# Patient Record
Sex: FEMALE | Race: WHITE | NOT HISPANIC OR LATINO | Employment: OTHER | URBAN - METROPOLITAN AREA
[De-identification: names, ages, dates, MRNs, and addresses within clinical notes are randomized per-mention and may not be internally consistent; named-entity substitution may affect disease eponyms.]

---

## 2017-01-20 ENCOUNTER — TRANSFERRED RECORDS (OUTPATIENT)
Dept: HEALTH INFORMATION MANAGEMENT | Facility: CLINIC | Age: 77
End: 2017-01-20

## 2017-02-06 ENCOUNTER — OFFICE VISIT (OUTPATIENT)
Dept: NEUROSURGERY | Facility: CLINIC | Age: 77
End: 2017-02-06

## 2017-02-06 VITALS
HEIGHT: 61 IN | BODY MASS INDEX: 22.68 KG/M2 | DIASTOLIC BLOOD PRESSURE: 68 MMHG | HEART RATE: 71 BPM | SYSTOLIC BLOOD PRESSURE: 142 MMHG | WEIGHT: 120.1 LBS

## 2017-02-06 DIAGNOSIS — M47.22 CERVICAL RADICULOPATHY DUE TO DEGENERATIVE JOINT DISEASE OF SPINE: Primary | ICD-10-CM

## 2017-02-06 ASSESSMENT — PAIN SCALES - GENERAL: PAINLEVEL: NO PAIN (0)

## 2017-02-06 NOTE — NURSING NOTE
Chief Complaint   Patient presents with     RECHECK     [facility] RETURN - See Elda young - Cervicalgia     Sarah Shipman MA

## 2017-02-06 NOTE — Clinical Note
2/6/2017      RE: Kimberly Chau  [address] [facility] [address]       CHIEF COMPLAINT:  Left arm tingling.      HISTORY OF PRESENT ILLNESS:  Ms. Chau is a pleasant 76-year-old female who underwent a C1-C6 instrumentation, laminectomy and fusion in 01/2013.  She did well postop.  At 1-year followup, she had some mild  tingling but otherwise denied neck pain or new symptoms.  She has rheumatoid arthritis and has been on methotrexate on and off for years.      At that time, a CT scan of the cervical spine demonstrated a solid fusion at C1-2, as well as stable hardware throughout.  She was doing well and so was discharged from care.      In 2015, she called here reporting some left hand tingling and was urged to follow up with her primary care physician, and if indicated, we would obviously see her again.      In 2017, this left hand tingling has not seemed to actually get better over time.  In fact, it has gotten worse, and recently she has been developing some pain kind of behind her left shoulder blade, left neck, down the back of her left arm.  Her symptoms are worse when she is up and about or first thing in the morning when she gets up.  She does not notice that her small fingers tingling any more than the rest of them.  It does seem to be rather circumferential, but the tingling does seem to be worse from about the mid humerus to the distal fingers.  Discomfort is really located kind of at the back of the neck and down the back of the left shoulder.      Serendipitously, about 2 weeks ago, she was put on a steroid pack by her rheumatologist for some skin, and about a week after starting on that, she noticed that the discomfort in the back of her neck and down the left arm seemed to be better after that.  The steroids have not seemed to make much difference in how her hand feels.      She did mention this to her primary care physician who in December ordered a CT scan, and based on  those results, she has returned to clinic to discuss her situation.      She does find that it helps to wear an elbow brace, this is a brace that was purchased in an over-the-counter type situation.  She notes that is not particularly tight, but she finds it eases the tingling some.  She states that her hands are both quite weak, as they have been for some time, so she has not noticed a change in the strength, no change in bowel or bladder function.  She notes that putting her left arm overhead helps the pain over the shoulder, as well as the tingling in the arm.      PAST MEDICAL HISTORY, FAMILY HISTORY, SOCIAL HISTORY:  All reviewed in Epic.      MEDICATIONS:  Reviewed in Epic.      ALLERGIES:  Reviewed in Epic.      REVIEW OF SYSTEMS:  A 13-point review of systems and problem list are all reviewed in Epic.  A 13-point review of systems is negative but for those symptoms associated with past medical history and history of present illness.      PHYSICAL EXAMINATION:   VITAL SIGNS:  Blood pressure is 142/68, pulse 71.  Height is 5 feet and 1/2 inches tall, weight 120 pounds, a well-developed, well-nourished, pleasant female in no acute distress.  She is found seated comfortably in exam room chair.  She is able to sit and rise independently.  She is accompanied by her spouse.  She is alert and oriented x3.  Cranial nerves II-XII are grossly intact.      Finger-nose is intact.  She has no drift.  DTRs at biceps, triceps, brachioradialis are brisk, 2+/4 but symmetric.  Sensation is intact to light touch.  She has no Anguiano's, no Phalen's, no Tinel's.  No fasciculations.  Significant rheumatoid deposits at most of the fingers.  Muscle bulk and tone slightly diminished.  Upper extremity strength is diminished throughout, basically 5-/5 throughout.      Structural exam:  Inspection of the spine reveals no scoliosis, exaggerated kyphosis or lordosis.  Prior surgical scars are beautifully healed.  Head is very slightly  forward of the pelvis in the sagittal plane.  Cervical spine range of motion is quite truncated, no spasming or significant tenderness.  No Spurling's or Lhermitte's is able to be elicited.  Her gait is narrow and smooth with no scissoring and no antalgia.      She has 1/4 reflexes bilateral knees and ankles.  No clonus.  Babinskis are mute.      IMAGING:  Cervical spine CT scan without contrast and without myelogram performed on 2016 is remarkable for post-surgical changes from C1-C6, no change in the alignment, no obvious evidence of nonunion.  She has a moderate central stenosis at C7-T1, left foraminal stenosis from C5-T1.      IMPRESSION:  I think Ms. Chau has probably a combination of cervical radiculopathy, mostly and possibly some ulnar nerve radiculopathy.  We are going to try to better delineate this with a MRI of the cervical spine.  If we cannot see everything we will need to see, we will get a CT myelogram, but I do want him put her through another CT scan with a myelogram unless we cannot see what we need to see with the MRI.      She seems to have responded well to steroids, so assuming that we do not see anything that urgently surgical on the MRI, we will manage this nonoperatively with a little bit of physical therapy, perhaps a Medrol Dosepak since she seems to respond pretty well to steroids and then a check-in visit after that.  I have discussed this case and her exam and presentation with Dr. Bar.      It has been our pleasure seeing this very pleasant patient.  We appreciate the opportunity to participate in her care.         SCAR MANDUJANO PA-C             D: 2017 10:20   T: 2017 07:10   MT: MELVIN      Name:     INDIA CHAU   MRN:      [id number]        Account:      [id number]   :      1940           Service Date: 2017      Document: [id number]

## 2017-02-06 NOTE — Clinical Note
2/6/2017       RE: Kimberly Chau  [address] [place] [address]     Dear Colleague,    Thank you for referring your patient, Kimberly Chau, to the [facility] NEUROSURGERY at [facility]. Please see a copy of my visit note below.                   Dictated    Again, thank you for allowing me to participate in the care of your patient.      Sincerely,    Ra Bar MD

## 2017-02-06 NOTE — MR AVS SNAPSHOT
After Visit Summary   2/6/2017    Kimberly Chau    MRN: [id number]           Patient Information     Date Of Birth          1940        Visit Information        Provider Department      2/6/2017 9:00 AM Ra Bar MD [facility] Neurosurgery        Today's Diagnoses     Cervical radiculopathy due to degenerative joint disease of spine    -  1        Follow-ups after your visit        Your next 10 appointments already scheduled     Feb 21, 2017  1:30 PM   (Arrive by 1:15 PM)   Return Visit with [facility] Neurosurgery ([facility])    [address] [place] [address] [phone]              Future tests that were ordered for you today     Open Future Orders        Priority Expected Expires Ordered    MRI Cervical spine w/o contrast Routine  2/6/2018 2/6/2017            Who to contact     Please call your clinic at [phone] to:    Ask questions about your health    Make or cancel appointments    Discuss your medicines    Learn about your test results    Speak to your doctor   If you have compliments or concerns about an experience at your clinic, or if you wish to file a complaint, please contact [facility] Patient Relations at [phone] or email us at [email or web address]         Additional Information About Your Visit        MyChart Information     Edutor is an electronic gateway that provides easy, online access to your medical records. With Edutor, you can request a clinic appointment, read your test results, renew a prescription or communicate with your care team.     To sign up for [employer] visit the website at [email or web address]   You will be asked to enter the access code listed below, as well as some personal information. Please follow the directions to create your username and password.     Your access code is: [email or web address]  Expires: 2/19/2017  6:30 AM    "  Your access code will  in 90 days. If you need help or a new code, please contact your [facility] or call [phone] for assistance.        Care EveryWhere ID     This is your Care EveryWhere ID. This could be used by other organizations to access your [place] medical records  [id number]        Your Vitals Were     Pulse Height BMI (Body Mass Index)             71 1.54 m (5' 0.63\") 22.97 kg/m2          Blood Pressure from Last 3 Encounters:   17 142/68   16 156/82   16 170/82    Weight from Last 3 Encounters:   17 54.477 kg (120 lb 1.6 oz)   16 54.795 kg (120 lb 12.8 oz)   16 53.978 kg (119 lb)               Primary Care Provider Office Phone # Fax #    Alli Wetzel [phone]        [facility] [address] [place] [address] [phone]  [facility] [address]        Thank you!     Thank you for choosing [facility]  for your care. Our goal is always to provide you with excellent care. Hearing back from our patients is one way we can continue to improve our services. Please take a few minutes to complete the written survey that you may receive in the mail after your visit with us. Thank you!             Your Updated Medication List - Protect others around you: Learn how to safely use, store and throw away your medicines at www.disposemymeds.org.          This list is accurate as of: 17 10:19 AM.  Always use your most recent med list.                   Brand Name Dispense Instructions for use    ACETAMINOPHEN 8 HOUR 650 MG 8 hour tablet   Generic drug:  acetaminophen     100 tablet    Take 650 mg by mouth every 4 hours as needed.       amLODIPine 10 MG tablet    NORVASC    90 tablet    Take 1 tablet (10 mg) by mouth daily       clobetasol 0.05 % Crea cream    CLOBETASOL PROPIONATE EMULSION    30 g    Apply topically 3 times daily Use as directed       DICLOFENAC SODIUM PO      Take 25 mg by mouth daily       FOLIC ACID "      daily.       METHOTREXATE      10 tablets once a week On Fridays. Taking 8 tablets on Fridays       metoprolol 100 MG tablet    LOPRESSOR    360 tablet    Take 2 tablets (200 mg) by mouth every 12 hours       mometasone 50 MCG/ACT spray    NASONEX    1 Box    Spray 1 spray into both nostrils as needed PRN daily       Multi-vitamin Tabs tablet   Generic drug:  multivitamin, therapeutic with minerals      Take 1 tablet by mouth daily.       PRILOSEC PO      Take 20 mg by mouth daily as needed       TYLENOL PM EXTRA STRENGTH PO      Take 2 tablets by mouth nightly as needed.

## 2017-02-07 ENCOUNTER — TRANSFERRED RECORDS (OUTPATIENT)
Dept: HEALTH INFORMATION MANAGEMENT | Facility: CLINIC | Age: 77
End: 2017-02-07

## 2017-02-07 NOTE — PROGRESS NOTES
NEUROSURGERY CONSULT  2/6/17    CHIEF COMPLAINT:  Left arm tingling.      HISTORY OF PRESENT ILLNESS:  Ms. Chau is a pleasant 76-year-old female with RA who underwent a C1-C6 instrumentation, laminectomy and fusion in 01/2013 by Dr Bar.  She did well postop.  At 1-year followup, she had some mild  tingling but otherwise denied neck pain or new symptoms.  She has rheumatoid arthritis and has been on methotrexate on and off for years.      At that time, a CT scan of the cervical spine demonstrated a solid fusion at C1-2, as well as stable hardware throughout.  She was doing well and so was discharged from care.      In 2015, she called here reporting some left hand tingling and was urged to follow up with her primary care physician, and if indicated, we would obviously see her again.      In 2017, this left hand tingling has not seemed to actually get better over time.  In fact, it has gotten worse, and recently she has been developing some pain kind of behind her left shoulder blade, left neck, down the back of her left arm.  Her symptoms are worse when she is up and about or first thing in the morning when she gets up.  She does not notice that her small fingers tingling any more than the rest of them.  It does seem to be rather circumferential, but the tingling does seem to be worse from about the mid humerus to the distal fingers.  Discomfort is really located kind of at the back of the neck and down the back of the left shoulder.      Serendipitously, about 2 weeks ago, she was put on a steroid pack by her rheumatologist for some skin issues, and about a week after starting on that she noticed that the discomfort in the back of her neck and down the left arm seemed lessened.     She did mention this to her primary care physician who in December ordered a CT scan, and based on those results, she has returned to clinic to discuss her situation.      She does find that it helps to wear an elbow  brace, this is a brace that was purchased in an over-the-counter type situation.  She notes that is not particularly tight, but she finds it eases the tingling some.  She states that her hands are both quite weak, as they have been for some time, so she has not noticed a change in the strength, no change in bowel or bladder function.  She notes that putting her left arm overhead helps the pain over the shoulder, as well as the tingling in the arm.      PAST MEDICAL HISTORY, FAMILY HISTORY, SOCIAL HISTORY:  All reviewed in Epic.   MEDICATIONS:  Reviewed in Epic.   ALLERGIES:  Reviewed in Epic.   REVIEW OF SYSTEMS:  A 13-point review of systems and problem list are all reviewed in Epic.  A 13-point review of systems is negative but for those symptoms associated with past medical history and history of present illness.      PHYSICAL EXAMINATION:   VITAL SIGNS:  Blood pressure is 142/68, pulse 71.  Height is 5 feet and 1/2 inches tall, weight 120 pounds, a well-developed, well-nourished, pleasant female in no acute distress.  She is found seated comfortably in exam room chair.  She is able to sit and rise independently.  She is accompanied by her spouse.  She is alert and oriented x3.  Cranial nerves II-XII are grossly intact.      Finger-nose is intact.  She has no drift.  DTRs at biceps, triceps, brachioradialis are brisk, 2+/4 but symmetric.  Sensation is intact to light touch.  She has no Anguiano's, no Phalen's, no Tinel's.  No fasciculations.  Significant rheumatoid deposits at most of the fingers.  Muscle bulk and tone slightly diminished.  Upper extremity strength is diminished throughout, basically 5-/5 throughout.      Structural exam:  Inspection of the spine reveals no scoliosis, exaggerated kyphosis or lordosis.  Prior surgical scars are beautifully healed.  Head is very slightly forward of the pelvis in the sagittal plane.  Cervical spine range of motion is quite truncated, no spasming or significant  tenderness.  No Spurling's or Lhermitte's is able to be elicited.  Her gait is narrow and smooth with no scissoring and no antalgia.      She has 1/4 reflexes bilateral knees and ankles.  No clonus.  Babinskis are mute.      IMAGING:  Cervical spine CT scan without contrast and without myelogram performed on 2016 is remarkable for post-surgical changes from C1-C6, no change in the alignment, no obvious evidence of nonunion.  She has a moderate central stenosis at C7-T1, left foraminal stenosis from C5-T1.      IMPRESSION/PLAN:  I think Ms. Chau has probably a combination of cervical radiculopathy, mostly and possibly some ulnar nerve radiculopathy.  We are going to try to better delineate this with a MRI of the cervical spine.  If we cannot see everything we will need to see, we will get a CT myelogram, but I do want him put her through another CT scan with a myelogram unless I have to     She seems to have responded well to steroids, so assuming that we do not see anything that urgently surgical on the MRI, we will manage this nonoperatively with a little bit of physical therapy, perhaps a Medrol Dosepak since she seems to respond pretty well to steroids and then a check-in visit after that.  I have discussed this case and her exam and presentation with Dr. Bar.      It has been our pleasure seeing this very pleasant patient.  We appreciate the opportunity to participate in her care.         SCAR MANDUJANO PA-C             D: 2017 10:20   T: 2017 07:10   MT: MELVIN      Name:     INDIA CHAU   MRN:      [id number]        Account:      [id number]   :      1940           Service Date: 2017      Document: [id number]

## 2017-02-21 ENCOUNTER — OFFICE VISIT (OUTPATIENT)
Dept: NEUROSURGERY | Facility: CLINIC | Age: 77
End: 2017-02-21

## 2017-02-21 VITALS
HEIGHT: 60 IN | HEART RATE: 84 BPM | WEIGHT: 120 LBS | DIASTOLIC BLOOD PRESSURE: 73 MMHG | BODY MASS INDEX: 23.56 KG/M2 | SYSTOLIC BLOOD PRESSURE: 159 MMHG

## 2017-02-21 DIAGNOSIS — M47.22 CERVICAL RADICULOPATHY DUE TO DEGENERATIVE JOINT DISEASE OF SPINE: Primary | ICD-10-CM

## 2017-02-21 RX ORDER — METHYLPREDNISOLONE 4 MG
TABLET, DOSE PACK ORAL
Qty: 21 TABLET | Refills: 0 | Status: SHIPPED | OUTPATIENT
Start: 2017-02-21 | End: 2017-11-13

## 2017-02-21 ASSESSMENT — PAIN SCALES - GENERAL: PAINLEVEL: MODERATE PAIN (5)

## 2017-02-21 NOTE — NURSING NOTE
Chief Complaint   Patient presents with     RECHECK     Cervical rediculopathy     Lizy Hobbs LPN  Neuroscience- Movement Disorders and Epilepsy

## 2017-02-21 NOTE — LETTER
2/21/2017       RE: Kimberly Chau  [address] [place] [address]     Dear Colleague,    Thank you for referring your patient, Kimberly Chau, to the [facility] NEUROSURGERY at [facility]. Please see a copy of my visit note below.        NEUROSURGERY FOLLOW UP  2/21/17    CHIEF COMPLAINT:  Left arm tingling.      HISTORY OF PRESENT ILLNESS:  Ms. Chau is a pleasant 76-year-old female with RA who underwent a C1-C6 instrumentation, laminectomy and fusion in 01/2013 by Dr Bar.  She did well postop.  At 1-year followup, she had some mild  tingling but otherwise denied neck pain or new symptoms.  She has rheumatoid arthritis and has been on methotrexate on and off for years.      At that time, a CT scan of the cervical spine demonstrated a solid fusion at C1-2, as well as stable hardware throughout.  She was doing well and so was discharged from care.      In 2015, she called here reporting some left hand tingling and was urged to follow up with her primary care physician, and if indicated, we would obviously see her again.      In 2017, this left hand tingling has not seemed to actually get better over time.  In fact, it has gotten worse, and recently she has been developing some pain kind of behind her left shoulder blade, left neck, down the back of her left arm.  Her symptoms are worse when she is up and about or first thing in the morning when she gets up.  She does not notice that her small fingers tingling any more than the rest of them.  It does seem to be rather circumferential, but the tingling does seem to be worse from about the mid humerus to the distal fingers.  Discomfort is really located kind of at the back of the neck and down the back of the left shoulder.      Serendipitously, about 2 weeks ago, she was put on a steroid pack by her rheumatologist for some skin issues, and about a week after starting on that she noticed that her neck and left  arm pain got better.     A CT scan was done, and based on those results, she has returned to NS clinic.      She does find that it helps to wear an elbow brace, this is a brace that was purchased in an over-the-counter type situation.  She notes that is not particularly tight, but she finds it eases the tingling some.  She states that her hands are both quite weak, as they have been for some time, so she has not noticed a change in the strength, no change in bowel or bladder function.  She notes that putting her left arm overhead helps the pain over the shoulder, as well as the tingling in the arm.     We ordered an MRI, a dosepack and PT and she returns now. Her arm feels a bit better.     PAST MEDICAL HISTORY, FAMILY HISTORY, SOCIAL HISTORY:  All reviewed in Epic.   MEDICATIONS:  Reviewed in Epic.   ALLERGIES:  Reviewed in Epic.   REVIEW OF SYSTEMS:  A 13-point review of systems and problem list are all reviewed in Epic.  A 13-point review of systems is negative but for those symptoms associated with past medical history and history of present illness.      PHYSICAL EXAMINATION:   VITAL SIGNS:  Blood pressure is 142/68, pulse 71.  Height is 5 feet and 1/2 inches tall, weight 120 pounds, a well-developed, well-nourished, pleasant female in no acute distress.  She is found seated comfortably in exam room chair.  She is able to sit and rise independently.  She is accompanied by her spouse.  She is alert and oriented x3.  Cranial nerves II-XII are grossly intact.      Finger-nose is intact.  She has no drift.  DTRs at biceps, triceps, brachioradialis are brisk, 2+/4 but symmetric.  Sensation is intact to light touch.  She has no Anguiano's, no Phalen's, no Tinel's.  No fasciculations.  Significant rheumatoid deposits at most of the fingers.  Muscle bulk and tone slightly diminished.  Upper extremity strength is diminished throughout, basically 5-/5 throughout.      Structural exam:  Inspection of the spine reveals no  scoliosis, exaggerated kyphosis or lordosis.  Prior surgical scars are beautifully healed.  Head is very slightly forward of the pelvis in the sagittal plane.  Cervical spine range of motion is quite truncated, no spasming or significant tenderness.  No Spurling's or Lhermitte's is able to be elicited.  Her gait is narrow and smooth with no scissoring and no antalgia.      She has 1/4 reflexes bilateral knees and ankles.  No clonus.  Babinskis are mute.      IMAGING:    MRI cervical done at [facility] February 2017  Instrumented posterior spinal fusions from C1 to C6 with specific findings as follows:  1. Advanced C7-T1 disc degeneration with a 2 mm spondylolisthesis, mild to moderate central canal stenosis, severe left foraminal stenosis and left C8 neural impingement.  2. Advanced C6-7 disc degeneration with mild to moderate central canal stenosis and moderate left foraminal stenosis.  3. Interbody autofusion at C5-6 with solid-appearing posterior fusion masses and moderate residual foraminal stenosis on the left.  4. Residual spondylolisthesis at C4-5 with solid-appearing posterior fusions and moderate to severe residual foraminal stenosis.  5. Indeterminate posterior spinal fusions at C3-4, C2-3 and C1-2. If clinically indicated, thin section CT would be useful for further evaluation      Cervical spine CT scan wo contrast and wo myelogram on 12/05/2016   Post-surgical changes from C1-C6, no change in the alignment, no obvious evidence of nonunion.  She has a moderate central stenosis at C7-T1, left foraminal stenosis from C5-T1.      IMPRESSION/PLAN: Ms. Chau has cervical radiculopathy. She is not interested in surgical solutions at this time   She has responded moderately well to the treatments thus far and would like to continue with PT and so on.  We discussed warning signs of neurologic impairment and she knows to call and come in if these appear, otherwise we can see her PRN. I have discussed this case and her  exam and presentation with Dr. Bar.      It has been our pleasure seeing this very pleasant patient.  We appreciate the opportunity to participate in her care.         SCAR MANDUJANO PA-C

## 2017-02-21 NOTE — MR AVS SNAPSHOT
After Visit Summary   2/21/2017    Kimberly Chau    MRN: [id number]           Patient Information     Date Of Birth          1940        Visit Information        Provider Department      2/21/2017 1:30 PM Elda Borja PA-C [facility] Neurosurgery        Today's Diagnoses     Cervical radiculopathy due to degenerative joint disease of spine    -  1      Care Instructions    For any questions or concerns, call ANDRAE Benitez Care Coordinator at [phone]        Follow-ups after your visit        Additional Services     PT Evaluation and Treatment (External Referral) [7623]       Your provider has referred you to: [facility] - [place], [place]  Address: [address], [place] [address]  Phone: [phone]    Please be aware that coverage of these services is subject to the terms and limitations of your health insurance plan.  Call member services at your health plan with any benefit or coverage questions.      Treatment: Evaluation & Treatment  Special Instructions: gentle PT  Special Programs: None  Modalities: As indicated  Equipment: None  Duration and Frequency: Per Therapist Evaluation    Please bring the following to your appointment:  >>   Any x-rays, CTs or MRIs which have been performed.  Contact the facility where they were done to arrange for  prior to your scheduled appointment.  Any new CT, MRI or other procedures ordered by your specialist must be performed at a [facility] facility or coordinated by your clinic's referral office.    >>   List of current medications   >>   This referral request   >>   Any documents/labs given to you for this referral      **Note to Provider** To refer patients to therapy outside of the Location list, change the order class to External Referral in the General section.                  Follow-up notes from your care team     Return in about 6 weeks (around 4/4/2017).      Your next 10 appointments already  scheduled     2017  1:30 PM CDT   (Arrive by 1:15 PM)   Return Visit with Elda Borja PA-C   [facility] ([facility] [place])    [address] [address] [phone]              Who to contact     Please call your clinic at [phone] to:    Ask questions about your health    Make or cancel appointments    Discuss your medicines    Learn about your test results    Speak to your doctor   If you have compliments or concerns about an experience at your clinic, or if you wish to file a complaint, please contact [facility] Patient Relations at [phone] or email us at [email or web address]         Additional Information About Your Visit        Transgenomichart Information     [employer]faces is an electronic gateway that provides easy, online access to your medical records. With Adfaces, you can request a clinic appointment, read your test results, renew a prescription or communicate with your care team.     To sign up for Adfaces visit the website at [email or web address]   You will be asked to enter the access code listed below, as well as some personal information. Please follow the directions to create your username and password.     Your access code is: [email or web address]  Expires: 2017  2:51 PM     Your access code will  in 90 days. If you need help or a new code, please contact your [facility] or call [phone] for assistance.        Care EveryWhere ID     This is your Care EveryWhere ID. This could be used by other organizations to access your [facility] medical records  [id number]        Your Vitals Were     Pulse Height BMI (Body Mass Index)             84 1.524 m (5') 23.44 kg/m2          Blood Pressure from Last 3 Encounters:   17 159/73   17 142/68   16 156/82    Weight from Last 3 Encounters:   17 54.4 kg (120 lb)   17 54.5 kg (120  lb 1.6 oz)   12/26/16 54.8 kg (120 lb 12.8 oz)              We Performed the Following     PT Evaluation and Treatment (External Referral) [9032]          Today's Medication Changes          These changes are accurate as of: 2/21/17  2:51 PM.  If you have any questions, ask your nurse or doctor.               Start taking these medicines.        Dose/Directions    methylPREDNISolone 4 MG tablet   Commonly known as:  MEDROL DOSEPAK   Used for:  Cervical radiculopathy due to degenerative joint disease of spine   Started by:  Elda Borja PA-C        Follow package instructions   Quantity:  21 tablet   Refills:  0            Where to get your medicines      Some of these will need a paper prescription and others can be bought over the counter.  Ask your nurse if you have questions.     Bring a paper prescription for each of these medications     methylPREDNISolone 4 MG tablet                Primary Care Provider Office Phone # Fax #    Alli Wetzel [phone]        [facility] [address] [facility] [address]  [facility] [address]        Thank you!     Thank you for choosing [facility] NEUROSURGERY  for your care. Our goal is always to provide you with excellent care. Hearing back from our patients is one way we can continue to improve our services. Please take a few minutes to complete the written survey that you may receive in the mail after your visit with us. Thank you!             Your Updated Medication List - Protect others around you: Learn how to safely use, store and throw away your medicines at www.disposemymeds.org.          This list is accurate as of: 2/21/17  2:51 PM.  Always use your most recent med list.                   Brand Name Dispense Instructions for use    ACETAMINOPHEN 8 HOUR 650 MG 8 hour tablet   Generic drug:  acetaminophen     100 tablet    Take 650 mg by mouth every 4 hours as needed.       amLODIPine 10 MG tablet    NORVASC    90 tablet    Take 1 tablet (10 mg) by  mouth daily       clobetasol 0.05 % Crea cream    CLOBETASOL PROPIONATE EMULSION    30 g    Apply topically 3 times daily Use as directed       DICLOFENAC SODIUM PO      Take 25 mg by mouth daily       FOLIC ACID      daily.       METHOTREXATE      10 tablets once a week On Fridays. Taking 8 tablets on Fridays       methylPREDNISolone 4 MG tablet    MEDROL DOSEPAK    21 tablet    Follow package instructions       metoprolol 100 MG tablet    LOPRESSOR    360 tablet    Take 2 tablets (200 mg) by mouth every 12 hours       mometasone 50 MCG/ACT spray    NASONEX    1 Box    Spray 1 spray into both nostrils as needed PRN daily       Multi-vitamin Tabs tablet   Generic drug:  multivitamin, therapeutic with minerals      Take 1 tablet by mouth daily.       PRILOSEC PO      Take 20 mg by mouth daily as needed       TYLENOL PM EXTRA STRENGTH PO      Take 2 tablets by mouth nightly as needed.

## 2017-02-21 NOTE — PROGRESS NOTES
NEUROSURGERY FOLLOW UP  2/21/17    CHIEF COMPLAINT:  Left arm tingling.      HISTORY OF PRESENT ILLNESS:  Ms. Chau is a pleasant 76-year-old female with RA who underwent a C1-C6 instrumentation, laminectomy and fusion in 01/2013 by Dr Bar.  She did well postop.  At 1-year followup, she had some mild  tingling but otherwise denied neck pain or new symptoms.  She has rheumatoid arthritis and has been on methotrexate on and off for years.      At that time, a CT scan of the cervical spine demonstrated a solid fusion at C1-2, as well as stable hardware throughout.  She was doing well and so was discharged from care.      In 2015, she called here reporting some left hand tingling and was urged to follow up with her primary care physician, and if indicated, we would obviously see her again.      In 2017, this left hand tingling has not seemed to actually get better over time.  In fact, it has gotten worse, and recently she has been developing some pain kind of behind her left shoulder blade, left neck, down the back of her left arm.  Her symptoms are worse when she is up and about or first thing in the morning when she gets up.  She does not notice that her small fingers tingling any more than the rest of them.  It does seem to be rather circumferential, but the tingling does seem to be worse from about the mid humerus to the distal fingers.  Discomfort is really located kind of at the back of the neck and down the back of the left shoulder.      Serendipitously, about 2 weeks ago, she was put on a steroid pack by her rheumatologist for some skin issues, and about a week after starting on that she noticed that her neck and left arm pain got better.     A CT scan was done, and based on those results, she has returned to [facility].      She does find that it helps to wear an elbow brace, this is a brace that was purchased in an over-the-counter type situation.  She notes that is not particularly tight, but  she finds it eases the tingling some.  She states that her hands are both quite weak, as they have been for some time, so she has not noticed a change in the strength, no change in bowel or bladder function.  She notes that putting her left arm overhead helps the pain over the shoulder, as well as the tingling in the arm.     We ordered an MRI, a dosepack and PT and she returns now. Her arm feels a bit better.     PAST MEDICAL HISTORY, FAMILY HISTORY, SOCIAL HISTORY:  All reviewed in Epic.   MEDICATIONS:  Reviewed in Epic.   ALLERGIES:  Reviewed in Epic.   REVIEW OF SYSTEMS:  A 13-point review of systems and problem list are all reviewed in Epic.  A 13-point review of systems is negative but for those symptoms associated with past medical history and history of present illness.      PHYSICAL EXAMINATION:   VITAL SIGNS:  Blood pressure is 142/68, pulse 71.  Height is 5 feet and 1/2 inches tall, weight 120 pounds, a well-developed, well-nourished, pleasant female in no acute distress.  She is found seated comfortably in exam room chair.  She is able to sit and rise independently.  She is accompanied by her spouse.  She is alert and oriented x3.  Cranial nerves II-XII are grossly intact.      Finger-nose is intact.  She has no drift.  DTRs at biceps, triceps, brachioradialis are brisk, 2+/4 but symmetric.  Sensation is intact to light touch.  She has no Anguiano's, no Phalen's, no Tinel's.  No fasciculations.  Significant rheumatoid deposits at most of the fingers.  Muscle bulk and tone slightly diminished.  Upper extremity strength is diminished throughout, basically 5-/5 throughout.      Structural exam:  Inspection of the spine reveals no scoliosis, exaggerated kyphosis or lordosis.  Prior surgical scars are beautifully healed.  Head is very slightly forward of the pelvis in the sagittal plane.  Cervical spine range of motion is quite truncated, no spasming or significant tenderness.  No Spurling's or Lhermitte's is  able to be elicited.  Her gait is narrow and smooth with no scissoring and no antalgia.      She has 1/4 reflexes bilateral knees and ankles.  No clonus.  Babinskis are mute.      IMAGING:    MRI cervical done at [facility] February 2017  Instrumented posterior spinal fusions from C1 to C6 with specific findings as follows:  1. Advanced C7-T1 disc degeneration with a 2 mm spondylolisthesis, mild to moderate central canal stenosis, severe left foraminal stenosis and left C8 neural impingement.  2. Advanced C6-7 disc degeneration with mild to moderate central canal stenosis and moderate left foraminal stenosis.  3. Interbody autofusion at C5-6 with solid-appearing posterior fusion masses and moderate residual foraminal stenosis on the left.  4. Residual spondylolisthesis at C4-5 with solid-appearing posterior fusions and moderate to severe residual foraminal stenosis.  5. Indeterminate posterior spinal fusions at C3-4, C2-3 and C1-2. If clinically indicated, thin section CT would be useful for further evaluation      Cervical spine CT scan wo contrast and wo myelogram on 12/05/2016   Post-surgical changes from C1-C6, no change in the alignment, no obvious evidence of nonunion.  She has a moderate central stenosis at C7-T1, left foraminal stenosis from C5-T1.      IMPRESSION/PLAN: Ms. Chau has cervical radiculopathy. She is not interested in surgical solutions at this time   She has responded moderately well to the treatments thus far and would like to continue with PT and so on.  We discussed warning signs of neurologic impairment and she knows to call and come in if these appear, otherwise we can see her PRN. I have discussed this case and her exam and presentation with Dr. Bar.      It has been our pleasure seeing this very pleasant patient.  We appreciate the opportunity to participate in her care.         SCAR MANDUJANO PA-C

## 2017-02-23 ENCOUNTER — OFFICE VISIT (OUTPATIENT)
Dept: DERMATOLOGY | Facility: CLINIC | Age: 77
End: 2017-02-23
Payer: COMMERCIAL

## 2017-02-23 DIAGNOSIS — K13.79 ACUTE ORAL PAIN: ICD-10-CM

## 2017-02-23 DIAGNOSIS — K13.70 ORAL MUCOSAL LESION: Primary | ICD-10-CM

## 2017-02-23 DIAGNOSIS — L20.84 INTRINSIC ATOPIC DERMATITIS: Primary | ICD-10-CM

## 2017-02-23 DIAGNOSIS — L85.3 XEROSIS OF SKIN: ICD-10-CM

## 2017-02-23 PROCEDURE — 99203 OFFICE O/P NEW LOW 30 MIN: CPT | Performed by: DERMATOLOGY

## 2017-02-23 RX ORDER — FLUOCINOLONE ACETONIDE 0.25 MG/G
OINTMENT TOPICAL
Qty: 60 G | Refills: 1 | Status: SHIPPED | OUTPATIENT
Start: 2017-02-23 | End: 2019-02-04

## 2017-02-23 RX ORDER — TRIAMCINOLONE ACETONIDE 1 MG/G
OINTMENT TOPICAL
Qty: 454 G | Refills: 1 | Status: SHIPPED | OUTPATIENT
Start: 2017-02-23 | End: 2018-02-27

## 2017-02-23 ASSESSMENT — PAIN SCALES - GENERAL: PAINLEVEL: NO PAIN (0)

## 2017-02-23 NOTE — LETTER
2/23/2017       RE: Kimberly Chau  [address] [place] [address]     Dear Colleague,    Thank you for referring your patient, Kimberly Chau, to the [facility]. Please see a copy of my visit note below.    [facility] Dermatology Note      Dermatology Problem List:  1. Hx of Atopic dermatitis w/ Xerosis of skin  -s/p Previous treatment: Cyclosporin, discontinued 06/28/2010. Topical Fluocinolone 0.025% ointment.   Current treatment: Triamcinolone 0.1% ointment, Fluocinolone 0.025% ointment followed by moisturizer     Encounter Date: Feb 23, 2017    CC:  Chief Complaint   Patient presents with     Derm Problem     Rash all over          History of Present Illness:  Ms. Kimberly Chau is a 76 year old female who presents for evaluation of rash all over body. She reports rash has been present prior to Christmas 2016. She currently uses the Vanicream lite lotion and moisturizers daily more than one time a day. Pt bathes every other day and uses the Dove soap for sensitive skin.     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 Pt has Rheumatoid arthritis and is on Methotrexate.  Pt noted that she was off MTX about the same time the atopic dermatitis flared and since being back on mtx its been slightly better. She was off because she was unable to get monitoring labs in time. She is not treating with any specific topical meds. No problem with sleeping.    Chart review: Pt previously seen Dr. Jorge A Rosado in Dermatology at the [facility] site back in 2010.    Past Medical History:   Patient Active Problem List   Diagnosis     Other atopic dermatitis and related conditions     Essential hypertension     Rheumatoid arthritis (H)     Retinal artery occlusion     Cervical vertebral fracture (H)     Central retinal artery occlusion of right eye     Bilateral occipital neuralgia     C1-C2 instability     Rheumatoid  arthritis involving both hands with positive rheumatoid factor (H)     Past Medical History   Diagnosis Date     Anemia      Cataracts      Central retinal artery occlusion      Cervical spine fracture (H)      After a fall from orthostatic sx     Chronic pain      Back of head     Gastro-oesophageal reflux disease      Hypertension      Hyponatremia      Resolved, 2/2 diuretics     Migraines      Rheumatoid arthritis(714.0)      Past Surgical History   Procedure Laterality Date     Hysterectomy       Knee surgery       Colonoscopy       Fusion cervical posterior three+ levels  1/22/2013     Procedure: FUSION CERVICAL POSTERIOR THREE+ LEVELS;  Cervical 1-6 Posterior Instrumentation and Fusion, Cervical 3-4 Laminectomy  .Instrumentation and fusion to Cervical 6 *Latex Allergy*;  Surgeon: Ra Bar MD;  Location:  OR     Head & neck surgery         Social History:  The patient is retired. The patient denies use of tanning beds. She admits to consumption of 3-6 alcoholic beverages a week and is a nonsmoker.     Family History:  There is no family history of skin cancer. There is a family hx of Hay fever, Eczema, and Psoriasis.     Medications:  Current Outpatient Prescriptions   Medication Sig Dispense Refill     methylPREDNISolone (MEDROL DOSEPAK) 4 MG tablet Follow package instructions 21 tablet 0     clobetasol (CLOBETASOL PROPIONATE EMULSION) 0.05 % CREA Apply topically 3 times daily Use as directed 30 g 0     mometasone (NASONEX) 50 MCG/ACT nasal spray Spray 1 spray into both nostrils as needed PRN daily 1 Box 11     amLODIPine (NORVASC) 10 MG tablet Take 1 tablet (10 mg) by mouth daily 90 tablet 3     metoprolol (LOPRESSOR) 100 MG tablet Take 2 tablets (200 mg) by mouth every 12 hours 360 tablet 3     Omeprazole (PRILOSEC PO) Take 20 mg by mouth daily as needed        DICLOFENAC SODIUM PO Take 25 mg by mouth daily       FOLIC ACID daily.        METHOTREXATE 10 tablets once a week On Fridays.  Taking  8 tablets on Fridays       acetaminophen 650 MG TABS Take 650 mg by mouth every 4 hours as needed. 100 tablet 0     Multiple Vitamin (MULTI-VITAMIN) per tablet Take 1 tablet by mouth daily.       Diphenhydramine-APAP, sleep, (TYLENOL PM EXTRA STRENGTH PO) Take 2 tablets by mouth nightly as needed.         Allergies   Allergen Reactions     Hctz Other (See Comments)     Pt develops symptomatic and significant hyponatremia with HCTZ exposure     Hydrochlorothiazide      Other reaction(s): Hyponatremia  Hyponatremia     Latex      Benzocaine Rash     carba mix,thrium-sunscreen     Resorcinol-Alcohol Rash     carba mix,thrium-sunscreen     Ace Inhibitors      Dizziness and orthostatic changes and electrolyte problems.     Sulfa Drugs        Review of Systems:  -Skin/Heme New Pt: The patient denies frequent sun exposure. The patient denies excessive scarring or problems healing except as per HPI. The patient denies excessive bleeding. Pt has a Nickel Allergy or Skin Allergy.   -Constitutional: The patient is otherwise feeling well.     Physical exam:  Vitals: There were no vitals taken for this visit.  GEN: This is a well-nourished, well developed female in no acute distress  NEURO: Alert and oriented  PSYCH: in a pleasant mood, appropriate affect  SKIN: Total skin excluding the undergarment areas was performed. The exam included the head/face, neck, both arms, chest, back, abdomen, both legs, digits and/or nails.   -Right arm is clear  -Scattered excoriated papules on the back with scattered eczematous papules and plaques  -Excoriated, poorly demarcated papules and plaques on the left arm, unto the shoulder and chest  -Scattered excoriated pink dry papules and plaques on the legs as well as the popliteal fossa and left genao surface of the hand.   -Scaly eczematous papules and plaques on face.   -No other lesions of concern on areas examined.       Impression/Plan:  1. Atopic dermatitis w/ Xerosis of skin, flare likely  triggered by change in season as well as cessation of MTX as MTX is used to treat severe atopic dermatitis. Will treat topically today. Vanicream    Triamcinolone 0.1% ointment BID body + Vanicream    Fluocinolone 0.025% ointment BID face + Vanicream    Discussed consistent and daily use of gentle moisturizers, Preferable cream rather than lotion.     Gentle skin care in AVS.    Dove sensitive soap is fine.      Follow-up in 2-3 weeks, earlier for new or changing lesions.       Staff Involved:  Scribe/Staff      Scribe Disclosure:   I, De Kimble, am serving as a scribe to document services personally performed by Dr. Jacob Lane, based on data collection and the provider's statements to me.     Provider Disclosure:   I have reviewed the documentation recorded by the scribe and have edited it as needed. I have personally performed the services documented here and the documentation accurately represents those services and the decisions made by me.     Jacob Lane MD, MS    Department of Dermatology  [facility]: Phone: [phone], Fax:[phone]  [facility]: Phone: [phone], Fax: [phone]

## 2017-02-23 NOTE — MR AVS SNAPSHOT
After Visit Summary   2/23/2017    Kimberly Chau    MRN: [id number]           Patient Information     Date Of Birth          1940        Visit Information        Provider Department      2/23/2017 8:45 AM Jacob Lane MD [facility]        Today's Diagnoses     Intrinsic atopic dermatitis    -  1    Xerosis of skin          Care Instructions    Gentle Skin Care  Below is a list of products our providers recommend for gentle skin care.  1. Daily bathing is recommended. Make sure you are applying a good moisturizer after bathing every time.  2. Use Moisturizing creams at least twice daily to the whole body. Your provider may recommend a lighter or heavier moisturizer based on your child s severity and that time of year it is.  a. Lighter, more pleasing to the feel moisturizers include products such as; Cetaphil, Cerave, Aveeno and Vanicream light.   b. Thicker agents include; Aquaphor ointment, Vaseline, Eucerin and Vanicream.  3. Creams are more moisturizing than lotions  4. Products should be fragrance free- soaps, creams, detergents.  a. Mild Bar Soaps include;   i. Fragrance Free Dove, Basis and Purpose  b. Mild Liquid Cleansers;  i. Vanicream, Cetaphil, Aquanil, Cerave and Aquaphor  5. Laundry Products include;  i. All Free and Clear, Dreft, and Cheer Free  Care Plan:  1. Keep bathing and showering short, less than 15 mins  2. Always use lukewarm warm when possible. AVOID very HOT or COLD water  3. DO NOT use bubble bath  4. Limit the use of soaps. Focus on  dirty  areas of the body; face, armpits, groin and feet  5. Do NOT vigorously scrub when you cleanse your skin  6. After bathing, PAT your skin lightly with a towel. DO NOT rub or scrub when drying  7. ALWAYS apply a moisturizer immediately after bathing. This helps to  lock in  the moisture. * IF YOU WERE PRESCRIBED A TOPICAL MEDICATION, APPLY YOUR MEDICATION FIRST THEN COVER WITH YOUR DAILY MOISTURIZER  8. Reapply  moisturizing agents at least twice daily to your whole body  9. Do not use products such as powders, perfumes, or colognes on your skin  10. Avoid saunas and steam baths. This temperature is too HOT  11. Use unscented hypo-allergenic laundry products. AVOID fabric softeners  and dryer sheets  12. Avoid tight or  scratchy  clothing such as wool  13. Always wash new clothing before wearing them for the first time  14. Sometimes a humidifier or vaporizer, used at night can help the dry skin. Remember to keep it clean to void mold growth  ----------------------            Follow-ups after your visit        Your next 10 appointments already scheduled     Apr 04, 2017  1:30 PM CDT   (Arrive by 1:15 PM)   Return Visit with Elda Borja PA-C   [facility] ([facility])    [address] [place] [id number] [phone]              Who to contact     If you have questions or need follow up information about today's clinic visit or your schedule please contact [facility] directly at [phone].  Normal or non-critical lab and imaging results will be communicated to you by simfyhart, letter or phone within 4 business days after the clinic has received the results. If you do not hear from us within 7 days, please contact the clinic through simfyhart or phone. If you have a critical or abnormal lab result, we will notify you by phone as soon as possible.  Submit refill requests through Enohm or call your pharmacy and they will forward the refill request to us. Please allow 3 business days for your refill to be completed.          Additional Information About Your Visit        Enohm Information     Enohm is an electronic gateway that provides easy, online access to your medical records. With Enohm, you can request a clinic appointment, read your test results, renew a prescription or communicate with your care team.     To sign up for Enohm  visit the website at [email or web address]   You will be asked to enter the access code listed below, as well as some personal information. Please follow the directions to create your username and password.     Your access code is: [email or web address]  Expires: 2017  2:51 PM     Your access code will  in 90 days. If you need help or a new code, please contact your [facility] or call [phone] for assistance.        Care EveryWhere ID     This is your Care EveryWhere ID. This could be used by other organizations to access your [place] medical records  [id number]         Blood Pressure from Last 3 Encounters:   17 159/73   17 142/68   16 156/82    Weight from Last 3 Encounters:   17 120 lb (54.4 kg)   17 120 lb 1.6 oz (54.5 kg)   16 120 lb 12.8 oz (54.8 kg)              Today, you had the following     No orders found for display         Today's Medication Changes          These changes are accurate as of: 17  8:59 AM.  If you have any questions, ask your nurse or doctor.               Start taking these medicines.        Dose/Directions    fluocinolone 0.025 % ointment   Commonly known as:  SYNALAR   Used for:  Xerosis of skin, Intrinsic atopic dermatitis        Apply twice a day to affected areas of the face avoiding the eyelids followed by Vanicream moisturizer.   Quantity:  60 g   Refills:  1       triamcinolone 0.1 % ointment   Commonly known as:  KENALOG   Used for:  Xerosis of skin, Intrinsic atopic dermatitis        Apply to affected area of the body twice a day followed immediately by the Vanicream moisturizer.   Quantity:  454 g   Refills:  1            Where to get your medicines      These medications were sent to [id number] - [place] - [address]  [address] [address]    Hours:  Same info as Jessica Harrell Phone:  [phone]     fluocinolone 0.025 % ointment    triamcinolone  0.1 % ointment                Primary Care Provider Office Phone # Fax #    Alli Wetzel [phone]        [facility] [address] [facility] [address]  [facility] [address]        Thank you!     Thank you for choosing [facility]  for your care. Our goal is always to provide you with excellent care. Hearing back from our patients is one way we can continue to improve our services. Please take a few minutes to complete the written survey that you may receive in the mail after your visit with us. Thank you!             Your Updated Medication List - Protect others around you: Learn how to safely use, store and throw away your medicines at www.disposemymeds.org.          This list is accurate as of: 2/23/17  8:59 AM.  Always use your most recent med list.                   Brand Name Dispense Instructions for use    ACETAMINOPHEN 8 HOUR 650 MG 8 hour tablet   Generic drug:  acetaminophen     100 tablet    Take 650 mg by mouth every 4 hours as needed.       amLODIPine 10 MG tablet    NORVASC    90 tablet    Take 1 tablet (10 mg) by mouth daily       clobetasol 0.05 % Crea cream    CLOBETASOL PROPIONATE EMULSION    30 g    Apply topically 3 times daily Use as directed       DICLOFENAC SODIUM PO      Take 25 mg by mouth daily       fluocinolone 0.025 % ointment    SYNALAR    60 g    Apply twice a day to affected areas of the face avoiding the eyelids followed by Vanicream moisturizer.       FOLIC ACID      daily.       METHOTREXATE      10 tablets once a week On Fridays. Taking 8 tablets on Fridays       methylPREDNISolone 4 MG tablet    MEDROL DOSEPAK    21 tablet    Follow package instructions       metoprolol 100 MG tablet    LOPRESSOR    360 tablet    Take 2 tablets (200 mg) by mouth every 12 hours       mometasone 50 MCG/ACT spray    NASONEX    1 Box    Spray 1 spray into both nostrils as needed PRN daily       Multi-vitamin Tabs tablet   Generic drug:  multivitamin, therapeutic  with minerals      Take 1 tablet by mouth daily.       PRILOSEC PO      Take 20 mg by mouth daily as needed       triamcinolone 0.1 % ointment    KENALOG    454 g    Apply to affected area of the body twice a day followed immediately by the Vanicream moisturizer.       TYLENOL PM EXTRA STRENGTH PO      Take 2 tablets by mouth nightly as needed.

## 2017-02-23 NOTE — NURSING NOTE
Dermatology Rooming Note    Kimberly Chau's goals for this visit include:   Chief Complaint   Patient presents with     Derm Problem     Rash all over        Is a scribe okay for this visit:YES    Are records needed for this visit(If yes, obtain release of information): No     Vitals: There were no vitals taken for this visit.    Referring Provider:  No referring provider defined for this encounter.

## 2017-02-23 NOTE — PATIENT INSTRUCTIONS
Gentle Skin Care  Below is a list of products our providers recommend for gentle skin care.  1. Daily bathing is recommended. Make sure you are applying a good moisturizer after bathing every time.  2. Use Moisturizing creams at least twice daily to the whole body. Your provider may recommend a lighter or heavier moisturizer based on your child s severity and that time of year it is.  a. Lighter, more pleasing to the feel moisturizers include products such as; Cetaphil, Cerave, Aveeno and Vanicream light.   b. Thicker agents include; Aquaphor ointment, Vaseline, Eucerin and Vanicream.  3. Creams are more moisturizing than lotions  4. Products should be fragrance free- soaps, creams, detergents.  a. Mild Bar Soaps include;   i. Fragrance Free Dove, Basis and Purpose  b. Mild Liquid Cleansers;  i. Vanicream, Cetaphil, Aquanil, Cerave and Aquaphor  5. Laundry Products include;  i. All Free and Clear, Dreft, and Cheer Free  Care Plan:  1. Keep bathing and showering short, less than 15 mins  2. Always use lukewarm warm when possible. AVOID very HOT or COLD water  3. DO NOT use bubble bath  4. Limit the use of soaps. Focus on  dirty  areas of the body; face, armpits, groin and feet  5. Do NOT vigorously scrub when you cleanse your skin  6. After bathing, PAT your skin lightly with a towel. DO NOT rub or scrub when drying  7. ALWAYS apply a moisturizer immediately after bathing. This helps to  lock in  the moisture. * IF YOU WERE PRESCRIBED A TOPICAL MEDICATION, APPLY YOUR MEDICATION FIRST THEN COVER WITH YOUR DAILY MOISTURIZER  8. Reapply moisturizing agents at least twice daily to your whole body  9. Do not use products such as powders, perfumes, or colognes on your skin  10. Avoid saunas and steam baths. This temperature is too HOT  11. Use unscented hypo-allergenic laundry products. AVOID fabric softeners  and dryer sheets  12. Avoid tight or  scratchy  clothing such as wool  13. Always wash new clothing before wearing  them for the first time  14. Sometimes a humidifier or vaporizer, used at night can help the dry skin. Remember to keep it clean to void mold growth  ----------------------

## 2017-02-23 NOTE — PROGRESS NOTES
[facility] Dermatology Note      Dermatology Problem List:  1. Hx of Atopic dermatitis w/ Xerosis of skin  -s/p Previous treatment: Cyclosporin, discontinued 06/28/2010. Topical Fluocinolone 0.025% ointment.   Current treatment: Triamcinolone 0.1% ointment, Fluocinolone 0.025% ointment followed by moisturizer     Encounter Date: Feb 23, 2017    CC:  Chief Complaint   Patient presents with     Derm Problem     Rash all over          History of Present Illness:  Ms. Kimberly Chau is a 76 year old female who presents for evaluation of rash all over body. She reports rash has been present prior to Christmas 2016. She currently uses the Vanicream lite lotion and moisturizers daily more than one time a day. Pt bathes every other day and uses the Dove soap for sensitive skin.     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 Pt has Rheumatoid arthritis and is on Methotrexate.  Pt noted that she was off MTX about the same time the atopic dermatitis flared and since being back on mtx its been slightly better. She was off because she was unable to get monitoring labs in time. She is not treating with any specific topical meds. No problem with sleeping.    Chart review: Pt previously seen Dr. Jorge A Rosado in Dermatology at the [facility] site back in 2010.    Past Medical History:   Patient Active Problem List   Diagnosis     Other atopic dermatitis and related conditions     Essential hypertension     Rheumatoid arthritis (H)     Retinal artery occlusion     Cervical vertebral fracture (H)     Central retinal artery occlusion of right eye     Bilateral occipital neuralgia     C1-C2 instability     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 factor (H)     Past Medical History   Diagnosis Date     Anemia      Cataracts      Central retinal artery occlusion      Cervical spine fracture (H)      After a fall from orthostatic sx     Chronic pain      Back of head      Gastro-oesophageal reflux disease      Hypertension      Hyponatremia      Resolved, 2/2 diuretics     Migraines      Rheumatoid arthritis(714.0)      Past Surgical History   Procedure Laterality Date     Hysterectomy       Knee surgery       Colonoscopy       Fusion cervical posterior three+ levels  1/22/2013     Procedure: FUSION CERVICAL POSTERIOR THREE+ LEVELS;  Cervical 1-6 Posterior Instrumentation and Fusion, Cervical 3-4 Laminectomy  .Instrumentation and fusion to Cervical 6 *Latex Allergy*;  Surgeon: Ra Bar MD;  Location: [facility] OR     Head & neck surgery         Social History:  The patient is retired. The patient denies use of tanning beds. She admits to consumption of 3-6 alcoholic beverages a week and is a nonsmoker.     Family History:  There is no family history of skin cancer. There is a family hx of Hay fever, Eczema, and Psoriasis.     Medications:  Current Outpatient Prescriptions   Medication Sig Dispense Refill     methylPREDNISolone (MEDROL DOSEPAK) 4 MG tablet Follow package instructions 21 tablet 0     clobetasol (CLOBETASOL PROPIONATE EMULSION) 0.05 % CREA Apply topically 3 times daily Use as directed 30 g 0     mometasone (NASONEX) 50 MCG/ACT nasal spray Spray 1 spray into both nostrils as needed PRN daily 1 Box 11     amLODIPine (NORVASC) 10 MG tablet Take 1 tablet (10 mg) by mouth daily 90 tablet 3     metoprolol (LOPRESSOR) 100 MG tablet Take 2 tablets (200 mg) by mouth every 12 hours 360 tablet 3     Omeprazole (PRILOSEC PO) Take 20 mg by mouth daily as needed        DICLOFENAC SODIUM PO Take 25 mg by mouth daily       FOLIC ACID daily.        METHOTREXATE 10 tablets once a week On Fridays.  Taking 8 tablets on Fridays       acetaminophen 650 MG TABS Take 650 mg by mouth every 4 hours as needed. 100 tablet 0     Multiple Vitamin (MULTI-VITAMIN) per tablet Take 1 tablet by mouth daily.       Diphenhydramine-APAP, sleep, (TYLENOL PM EXTRA STRENGTH PO) Take 2 tablets by mouth  nightly as needed.         Allergies   Allergen Reactions     Hctz Other (See Comments)     Pt develops symptomatic and significant hyponatremia with HCTZ exposure     Hydrochlorothiazide      Other reaction(s): Hyponatremia  Hyponatremia     Latex      Benzocaine Rash     carba mix,thrium-sunscreen     Resorcinol-Alcohol Rash     carba mix,thrium-sunscreen     Ace Inhibitors      Dizziness and orthostatic changes and electrolyte problems.     Sulfa Drugs        Review of Systems:  -Skin/Heme New Pt: The patient denies frequent sun exposure. The patient denies excessive scarring or problems healing except as per HPI. The patient denies excessive bleeding. Pt has a Nickel Allergy or Skin Allergy.   -Constitutional: The patient is otherwise feeling well.     Physical exam:  Vitals: There were no vitals taken for this visit.  GEN: This is a well-nourished, well developed female in no acute distress  NEURO: Alert and oriented  PSYCH: in a pleasant mood, appropriate affect  SKIN: Total skin excluding the undergarment areas was performed. The exam included the head/face, neck, both arms, chest, back, abdomen, both legs, digits and/or nails.   -Right arm is clear  -Scattered excoriated papules on the back with scattered eczematous papules and plaques  -Excoriated, poorly demarcated papules and plaques on the left arm, unto the shoulder and chest  -Scattered excoriated pink dry papules and plaques on the legs as well as the popliteal fossa and left genao surface of the hand.   -Scaly eczematous papules and plaques on face.   -No other lesions of concern on areas examined.       Impression/Plan:  1. Atopic dermatitis w/ Xerosis of skin, flare likely triggered by change in season as well as cessation of MTX as MTX is used to treat severe atopic dermatitis. Will treat topically today. Vanicream    Triamcinolone 0.1% ointment BID body + Vanicream    Fluocinolone 0.025% ointment BID face + Vanicream    Discussed consistent and  daily use of gentle moisturizers, Preferable cream rather than lotion.     Gentle skin care in AVS.    Dove sensitive soap is fine.      Follow-up in 2-3 weeks, earlier for new or changing lesions.       Staff Involved:  Scribe/Staff      Scribe Disclosure:   I, De Kimble, am serving as a scribe to document services personally performed by Dr. Jacob Lane, based on data collection and the provider's statements to me.     Provider Disclosure:   I have reviewed the documentation recorded by the scribe and have edited it as needed. I have personally performed the services documented here and the documentation accurately represents those services and the decisions made by me.     Jacob Lane MD, MS    Department of Dermatology  [facility]: Phone: [phone], Fax:[phone]  [facility]: Phone: [phone], Fax: [phone]

## 2017-02-24 RX ORDER — DIPHENHYDRAMINE HYDROCHLORIDE AND LIDOCAINE HYDROCHLORIDE AND ALUMINUM HYDROXIDE AND MAGNESIUM HYDRO
5-10 KIT EVERY 6 HOURS PRN
Qty: 237 ML | Refills: 0 | Status: SHIPPED | OUTPATIENT
Start: 2017-02-24 | End: 2017-10-11

## 2017-03-06 ENCOUNTER — DOCUMENTATION ONLY (OUTPATIENT)
Dept: DERMATOLOGY | Facility: CLINIC | Age: 77
End: 2017-03-06

## 2017-03-06 NOTE — PROGRESS NOTES
Pharmacy called requesting a PA for fluocinolone ointment.  Forwarding to PA dept.    Lynne Akers RN

## 2017-03-15 ENCOUNTER — TELEPHONE (OUTPATIENT)
Dept: DERMATOLOGY | Facility: CLINIC | Age: 77
End: 2017-03-15

## 2017-03-15 NOTE — TELEPHONE ENCOUNTER
Prior Authorization Approval    Authorization Effective Date: 12/15/2016  Authorization Expiration Date: 3/15/2018  Medication: fluocinolone (SYNALAR) 0.025 % ointment - approved  Approved Dose/Quantity:   Reference #:     Insurance Company: Medicare [employer] - Phone [phone] Fax [phone]  Expected CoPay: $77.00     CoPay Card Available:      Foundation Assistance Needed:    Which Pharmacy is filling the prescription (Not needed for infusion/clinic administered): [id number] - [place] - [address]  Pharmacy Notified: Yes  Patient Notified: Yes

## 2017-03-20 ENCOUNTER — OFFICE VISIT (OUTPATIENT)
Dept: DERMATOLOGY | Facility: CLINIC | Age: 77
End: 2017-03-20
Payer: COMMERCIAL

## 2017-03-20 DIAGNOSIS — L20.84 INTRINSIC ATOPIC DERMATITIS: Primary | Chronic | ICD-10-CM

## 2017-03-20 PROCEDURE — 99213 OFFICE O/P EST LOW 20 MIN: CPT | Performed by: DERMATOLOGY

## 2017-03-20 NOTE — NURSING NOTE
Dermatology Rooming Note    Kimberly Chau's goals for this visit include:   Chief Complaint   Patient presents with     Derm Problem     Atopic dermatitis w/ Xerosis of skin     Pt states she is doing much better. She just was able to get the cream for her face a couple days ago due to ins issues.  Is a scribe okay for this visit:YES    Are records needed for this visit(If yes, obtain release of information): Not applicable     Vitals: There were no vitals taken for this visit.    Referring Provider:  ESTABLISHED PATIENT  No address on file

## 2017-03-20 NOTE — MR AVS SNAPSHOT
After Visit Summary   3/20/2017    Kimberly Chau    MRN: [id number]           Patient Information     Date Of Birth          1940        Visit Information        Provider Department      3/20/2017 1:15 PM Jacob Lane MD [facility]        Today's Diagnoses     Intrinsic atopic dermatitis    -  1      Care Instructions    Back, belly, arms: Keep on using the triamcinolone ointment + vanicream twice a day for 2 weeks then once a day for 2 weeks then stop. Please keep on using the vanicream.    Face: fluocinolone + vanicream once a day for 2-3 weeks and it should all go away.    If anything comes back, use the medicine + vanicream as 1-3 week bursts then stop.     If this procedure doesn't clear a flare, make a follow up appointment.        Follow-ups after your visit        Your next 10 appointments already scheduled     Apr 04, 2017  1:30 PM CDT   (Arrive by 1:15 PM)   Return Visit with Elda Borja PA-C   [facility] Neurosurgery ([facility])    [address] [place] [id number] [phone]              Who to contact     If you have questions or need follow up information about today's clinic visit or your schedule please contact [facility] directly at [phone].  Normal or non-critical lab and imaging results will be communicated to you by MyChart, letter or phone within 4 business days after the clinic has received the results. If you do not hear from us within 7 days, please contact the clinic through MyChart or phone. If you have a critical or abnormal lab result, we will notify you by phone as soon as possible.  Submit refill requests through Trunk Show or call your pharmacy and they will forward the refill request to us. Please allow 3 business days for your refill to be completed.          Additional Information About Your Visit        infotope [email or web address] Information     Trunk Show is an electronic gateway  that provides easy, online access to your medical records. With Narvii, you can request a clinic appointment, read your test results, renew a prescription or communicate with your care team.     To sign up for Narvii visit the website at [email or web address]   You will be asked to enter the access code listed below, as well as some personal information. Please follow the directions to create your username and password.     Your access code is: [email or web address]  Expires: 2017  3:51 PM     Your access code will  in 90 days. If you need help or a new code, please contact your [facility] or call [phone] for assistance.        Care EveryWhere ID     This is your Care EveryWhere ID. This could be used by other organizations to access your [place] medical records  [id number]         Blood Pressure from Last 3 Encounters:   17 159/73   17 142/68   16 156/82    Weight from Last 3 Encounters:   17 54.4 kg (120 lb)   17 54.5 kg (120 lb 1.6 oz)   16 54.8 kg (120 lb 12.8 oz)              Today, you had the following     No orders found for display       Primary Care Provider Office Phone # Fax #    Alli Wetzel [phone]        [facility]ANS [address] [place] [address] 741  [facility] [address]        Thank you!     Thank you for choosing [facility]  for your care. Our goal is always to provide you with excellent care. Hearing back from our patients is one way we can continue to improve our services. Please take a few minutes to complete the written survey that you may receive in the mail after your visit with us. Thank you!             Your Updated Medication List - Protect others around you: Learn how to safely use, store and throw away your medicines at www.disposemymeds.org.          This list is accurate as of: 3/20/17  1:36 PM.  Always use your most recent med list.                   Brand Name  Dispense Instructions for use    ACETAMINOPHEN 8 HOUR 650 MG 8 hour tablet   Generic drug:  acetaminophen     100 tablet    Take 650 mg by mouth every 4 hours as needed.       amLODIPine 10 MG tablet    NORVASC    90 tablet    Take 1 tablet (10 mg) by mouth daily       clobetasol 0.05 % Crea cream    CLOBETASOL PROPIONATE EMULSION    30 g    Apply topically 3 times daily Use as directed       DICLOFENAC SODIUM PO      Take 25 mg by mouth daily       FIRST-MOUTHWASH BLM Susp     237 mL    Swish and swallow 5-10 mLs in mouth every 6 hours as needed       fluocinolone 0.025 % ointment    SYNALAR    60 g    Apply twice a day to affected areas of the face avoiding the eyelids followed by Vanicream moisturizer.       FOLIC ACID      daily.       METHOTREXATE      10 tablets once a week On Fridays. Taking 8 tablets on Fridays       methylPREDNISolone 4 MG tablet    MEDROL DOSEPAK    21 tablet    Follow package instructions       metoprolol 100 MG tablet    LOPRESSOR    360 tablet    Take 2 tablets (200 mg) by mouth every 12 hours       mometasone 50 MCG/ACT spray    NASONEX    1 Box    Spray 1 spray into both nostrils as needed PRN daily       Multi-vitamin Tabs tablet   Generic drug:  multivitamin, therapeutic with minerals      Take 1 tablet by mouth daily.       PRILOSEC PO      Take 20 mg by mouth daily as needed       triamcinolone 0.1 % ointment    KENALOG    454 g    Apply to affected area of the body twice a day followed immediately by the Vanicream moisturizer.       TYLENOL PM EXTRA STRENGTH PO      Take 2 tablets by mouth nightly as needed.

## 2017-03-20 NOTE — PROGRESS NOTES
"[facility] Dermatology Note      Dermatology Problem List:  1. Hx of Atopic dermatitis w/ Xerosis of skin  -s/p Previous treatment: Cyclosporin, discontinued 06/28/2010. Topical Fluocinolone 0.025% ointment.   Current treatment: Triamcinolone 0.1% ointment, Fluocinolone 0.025% ointment followed by moisturizer, MTX for RA managed by [facility].    Encounter Date: Mar 20, 2017    CC:  Chief Complaint   Patient presents with     Derm Problem     Atopic dermatitis w/ Xerosis of skin         History of Present Illness:  Ms. Kimberly Chau is a 76 year old female who presents as a follow up for atopic dermatitis. She was last seen 2/23/2017 when started on Triamcinolone 0.1% ointment and Fluocinolone 0.025% ointment for atopic dermatitis.     Pt is currently back on methotrexate for her RA. She states her dermatitis is doing really well and she is feeling \"much better.\" Pt is currently using the Triamcinolone followed by Vanicream moisturizer. She recently started using the Fluocinolone ointment due to insurance coverage as it was finally approved. She says her face has improved. Her most troubled area is her right arms. No other skin concerns.     Past Medical History:   Patient Active Problem List   Diagnosis     Intrinsic atopic dermatitis     Essential hypertension     Rheumatoid arthritis (H)     Retinal artery occlusion     Cervical vertebral fracture (H)     Central retinal artery occlusion of right eye     Bilateral occipital neuralgia     C1-C2 instability     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 factor (H)     Past Medical History   Diagnosis Date     Anemia      Cataracts      Central retinal artery occlusion      Cervical spine fracture (H)      After a fall from orthostatic sx     Chronic pain      Back of head     Gastro-oesophageal reflux disease      Hypertension      Hyponatremia      Resolved, 2/2 diuretics     Migraines      Rheumatoid arthritis(714.0)  "     Past Surgical History   Procedure Laterality Date     Hysterectomy       Knee surgery       Colonoscopy       Fusion cervical posterior three+ levels  1/22/2013     Procedure: FUSION CERVICAL POSTERIOR THREE+ LEVELS;  Cervical 1-6 Posterior Instrumentation and Fusion, Cervical 3-4 Laminectomy  .Instrumentation and fusion to Cervical 6 *Latex Allergy*;  Surgeon: Ra Bar MD;  Location:  OR     Head & neck surgery         Social History:  The patient is retired. The patient denies use of tanning beds. She admits to consumption of 3-6 alcoholic beverages a week and is a nonsmoker.     Family History:  There is no family history of skin cancer. There is a family hx of Hay fever, Eczema, and Psoriasis.     Medications:  Current Outpatient Prescriptions   Medication Sig Dispense Refill     DPH-Lido-AlHydr-MgHydr-Simeth (FIRST-MOUTHWASH BLM) SUSP Swish and swallow 5-10 mLs in mouth every 6 hours as needed 237 mL 0     triamcinolone (KENALOG) 0.1 % ointment Apply to affected area of the body twice a day followed immediately by the Vanicream moisturizer. 454 g 1     fluocinolone (SYNALAR) 0.025 % ointment Apply twice a day to affected areas of the face avoiding the eyelids followed by Vanicream moisturizer. 60 g 1     methylPREDNISolone (MEDROL DOSEPAK) 4 MG tablet Follow package instructions 21 tablet 0     clobetasol (CLOBETASOL PROPIONATE EMULSION) 0.05 % CREA Apply topically 3 times daily Use as directed 30 g 0     mometasone (NASONEX) 50 MCG/ACT nasal spray Spray 1 spray into both nostrils as needed PRN daily 1 Box 11     amLODIPine (NORVASC) 10 MG tablet Take 1 tablet (10 mg) by mouth daily 90 tablet 3     metoprolol (LOPRESSOR) 100 MG tablet Take 2 tablets (200 mg) by mouth every 12 hours 360 tablet 3     Omeprazole (PRILOSEC PO) Take 20 mg by mouth daily as needed        DICLOFENAC SODIUM PO Take 25 mg by mouth daily       FOLIC ACID daily.        METHOTREXATE 10 tablets once a week On  Fridays.  Taking 8 tablets on Fridays       acetaminophen 650 MG TABS Take 650 mg by mouth every 4 hours as needed. 100 tablet 0     Multiple Vitamin (MULTI-VITAMIN) per tablet Take 1 tablet by mouth daily.       Diphenhydramine-APAP, sleep, (TYLENOL PM EXTRA STRENGTH PO) Take 2 tablets by mouth nightly as needed.         Allergies   Allergen Reactions     Hctz Other (See Comments)     Pt develops symptomatic and significant hyponatremia with HCTZ exposure     Hydrochlorothiazide      Other reaction(s): Hyponatremia  Hyponatremia     Latex      Benzocaine Rash     carba mix,thrium-sunscreen     Resorcinol-Alcohol Rash     carba mix,thrium-sunscreen     Ace Inhibitors      Dizziness and orthostatic changes and electrolyte problems.     Sulfa Drugs        Review of Systems:  -Skin/Heme New Pt: The patient denies frequent sun exposure. The patient denies excessive scarring or problems healing except as per HPI. The patient denies excessive bleeding. Pt has a Nickel Allergy or Skin Allergy.   -Constitutional: The patient is otherwise feeling well.     Physical exam:  Vitals: There were no vitals taken for this visit.  GEN: This is a well-nourished, well developed female in no acute distress  NEURO: Alert and oriented  PSYCH: in a pleasant mood, appropriate affect  SKIN: A focused exam included the head/face, neck, both arms, chest, back, and abdomen.   -There are resolving light pink papules on the right arm, more than the left  -Resolving pink papules on the upper and lower back  -Minor on abdomen   -No other lesions of concern on areas examined.       Impression/Plan:  1. Atopic dermatitis w/ Xerosis of skin, flare likely triggered by change in season as well as cessation of MTX as MTX is used to treat severe atopic dermatitis. Skin much improved with topical treatment. Will continue for 3 weeks then stop.    Continue Triamcinolone 0.1% ointment BID body + Vanicream BID x 2 weeks, qday x 2 weeks then stop.      Continue Fluocinolone 0.025% ointment qday face + Vanicream x 1-3 week bursts.    Continue consistent and daily use of gentle moisturizers, Preferable cream rather than lotion.     Follow-up prn, earlier for new or changing lesions.       Staff Involved:  Scribe/Staff      Scribe Disclosure:   I, De Kimble, am serving as a scribe to document services personally performed by Dr. Jacob Lane, based on data collection and the provider's statements to me.     Provider Disclosure:   I have reviewed the documentation recorded by the scribe and have edited it as needed. I have personally performed the services documented here and the documentation accurately represents those services and the decisions made by me.     Jacob Lane MD, MS    Department of Dermatology  [facility]: Phone: [phone], Fax:[phone]  [facility]: Phone: [phone], Fax: [phone]

## 2017-03-20 NOTE — PATIENT INSTRUCTIONS
Back, belly, arms: Keep on using the triamcinolone ointment + vanicream twice a day for 2 weeks then once a day for 2 weeks then stop. Please keep on using the vanicream.    Face: fluocinolone + vanicream once a day for 2-3 weeks and it should all go away.    If anything comes back, use the medicine + vanicream as 1-3 week bursts then stop.     If this procedure doesn't clear a flare, make a follow up appointment.

## 2017-04-04 ENCOUNTER — OFFICE VISIT (OUTPATIENT)
Dept: NEUROSURGERY | Facility: CLINIC | Age: 77
End: 2017-04-04

## 2017-04-04 VITALS
HEART RATE: 71 BPM | HEIGHT: 61 IN | SYSTOLIC BLOOD PRESSURE: 144 MMHG | WEIGHT: 121 LBS | BODY MASS INDEX: 22.84 KG/M2 | DIASTOLIC BLOOD PRESSURE: 68 MMHG

## 2017-04-04 DIAGNOSIS — M47.22 CERVICAL RADICULOPATHY DUE TO DEGENERATIVE JOINT DISEASE OF SPINE: Primary | ICD-10-CM

## 2017-04-04 RX ORDER — METHYLPREDNISOLONE 4 MG
TABLET, DOSE PACK ORAL
Qty: 21 TABLET | Refills: 0 | Status: SHIPPED | OUTPATIENT
Start: 2017-04-04 | End: 2017-11-13

## 2017-04-04 RX ORDER — METHYLPREDNISOLONE 4 MG
TABLET, DOSE PACK ORAL
Qty: 21 TABLET | Refills: 1 | Status: SHIPPED | OUTPATIENT
Start: 2017-04-04 | End: 2017-11-13

## 2017-04-04 ASSESSMENT — PAIN SCALES - GENERAL: PAINLEVEL: NO PAIN (0)

## 2017-04-04 NOTE — PATIENT INSTRUCTIONS
1. Please follow-up with the Neurosurgery clinic as needed.     2. Please call with any questions or concerns.     3. You were given a prescription for a steroid pack. Please take this to your pharmacy and have this filled. Please take as directed.

## 2017-04-04 NOTE — MR AVS SNAPSHOT
After Visit Summary   2017    Kimberly Chau    MRN: [id number]           Patient Information     Date Of Birth          1940        Visit Information        Provider Department      2017 1:30 PM Elda Borja PA-C [facility] Neurosurgery        Today's Diagnoses     Cervical radiculopathy due to degenerative joint disease of spine    -  1      Care Instructions    1. Please follow-up with the Neurosurgery clinic as needed.     2. Please call with any questions or concerns.     3. You were given a prescription for a steroid pack. Please take this to your pharmacy and have this filled. Please take as directed.         Follow-ups after your visit        Follow-up notes from your care team     Return if symptoms worsen or fail to improve.      Who to contact     Please call your clinic at [phone] to:    Ask questions about your health    Make or cancel appointments    Discuss your medicines    Learn about your test results    Speak to your doctor   If you have compliments or concerns about an experience at your clinic, or if you wish to file a complaint, please contact [facility] Patient Relations at [phone] or email us at [email or web address]         Additional Information About Your Visit        MyChart Information     JumpCamt is an electronic gateway that provides easy, online access to your medical records. With [employer], you can request a clinic appointment, read your test results, renew a prescription or communicate with your care team.     To sign up for JumpCamt visit the website at [email or web address]   You will be asked to enter the access code listed below, as well as some personal information. Please follow the directions to create your username and password.     Your access code is: [email or web address]  Expires: 2017  3:51 PM     Your access code will  in 90 days. If you need help or a new code, please contact your  "[facility] or call [phone] for assistance.        Care EveryWhere ID     This is your Care EveryWhere ID. This could be used by other organizations to access your [place] medical records  [id number]        Your Vitals Were     Pulse Height BMI (Body Mass Index)             71 1.537 m (5' 0.5\") 23.24 kg/m2          Blood Pressure from Last 3 Encounters:   04/04/17 144/68   02/21/17 159/73   02/06/17 142/68    Weight from Last 3 Encounters:   04/04/17 54.9 kg (121 lb)   02/21/17 54.4 kg (120 lb)   02/06/17 54.5 kg (120 lb 1.6 oz)              Today, you had the following     No orders found for display         Today's Medication Changes          These changes are accurate as of: 4/4/17  2:01 PM.  If you have any questions, ask your nurse or doctor.               These medicines have changed or have updated prescriptions.        Dose/Directions    * methylPREDNISolone 4 MG tablet   Commonly known as:  MEDROL DOSEPAK   This may have changed:  Another medication with the same name was added. Make sure you understand how and when to take each.   Used for:  Cervical radiculopathy due to degenerative joint disease of spine   Changed by:  Elda Borja PA-C        Follow package instructions   Quantity:  21 tablet   Refills:  0       * methylPREDNISolone 4 MG tablet   Commonly known as:  MEDROL DOSEPAK   This may have changed:  You were already taking a medication with the same name, and this prescription was added. Make sure you understand how and when to take each.   Used for:  Cervical radiculopathy due to degenerative joint disease of spine   Changed by:  Elda Borja PA-C        Follow package instructions   Quantity:  21 tablet   Refills:  1       * methylPREDNISolone 4 MG tablet   Commonly known as:  MEDROL DOSEPAK   This may have changed:  You were already taking a medication with the same name, and this prescription was added. Make sure you understand how and when to " take each.   Used for:  Cervical radiculopathy due to degenerative joint disease of spine   Changed by:  Elda Borja PA-C        Follow package instructions   Quantity:  21 tablet   Refills:  0       * Notice:  This list has 3 medication(s) that are the same as other medications prescribed for you. Read the directions carefully, and ask your doctor or other care provider to review them with you.         Where to get your medicines      Some of these will need a paper prescription and others can be bought over the counter.  Ask your nurse if you have questions.     Bring a paper prescription for each of these medications     methylPREDNISolone 4 MG tablet    methylPREDNISolone 4 MG tablet                Primary Care Provider Office Phone # Fax #    Alli Wetzel [phone]        [facility]ANS [address] [place] [address] [address] [place] [id number]        Thank you!     Thank you for choosing [facility]UROS[facility]ERY  for your care. Our goal is always to provide you with excellent care. Hearing back from our patients is one way we can continue to improve our services. Please take a few minutes to complete the written survey that you may receive in the mail after your visit with us. Thank you!             Your Updated Medication List - Protect others around you: Learn how to safely use, store and throw away your medicines at www.disposemymeds.org.          This list is accurate as of: 4/4/17  2:01 PM.  Always use your most recent med list.                   Brand Name Dispense Instructions for use    ACETAMINOPHEN 8 HOUR 650 MG 8 hour tablet   Generic drug:  acetaminophen     100 tablet    Take 650 mg by mouth every 4 hours as needed.       amLODIPine 10 MG tablet    NORVASC    90 tablet    Take 1 tablet (10 mg) by mouth daily       clobetasol 0.05 % Crea cream    CLOBETASOL PROPIONATE EMULSION    30 g    Apply topically 3 times daily Use as directed       DICLOFENAC SODIUM PO      Take 25 mg by mouth  daily       FIRST-MOUTHWASH BLM Susp     237 mL    Swish and swallow 5-10 mLs in mouth every 6 hours as needed       fluocinolone 0.025 % ointment    SYNALAR    60 g    Apply twice a day to affected areas of the face avoiding the eyelids followed by Vanicream moisturizer.       FOLIC ACID      daily.       METHOTREXATE      10 tablets once a week On Fridays. Taking 8 tablets on Fridays       methotrexate 2.5 MG tablet CHEMO          * methylPREDNISolone 4 MG tablet    MEDROL DOSEPAK    21 tablet    Follow package instructions       * methylPREDNISolone 4 MG tablet    MEDROL DOSEPAK    21 tablet    Follow package instructions       * methylPREDNISolone 4 MG tablet    MEDROL DOSEPAK    21 tablet    Follow package instructions       metoprolol 100 MG tablet    LOPRESSOR    360 tablet    Take 2 tablets (200 mg) by mouth every 12 hours       mometasone 50 MCG/ACT spray    NASONEX    1 Box    Spray 1 spray into both nostrils as needed PRN daily       Multi-vitamin Tabs tablet   Generic drug:  multivitamin, therapeutic with minerals      Take 1 tablet by mouth daily.       PRILOSEC PO      Take 20 mg by mouth daily as needed       triamcinolone 0.1 % ointment    KENALOG    454 g    Apply to affected area of the body twice a day followed immediately by the Vanicream moisturizer.       TYLENOL PM EXTRA STRENGTH PO      Take 2 tablets by mouth nightly as needed.       * Notice:  This list has 3 medication(s) that are the same as other medications prescribed for you. Read the directions carefully, and ask your doctor or other care provider to review them with you.

## 2017-04-04 NOTE — PROGRESS NOTES
NEUROSURGERY FOLLOW UP  4/4/17    CHIEF COMPLAINT:  Left arm tingling.      HISTORY OF PRESENT ILLNESS:  Ms. Chau is a pleasant 76-year-old female with RA who underwent a C1-C6 instrumentation, laminectomy and fusion in 01/2013 by Dr Bar.  She did well postop.  At 1-year followup, she had some mild  tingling but otherwise denied neck pain or new symptoms.  She has rheumatoid arthritis and has been on methotrexate on and off for years.    At that time, a CT scan of the cervical spine demonstrated a solid fusion at C1-2, as well as stable hardware throughout.  She was doing well and so was discharged from care.      In 2015, she called here reporting some left hand tingling and was urged to follow up with her primary care physician, and if indicated, we would obviously see her again.      In 2017, this left hand tingling has not seemed to actually get better over time.  In fact, it has gotten worse, and recently she has been developing some pain kind of behind her left shoulder blade, left neck, down the back of her left arm.  Her symptoms are worse when she is up and about or first thing in the morning when she gets up.  She does not notice that her small fingers tingling any more than the rest of them.  It does seem to be rather circumferential, but the tingling does seem to be worse from about the mid humerus to the distal fingers.  Discomfort is really located kind of at the back of the neck and down the back of the left shoulder.      Serendipitously, a few weeks ago, she was put on a steroid pack by her rheumatologist for some skin issues, and about a week after starting on that she noticed that her neck and left arm pain got better.     A CT scan was done, and based on those results, she had returned to [facility] clinic.   She does find that it helps to wear an elbow brace, this is a brace that was purchased in an over-the-counter type situation.  She notes that is not particularly tight, but she  finds it eases the tingling some.  She states that her hands are both quite weak, as they have been for some time, so she has not noticed a change in the strength, no change in bowel or bladder function.  She notes that putting her left arm overhead helps the pain over the shoulder, as well as the tingling in the arm.     We ordered an MRI which revealed spondylosis, as expected, and probably from C7/T1.  She was treated with PT and a dose pack, made some improvement and was not interested in surgery so we released her to PRN.  She returns today because a routine FU appointment had been scheduled for her.  She has no current complaints but would use another dose pack from time to time if she had one.     PAST MEDICAL HISTORY, FAMILY HISTORY, SOCIAL HISTORY:  All reviewed in Epic.   MEDICATIONS:  Reviewed in Epic.   ALLERGIES:  Reviewed in Epic.   Problem list:  reviewed in Epic.    PHYSICAL EXAMINATION:   VITAL SIGNS:  Blood pressure is 144/68, pulse 71.   A well-developed, well-nourished, pleasant female in no acute distress.  She is found seated comfortably in exam room chair.  She is able to sit and rise independently.  She is accompanied by her spouse.  She is alert and oriented x3.  Cranial nerves II-XII are grossly intact.      Finger-nose is intact.  She has no drift.  DTRs at biceps, triceps, brachioradialis are brisk, 2+/4 but symmetric.  Sensation is intact to light touch.  She has no Anguiano's, no Phalen's, no Tinel's.  No fasciculations.  Significant rheumatoid deposits at most of the fingers.  Muscle bulk and tone slightly diminished.  Upper extremity strength is diminished throughout, basically 5-/5 throughout.      Structural exam:  Inspection of the spine reveals no scoliosis, exaggerated kyphosis or lordosis.  Prior surgical scars are beautifully healed.  Head is very slightly forward of the pelvis in the sagittal plane.  Cervical spine range of motion is quite truncated, no spasming or significant  tenderness.  No Spurling's or Lhermitte's is able to be elicited.  Her gait is narrow and smooth with no scissoring and no antalgia.      She has 1/4 reflexes bilateral knees and ankles.  No clonus.  Babinskis are mute.      IMAGING:    MRI cervical done at [facility] February 2017  Instrumented posterior spinal fusions from C1 to C6 with specific findings as follows:  1. Advanced C7-T1 disc degeneration with a 2 mm spondylolisthesis, mild to moderate central canal stenosis, severe left foraminal stenosis and left C8 neural impingement.  2. Advanced C6-7 disc degeneration with mild to moderate central canal stenosis and moderate left foraminal stenosis.  3. Interbody autofusion at C5-6 with solid-appearing posterior fusion masses and moderate residual foraminal stenosis on the left.  4. Residual spondylolisthesis at C4-5 with solid-appearing posterior fusions and moderate to severe residual foraminal stenosis.  5. Indeterminate posterior spinal fusions at C3-4, C2-3 and C1-2. If clinically indicated, thin section CT would be useful for further evaluation  Cervical spine CT scan wo contrast and wo myelogram on 12/05/2016   Post-surgical changes from C1-C6, no change in the alignment, no obvious evidence of nonunion.  She has a moderate central stenosis at C7-T1, left foraminal stenosis from C5-T1.      IMPRESSION/PLAN: Ms. Chau has cervical radiculopathy. She is not interested in surgical solutions at this time   She has responded moderately well to the treatments thus far and would like to continue with PT and so on.  We discussed warning signs of neurologic impairment and she knows to call and come in if these appear, otherwise we can see her PRN.  I gave her another dose pack to be used as needed and a refill x1 on that. I have discussed this case and her exam and presentation with Dr. Bar.      It has been our pleasure seeing this very pleasant patient.  We appreciate the opportunity to participate in her care.        Elda Borja PA-C  [facility]  Department of Neurosurgery  Phone: [phone]  Fax: [phone]

## 2017-04-04 NOTE — LETTER
4/4/2017       RE: Kimberly Chau  [address] [facility] [address]     Dear Colleague,    Thank you for referring your patient, Kimberly Chau, to the [facility] NEUROSURGERY at [facility]. Please see a copy of my visit note below.        NEUROSURGERY FOLLOW UP  4/4/17    CHIEF COMPLAINT:  Left arm tingling.      HISTORY OF PRESENT ILLNESS:  Ms. Chau is a pleasant 76-year-old female with RA who underwent a C1-C6 instrumentation, laminectomy and fusion in 01/2013 by Dr Bar.  She did well postop.  At 1-year followup, she had some mild  tingling but otherwise denied neck pain or new symptoms.  She has rheumatoid arthritis and has been on methotrexate on and off for years.    At that time, a CT scan of the cervical spine demonstrated a solid fusion at C1-2, as well as stable hardware throughout.  She was doing well and so was discharged from care.      In 2015, she called here reporting some left hand tingling and was urged to follow up with her primary care physician, and if indicated, we would obviously see her again.      In 2017, this left hand tingling has not seemed to actually get better over time.  In fact, it has gotten worse, and recently she has been developing some pain kind of behind her left shoulder blade, left neck, down the back of her left arm.  Her symptoms are worse when she is up and about or first thing in the morning when she gets up.  She does not notice that her small fingers tingling any more than the rest of them.  It does seem to be rather circumferential, but the tingling does seem to be worse from about the mid humerus to the distal fingers.  Discomfort is really located kind of at the back of the neck and down the back of the left shoulder.      Serendipitously, a few weeks ago, she was put on a steroid pack by her rheumatologist for some skin issues, and about a week after starting on that she noticed that her neck and  left arm pain got better.     A CT scan was done, and based on those results, she had returned to NS clinic.   She does find that it helps to wear an elbow brace, this is a brace that was purchased in an over-the-counter type situation.  She notes that is not particularly tight, but she finds it eases the tingling some.  She states that her hands are both quite weak, as they have been for some time, so she has not noticed a change in the strength, no change in bowel or bladder function.  She notes that putting her left arm overhead helps the pain over the shoulder, as well as the tingling in the arm.     We ordered an MRI which revealed spondylosis, as expected, and probably from C7/T1.  She was treated with PT and a dose pack, made some improvement and was not interested in surgery so we released her to [facility].  She returns today because a routine FU appointment had been scheduled for her.  She has no current complaints but would use another dose pack from time to time if she had one.     PAST MEDICAL HISTORY, FAMILY HISTORY, SOCIAL HISTORY:  All reviewed in Epic.   MEDICATIONS:  Reviewed in Epic.   ALLERGIES:  Reviewed in Epic.   Problem list:  reviewed in Epic.    PHYSICAL EXAMINATION:   VITAL SIGNS:  Blood pressure is 144/68, pulse 71.   A well-developed, well-nourished, pleasant female in no acute distress.  She is found seated comfortably in exam room chair.  She is able to sit and rise independently.  She is accompanied by her spouse.  She is alert and oriented x3.  Cranial nerves II-XII are grossly intact.      Finger-nose is intact.  She has no drift.  DTRs at biceps, triceps, brachioradialis are brisk, 2+/4 but symmetric.  Sensation is intact to light touch.  She has no Anguiano's, no Phalen's, no Tinel's.  No fasciculations.  Significant rheumatoid deposits at most of the fingers.  Muscle bulk and tone slightly diminished.  Upper extremity strength is diminished throughout, basically 5-/5 throughout.       Structural exam:  Inspection of the spine reveals no scoliosis, exaggerated kyphosis or lordosis.  Prior surgical scars are beautifully healed.  Head is very slightly forward of the pelvis in the sagittal plane.  Cervical spine range of motion is quite truncated, no spasming or significant tenderness.  No Spurling's or Lhermitte's is able to be elicited.  Her gait is narrow and smooth with no scissoring and no antalgia.      She has 1/4 reflexes bilateral knees and ankles.  No clonus.  Babinskis are mute.      IMAGING:    MRI cervical done at [facility] February 2017  Instrumented posterior spinal fusions from C1 to C6 with specific findings as follows:  1. Advanced C7-T1 disc degeneration with a 2 mm spondylolisthesis, mild to moderate central canal stenosis, severe left foraminal stenosis and left C8 neural impingement.  2. Advanced C6-7 disc degeneration with mild to moderate central canal stenosis and moderate left foraminal stenosis.  3. Interbody autofusion at C5-6 with solid-appearing posterior fusion masses and moderate residual foraminal stenosis on the left.  4. Residual spondylolisthesis at C4-5 with solid-appearing posterior fusions and moderate to severe residual foraminal stenosis.  5. Indeterminate posterior spinal fusions at C3-4, C2-3 and C1-2. If clinically indicated, thin section CT would be useful for further evaluation  Cervical spine CT scan wo contrast and wo myelogram on 12/05/2016   Post-surgical changes from C1-C6, no change in the alignment, no obvious evidence of nonunion.  She has a moderate central stenosis at C7-T1, left foraminal stenosis from C5-T1.      IMPRESSION/PLAN: Ms. Chau has cervical radiculopathy. She is not interested in surgical solutions at this time   She has responded moderately well to the treatments thus far and would like to continue with PT and so on.  We discussed warning signs of neurologic impairment and she knows to call and come in if these appear, otherwise we  can see her PRN.  I gave her another dose pack to be used as needed and a refill x1 on that. I have discussed this case and her exam and presentation with Dr. Bar.      It has been our pleasure seeing this very pleasant patient.  We appreciate the opportunity to participate in her care.       Elda Borja PA-C  [facility]  Department of Neurosurgery  Phone: [phone]  Fax: [phone]

## 2017-04-24 DIAGNOSIS — I10 ESSENTIAL HYPERTENSION: ICD-10-CM

## 2017-04-24 RX ORDER — AMLODIPINE BESYLATE 10 MG/1
10 TABLET ORAL DAILY
Qty: 90 TABLET | Refills: 0 | Status: SHIPPED | OUTPATIENT
Start: 2017-04-24 | End: 2017-08-18

## 2017-06-13 DIAGNOSIS — I10 ESSENTIAL HYPERTENSION: ICD-10-CM

## 2017-06-13 RX ORDER — METOPROLOL TARTRATE 100 MG
200 TABLET ORAL EVERY 12 HOURS
Qty: 360 TABLET | Refills: 1 | Status: SHIPPED | OUTPATIENT
Start: 2017-06-13 | End: 2017-12-05

## 2017-06-13 NOTE — TELEPHONE ENCOUNTER
Patient needing refill of metoprolol.   Last seen in [facility] on 12/26/16.   BP  144/68 on 4/4/17         159/73 on 2/21/17         142/68 on 2/6/17

## 2017-06-15 NOTE — TELEPHONE ENCOUNTER
Dear Lou;    I personally discussed with Dr. Del Rio, Cardiology and reviewed Kimberly's EKG's. OK for now to stay on current dose of Metoprolol (400 mg total).     Thanks, RADHA Castellano

## 2017-08-18 DIAGNOSIS — I10 ESSENTIAL HYPERTENSION: ICD-10-CM

## 2017-08-18 RX ORDER — AMLODIPINE BESYLATE 10 MG/1
10 TABLET ORAL DAILY
Qty: 90 TABLET | Refills: 0 | Status: SHIPPED | OUTPATIENT
Start: 2017-08-18 | End: 2017-11-30

## 2017-08-18 NOTE — TELEPHONE ENCOUNTER
amLODIPine      Last Written Prescription Date:  4/24/17  Last Fill Quantity: 90,   # refills: 0  Last Office Visit : 12/26/16  Future Office visit:  NONE  BP Readings from Last 3 Encounters:   04/04/17 144/68   02/21/17 159/73   02/06/17 142/68

## 2017-10-11 ENCOUNTER — OFFICE VISIT (OUTPATIENT)
Dept: ORTHOPEDICS | Facility: CLINIC | Age: 77
End: 2017-10-11

## 2017-10-11 ENCOUNTER — OFFICE VISIT (OUTPATIENT)
Dept: INTERNAL MEDICINE | Facility: CLINIC | Age: 77
End: 2017-10-11

## 2017-10-11 VITALS
DIASTOLIC BLOOD PRESSURE: 73 MMHG | SYSTOLIC BLOOD PRESSURE: 124 MMHG | WEIGHT: 119.5 LBS | BODY MASS INDEX: 23.46 KG/M2 | OXYGEN SATURATION: 99 % | RESPIRATION RATE: 18 BRPM | HEART RATE: 69 BPM | HEIGHT: 60 IN

## 2017-10-11 VITALS
DIASTOLIC BLOOD PRESSURE: 70 MMHG | RESPIRATION RATE: 16 BRPM | WEIGHT: 117 LBS | HEART RATE: 66 BPM | SYSTOLIC BLOOD PRESSURE: 118 MMHG | BODY MASS INDEX: 22.47 KG/M2

## 2017-10-11 DIAGNOSIS — Z12.31 ENCOUNTER FOR SCREENING MAMMOGRAM FOR BREAST CANCER: ICD-10-CM

## 2017-10-11 DIAGNOSIS — G89.29 CHRONIC LEFT SHOULDER PAIN: ICD-10-CM

## 2017-10-11 DIAGNOSIS — E78.00 HIGH CHOLESTEROL: ICD-10-CM

## 2017-10-11 DIAGNOSIS — M25.512 CHRONIC LEFT SHOULDER PAIN: ICD-10-CM

## 2017-10-11 DIAGNOSIS — M54.12 CERVICAL RADICULOPATHY: Primary | ICD-10-CM

## 2017-10-11 DIAGNOSIS — Z91.81 AT RISK FOR FALLING: ICD-10-CM

## 2017-10-11 DIAGNOSIS — G44.89 OTHER HEADACHE SYNDROME: ICD-10-CM

## 2017-10-11 DIAGNOSIS — Z91.81 AT RISK FOR FALLING: Primary | ICD-10-CM

## 2017-10-11 DIAGNOSIS — E87.1 HYPONATREMIA: ICD-10-CM

## 2017-10-11 LAB
ALBUMIN SERPL-MCNC: 3.6 G/DL (ref 3.4–5)
ALP SERPL-CCNC: 90 U/L (ref 40–150)
ALT SERPL W P-5'-P-CCNC: 21 U/L (ref 0–50)
ANION GAP SERPL CALCULATED.3IONS-SCNC: 6 MMOL/L (ref 3–14)
AST SERPL W P-5'-P-CCNC: 19 U/L (ref 0–45)
BASOPHILS # BLD AUTO: 0 10E9/L (ref 0–0.2)
BASOPHILS NFR BLD AUTO: 0.7 %
BILIRUB SERPL-MCNC: 0.5 MG/DL (ref 0.2–1.3)
BUN SERPL-MCNC: 7 MG/DL (ref 7–30)
CALCIUM SERPL-MCNC: 8.8 MG/DL (ref 8.5–10.1)
CHLORIDE SERPL-SCNC: 101 MMOL/L (ref 94–109)
CHOLEST SERPL-MCNC: 189 MG/DL
CO2 SERPL-SCNC: 26 MMOL/L (ref 20–32)
CREAT SERPL-MCNC: 0.8 MG/DL (ref 0.52–1.04)
CRP SERPL-MCNC: 3.2 MG/L (ref 0–8)
DIFFERENTIAL METHOD BLD: ABNORMAL
EOSINOPHIL # BLD AUTO: 0.2 10E9/L (ref 0–0.7)
EOSINOPHIL NFR BLD AUTO: 3.5 %
ERYTHROCYTE [DISTWIDTH] IN BLOOD BY AUTOMATED COUNT: 13.8 % (ref 10–15)
ERYTHROCYTE [SEDIMENTATION RATE] IN BLOOD BY WESTERGREN METHOD: 15 MM/H (ref 0–30)
GFR SERPL CREATININE-BSD FRML MDRD: 70 ML/MIN/1.7M2
GLUCOSE SERPL-MCNC: 74 MG/DL (ref 70–99)
HCT VFR BLD AUTO: 34.2 % (ref 35–47)
HDLC SERPL-MCNC: 123 MG/DL
HGB BLD-MCNC: 11.6 G/DL (ref 11.7–15.7)
IMM GRANULOCYTES # BLD: 0 10E9/L (ref 0–0.4)
IMM GRANULOCYTES NFR BLD: 0.5 %
LDLC SERPL CALC-MCNC: 28 MG/DL
LYMPHOCYTES # BLD AUTO: 0.8 10E9/L (ref 0.8–5.3)
LYMPHOCYTES NFR BLD AUTO: 14.8 %
MCH RBC QN AUTO: 34.5 PG (ref 26.5–33)
MCHC RBC AUTO-ENTMCNC: 33.9 G/DL (ref 31.5–36.5)
MCV RBC AUTO: 102 FL (ref 78–100)
MONOCYTES # BLD AUTO: 1 10E9/L (ref 0–1.3)
MONOCYTES NFR BLD AUTO: 17.7 %
NEUTROPHILS # BLD AUTO: 3.6 10E9/L (ref 1.6–8.3)
NEUTROPHILS NFR BLD AUTO: 62.8 %
NONHDLC SERPL-MCNC: 66 MG/DL
NRBC # BLD AUTO: 0 10*3/UL
NRBC BLD AUTO-RTO: 0 /100
PLATELET # BLD AUTO: 256 10E9/L (ref 150–450)
POTASSIUM SERPL-SCNC: 4 MMOL/L (ref 3.4–5.3)
PROT SERPL-MCNC: 7.3 G/DL (ref 6.8–8.8)
RBC # BLD AUTO: 3.36 10E12/L (ref 3.8–5.2)
SODIUM SERPL-SCNC: 133 MMOL/L (ref 133–144)
TRIGL SERPL-MCNC: 189 MG/DL
WBC # BLD AUTO: 5.7 10E9/L (ref 4–11)

## 2017-10-11 ASSESSMENT — PAIN SCALES - GENERAL: PAINLEVEL: MILD PAIN (3)

## 2017-10-11 NOTE — NURSING NOTE
Chief Complaint   Patient presents with     Shoulder Pain     Patient is here to follow up with ongoing shoulder pain     Alpa Medina CMA 7:23 AM on 10/11/2017.

## 2017-10-11 NOTE — PATIENT INSTRUCTIONS
[facility]: [phone]     [facility] Medication Refill Request Information:  * Please contact your pharmacy regarding ANY request for medication refills.  ** [facility] Prescription Fax = [phone]  * Please allow 3 business days for routine medication refills.  * Please allow 5 business days for controlled substance medication refills.     Primary [facility] Test Result notification information:  *You will be notified with in 7-10 days of your appointment day regarding the results of your test.  If you are on MyChart you will be notified as soon as the provider has reviewed the results and signed off on them.    Radiology (Imaging Center) [phone] to schedule MRI

## 2017-10-11 NOTE — MR AVS SNAPSHOT
After Visit Summary   10/11/2017    Kimberly Chau    MRN: [id number]           Patient Information     Date Of Birth          1940        Visit Information        Provider Department      10/11/2017 8:40 AM Guillermo Welch DO [facility]        Today's Diagnoses     Cervical radiculopathy    -  1       Follow-ups after your visit        Your next 10 appointments already scheduled     Nov 13, 2017 12:05 PM CST   (Arrive by 11:50 AM)   Return Visit with MD ERICKA Morales [facility] ([facility])    [address] [place] [id number] [phone]              Future tests that were ordered for you today     Open Future Orders        Priority Expected Expires Ordered    MRI Brain w/o contrast Routine  10/11/2018 10/11/2017    Mammogram, routine screening Routine  10/11/2018 10/11/2017            Who to contact     Please call your clinic at [phone] to:    Ask questions about your health    Make or cancel appointments    Discuss your medicines    Learn about your test results    Speak to your doctor   If you have compliments or concerns about an experience at your clinic, or if you wish to file a complaint, please contact [facility] Patient Relations at [phone] or email us at [email or web address]         Additional Information About Your Visit        MyChart Information     S[employer]et is an electronic gateway that provides easy, online access to your medical records. With [employer]Vet, you can request a clinic appointment, read your test results, renew a prescription or communicate with your care team.     To sign up for [employer] visit the website at [email or web address]   You will be asked to enter the access code listed below, as well as some personal information. Please follow the directions to create your username and password.     Your access code is:  [email or web address]  Expires: 2017  6:30 AM     Your access code will  in 90 days. If you need help or a new code, please contact your [facility] or call [phone] for assistance.        Care EveryWhere ID     This is your Care EveryWhere ID. This could be used by other organizations to access your [facility] medical records  [id number]        Your Vitals Were     Pulse Respirations Height Pulse Oximetry BMI (Body Mass Index)       69 18 1.524 m (5') 99% 23.34 kg/m2        Blood Pressure from Last 3 Encounters:   10/11/17 124/73   10/11/17 118/70   17 144/68    Weight from Last 3 Encounters:   10/11/17 54.2 kg (119 lb 8 oz)   10/11/17 53.1 kg (117 lb)   17 54.9 kg (121 lb)              Today, you had the following     No orders found for display         Today's Medication Changes          These changes are accurate as of: 10/11/17 10:00 AM.  If you have any questions, ask your nurse or doctor.               Stop taking these medicines if you haven't already. Please contact your care team if you have questions.     clobetasol 0.05 % Crea cream   Commonly known as:  CLOBETASOL PROPIONATE EMULSION   Stopped by:  Guillermo Castellano MD           [employer] Susp compounding kit   Stopped by:  Guillermo Castellano MD                    Primary Care Provider Office Phone # Fax #    Guillermo Castellano [phone] [address] [address]        Equal Access to Services     [facility]: Hadii halina chairez hadasho Soomaali, waaxda luqadaha, qaybta kaalmada alejandra jimenez . So [facility] [phone].    ATENCIÓN: Si habla español, tiene a vázquez disposición servicios gratuitos de asistencia lingüística. Llame al [phone].    We comply with applicable federal civil rights laws and [place] laws. We do not discriminate on the basis of race, color, national origin, age, disability, sex, sexual  orientation, or gender identity.            Thank you!     Thank you for choosing [facility]  for your care. Our goal is always to provide you with excellent care. Hearing back from our patients is one way we can continue to improve our services. Please take a few minutes to complete the written survey that you may receive in the mail after your visit with us. Thank you!             Your Updated Medication List - Protect others around you: Learn how to safely use, store and throw away your medicines at www.disposemymeds.org.          This list is accurate as of: 10/11/17 10:00 AM.  Always use your most recent med list.                   Brand Name Dispense Instructions for use Diagnosis    ACETAMINOPHEN 8 HOUR 650 MG 8 hour tablet   Generic drug:  acetaminophen     100 tablet    Take 650 mg by mouth every 4 hours as needed.    C1-C2 instability       amLODIPine 10 MG tablet    NORVASC    90 tablet    Take 1 tablet (10 mg) by mouth daily    Essential hypertension       DICLOFENAC SODIUM PO      Take 25 mg by mouth daily        fluocinolone 0.025 % ointment    SYNALAR    60 g    Apply twice a day to affected areas of the face avoiding the eyelids followed by Vanicream moisturizer.    Xerosis of skin, Intrinsic atopic dermatitis       FOLIC ACID      daily.        METHOTREXATE      10 tablets once a week On Fridays. Taking 8 tablets on Fridays        methotrexate 2.5 MG tablet CHEMO           * methylPREDNISolone 4 MG tablet    MEDROL DOSEPAK    21 tablet    Follow package instructions    Cervical radiculopathy due to degenerative joint disease of spine       * methylPREDNISolone 4 MG tablet    MEDROL DOSEPAK    21 tablet    Follow package instructions    Cervical radiculopathy due to degenerative joint disease of spine       * methylPREDNISolone 4 MG tablet    MEDROL DOSEPAK    21 tablet    Follow package instructions    Cervical radiculopathy due to degenerative joint disease of spine        metoprolol 100 MG tablet    LOPRESSOR    360 tablet    Take 2 tablets (200 mg) by mouth every 12 hours    Essential hypertension       mometasone 50 MCG/ACT spray    NASONEX    1 Box    Spray 1 spray into both nostrils as needed PRN daily    Chronic rhinitis       Multi-vitamin Tabs tablet   Generic drug:  multivitamin, therapeutic with minerals      Take 1 tablet by mouth daily.        PRILOSEC PO      Take 20 mg by mouth daily as needed        triamcinolone 0.1 % ointment    KENALOG    454 g    Apply to affected area of the body twice a day followed immediately by the Vanicream moisturizer.    Xerosis of skin, Intrinsic atopic dermatitis       TYLENOL PM EXTRA STRENGTH PO      Take 2 tablets by mouth nightly as needed.        * Notice:  This list has 3 medication(s) that are the same as other medications prescribed for you. Read the directions carefully, and ask your doctor or other care provider to review them with you.

## 2017-10-11 NOTE — PROGRESS NOTES
CHIEF COMPLAINT: Left shoulder pain  HISTORY OF PRESENT ILLNESS  Ms. Chau is a pleasant 76 year old year old female who presents to clinic today with  Left shoulder pain.  Kimberly explains that she has had shoulder pain over the past 2-3 years. She has had cervical fusion surgery 2013.  Pain started after this time.  Has been evaulated by a physician since this time; MRI thought to be related to continued cervical radiculopathy despite fusion.  Has tried PT April 2017, medrol dosepak which helped.  No acute worsening of pain since spring.    Location: Left shoulder, posteriorly  Quality:  Aching  Duration: 3 years  Severity: Moderate  Timing: occurs intermittently  Modifying factors:  resting and non-use makes it better, movement and use makes it worse  Associated signs & symptoms: No numbness or tingling in the left arm  Previous similar pain: yes  Exercise: HEP from physical therapyh  Additional history:  Cervical fusion 2013 with Dr. Bar.  posterior cervical fusion from C1 to C6 and decompressive laminectomy at C3-4 for chronic non-union of C1 and C2    MEDICAL HISTORY  Patient Active Problem List   Diagnosis     Intrinsic atopic dermatitis     Essential hypertension     Rheumatoid arthritis (H)     Retinal artery occlusion     Cervical vertebral fracture (H)     Central retinal artery occlusion of right eye     Bilateral occipital neuralgia     C1-C2 instability     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 factor (H)       Current Outpatient Prescriptions   Medication Sig Dispense Refill     amLODIPine (NORVASC) 10 MG tablet Take 1 tablet (10 mg) by mouth daily 90 tablet 0     metoprolol (LOPRESSOR) 100 MG tablet Take 2 tablets (200 mg) by mouth every 12 hours 360 tablet 1     methotrexate 2.5 MG tablet CHEMO   2     methylPREDNISolone (MEDROL DOSEPAK) 4 MG tablet Follow package instructions 21 tablet 1     methylPREDNISolone (MEDROL DOSEPAK) 4 MG tablet Follow package instructions 21  tablet 0     triamcinolone (KENALOG) 0.1 % ointment Apply to affected area of the body twice a day followed immediately by the Vanicream moisturizer. 454 g 1     fluocinolone (SYNALAR) 0.025 % ointment Apply twice a day to affected areas of the face avoiding the eyelids followed by Vanicream moisturizer. 60 g 1     methylPREDNISolone (MEDROL DOSEPAK) 4 MG tablet Follow package instructions 21 tablet 0     mometasone (NASONEX) 50 MCG/ACT nasal spray Spray 1 spray into both nostrils as needed PRN daily 1 Box 11     Omeprazole (PRILOSEC PO) Take 20 mg by mouth daily as needed        DICLOFENAC SODIUM PO Take 25 mg by mouth daily       FOLIC ACID daily.        METHOTREXATE 10 tablets once a week On Fridays.  Taking 8 tablets on Fridays       acetaminophen 650 MG TABS Take 650 mg by mouth every 4 hours as needed. 100 tablet 0     Multiple Vitamin (MULTI-VITAMIN) per tablet Take 1 tablet by mouth daily.       Diphenhydramine-APAP, sleep, (TYLENOL PM EXTRA STRENGTH PO) Take 2 tablets by mouth nightly as needed.         Allergies   Allergen Reactions     Hctz Other (See Comments)     Pt develops symptomatic and significant hyponatremia with HCTZ exposure     Hydrochlorothiazide      Other reaction(s): Hyponatremia  Hyponatremia     Latex      Benzocaine Rash     carba mix,thrium-sunscreen     Resorcinol-Alcohol Rash     carba mix,thrium-sunscreen     Ace Inhibitors      Dizziness and orthostatic changes and electrolyte problems.     Sulfa Drugs        Family History   Problem Relation Age of Onset     Arthritis Mother      Respiratory Mother      pulmonary fibrosis     Liver Disease Father      from EtOH       Additional medical/Social/Surgical histories reviewed in [facility] and updated as appropriate.     REVIEW OF SYSTEMS (10/11/2017)  CONSTITUTIONAL: Denies fever and weight loss  EYES: Denies acute vision changes  ENT: Denies hearing changes or difficulty swallowing  CARDIAC: Denies chest pain or edema  RESPIRATORY: Denies  dyspnea, cough or wheeze  GASTROINTESTINAL: Denies abdominal pain, nausea, vomiting  MUSCULOSKELETAL: See HPI  SKIN: Denies any recent rash or lesion  NEUROLOGICAL: Denies numbness or focal weakness  PSYCHIATRIC: No history of psychiatric symptoms or problems  ENDOCRINE: Denies current diagnosis of diabetes  HEMATOLOGY: Denies episodes of easy bleeding     PHYSICAL EXAM  Vitals:    10/11/17 0814   BP: 124/73   BP Location: Right arm   Patient Position: Chair   Cuff Size: Adult Regular   Pulse: 69   Resp: 18   SpO2: 99%   Weight: 54.2 kg (119 lb 8 oz)   Height: 1.524 m (5')       General Appearance: Well appearing, alert, in no acute distress, well-hydrated, and well nourished  Skin: No rashes, lesions, or ecchymosis present  Cardiovascular: no signs of upper or lower extremity edema  Respiratory: no respiratory distress, no audible wheezing, no labored breathing, symmetric thoracic excursion  Psychiatric: mood and affect are appropriate, patient is oriented to time, place and person  Musculoskeletal:  General  - normal appearance, in no obvious distress  CV  - normal radial pulse  Pulm  - normal respiratory pattern, non-labored  Musculoskeletal - shoulder  - inspection: normal bone and joint alignment, no obvious deformity, no scapular winging, no AC step-off  - palpation: No tenderness at RC insertion, normal clavicle, non-tender AC  - ROM:  Full range of motion of forward elevation, abduction, internal and external rotation.    - strength: 5/5  strength, 5/5 in all shoulder planes  - special tests:  (-) Speed's  (-) Neer  (-) Hawkin's  (-) Ethel's  (-) Luna's  Musculoskeletal - cervical spine  - inspection: thoracic kyphosis present  - palpation: Tenderness to palpation of rhomboid/parascapular region on left.  - ROM: marked limitation in all planes.  - strength: upper extremities 5/5 in all planes  - special tests:  (-) Spurling bilaterally  (-) Gustavo's reflex  Neuro  - C5-7 DTRs 2+ bilaterally, no  sensory or motor deficit, grossly normal coordination, normal muscle tone  Skin  - no ecchymosis, erythema, warmth, or induration, no obvious rash  Psych  - interactive, appropriate, normal mood and affect    IMAGING: MRI report from 2/2017 revealing foraminal narrowing at C6-C7 and C8-T1.     ASSESSMENT & PLAN  Ms. Chau is a 76 year old year old female with PMHx of Cervical fusion C1-C6 with decompressive laminectomy C3-C4 who presents to clinic today with posterior left shoulder pain.  MRI cervical spine has been ordered by Dr. Castellano.  Symptoms eminating from C8-T1 foraminal narrowing on left.      -Schedule MRI cervical spine  -If foraminal narrowing, will order epidural steroid injection  -Continue Home exercise plan per PT  -Tylenol as needed for pain  -Follow up: Will call with MRI results to dictate course of action    Guillermo Welch DO, CAQSM  Primary Care Sports Medicine

## 2017-10-11 NOTE — NURSING NOTE
Reason For Visit:   Chief Complaint   Patient presents with     Shoulder Pain     referred by Dr. Castellano to be seen today for chronic left shoulder pain        Primary MD: Guillermo Castellano  Ref. MD: same    ?  No  Currently working? No.  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 Bar  Type of surgery: Cervical 1-6 Posterior Instrumentation and Fusion, Cervical 3-4 Laminectomy  .Instrumentation and fusion to Cervical 6 .  Smoker: No      /73 (BP Location: Right arm, Patient Position: Chair, Cuff Size: Adult Regular)  Pulse 69  Resp 18  Ht 1.524 m (5')  Wt 54.2 kg (119 lb 8 oz)  SpO2 99%  BMI 23.34 kg/m2    Pain Assessment  Patient Currently in Pain: Yes  0-10 Pain Scale: 4  Primary Pain Location: Shoulder  Pain Orientation: Left  Pain Descriptors: Aching  Alleviating Factors: Rest  Aggravating Factors: Other (comment) (weather, too much physical activities)    Hand Dominance Evaluation  Hand Dominance: Right           Katey Gross LPN

## 2017-10-11 NOTE — LETTER
10/11/2017       RE: Kimberly Chau  [id number] [facility] [address]     Dear Colleague,    Thank you for referring your patient, Kimberly Chau, to the [facility] at [facility]. Please see a copy of my visit note below.    CHIEF COMPLAINT: Left shoulder pain  HISTORY OF PRESENT ILLNESS  Ms. Chau is a pleasant 76 year old year old female who presents to clinic today with  Left shoulder pain.  Kimberly explains that she has had shoulder pain over the past 2-3 years. She has had cervical fusion surgery 2013.  Pain started after this time.  Has been evaulated by a physician since this time; MRI thought to be related to continued cervical radiculopathy despite fusion.  Has tried PT April 2017, medrol dosepak which helped.  No acute worsening of pain since spring.    Location: Left shoulder, posteriorly  Quality:  Aching  Duration: 3 years  Severity: Moderate  Timing: occurs intermittently  Modifying factors:  resting and non-use makes it better, movement and use makes it worse  Associated signs & symptoms: No numbness or tingling in the left arm  Previous similar pain: yes  Exercise: HEP from physical therapyh  Additional history:  Cervical fusion 2013 with Dr. Bar.  posterior cervical fusion from C1 to C6 and decompressive laminectomy at C3-4 for chronic non-union of C1 and C2    MEDICAL HISTORY  Patient Active Problem List   Diagnosis     Intrinsic atopic dermatitis     Essential hypertension     Rheumatoid arthritis (H)     Retinal artery occlusion     Cervical vertebral fracture (H)     Central retinal artery occlusion of right eye     Bilateral occipital neuralgia     C1-C2 instability     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 factor (H)       Current Outpatient Prescriptions   Medication Sig Dispense Refill     amLODIPine (NORVASC) 10 MG tablet Take 1 tablet (10 mg) by mouth daily 90 tablet 0     metoprolol  (LOPRESSOR) 100 MG tablet Take 2 tablets (200 mg) by mouth every 12 hours 360 tablet 1     methotrexate 2.5 MG tablet CHEMO   2     methylPREDNISolone (MEDROL DOSEPAK) 4 MG tablet Follow package instructions 21 tablet 1     methylPREDNISolone (MEDROL DOSEPAK) 4 MG tablet Follow package instructions 21 tablet 0     triamcinolone (KENALOG) 0.1 % ointment Apply to affected area of the body twice a day followed immediately by the Vanicream moisturizer. 454 g 1     fluocinolone (SYNALAR) 0.025 % ointment Apply twice a day to affected areas of the face avoiding the eyelids followed by Vanicream moisturizer. 60 g 1     methylPREDNISolone (MEDROL DOSEPAK) 4 MG tablet Follow package instructions 21 tablet 0     mometasone (NASONEX) 50 MCG/ACT nasal spray Spray 1 spray into both nostrils as needed PRN daily 1 Box 11     Omeprazole (PRILOSEC PO) Take 20 mg by mouth daily as needed        DICLOFENAC SODIUM PO Take 25 mg by mouth daily       FOLIC ACID daily.        METHOTREXATE 10 tablets once a week On Fridays.  Taking 8 tablets on Fridays       acetaminophen 650 MG TABS Take 650 mg by mouth every 4 hours as needed. 100 tablet 0     Multiple Vitamin (MULTI-VITAMIN) per tablet Take 1 tablet by mouth daily.       Diphenhydramine-APAP, sleep, (TYLENOL PM EXTRA STRENGTH PO) Take 2 tablets by mouth nightly as needed.         Allergies   Allergen Reactions     Hctz Other (See Comments)     Pt develops symptomatic and significant hyponatremia with HCTZ exposure     Hydrochlorothiazide      Other reaction(s): Hyponatremia  Hyponatremia     Latex      Benzocaine Rash     carba mix,thrium-sunscreen     Resorcinol-Alcohol Rash     carba mix,thrium-sunscreen     Ace Inhibitors      Dizziness and orthostatic changes and electrolyte problems.     Sulfa Drugs        Family History   Problem Relation Age of Onset     Arthritis Mother      Respiratory Mother      pulmonary fibrosis     Liver Disease Father      from EtOH       Additional  medical/Social/Surgical histories reviewed in [facility] and updated as appropriate.     REVIEW OF SYSTEMS (10/11/2017)  CONSTITUTIONAL: Denies fever and weight loss  EYES: Denies acute vision changes  ENT: Denies hearing changes or difficulty swallowing  CARDIAC: Denies chest pain or edema  RESPIRATORY: Denies dyspnea, cough or wheeze  GASTROINTESTINAL: Denies abdominal pain, nausea, vomiting  MUSCULOSKELETAL: See HPI  SKIN: Denies any recent rash or lesion  NEUROLOGICAL: Denies numbness or focal weakness  PSYCHIATRIC: No history of psychiatric symptoms or problems  ENDOCRINE: Denies current diagnosis of diabetes  HEMATOLOGY: Denies episodes of easy bleeding     PHYSICAL EXAM  Vitals:    10/11/17 0814   BP: 124/73   BP Location: Right arm   Patient Position: Chair   Cuff Size: Adult Regular   Pulse: 69   Resp: 18   SpO2: 99%   Weight: 54.2 kg (119 lb 8 oz)   Height: 1.524 m (5')       General Appearance: Well appearing, alert, in no acute distress, well-hydrated, and well nourished  Skin: No rashes, lesions, or ecchymosis present  Cardiovascular: no signs of upper or lower extremity edema  Respiratory: no respiratory distress, no audible wheezing, no labored breathing, symmetric thoracic excursion  Psychiatric: mood and affect are appropriate, patient is oriented to time, place and person  Musculoskeletal:  General  - normal appearance, in no obvious distress  CV  - normal radial pulse  Pulm  - normal respiratory pattern, non-labored  Musculoskeletal - shoulder  - inspection: normal bone and joint alignment, no obvious deformity, no scapular winging, no AC step-off  - palpation: No tenderness at RC insertion, normal clavicle, non-tender AC  - ROM:  Full range of motion of forward elevation, abduction, internal and external rotation.    - strength: 5/5  strength, 5/5 in all shoulder planes  - special tests:  (-) Speed's  (-) Neer  (-) Hawkin's  (-) Ethel's  (-) Skanee's  Musculoskeletal - cervical spine  - inspection:  thoracic kyphosis present  - palpation: Tenderness to palpation of rhomboid/parascapular region on left.  - ROM: marked limitation in all planes.  - strength: upper extremities 5/5 in all planes  - special tests:  (-) Spurling bilaterally  (-) Gustavo's reflex  Neuro  - C5-7 DTRs 2+ bilaterally, no sensory or motor deficit, grossly normal coordination, normal muscle tone  Skin  - no ecchymosis, erythema, warmth, or induration, no obvious rash  Psych  - interactive, appropriate, normal mood and affect    IMAGING: MRI report from 2/2017 revealing foraminal narrowing at C6-C7 and C8-T1.     ASSESSMENT & PLAN  Ms. Chau is a 76 year old year old female with PMHx of Cervical fusion C1-C6 with decompressive laminectomy C3-C4 who presents to clinic today with posterior left shoulder pain.  MRI cervical spine has been ordered by Dr. Castellano.  Symptoms eminating from C8-T1 foraminal narrowing on left.      -Schedule MRI cervical spine  -If foraminal narrowing, will order epidural steroid injection  -Continue Home exercise plan per PT  -Tylenol as needed for pain  -Follow up: Will call with MRI results to dictate course of action      Again, thank you for allowing me to participate in the care of your patient.      Sincerely,    Guillermo Welch, DO

## 2017-10-11 NOTE — PROGRESS NOTES
HPI:    Pt. With h/o RA and cervical spine surgery comes in for follow up.  She followed in  neurosurgery 2/17 and again in 4/17.   No CP or breathing issues. She had normal CXR 12/5/16. She states her HA's are less after being on antibiotics for sinus issues. She is still due to see her Rheumatology doctor. She o/w denies additional HEENT, cardiopulmonary, abdominal, , GYN, neurological complaints.    PE:    Vitals noted gen nad cooperative alert, HEENT, ears normal oropharynx clear LCTA, RRR, S1, S2, no MRG, abdomen, nt, nd, She has symmetrical strength in her B arms.    A/P:    1. She was seen in Neurosurgery for spine disease last 4/17.   2.Ordered labs today for HTN and fasting lipids  3. I recommend she follow up with her Rheumatology doctor and has follow up in 2 weeks  4. Ordered  Mammogram today 10/11/17  5. She declined recommended flu shot  6. MRI brain for R frontal HA and check ESR/CRP. She states R frontal head pain with she drives and now when she eats. No R jaw claudication  7. Ortho today for chronic L shoulder pain; she had L shoulder X-ray 12/16    I will see her back in about one month to review ortho appt., MRI brain and Rheumatology appt.     Total time spent 25 minutes.  More than 50% of the time spent with Ms. Chau on counseling / coordinating her care

## 2017-10-17 ENCOUNTER — RADIANT APPOINTMENT (OUTPATIENT)
Dept: MRI IMAGING | Facility: CLINIC | Age: 77
End: 2017-10-17
Attending: INTERNAL MEDICINE
Payer: COMMERCIAL

## 2017-10-17 DIAGNOSIS — G44.89 OTHER HEADACHE SYNDROME: ICD-10-CM

## 2017-10-17 DIAGNOSIS — E78.00 HIGH CHOLESTEROL: ICD-10-CM

## 2017-10-17 DIAGNOSIS — Z12.31 ENCOUNTER FOR SCREENING MAMMOGRAM FOR BREAST CANCER: ICD-10-CM

## 2017-10-17 DIAGNOSIS — Z91.81 AT RISK FOR FALLING: ICD-10-CM

## 2017-10-17 PROCEDURE — 70551 MRI BRAIN STEM W/O DYE: CPT | Mod: TC

## 2017-10-19 ENCOUNTER — TELEPHONE (OUTPATIENT)
Dept: INTERNAL MEDICINE | Facility: CLINIC | Age: 77
End: 2017-10-19

## 2017-10-19 DIAGNOSIS — D50.0 IRON DEFICIENCY ANEMIA DUE TO CHRONIC BLOOD LOSS: Primary | ICD-10-CM

## 2017-10-19 NOTE — TELEPHONE ENCOUNTER
Placed future lab orders this encounter        Dear Kimberly;    Your labs are stable except for somewhat lower hemoglobin. I recommend we repeat some labs at your next appointment with me on 11/13/17. I placed orders for this today. Also, Your brain MRI shows some R-sided sinus findings. I have sent a note to Dr. Eisenberg, of ENT and we will follow up on this. As above, I recommend you keep your 11/13/17 appointment with me to discuss all these issues.    RADHA Castellano MD

## 2017-10-26 ENCOUNTER — CARE COORDINATION (OUTPATIENT)
Dept: INTERNAL MEDICINE | Facility: CLINIC | Age: 77
End: 2017-10-26

## 2017-10-26 ENCOUNTER — CARE COORDINATION (OUTPATIENT)
Dept: OTOLARYNGOLOGY | Facility: CLINIC | Age: 77
End: 2017-10-26

## 2017-10-26 DIAGNOSIS — J32.4 CHRONIC PANSINUSITIS: Primary | ICD-10-CM

## 2017-10-26 NOTE — PROGRESS NOTES
MRI reviewed by Dr Eisenberg(per Dr Castellano request)  Augementin 875 BID x 21 days  STEALTH CT to be done on 11/13 before appointment with Dr Castellano. Dr Eisenberg will review, plan developed from there    Patient is on Methotrexate which might alter the effect of the Augmentin. Patient has call into her rheumatologist asking for direction. [facility] pharmacy will hold script until they hear from clinic or patient.  Patient instructed to call [facility] when she has heard from her rheumatologist and also let RN know what the plan is. If not ok to take Augmentin, we will discuss with Dr Eisenberg.

## 2017-10-31 ENCOUNTER — TRANSFERRED RECORDS (OUTPATIENT)
Dept: HEALTH INFORMATION MANAGEMENT | Facility: CLINIC | Age: 77
End: 2017-10-31

## 2017-11-13 ENCOUNTER — HOSPITAL ENCOUNTER (OUTPATIENT)
Dept: CT IMAGING | Facility: CLINIC | Age: 77
Discharge: HOME | End: 2017-11-13
Attending: INTERNAL MEDICINE | Admitting: INTERNAL MEDICINE
Payer: MEDICARE

## 2017-11-13 ENCOUNTER — OFFICE VISIT (OUTPATIENT)
Dept: INTERNAL MEDICINE | Facility: CLINIC | Age: 77
End: 2017-11-13

## 2017-11-13 VITALS
WEIGHT: 121.5 LBS | HEART RATE: 67 BPM | BODY MASS INDEX: 23.73 KG/M2 | SYSTOLIC BLOOD PRESSURE: 119 MMHG | DIASTOLIC BLOOD PRESSURE: 67 MMHG | OXYGEN SATURATION: 99 %

## 2017-11-13 DIAGNOSIS — R09.81 CONGESTION OF PARANASAL SINUS: Primary | ICD-10-CM

## 2017-11-13 DIAGNOSIS — J32.4 CHRONIC PANSINUSITIS: ICD-10-CM

## 2017-11-13 PROCEDURE — 70486 CT MAXILLOFACIAL W/O DYE: CPT

## 2017-11-13 ASSESSMENT — PAIN SCALES - GENERAL: PAINLEVEL: MILD PAIN (3)

## 2017-11-13 NOTE — MR AVS SNAPSHOT
After Visit Summary   2017    Kimberly Chau    MRN: [id number]           Patient Information     Date Of Birth          1940        Visit Information        Provider Department      2017 12:05 PM Guillermo Castellano MD [facility]        Today's Diagnoses     Congestion of paranasal sinus    -  1       Follow-ups after your visit        Who to contact     Please call your clinic at [phone] to:    Ask questions about your health    Make or cancel appointments    Discuss your medicines    Learn about your test results    Speak to your doctor   If you have compliments or concerns about an experience at your clinic, or if you wish to file a complaint, please contact [facility] Patient Relations at [phone] or email us at [email or web address]         Additional Information About Your Visit        MyChart Information     [employer] is an electronic gateway that provides easy, online access to your medical records. With [employer], you can request a clinic appointment, read your test results, renew a prescription or communicate with your care team.     To sign up for "[employer]t visit the website at [email or web address]   You will be asked to enter the access code listed below, as well as some personal information. Please follow the directions to create your username and password.     Your access code is: [email or web address]  Expires: 2017  5:30 AM     Your access code will  in 90 days. If you need help or a new code, please contact your [facility] or call [phone] for assistance.        Care EveryWhere ID     This is your Care EveryWhere ID. This could be used by other organizations to access your [facility] medical records  [id number]        Your Vitals Were     Pulse Pulse Oximetry BMI (Body Mass Index)             67 99% 23.73 kg/m2          Blood Pressure from Last 3 Encounters:   17  119/67   10/11/17 124/73   10/11/17 118/70    Weight from Last 3 Encounters:   11/13/17 55.1 kg (121 lb 8 oz)   10/11/17 54.2 kg (119 lb 8 oz)   10/11/17 53.1 kg (117 lb)              Today, you had the following     No orders found for display         Today's Medication Changes          These changes are accurate as of: 11/13/17  4:55 PM.  If you have any questions, ask your nurse or doctor.               Stop taking these medicines if you haven't already. Please contact your care team if you have questions.     methylPREDNISolone 4 MG tablet   Commonly known as:  MEDROL DOSEPAK                    Primary Care Provider Office Phone # Fax #    Guillermo Castellano [phone] [address] [address]        Equal Access to Services     DIANE YATES : Hadii halina nunez Soreji, waaxda luqdana, qaybta kaalmasaurav jimenez, alejandra chen . So [facility] [place] [phone].    ATENCIÓN: Si habla español, tiene a vázquez disposición servicios gratuitos de asistencia lingüística. Llame al [phone].    We comply with applicable federal civil rights laws and [place] laws. We do not discriminate on the basis of race, color, national origin, age, disability, sex, sexual orientation, or gender identity.            Thank you!     Thank you for choosing [facility]  for your care. Our goal is always to provide you with excellent care. Hearing back from our patients is one way we can continue to improve our services. Please take a few minutes to complete the written survey that you may receive in the mail after your visit with us. Thank you!             Your Updated Medication List - Protect others around you: Learn how to safely use, store and throw away your medicines at www.disposemymeds.org.          This list is accurate as of: 11/13/17  4:55 PM.  Always use your most recent med list.                   Brand Name Dispense Instructions for use  Diagnosis    ACETAMINOPHEN 8 HOUR 650 MG 8 hour tablet   Generic drug:  acetaminophen     100 tablet    Take 650 mg by mouth every 4 hours as needed.    C1-C2 instability       amLODIPine 10 MG tablet    NORVASC    90 tablet    Take 1 tablet (10 mg) by mouth daily    Essential hypertension       amoxicillin-clavulanate 875-125 MG per tablet    AUGMENTIN    42 tablet    Take 1 tablet by mouth every 12 hours for 21 days    Chronic pansinusitis       DICLOFENAC SODIUM PO      Take 25 mg by mouth daily        fluocinolone 0.025 % ointment    SYNALAR    60 g    Apply twice a day to affected areas of the face avoiding the eyelids followed by Vanicream moisturizer.    Xerosis of skin, Intrinsic atopic dermatitis       FOLIC ACID      daily.        METHOTREXATE      10 tablets once a week On Fridays. Taking 8 tablets on Fridays        methotrexate 2.5 MG tablet CHEMO           metoprolol 100 MG tablet    LOPRESSOR    360 tablet    Take 2 tablets (200 mg) by mouth every 12 hours    Essential hypertension       mometasone 50 MCG/ACT spray    NASONEX    1 Box    Spray 1 spray into both nostrils as needed PRN daily    Chronic rhinitis       Multi-vitamin Tabs tablet   Generic drug:  multivitamin, therapeutic with minerals      Take 1 tablet by mouth daily.        PRILOSEC PO      Take 20 mg by mouth daily as needed        triamcinolone 0.1 % ointment    KENALOG    454 g    Apply to affected area of the body twice a day followed immediately by the Vanicream moisturizer.    Xerosis of skin, Intrinsic atopic dermatitis       TYLENOL PM EXTRA STRENGTH PO      Take 2 tablets by mouth nightly as needed.

## 2017-11-13 NOTE — PROGRESS NOTES
HPI:    Pt. With h/o RA and cervical spine surgery comes in for follow up.  She followed in  neurosurgery 2/17 and again in 4/17.   No CP or breathing issues. She had normal CXR 12/5/16. She states her HA's are less after being on antibiotics for sinus issues. She is still due to see her Rheumatology doctor. She o/w denies additional HEENT, cardiopulmonary, abdominal, , GYN, neurological complaints.    PE:    Vitals noted gen nad cooperative alert, HEENT, ears normal oropharynx clear LCTA, RRR, S1, S2, no MRG, abdomen, nt, nd, She has symmetrical strength in her B arms.    A/P:    1. She was seen in Neurosurgery for spine disease last 4/17.   2.Ordered labs today for HTN and fasting lipids  3. She continue to follow with her Rheumatology doctor and seen recently  4. Ordered  Mammogram  10/11/17  5. She declined recommended flu shot  6. MRI brain for R frontal MONZON done 10/17/17 showed R frontal and R maxillary sinus disease.  7. See in ortho Dr. Welch 10/11/17  for chronic L shoulder pain; she had L shoulder X-ray 12/16. She has outside Cervical MRI scan from 2/7/17  8. She has taken about 2 weeks of Augmentin for Sinus MRI findings and I have communicated with Dr. Eisenberg ENT. She may feel a little better. Sinus CT scan done today still showing R-sided frontal and maxillary findings.        Total time spent 25 minutes.  More than 50% of the time spent with Ms. Chau on counseling / coordinating her care

## 2017-11-15 DIAGNOSIS — J32.4 CHRONIC PANSINUSITIS: Primary | ICD-10-CM

## 2017-11-30 DIAGNOSIS — I10 ESSENTIAL HYPERTENSION: ICD-10-CM

## 2017-12-04 RX ORDER — AMLODIPINE BESYLATE 10 MG/1
10 TABLET ORAL DAILY
Qty: 90 TABLET | Refills: 2 | Status: SHIPPED | OUTPATIENT
Start: 2017-12-04 | End: 2018-08-23

## 2017-12-04 NOTE — TELEPHONE ENCOUNTER
Last Written Prescription Date:  8/18/17  Last Fill Quantity: 90,   # refills: 0  Last Office Visit : 11/13/17  Future Office visit:  11/15/18

## 2017-12-05 DIAGNOSIS — I10 ESSENTIAL HYPERTENSION: ICD-10-CM

## 2017-12-06 RX ORDER — METOPROLOL TARTRATE 100 MG
200 TABLET ORAL 2 TIMES DAILY
Qty: 360 TABLET | Refills: 3 | Status: SHIPPED | OUTPATIENT
Start: 2017-12-06 | End: 2018-11-30

## 2017-12-06 NOTE — TELEPHONE ENCOUNTER
Last Clinic Visit: 11/13/2017  [facility]

## 2018-01-15 ENCOUNTER — RADIANT APPOINTMENT (OUTPATIENT)
Dept: GENERAL RADIOLOGY | Facility: CLINIC | Age: 78
End: 2018-01-15
Attending: INTERNAL MEDICINE
Payer: COMMERCIAL

## 2018-01-15 ENCOUNTER — OFFICE VISIT (OUTPATIENT)
Dept: OTOLARYNGOLOGY | Facility: CLINIC | Age: 78
End: 2018-01-15
Payer: COMMERCIAL

## 2018-01-15 ENCOUNTER — OFFICE VISIT (OUTPATIENT)
Dept: INTERNAL MEDICINE | Facility: CLINIC | Age: 78
End: 2018-01-15
Payer: COMMERCIAL

## 2018-01-15 VITALS
BODY MASS INDEX: 23.73 KG/M2 | SYSTOLIC BLOOD PRESSURE: 122 MMHG | HEART RATE: 71 BPM | DIASTOLIC BLOOD PRESSURE: 62 MMHG | WEIGHT: 121.5 LBS

## 2018-01-15 DIAGNOSIS — J32.4 CHRONIC PANSINUSITIS: Primary | ICD-10-CM

## 2018-01-15 DIAGNOSIS — I10 BENIGN ESSENTIAL HYPERTENSION: ICD-10-CM

## 2018-01-15 DIAGNOSIS — I10 BENIGN ESSENTIAL HYPERTENSION: Primary | ICD-10-CM

## 2018-01-15 DIAGNOSIS — R05.9 COUGH: Primary | ICD-10-CM

## 2018-01-15 DIAGNOSIS — R05.9 COUGH: ICD-10-CM

## 2018-01-15 LAB
ALBUMIN SERPL-MCNC: 3.1 G/DL (ref 3.4–5)
ALP SERPL-CCNC: 183 U/L (ref 40–150)
ALT SERPL W P-5'-P-CCNC: 25 U/L (ref 0–50)
ANION GAP SERPL CALCULATED.3IONS-SCNC: 11 MMOL/L (ref 3–14)
AST SERPL W P-5'-P-CCNC: 24 U/L (ref 0–45)
BASOPHILS # BLD AUTO: 0.1 10E9/L (ref 0–0.2)
BASOPHILS NFR BLD AUTO: 0.4 %
BILIRUB SERPL-MCNC: 0.6 MG/DL (ref 0.2–1.3)
BUN SERPL-MCNC: 8 MG/DL (ref 7–30)
CALCIUM SERPL-MCNC: 8.3 MG/DL (ref 8.5–10.1)
CHLORIDE SERPL-SCNC: 92 MMOL/L (ref 94–109)
CO2 SERPL-SCNC: 22 MMOL/L (ref 20–32)
CREAT SERPL-MCNC: 0.79 MG/DL (ref 0.52–1.04)
DIFFERENTIAL METHOD BLD: ABNORMAL
EOSINOPHIL # BLD AUTO: 0.1 10E9/L (ref 0–0.7)
EOSINOPHIL NFR BLD AUTO: 1.1 %
ERYTHROCYTE [DISTWIDTH] IN BLOOD BY AUTOMATED COUNT: 13.3 % (ref 10–15)
FLUAV+FLUBV AG SPEC QL: NEGATIVE
FLUAV+FLUBV AG SPEC QL: NEGATIVE
GFR SERPL CREATININE-BSD FRML MDRD: 70 ML/MIN/1.7M2
GLUCOSE SERPL-MCNC: 97 MG/DL (ref 70–99)
HCT VFR BLD AUTO: 31.3 % (ref 35–47)
HGB BLD-MCNC: 10.9 G/DL (ref 11.7–15.7)
IMM GRANULOCYTES # BLD: 0.2 10E9/L (ref 0–0.4)
IMM GRANULOCYTES NFR BLD: 1.3 %
LYMPHOCYTES # BLD AUTO: 1.3 10E9/L (ref 0.8–5.3)
LYMPHOCYTES NFR BLD AUTO: 10.5 %
MCH RBC QN AUTO: 32.6 PG (ref 26.5–33)
MCHC RBC AUTO-ENTMCNC: 34.8 G/DL (ref 31.5–36.5)
MCV RBC AUTO: 94 FL (ref 78–100)
MONOCYTES # BLD AUTO: 2.2 10E9/L (ref 0–1.3)
MONOCYTES NFR BLD AUTO: 17.7 %
NEUTROPHILS # BLD AUTO: 8.7 10E9/L (ref 1.6–8.3)
NEUTROPHILS NFR BLD AUTO: 69 %
NRBC # BLD AUTO: 0 10*3/UL
NRBC BLD AUTO-RTO: 0 /100
PLATELET # BLD AUTO: 558 10E9/L (ref 150–450)
POTASSIUM SERPL-SCNC: 3.5 MMOL/L (ref 3.4–5.3)
PROT SERPL-MCNC: 7.6 G/DL (ref 6.8–8.8)
RADIOLOGIST FLAGS: NORMAL
RBC # BLD AUTO: 3.34 10E12/L (ref 3.8–5.2)
SODIUM SERPL-SCNC: 126 MMOL/L (ref 133–144)
SPECIMEN SOURCE: NORMAL
WBC # BLD AUTO: 12.7 10E9/L (ref 4–11)

## 2018-01-15 RX ORDER — LEVOFLOXACIN 500 MG/1
500 TABLET, FILM COATED ORAL DAILY
Qty: 10 TABLET | Refills: 0 | Status: SHIPPED | OUTPATIENT
Start: 2018-01-15 | End: 2018-02-27

## 2018-01-15 ASSESSMENT — PAIN SCALES - GENERAL
PAINLEVEL: MODERATE PAIN (5)
PAINLEVEL: MODERATE PAIN (5)

## 2018-01-15 NOTE — PROGRESS NOTES
HPI:    Pt. With h/o RA and cervical spine surgery comes in for sinus surgery preop with Dr. Eisenbreg.  She has chronic R frontal HA's. She had MRI brain 10/17 followed sinus CT scan 11/13/17 showing R frontal and R maxillary sinus disease. She has also been treated with Augmentin. She has productive cough and now laryngitis for more than one week. She denies fever today. She denies any SOB today She followed in  neurosurgery 2/17 and again in 4/17.   No CP or breathing issues. She had normal CXR 12/5/16.  She was seen by Dr. Cabello  Rheumatology 10/31/17; she has run out of methotrexate. She o/w denies additional HEENT, cardiopulmonary, abdominal, , GYN, neurological complaints.    PE:    Vitals noted gen nad cooperative alert, lungs with coarse BS's but good air movement,  RRR, S1, S2, no MRG, abdomen, nt, nd, She has symmetrical strength in her B arms.    EKG: SR at 70; Q waves III and aVF old findings compared 12/5/16 and 1/8/2013    Resting echo Nl LVF 55-60%    A/P:    1. Cough started with URI; CXR and influenza testing today. Will probably treat with antibiotics  2. Psoriatic arthritis; she was seen by Dr. Parada Rheumatology 10/31/17; she is not on MTX currently  3. Preop; She sees Dr. Eisenberg today. She has 1/17/18 PAC appt. As well. Note previous cervical neck surgery to bend her neck  4. She remains on her same antihypertensive medications         Total time spent 25 minutes.  More than 50% of the time spent with Ms. Chau on counseling / coordinating her care

## 2018-01-15 NOTE — MR AVS SNAPSHOT
After Visit Summary   1/15/2018    Kimberly Chau    MRN: [id number]           Patient Information     Date Of Birth          1940        Visit Information        Provider Department      1/15/2018 4:45 PM Elyse Eisenberg MD [facility]        Today's Diagnoses     Chronic pansinusitis    -  1       Follow-ups after your visit        Your next 10 appointments already scheduled     Jan 24, 2018 10:00 AM CST   (Arrive by 9:45 AM)   PAC PHONE RN ASSESSMENT with  Pac Rn   [facility] ([facility])    [address] [place] [id number] [phone]           Note: this is not an onsite visit; there is no need to come to the facility.            Jan 26, 2018   Procedure with Elyse Eisenberg MD   [facility] [place], Same Day Surgery (--)    [address] [address] [phone]            Feb 07, 2018  9:30 AM CST   (Arrive by 9:15 AM)   Return Visit with Elyse Eisenberg MD   [facility] ([facility])    [address] [place] [id number] [phone]              Future tests that were ordered for you today     Open Future Orders        Priority Expected Expires Ordered    Echocardiogram Routine  1/16/2019 1/16/2018            Who to contact     Please call your clinic at [phone] to:    Ask questions about your health    Make or cancel appointments    Discuss your medicines    Learn about your test results    Speak to your doctor   If you have compliments or concerns about an experience at your clinic, or if you wish to file a complaint, please contact [facility] Patient Relations at [phone] or email us at [email or web address]         Additional Information About Your Visit        MyChart Information     [employer] is an electronic gateway that provides easy, online access to your medical  records. With [employer], you can request a clinic appointment, read your test results, renew a prescription or communicate with your care team.     To sign up for [employer] visit the website at [email or web address]   You will be asked to enter the access code listed below, as well as some personal information. Please follow the directions to create your username and password.     Your access code is: [email or web address]  Expires: 2018  6:31 AM     Your access code will  in 90 days. If you need help or a new code, please contact your [facility] or call [phone] for assistance.        Care EveryWhere ID     This is your Care EveryWhere ID. This could be used by other organizations to access your [facility] medical records  [id number]         Blood Pressure from Last 3 Encounters:   01/15/18 122/62   17 119/67   10/11/17 124/73    Weight from Last 3 Encounters:   01/15/18 55.1 kg (121 lb 8 oz)   17 55.1 kg (121 lb 8 oz)   10/11/17 54.2 kg (119 lb 8 oz)              Today, you had the following     No orders found for display         Today's Medication Changes          These changes are accurate as of: 1/15/18 11:59 PM.  If you have any questions, ask your nurse or doctor.               Start taking these medicines.        Dose/Directions    levofloxacin 500 MG tablet   Commonly known as:  LEVAQUIN   Used for:  Cough   Started by:  Guillermo Castellano MD        Dose:  500 mg   Take 1 tablet (500 mg) by mouth daily   Quantity:  10 tablet   Refills:  0            Where to get your medicines      These medications were sent to [id number] - [place] - [address]  [address], [place] [address]    Hours:  Same info as [facility] Phone:  [phone]     levofloxacin 500 MG tablet                Primary Care Provider Office Phone # Fax #    Guillermo Castellano [phone] [address] [address]         Equal Access to Services     [facility] Hospital[facility]ARRETT : Hadii aad ku hadlizzyjosh Williali, waenda luqdana, qaradhata kamariealejandra ignacio. So [facility] [phone].    ATENCIÓN: Si habla español, tiene a vázquez disposición servicios gratuitos de asistencia lingüística. Rodriguezame al [phone].    We comply with applicable federal civil rights laws and [place] laws. We do not discriminate on the basis of race, color, national origin, age, disability, sex, sexual orientation, or gender identity.            Thank you!     Thank you for choosing [facility]  for your care. Our goal is always to provide you with excellent care. Hearing back from our patients is one way we can continue to improve our services. Please take a few minutes to complete the written survey that you may receive in the mail after your visit with us. Thank you!             Your Updated Medication List - Protect others around you: Learn how to safely use, store and throw away your medicines at www.disposemymeds.org.          This list is accurate as of: 1/15/18 11:59 PM.  Always use your most recent med list.                   Brand Name Dispense Instructions for use Diagnosis    ACETAMINOPHEN 8 HOUR 650 MG 8 hour tablet   Generic drug:  acetaminophen     100 tablet    Take 650 mg by mouth every 4 hours as needed.    C1-C2 instability       amLODIPine 10 MG tablet    NORVASC    90 tablet    Take 1 tablet (10 mg) by mouth daily    Essential hypertension       DICLOFENAC SODIUM PO      Take 25 mg by mouth daily        fluocinolone 0.025 % ointment    SYNALAR    60 g    Apply twice a day to affected areas of the face avoiding the eyelids followed by Vanicream moisturizer.    Xerosis of skin, Intrinsic atopic dermatitis       FOLIC ACID      daily.        levofloxacin 500 MG tablet    LEVAQUIN    10 tablet    Take 1 tablet (500 mg) by mouth daily    Cough       METHOTREXATE      10 tablets once a week On Fridays.  Taking 8 tablets on Fridays        methotrexate 2.5 MG tablet CHEMO           metoprolol tartrate 100 MG tablet    LOPRESSOR    360 tablet    Take 2 tablets (200 mg) by mouth 2 times daily    Essential hypertension       mometasone 50 MCG/ACT spray    NASONEX    1 Box    Spray 1 spray into both nostrils as needed PRN daily    Chronic rhinitis       Multi-vitamin Tabs tablet   Generic drug:  multivitamin, therapeutic with minerals      Take 1 tablet by mouth daily.        PRILOSEC PO      Take 20 mg by mouth daily as needed        triamcinolone 0.1 % ointment    KENALOG    454 g    Apply to affected area of the body twice a day followed immediately by the Vanicream moisturizer.    Xerosis of skin, Intrinsic atopic dermatitis       TYLENOL PM EXTRA STRENGTH PO      Take 2 tablets by mouth nightly as needed.

## 2018-01-15 NOTE — NURSING NOTE
Chief Complaint   Patient presents with     Pre-Op Exam     Patient here for a pre op exam     Mindi Murry LPN at 3:41 PM on 1/15/2018.

## 2018-01-15 NOTE — LETTER
1/15/2018       RE: Kimberly Chau  [address] [facility] [address]     Dear Colleague,    Thank you for referring your patient, Kimberly Chau, to the [facility] at [facility]. Please see a copy of my visit note below.    Otolaryngology Adult Consultation    Patient: Kimberly Chau   : 1940     Patient presents with:  Consult:   Chief Complaint   Patient presents with     RECHECK     Pre op consultation         Referring Provider: Guillermo Castellano   Consulting Physician:  Elyse Eisenberg MD       Assessment/Plan: We will postpone Ms. Chau's surgery until she is has recovered from the flu.  I discussed the risks and benefits of surgery with her.  My nurse provided preop teaching as well.  We will get back and schedule soon as possible.     HPI: Kimberly Chau is a 77 year old female seen today in the Otolaryngology Clinic in consultation from Dr. Guillermo Castellano for chronic right-sided frontal headache.  For over a year she has been experiencing intermittent right frontal pain. MRI showed opacified right frontal and maxillary her sinuses.  She was treated with antibiotic therapy and follow-up CT scan was performed.  This demonstrates ongoing opacification of the frontal and maxillary sinus on the right side.  Despite antibiotic therapy the patient continues to have pain.  She is immunosuppressed related to treatment for rheumatoid arthritis.  Given his history in her immunosuppression I recommended endoscopic sinus surgery and she is scheduled for that in the next several days.  Unfortunately in the last week she developed the flu and today she is acutely ill.  Therefore, it is likely that I will postpone her surgery.    Current Outpatient Prescriptions on File Prior to Visit:  metoprolol (LOPRESSOR) 100 MG tablet Take 2 tablets (200 mg) by mouth 2 times daily   amLODIPine (NORVASC) 10 MG tablet Take 1 tablet (10 mg) by mouth daily    methotrexate 2.5 MG tablet CHEMO    triamcinolone (KENALOG) 0.1 % ointment Apply to affected area of the body twice a day followed immediately by the Vanicream moisturizer.   fluocinolone (SYNALAR) 0.025 % ointment Apply twice a day to affected areas of the face avoiding the eyelids followed by Vanicream moisturizer.   mometasone (NASONEX) 50 MCG/ACT nasal spray Spray 1 spray into both nostrils as needed PRN daily   Omeprazole (PRILOSEC PO) Take 20 mg by mouth daily as needed    DICLOFENAC SODIUM PO Take 25 mg by mouth daily   FOLIC ACID daily.    METHOTREXATE 10 tablets once a week On Fridays.Taking 8 tablets on Fridays   acetaminophen 650 MG TABS Take 650 mg by mouth every 4 hours as needed.   Multiple Vitamin (MULTI-VITAMIN) per tablet Take 1 tablet by mouth daily.   Diphenhydramine-APAP, sleep, (TYLENOL PM EXTRA STRENGTH PO) Take 2 tablets by mouth nightly as needed.     No current facility-administered medications on file prior to visit.        Allergies   Allergen Reactions     Hctz Other (See Comments)     Pt develops symptomatic and significant hyponatremia with HCTZ exposure     Hydrochlorothiazide      Other reaction(s): Hyponatremia  Hyponatremia     Latex      Benzocaine Rash     carba mix,thrium-sunscreen     Resorcinol-Alcohol Rash     carba mix,thrium-sunscreen     Ace Inhibitors      Dizziness and orthostatic changes and electrolyte problems.     Sulfa Drugs        Past Medical History:   Diagnosis Date     Anemia      Cataracts      Central retinal artery occlusion      Cervical spine fracture (H)     After a fall from orthostatic sx     Chronic pain     Back of head     Gastro-oesophageal reflux disease      Hypertension      Hyponatremia     Resolved, 2/2 diuretics     Migraines      Rheumatoid arthritis(714.0)          Review of Systems   ENT ROS 8/1/2016   Allergy/Immunology Allergies or hay fever        14 point ROS neg other than the symptoms noted above.    Physical Exam:    General  Assessment   The patient is alert, oriented and in no acute distress. Fatigued appearing, hoarse.  Head/Face/Scalp  Grossly normal.   Nose  External nose is straight, skin is normal.         Imaging:    Results for orders placed during the hospital encounter of 11/13/17   CT MAXILLOFACIAL W/O CONTRAST    Status: Normal 11/13/2017    Narrative CT MAXILLOFACIAL W/O CONTRAST 11/13/2017 10:52 AM    Provided History: ; Chronic pansinusitis  ICD-10: Chronic pansinusitis     Comparison: Brain MRI 10/17/2017.    Technique:  Using thin collimation multidetector helical acquisition  technique, axial, coronal, and sagittal thin section CT images were  reconstructed through the paranasal sinuses. Images were reviewed in  bone and soft tissue windows.    Findings:   Complete opacification of the right frontal sinus locule. Moderate  anterior right ethmoid mucosal thickening. Mild posterior left ethmoid  mucosal thickening. Smallest air-fluid level in the sphenoid sinus.  Complete opacification of the right maxillary sinus. Clear left  maxillary sinus. 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    The ostiomeatal units appear patent bilaterally. The bony walls of the  paranasal sinuses are intact.    Normal retromaxillary and pterygopalatine fat.    The adenoid tonsils in the nasopharynx are unremarkable.    Severe degenerative changes of the right temporomandibular joint, mild  on the left.    Posterior cervical spine fusion instrumentation.    Moderate generalized cerebral volume loss and leukoaraiosis.      Impression Impression: Chronic right frontal and maxillary sinusitis. Acute mild  sphenoid sinusitis.    GELACIO MASON MD            Again, thank you for allowing me to participate in the care of your patient.      Sincerely,    Elyse Eisenberg MD

## 2018-01-15 NOTE — PROGRESS NOTES
Surgeon (please enter first and last name):  Dr. Elyse Eisenberg  Fax number for Preop Evaluation:    Location of Surgery: [facility]  Date of Surgery:  1/19/18  Procedure:    History of reaSinusitis ction to anesthesia?  No

## 2018-01-15 NOTE — NURSING NOTE
Chief Complaint   Patient presents with     RECHECK     Pre op consultation      Vitals taken earlier appointment     Agus Avila

## 2018-01-15 NOTE — LETTER
Patient:  Kimberly Chau  :   1940  MRN:     [id number]        Ms. Kimberly Chau  [address] [facility] [address]        2018    Dear Ms. Chau,    Thank you for choosing the [facility] for your healthcare needs.  We appreciate the opportunity to serve you.    The following are your recent test results.     Dear Kimberly;     Negative influenza testing     Results for orders placed or performed in visit on 01/15/18   XR Chest 2 Views   Result Value Ref Range    Radiologist flags Right lung patchy opacities.     Narrative    Exam: XR CHEST 2 VW, 1/15/2018 5:25 PM    Indication: Cough    Comparison: 2016    Findings:   PA and lateral views of the chest. The cardiomediastinal silhouette  and pulmonary vasculature are within normal limits. New trace right  pleural effusion. No appreciable left pleural effusion. New streaky  right perihilar and right mid to upper lung mixed patchy opacities.  The left lung is clear. No pneumothorax.      Impression    Impression: Small right pleural effusion with new right perihilar and  right mid to upper lung mixed patchy opacities, concerning for  infection in the appropriate clinical setting. Recommend follow-up in  6-8 weeks to evaluate for resolution.    [Consider Follow Up: Right lung patchy opacities.]    This report will be copied to the [facility] to ensure a  provider acknowledges the finding.       I have personally reviewed the examination and initial interpretation  and I agree with the findings.    MADIHA LOBO MD   EKG Performed in Clinic w/ Provider Reading Fee   Result Value Ref Range    Interpretation ECG Click View Image link to view waveform and result    Influenza A/B antigen - Rapid Nasal Swab, Clinic Collect   Result Value Ref Range    Influenza A/B Agn Specimen Nasopharyngeal     Influenza A Negative NEG^Negative    Influenza B Negative NEG^Negative        Please contact your provider if you have any questions or concerns.  We look forward to serving your needs in the future.      Sincerely,    Dr Castellano/LUISITO

## 2018-01-15 NOTE — MR AVS SNAPSHOT
After Visit Summary   1/15/2018    Kimberly Chau    MRN: [id number]           Patient Information     Date Of Birth          1940        Visit Information        Provider Department      1/15/2018 3:35 PM Guillermo Castellano MD [facility]        Today's Diagnoses     Benign essential hypertension    -  1    Cough           Follow-ups after your visit        Your next 10 appointments already scheduled     Keenan 15, 2018  4:45 PM CST   (Arrive by 4:30 PM)   Return Visit with Elyse Eisenberg MD   [facility] ([facility])    [address] [place] [id number] [phone]            Jan 17, 2018 10:00 AM CST   (Arrive by 9:45 AM)   PAC PHONE RN ASSESSMENT with  Pac Rn   [facility] Pre[facility] ([facility])    [address] [place] [id number] [phone]           Note: this is not an onsite visit; there is no need to come to the facility.            Jan 19, 2018   Procedure with Elyse Eisenberg MD   [facility], [place], Same Day Surgery (--)    [address] [facility] [address] [phone]            Jan 29, 2018  4:00 PM CST   (Arrive by 3:45 PM)   Return Visit with Elyse Eisenberg MD   [facility] ([facility])    [address] [place] [id number] [phone]              Future tests that were ordered for you today     Open Future Orders        Priority Expected Expires Ordered    CBC with platelets differential Routine 1/15/2018 1/29/2018 1/15/2018    Comprehensive metabolic panel Routine 1/15/2018 1/29/2018 1/15/2018            Who to contact     Please call your clinic at [phone] to:    Ask questions about your health    Make or cancel appointments    Discuss your medicines    Learn about your test results    Speak to your doctor   If you have compliments or concerns about  an experience at your clinic, or if you wish to file a complaint, please contact [facility] Patient Relations at [phone] or email us at [email or web address]         Additional Information About Your Visit        [employer] Information     [employer] is an electronic gateway that provides easy, online access to your medical records. With [employer], you can request a clinic appointment, read your test results, renew a prescription or communicate with your care team.     To sign up for [employer] visit the website at [email or web address]   You will be asked to enter the access code listed below, as well as some personal information. Please follow the directions to create your username and password.     Your access code is: [email or web address]  Expires: 2018  6:31 AM     Your access code will  in 90 days. If you need help or a new code, please contact your [facility] or call [phone] for assistance.        Care EveryWhere ID     This is your Care EveryWhere ID. This could be used by other organizations to access your [place] medical records  [id number]        Your Vitals Were     Pulse Breastfeeding? BMI (Body Mass Index)             71 No 23.73 kg/m2          Blood Pressure from Last 3 Encounters:   01/15/18 122/62   17 119/67   10/11/17 124/73    Weight from Last 3 Encounters:   01/15/18 55.1 kg (121 lb 8 oz)   17 55.1 kg (121 lb 8 oz)   10/11/17 54.2 kg (119 lb 8 oz)              We Performed the Following     EKG Performed in Clinic w/ Provider Reading Fee     Influenza A/B antigen - Rapid Nasal Swab, Clinic Collect     XR Chest 2 Views        Primary Care Provider Office Phone # Fax #    Guillermo Castellano [phone]       [address] [address]        Equal Access to Services     ERA YATES : Esther Lockhart, kaylan roach, alejandra josue  boris casanova sofíastan chen ah. So [facility] [phone].    ATENCIÓN: Si thaliala anjana, tiene a vázquez disposición servicios gratuitos de asistencia lingüística. Farzaneh gillespie [phone].    We comply with applicable federal civil rights laws and [place] laws. We do not discriminate on the basis of race, color, national origin, age, disability, sex, sexual orientation, or gender identity.            Thank you!     Thank you for choosing [facility]  for your care. Our goal is always to provide you with excellent care. Hearing back from our patients is one way we can continue to improve our services. Please take a few minutes to complete the written survey that you may receive in the mail after your visit with us. Thank you!             Your Updated Medication List - Protect others around you: Learn how to safely use, store and throw away your medicines at www.disposemymeds.org.          This list is accurate as of: 1/15/18  4:35 PM.  Always use your most recent med list.                   Brand Name Dispense Instructions for use Diagnosis    ACETAMINOPHEN 8 HOUR 650 MG 8 hour tablet   Generic drug:  acetaminophen     100 tablet    Take 650 mg by mouth every 4 hours as needed.    C1-C2 instability       amLODIPine 10 MG tablet    NORVASC    90 tablet    Take 1 tablet (10 mg) by mouth daily    Essential hypertension       DICLOFENAC SODIUM PO      Take 25 mg by mouth daily        fluocinolone 0.025 % ointment    SYNALAR    60 g    Apply twice a day to affected areas of the face avoiding the eyelids followed by Vanicream moisturizer.    Xerosis of skin, Intrinsic atopic dermatitis       FOLIC ACID      daily.        METHOTREXATE      10 tablets once a week On Fridays. Taking 8 tablets on Fridays        methotrexate 2.5 MG tablet CHEMO           metoprolol tartrate 100 MG tablet    LOPRESSOR    360 tablet    Take 2 tablets (200 mg) by mouth 2 times daily    Essential hypertension       mometasone 50 MCG/ACT  spray    NASONEX    1 Box    Spray 1 spray into both nostrils as needed PRN daily    Chronic rhinitis       Multi-vitamin Tabs tablet   Generic drug:  multivitamin, therapeutic with minerals      Take 1 tablet by mouth daily.        PRILOSEC PO      Take 20 mg by mouth daily as needed        triamcinolone 0.1 % ointment    KENALOG    454 g    Apply to affected area of the body twice a day followed immediately by the Vanicream moisturizer.    Xerosis of skin, Intrinsic atopic dermatitis       TYLENOL PM EXTRA STRENGTH PO      Take 2 tablets by mouth nightly as needed.

## 2018-01-16 ENCOUNTER — TELEPHONE (OUTPATIENT)
Dept: OTOLARYNGOLOGY | Facility: CLINIC | Age: 78
End: 2018-01-16

## 2018-01-16 ENCOUNTER — TELEPHONE (OUTPATIENT)
Dept: INTERNAL MEDICINE | Facility: CLINIC | Age: 78
End: 2018-01-16

## 2018-01-16 DIAGNOSIS — R94.31 ABNORMAL ELECTROCARDIOGRAM: Primary | ICD-10-CM

## 2018-01-16 LAB — INTERPRETATION ECG - MUSE: NORMAL

## 2018-01-16 NOTE — TELEPHONE ENCOUNTER
Dear Coretta;    I saw Kimberly yesterday preop for ENT surgery with Dr. Eisenberg. Surgery had to be cancelled because of cough/pneumonia:    (1) I would like her seen by anyone (resident's clinic?) this Thursday or Friday    (2) She needs resting echo (ordered this encounter) before surgery (tentatively planned for 1/26/18?). This can wait until her cough gets less.    ThanksRADHA

## 2018-01-16 NOTE — TELEPHONE ENCOUNTER
1/16/18  Per Dr Eisenberg, the surgery for 1/19/18 needs to be rescheduled as pt has pneumonia.    Offered a couple dates that did not work for patient.    Surgery rescheduled to 2/12/18 at [facility] with Dr Eisenberg.    PAC Call moved to 2/8/18  10:00 am    Post -Op moved to 2/21/18 3: pm    Reema Coleman   ENT [facility]-Op Coordinator  [phone]

## 2018-01-16 NOTE — TELEPHONE ENCOUNTER
Dear Coretta;     I saw Kimberly yesterday preop for ENT surgery with Dr. Eisenberg. Surgery had to be cancelled because of cough/pneumonia:    (1) I would like her seen by anyone (resident's clinic?) this Thursday or Friday     (2) She needs resting echo (ordered this encounter) before surgery (tentatively planned for 1/26/18?). This can wait until her cough gets less.     ThanksRADHA    Pt called and appt at 2pm with Lulu Way on 01/19/2018.  Coretta Simeon RN 12:56 PM on 1/16/2018.

## 2018-01-19 ENCOUNTER — OFFICE VISIT (OUTPATIENT)
Dept: INTERNAL MEDICINE | Facility: CLINIC | Age: 78
End: 2018-01-19
Payer: COMMERCIAL

## 2018-01-19 VITALS
TEMPERATURE: 98 F | HEART RATE: 79 BPM | RESPIRATION RATE: 20 BRPM | SYSTOLIC BLOOD PRESSURE: 132 MMHG | DIASTOLIC BLOOD PRESSURE: 61 MMHG | BODY MASS INDEX: 23.44 KG/M2 | WEIGHT: 120 LBS

## 2018-01-19 DIAGNOSIS — J18.9 PNEUMONIA OF RIGHT MIDDLE LOBE DUE TO INFECTIOUS ORGANISM: ICD-10-CM

## 2018-01-19 DIAGNOSIS — J18.9 PNEUMONIA OF RIGHT MIDDLE LOBE DUE TO INFECTIOUS ORGANISM: Primary | ICD-10-CM

## 2018-01-19 DIAGNOSIS — D50.0 IRON DEFICIENCY ANEMIA DUE TO CHRONIC BLOOD LOSS: ICD-10-CM

## 2018-01-19 LAB
ANION GAP SERPL CALCULATED.3IONS-SCNC: 7 MMOL/L (ref 3–14)
BASOPHILS # BLD AUTO: 0 10E9/L (ref 0–0.2)
BASOPHILS NFR BLD AUTO: 0.2 %
BUN SERPL-MCNC: 8 MG/DL (ref 7–30)
CALCIUM SERPL-MCNC: 8.5 MG/DL (ref 8.5–10.1)
CHLORIDE SERPL-SCNC: 86 MMOL/L (ref 94–109)
CO2 SERPL-SCNC: 28 MMOL/L (ref 20–32)
CREAT SERPL-MCNC: 0.81 MG/DL (ref 0.52–1.04)
DIFFERENTIAL METHOD BLD: ABNORMAL
EOSINOPHIL # BLD AUTO: 0 10E9/L (ref 0–0.7)
EOSINOPHIL NFR BLD AUTO: 0.2 %
ERYTHROCYTE [DISTWIDTH] IN BLOOD BY AUTOMATED COUNT: 13.4 % (ref 10–15)
FERRITIN SERPL-MCNC: 931 NG/ML (ref 8–252)
FOLATE SERPL-MCNC: 26 NG/ML
GFR SERPL CREATININE-BSD FRML MDRD: 68 ML/MIN/1.7M2
GLUCOSE SERPL-MCNC: 93 MG/DL (ref 70–99)
HCT VFR BLD AUTO: 30.7 % (ref 35–47)
HGB BLD-MCNC: 10.8 G/DL (ref 11.7–15.7)
IMM GRANULOCYTES # BLD: 0.1 10E9/L (ref 0–0.4)
IMM GRANULOCYTES NFR BLD: 0.5 %
IRON SATN MFR SERPL: 17 % (ref 15–46)
IRON SERPL-MCNC: 33 UG/DL (ref 35–180)
LYMPHOCYTES # BLD AUTO: 0.9 10E9/L (ref 0.8–5.3)
LYMPHOCYTES NFR BLD AUTO: 7.9 %
MCH RBC QN AUTO: 32.7 PG (ref 26.5–33)
MCHC RBC AUTO-ENTMCNC: 35.2 G/DL (ref 31.5–36.5)
MCV RBC AUTO: 93 FL (ref 78–100)
MONOCYTES # BLD AUTO: 1.5 10E9/L (ref 0–1.3)
MONOCYTES NFR BLD AUTO: 13.8 %
NEUTROPHILS # BLD AUTO: 8.5 10E9/L (ref 1.6–8.3)
NEUTROPHILS NFR BLD AUTO: 77.4 %
NRBC # BLD AUTO: 0 10*3/UL
NRBC BLD AUTO-RTO: 0 /100
PLATELET # BLD AUTO: 424 10E9/L (ref 150–450)
POTASSIUM SERPL-SCNC: 3.4 MMOL/L (ref 3.4–5.3)
RBC # BLD AUTO: 3.3 10E12/L (ref 3.8–5.2)
SODIUM SERPL-SCNC: 121 MMOL/L (ref 133–144)
TIBC SERPL-MCNC: 196 UG/DL (ref 240–430)
VIT B12 SERPL-MCNC: 1146 PG/ML (ref 193–986)
WBC # BLD AUTO: 11 10E9/L (ref 4–11)

## 2018-01-19 ASSESSMENT — PAIN SCALES - GENERAL: PAINLEVEL: NO PAIN (0)

## 2018-01-19 NOTE — MR AVS SNAPSHOT
After Visit Summary   1/19/2018    Kimberly Chau    MRN: [id number]           Patient Information     Date Of Birth          1940        Visit Information        Provider Department      1/19/2018 2:00 PM [address] APRN [facility]        Today's Diagnoses     Pneumonia of right middle lobe due to infectious organism (H)    -  1      Care Instructions    Primary Care Center: [phone]     Primary Care Center Medication Refill Request Information:  * Please contact your pharmacy regarding ANY request for medication refills.  ** [facility] Prescription Fax = [phone]  * Please allow 3 business days for routine medication refills.  * Please allow 5 business days for controlled substance medication refills.     Primary Care Center Test Result notification information:  *You will be notified with in 7-10 days of your appointment day regarding the results of your test.  If you are on MyChart you will be notified as soon as the provider has reviewed the results and signed off on them.      Treating Pneumonia  Pneumonia is an infection of one or both of the lungs. Pneumonia:    Is usually caused by either a virus or a bacteria    Can be very serious, especially in infants, young children, and older adults. It s also serious for those with other long-term health problems or weakened immune systems.    Is sometimes treated at home and sometimes in the hospital    Antibiotic medicines  Antibiotics may be prescribed for pneumonia caused by bacteria. They may be pills (oral medicines), or shots (injections). Or they may be given by IV (intravenously) into a vein. If you are taking oral medicines at home:    Fill your prescription and start taking your medicine as soon as you can.    You will likely start to feel better in a day or 2, but don t stop taking the antibiotic.    Use a pill organizer to help you remember to take your medicine.    Let your healthcare provider know if  you have side effects.    Take your medicine exactly as directed on the label. Talk to your provider or pharmacist if you have any questions.  Antiviral medicines  Antiviral medicine may be prescribed for pneumonia caused by a virus. For example, antiviral medicine may be prescribed for pneumonia caused by the flu virus. Antibiotics do not work against viruses. If you are taking antiviral medicine at home:    Fill your prescription and start taking your medicine as soon as you can.    Talk with your provider or pharmacist about possible side effects.    Take the medicine exactly as instructed.  To relieve symptoms  There are many medicines that can help relieve symptoms of pneumonia. Some are prescription and some are over-the-counter.  Your healthcare provider may recommend:    Acetaminophen or ibuprofen to lower your fever and to lessen headache or other pain    Cough medicine to loosen mucus or to reduce coughing  Make sure you check with your healthcare provider or pharmacist before taking any over-the-counter medicines.  Special treatments  If you are hospitalized for pneumonia, you may have other therapies, including:    Inhaled medicines to help with breathing or chest congestion    Supplemental oxygen to increase low oxygen levels  Drink fluids and eat healthy  You should eat healthy to help your body fight the infection. Drinking a lot of fluids helps to replace fluids lost from fever and to loosen mucus in your chest.    Diet. Make healthy food choices, including fruits and vegetables, lean meats and other proteins, 100% whole grain and low- or no-fat dairy products.    Fluids. Drink at least 6 to 8 tall glasses a day. Water and 100% fruit or vegetable juice are best.  Get plenty of rest and sleep  You may be more tired than usual for a while. It is important to get enough sleep at night. It s also important to rest during the day. Talk with your healthcare provider if coughing or other symptoms are  interfering with your sleep.  Preventing the spread of germs  The best thing you can do to prevent spreading germs is to wash your hands often. You should:    Rub your hand with soap and water for 20 to 30 seconds.    Clean in between your fingers, the backs of your hands, and around your nails.    Dry your hands on a separate towel or use paper towels.  You should also:    Keep alcohol-based hand  nearby.    Make sure you also clean surfaces that you touch. Use a product that kills all types of germs.    Stay away from others until you are feeling better.  When to call your healthcare provider  Call your healthcare provider if you have any of the following:    Symptoms get worse    Fever continues    Shortness of breath gets worse    Increased mucus or mucus that is darker in color    Coughing gets worse    Lips or fingers are bluish in color    Side effects from your medicine   Date Last Reviewed: 12/1/2016 2000-2017 [employer]. [address] [place] [address]. All rights reserved. This information is not intended as a substitute for professional medical care. Always follow your healthcare professional's instructions.                Follow-ups after your visit        Your next 10 appointments already scheduled     Jan 19, 2018  3:00 PM CST   LAB with MARIANNA LAB   [facility] Lab ([facility])    [address] [place] [address] [phone]           Please do not eat 10-12 hours before your appointment if you are coming in fasting for labs on lipids, cholesterol, or glucose (sugar). This does not apply to pregnant women. Water, hot tea and black coffee (with nothing added) are okay. Do not drink other fluids, diet soda or chew gum.            Feb 08, 2018 10:00 AM CST   (Arrive by 9:45 AM)   PAC PHONE RN ASSESSMENT with Marianna Pac Rn   [facility] Preoperative Assessment Center ([facility])    [address] [place] [address]  Se  4th Floor  [facility] [address] [phone]           Note: this is not an onsite visit; there is no need to come to the facility.            Feb 12, 2018   Procedure with Elyse Eisenberg MD   [facility], [place], Same Day Surgery (--)    [address]  [place] [address]   [phone]            Feb 21, 2018  3:30 PM CST   (Arrive by 3:15 PM)   Return Visit with Elyse Eisenberg MD   [facility] Nose [facility] Throat ([facility] [place])    [address] [facility]  [address] [facility] [address] [phone]            Mar 12, 2018 11:00 AM CDT   (Arrive by 10:45 AM)   XR CHEST 2 VIEWS with UCXR1   [facility] [place] [facility] ([facility])    [address] [facility] [address]  [address] [facility] [address] [phone]           Please bring a list of your current medicines to your exam. (Include vitamins, minerals and over-thecounter medicines.) Leave your valuables at home.  Tell your doctor if there is a chance you may be pregnant.  You do not need to do anything special for this exam.              Future tests that were ordered for you today     Open Future Orders        Priority Expected Expires Ordered    Basic metabolic panel Routine 1/19/2018 2/2/2018 1/19/2018    CBC with platelets differential Routine 1/19/2018 2/2/2018 1/19/2018    XR Chest 2 Views Routine 1/19/2018 1/19/2019 1/19/2018            Who to contact     Please call your clinic at [phone] to:    Ask questions about your health    Make or cancel appointments    Discuss your medicines    Learn about your test results    Speak to your doctor   If you have compliments or concerns about an experience at your clinic, or if you wish to file a complaint, please contact [facility] Patient Relations at [phone] or email us at [email or web address]         Additional Information About Your Visit        MyChart Information     [email or web address] is an electronic gateway that  provides easy, online access to your medical records. With [employer], you can request a clinic appointment, read your test results, renew a prescription or communicate with your care team.     To sign up for [employer] visit the website at [email or web address]   You will be asked to enter the access code listed below, as well as some personal information. Please follow the directions to create your username and password.     Your access code is: [email or web address]  Expires: 2018  6:31 AM     Your access code will  in 90 days. If you need help or a new code, please contact your [facility] or call [phone] for assistance.        Care EveryWhere ID     This is your Care EveryWhere ID. This could be used by other organizations to access your [place] medical records  [id number]        Your Vitals Were     Pulse Temperature Respirations BMI (Body Mass Index)          79 98  F (36.7  C) (Oral) 20 23.44 kg/m2         Blood Pressure from Last 3 Encounters:   18 132/61   01/15/18 122/62   17 119/67    Weight from Last 3 Encounters:   18 54.4 kg (120 lb)   01/15/18 55.1 kg (121 lb 8 oz)   17 55.1 kg (121 lb 8 oz)               Primary Care Provider Office Phone # Fax #    Guillermo HEYDI MD [place] [phone]       [address] [address]        Equal Access to Services     ERA YAETS AH: Hadii aad ku hadasho Soomaali, waaxda luqadaha, qaybta kaalmada adeegyada, waxay idiin boris chen . So [facility] [phone].    ATENCIÓN: Si habla español, tiene a vázquez disposición servicios gratuitos de asistencia lingüística. Llame al [phone].    We comply with applicable federal civil rights laws and [place] laws. We do not discriminate on the basis of race, color, national origin, age, disability, sex, sexual orientation, or gender identity.            Thank you!     Thank you for choosing [facility]   for your care. Our goal is always to provide you with excellent care. Hearing back from our patients is one way we can continue to improve our services. Please take a few minutes to complete the written survey that you may receive in the mail after your visit with us. Thank you!             Your Updated Medication List - Protect others around you: Learn how to safely use, store and throw away your medicines at www.disposemymeds.org.          This list is accurate as of: 1/19/18  2:49 PM.  Always use your most recent med list.                   Brand Name Dispense Instructions for use Diagnosis    ACETAMINOPHEN 8 HOUR 650 MG 8 hour tablet   Generic drug:  acetaminophen     100 tablet    Take 650 mg by mouth every 4 hours as needed.    C1-C2 instability       amLODIPine 10 MG tablet    NORVASC    90 tablet    Take 1 tablet (10 mg) by mouth daily    Essential hypertension       DICLOFENAC SODIUM PO      Take 25 mg by mouth daily        fluocinolone 0.025 % ointment    SYNALAR    60 g    Apply twice a day to affected areas of the face avoiding the eyelids followed by Vanicream moisturizer.    Xerosis of skin, Intrinsic atopic dermatitis       FOLIC ACID      daily.        levofloxacin 500 MG tablet    LEVAQUIN    10 tablet    Take 1 tablet (500 mg) by mouth daily    Cough       METHOTREXATE      10 tablets once a week On Fridays. Taking 8 tablets on Fridays        methotrexate 2.5 MG tablet CHEMO           metoprolol tartrate 100 MG tablet    LOPRESSOR    360 tablet    Take 2 tablets (200 mg) by mouth 2 times daily    Essential hypertension       mometasone 50 MCG/ACT spray    NASONEX    1 Box    Spray 1 spray into both nostrils as needed PRN daily    Chronic rhinitis       Multi-vitamin Tabs tablet   Generic drug:  multivitamin, therapeutic with minerals      Take 1 tablet by mouth daily.        PRILOSEC PO      Take 20 mg by mouth daily as needed        triamcinolone 0.1 % ointment    KENALOG    454 g    Apply to  affected area of the body twice a day followed immediately by the Vanicream moisturizer.    Xerosis of skin, Intrinsic atopic dermatitis       TYLENOL PM EXTRA STRENGTH PO      Take 2 tablets by mouth nightly as needed.

## 2018-01-19 NOTE — PATIENT INSTRUCTIONS
[facility]: [phone]     [facility] Medication Refill Request Information:  * Please contact your pharmacy regarding ANY request for medication refills.  ** [facility] Prescription Fax = [phone]  * Please allow 3 business days for routine medication refills.  * Please allow 5 business days for controlled substance medication refills.     [facility] Test Result notification information:  *You will be notified with in 7-10 days of your appointment day regarding the results of your test.  If you are on MyChart you will be notified as soon as the provider has reviewed the results and signed off on them.      Treating Pneumonia  Pneumonia is an infection of one or both of the lungs. Pneumonia:    Is usually caused by either a virus or a bacteria    Can be very serious, especially in infants, young children, and older adults. It s also serious for those with other long-term health problems or weakened immune systems.    Is sometimes treated at home and sometimes in the hospital    Antibiotic medicines  Antibiotics may be prescribed for pneumonia caused by bacteria. They may be pills (oral medicines), or shots (injections). Or they may be given by IV (intravenously) into a vein. If you are taking oral medicines at home:    Fill your prescription and start taking your medicine as soon as you can.    You will likely start to feel better in a day or 2, but don t stop taking the antibiotic.    Use a pill organizer to help you remember to take your medicine.    Let your healthcare provider know if you have side effects.    Take your medicine exactly as directed on the label. Talk to your provider or pharmacist if you have any questions.  Antiviral medicines  Antiviral medicine may be prescribed for pneumonia caused by a virus. For example, antiviral medicine may be prescribed for pneumonia caused by the flu virus. Antibiotics do not work against viruses. If you are taking antiviral medicine at  home:    Fill your prescription and start taking your medicine as soon as you can.    Talk with your provider or pharmacist about possible side effects.    Take the medicine exactly as instructed.  To relieve symptoms  There are many medicines that can help relieve symptoms of pneumonia. Some are prescription and some are over-the-counter.  Your healthcare provider may recommend:    Acetaminophen or ibuprofen to lower your fever and to lessen headache or other pain    Cough medicine to loosen mucus or to reduce coughing  Make sure you check with your healthcare provider or pharmacist before taking any over-the-counter medicines.  Special treatments  If you are hospitalized for pneumonia, you may have other therapies, including:    Inhaled medicines to help with breathing or chest congestion    Supplemental oxygen to increase low oxygen levels  Drink fluids and eat healthy  You should eat healthy to help your body fight the infection. Drinking a lot of fluids helps to replace fluids lost from fever and to loosen mucus in your chest.    Diet. Make healthy food choices, including fruits and vegetables, lean meats and other proteins, 100% whole grain and low- or no-fat dairy products.    Fluids. Drink at least 6 to 8 tall glasses a day. Water and 100% fruit or vegetable juice are best.  Get plenty of rest and sleep  You may be more tired than usual for a while. It is important to get enough sleep at night. It s also important to rest during the day. Talk with your healthcare provider if coughing or other symptoms are interfering with your sleep.  Preventing the spread of germs  The best thing you can do to prevent spreading germs is to wash your hands often. You should:    Rub your hand with soap and water for 20 to 30 seconds.    Clean in between your fingers, the backs of your hands, and around your nails.    Dry your hands on a separate towel or use paper towels.  You should also:    Keep alcohol-based hand   nearby.    Make sure you also clean surfaces that you touch. Use a product that kills all types of germs.    Stay away from others until you are feeling better.  When to call your healthcare provider  Call your healthcare provider if you have any of the following:    Symptoms get worse    Fever continues    Shortness of breath gets worse    Increased mucus or mucus that is darker in color    Coughing gets worse    Lips or fingers are bluish in color    Side effects from your medicine   Date Last Reviewed: 12/1/2016 2000-2017 [employer]. [address] [place] [address]. All rights reserved. This information is not intended as a substitute for professional medical care. Always follow your healthcare professional's instructions.

## 2018-01-19 NOTE — NURSING NOTE
"Chief Complaint   Patient presents with     URI     Has had cold for \"  about a week', coughing up yellow/green, congestion in sinuses and chest.\"   Mar Cook LPN 2:23 PM on 1/19/2018  "

## 2018-01-19 NOTE — PROGRESS NOTES
Kimberly Chau is a 77 year old female who comes in for    CC: f/u cough and pneumonia  HPI:    Ms. Chau was seen in clinic on 1/15/18 for cough. Her chest Xray showed patchy opacities in her R middle lobe, concerning for infection. She was started on 10-d course Levofloxacin, has taken 3 doses so far.  She reports she is still feeling ill--continues to cough and her head is hurting, feels fatigued. She has low appetite. Tried to eat a piece of toast. Has been drinking water. Denies nausea. Has been taking Robitussin-DM, doesn't feel like it works very well. She hasn't had a voice for the past 10 days.  Denies ear pain or sore throat, does have some nasal congestion. Has mild lightheadedness when getting up from bed, moves slowly to steady herself. Denies fever, body aches, or chills. No generalized weakness.   She was scheduled for sinus surgery today, but this had to be rescheduled due to her pneumonia.    Other issues discussed today:     Patient Active Problem List   Diagnosis     Intrinsic atopic dermatitis     Essential hypertension     Rheumatoid arthritis (H)     Retinal artery occlusion     Cervical vertebral fracture (H)     Central retinal artery occlusion of right eye     Bilateral occipital neuralgia     C1-C2 instability     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 factor (H)       Current Outpatient Prescriptions   Medication Sig Dispense Refill     levofloxacin (LEVAQUIN) 500 MG tablet Take 1 tablet (500 mg) by mouth daily 10 tablet 0     metoprolol (LOPRESSOR) 100 MG tablet Take 2 tablets (200 mg) by mouth 2 times daily 360 tablet 3     amLODIPine (NORVASC) 10 MG tablet Take 1 tablet (10 mg) by mouth daily 90 tablet 2     methotrexate 2.5 MG tablet CHEMO   2     triamcinolone (KENALOG) 0.1 % ointment Apply to affected area of the body twice a day followed immediately by the Vanicream moisturizer. 454 g 1     fluocinolone (SYNALAR) 0.025 % ointment Apply twice a day to  affected areas of the face avoiding the eyelids followed by Vanicream moisturizer. 60 g 1     mometasone (NASONEX) 50 MCG/ACT nasal spray Spray 1 spray into both nostrils as needed PRN daily 1 Box 11     Omeprazole (PRILOSEC PO) Take 20 mg by mouth daily as needed        DICLOFENAC SODIUM PO Take 25 mg by mouth daily       FOLIC ACID daily.        METHOTREXATE 10 tablets once a week On Fridays.  Taking 8 tablets on Fridays       acetaminophen 650 MG TABS Take 650 mg by mouth every 4 hours as needed. 100 tablet 0     Multiple Vitamin (MULTI-VITAMIN) per tablet Take 1 tablet by mouth daily.       Diphenhydramine-APAP, sleep, (TYLENOL PM EXTRA STRENGTH PO) Take 2 tablets by mouth nightly as needed.           ALLERGIES: Hctz; Hydrochlorothiazide; Latex; Benzocaine; Resorcinol-alcohol; Ace inhibitors; and Sulfa drugs    PAST MEDICAL HX:   Past Medical History:   Diagnosis Date     Anemia      Cataracts      Central retinal artery occlusion      Cervical spine fracture (H)     After a fall from orthostatic sx     Chronic pain     Back of head     Gastro-oesophageal reflux disease      Hypertension      Hyponatremia     Resolved, 2/2 diuretics     Migraines      Rheumatoid arthritis(714.0)        PAST SURGICAL HX:   Past Surgical History:   Procedure Laterality Date     COLONOSCOPY       FUSION CERVICAL POSTERIOR THREE+ LEVELS  1/22/2013    Procedure: FUSION CERVICAL POSTERIOR THREE+ LEVELS;  Cervical 1-6 Posterior Instrumentation and Fusion, Cervical 3-4 Laminectomy  .Instrumentation and fusion to Cervical 6 *Latex Allergy*;  Surgeon: Ra Bar MD;  Location: [facility] OR     HEAD & NECK SURGERY       HYSTERECTOMY       KNEE SURGERY         IMMUNIZATION HX:   Immunization History   Administered Date(s) Administered     Influenza (IIV3) PF 11/11/2003, 09/27/2010, 09/27/2010, 01/08/2013     Pneumococcal (PCV 7) 09/25/2011     Pneumococcal 23 valent 06/25/2011     TDAP Vaccine (Boostrix) 03/10/2014     Zoster vaccine,  live 04/12/2010       SOCIAL HX:   Social History     Social History Narrative    Ms. Chau is , has two grown children and two grandchildren. She does not work. She was never a smoker, and drinks a glass of wine with dinner each night.        ROS:   CONSTITUTIONAL: no fatigue, no unexpected change in weight  SKIN: no worrisome rashes, no worrisome moles, no worrisome lesions  EYES: no acute vision problems or changes  ENT:see HPI  RESP:see HPI  CV: no chest pain, no palpitations, no new or worsening peripheral edema  GI: no nausea, no vomiting, no constipation, no diarrhea    OBJECTIVE:  /61 (BP Location: Right arm, Patient Position: Sitting, Cuff Size: Adult Regular)  Pulse 79  Temp 98  F (36.7  C) (Oral)  Resp 20  Wt 54.4 kg (120 lb)  BMI 23.44 kg/m2   Wt Readings from Last 1 Encounters:   01/19/18 54.4 kg (120 lb)     Constitutional: no distress, comfortable, pleasant, well-groomed  Eyes: anicteric, conjunctiva pink, normal extra-ocular movements   Ears, Nose and Throat: tympanic membranes pearly gray with positive light reflex, EACs clear bilaterally, nose clear and free of lesions, throat clear, mucosa pink and moist.   Neck: supple with full range of motion, no thyromegaly, no lymphadenopathy   Cardiovascular: regular rate and rhythm, normal S1 and S2, no murmurs, rubs or gallops  Respiratory: clear to auscultation with good air movement bilaterally, no wheezes or crackles, non-labored, intermittent cough  Skin: no concerning lesions or rash, no jaundice, temp normal   Neurological: cranial nerves grossly intact, normal strength and sensation, gait is steady with intact balance, speech is clear, no tremor         ASSESSMENT/PLAN:    1. Pneumonia of right middle lobe due to infectious organism (H)  VSS stable, pt is non-toxic appearing. Recommended continuing with Levofloxacin as prescribed. Lungs are clear on exam, no wheezing, will hold off on prednisone for cough. Will hold methotrexate  today d/t acute infection. Reviewed home care with pushing fluids, rest, good hand hygiene, and gradually advancing diet as tolerated. Discussed red flags--worsening cough/SOB, fever, fatigue/weakness/confusion, or if unable to tolerate PO intake--seek urgent medical care. Recommended follow-up in clinic early next week if symptoms persisting. Order for future Xray to reassess lungs in 6-8 weeks.  - XR Chest 2 Views; Future  - Basic metabolic panel; Future  - CBC with platelets differential; Future    FOLLOW UP: If not improving or if worsening  KEMI Meier CNP

## 2018-01-20 ENCOUNTER — TELEPHONE (OUTPATIENT)
Dept: INTERNAL MEDICINE | Facility: CLINIC | Age: 78
End: 2018-01-20

## 2018-01-20 DIAGNOSIS — E87.1 SODIUM BLOOD DECREASED: Primary | ICD-10-CM

## 2018-01-20 NOTE — TELEPHONE ENCOUNTER
Dear Nikhil;    Ms. Chau saw Lulu yesterday follow up pneumonia    Her Na+ was 121 and before than 126. This is getting on the low side    (1) Please call her today and (I agree with staying hydrated) but she needs to decrease her free water (that is try to cut back on just plain water) and change to just more electrolytes    (2) She needs to have her Na+ checked next week preferably Monday (future lab order placed this encounter)    (3) Clinically if she is getting worse, cough, fever, fatigue, ANY confusion I recommend she go to ED this weekend.    RADHA Blackburn    01/20/2018 10:38 AM  I spoke with Kimberly this morning about the above information.  She was instructed to cut back on the amount of free water she's drinking.  Advised her to use either a Gatorade or propel drink that she may sip on throughout the day.  She will come in for the lab draw on Monday for a BMP recheck. She agrees with the instructions.   Deep ABEBE

## 2018-01-23 ENCOUNTER — OFFICE VISIT (OUTPATIENT)
Dept: INTERNAL MEDICINE | Facility: CLINIC | Age: 78
End: 2018-01-23
Payer: COMMERCIAL

## 2018-01-23 VITALS
TEMPERATURE: 98 F | HEART RATE: 65 BPM | BODY MASS INDEX: 21.68 KG/M2 | SYSTOLIC BLOOD PRESSURE: 107 MMHG | RESPIRATION RATE: 16 BRPM | WEIGHT: 111 LBS | DIASTOLIC BLOOD PRESSURE: 67 MMHG | OXYGEN SATURATION: 98 %

## 2018-01-23 DIAGNOSIS — E87.1 SODIUM BLOOD DECREASED: ICD-10-CM

## 2018-01-23 DIAGNOSIS — E87.6 HYPOKALEMIA: Primary | ICD-10-CM

## 2018-01-23 LAB
ANION GAP SERPL CALCULATED.3IONS-SCNC: 8 MMOL/L (ref 3–14)
BUN SERPL-MCNC: 9 MG/DL (ref 7–30)
CALCIUM SERPL-MCNC: 8.2 MG/DL (ref 8.5–10.1)
CHLORIDE SERPL-SCNC: 90 MMOL/L (ref 94–109)
CO2 SERPL-SCNC: 29 MMOL/L (ref 20–32)
CREAT SERPL-MCNC: 0.9 MG/DL (ref 0.52–1.04)
GFR SERPL CREATININE-BSD FRML MDRD: 61 ML/MIN/1.7M2
GLUCOSE SERPL-MCNC: 99 MG/DL (ref 70–99)
POTASSIUM SERPL-SCNC: 2.9 MMOL/L (ref 3.4–5.3)
SODIUM SERPL-SCNC: 126 MMOL/L (ref 133–144)

## 2018-01-23 RX ORDER — POTASSIUM CHLORIDE 1500 MG/1
40 TABLET, EXTENDED RELEASE ORAL DAILY
Qty: 6 TABLET | Refills: 0 | Status: SHIPPED | OUTPATIENT
Start: 2018-01-23 | End: 2018-01-26

## 2018-01-23 ASSESSMENT — PAIN SCALES - GENERAL: PAINLEVEL: NO PAIN (0)

## 2018-01-23 NOTE — NURSING NOTE
Chief Complaint   Patient presents with     Cough     Patient is here to follow up on pneumonia     Kody Roberts CMA (AAMA) at 1:05 PM on 1/23/2018

## 2018-01-23 NOTE — PROGRESS NOTES
[facility]       SUBJECTIVE:  Kimberly Chau is a 77 year old female with a PMHx of RA, hyponatremia, GERD, chronic anemia, who presents for follow up on her recent pneumonia. Patient developed productive cough on 1/7/18 and was first seen by Dr. Castellano on 1/15. CXR at that time demonstrated patchy opacities in the R middle lobe which were concerning for infectious etiology and patient was started on 10 day course of Levaquin. She had interval appointment where her sodium decreased to 121 from 126 over the course of 4 days.     Patient continues to have cough with scant green sputum production. She has been using robitussin DM with moderate relief in addition to continuing levaquin. She describes her cough as worse when laying flat at night, but also reports stopping her prilosec for 5-6 days over the past week. She was advised to stop taking her methotrexate until her symptoms resolve. She endorses cough, sneezing, general fatigue, lightheadedness and 2 soft stools of normal color over the past 2 days, but denies any fevers, chills, syncope, sore throat, dysphagia or globus sensation.     Medications and allergies reviewed by me today.     ROS:   10 point ROS negative unless indicated otherwise in HPI    OBJECTIVE:    /67 (BP Location: Right arm, Patient Position: Chair, Cuff Size: Adult Regular)  Pulse 65  Temp 98  F (36.7  C) (Oral)  Resp 16  Wt 50.3 kg (111 lb)  SpO2 98%  Breastfeeding? No  BMI 21.68 kg/m2   Wt Readings from Last 1 Encounters:   01/23/18 50.3 kg (111 lb)       GENERAL APPEARANCE: healthy, alert and no distress     EYES: EOMI, PERRL     HENT: ear canals and TM's normal and nose and mouth without ulcers or lesions     NECK: no adenopathy, no asymmetry, masses, or scars      RESP: lungs clear to auscultation - no rales, rhonchi or wheezes     CV: regular rates and rhythm, normal S1 S2, no S3 or S4 and no murmur, click or rub     ABDOMEN:  soft, nontender,  no HSM or masses and bowel sounds normal     MS: Diffuse enlargement of PCP joints on both hands, no tenderness, erythema or warmth,  strength intact bilaterally     SKIN: no suspicious lesions or rashes over exposed areas     NEURO: Normal strength and tone, sensory exam grossly normal, mentation intact and speech normal     PSYCH: mentation appears normal. and affect normal/bright     LYMPHATICS: No axillary, cervical, or supraclavicular nodes     ASSESSMENT/PLAN:  # Hypokalemia  No prior history of potassium abnormalities. She does report 2 soft stools over the past several days in the setting of antibiotic use, but C. Diff less likely given absence of fevers, <3 stools per day, and no abdominal cramping.   - Potassium supplementation, 40 meq x3 days  - Will plan to repeat BMP on 1/26/18     # Hyponatremia   History of hyponatremia, initially in the setting of diuretics, now persistently in low 130s without diuretic use. Appears euvolemic on exam. Reports drinking excess water when diagnosed with pneumonia to maintain adequate hydration with reported decrease from 126 to 121. Patient at that time advised to reduce free water intake and Na now increased to 126. Renal function remains intact. Possible etiologies include SIADH, recent pneumonia, thyroid abnormalities, and excess fluid intake. No recent medication changes other than levaquin and discontinuing MTX.   - Repeat BMP on 1/26/18  - Should Na remain <130 at time of recheck, will consider further workup     # Pneumonia  # Chronic cough  Cough began on 1/7 and was diagnosed with pneumonia on 1/15 after CXR concerning for infectious causes of right middle-upper lobe opacities. Patient was started on a 10 day course of Levaquin which she has not yet completed.   - Complete remainder of antibiotic course  - Continue robitussin DM as needed   - Return to clinic in 1-2 weeks if symptoms continue to worsen    Roberth Jones, DO  Internal Medicine, PGY1  Pager  [phone]    Pt was seen and plan of care discussed with Dr. Smith.    Attending Addendum:  Patient seen and examined with resident in clinic today.  Pertinent portions of history and exam were independently verified by myself.  I agree with the exam and plan as outlined above with the following modifications: none.  Veronica Smith MD  Internal Medicine

## 2018-01-23 NOTE — MR AVS SNAPSHOT
After Visit Summary   1/23/2018    Kimberly Chau    MRN: [id number]           Patient Information     Date Of Birth          1940        Visit Information        Provider Department      1/23/2018 1:00 PM Roberth Jones DO [facility]        Today's Diagnoses     Hypokalemia    -  1      Care Instructions    Primary Care Center Medication Refill Request Information:  * Please contact your pharmacy regarding ANY request for medication refills.  ** [facility] Prescription Fax = [phone]  * Please allow 3 business days for routine medication refills.  * Please allow 5 business days for controlled substance medication refills.     Primary Care Center Test Result notification information:  *You will be notified with in 7-10 days of your appointment day regarding the results of your test.  If you are on MyChart you will be notified as soon as the provider has reviewed the results and signed off on them.    [facility] [phone]     You should plan to have a basic metabolic panel lab drawn on Friday 1/26. If your symptoms continue to worsen, please call to set up a follow up appointment.           Follow-ups after your visit        Your next 10 appointments already scheduled     Feb 08, 2018 10:00 AM CST   (Arrive by 9:45 AM)   PAC PHONE RN ASSESSMENT with  Pac Rn   [facility] ([facility])    [address] [place] [id number] [phone]           Note: this is not an onsite visit; there is no need to come to the facility.            Feb 12, 2018   Procedure with Elyse Eisenberg MD   [facility] [place], Same Day Surgery (--)    [address] [facility] [id number] [phone]            Feb 21, 2018  3:30 PM CST   (Arrive by 3:15 PM)   Return Visit with Elyse Eisenberg MD   [facility] Nose [facility]ro[facility] ([facility])    [address] [place] [address]  [id number] [phone]            Mar 12, 2018 11:00 AM CDT   (Arrive by 10:45 AM)   XR CHEST 2 VIEWS with UCXR1   [facility] ([facility])    [address] [id number] [phone]           Please bring a list of your current medicines to your exam. (Include vitamins, minerals and over-thecounter medicines.) Leave your valuables at home.  Tell your doctor if there is a chance you may be pregnant.  You do not need to do anything special for this exam.              Future tests that were ordered for you today     Open Future Orders        Priority Expected Expires Ordered    Basic metabolic panel Routine 2018            Who to contact     Please call your clinic at [phone] to:    Ask questions about your health    Make or cancel appointments    Discuss your medicines    Learn about your test results    Speak to your doctor   If you have compliments or concerns about an experience at your clinic, or if you wish to file a complaint, please contact [facility] Patient Relations at [phone] or email us at [email or web address]         Additional Information About Your Visit        [employer] Information     [employer] is an electronic gateway that provides easy, online access to your medical records. With [employer], you can request a clinic appointment, read your test results, renew a prescription or communicate with your care team.     To sign up for [employer] visit the website at [email or web address]   You will be asked to enter the access code listed below, as well as some personal information. Please follow the directions to create your username and password.     Your access code is: [email or web address]  Expires: 2018  6:31 AM     Your access code will  in 90 days. If you need help or a new code, please contact your [facility] or call  [phone] for assistance.        Care EveryWhere ID     This is your Care EveryWhere ID. This could be used by other organizations to access your [facility] medical records  [id number]        Your Vitals Were     Pulse Temperature Respirations Pulse Oximetry Breastfeeding? BMI (Body Mass Index)    65 98  F (36.7  C) (Oral) 16 98% No 21.68 kg/m2       Blood Pressure from Last 3 Encounters:   01/23/18 107/67   01/19/18 132/61   01/15/18 122/62    Weight from Last 3 Encounters:   01/23/18 50.3 kg (111 lb)   01/19/18 54.4 kg (120 lb)   01/15/18 55.1 kg (121 lb 8 oz)                 Today's Medication Changes          These changes are accurate as of: 1/23/18  2:04 PM.  If you have any questions, ask your nurse or doctor.               Start taking these medicines.        Dose/Directions    Potassium Chloride ER 20 MEQ Tbcr   Used for:  Hypokalemia   Started by:  Roberth Jones DO        Dose:  40 mEq   Take 2 tablets (40 mEq) by mouth daily for 3 days   Quantity:  6 tablet   Refills:  0            Where to get your medicines      These medications were sent to [id number] - [place] - [address]  [address], [place] [address]    Hours:  Same info as Jessica Harrell Phone:  [phone]     Potassium Chloride ER 20 MEQ Tbcr                Primary Care Provider Office Phone # Fax #    Guillermo Castellano [phone] [address] [address]        Equal Access to Services     [facility] AH: Hadii halina nunez Soreji, waaxda luqadaha, qaybta kaalmaalejandra ignacio. So [facility] [phone].    ATENCIÓN: Si habla español, tiene a vázquez disposición servicios gratuitos de asistencia lingüística. Llame al [phone].    We comply with applicable federal civil rights laws and [place] laws. We do not discriminate on the basis of race, color, national origin, age, disability, sex, sexual orientation, or gender  identity.            Thank you!     Thank you for choosing [facility]  for your care. Our goal is always to provide you with excellent care. Hearing back from our patients is one way we can continue to improve our services. Please take a few minutes to complete the written survey that you may receive in the mail after your visit with us. Thank you!             Your Updated Medication List - Protect others around you: Learn how to safely use, store and throw away your medicines at www.disposemymeds.org.          This list is accurate as of: 1/23/18  2:04 PM.  Always use your most recent med list.                   Brand Name Dispense Instructions for use Diagnosis    ACETAMINOPHEN 8 HOUR 650 MG 8 hour tablet   Generic drug:  acetaminophen     100 tablet    Take 650 mg by mouth every 4 hours as needed.    C1-C2 instability       amLODIPine 10 MG tablet    NORVASC    90 tablet    Take 1 tablet (10 mg) by mouth daily    Essential hypertension       DICLOFENAC SODIUM PO      Take 25 mg by mouth daily        fluocinolone 0.025 % ointment    SYNALAR    60 g    Apply twice a day to affected areas of the face avoiding the eyelids followed by Vanicream moisturizer.    Xerosis of skin, Intrinsic atopic dermatitis       FOLIC ACID      daily.        levofloxacin 500 MG tablet    LEVAQUIN    10 tablet    Take 1 tablet (500 mg) by mouth daily    Cough       METHOTREXATE      10 tablets once a week On Fridays. Taking 8 tablets on Fridays        methotrexate 2.5 MG tablet CHEMO           metoprolol tartrate 100 MG tablet    LOPRESSOR    360 tablet    Take 2 tablets (200 mg) by mouth 2 times daily    Essential hypertension       mometasone 50 MCG/ACT spray    NASONEX    1 Box    Spray 1 spray into both nostrils as needed PRN daily    Chronic rhinitis       Multi-vitamin Tabs tablet   Generic drug:  multivitamin, therapeutic with minerals      Take 1 tablet by mouth daily.        Potassium Chloride ER 20 MEQ Tbcr      6 tablet    Take 2 tablets (40 mEq) by mouth daily for 3 days    Hypokalemia       PRILOSEC PO      Take 20 mg by mouth daily as needed        triamcinolone 0.1 % ointment    KENALOG    454 g    Apply to affected area of the body twice a day followed immediately by the Vanicream moisturizer.    Xerosis of skin, Intrinsic atopic dermatitis       TYLENOL PM EXTRA STRENGTH PO      Take 2 tablets by mouth nightly as needed.

## 2018-01-23 NOTE — PATIENT INSTRUCTIONS
[facility] Medication Refill Request Information:  * Please contact your pharmacy regarding ANY request for medication refills.  ** [facility] Prescription Fax = [phone]  * Please allow 3 business days for routine medication refills.  * Please allow 5 business days for controlled substance medication refills.     [facility] Test Result notification information:  *You will be notified with in 7-10 days of your appointment day regarding the results of your test.  If you are on MyChart you will be notified as soon as the provider has reviewed the results and signed off on them.    [facility] [phone]     You should plan to have a basic metabolic panel lab drawn on Friday 1/26. If your symptoms continue to worsen, please call to set up a follow up appointment.

## 2018-01-26 ENCOUNTER — TELEPHONE (OUTPATIENT)
Dept: INTERNAL MEDICINE | Facility: CLINIC | Age: 78
End: 2018-01-26

## 2018-01-26 DIAGNOSIS — E87.6 HYPOKALEMIA: ICD-10-CM

## 2018-01-26 DIAGNOSIS — R05.9 COUGH: Primary | ICD-10-CM

## 2018-01-26 DIAGNOSIS — D50.0 IRON DEFICIENCY ANEMIA DUE TO CHRONIC BLOOD LOSS: ICD-10-CM

## 2018-01-26 LAB
ANION GAP SERPL CALCULATED.3IONS-SCNC: 8 MMOL/L (ref 3–14)
BUN SERPL-MCNC: 10 MG/DL (ref 7–30)
CALCIUM SERPL-MCNC: 8.6 MG/DL (ref 8.5–10.1)
CHLORIDE SERPL-SCNC: 99 MMOL/L (ref 94–109)
CO2 SERPL-SCNC: 27 MMOL/L (ref 20–32)
CREAT SERPL-MCNC: 0.93 MG/DL (ref 0.52–1.04)
GFR SERPL CREATININE-BSD FRML MDRD: 58 ML/MIN/1.7M2
GLUCOSE SERPL-MCNC: 107 MG/DL (ref 70–99)
POTASSIUM SERPL-SCNC: 4.6 MMOL/L (ref 3.4–5.3)
SODIUM SERPL-SCNC: 134 MMOL/L (ref 133–144)

## 2018-01-26 PROCEDURE — 36415 COLL VENOUS BLD VENIPUNCTURE: CPT | Performed by: STUDENT IN AN ORGANIZED HEALTH CARE EDUCATION/TRAINING PROGRAM

## 2018-01-26 PROCEDURE — 80048 BASIC METABOLIC PNL TOTAL CA: CPT | Performed by: STUDENT IN AN ORGANIZED HEALTH CARE EDUCATION/TRAINING PROGRAM

## 2018-01-26 NOTE — TELEPHONE ENCOUNTER
Dear Coretta;    Kimberly is still scheduled to have surgery with Dr. Eisenberg 2/12/18 but I'm not sure she is quite ready? Anyway, she is scheduled for PAC appt. 2/8/18. I placed future lab orders and CXR this encounter. Please give her call with this information.    Thanks, RADHA Castellano

## 2018-01-27 ENCOUNTER — TELEPHONE (OUTPATIENT)
Dept: INTERNAL MEDICINE | Facility: CLINIC | Age: 78
End: 2018-01-27

## 2018-01-27 NOTE — TELEPHONE ENCOUNTER
Dear Coretta;    One additional item for Kimberly;    She still needs resting echo before surgery with Dr. Eisenberg. I placed order for this 1/16/18. Please help coordinate    Thanks, RADHA Castellano    Pt called and number for ECHO given. Pt will call back if problems or concerns.  Coretta Simeon RN 12:47 PM on 1/29/2018.

## 2018-02-07 ENCOUNTER — RADIANT APPOINTMENT (OUTPATIENT)
Dept: CARDIOLOGY | Facility: CLINIC | Age: 78
End: 2018-02-07
Attending: INTERNAL MEDICINE
Payer: MEDICARE

## 2018-02-07 DIAGNOSIS — R94.31 ABNORMAL ELECTROCARDIOGRAM: ICD-10-CM

## 2018-02-08 ENCOUNTER — TELEPHONE (OUTPATIENT)
Dept: INTERNAL MEDICINE | Facility: CLINIC | Age: 78
End: 2018-02-08

## 2018-02-08 NOTE — TELEPHONE ENCOUNTER
Dear Halle;    Ms. Chau is scheduled for Sinus surgery with Dr. Eisenberg 2/12/18 (next Monday). I had placed lab orders 1/26/18 and CXR order on 1/26/18. She still needs these done before her surgery.  Please give her a call.    Thanks, RADHA Castellano

## 2018-02-09 ENCOUNTER — TELEPHONE (OUTPATIENT)
Dept: OTOLARYNGOLOGY | Facility: CLINIC | Age: 78
End: 2018-02-09

## 2018-02-09 ENCOUNTER — DOCUMENTATION ONLY (OUTPATIENT)
Dept: OTOLARYNGOLOGY | Facility: CLINIC | Age: 78
End: 2018-02-09

## 2018-02-09 ENCOUNTER — RADIANT APPOINTMENT (OUTPATIENT)
Dept: GENERAL RADIOLOGY | Facility: CLINIC | Age: 78
End: 2018-02-09
Payer: MEDICARE

## 2018-02-09 ENCOUNTER — DOCUMENTATION ONLY (OUTPATIENT)
Dept: INTERNAL MEDICINE | Facility: CLINIC | Age: 78
End: 2018-02-09

## 2018-02-09 DIAGNOSIS — R05.9 COUGH: ICD-10-CM

## 2018-02-09 DIAGNOSIS — D50.0 IRON DEFICIENCY ANEMIA DUE TO CHRONIC BLOOD LOSS: ICD-10-CM

## 2018-02-09 LAB — RADIOLOGIST FLAGS: NORMAL

## 2018-02-09 NOTE — TELEPHONE ENCOUNTER
The patient was called to provide further explanation of why we were postponing her surgery next Monday with Dr Eisenberg. Dr Castellano spoke to her on the phone today and she is still very week and has a persistent cough. As the surgery is totally elective, it was felt that the best and safest course of action would be to post pone the case til Kimberly was feeling better. The patient is fine with this plan.  Hector SaundersRN  [phone]

## 2018-02-09 NOTE — PROGRESS NOTES
Ms. Chau is scheduled for sinus surgery 2/12/18 with Dr. Eisenberg, ENT    (1) Resting echo from 2/7/2018; Nl LVF 60-65%. No LV  wall motion noted. Normal RV size and function. Note EKG from 1/18/18 with old (see EKG from 1/8/2013) Q wave III and aVF    (2) CXR from today 2/9/2018 with resolved (resolving) R sided pulmonary process    (3) Ordered labs for today to follow up on lower Na+    D. MD Gray

## 2018-02-09 NOTE — PROGRESS NOTES
I  Called Ms. Chau at home. She states he has laryngitis and feels weak. She states not feeling well since CXR showed infiltrate. However, she denies cough. Discussed with ENT nursing regarding planned surgery 2/12/2018; would cancel. She should follow up next week in clinic if not improving.    RADHA Castellano MD

## 2018-02-09 NOTE — PROGRESS NOTES
2/9/18  Per guidance from Sean ARZOLA, ENT RNCNC surgery for 2/12/18 is cancelled at [facility],  Called to pt to confirm that once she is cleared to call and reschedule.  She has the surgery schedulers for ENT information.    Reema Coleman   ENT Rocio-Op Coordinator  [phone]

## 2018-02-27 ENCOUNTER — DOCUMENTATION ONLY (OUTPATIENT)
Dept: OTOLARYNGOLOGY | Facility: CLINIC | Age: 78
End: 2018-02-27

## 2018-02-27 ENCOUNTER — OFFICE VISIT (OUTPATIENT)
Dept: INTERNAL MEDICINE | Facility: CLINIC | Age: 78
End: 2018-02-27
Payer: MEDICARE

## 2018-02-27 VITALS
HEIGHT: 61 IN | DIASTOLIC BLOOD PRESSURE: 64 MMHG | SYSTOLIC BLOOD PRESSURE: 117 MMHG | HEART RATE: 70 BPM | TEMPERATURE: 97.9 F | OXYGEN SATURATION: 99 % | WEIGHT: 109.9 LBS | BODY MASS INDEX: 20.75 KG/M2

## 2018-02-27 DIAGNOSIS — E87.1 HYPONATREMIA: ICD-10-CM

## 2018-02-27 DIAGNOSIS — M05.742 RHEUMATOID ARTHRITIS INVOLVING BOTH HANDS WITH POSITIVE RHEUMATOID FACTOR (H): ICD-10-CM

## 2018-02-27 DIAGNOSIS — J18.9 PNEUMONIA OF RIGHT MIDDLE LOBE DUE TO INFECTIOUS ORGANISM: ICD-10-CM

## 2018-02-27 DIAGNOSIS — Z01.818 PRE-OP EXAM: Primary | ICD-10-CM

## 2018-02-27 DIAGNOSIS — M05.741 RHEUMATOID ARTHRITIS INVOLVING BOTH HANDS WITH POSITIVE RHEUMATOID FACTOR (H): ICD-10-CM

## 2018-02-27 DIAGNOSIS — I10 ESSENTIAL HYPERTENSION: ICD-10-CM

## 2018-02-27 DIAGNOSIS — J32.9 CHRONIC SINUSITIS, UNSPECIFIED LOCATION: ICD-10-CM

## 2018-02-27 ASSESSMENT — ENCOUNTER SYMPTOMS
MEMORY LOSS: 0
DYSURIA: 0
DISTURBANCES IN COORDINATION: 0
SINUS PAIN: 1
SEIZURES: 0
SPEECH CHANGE: 0
NECK MASS: 0
PARALYSIS: 0
LOSS OF CONSCIOUSNESS: 0
WEAKNESS: 0
TREMORS: 0
TROUBLE SWALLOWING: 0
FLANK PAIN: 0
NUMBNESS: 0
HEMATURIA: 0
TINGLING: 0
HOARSE VOICE: 1
DIFFICULTY URINATING: 0
HEADACHES: 1
TASTE DISTURBANCE: 0
DIZZINESS: 0
SINUS CONGESTION: 1
SMELL DISTURBANCE: 0
SORE THROAT: 0

## 2018-02-27 ASSESSMENT — ACTIVITIES OF DAILY LIVING (ADL)
IN_THE_PAST_7_DAYS,_DID_YOU_NEED_HELP_FROM_OTHERS_TO_PERFORM_EVERYDAY_ACTIVITIES_SUCH_AS_EATING,_GETTING_DRESSED,_GROOMING,_BATHING,_WALKING,_OR_USING_THE_TOILET: N
IN_THE_PAST_7_DAYS,_DID_YOU_NEED_HELP_FROM_OTHERS_TO_TAKE_CARE_OF_THINGS_SUCH_AS_LAUNDRY_AND_HOUSEKEEPING,_BANKING,_SHOPPING,_USING_THE_TELEPHONE,_FOOD_PREPARATION,_TRANSPORTATION,_OR_TAKING_YOUR_OWN_MEDICATIONS?: N

## 2018-02-27 ASSESSMENT — PAIN SCALES - GENERAL: PAINLEVEL: MODERATE PAIN (5)

## 2018-02-27 NOTE — PROGRESS NOTES
This is a LOW risk surgery.    HPI:   Reason for surgery:  (must document 4+HPI factors for completion)  Ms. Kimberly Chau is a 77-year-old female with medical history notable for rheumatoid arthritis, chronic hyponatremia, GERD, and chronic right frontal headaches with MRI brain demonstrating extensive right frontal and right maxillary sinus disease.  She was seen by ENT, was ultimately felt that headaches may be due to extensive sinus disease.  Was therefore recommended to patient that she may benefit from Stealth guided right maxillary antrostomy, ethmoidectomy, and frontal sinus ostomy.  She was originally scheduled for this in mid January, and had a preop visit with Dr. Castellano in early January 2018.  However, at that visi, patient reported a productive cough and feeling generally unwell.  Workup at that time was negative for influenza, chest x-ray however, demonstrated right middle lobe infiltrate consistent with pneumonia.  Patient was treated with a 10 day course of levofloxacin with improvement in productive cough and generalized fatigue.  Continued to feel poorly into early February.  Followed up with Dr. Castellano on February 7 and was reportedly feeling better at that time, though continued to feel somewhat weak and congested.  Repeat chest x-ray at that time showed interval improvement of right middle lobe infiltrate.  Her surgery was scheduled for 2/9, however, given ongoing symptoms surgery was postponed and has not yet been rescheduled.  During the course of patient's pneumonia, she was also noted to have an acute on chronic hyponatremia.  Baseline sodium in the 130s, on recheck in early January was 126, down trended to 121.  Patient was noted to be drinking a significant amount of water at this time while feeling sick.  Patient was instructed to back off on water intake and replace of Gatorade, recheck sodium was 126 followed by 134.  Unclear if due to extensive water intake versus bacterial  pneumonia, likely both contributing.    On visit today, patient reports feeling significantly improved.  Productive cough is completely resolved, denies fevers, chills, general fatigue, malaise, or any other symptoms.  Does still have a very mild nonproductive cough that she describes as a tickle, worse with sleeping.  Does have ongoing sinus congestion and headaches, indication for initial surgery.  No other concerns today, was hoping to get clearance from Dr. Castellano or colleagues for surgery.    Cardiovascular Risk:  This patient does not have chest pain with ambulation or walking up a flight of stairs.  There is not any chest pain with exercise.  There is not a history of heart disease or valve problems.  TTE done 2/7/18, EF 60-65%, right ventricle normal, no regional wall motion abnormalities, no valvular abnormalities, IVC appear within normal limits with normal respiratory variation.    Pulmonary Risk:  The patient does not have a history of lung problems.    Perioperative Complications:  The patient does not have a history of bleeding or clotting problems in the past. The patient has not had complications from past surgeries.  The patient does not have a family history of any anesthesia or surgical complications.  Patient Active Problem List   Diagnosis     Intrinsic atopic dermatitis     Essential hypertension     Rheumatoid arthritis (H)     Retinal artery occlusion     Cervical vertebral fracture (H)     Central retinal artery occlusion of right eye     Bilateral occipital neuralgia     C1-C2 instability     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 factor (H)     Past Surgical History:   Procedure Laterality Date     COLONOSCOPY       FUSION CERVICAL POSTERIOR THREE+ LEVELS  1/22/2013    Procedure: FUSION CERVICAL POSTERIOR THREE+ LEVELS;  Cervical 1-6 Posterior Instrumentation and Fusion, Cervical 3-4 Laminectomy  .Instrumentation and fusion to Cervical 6 *Latex Allergy*;   Surgeon: Ra Bar MD;  Location: [facility] OR     HEAD & NECK SURGERY       HYSTERECTOMY       KNEE SURGERY       Family History   Problem Relation Age of Onset     Arthritis Mother      Respiratory Mother      pulmonary fibrosis     Liver Disease Father      from EtOH     Social History     Social History     Marital status:      Spouse name: N/A     Number of children: N/A     Years of education: N/A     Occupational History     Not on file.     Social History Main Topics     Smoking status: Never Smoker     Smokeless tobacco: Never Used     Alcohol use Yes      Comment: Wine daily with dinner     Drug use: No     Sexual activity: Not on file     Other Topics Concern     Not on file     Social History Narrative    Ms. Chau is , has two grown children and two grandchildren. She does not work. She was never a smoker, and drinks a glass of wine with dinner each night.      Current Outpatient Prescriptions   Medication Sig Dispense Refill     metoprolol (LOPRESSOR) 100 MG tablet Take 2 tablets (200 mg) by mouth 2 times daily 360 tablet 3     amLODIPine (NORVASC) 10 MG tablet Take 1 tablet (10 mg) by mouth daily 90 tablet 2     Omeprazole (PRILOSEC PO) Take 20 mg by mouth daily as needed        acetaminophen 650 MG TABS Take 650 mg by mouth every 4 hours as needed. 100 tablet 0     Multiple Vitamin (MULTI-VITAMIN) per tablet Take 1 tablet by mouth daily.       Diphenhydramine-APAP, sleep, (TYLENOL PM EXTRA STRENGTH PO) Take 2 tablets by mouth nightly as needed.       methotrexate 2.5 MG tablet CHEMO   2     fluocinolone (SYNALAR) 0.025 % ointment Apply twice a day to affected areas of the face avoiding the eyelids followed by Vanicream moisturizer. (Patient not taking: Reported on 1/23/2018) 60 g 1     mometasone (NASONEX) 50 MCG/ACT nasal spray Spray 1 spray into both nostrils as needed PRN daily (Patient not taking: Reported on 2/27/2018) 1 Box 11     DICLOFENAC SODIUM PO Take 25 mg by mouth  "daily       FOLIC ACID daily.        METHOTREXATE 10 tablets once a week On Fridays.  Taking 8 tablets on Fridays       Allergies   Allergen Reactions     Hctz Other (See Comments)     Pt develops symptomatic and significant hyponatremia with HCTZ exposure     Hydrochlorothiazide      Other reaction(s): Hyponatremia  Hyponatremia     Latex      Benzocaine Rash     carba mix,thrium-sunscreen     Resorcinol-Alcohol Rash     carba mix,thrium-sunscreen     Ace Inhibitors      Dizziness and orthostatic changes and electrolyte problems.     Sulfa Drugs          ROS:  Comprehensive 10 point review of systems negative unless otherwise noted in HPI as above.    Physical exam:  /64  Pulse 70  Temp 97.9  F (36.6  C) (Oral)  Ht 1.543 m (5' 0.75\")  Wt 49.9 kg (109 lb 14.4 oz)  SpO2 99%  BMI 20.94 kg/m2    Constitutional: no distress, comfortable, pleasant   Eyes: anicteric sclera, normal extra-ocular movements   Ears, Nose and Throat: throat clear, neck supple with full range of motion  Head: Patient with large healing bruise on anterior forehead, nontender  Cardiovascular: regular rate and rhythm, normal S1 and S2, no murmurs, rubs or gallops, peripheral pulses full and symmetric   Respiratory: clear to auscultation, no wheezes or crackles, normal breath sounds   Gastrointestinal: positive bowel sounds, nontender, no hepatosplenomegaly, no masses   Musculoskeletal: full range of motion, no edema   Skin: no concerning lesions, no jaundice   Neurological: cranial nerves intact, normal strength and sensation, reflexes at patella and biceps normal, normal gait, no tremor   Psychological: appropriate mood   Lymphatic: no cervical, axillary or inguinal lymphadenopathy    Assessment and Plan:   Presents prior to surgery for assessment of perioperative risk. The patient is at LOW risk for cardiovascular complications and at LOW risk for pulmonary complications of this LOW risk surgery.    --Approval given to proceed with " proposed procedure, will repeat BMP to ensure Na >130, will repeat CXR to confirm resolution of prior pneumonia; patient's surgery currently not yet scheduled, instructed patient to have lab and imaging done ~1 week prior to scheduled surgery  -- Continue to hold MTX (for psoriatic arthritis) in anticipation of surgery   --Patient is to take all other scheduled medications on the day of surgery   -- The patient has been told to not take any aspirin or NSAIDs for 10 days prior to surgery.  The patient has been instructed as to what to do with medications prior to surgery.    Laboratory studies:  BMP  Imaging studies: CXR 2V  EKG and TTE completed recently, both unremarkable, no need to repeat at this time.     Please contact our office if there are any further questions or information required about this patient.    Patient seen and examined with attending physician, Dr. Dominguez, who is in agreement with assessment and plan as above.    Jayla Nation MD  Internal Medicine, PGY-3  [phone]       Teaching Physician Note:  I was present during the visit and the patient was seen and examined by me.   I discussed the case with the resident and agree with the note as documented by the resident with the following exceptions:  None.    Suni Dominguez M.D.  Internal Medicine   pager [phone]

## 2018-02-27 NOTE — MR AVS SNAPSHOT
After Visit Summary   2/27/2018    Kimberly Chau    MRN: [id number]           Patient Information     Date Of Birth          1940        Visit Information        Provider Department      2/27/2018 10:10 AM Jayla Nation MD [facility]        Today's Diagnoses     Pneumonia of right middle lobe due to infectious organism (H)    -  1    Hyponatremia           Follow-ups after your visit        Your next 10 appointments already scheduled     Mar 12, 2018 11:00 AM CDT   (Arrive by 10:45 AM)   XR CHEST 2 VIEWS with UCXR1   [facility] ([facility])    [address] [address] [phone]           Please bring a list of your current medicines to your exam. (Include vitamins, minerals and over-thecounter medicines.) Leave your valuables at home.  Tell your doctor if there is a chance you may be pregnant.  You do not need to do anything special for this exam.              Future tests that were ordered for you today     Open Future Orders        Priority Expected Expires Ordered    Basic metabolic panel Routine 2/27/2018 3/13/2018 2/27/2018    XR Chest 2 Views Routine 2/27/2018 2/27/2019 2/27/2018            Who to contact     Please call your clinic at [phone] to:    Ask questions about your health    Make or cancel appointments    Discuss your medicines    Learn about your test results    Speak to your doctor            Additional Information About Your Visit        MyChart Information     Tamar [employer] is an electronic gateway that provides easy, online access to your medical records. With Tindie, you can request a clinic appointment, read your test results, renew a prescription or communicate with your care team.     To sign up for Tamar [employer] visit the website at [email or web address]   You will be asked to enter the access code listed below, as well as some personal information. Please follow  "the directions to create your username and password.     Your access code is: [email or web address]  Expires: 2018  6:31 AM     Your access code will  in 90 days. If you need help or a new code, please contact your [facility] or call [phone] for assistance.        Care EveryWhere ID     This is your Care EveryWhere ID. This could be used by other organizations to access your [place] medical records  [id number]        Your Vitals Were     Pulse Temperature Height Pulse Oximetry BMI (Body Mass Index)       70 97.9  F (36.6  C) (Oral) 1.543 m (5' 0.75\") 99% 20.94 kg/m2        Blood Pressure from Last 3 Encounters:   18 117/64   18 107/67   18 132/61    Weight from Last 3 Encounters:   18 49.9 kg (109 lb 14.4 oz)   18 50.3 kg (111 lb)   18 54.4 kg (120 lb)                 Today's Medication Changes          These changes are accurate as of 18 11:08 AM.  If you have any questions, ask your nurse or doctor.               Stop taking these medicines if you haven't already. Please contact your care team if you have questions.     levofloxacin 500 MG tablet   Commonly known as:  LEVAQUIN   Stopped by:  Jayla Nation MD           triamcinolone 0.1 % ointment   Commonly known as:  KENALOG   Stopped by:  Jayla Nation MD                    Primary Care Provider Office Phone # Fax #    Guillermo W MD Gray [phone] [address] [address]        Equal Access to Services     [facility] : Hadii halina nunez Soreji, waaxda luqadaha, qaybta kaalmada alejandra jimenez. So [facility] [phone].    ATENCIÓN: Si habla español, tiene a vázquez disposición servicios gratuitos de asistencia lingüística. Llame al [phone].    We comply with applicable federal civil rights laws and [place] laws. We do not discriminate on the basis of race, color, national origin, " age, disability, sex, sexual orientation, or gender identity.            Thank you!     Thank you for choosing [facility]  for your care. Our goal is always to provide you with excellent care. Hearing back from our patients is one way we can continue to improve our services. Please take a few minutes to complete the written survey that you may receive in the mail after your visit with us. Thank you!             Your Updated Medication List - Protect others around you: Learn how to safely use, store and throw away your medicines at www.disposemymeds.org.          This list is accurate as of 2/27/18 11:08 AM.  Always use your most recent med list.                   Brand Name Dispense Instructions for use Diagnosis    ACETAMINOPHEN 8 HOUR 650 MG 8 hour tablet   Generic drug:  acetaminophen     100 tablet    Take 650 mg by mouth every 4 hours as needed.    C1-C2 instability       amLODIPine 10 MG tablet    NORVASC    90 tablet    Take 1 tablet (10 mg) by mouth daily    Essential hypertension       DICLOFENAC SODIUM PO      Take 25 mg by mouth daily        fluocinolone 0.025 % ointment    SYNALAR    60 g    Apply twice a day to affected areas of the face avoiding the eyelids followed by Vanicream moisturizer.    Xerosis of skin, Intrinsic atopic dermatitis       FOLIC ACID      daily.        METHOTREXATE      10 tablets once a week On Fridays. Taking 8 tablets on Fridays        methotrexate 2.5 MG tablet CHEMO           metoprolol tartrate 100 MG tablet    LOPRESSOR    360 tablet    Take 2 tablets (200 mg) by mouth 2 times daily    Essential hypertension       mometasone 50 MCG/ACT spray    NASONEX    1 Box    Spray 1 spray into both nostrils as needed PRN daily    Chronic rhinitis       Multi-vitamin Tabs tablet   Generic drug:  multivitamin, therapeutic with minerals      Take 1 tablet by mouth daily.        PRILOSEC PO      Take 20 mg by mouth daily as needed        TYLENOL PM EXTRA STRENGTH PO       Take 2 tablets by mouth nightly as needed.

## 2018-02-27 NOTE — NURSING NOTE
Chief Complaint   Patient presents with     Pre-Op Exam     DOS: TBD location TBD, was originally scheduled for 2.12.18 was cancelleddue to pneumonia. For Stealth Guided Right Maxillary Antrostomy, Ethmoidectomy and Frontal Sinusotomy  **Latex Allergy** with Dr Eisenberg.

## 2018-02-27 NOTE — PROGRESS NOTES
Surgeon (please enter first and last name):  Dr Elyse Eisenberg  Fax number for Preop Evaluation:  TBD  Location of Surgery: TBD  Date of Surgery:  TBD  Procedure:  Stealth Guided Right Maxillary Antrostomy, Ethmoidectomy and Frontal Sinusotomy  **Latex Allergy**  History of reaction to anesthesia?  No

## 2018-02-27 NOTE — PROGRESS NOTES
Surgery with Dr Eisenberg re-scheduled to 4/6/18 at [facility].    Pre-OP - Dr Castellano    PAC Call  4/4/18  10:00 am    Post-Op  4/16/18  4:45 pm    Questions from patient sent to Veronica Webber - ENT RN    1.  Driving to [place] 4/19/18 - 13 days later ok  2. Re-see Dr Eisenberg prior to surgery - not needed per Terrie  3. New CT - No    Reema Coleman   ENT [place]-Op Coordinator  [phone]

## 2018-03-01 ENCOUNTER — TELEPHONE (OUTPATIENT)
Dept: INTERNAL MEDICINE | Facility: CLINIC | Age: 78
End: 2018-03-01

## 2018-03-01 NOTE — TELEPHONE ENCOUNTER
Dear Chris Harris is scheduled to see the PAC clinic 4/4/2018 for preop sinus surgery with Dr. Eisenberg    (1) She still needs CXR ordered 2/27/2018 BEFORE surgery    (2) She still needs labs ordered 2/27/2018 BEFORE surgery    She could get these done 4/4/2018    Please give her a reminder call    Thanks, RADHA Castellano    Pt called and pt will be seen March 26th 12:05pm.  Coretta Simeon RN 9:08 AM on 3/5/2018.

## 2018-03-26 ENCOUNTER — OFFICE VISIT (OUTPATIENT)
Dept: INTERNAL MEDICINE | Facility: CLINIC | Age: 78
End: 2018-03-26
Payer: MEDICARE

## 2018-03-26 ENCOUNTER — RADIANT APPOINTMENT (OUTPATIENT)
Dept: GENERAL RADIOLOGY | Facility: CLINIC | Age: 78
End: 2018-03-26
Attending: INTERNAL MEDICINE
Payer: MEDICARE

## 2018-03-26 VITALS
OXYGEN SATURATION: 95 % | RESPIRATION RATE: 16 BRPM | WEIGHT: 107 LBS | DIASTOLIC BLOOD PRESSURE: 69 MMHG | SYSTOLIC BLOOD PRESSURE: 115 MMHG | HEART RATE: 72 BPM | BODY MASS INDEX: 21.01 KG/M2 | HEIGHT: 60 IN

## 2018-03-26 DIAGNOSIS — R05.9 COUGH: ICD-10-CM

## 2018-03-26 DIAGNOSIS — D50.0 IRON DEFICIENCY ANEMIA DUE TO CHRONIC BLOOD LOSS: ICD-10-CM

## 2018-03-26 DIAGNOSIS — R05.9 COUGH: Primary | ICD-10-CM

## 2018-03-26 LAB
ALBUMIN SERPL-MCNC: 3.6 G/DL (ref 3.4–5)
ALP SERPL-CCNC: 114 U/L (ref 40–150)
ALT SERPL W P-5'-P-CCNC: 18 U/L (ref 0–50)
ANION GAP SERPL CALCULATED.3IONS-SCNC: 7 MMOL/L (ref 3–14)
AST SERPL W P-5'-P-CCNC: 23 U/L (ref 0–45)
BASOPHILS # BLD AUTO: 0.1 10E9/L (ref 0–0.2)
BASOPHILS NFR BLD AUTO: 0.9 %
BILIRUB SERPL-MCNC: 0.3 MG/DL (ref 0.2–1.3)
BUN SERPL-MCNC: 8 MG/DL (ref 7–30)
CALCIUM SERPL-MCNC: 8.9 MG/DL (ref 8.5–10.1)
CHLORIDE SERPL-SCNC: 97 MMOL/L (ref 94–109)
CO2 SERPL-SCNC: 27 MMOL/L (ref 20–32)
CREAT SERPL-MCNC: 1.01 MG/DL (ref 0.52–1.04)
DIFFERENTIAL METHOD BLD: ABNORMAL
EOSINOPHIL # BLD AUTO: 0.3 10E9/L (ref 0–0.7)
EOSINOPHIL NFR BLD AUTO: 4.5 %
ERYTHROCYTE [DISTWIDTH] IN BLOOD BY AUTOMATED COUNT: 13.2 % (ref 10–15)
FERRITIN SERPL-MCNC: 189 NG/ML (ref 8–252)
FOLATE SERPL-MCNC: 21.2 NG/ML
GFR SERPL CREATININE-BSD FRML MDRD: 53 ML/MIN/1.7M2
GLUCOSE SERPL-MCNC: 93 MG/DL (ref 70–99)
HCT VFR BLD AUTO: 34.7 % (ref 35–47)
HGB BLD-MCNC: 12 G/DL (ref 11.7–15.7)
IMM GRANULOCYTES # BLD: 0 10E9/L (ref 0–0.4)
IMM GRANULOCYTES NFR BLD: 0.3 %
IRON SATN MFR SERPL: 30 % (ref 15–46)
IRON SERPL-MCNC: 91 UG/DL (ref 35–180)
LYMPHOCYTES # BLD AUTO: 1 10E9/L (ref 0.8–5.3)
LYMPHOCYTES NFR BLD AUTO: 15.2 %
MCH RBC QN AUTO: 32.9 PG (ref 26.5–33)
MCHC RBC AUTO-ENTMCNC: 34.6 G/DL (ref 31.5–36.5)
MCV RBC AUTO: 95 FL (ref 78–100)
MONOCYTES # BLD AUTO: 0.8 10E9/L (ref 0–1.3)
MONOCYTES NFR BLD AUTO: 12.5 %
NEUTROPHILS # BLD AUTO: 4.4 10E9/L (ref 1.6–8.3)
NEUTROPHILS NFR BLD AUTO: 66.6 %
NRBC # BLD AUTO: 0 10*3/UL
NRBC BLD AUTO-RTO: 0 /100
PLATELET # BLD AUTO: 311 10E9/L (ref 150–450)
POTASSIUM SERPL-SCNC: 4.7 MMOL/L (ref 3.4–5.3)
PROT SERPL-MCNC: 7.6 G/DL (ref 6.8–8.8)
RADIOLOGIST FLAGS: NORMAL
RBC # BLD AUTO: 3.65 10E12/L (ref 3.8–5.2)
SODIUM SERPL-SCNC: 131 MMOL/L (ref 133–144)
TIBC SERPL-MCNC: 297 UG/DL (ref 240–430)
VIT B12 SERPL-MCNC: 524 PG/ML (ref 193–986)
WBC # BLD AUTO: 6.7 10E9/L (ref 4–11)

## 2018-03-26 ASSESSMENT — PAIN SCALES - GENERAL: PAINLEVEL: MILD PAIN (3)

## 2018-03-26 NOTE — NURSING NOTE
Chief Complaint   Patient presents with     Pre-Op Exam     Patient is having surgery at [facility]      Alpa Medina [facility] 11:57 AM on 3/26/2018.

## 2018-03-26 NOTE — LETTER
Patient:  Kimberly Chau  :   1940  MRN:     [id number]        [facility] Kimberly Chau  [address] [address] [facility] [place] [address]        2018    Dear Ms. Chau,    Thank you for choosing the [facility] for your healthcare needs.  We appreciate the opportunity to serve you.    The following are your recent test results.     Dear Kimberly;     The results of your chest X-ray are attached and the previous pneumonia is probably gone but the radiology doctors are still seeing something. Before your surgery on 2018 with Dr. Eisenberg, I recommend we get a chest CT scan. I placed a radiology order for this today.  My nurse Coretta will give you a call to get this set up.         Results for orders placed or performed in visit on 18   XR Chest 2 Views   Result Value Ref Range    Radiologist flags Potential right upper lobe nodule     Narrative    Exam: XR CHEST 2 VW, 3/26/2018 12:37 PM    Indication: ; Cough; Iron deficiency anemia due to chronic blood loss    Comparison: 2018    Findings:   Heart within normal limits. Pulmonary vasculature normal. 1 cm density  projecting over the right third anterior rib. This is the same  location as the apparent pneumonia seen on 1/15/2018. Unfortunately  not present on a study of 2016. Pleural spaces are clear. Upper  abdomen unremarkable.      Impression    IMPRESSION: In the area of the previous apparent pneumonia in the  right upper lobe there is now a nodular opacity. Not present on  2016. This could be summation of shadows with the third anterior  rib but similar appearance is not identified on prior chest x-rays. CT  of the chest recommended to understand it's significance.    [Consider Follow Up: Potential right upper lobe nodule]    This report will be copied to the [facility] to ensure a  provider acknowledges the finding.     VICTORIA STEVE MD   EKG Performed in Clinic w/ Provider Reading Fee    Result Value Ref Range    Interpretation ECG Click View Image link to view waveform and result        Please contact your provider if you have any questions or concerns.  We look forward to serving your needs in the future.      Sincerely,    RADHA Castellano MD/CE

## 2018-03-26 NOTE — MR AVS SNAPSHOT
After Visit Summary   3/26/2018    Kibmerly Chau    MRN: [id number]           Patient Information     Date Of Birth          1940        Visit Information        Provider Department      3/26/2018 12:05 PM Guillermo Castellano MD [facility]        Today's Diagnoses     Cough    -  1    Iron deficiency anemia due to chronic blood loss           Follow-ups after your visit        Your next 10 appointments already scheduled     Mar 26, 2018 12:45 PM CDT   LAB with  LAB   [facility] ([facility])    [address] [place] [id number] [phone]           Please do not eat 10-12 hours before your appointment if you are coming in fasting for labs on lipids, cholesterol, or glucose (sugar). This does not apply to pregnant women. Water, hot tea and black coffee (with nothing added) are okay. Do not drink other fluids, diet soda or chew gum.            Mar 26, 2018  3:00 PM CDT   (Arrive by 2:45 PM)   XR CHEST 2 VIEWS with UCXR1   [facility] ([facility])    [address] [place] [id number] [phone]           Please bring a list of your current medicines to your exam. (Include vitamins, minerals and over-thecounter medicines.) Leave your valuables at home.  Tell your doctor if there is a chance you may be pregnant.  You do not need to do anything special for this exam.            Apr 04, 2018 10:00 AM CDT   (Arrive by 9:45 AM)   PAC PHONE RN ASSESSMENT with  Pac Rn   [facility] ([facility])    [address] [place] [id number] [phone]           Note: this is not an onsite visit; there is no need to come to the facility.            Apr 06, 2018   Procedure with Elyse Eisenberg MD   [facility] [place] (--)    [address] [facility] [id number] [phone]             2018  4:45 PM CDT   (Arrive by 4:30 PM)   Return Visit with Elyse Eisenberg MD   [facility] ([facility])    [address] [address] [phone]              Future tests that were ordered for you today     Open Future Orders        Priority Expected Expires Ordered    Ferritin Routine 3/26/2018 3/26/2019 3/26/2018    Vitamin B12 Routine 3/26/2018 3/26/2019 3/26/2018    Iron and iron binding capacity Routine 3/26/2018 3/26/2019 3/26/2018    Folate Routine 3/26/2018 3/26/2019 3/26/2018    CBC with platelets differential Routine 3/26/2018 2018 3/26/2018    Comprehensive metabolic panel Routine 3/26/2018 2018 3/26/2018            Who to contact     Please call your clinic at [phone] to:    Ask questions about your health    Make or cancel appointments    Discuss your medicines    Learn about your test results    Speak to your doctor            Additional Information About Your Visit        WhatSalon Information     What[employer] is an electronic gateway that provides easy, online access to your medical records. With What[employer]on, you can request a clinic appointment, read your test results, renew a prescription or communicate with your care team.     To sign up for What[employer]on visit the website at [email or web address]   You will be asked to enter the access code listed below, as well as some personal information. Please follow the directions to create your username and password.     Your access code is: [email or web address]  Expires: 2018  7:31 AM     Your access code will  in 90 days. If you need help or a new code, please contact your [facility] or call [phone] for assistance.        Care EveryWhere ID     This is your Care EveryWhere ID. This could be used by other organizations to access your [place] medical records  [id number]        Your Vitals Were     Pulse Respirations Height  Pulse Oximetry Breastfeeding? BMI (Body Mass Index)    72 16 1.524 m (5') 95% No 20.9 kg/m2       Blood Pressure from Last 3 Encounters:   03/26/18 115/69   02/27/18 117/64   01/23/18 107/67    Weight from Last 3 Encounters:   03/26/18 48.5 kg (107 lb)   02/27/18 49.9 kg (109 lb 14.4 oz)   01/23/18 50.3 kg (111 lb)              We Performed the Following     EKG Performed in Clinic w/ Provider Reading Fee     XR Chest 2 Views        Primary Care Provider Office Phone # Fax #    Guillermo Castellano [phone]       [address] [address]        Equal Access to Services     ERA YATES : Hadii aad ku hadasho Soomaali, waaxda luqadaha, qaybta kaalmada adeegyada, alejandra idiin hayandrzej chen . So [facility] [phone].    ATENCIÓN: Si habla español, tiene a vázquez disposición servicios gratuitos de asistencia lingüística. [place]Cent[place] [phone].    We comply with applicable federal civil rights laws and [place] laws. We do not discriminate on the basis of race, color, national origin, age, disability, sex, sexual orientation, or gender identity.            Thank you!     Thank you for choosing [facility]  for your care. Our goal is always to provide you with excellent care. Hearing back from our patients is one way we can continue to improve our services. Please take a few minutes to complete the written survey that you may receive in the mail after your visit with us. Thank you!             Your Updated Medication List - Protect others around you: Learn how to safely use, store and throw away your medicines at www.disposemymeds.org.          This list is accurate as of 3/26/18 12:24 PM.  Always use your most recent med list.                   Brand Name Dispense Instructions for use Diagnosis    ACETAMINOPHEN 8 HOUR 650 MG 8 hour tablet   Generic drug:  acetaminophen     100 tablet    Take 650 mg by mouth every 4 hours as needed.    C1-C2 instability        amLODIPine 10 MG tablet    NORVASC    90 tablet    Take 1 tablet (10 mg) by mouth daily    Essential hypertension       DICLOFENAC SODIUM PO      Take 25 mg by mouth daily        fluocinolone 0.025 % ointment    SYNALAR    60 g    Apply twice a day to affected areas of the face avoiding the eyelids followed by Vanicream moisturizer.    Xerosis of skin, Intrinsic atopic dermatitis       FOLIC ACID      daily.        METHOTREXATE      10 tablets once a week On Fridays. Taking 8 tablets on Fridays        methotrexate 2.5 MG tablet CHEMO           metoprolol tartrate 100 MG tablet    LOPRESSOR    360 tablet    Take 2 tablets (200 mg) by mouth 2 times daily    Essential hypertension       mometasone 50 MCG/ACT spray    NASONEX    1 Box    Spray 1 spray into both nostrils as needed PRN daily    Chronic rhinitis       Multi-vitamin Tabs tablet   Generic drug:  multivitamin, therapeutic with minerals      Take 1 tablet by mouth daily.        PRILOSEC PO      Take 20 mg by mouth daily as needed        TYLENOL PM EXTRA STRENGTH PO      Take 2 tablets by mouth nightly as needed.

## 2018-03-26 NOTE — NURSING NOTE
Surgeon (please enter first and last name):  Dr. Elyse Eisenberg  Fax number for Preop Evaluation:    Location of Surgery: [facility]  Date of Surgery:  04/06/18  Procedure:  Sinus  History of reaction to anesthesia?  No

## 2018-03-26 NOTE — PROGRESS NOTES
HPI:    Pt. With h/o RA and cervical spine surgery comes in for sinus surgery preop with Dr. Eisenberg scheduled for 4/6/2018. She has chronic R frontal HA's. She had MRI brain 10/17 followed sinus CT scan 11/13/17 showing R frontal and R maxillary sinus disease. She denies fever today. She denies any SOB today She followed in  neurosurgery 2/17 and again in 4/17.   No CP or breathing issues. She had normal CXR 12/5/16.  She was seen by Dr. Cabello  Rheumatology 10/31/17; she has stopped her  methotrexate. She o/w denies additional HEENT, cardiopulmonary, abdominal, , GYN, neurological complaints.    PE:    Vitals noted gen nad cooperative alert, lungs with  good air movement,  RRR, S1, S2, no MRG, abdomen, nt, nd, She has symmetrical strength in her B arms.    EKG (1/15/2018): SR at 70; Q waves III and aVF old findings compared 12/5/16 and 1/8/2013    EKG today 3/26/2018 SR at 71; no change from 1/15/2018    Resting echo (2/7/2018) Nl LVF 60-65%. No WMA's.     CXR; some possible finding upper R lobe but less than from 1/15/2018    A/P:    1. Previous cough; will repeat CXR today and improvement. She has no pulmonary sxs. No cough and feels fine.   2. Psoriatic arthritis; she was seen by Dr. Parada Rheumatology 10/31/17; she is not on MTX currently  3. Preop; . She has 1/17/18 PAC appt. As well. Note previous cervical neck surgery to bend her neck  4. She remains on her same antihypertensive medications   5. Resting echo from 2/7/2018 no Wall motion abnormalities to correspond to inferior Q waves on EKG        Total time spent 25 minutes.  More than 50% of the time spent with Ms. Chau on counseling / coordinating her care

## 2018-03-27 LAB — INTERPRETATION ECG - MUSE: NORMAL

## 2018-03-29 ENCOUNTER — TELEPHONE (OUTPATIENT)
Dept: INTERNAL MEDICINE | Facility: CLINIC | Age: 78
End: 2018-03-29

## 2018-03-29 DIAGNOSIS — R93.89 ABNORMAL CHEST X-RAY: Primary | ICD-10-CM

## 2018-03-29 NOTE — TELEPHONE ENCOUNTER
Dear Coretta;    See results note I sent to Ms. Chau below    She needs chest CT scan BEFORE surgery with Dr. Eisenberg next Friday (4/6/2018). Placed order this encounter    ThanksRADHA        Dear Kimberly;    The results of your chest X-ray are attached and the previous pneumonia is probably gone but the radiology doctors are still seeing something. Before your surgery on 4/6/2018 with Dr. Eisenberg, I recommend we get a chest CT scan. I placed a radiology order for this today.  My nurse Coretta will give you a call to get this set up.    RADHA Castellano MD      Appt at [facility] for a 1:40pm 03/30/2018 CT scan  Pt aware.  Coretta Simeon RN 11:57 AM on 3/29/2018.

## 2018-03-30 ENCOUNTER — RADIANT APPOINTMENT (OUTPATIENT)
Dept: CT IMAGING | Facility: CLINIC | Age: 78
End: 2018-03-30
Attending: INTERNAL MEDICINE
Payer: COMMERCIAL

## 2018-03-30 DIAGNOSIS — R93.89 ABNORMAL CHEST X-RAY: ICD-10-CM

## 2018-03-30 PROCEDURE — 71250 CT THORAX DX C-: CPT | Performed by: RADIOLOGY

## 2018-03-31 LAB — RADIOLOGIST FLAGS: NORMAL

## 2018-04-03 ENCOUNTER — TELEPHONE (OUTPATIENT)
Dept: INTERNAL MEDICINE | Facility: CLINIC | Age: 78
End: 2018-04-03

## 2018-04-03 NOTE — TELEPHONE ENCOUNTER
BIRDIE Castellano    Pt called and informed about that she can go ahead with surgery.  Coretta Simeon RN 2:11 PM on 4/3/2018.

## 2018-04-03 NOTE — TELEPHONE ENCOUNTER
Incidental finding of CT chest with contrast. Done on March 30th. Multiple solid and groundglass nodules.   Coretta Simeon RN 9:30 AM on 4/3/2018.       Pt requesting results and if her surgery on the April 6th is still a go.  Coretta Simeon RN 11:44 AM on 4/3/2018.

## 2018-04-03 NOTE — TELEPHONE ENCOUNTER
[facility] Call Center    Phone Message    May a detailed message be left on voicemail: yes    Reason for Call: Other: CT Scan results and verify 4/6/18 surgery is still on.     Action Taken: Other: Routed to [facility]

## 2018-04-03 NOTE — TELEPHONE ENCOUNTER
Incidental finding of CT chest with contrast. Done on March 30th. Multiple solid and groundglass nodules.   Coretta Simeon RN 9:30 AM on 4/3/2018.

## 2018-04-05 ENCOUNTER — TELEPHONE (OUTPATIENT)
Dept: INTERNAL MEDICINE | Facility: CLINIC | Age: 78
End: 2018-04-05

## 2018-04-05 ENCOUNTER — ANESTHESIA EVENT (OUTPATIENT)
Dept: SURGERY | Facility: CLINIC | Age: 78
End: 2018-04-05
Payer: MEDICARE

## 2018-04-05 DIAGNOSIS — R93.89 ABNORMAL CAT SCAN: Primary | ICD-10-CM

## 2018-04-05 ASSESSMENT — LIFESTYLE VARIABLES: TOBACCO_USE: 0

## 2018-04-05 NOTE — ANESTHESIA PREPROCEDURE EVALUATION
Anesthesia Evaluation     . Pt has had prior anesthetic. Type: General    No history of anesthetic complications          ROS/MED HX    ENT/Pulmonary: Comment: History of retinal artery occlusion      (-) tobacco use   Neurologic:     (+)other neuro occipital neuralgia    Cardiovascular: Comment: TTE 2/7/18:  EF 60-65%, right ventricle normal, no regional wall motion abnormalities, no valvular abnormalities, IVC appear within normal limits with normal respiratory variation    (+) hypertension----. : . . . :. . Previous cardiac testing Echodate:2018results:Normal function. No WMA. Normal valvesdate: results: date: results: date: results:         (-) taking anticoagulants/antiplatelets and NORWOOD   METS/Exercise Tolerance:     Hematologic:        (-) history of blood clots   Musculoskeletal: Comment: Rheumatoid arthritis with immunosuppression   FUSION CERVICAL POSTERIOR THREE+ LEVELS;  Cervical 1-6 Posterior Instrumentation and Fusion, Cervical 3-4 Laminectomy    (+) arthritis, , , -       GI/Hepatic:     (+) GERD       Renal/Genitourinary:  - ROS Renal section negative       Endo: Comment: Body mass index is 21.53 kg/(m^2).          Psychiatric:  - neg psychiatric ROS       Infectious Disease:  - neg infectious disease ROS       Malignancy:         Other:                         Physical Exam  Normal systems: pulmonary    Airway   Mallampati: III  TM distance: >3 FB  Neck ROM: limited    Dental     Cardiovascular   Rhythm and rate: regular and normal      Pulmonary    breath sounds clear to auscultation                    Anesthesia Plan      History & Physical Review      ASA Status:  3 .    NPO Status:  > 8 hours    Plan for General and ETT with Intravenous induction. Maintenance will be Balanced.    PONV prophylaxis:  Ondansetron (or other 5HT-3) and Dexamethasone or Solumedrol  Additional equipment: 2nd IV and Videolaryngoscope      Postoperative Care  Postoperative pain management:  IV analgesics, Oral pain  medications and Multi-modal analgesia.      Consents  Anesthetic plan, risks, benefits and alternatives discussed with:  Patient..                      .

## 2018-04-05 NOTE — TELEPHONE ENCOUNTER
Placed pulmonary referral this encounter                Dear Kimberly;    Your chest CT scan results are attached and you have some findings. After your sinus surgery, I recommend you see the pulmonary doctors for this. I placed a referral today and you can call [phone] to schedule this appointment.    RADHA Castellano MD

## 2018-04-06 ENCOUNTER — ANESTHESIA (OUTPATIENT)
Dept: SURGERY | Facility: CLINIC | Age: 78
End: 2018-04-06
Payer: MEDICARE

## 2018-04-06 ENCOUNTER — HOSPITAL ENCOUNTER (OUTPATIENT)
Facility: CLINIC | Age: 78
Discharge: HOME | End: 2018-04-06
Attending: OTOLARYNGOLOGY | Admitting: OTOLARYNGOLOGY
Payer: MEDICARE

## 2018-04-06 VITALS
BODY MASS INDEX: 21.53 KG/M2 | TEMPERATURE: 98.1 F | WEIGHT: 110.23 LBS | RESPIRATION RATE: 16 BRPM | SYSTOLIC BLOOD PRESSURE: 144 MMHG | HEART RATE: 70 BPM | OXYGEN SATURATION: 95 % | DIASTOLIC BLOOD PRESSURE: 78 MMHG

## 2018-04-06 DIAGNOSIS — J32.1 CHRONIC FRONTAL SINUSITIS: Primary | ICD-10-CM

## 2018-04-06 LAB
BACTERIA SPEC CULT: NORMAL
GLUCOSE BLDC GLUCOMTR-MCNC: 54 MG/DL (ref 70–99)
GLUCOSE BLDC GLUCOMTR-MCNC: 81 MG/DL (ref 70–99)
GRAM STN SPEC: NORMAL
INTERPRETATION ECG - MUSE: NORMAL
SPECIMEN SOURCE: NORMAL
SPECIMEN SOURCE: NORMAL

## 2018-04-06 PROCEDURE — 25000128 H RX IP 250 OP 636: Performed by: STUDENT IN AN ORGANIZED HEALTH CARE EDUCATION/TRAINING PROGRAM

## 2018-04-06 PROCEDURE — 71000027 ZZH RECOVERY PHASE 2 EACH 15 MINS: Performed by: OTOLARYNGOLOGY

## 2018-04-06 PROCEDURE — 87186 SC STD MICRODIL/AGAR DIL: CPT | Performed by: OTOLARYNGOLOGY

## 2018-04-06 PROCEDURE — A9270 NON-COVERED ITEM OR SERVICE: HCPCS | Mod: GY | Performed by: ANESTHESIOLOGY

## 2018-04-06 PROCEDURE — C9399 UNCLASSIFIED DRUGS OR BIOLOG: HCPCS | Performed by: STUDENT IN AN ORGANIZED HEALTH CARE EDUCATION/TRAINING PROGRAM

## 2018-04-06 PROCEDURE — 71000014 ZZH RECOVERY PHASE 1 LEVEL 2 FIRST HR: Performed by: OTOLARYNGOLOGY

## 2018-04-06 PROCEDURE — 36000076 ZZH SURGERY LEVEL 6 EA 15 ADDTL MIN - UMMC: Performed by: OTOLARYNGOLOGY

## 2018-04-06 PROCEDURE — 25000128 H RX IP 250 OP 636: Performed by: OTOLARYNGOLOGY

## 2018-04-06 PROCEDURE — 25000125 ZZHC RX 250: Performed by: OTOLARYNGOLOGY

## 2018-04-06 PROCEDURE — 93010 ELECTROCARDIOGRAM REPORT: CPT | Performed by: INTERNAL MEDICINE

## 2018-04-06 PROCEDURE — 88304 TISSUE EXAM BY PATHOLOGIST: CPT | Performed by: OTOLARYNGOLOGY

## 2018-04-06 PROCEDURE — 71000015 ZZH RECOVERY PHASE 1 LEVEL 2 EA ADDTL HR: Performed by: OTOLARYNGOLOGY

## 2018-04-06 PROCEDURE — 40000170 ZZH STATISTIC PRE-PROCEDURE ASSESSMENT II: Performed by: OTOLARYNGOLOGY

## 2018-04-06 PROCEDURE — 87070 CULTURE OTHR SPECIMN AEROBIC: CPT | Performed by: OTOLARYNGOLOGY

## 2018-04-06 PROCEDURE — 36000074 ZZH SURGERY LEVEL 6 1ST 30 MIN - UMMC: Performed by: OTOLARYNGOLOGY

## 2018-04-06 PROCEDURE — 25000125 ZZHC RX 250: Performed by: ANESTHESIOLOGY

## 2018-04-06 PROCEDURE — 27210794 ZZH OR GENERAL SUPPLY STERILE: Performed by: OTOLARYNGOLOGY

## 2018-04-06 PROCEDURE — 37000008 ZZH ANESTHESIA TECHNICAL FEE, 1ST 30 MIN: Performed by: OTOLARYNGOLOGY

## 2018-04-06 PROCEDURE — 25000128 H RX IP 250 OP 636: Performed by: ANESTHESIOLOGY

## 2018-04-06 PROCEDURE — 37000009 ZZH ANESTHESIA TECHNICAL FEE, EACH ADDTL 15 MIN: Performed by: OTOLARYNGOLOGY

## 2018-04-06 PROCEDURE — A9270 NON-COVERED ITEM OR SERVICE: HCPCS | Performed by: OTOLARYNGOLOGY

## 2018-04-06 PROCEDURE — 87102 FUNGUS ISOLATION CULTURE: CPT | Performed by: OTOLARYNGOLOGY

## 2018-04-06 PROCEDURE — 87077 CULTURE AEROBIC IDENTIFY: CPT | Performed by: OTOLARYNGOLOGY

## 2018-04-06 PROCEDURE — 40000065 ZZH STATISTIC EKG NON-CHARGEABLE

## 2018-04-06 PROCEDURE — 87205 SMEAR GRAM STAIN: CPT | Performed by: OTOLARYNGOLOGY

## 2018-04-06 PROCEDURE — 25000132 ZZH RX MED GY IP 250 OP 250 PS 637: Mod: GY | Performed by: ANESTHESIOLOGY

## 2018-04-06 PROCEDURE — 25000566 ZZH SEVOFLURANE, EA 15 MIN: Performed by: OTOLARYNGOLOGY

## 2018-04-06 PROCEDURE — 82962 GLUCOSE BLOOD TEST: CPT

## 2018-04-06 RX ORDER — OXYMETAZOLINE HYDROCHLORIDE 0.05 G/100ML
SPRAY NASAL
Qty: 1 BOTTLE | Refills: 0 | Status: SHIPPED | OUTPATIENT
Start: 2018-04-06 | End: 2019-02-04

## 2018-04-06 RX ORDER — FENTANYL CITRATE 50 UG/ML
25-50 INJECTION, SOLUTION INTRAMUSCULAR; INTRAVENOUS EVERY 5 MIN PRN
Status: DISCONTINUED | OUTPATIENT
Start: 2018-04-06 | End: 2018-04-06 | Stop reason: HOSPADM

## 2018-04-06 RX ORDER — NALOXONE HYDROCHLORIDE 0.4 MG/ML
.1-.4 INJECTION, SOLUTION INTRAMUSCULAR; INTRAVENOUS; SUBCUTANEOUS
Status: DISCONTINUED | OUTPATIENT
Start: 2018-04-06 | End: 2018-04-06 | Stop reason: HOSPADM

## 2018-04-06 RX ORDER — MEPERIDINE HYDROCHLORIDE 50 MG/ML
12.5 INJECTION INTRAMUSCULAR; INTRAVENOUS; SUBCUTANEOUS
Status: DISCONTINUED | OUTPATIENT
Start: 2018-04-06 | End: 2018-04-06 | Stop reason: HOSPADM

## 2018-04-06 RX ORDER — LIDOCAINE HYDROCHLORIDE 20 MG/ML
INJECTION, SOLUTION INFILTRATION; PERINEURAL PRN
Status: DISCONTINUED | OUTPATIENT
Start: 2018-04-06 | End: 2018-04-06

## 2018-04-06 RX ORDER — ONDANSETRON 2 MG/ML
4 INJECTION INTRAMUSCULAR; INTRAVENOUS EVERY 30 MIN PRN
Status: DISCONTINUED | OUTPATIENT
Start: 2018-04-06 | End: 2018-04-06 | Stop reason: HOSPADM

## 2018-04-06 RX ORDER — OXYMETAZOLINE HYDROCHLORIDE 0.05 G/100ML
2 SPRAY NASAL
Status: COMPLETED | OUTPATIENT
Start: 2018-04-06 | End: 2018-04-06

## 2018-04-06 RX ORDER — DEXAMETHASONE SODIUM PHOSPHATE 4 MG/ML
INJECTION, SOLUTION INTRA-ARTICULAR; INTRALESIONAL; INTRAMUSCULAR; INTRAVENOUS; SOFT TISSUE PRN
Status: DISCONTINUED | OUTPATIENT
Start: 2018-04-06 | End: 2018-04-06

## 2018-04-06 RX ORDER — ONDANSETRON 4 MG/1
4 TABLET, ORALLY DISINTEGRATING ORAL EVERY 30 MIN PRN
Status: DISCONTINUED | OUTPATIENT
Start: 2018-04-06 | End: 2018-04-06 | Stop reason: HOSPADM

## 2018-04-06 RX ORDER — SODIUM CHLORIDE, SODIUM LACTATE, POTASSIUM CHLORIDE, CALCIUM CHLORIDE 600; 310; 30; 20 MG/100ML; MG/100ML; MG/100ML; MG/100ML
INJECTION, SOLUTION INTRAVENOUS CONTINUOUS PRN
Status: DISCONTINUED | OUTPATIENT
Start: 2018-04-06 | End: 2018-04-06

## 2018-04-06 RX ORDER — OXYCODONE HYDROCHLORIDE 5 MG/1
5 TABLET ORAL EVERY 6 HOURS PRN
Qty: 30 TABLET | Refills: 0 | Status: SHIPPED | OUTPATIENT
Start: 2018-04-06 | End: 2018-07-23

## 2018-04-06 RX ORDER — SODIUM CHLORIDE, SODIUM LACTATE, POTASSIUM CHLORIDE, CALCIUM CHLORIDE 600; 310; 30; 20 MG/100ML; MG/100ML; MG/100ML; MG/100ML
INJECTION, SOLUTION INTRAVENOUS CONTINUOUS
Status: DISCONTINUED | OUTPATIENT
Start: 2018-04-06 | End: 2018-04-06 | Stop reason: HOSPADM

## 2018-04-06 RX ORDER — LABETALOL HYDROCHLORIDE 5 MG/ML
10 INJECTION, SOLUTION INTRAVENOUS
Status: DISCONTINUED | OUTPATIENT
Start: 2018-04-06 | End: 2018-04-06 | Stop reason: HOSPADM

## 2018-04-06 RX ORDER — FENTANYL CITRATE 50 UG/ML
INJECTION, SOLUTION INTRAMUSCULAR; INTRAVENOUS PRN
Status: DISCONTINUED | OUTPATIENT
Start: 2018-04-06 | End: 2018-04-06

## 2018-04-06 RX ORDER — ACETAMINOPHEN 325 MG/1
650 TABLET ORAL ONCE
Status: COMPLETED | OUTPATIENT
Start: 2018-04-06 | End: 2018-04-06

## 2018-04-06 RX ORDER — GABAPENTIN 300 MG/1
300 CAPSULE ORAL ONCE
Status: COMPLETED | OUTPATIENT
Start: 2018-04-06 | End: 2018-04-06

## 2018-04-06 RX ORDER — PROPOFOL 10 MG/ML
INJECTION, EMULSION INTRAVENOUS PRN
Status: DISCONTINUED | OUTPATIENT
Start: 2018-04-06 | End: 2018-04-06

## 2018-04-06 RX ORDER — LIDOCAINE HYDROCHLORIDE AND EPINEPHRINE 10; 10 MG/ML; UG/ML
INJECTION, SOLUTION INFILTRATION; PERINEURAL PRN
Status: DISCONTINUED | OUTPATIENT
Start: 2018-04-06 | End: 2018-04-06 | Stop reason: HOSPADM

## 2018-04-06 RX ORDER — EPINEPHRINE NASAL SOLUTION 1 MG/ML
SOLUTION NASAL PRN
Status: DISCONTINUED | OUTPATIENT
Start: 2018-04-06 | End: 2018-04-06 | Stop reason: HOSPADM

## 2018-04-06 RX ORDER — OXYMETAZOLINE HYDROCHLORIDE 0.05 G/100ML
SPRAY NASAL PRN
Status: DISCONTINUED | OUTPATIENT
Start: 2018-04-06 | End: 2018-04-06 | Stop reason: HOSPADM

## 2018-04-06 RX ORDER — ONDANSETRON 2 MG/ML
INJECTION INTRAMUSCULAR; INTRAVENOUS PRN
Status: DISCONTINUED | OUTPATIENT
Start: 2018-04-06 | End: 2018-04-06

## 2018-04-06 RX ADMIN — PROPOFOL 25 MG: 10 INJECTION, EMULSION INTRAVENOUS at 07:59

## 2018-04-06 RX ADMIN — REMIFENTANIL HYDROCHLORIDE 0.08 MCG/KG/MIN: 1 INJECTION, POWDER, LYOPHILIZED, FOR SOLUTION INTRAVENOUS at 08:01

## 2018-04-06 RX ADMIN — FENTANYL CITRATE 50 MCG: 50 INJECTION, SOLUTION INTRAMUSCULAR; INTRAVENOUS at 10:41

## 2018-04-06 RX ADMIN — FENTANYL CITRATE 50 MCG: 50 INJECTION, SOLUTION INTRAMUSCULAR; INTRAVENOUS at 10:20

## 2018-04-06 RX ADMIN — Medication 0.5 MG: at 11:49

## 2018-04-06 RX ADMIN — LIDOCAINE HYDROCHLORIDE 60 MG: 20 INJECTION, SOLUTION INFILTRATION; PERINEURAL at 07:49

## 2018-04-06 RX ADMIN — FENTANYL CITRATE 50 MCG: 50 INJECTION, SOLUTION INTRAMUSCULAR; INTRAVENOUS at 07:48

## 2018-04-06 RX ADMIN — ROCURONIUM BROMIDE 20 MG: 10 INJECTION INTRAVENOUS at 08:24

## 2018-04-06 RX ADMIN — Medication 0.5 MG: at 11:37

## 2018-04-06 RX ADMIN — FENTANYL CITRATE 50 MCG: 50 INJECTION, SOLUTION INTRAMUSCULAR; INTRAVENOUS at 07:44

## 2018-04-06 RX ADMIN — ACETAMINOPHEN 650 MG: 325 TABLET ORAL at 07:41

## 2018-04-06 RX ADMIN — SODIUM CHLORIDE, POTASSIUM CHLORIDE, SODIUM LACTATE AND CALCIUM CHLORIDE: 600; 310; 30; 20 INJECTION, SOLUTION INTRAVENOUS at 07:41

## 2018-04-06 RX ADMIN — FENTANYL CITRATE 25 MCG: 50 INJECTION, SOLUTION INTRAMUSCULAR; INTRAVENOUS at 10:15

## 2018-04-06 RX ADMIN — GABAPENTIN 300 MG: 300 CAPSULE ORAL at 07:41

## 2018-04-06 RX ADMIN — FENTANYL CITRATE 50 MCG: 50 INJECTION, SOLUTION INTRAMUSCULAR; INTRAVENOUS at 08:04

## 2018-04-06 RX ADMIN — PROPOFOL 25 MG: 10 INJECTION, EMULSION INTRAVENOUS at 08:01

## 2018-04-06 RX ADMIN — Medication: at 12:40

## 2018-04-06 RX ADMIN — FENTANYL CITRATE 50 MCG: 50 INJECTION, SOLUTION INTRAMUSCULAR; INTRAVENOUS at 10:50

## 2018-04-06 RX ADMIN — OXYMETAZOLINE HYDROCHLORIDE 2 SPRAY: 5 SPRAY NASAL at 07:18

## 2018-04-06 RX ADMIN — DEXAMETHASONE SODIUM PHOSPHATE 4 MG: 4 INJECTION, SOLUTION INTRA-ARTICULAR; INTRALESIONAL; INTRAMUSCULAR; INTRAVENOUS; SOFT TISSUE at 08:24

## 2018-04-06 RX ADMIN — PROPOFOL 30 MG: 10 INJECTION, EMULSION INTRAVENOUS at 08:03

## 2018-04-06 RX ADMIN — PHENYLEPHRINE HYDROCHLORIDE 0.15 MCG/KG/MIN: 10 INJECTION, SOLUTION INTRAMUSCULAR; INTRAVENOUS; SUBCUTANEOUS at 08:19

## 2018-04-06 RX ADMIN — ROCURONIUM BROMIDE 50 MG: 10 INJECTION INTRAVENOUS at 08:05

## 2018-04-06 RX ADMIN — Medication 0.3 MG: at 12:08

## 2018-04-06 RX ADMIN — Medication 0.2 MG: at 11:05

## 2018-04-06 RX ADMIN — Medication 0.5 MG: at 12:01

## 2018-04-06 RX ADMIN — ONDANSETRON 4 MG: 2 INJECTION INTRAMUSCULAR; INTRAVENOUS at 09:32

## 2018-04-06 RX ADMIN — SUGAMMADEX 100 MG: 100 INJECTION, SOLUTION INTRAVENOUS at 09:35

## 2018-04-06 RX ADMIN — FENTANYL CITRATE 25 MCG: 50 INJECTION, SOLUTION INTRAMUSCULAR; INTRAVENOUS at 10:28

## 2018-04-06 RX ADMIN — ROCURONIUM BROMIDE 10 MG: 10 INJECTION INTRAVENOUS at 09:25

## 2018-04-06 ASSESSMENT — PAIN DESCRIPTION - DESCRIPTORS: DESCRIPTORS: STABBING

## 2018-04-06 NOTE — DISCHARGE INSTRUCTIONS
[facility]  Same-Day Surgery   Adult Discharge Orders & Instructions     For 24 hours after surgery    1. Get plenty of rest.  A responsible adult must stay with you for at least 24 hours after you leave the hospital.   2. Do not drive or use heavy equipment.  If you have weakness or tingling, don't drive or use heavy equipment until this feeling goes away.  3. Do not drink alcohol.  4. Avoid strenuous or risky activities.  Ask for help when climbing stairs.   5. You may feel lightheaded.  IF so, sit for a few minutes before standing.  Have someone help you get up.   6. If you have nausea (feel sick to your stomach): Drink only clear liquids such as apple juice, ginger ale, broth or 7-Up.  Rest may also help.  Be sure to drink enough fluids.  Move to a regular diet as you feel able.  7. You may have a slight fever. Call the doctor if your fever is over 100 F (37.7 C) (taken under the tongue) or lasts longer than 24 hours.  8. You may have a dry mouth, a sore throat, muscle aches or trouble sleeping.  These should go away after 24 hours.  9. Do not make important or legal decisions.   Call your doctor for any of the followin.  Signs of infection (fever, growing tenderness at the surgery site, a large amount of drainage or bleeding, severe pain, foul-smelling drainage, redness, swelling).    2. It has been over 8 to 10 hours since surgery and you are still not able to urinate (pass water).    3.  Headache for over 24 hours.      To contact a doctor, call  Dr. MAYKEL Eisenberg  Clinic # [phone]_ or:        [phone] and ask for the resident on call for   ENT_ (answered 24 hours a day)       Emergency Department:    [facility]: [phone]             Tips for taking pain medications  To get the best pain relief possible , remember these points:      Take pain medications as directed, before pain becomes severe      Pain medication can upset your stomach: taking it  with food may help      Constipation is a common side effect of pain medication. Drink plenty of  Fluids      Eat foods high in fiber. Take a stool softener  if recommended by your doctor or  Pharmacist.        Do not drink alcohol, drive or operate machinery while taking pain medications.      Ask about other ways to control pain, such as with heat, ice or relaxation.

## 2018-04-06 NOTE — IP AVS SNAPSHOT
MRN:[id number]                      After Visit Summary   4/6/2018    Kimberly Chau    MRN: [id number]           Thank you!     Thank you for choosing [facility] for your care. Our goal is always to provide you with excellent care. Hearing back from our patients is one way we can continue to improve our services. Please take a few minutes to complete the written survey that you may receive in the mail after you visit with us. Thank you!        Patient Information     Date Of Birth          1940        About your hospital stay     You were admitted on:  April 6, 2018 You last received care in the:  Same Day Surgery [facility]    You were discharged on:  April 6, 2018       Who to Call     For medical emergencies, please call 911.  For non-urgent questions about your medical care, please call your primary care provider or clinic, [phone]  For questions related to your surgery, please call your surgery clinic        Attending Provider     Provider Specialty    Elyse Eisenberg MD Otolaryngology       Primary Care Provider Office Phone # Fax #    Guillermo SOLIZ MD [place] [phone]      After Care Instructions     Diet Instructions       Resume pre-procedure diet            NO Lifting       No lifting over 10 lbs and no strenuous physical activity for 2 weeks.            No driving or operating machinery        While taking narcotic pain medication            Shower        No shower for 24 hours post procedure. May shower POD 1                  Your next 10 appointments already scheduled     Apr 16, 2018  4:45 PM CDT   (Arrive by 4:30 PM)   Return Visit with Elyse Eisenberg MD   [facility] ([facility])    [address] [place] [id number] [phone]              Further instructions from your care team       [facility]  Same-Day Surgery   Adult Discharge Orders &  Instructions     For 24 hours after surgery    1. Get plenty of rest.  A responsible adult must stay with you for at least 24 hours after you leave the hospital.   2. Do not drive or use heavy equipment.  If you have weakness or tingling, don't drive or use heavy equipment until this feeling goes away.  3. Do not drink alcohol.  4. Avoid strenuous or risky activities.  Ask for help when climbing stairs.   5. You may feel lightheaded.  IF so, sit for a few minutes before standing.  Have someone help you get up.   6. If you have nausea (feel sick to your stomach): Drink only clear liquids such as apple juice, ginger ale, broth or 7-Up.  Rest may also help.  Be sure to drink enough fluids.  Move to a regular diet as you feel able.  7. You may have a slight fever. Call the doctor if your fever is over 100 F (37.7 C) (taken under the tongue) or lasts longer than 24 hours.  8. You may have a dry mouth, a sore throat, muscle aches or trouble sleeping.  These should go away after 24 hours.  9. Do not make important or legal decisions.   Call your doctor for any of the followin.  Signs of infection (fever, growing tenderness at the surgery site, a large amount of drainage or bleeding, severe pain, foul-smelling drainage, redness, swelling).    2. It has been over 8 to 10 hours since surgery and you are still not able to urinate (pass water).    3.  Headache for over 24 hours.      To contact a doctor, call  Dr. MAYKEL Eisenberg  Clinic # [phone]_ or:        [phone] and ask for the resident on call for   ENT_ (answered 24 hours a day)       Emergency Department:    [facility]: [phone]             Tips for taking pain medications  To get the best pain relief possible , remember these points:      Take pain medications as directed, before pain becomes severe      Pain medication can upset your stomach: taking it with food may help      Constipation is a common side effect of pain medication. Drink plenty of   "Fluids      Eat foods high in fiber. Take a stool softener  if recommended by your doctor or  Pharmacist.        Do not drink alcohol, drive or operate machinery while taking pain medications.      Ask about other ways to control pain, such as with heat, ice or relaxation.      Pending Results     Date and Time Order Name Status Description    2018 0940 Surgical pathology exam In process     2018 0940 Gram stain In process     2018 0940 Fungus Culture, non-blood In process     2018 0939 Sinus Culture Aerobic Bacterial In process     2018 0637 EKG 12-lead, tracing only Preliminary             Admission Information     Date & Time Provider Department Dept. Phone    2018 Elyse Eisenberg MD Same Day Surgery [facility] [place] [phone]      Your Vitals Were     Blood Pressure Pulse Temperature Respirations Weight Pulse Oximetry    136/70 70 97.3  F (36.3  C) (Oral) 16 50 kg (110 lb 3.7 oz) 95%    BMI (Body Mass Index)                   21.53 kg/m2           [employer] Information     [employer] lets you send messages to your doctor, view your test results, renew your prescriptions, schedule appointments and more. To sign up, go to [email or web address] . Click on \"Log in\" on the left side of the screen, which will take you to the Welcome page. Then click on \"Sign up Now\" on the right side of the page.     You will be asked to enter the access code listed below, as well as some personal information. Please follow the directions to create your username and password.     Your access code is: [email or web address]  Expires: 2018  6:31 AM     Your access code will  in 90 days. If you need help or a new code, please call your [place] clinic or [phone].        Care EveryWhere ID     This is your Care EveryWhere ID. This could be used by other organizations to access your [facility] [place] medical records  [id number]        Equal Access to Services     ERA YATES AH: kaylan Briones " larissa roach waxay idiin hayaan adeeg kharash la'aan ah. Melissa [facility] [phone].    ATENCIÓN: Si ebony yeung, tiene a vázquez disposición servicios gratuitos de asistencia lingüística. Llame al [phone].    We comply with applicable federal civil rights laws and [place] laws. We do not discriminate on the basis of race, color, national origin, age, disability, sex, sexual orientation, or gender identity.               Review of your medicines      START taking        Dose / Directions    oxyCODONE IR 5 MG tablet   Commonly known as:  ROXICODONE   Used for:  Chronic frontal sinusitis        Dose:  5 mg   Take 1 tablet (5 mg) by mouth every 6 hours as needed for other (Moderate to Severe Pain)   Quantity:  30 tablet   Refills:  0       oxymetazoline 0.05 % spray   Commonly known as:  AFRIN NASAL SPRAY   Used for:  Chronic frontal sinusitis        Use 2 sprays to each nare two times daily for up to 3 days or as needed for bleeding.   Quantity:  1 Bottle   Refills:  0       sodium chloride 0.65 % nasal spray   Commonly known as:  OCEAN   Used for:  Chronic frontal sinusitis        Dose:  1-2 spray   Spray 1-2 sprays in nostril every 2 hours Use in EACH nostril.   Quantity:  1 Bottle   Refills:  1         CONTINUE these medicines which have NOT CHANGED        Dose / Directions    ACETAMINOPHEN 8 HOUR 650 MG 8 hour tablet   Used for:  C1-C2 instability   Generic drug:  acetaminophen        Dose:  650 mg   Take 650 mg by mouth every 4 hours as needed.   Quantity:  100 tablet   Refills:  0       amLODIPine 10 MG tablet   Commonly known as:  NORVASC   Used for:  Essential hypertension        Dose:  10 mg   Take 1 tablet (10 mg) by mouth daily   Quantity:  90 tablet   Refills:  2       DICLOFENAC SODIUM PO        Dose:  25 mg   Take 25 mg by mouth daily   Refills:  0       fluocinolone 0.025 % ointment   Commonly known as:  SYNALAR   Used for:  Xerosis of skin, Intrinsic atopic dermatitis        Apply  twice a day to affected areas of the face avoiding the eyelids followed by Vanicream moisturizer.   Quantity:  60 g   Refills:  1       FOLIC ACID        daily.   Refills:  0       METHOTREXATE        Dose:  10 tablet   10 tablets once a week On Fridays. Taking 8 tablets on Fridays   Refills:  0       methotrexate 2.5 MG tablet CHEMO        Refills:  2       metoprolol tartrate 100 MG tablet   Commonly known as:  LOPRESSOR   Used for:  Essential hypertension        Dose:  200 mg   Take 2 tablets (200 mg) by mouth 2 times daily   Quantity:  360 tablet   Refills:  3       mometasone 50 MCG/ACT spray   Commonly known as:  NASONEX   Used for:  Chronic rhinitis        Dose:  1 spray   Spray 1 spray into both nostrils as needed PRN daily   Quantity:  1 Box   Refills:  11       Multi-vitamin Tabs tablet   Generic drug:  multivitamin, therapeutic with minerals        Dose:  1 tablet   Take 1 tablet by mouth daily.   Refills:  0       PRILOSEC PO        Dose:  20 mg   Take 20 mg by mouth daily as needed   Refills:  0       TYLENOL PM EXTRA STRENGTH PO        Dose:  2 tablet   Take 2 tablets by mouth nightly as needed.   Refills:  0            Where to get your medicines      These medications were sent to [facility] [place], [place] - [address] [address]     Phone:  [phone]     oxymetazoline 0.05 % spray    sodium chloride 0.65 % nasal spray         Some of these will need a paper prescription and others can be bought over the counter. Ask your nurse if you have questions.     Bring a paper prescription for each of these medications     oxyCODONE IR 5 MG tablet                Protect others around you: Learn how to safely use, store and throw away your medicines at www.disposemymeds.org.        Information about OPIOIDS     PRESCRIPTION OPIOIDS: WHAT YOU NEED TO KNOW    Prescription opioids can be used to help relieve moderate to severe pain and are often  prescribed following a surgery or injury, or for certain health conditions. These medications can be an important part of treatment but also come with serious risks. It is important to work with your health care provider to make sure you are getting the safest, most effective care.    WHAT ARE THE RISKS AND SIDE EFFECTS OF OPIOID USE?  Prescription opioids carry serious risks of addiction and overdose, especially with prolonged use. An opioid overdose, often marked by slowed breathing can cause sudden death. The use of prescription opioids can have a number of side effects as well, even when taken as directed:      Tolerance - meaning you might need to take more of a medication for the same pain relief    Physical dependence - meaning you have symptoms of withdrawal when a medication is stopped    Increased sensitivity to pain    Constipation    Nausea, vomiting, and dry mouth    Sleepiness and dizziness    Confusion    Depression    Low levels of testosterone that can result in lower sex drive, energy, and strength    Itching and sweating    RISKS ARE GREATER WITH:    History of drug misuse, substance use disorder, or overdose    Mental health conditions (such as depression or anxiety)    Sleep apnea    Older age (65 years or older)    Pregnancy    Avoid alcohol while taking prescription opioids.   Also, unless specifically advised by your health care provider, medications to avoid include:    Benzodiazepines (such as Xanax or Valium)    Muscle relaxants (such as Soma or Flexeril)    Hypnotics (such as Ambien or Lunesta)    Other prescription opioids    KNOW YOUR OPTIONS:  Talk to your health care provider about ways to manage your pain that do not involve prescription opioids. Some of these options may actually work better and have fewer risks and side effects:    Pain relievers such as acetaminophen, ibuprofen, and naproxen    Some medications that are also used for depression or seizures    Physical therapy and  exercise    Cognitive behavioral therapy, a psychological, goal-directed approach, in which patients learn how to modify physical, behavioral, and emotional triggers of pain and stress    IF YOU ARE PRESCRIBED OPIOIDS FOR PAIN:    Never take opioids in greater amounts or more often than prescribed    Follow up with your primary health care provider and work together to create a plan on how to manage your pain.    Talk about ways to help manage your pain that do not involve prescription opioids    Talk about all concerns and side effects    Help prevent misuse and abuse    Never sell or share prescription opioids    Never use another person's prescription opioids    Store prescription opioids in a secure place and out of reach of others (this may include visitors, children, friends, and family)    Visit www.cdc.gov/drugoverdose to learn about risks of opioid abuse and overdose    If you believe you may be struggling with addiction, tell your health care provider and ask for guidance or call [facility]'s National Helpline at 6-357-414-HELP    LEARN MORE / www.cdc.gov/drugoverdose/prescribing/guideline.html    Safely dispose of unused prescription opioids: Find your local drug take-back programs and more information about the importance of safe disposal at www.doseofreality.[place].gov             Medication List: This is a list of all your medications and when to take them. Check marks below indicate your daily home schedule. Keep this list as a reference.      Medications           Morning Afternoon Evening Bedtime As Needed    ACETAMINOPHEN 8 HOUR 650 MG 8 hour tablet   Take 650 mg by mouth every 4 hours as needed.   Last time this was given:  650 mg on 4/6/2018  7:41 AM   Generic drug:  acetaminophen                                amLODIPine 10 MG tablet   Commonly known as:  NORVASC   Take 1 tablet (10 mg) by mouth daily                                DICLOFENAC SODIUM PO   Take 25 mg by mouth daily                                 fluocinolone 0.025 % ointment   Commonly known as:  SYNALAR   Apply twice a day to affected areas of the face avoiding the eyelids followed by Vanicream moisturizer.                                FOLIC ACID   daily.                                METHOTREXATE   10 tablets once a week On Fridays. Taking 8 tablets on Fridays                                methotrexate 2.5 MG tablet CHEMO                                metoprolol tartrate 100 MG tablet   Commonly known as:  LOPRESSOR   Take 2 tablets (200 mg) by mouth 2 times daily                                mometasone 50 MCG/ACT spray   Commonly known as:  NASONEX   Spray 1 spray into both nostrils as needed PRN daily                                Multi-vitamin Tabs tablet   Take 1 tablet by mouth daily.   Generic drug:  multivitamin, therapeutic with minerals                                oxyCODONE IR 5 MG tablet   Commonly known as:  ROXICODONE   Take 1 tablet (5 mg) by mouth every 6 hours as needed for other (Moderate to Severe Pain)                                oxymetazoline 0.05 % spray   Commonly known as:  AFRIN NASAL SPRAY   Use 2 sprays to each nare two times daily for up to 3 days or as needed for bleeding.   Last time this was given:  15 mLs on 4/6/2018  8:30 AM                                PRILOSEC PO   Take 20 mg by mouth daily as needed                                sodium chloride 0.65 % nasal spray   Commonly known as:  OCEAN   Spray 1-2 sprays in nostril every 2 hours Use in EACH nostril.                                TYLENOL PM EXTRA STRENGTH PO   Take 2 tablets by mouth nightly as needed.

## 2018-04-06 NOTE — IP AVS SNAPSHOT
Same Day Surgery [address] [address]    Phone:  [phone]                                       After Visit Summary   4/6/2018    Kimberly Chau    MRN: [id number]           After Visit Summary Signature Page     I have received my discharge instructions, and my questions have been answered. I have discussed any challenges I see with this plan with the nurse or doctor.    ..........................................................................................................................................  Patient/Patient Representative Signature      ..........................................................................................................................................  Patient Representative Print Name and Relationship to Patient    ..................................................               ................................................  Date                                            Time    ..........................................................................................................................................  Reviewed by Signature/Title    ...................................................              ..............................................  Date                                                            Time

## 2018-04-06 NOTE — OR NURSING
Dr. KIMBERLY Lima here to see Pt at 1444.  Pt wants to go home and she has enjoyed a cup of coffee and cookies.  RA sats mid to hi 90's.  Dr. Lima to hold off taking pain medication if possible and to take Tylenol is this is tolerable. Pt. Instructed to stay active for a couple hours.  Pt and  are agreeable to plan of care.  Pt. Know not to use straw, blow nose.  No nasal drainage.  Pt. And  report that they are comfortable with her status and discharge instructions.

## 2018-04-06 NOTE — ANESTHESIA CARE TRANSFER NOTE
Patient: Kimberly Chau    Procedure(s):  Stealth Assisted Right Maxillary Antrostomy, Anterior Ethmoidectomy And Frontal Sinusotomy - Wound Class: II-Clean Contaminated    Diagnosis: Chronic Pansinusitis  Diagnosis Additional Information: No value filed.    Anesthesia Type:   General, ETT     Note:  Airway :Face Mask  Patient transferred to:PACU  Comments: Vss, sats 100% on facemask, No pain on extubation, No adverse anesthesia events.          Vitals: (Last set prior to Anesthesia Care Transfer)    CRNA VITALS  4/6/2018 0923 - 4/6/2018 1003      4/6/2018             Resp Rate (observed): (!)  1                Electronically Signed By: Hui Jaramillo MD  April 6, 2018  10:03 AM

## 2018-04-06 NOTE — ANESTHESIA POSTPROCEDURE EVALUATION
Patient: Kimberly Chau    Procedure(s):  Stealth Assisted Right Maxillary Antrostomy, Anterior Ethmoidectomy And Frontal Sinusotomy - Wound Class: II-Clean Contaminated    Diagnosis:Chronic Pansinusitis  Diagnosis Additional Information: No value filed.    Anesthesia Type:  General, ETT    Note:  Anesthesia Post Evaluation    Patient location during evaluation: PACU  Patient participation: Able to fully participate in evaluation  Level of consciousness: awake and alert  Pain management: satisfactory to patient  Airway patency: patent  Cardiovascular status: acceptable and stable  Respiratory status: acceptable and spontaneous ventilation  Hydration status: euvolemic  PONV: controlled     Anesthetic complications: None          Last vitals:  Vitals:    04/06/18 1000 04/06/18 1015 04/06/18 1030   BP: 132/77 148/78 139/76   Resp: 12 16 14   Temp: 35.9  C (96.6  F)     SpO2: 100% 96% 98%         Electronically Signed By: Dereck Kuo MD  April 6, 2018  11:42 AM

## 2018-04-06 NOTE — OR NURSING
Pt up to restroom and dressed herself mostly independently after arrival to phase 2.  Pulse oximeter sats on ear lobe 95% . After discharge instructons completed Pt. Reports she felt very tired.  Pt. Dozes off easily and after a short time O2 sats drifted to mid 80's with Pt. Able to  sats to 94% without any stimulation.   Soon afterwards RA sats drifted to 79 %. And up to 85% max.  Pt. Stimulated to take deep breaths, NC O2 applied at 1.5 L/min with improvement to 99%.  Pt. Instructed in how to use Incentive spirometer.  Dr. KIMBERLY Lima contacted to discuss sats with him, pain medications in PACU reviewed.  Pt. Denies pain and wants to sleep now.  Dr. Lima advised offering Pt coffee and he will be here to see her.

## 2018-04-06 NOTE — OR NURSING
Patient is pink talking and laughing with no shortness of breath sats will jump down to mid 80's but then jump up to 98 with no change in patient appearance or condition.

## 2018-04-06 NOTE — BRIEF OP NOTE
[facility], [place]    Brief Operative Note    Pre-operative diagnosis: Chronic Pansinusitis  Post-operative diagnosis * No post-op diagnosis entered *  Procedure: Procedure(s):  Stealth Assisted Right Maxillary Antrostomy, Anterior Ethmoidectomy And Frontal Sinusotomy - Wound Class: II-Clean Contaminated  Surgeon: Surgeon(s) and Role:     * Elyse Eisenberg MD - Primary     * Marta Reyes MD - Resident - Assisting  Anesthesia: General   Estimated blood loss: Less than 50 ml  Drains: None  Specimens:   ID Type Source Tests Collected by Time Destination   1 : for research specimen Fluid Sinus Contents, Right OR DOCUMENTATION ONLY Elyse Eisenberg MD 4/6/2018  8:51 AM    2 : right maxillary sinus Tissue Sinus Contents, Maxillary, Right FUNGUS CULTURE, GRAM STAIN, TISSUE CULTURE AEROBIC BACTERIAL Elyse Eisenberg MD 4/6/2018  9:39 AM    A : Right Sinus Contents Tissue Sinus Contents, Right SURGICAL PATHOLOGY EXAM Elyse Eisenberg MD 4/6/2018  9:37 AM      Findings:   see op note.  Complications: None.  Implants: None.

## 2018-04-09 LAB
BACTERIA SPEC CULT: ABNORMAL
SPECIMEN SOURCE: ABNORMAL

## 2018-04-09 NOTE — PROGRESS NOTES
Veronica, Please contact patient by letter/phone with following results:  Please contact patient and start doxycycline 100 bid for 21 days.      Guillermo MOYA.

## 2018-04-10 ENCOUNTER — CARE COORDINATION (OUTPATIENT)
Dept: OTOLARYNGOLOGY | Facility: CLINIC | Age: 78
End: 2018-04-10

## 2018-04-10 DIAGNOSIS — J32.9 SINUSITIS, CHRONIC: Primary | ICD-10-CM

## 2018-04-10 RX ORDER — DOXYCYCLINE 100 MG/1
100 CAPSULE ORAL 2 TIMES DAILY
Qty: 42 CAPSULE | Refills: 0 | Status: SHIPPED | OUTPATIENT
Start: 2018-04-10 | End: 2019-02-04

## 2018-04-10 NOTE — PROGRESS NOTES
Culture reviewed by Dr Eisenberg:   Please contact patient and start doxycycline 100 bid for 21 days.     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

## 2018-04-11 LAB — COPATH REPORT: NORMAL

## 2018-04-12 NOTE — OP NOTE
Procedure Date: 04/06/2018      PREOPERATIVE DIAGNOSIS:  Chronic rhinosinusitis.      POSTOPERATIVE DIAGNOSIS:  Chronic rhinosinusitis.      PROCEDURE:  Right maxillary antrostomy, anterior ethmoidectomy and frontal sinusotomy.      SURGEON:  Elyse Eisenberg MD.      ASSISTANT:   Dr. Soco Reyes.      ANESTHESIA:  General endotracheal.      BLOOD LOSS:  50 mL.      REPLACEMENT:  Crystalloid.      COMPLICATIONS:  None.      FINDINGS:  Purulent rhinorrhea involving the maxillary and frontal sinuses.      INDICATIONS FOR PROCEDURE:  The patient is a 77-year-old female who presents with longstanding chronic sinusitis, unresponsive to medical therapy.  She presents for the above-stated procedure.      OPERATIVE PROCEDURE:  After risks and benefits were explained, the patient signed consents obtained, taken to the operating room, placed supine on the table.  General anesthesia administered.  She was endotracheally intubated and sterilely draped.  Headset is placed.  Registration, calibration, verification is performed and image guidance is used throughout the procedure.  The image guidance was used to enter specifically the right frontal sinus.  The procedure commences with visualization with a 0-degree rigid endoscope and injection of 1% lidocaine with epinephrine in the right sinonasal cavity.  The right uncinate is then identified and using a combination of a ball-tipped seeker and a shaver the uncinate is removed.  Purulent secretions are encountered in the maxillary antrum.  These are sent for culture and the uncinate is removed and the maxillary antrostomy site is widely opened.  Next, dissection is carried out superiorly into the anterior ethmoid region and up to the area of the frontal sinus recess and frontal sinus ostium.  At the level of the ostium a curved tip probe is passed into the frontal sinus and a backbiter was used to enlarge the ostium.  The sinuses were copiously irrigated.  At this point once  the sinus appears to be adequately opened the procedure is completed.  The patient is turned over to Anesthesia for wake up.         THONY CONTRERAS MD             D: 2018   T: 2018   MT: VANESSA      Name:     INDIA MCNEILL   MRN:      [id number]        Account:        [id number]   :      1940           Procedure Date: 2018      Document: [id number]

## 2018-04-16 ENCOUNTER — OFFICE VISIT (OUTPATIENT)
Dept: OTOLARYNGOLOGY | Facility: CLINIC | Age: 78
End: 2018-04-16
Payer: MEDICARE

## 2018-04-16 DIAGNOSIS — J32.4 CHRONIC PANSINUSITIS: Primary | ICD-10-CM

## 2018-04-16 NOTE — LETTER
4/16/2018       RE: Kimberly Chau  [address]  [place] [address]     Dear Colleague,    Thank you for referring your patient, Kimberly Chau, to the [facility] EAR NOSE AND THROAT at [facility]. Please see a copy of my visit note below.    Kimberly is s/p endoscopic sinus surgery on 4/6 including R maxillary ethmoid and frontal.  Her surgery went well.  She feels well.    Procedure:  Endoscopy indicated for post op exam and possible debridement.  Topical anesthetic/decongestant spray applied.  Rigid scope used for visualization.  Findings: R middle meatus with old blood suctioned out, no need to debride.  Healing very well.      Plan:  See me in 1 month.  Irrigate or use simply Saline.      Again, thank you for allowing me to participate in the care of your patient.      Sincerely,    Elyse Eisenberg MD

## 2018-04-16 NOTE — NURSING NOTE
Chief Complaint   Patient presents with     RECHECK     post op sinus reuben Zavala Medical Assistant

## 2018-04-16 NOTE — PATIENT INSTRUCTIONS
Plan of care:  Use Simply Saline  Follow up with Dr Eisenberg on  at 145  Clinic contact information:  1. To schedule an appointment call [phone], option 1  2. To talk to the Triage RN call [phone], option 3  3. If you need to speak to Veronica or get a message to your doctor on a Friday, call the triage RN  4. VeronicaRN: [phone]  5. Surgery scheduling:      Reema Coleman: [phone]      Denise Singletary: [phone]  6. Fax: [phone]  7. [phone]

## 2018-04-16 NOTE — MR AVS SNAPSHOT
After Visit Summary   2018    Kimberly Chau    MRN: [id number]           Patient Information     Date Of Birth          1940        Visit Information        Provider Department      2018 4:45 PM Elyse Eisenberg MD [facility] Ear Nose and Throat        Care Instructions    Plan of care:  Use Simply Saline  Follow up with Dr Eisenberg on  at 145  Clinic contact information:  1. To schedule an appointment call [phone], option 1  2. To talk to the Triage RN call [phone], option 3  3. If you need to speak to Veronica or get a message to your doctor on a Friday, call the triage RN  4. VeronicaRN: [phone]  5. Surgery scheduling:      Reema Coleman: [phone]      Denise Singletary: [phone]  6. Fax: [phone]  7. [phone]            Follow-ups after your visit        Who to contact     Please call your clinic at [phone] to:    Ask questions about your health    Make or cancel appointments    Discuss your medicines    Learn about your test results    Speak to your doctor            Additional Information About Your Visit        MyChart Information     [email or web address] is an electronic gateway that provides easy, online access to your medical records. With [employer], you can request a clinic appointment, read your test results, renew a prescription or communicate with your care team.     To sign up for [email or web address] visit the website at [email or web address]   You will be asked to enter the access code listed below, as well as some personal information. Please follow the directions to create your username and password.     Your access code is: [email or web address]  Expires: 2018  6:31 AM     Your access code will  in 90 days. If you need help or a new code, please contact your [facility] or call [phone] for assistance.        Care EveryWhere ID     This is your Care EveryWhere ID. This could be used by other organizations to access your  [facility] medical records  [id number]         Blood Pressure from Last 3 Encounters:   04/06/18 144/78   03/26/18 115/69   02/27/18 117/64    Weight from Last 3 Encounters:   04/06/18 50 kg (110 lb 3.7 oz)   03/26/18 48.5 kg (107 lb)   02/27/18 49.9 kg (109 lb 14.4 oz)              Today, you had the following     No orders found for display       Primary Care Provider Office Phone # Fax #    Guillermo Castellano [phone] [address] [address]        Equal Access to Services     [facility] AreaJAR[facility] : Hadii halina chairez hadasho Soreji, waaxda luqadaha, qaybta kaalmada adecarayada, alejandra magallon. So [facility] [phone].    ATENCIÓN: Si habla español, tiene a vázquez disposición servicios gratuitos de asistencia lingüística. LlRegency Hospital Toledo [phone].    We comply with applicable federal civil rights laws and [place] laws. We do not discriminate on the basis of race, color, national origin, age, disability, sex, sexual orientation, or gender identity.            Thank you!     Thank you for choosing [facility] EAR NOSE AND THROAT  for your care. Our goal is always to provide you with excellent care. Hearing back from our patients is one way we can continue to improve our services. Please take a few minutes to complete the written survey that you may receive in the mail after your visit with us. Thank you!             Your Updated Medication List - Protect others around you: Learn how to safely use, store and throw away your medicines at www.disposemymeds.org.          This list is accurate as of 4/16/18  5:10 PM.  Always use your most recent med list.                   Brand Name Dispense Instructions for use Diagnosis    ACETAMINOPHEN 8 HOUR 650 MG 8 hour tablet   Generic drug:  acetaminophen     100 tablet    Take 650 mg by mouth every 4 hours as needed.    C1-C2 instability       amLODIPine 10 MG tablet    NORVASC    90 tablet    Take 1 tablet (10 mg) by mouth  daily    Essential hypertension       DICLOFENAC SODIUM PO      Take 25 mg by mouth daily        doxycycline 100 MG capsule    VIBRAMYCIN    42 capsule    Take 1 capsule (100 mg) by mouth 2 times daily for 21 days    Sinusitis, chronic       fluocinolone 0.025 % ointment    SYNALAR    60 g    Apply twice a day to affected areas of the face avoiding the eyelids followed by Vanicream moisturizer.    Xerosis of skin, Intrinsic atopic dermatitis       FOLIC ACID      daily.        METHOTREXATE      10 tablets once a week On Fridays. Taking 8 tablets on Fridays        methotrexate 2.5 MG tablet CHEMO           metoprolol tartrate 100 MG tablet    LOPRESSOR    360 tablet    Take 2 tablets (200 mg) by mouth 2 times daily    Essential hypertension       mometasone 50 MCG/ACT spray    NASONEX    1 Box    Spray 1 spray into both nostrils as needed PRN daily    Chronic rhinitis       Multi-vitamin Tabs tablet   Generic drug:  multivitamin, therapeutic with minerals      Take 1 tablet by mouth daily.        oxyCODONE IR 5 MG tablet    ROXICODONE    30 tablet    Take 1 tablet (5 mg) by mouth every 6 hours as needed for other (Moderate to Severe Pain)    Chronic frontal sinusitis       oxymetazoline 0.05 % spray    AFRIN NASAL SPRAY    1 Bottle    Use 2 sprays to each nare two times daily for up to 3 days or as needed for bleeding.    Chronic frontal sinusitis       PRILOSEC PO      Take 20 mg by mouth daily as needed        sodium chloride 0.65 % nasal spray    OCEAN    1 Bottle    Spray 1-2 sprays in nostril every 2 hours Use in EACH nostril.    Chronic frontal sinusitis       TYLENOL PM EXTRA STRENGTH PO      Take 2 tablets by mouth nightly as needed.

## 2018-04-19 NOTE — PROGRESS NOTES
Kimberly is s/p endoscopic sinus surgery on 4/6 including R maxillary ethmoid and frontal.  Her surgery went well.  She feels well.    Procedure:  Endoscopy indicated for post op exam and possible debridement.  Topical anesthetic/decongestant spray applied.  Rigid scope used for visualization.  Findings: R middle meatus with old blood suctioned out, no need to debride.  Healing very well.      Plan:  See me in 1 month.  Irrigate or use simply Saline.

## 2018-04-20 ENCOUNTER — TELEPHONE (OUTPATIENT)
Dept: OTOLARYNGOLOGY | Facility: CLINIC | Age: 78
End: 2018-04-20

## 2018-04-20 NOTE — TELEPHONE ENCOUNTER
The patient called to report that she is having bad side effects from the doxy Dr Eisenberg put her on. She recently had sinus surgery. Also, she is on her way to [place] today with her . After communicating with Dr Eisenberg she would like Kimberly to hold off on taking another antibiotic for now. This message was left on a VM and the patient was instructed to call back to the clinic with any further questions.  Hector Saunders RN  [phone]

## 2018-05-04 LAB
FUNGUS SPEC CULT: NORMAL
SPECIMEN SOURCE: NORMAL

## 2018-05-16 ENCOUNTER — OFFICE VISIT (OUTPATIENT)
Dept: OTOLARYNGOLOGY | Facility: CLINIC | Age: 78
End: 2018-05-16
Payer: MEDICARE

## 2018-05-16 VITALS — WEIGHT: 106.5 LBS | HEIGHT: 60 IN | BODY MASS INDEX: 20.91 KG/M2

## 2018-05-16 DIAGNOSIS — J32.4 CHRONIC PANSINUSITIS: Primary | ICD-10-CM

## 2018-05-16 ASSESSMENT — PAIN SCALES - GENERAL: PAINLEVEL: NO PAIN (0)

## 2018-05-16 NOTE — LETTER
5/16/2018       RE: Kimberly Chau  [address]  [place] [address]     Dear Colleague,    Thank you for referring your patient, Kimberly Chau, to the [facility] EAR NOSE AND THROAT at [facility]. Please see a copy of my visit note below.    Kimberly has been doing well, but is still noting mild R headaches especially when driving.  She has minimal nasal discharge.      Exam: anterior rhinoscopy done with normal exam, but cannot see frontal recess.  Endoscopy indicated.    Procedure:  Endoscopy indicated for exam of frontal recess  Topical anesthetic/decongestant spray applied.  Rigid scope used for visualization.  Findings: R forntal recess with scant purulence and some inflammation.  Tried to probe with calgiswab, could not access sinus.      A/P:  Unfortunately she may have stenosed the sinusotomy.  Will try gentamicin irrigation as she has significant difficulty with antibiotics.  Will reassess in about a month.  If not improved will image and consider next options.      Again, thank you for allowing me to participate in the care of your patient.      Sincerely,    Elyse Eisenberg MD

## 2018-05-16 NOTE — PATIENT INSTRUCTIONS
Plan of care:  Follow up with Dr Eisenberg on  at 11  Patient teaching:  Gentamicin/Edd's solution, irrigation  Medication is ordered through  compounding pharmacy  Medication will arrive in mail, frozen, in 4 (1) liter bottles. Put 3 bottles in the freezer, let one bottle thaw, keep that bottle in the refrigerator. Before you irrigate, let solution warm to room temp (can put in warm bath if you wish, but do not microwave)  If you currently are rinsing with the rinse bottle, go ahead and rinse, then put 30-50ml of the gentamicin solution in the bottle, squirt 1/2 into one nostril, then 1/2 into the other nostril. A small amount of the solution stays in the bottle because the straw does not go all the way to the bottom-this is ok.  You can also rinse with a baby bulb syringe or regular syringes if you have some.  Do this twice/day, usually for 3 weeks. Your prescription will have refills, so when you're about 12 days into the cycle, call the pharmacy and get a refill. Be mindful that it takes the pharmacy a day to make the solution and then the mailing time.    If you see improvement with gentamicin irrigations, the next step varies with each patient: Dr Eisenberg might have you do this once/month for a while, or 3 weeks on/3 weeks off, or use it as needed. It depends on your response      Clinic contact information:  1. To schedule an appointment call [phone], option 1  2. To talk to the Triage RN call [phone], option 3  3. If you need to speak to Veronica or get a message to your doctor on a Friday, call the triage RN  4. VeronicaRN: [phone]  5. Surgery scheduling:      Reema Coleman: [phone]      Denise Singletary: [phone]  6. Fax: [phone]  7. [phone]

## 2018-05-16 NOTE — MR AVS SNAPSHOT
After Visit Summary   5/16/2018    Kimberly Chau    MRN: [id number]           Patient Information     Date Of Birth          1940        Visit Information        Provider Department      5/16/2018 1:45 PM Elyse Eisenberg MD [facility] Ear Nose and Throat        Care Instructions    Plan of care:  Follow up with Dr Eisenberg on 7/11 at 11  Patient teaching:  Gentamicin/Edd's solution, irrigation  Medication is ordered through  compounding pharmacy  Medication will arrive in mail, frozen, in 4 (1) liter bottles. Put 3 bottles in the freezer, let one bottle thaw, keep that bottle in the refrigerator. Before you irrigate, let solution warm to room temp (can put in warm bath if you wish, but do not microwave)  If you currently are rinsing with the rinse bottle, go ahead and rinse, then put 30-50ml of the gentamicin solution in the bottle, squirt 1/2 into one nostril, then 1/2 into the other nostril. A small amount of the solution stays in the bottle because the straw does not go all the way to the bottom-this is ok.  You can also rinse with a baby bulb syringe or regular syringes if you have some.  Do this twice/day, usually for 3 weeks. Your prescription will have refills, so when you're about 12 days into the cycle, call the pharmacy and get a refill. Be mindful that it takes the pharmacy a day to make the solution and then the mailing time.    If you see improvement with gentamicin irrigations, the next step varies with each patient: Dr Eisenberg might have you do this once/month for a while, or 3 weeks on/3 weeks off, or use it as needed. It depends on your response      Clinic contact information:  1. To schedule an appointment call [phone], option 1  2. To talk to the Triage RN call [phone], option 3  3. If you need to speak to Veronica or get a message to your doctor on a Friday, call the triage RN  4. VeronicaRN: [phone]  5. Surgery scheduling:      Reema Coleman: [phone]      Denise  Singletary: [phone]  6. Fax: [phone]  7. [phone]            Follow-ups after your visit        Who to contact     Please call your clinic at [phone] to:    Ask questions about your health    Make or cancel appointments    Discuss your medicines    Learn about your test results    Speak to your doctor            Additional Information About Your Visit        MyChart Information     [employer] is an electronic gateway that provides easy, online access to your medical records. With [employer], you can request a clinic appointment, read your test results, renew a prescription or communicate with your care team.     To sign up for [employer] visit the website at [email or web address]   You will be asked to enter the access code listed below, as well as some personal information. Please follow the directions to create your username and password.     Your access code is: [email or web address]  Expires: 2018  6:31 AM     Your access code will  in 90 days. If you need help or a new code, please contact your [facility] or call [phone] for assistance.        Care EveryWhere ID     This is your Care EveryWhere ID. This could be used by other organizations to access your [facility] medical records  [id number]        Your Vitals Were     Height BMI (Body Mass Index)                1.524 m (5') 20.8 kg/m2           Blood Pressure from Last 3 Encounters:   18 144/78   18 115/69   18 117/64    Weight from Last 3 Encounters:   18 48.3 kg (106 lb 8 oz)   18 50 kg (110 lb 3.7 oz)   18 48.5 kg (107 lb)              Today, you had the following     No orders found for display       Primary Care Provider Office Phone # Fax #    Guillermo Castellano [phone]       [address] [address]        Equal Access to Services     ERA YATES AH: Esther Lockhart, kaylan roach, larissa main  alejandra jimenezcara olivasaan ah. So [facility] [phone].    ATENCIÓN: Si thaliala anjana, tiene a vázquez disposición servicios gratuitos de asistencia lingüística. Farzaneh al [phone].    We comply with applicable federal civil rights laws and [place] laws. We do not discriminate on the basis of race, color, national origin, age, disability, sex, sexual orientation, or gender identity.            Thank you!     Thank you for choosing [facility] EAR NOSE AND THROAT  for your care. Our goal is always to provide you with excellent care. Hearing back from our patients is one way we can continue to improve our services. Please take a few minutes to complete the written survey that you may receive in the mail after your visit with us. Thank you!             Your Updated Medication List - Protect others around you: Learn how to safely use, store and throw away your medicines at www.disposemymeds.org.          This list is accurate as of 5/16/18  2:39 PM.  Always use your most recent med list.                   Brand Name Dispense Instructions for use Diagnosis    ACETAMINOPHEN 8 HOUR 650 MG 8 hour tablet   Generic drug:  acetaminophen     100 tablet    Take 650 mg by mouth every 4 hours as needed.    C1-C2 instability       amLODIPine 10 MG tablet    NORVASC    90 tablet    Take 1 tablet (10 mg) by mouth daily    Essential hypertension       DICLOFENAC SODIUM PO      Take 25 mg by mouth daily        fluocinolone 0.025 % ointment    SYNALAR    60 g    Apply twice a day to affected areas of the face avoiding the eyelids followed by Vanicream moisturizer.    Xerosis of skin, Intrinsic atopic dermatitis       FOLIC ACID      daily.        METHOTREXATE      10 tablets once a week On Fridays. Taking 8 tablets on Fridays        methotrexate 2.5 MG tablet CHEMO           metoprolol tartrate 100 MG tablet    LOPRESSOR    360 tablet    Take 2 tablets (200 mg) by mouth 2 times daily    Essential hypertension        mometasone 50 MCG/ACT spray    NASONEX    1 Box    Spray 1 spray into both nostrils as needed PRN daily    Chronic rhinitis       Multi-vitamin Tabs tablet   Generic drug:  multivitamin, therapeutic with minerals      Take 1 tablet by mouth daily.        oxyCODONE IR 5 MG tablet    ROXICODONE    30 tablet    Take 1 tablet (5 mg) by mouth every 6 hours as needed for other (Moderate to Severe Pain)    Chronic frontal sinusitis       oxymetazoline 0.05 % spray    AFRIN NASAL SPRAY    1 Bottle    Use 2 sprays to each nare two times daily for up to 3 days or as needed for bleeding.    Chronic frontal sinusitis       PRILOSEC PO      Take 20 mg by mouth daily as needed        sodium chloride 0.65 % nasal spray    OCEAN    1 Bottle    Spray 1-2 sprays in nostril every 2 hours Use in EACH nostril.    Chronic frontal sinusitis       TYLENOL PM EXTRA STRENGTH PO      Take 2 tablets by mouth nightly as needed.

## 2018-05-16 NOTE — NURSING NOTE
Chief Complaint   Patient presents with     RECHECK     Follow up post op sinus surgery      Height 1.524 m (5'), weight 48.3 kg (106 lb 8 oz), not currently breastfeeding.    Agus Avila

## 2018-05-24 NOTE — PROGRESS NOTES
Kimberly has been doing well, but is still noting mild R headaches especially when driving.  She has minimal nasal discharge.      Exam: anterior rhinoscopy done with normal exam, but cannot see frontal recess.  Endoscopy indicated.    Procedure:  Endoscopy indicated for exam of frontal recess  Topical anesthetic/decongestant spray applied.  Rigid scope used for visualization.  Findings: R forntal recess with scant purulence and some inflammation.  Tried to probe with calgiswab, could not access sinus.      A/P:  Unfortunately she may have stenosed the sinusotomy.  Will try gentamicin irrigation as she has significant difficulty with antibiotics.  Will reassess in about a month.  If not improved will image and consider next options.

## 2018-07-11 ENCOUNTER — OFFICE VISIT (OUTPATIENT)
Dept: OTOLARYNGOLOGY | Facility: CLINIC | Age: 78
End: 2018-07-11
Payer: MEDICARE

## 2018-07-11 ENCOUNTER — RADIANT APPOINTMENT (OUTPATIENT)
Dept: CT IMAGING | Facility: CLINIC | Age: 78
End: 2018-07-11
Attending: OTOLARYNGOLOGY
Payer: MEDICARE

## 2018-07-11 DIAGNOSIS — J32.4 CHRONIC PANSINUSITIS: ICD-10-CM

## 2018-07-11 DIAGNOSIS — J32.4 CHRONIC PANSINUSITIS: Primary | ICD-10-CM

## 2018-07-11 ASSESSMENT — PAIN SCALES - GENERAL: PAINLEVEL: NO PAIN (0)

## 2018-07-11 NOTE — PROGRESS NOTES
Kimberly is s/p left frontal sinus surgery, at her last visit I was concerned that there was stenosis of the outflow. She is still having headaches with driving and eating.  She tried gent irrigation, did not notice much benefit.    Exam: anterior rhinoscopy with purulence in middle meatus on left.  Endoscopy indicated to further evaluate and determine if frontal recess is open.    Nasal endoscopy - purulence left middle meatus.  Suctioned this out, applied more decongestant.  Used calgiswab to probe frontal recess, cannot find opening.     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  Stealth CT today.  Will hold off on oral antibiotics due to allergies/intolerance, and stability of symptoms.  There is also an apparent small opening given the drainage that is evident.

## 2018-07-11 NOTE — LETTER
7/11/2018       RE: Kimberly Chau  [address]   [place] [address]     Dear Colleague,    Thank you for referring your patient, Kimberly Chau, to the [facility] EAR NOSE AND THROAT at [facility]. Please see a copy of my visit note below.    Kimberly is s/p left frontal sinus surgery, at her last visit I was concerned that there was stenosis of the outflow. She is still having headaches with driving and eating.  She tried gent irrigation, did not notice much benefit.    Exam: anterior rhinoscopy with purulence in middle meatus on left.  Endoscopy indicated to further evaluate and determine if frontal recess is open.    Nasal endoscopy - purulence left middle meatus.  Suctioned this out, applied more decongestant.  Used calgiswab to probe frontal recess, cannot find opening.     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  Stealth CT today.  Will hold off on oral antibiotics due to allergies/intolerance, and stability of symptoms.  There is also an apparent small opening given the drainage that is evident.    Again, thank you for allowing me to participate in the care of your patient.      Sincerely,    Elyse Eisenberg MD

## 2018-07-11 NOTE — NURSING NOTE
Chief Complaint   Patient presents with     RECHECK     follow up sinus        Holden Muhammad LPN

## 2018-07-11 NOTE — MR AVS SNAPSHOT
After Visit Summary   2018    Kimberly Chau    MRN: [id number]           Patient Information     Date Of Birth          1940        Visit Information        Provider Department      2018 11:00 AM Elyse Eisenberg MD [facility]        Today's Diagnoses     Chronic pansinusitis    -  1      Care Instructions    Plan of care:  You can expect a call from Reema to schedule          Follow-ups after your visit        Who to contact     Please call your clinic at [phone] to:    Ask questions about your health    Make or cancel appointments    Discuss your medicines    Learn about your test results    Speak to your doctor            Additional Information About Your Visit        MyChart Information     [employer] is an electronic gateway that provides easy, online access to your medical records. With [employer], you can request a clinic appointment, read your test results, renew a prescription or communicate with your care team.     To sign up for [employer] visit the website at [email or web address]   You will be asked to enter the access code listed below, as well as some personal information. Please follow the directions to create your username and password.     Your access code is: [email or web address]  Expires: 10/9/2018  1:31 PM     Your access code will  in 90 days. If you need help or a new code, please contact your [facility] or call [phone] for assistance.        Care EveryWhere ID     This is your Care EveryWhere ID. This could be used by other organizations to access your [place] medical records  [id number]         Blood Pressure from Last 3 Encounters:   18 144/78   18 115/69   18 117/64    Weight from Last 3 Encounters:   18 48.3 kg (106 lb 8 oz)   18 50 kg (110 lb 3.7 oz)   18 48.5 kg (107 lb)              We Performed the Following     NASAL ENDOSCOPY, DIAGNOSTIC     [facility]ocio-Operative  Worksheet (ENT Adult Default Surgery Request)        Primary Care Provider Office Phone # Fax #    Guillermo Castellano [phone]       [address] [address]        Equal Access to Services     ERA YATES : Esther halina chairez theresao Soomaali, waaxda luqadaha, qaybta kaalmada adeegyada, alejandra zamoran edilberto desouza laSteveandrzej magallon. So [facility] [phone].    ATENCIÓN: Si habla español, tiene a vázquez disposición servicios gratuitos de asistencia lingüística. Llame al [phone].    We comply with applicable federal civil rights laws and [place] laws. We do not discriminate on the basis of race, color, national origin, age, disability, sex, sexual orientation, or gender identity.            Thank you!     Thank you for choosing [facility]  for your care. Our goal is always to provide you with excellent care. Hearing back from our patients is one way we can continue to improve our services. Please take a few minutes to complete the written survey that you may receive in the mail after your visit with us. Thank you!             Your Updated Medication List - Protect others around you: Learn how to safely use, store and throw away your medicines at www.disposemymeds.org.          This list is accurate as of 7/11/18  1:31 PM.  Always use your most recent med list.                   Brand Name Dispense Instructions for use Diagnosis    ACETAMINOPHEN 8 HOUR 650 MG 8 hour tablet   Generic drug:  acetaminophen     100 tablet    Take 650 mg by mouth every 4 hours as needed.    C1-C2 instability       amLODIPine 10 MG tablet    NORVASC    90 tablet    Take 1 tablet (10 mg) by mouth daily    Essential hypertension       DICLOFENAC SODIUM PO      Take 25 mg by mouth daily        fluocinolone 0.025 % ointment    SYNALAR    60 g    Apply twice a day to affected areas of the face avoiding the eyelids followed by Vanicream moisturizer.    Xerosis of skin, Intrinsic atopic dermatitis        FOLIC ACID      daily.        gentamicin 0.008% in 1000ml 0.9% NaCl Soln nasal irrigation    GARAMYCIN    1000 mL    Spray 30 mLs in nostril 2 times daily Irrigate 30 mL between each nostril total 60ml/dayPLEASE MAIL TO PATIENT    Chronic pansinusitis       METHOTREXATE      10 tablets once a week On Fridays. Taking 8 tablets on Fridays        methotrexate 2.5 MG tablet CHEMO           metoprolol tartrate 100 MG tablet    LOPRESSOR    360 tablet    Take 2 tablets (200 mg) by mouth 2 times daily    Essential hypertension       mometasone 50 MCG/ACT spray    NASONEX    1 Box    Spray 1 spray into both nostrils as needed PRN daily    Chronic rhinitis       Multi-vitamin Tabs tablet   Generic drug:  multivitamin, therapeutic with minerals      Take 1 tablet by mouth daily.        oxyCODONE IR 5 MG tablet    ROXICODONE    30 tablet    Take 1 tablet (5 mg) by mouth every 6 hours as needed for other (Moderate to Severe Pain)    Chronic frontal sinusitis       oxymetazoline 0.05 % spray    AFRIN NASAL SPRAY    1 Bottle    Use 2 sprays to each nare two times daily for up to 3 days or as needed for bleeding.    Chronic frontal sinusitis       PRILOSEC PO      Take 20 mg by mouth daily as needed        sodium chloride 0.65 % nasal spray    OCEAN    1 Bottle    Spray 1-2 sprays in nostril every 2 hours Use in EACH nostril.    Chronic frontal sinusitis       TYLENOL PM EXTRA STRENGTH PO      Take 2 tablets by mouth nightly as needed.

## 2018-07-13 ENCOUNTER — TELEPHONE (OUTPATIENT)
Dept: INTERNAL MEDICINE | Facility: CLINIC | Age: 78
End: 2018-07-13

## 2018-07-13 DIAGNOSIS — R93.89 ABNORMAL CAT SCAN: Primary | ICD-10-CM

## 2018-07-13 NOTE — TELEPHONE ENCOUNTER
Placed pulmonary referral this encounter      Dear Kimberly;    As I mentioned in the past, your chest CT scan from 3/30/2018 has some abnormal findings and I recommend you see the pulmonary doctors. I placed a referral again today and you can call [phone] to schedule this appointment.      RADHA Castellano MD

## 2018-07-16 ENCOUNTER — DOCUMENTATION ONLY (OUTPATIENT)
Dept: OTOLARYNGOLOGY | Facility: CLINIC | Age: 78
End: 2018-07-16

## 2018-07-16 NOTE — PROGRESS NOTES
Surgery scheduled at [facility] with Dr Eisenberg 8/3/18:  Stealth guided revision right maxillary antrostomy, anterior ethmoidectomy and frontal sinusotomy    Pre-OP PE: Dr Jacobs    Post OP: 8/15/18  2:00 Dr Brock meadows    Pre-Op teaching done by ENT RN      Reema Coleman   ENT Rocio-Op Coordinator  [phone]

## 2018-07-19 NOTE — PROGRESS NOTES
Veronica, Please contact patient by letter/phone with following results:  CT looks better except for the frontal sinus that we need to re-open.

## 2018-07-23 ENCOUNTER — OFFICE VISIT (OUTPATIENT)
Dept: INTERNAL MEDICINE | Facility: CLINIC | Age: 78
End: 2018-07-23
Payer: MEDICARE

## 2018-07-23 VITALS
HEART RATE: 82 BPM | BODY MASS INDEX: 21.35 KG/M2 | WEIGHT: 113.1 LBS | OXYGEN SATURATION: 95 % | HEIGHT: 61 IN | SYSTOLIC BLOOD PRESSURE: 135 MMHG | DIASTOLIC BLOOD PRESSURE: 65 MMHG

## 2018-07-23 DIAGNOSIS — Z01.818 PRE-OP EXAM: Primary | ICD-10-CM

## 2018-07-23 DIAGNOSIS — Z01.818 PRE-OP EXAM: ICD-10-CM

## 2018-07-23 DIAGNOSIS — M05.742 RHEUMATOID ARTHRITIS INVOLVING BOTH HANDS WITH POSITIVE RHEUMATOID FACTOR (H): ICD-10-CM

## 2018-07-23 DIAGNOSIS — I10 ESSENTIAL HYPERTENSION: ICD-10-CM

## 2018-07-23 DIAGNOSIS — M05.741 RHEUMATOID ARTHRITIS INVOLVING BOTH HANDS WITH POSITIVE RHEUMATOID FACTOR (H): ICD-10-CM

## 2018-07-23 LAB
ANION GAP SERPL CALCULATED.3IONS-SCNC: 11 MMOL/L (ref 3–14)
BUN SERPL-MCNC: 21 MG/DL (ref 7–30)
CALCIUM SERPL-MCNC: 9.1 MG/DL (ref 8.5–10.1)
CHLORIDE SERPL-SCNC: 97 MMOL/L (ref 94–109)
CO2 SERPL-SCNC: 24 MMOL/L (ref 20–32)
CREAT SERPL-MCNC: 0.89 MG/DL (ref 0.52–1.04)
ERYTHROCYTE [DISTWIDTH] IN BLOOD BY AUTOMATED COUNT: 13.3 % (ref 10–15)
GFR SERPL CREATININE-BSD FRML MDRD: 61 ML/MIN/1.7M2
GLUCOSE SERPL-MCNC: 85 MG/DL (ref 70–99)
HCT VFR BLD AUTO: 34.2 % (ref 35–47)
HGB BLD-MCNC: 11.2 G/DL (ref 11.7–15.7)
MCH RBC QN AUTO: 32.1 PG (ref 26.5–33)
MCHC RBC AUTO-ENTMCNC: 32.7 G/DL (ref 31.5–36.5)
MCV RBC AUTO: 98 FL (ref 78–100)
PLATELET # BLD AUTO: 309 10E9/L (ref 150–450)
POTASSIUM SERPL-SCNC: 4.6 MMOL/L (ref 3.4–5.3)
RBC # BLD AUTO: 3.49 10E12/L (ref 3.8–5.2)
SODIUM SERPL-SCNC: 131 MMOL/L (ref 133–144)
WBC # BLD AUTO: 5.9 10E9/L (ref 4–11)

## 2018-07-23 ASSESSMENT — PAIN SCALES - GENERAL: PAINLEVEL: NO PAIN (0)

## 2018-07-23 NOTE — LETTER
7/23/2018      RE: Kimberly Chau  [address]   [place] [address]       [facility]   Veronica Smith MD  07/23/2018     Chief Complaint:   Pre-Op Exam      History of Present Illness:   Kimberly Chau is 77 year old female here at the request of Dr. Eisenberg for cardiovascular, pulmonary, and perioperative risk assessment prior to surgery.  The intended surgical procedure is optical tracking system endoscopic sinus surgery.  A copy of this note will be sent to the surgeon.     Reason for surgery:  She previously had sinus surgery in April. However, the symptoms have come back, and Dr. Eisenberg would now like to put a stent in. Symptoms include headaches that will worsen with driving and eating. She denies any fevers.     Cardiovascular Risk:  This patient does not have chest pain with ambulation or walking up a flight of stairs. There is not any chest pain with exercise.  She does have a history of hypertension.  There is not a history of stroke or valvular disease. She had a normal resting echo in 2/18.    Pulmonary Risk:  The patient does not have any history of lung problems.    Perioperative Complications:  The patient does not have a history of bleeding or clotting problems in the past, specifically has no history of DVT, PE, or bleeding disorder.  She is not currently anticoagulated.  She has not had complications from past surgeries.  The patient does not have a family history of any anesthesia or surgical complications.    Review of Systems:   Pertinent items are noted in HPI, remainder of complete ROS is negative.      Active Medications:      amLODIPine (NORVASC) 10 MG tablet, Take 1 tablet (10 mg) by mouth daily, Disp: 90 tablet, Rfl: 2     DICLOFENAC SODIUM PO, Take 25 mg by mouth daily, Disp: , Rfl:      Diphenhydramine-APAP, sleep, (TYLENOL PM EXTRA STRENGTH PO), Take 2 tablets by mouth nightly as needed., Disp: , Rfl:      fluocinolone (SYNALAR) 0.025 % ointment, Apply twice  a day to affected areas of the face avoiding the eyelids followed by Vanicream moisturizer., Disp: 60 g, Rfl: 1     FOLIC ACID, daily. , Disp: , Rfl:      gentamicin 0.008% in 1000ml 0.9% NaCl (GARAMYCIN) SOLN nasal irrigation, Spray 30 mLs in nostril 2 times daily Irrigate 30 mL between each nostril total 60ml/dayPLEASE MAIL TO PATIENT, Disp: 1000 mL, Rfl: 11     methotrexate 2.5 MG tablet CHEMO, , Disp: , Rfl: 2     METHOTREXATE, 10 tablets once a week On Fridays. Taking 8 tablets on Fridays, Disp: , Rfl:      metoprolol (LOPRESSOR) 100 MG tablet, Take 2 tablets (200 mg) by mouth 2 times daily, Disp: 360 tablet, Rfl: 3     mometasone (NASONEX) 50 MCG/ACT nasal spray, Spray 1 spray into both nostrils as needed PRN daily, Disp: 1 Box, Rfl: 11     Multiple Vitamin (MULTI-VITAMIN) per tablet, Take 1 tablet by mouth daily., Disp: , Rfl:      Omeprazole (PRILOSEC PO), Take 20 mg by mouth daily as needed , Disp: , Rfl:      oxyCODONE IR (ROXICODONE) 5 MG tablet, Take 1 tablet (5 mg) by mouth every 6 hours as needed for other (Moderate to Severe Pain), Disp: 30 tablet, Rfl: 0     oxymetazoline (AFRIN NASAL SPRAY) 0.05 % spray, Use 2 sprays to each nare two times daily for up to 3 days or as needed for bleeding., Disp: 1 Bottle, Rfl: 0     sodium chloride (OCEAN) 0.65 % nasal spray, Spray 1-2 sprays in nostril every 2 hours Use in EACH nostril., Disp: 1 Bottle, Rfl: 1      Allergies:   Hctz; Hydrochlorothiazide; Latex; Benzocaine; Resorcinol-alcohol; Ace inhibitors; and Sulfa drugs      Past Medical History:  Anemia   Arthritis  Cataracts  Central retinal artery occlusion  Cervical spine fracture  Chronic pain  Gastro-oesophageal reflux disease  Head injury  Hypertension  Hyponatremia  Migraines  Osteoporosis  Pneumonia  Rheumatoid arthritis   Intrinsic atopic dermatitis  Bilateral occipital neuralgia   C1-C2 instability  Osteoarthritis  Eczematous dermatitis  Anxiety      Past Surgical History:  Colonoscopy  Fusion  "cervical posterior three+ levels  GYN surgery  Head and neck surgery  Hysterectomy  Knee surgery  Optical tracking system endoscopic sinus surgery  Orthopedic surgery    Family History:   Arthritis - mother   Pulmonary fibrosis - mother  Hypertension - mother  Liver disease from alcohol - father      Social History:   This patient was accompanied by:   Smoking status: never  Smokeless tobacco: never  Alcohol use: wine daily with dinner     Physical Exam:   /65  Pulse 82  Ht 1.537 m (5' 0.5\")  Wt 51.3 kg (113 lb 1.6 oz)  SpO2 95%  BMI 21.72 kg/m2   Constitutional: Alert, oriented, pleasant, no acute distress  Head: Normocephalic, atraumatic  Eyes: Extra-ocular movements intact, no scleral icterus  ENT: Oropharynx clear, moist mucus membranes, fair dentition  Neck: Supple  Cardiovascular: Regular rate and rhythm, no murmurs, rubs or gallops, peripheral pulses full/symmetric  Respiratory: Good air movement bilaterally, lungs clear, no wheezes/rales/rhonchi  Musculoskeletal: trace ankle edema bilaterally, normal muscle tone, normal gait  Neurologic: Alert and oriented, cranial nerves 2-12 intact, bilateral hands with classical RA joint deformities.   Psychiatric: normal mentation, affect and mood    Recent Labs:  Component      Latest Ref Rng & Units 7/23/2018   Sodium      133 - 144 mmol/L 131 (L)   Potassium      3.4 - 5.3 mmol/L 4.6   Chloride      94 - 109 mmol/L 97   Carbon Dioxide      20 - 32 mmol/L 24   Anion Gap      3 - 14 mmol/L 11   Glucose      70 - 99 mg/dL 85   Urea Nitrogen      7 - 30 mg/dL 21   Creatinine      0.52 - 1.04 mg/dL 0.89   GFR Estimate      >60 mL/min/1.7m2 61   GFR Estimate If Black      >60 mL/min/1.7m2 74   Calcium      8.5 - 10.1 mg/dL 9.1   WBC      4.0 - 11.0 10e9/L 5.9   RBC Count      3.8 - 5.2 10e12/L 3.49 (L)   Hemoglobin      11.7 - 15.7 g/dL 11.2 (L)   Hematocrit      35.0 - 47.0 % 34.2 (L)   MCV      78 - 100 fl 98   MCH      26.5 - 33.0 pg 32.1   MCHC      " 31.5 - 36.5 g/dL 32.7   RDW      10.0 - 15.0 % 13.3   Platelet Count      150 - 450 10e9/L 309       EKG:  EKG previously done on 4/6/2018.  No acute changes. Q waves noted in leads III and AVF.     Pregnancy test needed: No     Assessment and Plan:  Pre-op exam  She is low risk from a cardiopulmonary standpoint for this low-moderate risk surgery. Assuming electrolytes are stable okay to proceed to surgery without further testing.   - Basic metabolic panel  - CBC with platelets    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 factor (H)  I advised her to hold her methotrexate one week before and after her surgery.   - Basic metabolic panel  - CBC with platelets    Essential hypertension  She will continue her medications per usual.   - Basic metabolic panel  - CBC with platelets      The patient has been told to not take any aspirin or NSAIDs for 10 days prior to surgery. The patient has been instructed as to what to do with medications prior to surgery.    Follow-up: Return to clinic PRN.         Scribe Disclosure:   I, Swati Marc, am serving as a scribe to document services personally performed by Veronica Smith MD at this visit, based upon the provider's statements to me. All documentation has been reviewed by the aforementioned provider prior to being entered into the official medical record.     Portions of this medical record were completed by a scribe. UPON MY REVIEW AND AUTHENTICATION BY ELECTRONIC SIGNATURE, this confirms (a) I performed the applicable clinical services, and (b) the record is accurate.    Veronica Smith MD  Internal Medicine        Veronica Smith MD

## 2018-07-23 NOTE — PROGRESS NOTES
[facility]  Primary Care Center   Veronica Smith MD  07/23/2018     Chief Complaint:   Pre-Op Exam      History of Present Illness:   Kimberly Chau is 77 year old female here at the request of Dr. Eisenberg for cardiovascular, pulmonary, and perioperative risk assessment prior to surgery.  The intended surgical procedure is optical tracking system endoscopic sinus surgery.  A copy of this note will be sent to the surgeon.     Reason for surgery:  She previously had sinus surgery in April. However, the symptoms have come back, and Dr. Eisenberg would now like to put a stent in. Symptoms include headaches that will worsen with driving and eating. She denies any fevers.     Cardiovascular Risk:  This patient does not have chest pain with ambulation or walking up a flight of stairs. There is not any chest pain with exercise.  She does have a history of hypertension.  There is not a history of stroke or valvular disease. She had a normal resting echo in 2/18.    Pulmonary Risk:  The patient does not have any history of lung problems.    Perioperative Complications:  The patient does not have a history of bleeding or clotting problems in the past, specifically has no history of DVT, PE, or bleeding disorder.  She is not currently anticoagulated.  She has not had complications from past surgeries.  The patient does not have a family history of any anesthesia or surgical complications.    Review of Systems:   Pertinent items are noted in HPI, remainder of complete ROS is negative.      Active Medications:      amLODIPine (NORVASC) 10 MG tablet, Take 1 tablet (10 mg) by mouth daily, Disp: 90 tablet, Rfl: 2     DICLOFENAC SODIUM PO, Take 25 mg by mouth daily, Disp: , Rfl:      Diphenhydramine-APAP, sleep, (TYLENOL PM EXTRA STRENGTH PO), Take 2 tablets by mouth nightly as needed., Disp: , Rfl:      fluocinolone (SYNALAR) 0.025 % ointment, Apply twice a day to affected areas of the face avoiding the eyelids followed by Vanicream  moisturizer., Disp: 60 g, Rfl: 1     FOLIC ACID, daily. , Disp: , Rfl:      gentamicin 0.008% in 1000ml 0.9% NaCl (GARAMYCIN) SOLN nasal irrigation, Spray 30 mLs in nostril 2 times daily Irrigate 30 mL between each nostril total 60ml/dayPLEASE MAIL TO PATIENT, Disp: 1000 mL, Rfl: 11     methotrexate 2.5 MG tablet CHEMO, , Disp: , Rfl: 2     METHOTREXATE, 10 tablets once a week On Fridays. Taking 8 tablets on Fridays, Disp: , Rfl:      metoprolol (LOPRESSOR) 100 MG tablet, Take 2 tablets (200 mg) by mouth 2 times daily, Disp: 360 tablet, Rfl: 3     mometasone (NASONEX) 50 MCG/ACT nasal spray, Spray 1 spray into both nostrils as needed PRN daily, Disp: 1 Box, Rfl: 11     Multiple Vitamin (MULTI-VITAMIN) per tablet, Take 1 tablet by mouth daily., Disp: , Rfl:      Omeprazole (PRILOSEC PO), Take 20 mg by mouth daily as needed , Disp: , Rfl:      oxyCODONE IR (ROXICODONE) 5 MG tablet, Take 1 tablet (5 mg) by mouth every 6 hours as needed for other (Moderate to Severe Pain), Disp: 30 tablet, Rfl: 0     oxymetazoline (AFRIN NASAL SPRAY) 0.05 % spray, Use 2 sprays to each nare two times daily for up to 3 days or as needed for bleeding., Disp: 1 Bottle, Rfl: 0     sodium chloride (OCEAN) 0.65 % nasal spray, Spray 1-2 sprays in nostril every 2 hours Use in EACH nostril., Disp: 1 Bottle, Rfl: 1      Allergies:   Hctz; Hydrochlorothiazide; Latex; Benzocaine; Resorcinol-alcohol; Ace inhibitors; and Sulfa drugs      Past Medical History:  Anemia   Arthritis  Cataracts  Central retinal artery occlusion  Cervical spine fracture  Chronic pain  Gastro-oesophageal reflux disease  Head injury  Hypertension  Hyponatremia  Migraines  Osteoporosis  Pneumonia  Rheumatoid arthritis   Intrinsic atopic dermatitis  Bilateral occipital neuralgia   C1-C2 instability  Osteoarthritis  Eczematous dermatitis  Anxiety      Past Surgical History:  Colonoscopy  Fusion cervical posterior three+ levels  GYN surgery  Head and neck  "surgery  Hysterectomy  Knee surgery  Optical tracking system endoscopic sinus surgery  Orthopedic surgery    Family History:   Arthritis - mother   Pulmonary fibrosis - mother  Hypertension - mother  Liver disease from alcohol - father      Social History:   This patient was accompanied by:   Smoking status: never  Smokeless tobacco: never  Alcohol use: wine daily with dinner     Physical Exam:   /65  Pulse 82  Ht 1.537 m (5' 0.5\")  Wt 51.3 kg (113 lb 1.6 oz)  SpO2 95%  BMI 21.72 kg/m2   Constitutional: Alert, oriented, pleasant, no acute distress  Head: Normocephalic, atraumatic  Eyes: Extra-ocular movements intact, no scleral icterus  ENT: Oropharynx clear, moist mucus membranes, fair dentition  Neck: Supple  Cardiovascular: Regular rate and rhythm, no murmurs, rubs or gallops, peripheral pulses full/symmetric  Respiratory: Good air movement bilaterally, lungs clear, no wheezes/rales/rhonchi  Musculoskeletal: trace ankle edema bilaterally, normal muscle tone, normal gait  Neurologic: Alert and oriented, cranial nerves 2-12 intact, bilateral hands with classical RA joint deformities.   Psychiatric: normal mentation, affect and mood    Recent Labs:  Component      Latest Ref Rng & Units 7/23/2018   Sodium      133 - 144 mmol/L 131 (L)   Potassium      3.4 - 5.3 mmol/L 4.6   Chloride      94 - 109 mmol/L 97   Carbon Dioxide      20 - 32 mmol/L 24   Anion Gap      3 - 14 mmol/L 11   Glucose      70 - 99 mg/dL 85   Urea Nitrogen      7 - 30 mg/dL 21   Creatinine      0.52 - 1.04 mg/dL 0.89   GFR Estimate      >60 mL/min/1.7m2 61   GFR Estimate If Black      >60 mL/min/1.7m2 74   Calcium      8.5 - 10.1 mg/dL 9.1   WBC      4.0 - 11.0 10e9/L 5.9   RBC Count      3.8 - 5.2 10e12/L 3.49 (L)   Hemoglobin      11.7 - 15.7 g/dL 11.2 (L)   Hematocrit      35.0 - 47.0 % 34.2 (L)   MCV      78 - 100 fl 98   MCH      26.5 - 33.0 pg 32.1   MCHC      31.5 - 36.5 g/dL 32.7   RDW      10.0 - 15.0 % 13.3   Platelet " Count      150 - 450 10e9/L 309       EKG:  EKG previously done on 4/6/2018.  No acute changes. Q waves noted in leads III and AVF.     Pregnancy test needed: No     Assessment and Plan:  Pre-op exam  She is low risk from a cardiopulmonary standpoint for this low-moderate risk surgery. Assuming electrolytes are stable okay to proceed to surgery without further testing.   - Basic metabolic panel  - CBC with platelets    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 factor (H)  I advised her to hold her methotrexate one week before and after her surgery.   - Basic metabolic panel  - CBC with platelets    Essential hypertension  She will continue her medications per usual.   - Basic metabolic panel  - CBC with platelets      The patient has been told to not take any aspirin or NSAIDs for 10 days prior to surgery. The patient has been instructed as to what to do with medications prior to surgery.    Follow-up: Return to clinic PRN.         Scribe Disclosure:   I, Swati Marc, am serving as a scribe to document services personally performed by Veronica Smith MD at this visit, based upon the provider's statements to me. All documentation has been reviewed by the aforementioned provider prior to being entered into the official medical record.     Portions of this medical record were completed by a scribe. UPON MY REVIEW AND AUTHENTICATION BY ELECTRONIC SIGNATURE, this confirms (a) I performed the applicable clinical services, and (b) the record is accurate.    Veronica Smith MD  Internal Medicine

## 2018-07-23 NOTE — MR AVS SNAPSHOT
After Visit Summary   7/23/2018    Kimberly Chau    MRN: [id number]           Patient Information     Date Of Birth          1940        Visit Information        Provider Department      7/23/2018 1:10 PM Veronica Smith MD [facility]        Today's Diagnoses     Pre-op exam    -  1    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 factor (H)        Essential hypertension           Follow-ups after your visit        Follow-up notes from your care team     Return if symptoms worsen or fail to improve.      Your next 10 appointments already scheduled     Aug 03, 2018   Procedure with Elyse Eisenberg MD   [facility], [place], Same Day Surgery (--)    [address] [facility] [id number] [phone]            Aug 15, 2018  2:00 PM CDT   (Arrive by 1:45 PM)   Return Visit with Elyse Eisenberg MD   [facility] ([facility])    [address]  [address] [facility] [address] [phone]              Future tests that were ordered for you today     Open Future Orders        Priority Expected Expires Ordered    Basic metabolic panel Routine 7/23/2018 8/6/2018 7/23/2018    CBC with platelets Routine 7/23/2018 8/6/2018 7/23/2018            Who to contact     Please call your clinic at [phone] to:    Ask questions about your health    Make or cancel appointments    Discuss your medicines    Learn about your test results    Speak to your doctor            Additional Information About Your Visit        MyChart Information     [employer] is an electronic gateway that provides easy, online access to your medical records. With [employer], you can request a clinic appointment, read your test results, renew a prescription or communicate with your care team.     To sign up for [employer] visit the website at [email or web address]   You will be asked to enter the access code listed below, as well as some personal  "information. Please follow the directions to create your username and password.     Your access code is: [email or web address]  Expires: 10/9/2018  1:31 PM     Your access code will  in 90 days. If you need help or a new code, please contact your [facility] or call [phone] for assistance.        Care EveryWhere ID     This is your Care EveryWhere ID. This could be used by other organizations to access your [facility] medical records  [id number]        Your Vitals Were     Pulse Height Pulse Oximetry BMI (Body Mass Index)          82 1.537 m (5' 0.5\") 95% 21.72 kg/m2         Blood Pressure from Last 3 Encounters:   18 135/65   18 144/78   18 115/69    Weight from Last 3 Encounters:   18 51.3 kg (113 lb 1.6 oz)   18 48.3 kg (106 lb 8 oz)   18 50 kg (110 lb 3.7 oz)                 Today's Medication Changes          These changes are accurate as of 18  1:35 PM.  If you have any questions, ask your nurse or doctor.               Stop taking these medicines if you haven't already. Please contact your care team if you have questions.     gentamicin 0.008% in 1000ml 0.9% NaCl Soln nasal irrigation   Commonly known as:  GARAMYCIN   Stopped by:  Veronica Smith MD           methotrexate 2.5 MG tablet CHEMO   Stopped by:  Veronica Smith MD           oxyCODONE IR 5 MG tablet   Commonly known as:  ROXICODONE   Stopped by:  Veronica Smith MD                    Primary Care Provider Office Phone # Fax #    Guillermo Castellano [phone] [address] [address]        Equal Access to Services     DIANE YATES : Esther Lockhart, waenda doc, qaybta kaalmaalejandra ignacio. So [facility] [phone].    ATENCIÓN: Si habla español, tiene a vázquez disposición servicios gratuitos de asistencia lingüística. Farzaneh gillespie [phone].    We comply with " applicable federal civil rights laws and [place] laws. We do not discriminate on the basis of race, color, national origin, age, disability, sex, sexual orientation, or gender identity.            Thank you!     Thank you for choosing [facility]  for your care. Our goal is always to provide you with excellent care. Hearing back from our patients is one way we can continue to improve our services. Please take a few minutes to complete the written survey that you may receive in the mail after your visit with us. Thank you!             Your Updated Medication List - Protect others around you: Learn how to safely use, store and throw away your medicines at www.disposemymeds.org.          This list is accurate as of 7/23/18  1:35 PM.  Always use your most recent med list.                   Brand Name Dispense Instructions for use Diagnosis    ACETAMINOPHEN 8 HOUR 650 MG 8 hour tablet   Generic drug:  acetaminophen     100 tablet    Take 650 mg by mouth every 4 hours as needed.    C1-C2 instability       amLODIPine 10 MG tablet    NORVASC    90 tablet    Take 1 tablet (10 mg) by mouth daily    Essential hypertension       DICLOFENAC SODIUM PO      Take 25 mg by mouth daily        fluocinolone 0.025 % ointment    SYNALAR    60 g    Apply twice a day to affected areas of the face avoiding the eyelids followed by Vanicream moisturizer.    Xerosis of skin, Intrinsic atopic dermatitis       FOLIC ACID      daily.        METHOTREXATE      10 tablets once a week On Fridays. Taking 8 tablets on Fridays        metoprolol tartrate 100 MG tablet    LOPRESSOR    360 tablet    Take 2 tablets (200 mg) by mouth 2 times daily    Essential hypertension       mometasone 50 MCG/ACT spray    NASONEX    1 Box    Spray 1 spray into both nostrils as needed PRN daily    Chronic rhinitis       Multi-vitamin Tabs tablet   Generic drug:  multivitamin, therapeutic with minerals      Take 1 tablet by mouth daily.         oxymetazoline 0.05 % spray    AFRIN NASAL SPRAY    1 Bottle    Use 2 sprays to each nare two times daily for up to 3 days or as needed for bleeding.    Chronic frontal sinusitis       PRILOSEC PO      Take 20 mg by mouth daily as needed        sodium chloride 0.65 % nasal spray    OCEAN    1 Bottle    Spray 1-2 sprays in nostril every 2 hours Use in EACH nostril.    Chronic frontal sinusitis       TYLENOL PM EXTRA STRENGTH PO      Take 2 tablets by mouth nightly as needed.

## 2018-07-23 NOTE — NURSING NOTE
Surgeon (please enter first and last name):  Elyse Eisenberg MD  Fax number for Preop Evaluation:  [facility]  Location of Surgery: [phone]  Date of Surgery:  8.3.18  Procedure:  Stealth Assisted Revision Right Maxillary Antrostomy, Anterior Ethmoidectomy And Frontal Sinusotomy  Latex Allergy   History of reaction to anesthesia?  No

## 2018-07-23 NOTE — LETTER
7/23/2018      RE: Kimberly Chau  [address]   [place] [address]       [facility]   Veronica Smith MD  07/23/2018     Chief Complaint:   Pre-Op Exam      History of Present Illness:   Kimberly Chau is 77 year old female here at the request of Dr. Eisenberg for cardiovascular, pulmonary, and perioperative risk assessment prior to surgery.  The intended surgical procedure is optical tracking system endoscopic sinus surgery.  A copy of this note will be sent to the surgeon.     Reason for surgery:  She previously had sinus surgery in April. However, the symptoms have come back, and Dr. Eisenberg would now like to put a stent in. Symptoms include headaches that will worsen with driving and eating. She denies any fevers.     Cardiovascular Risk:  This patient does not have chest pain with ambulation or walking up a flight of stairs. There is not any chest pain with exercise.  She does have a history of hypertension.  There is not a history of stroke or valvular disease. She had a normal resting echo in 2/18.    Pulmonary Risk:  The patient does not have any history of lung problems.    Perioperative Complications:  The patient does not have a history of bleeding or clotting problems in the past, specifically has no history of DVT, PE, or bleeding disorder.  She is not currently anticoagulated.  She has not had complications from past surgeries.  The patient does not have a family history of any anesthesia or surgical complications.    Review of Systems:   Pertinent items are noted in HPI, remainder of complete ROS is negative.      Active Medications:      amLODIPine (NORVASC) 10 MG tablet, Take 1 tablet (10 mg) by mouth daily, Disp: 90 tablet, Rfl: 2     DICLOFENAC SODIUM PO, Take 25 mg by mouth daily, Disp: , Rfl:      Diphenhydramine-APAP, sleep, (TYLENOL PM EXTRA STRENGTH PO), Take 2 tablets by mouth nightly as needed., Disp: , Rfl:      fluocinolone (SYNALAR) 0.025 % ointment, Apply  twice a day to affected areas of the face avoiding the eyelids followed by Vanicream moisturizer., Disp: 60 g, Rfl: 1     FOLIC ACID, daily. , Disp: , Rfl:      gentamicin 0.008% in 1000ml 0.9% NaCl (GARAMYCIN) SOLN nasal irrigation, Spray 30 mLs in nostril 2 times daily Irrigate 30 mL between each nostril total 60ml/dayPLEASE MAIL TO PATIENT, Disp: 1000 mL, Rfl: 11     methotrexate 2.5 MG tablet CHEMO, , Disp: , Rfl: 2     METHOTREXATE, 10 tablets once a week On Fridays. Taking 8 tablets on Fridays, Disp: , Rfl:      metoprolol (LOPRESSOR) 100 MG tablet, Take 2 tablets (200 mg) by mouth 2 times daily, Disp: 360 tablet, Rfl: 3     mometasone (NASONEX) 50 MCG/ACT nasal spray, Spray 1 spray into both nostrils as needed PRN daily, Disp: 1 Box, Rfl: 11     Multiple Vitamin (MULTI-VITAMIN) per tablet, Take 1 tablet by mouth daily., Disp: , Rfl:      Omeprazole (PRILOSEC PO), Take 20 mg by mouth daily as needed , Disp: , Rfl:      oxyCODONE IR (ROXICODONE) 5 MG tablet, Take 1 tablet (5 mg) by mouth every 6 hours as needed for other (Moderate to Severe Pain), Disp: 30 tablet, Rfl: 0     oxymetazoline (AFRIN NASAL SPRAY) 0.05 % spray, Use 2 sprays to each nare two times daily for up to 3 days or as needed for bleeding., Disp: 1 Bottle, Rfl: 0     sodium chloride (OCEAN) 0.65 % nasal spray, Spray 1-2 sprays in nostril every 2 hours Use in EACH nostril., Disp: 1 Bottle, Rfl: 1      Allergies:   Hctz; Hydrochlorothiazide; Latex; Benzocaine; Resorcinol-alcohol; Ace inhibitors; and Sulfa drugs      Past Medical History:  Anemia   Arthritis  Cataracts  Central retinal artery occlusion  Cervical spine fracture  Chronic pain  Gastro-oesophageal reflux disease  Head injury  Hypertension  Hyponatremia  Migraines  Osteoporosis  Pneumonia  Rheumatoid arthritis   Intrinsic atopic dermatitis  Bilateral occipital neuralgia   C1-C2 instability  Osteoarthritis  Eczematous dermatitis  Anxiety      Past Surgical History:  Colonoscopy  Fusion  "cervical posterior three+ levels  GYN surgery  Head and neck surgery  Hysterectomy  Knee surgery  Optical tracking system endoscopic sinus surgery  Orthopedic surgery    Family History:   Arthritis - mother   Pulmonary fibrosis - mother  Hypertension - mother  Liver disease from alcohol - father      Social History:   This patient was accompanied by:   Smoking status: never  Smokeless tobacco: never  Alcohol use: wine daily with dinner     Physical Exam:   /65  Pulse 82  Ht 1.537 m (5' 0.5\")  Wt 51.3 kg (113 lb 1.6 oz)  SpO2 95%  BMI 21.72 kg/m2   Constitutional: Alert, oriented, pleasant, no acute distress  Head: Normocephalic, atraumatic  Eyes: Extra-ocular movements intact, no scleral icterus  ENT: Oropharynx clear, moist mucus membranes, fair dentition  Neck: Supple  Cardiovascular: Regular rate and rhythm, no murmurs, rubs or gallops, peripheral pulses full/symmetric  Respiratory: Good air movement bilaterally, lungs clear, no wheezes/rales/rhonchi  Musculoskeletal: trace ankle edema bilaterally, normal muscle tone, normal gait  Neurologic: Alert and oriented, cranial nerves 2-12 intact, bilateral hands with classical RA joint deformities.   Psychiatric: normal mentation, affect and mood    Recent Labs:  Component      Latest Ref Rng & Units 7/23/2018   Sodium      133 - 144 mmol/L 131 (L)   Potassium      3.4 - 5.3 mmol/L 4.6   Chloride      94 - 109 mmol/L 97   Carbon Dioxide      20 - 32 mmol/L 24   Anion Gap      3 - 14 mmol/L 11   Glucose      70 - 99 mg/dL 85   Urea Nitrogen      7 - 30 mg/dL 21   Creatinine      0.52 - 1.04 mg/dL 0.89   GFR Estimate      >60 mL/min/1.7m2 61   GFR Estimate If Black      >60 mL/min/1.7m2 74   Calcium      8.5 - 10.1 mg/dL 9.1   WBC      4.0 - 11.0 10e9/L 5.9   RBC Count      3.8 - 5.2 10e12/L 3.49 (L)   Hemoglobin      11.7 - 15.7 g/dL 11.2 (L)   Hematocrit      35.0 - 47.0 % 34.2 (L)   MCV      78 - 100 fl 98   MCH      26.5 - 33.0 pg 32.1   MCHC      " 31.5 - 36.5 g/dL 32.7   RDW      10.0 - 15.0 % 13.3   Platelet Count      150 - 450 10e9/L 309       EKG:  EKG previously done on 4/6/2018.  No acute changes. Q waves noted in leads III and AVF.     Pregnancy test needed: No     Assessment and Plan:  Pre-op exam  She is low risk from a cardiopulmonary standpoint for this low-moderate risk surgery. Assuming electrolytes are stable okay to proceed to surgery without further testing.   - Basic metabolic panel  - CBC with platelets    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 factor (H)  I advised her to hold her methotrexate one week before and after her surgery.   - Basic metabolic panel  - CBC with platelets    Essential hypertension  She will continue her medications per usual.   - Basic metabolic panel  - CBC with platelets      The patient has been told to not take any aspirin or NSAIDs for 10 days prior to surgery. The patient has been instructed as to what to do with medications prior to surgery.    Follow-up: Return to clinic PRN.         Scribe Disclosure:   I, Swati Marc, am serving as a scribe to document services personally performed by Veronica Smith MD at this visit, based upon the provider's statements to me. All documentation has been reviewed by the aforementioned provider prior to being entered into the official medical record.     Portions of this medical record were completed by a scribe. UPON MY REVIEW AND AUTHENTICATION BY ELECTRONIC SIGNATURE, this confirms (a) I performed the applicable clinical services, and (b) the record is accurate.    Veronica Smith MD  Internal Medicine        Veronica Smith MD

## 2018-08-02 ENCOUNTER — ANESTHESIA EVENT (OUTPATIENT)
Dept: SURGERY | Facility: CLINIC | Age: 78
End: 2018-08-02
Payer: MEDICARE

## 2018-08-03 ENCOUNTER — SURGERY (OUTPATIENT)
Age: 78
End: 2018-08-03

## 2018-08-03 ENCOUNTER — ANESTHESIA (OUTPATIENT)
Dept: SURGERY | Facility: CLINIC | Age: 78
End: 2018-08-03
Payer: MEDICARE

## 2018-08-03 ENCOUNTER — HOSPITAL ENCOUNTER (OUTPATIENT)
Facility: CLINIC | Age: 78
Discharge: HOME | End: 2018-08-03
Attending: OTOLARYNGOLOGY | Admitting: OTOLARYNGOLOGY
Payer: MEDICARE

## 2018-08-03 VITALS
BODY MASS INDEX: 22.2 KG/M2 | SYSTOLIC BLOOD PRESSURE: 140 MMHG | DIASTOLIC BLOOD PRESSURE: 80 MMHG | OXYGEN SATURATION: 97 % | WEIGHT: 113.1 LBS | HEIGHT: 60 IN | TEMPERATURE: 98 F | RESPIRATION RATE: 16 BRPM

## 2018-08-03 DIAGNOSIS — J32.1 CHRONIC FRONTAL SINUSITIS: Primary | ICD-10-CM

## 2018-08-03 LAB
BASE EXCESS BLDV CALC-SCNC: 0.3 MMOL/L
CA-I BLD-MCNC: 4.9 MG/DL (ref 4.4–5.2)
GLUCOSE BLD-MCNC: 96 MG/DL (ref 70–99)
GLUCOSE BLDC GLUCOMTR-MCNC: 66 MG/DL (ref 70–99)
HCO3 BLDV-SCNC: 26 MMOL/L (ref 21–28)
HGB BLD-MCNC: 11.3 G/DL (ref 11.7–15.7)
LACTATE BLD-SCNC: 1.5 MMOL/L (ref 0.7–2)
O2/TOTAL GAS SETTING VFR VENT: 100 %
PCO2 BLDV: 46 MM HG (ref 40–50)
PH BLDV: 7.36 PH (ref 7.32–7.43)
PO2 BLDV: 39 MM HG (ref 25–47)
POTASSIUM BLD-SCNC: 4.5 MMOL/L (ref 3.4–5.3)
SODIUM BLD-SCNC: 134 MMOL/L (ref 133–144)

## 2018-08-03 PROCEDURE — 82803 BLOOD GASES ANY COMBINATION: CPT | Performed by: ANESTHESIOLOGY

## 2018-08-03 PROCEDURE — C9399 UNCLASSIFIED DRUGS OR BIOLOG: HCPCS | Performed by: NURSE ANESTHETIST, CERTIFIED REGISTERED

## 2018-08-03 PROCEDURE — 71000014 ZZH RECOVERY PHASE 1 LEVEL 2 FIRST HR: Performed by: OTOLARYNGOLOGY

## 2018-08-03 PROCEDURE — 25000128 H RX IP 250 OP 636: Performed by: ANESTHESIOLOGY

## 2018-08-03 PROCEDURE — A9270 NON-COVERED ITEM OR SERVICE: HCPCS | Performed by: OTOLARYNGOLOGY

## 2018-08-03 PROCEDURE — 87070 CULTURE OTHR SPECIMN AEROBIC: CPT | Performed by: OTOLARYNGOLOGY

## 2018-08-03 PROCEDURE — 40000170 ZZH STATISTIC PRE-PROCEDURE ASSESSMENT II: Performed by: OTOLARYNGOLOGY

## 2018-08-03 PROCEDURE — 25000132 ZZH RX MED GY IP 250 OP 250 PS 637: Mod: GY | Performed by: ANESTHESIOLOGY

## 2018-08-03 PROCEDURE — 83605 ASSAY OF LACTIC ACID: CPT | Performed by: ANESTHESIOLOGY

## 2018-08-03 PROCEDURE — 25000125 ZZHC RX 250: Performed by: NURSE ANESTHETIST, CERTIFIED REGISTERED

## 2018-08-03 PROCEDURE — 37000008 ZZH ANESTHESIA TECHNICAL FEE, 1ST 30 MIN: Performed by: OTOLARYNGOLOGY

## 2018-08-03 PROCEDURE — 87077 CULTURE AEROBIC IDENTIFY: CPT | Performed by: OTOLARYNGOLOGY

## 2018-08-03 PROCEDURE — 87186 SC STD MICRODIL/AGAR DIL: CPT | Performed by: OTOLARYNGOLOGY

## 2018-08-03 PROCEDURE — 84132 ASSAY OF SERUM POTASSIUM: CPT | Performed by: ANESTHESIOLOGY

## 2018-08-03 PROCEDURE — 36000074 ZZH SURGERY LEVEL 6 1ST 30 MIN - UMMC: Performed by: OTOLARYNGOLOGY

## 2018-08-03 PROCEDURE — 71000027 ZZH RECOVERY PHASE 2 EACH 15 MINS: Performed by: OTOLARYNGOLOGY

## 2018-08-03 PROCEDURE — A9270 NON-COVERED ITEM OR SERVICE: HCPCS | Mod: GY | Performed by: ANESTHESIOLOGY

## 2018-08-03 PROCEDURE — 25000566 ZZH SEVOFLURANE, EA 15 MIN: Performed by: OTOLARYNGOLOGY

## 2018-08-03 PROCEDURE — 84295 ASSAY OF SERUM SODIUM: CPT | Performed by: ANESTHESIOLOGY

## 2018-08-03 PROCEDURE — 82962 GLUCOSE BLOOD TEST: CPT

## 2018-08-03 PROCEDURE — 27810169 ZZH OR IMPLANT GENERAL: Performed by: OTOLARYNGOLOGY

## 2018-08-03 PROCEDURE — 82330 ASSAY OF CALCIUM: CPT | Performed by: ANESTHESIOLOGY

## 2018-08-03 PROCEDURE — 37000009 ZZH ANESTHESIA TECHNICAL FEE, EACH ADDTL 15 MIN: Performed by: OTOLARYNGOLOGY

## 2018-08-03 PROCEDURE — 27210794 ZZH OR GENERAL SUPPLY STERILE: Performed by: OTOLARYNGOLOGY

## 2018-08-03 PROCEDURE — 25000125 ZZHC RX 250: Performed by: OTOLARYNGOLOGY

## 2018-08-03 PROCEDURE — 82947 ASSAY GLUCOSE BLOOD QUANT: CPT | Mod: 91 | Performed by: ANESTHESIOLOGY

## 2018-08-03 PROCEDURE — 25000128 H RX IP 250 OP 636: Performed by: NURSE ANESTHETIST, CERTIFIED REGISTERED

## 2018-08-03 PROCEDURE — 36000076 ZZH SURGERY LEVEL 6 EA 15 ADDTL MIN - UMMC: Performed by: OTOLARYNGOLOGY

## 2018-08-03 DEVICE — IMPLANTABLE DEVICE: Type: IMPLANTABLE DEVICE | Site: SINUS | Status: FUNCTIONAL

## 2018-08-03 RX ORDER — ONDANSETRON 2 MG/ML
INJECTION INTRAMUSCULAR; INTRAVENOUS PRN
Status: DISCONTINUED | OUTPATIENT
Start: 2018-08-03 | End: 2018-08-03

## 2018-08-03 RX ORDER — LIDOCAINE 40 MG/G
CREAM TOPICAL
Status: DISCONTINUED | OUTPATIENT
Start: 2018-08-03 | End: 2018-08-03 | Stop reason: HOSPADM

## 2018-08-03 RX ORDER — DEXAMETHASONE SODIUM PHOSPHATE 4 MG/ML
4 INJECTION, SOLUTION INTRA-ARTICULAR; INTRALESIONAL; INTRAMUSCULAR; INTRAVENOUS; SOFT TISSUE EVERY 10 MIN PRN
Status: DISCONTINUED | OUTPATIENT
Start: 2018-08-03 | End: 2018-08-03 | Stop reason: HOSPADM

## 2018-08-03 RX ORDER — ONDANSETRON 4 MG/1
4 TABLET, ORALLY DISINTEGRATING ORAL EVERY 30 MIN PRN
Status: DISCONTINUED | OUTPATIENT
Start: 2018-08-03 | End: 2018-08-03 | Stop reason: HOSPADM

## 2018-08-03 RX ORDER — OXYCODONE HYDROCHLORIDE 5 MG/1
5-10 TABLET ORAL EVERY 4 HOURS PRN
Qty: 12 TABLET | Refills: 0 | Status: SHIPPED | OUTPATIENT
Start: 2018-08-03 | End: 2019-02-04

## 2018-08-03 RX ORDER — MEPERIDINE HYDROCHLORIDE 50 MG/ML
12.5 INJECTION INTRAMUSCULAR; INTRAVENOUS; SUBCUTANEOUS
Status: DISCONTINUED | OUTPATIENT
Start: 2018-08-03 | End: 2018-08-03 | Stop reason: HOSPADM

## 2018-08-03 RX ORDER — HYDROMORPHONE HYDROCHLORIDE 1 MG/ML
.3-.5 INJECTION, SOLUTION INTRAMUSCULAR; INTRAVENOUS; SUBCUTANEOUS EVERY 10 MIN PRN
Status: DISCONTINUED | OUTPATIENT
Start: 2018-08-03 | End: 2018-08-03 | Stop reason: HOSPADM

## 2018-08-03 RX ORDER — ECHINACEA PURPUREA EXTRACT 125 MG
1 TABLET ORAL DAILY PRN
Qty: 1 BOTTLE | Refills: 3 | Status: SHIPPED | OUTPATIENT
Start: 2018-08-03 | End: 2024-07-31

## 2018-08-03 RX ORDER — FENTANYL CITRATE 50 UG/ML
INJECTION, SOLUTION INTRAMUSCULAR; INTRAVENOUS PRN
Status: DISCONTINUED | OUTPATIENT
Start: 2018-08-03 | End: 2018-08-03

## 2018-08-03 RX ORDER — ACETAMINOPHEN 325 MG/1
650 TABLET ORAL ONCE
Status: COMPLETED | OUTPATIENT
Start: 2018-08-03 | End: 2018-08-03

## 2018-08-03 RX ORDER — FENTANYL CITRATE 50 UG/ML
25-50 INJECTION, SOLUTION INTRAMUSCULAR; INTRAVENOUS
Status: DISCONTINUED | OUTPATIENT
Start: 2018-08-03 | End: 2018-08-03 | Stop reason: HOSPADM

## 2018-08-03 RX ORDER — SODIUM CHLORIDE, SODIUM LACTATE, POTASSIUM CHLORIDE, CALCIUM CHLORIDE 600; 310; 30; 20 MG/100ML; MG/100ML; MG/100ML; MG/100ML
INJECTION, SOLUTION INTRAVENOUS CONTINUOUS
Status: DISCONTINUED | OUTPATIENT
Start: 2018-08-03 | End: 2018-08-03 | Stop reason: HOSPADM

## 2018-08-03 RX ORDER — FENTANYL CITRATE 50 UG/ML
25-50 INJECTION, SOLUTION INTRAMUSCULAR; INTRAVENOUS EVERY 5 MIN PRN
Status: DISCONTINUED | OUTPATIENT
Start: 2018-08-03 | End: 2018-08-03 | Stop reason: HOSPADM

## 2018-08-03 RX ORDER — HYDRALAZINE HYDROCHLORIDE 20 MG/ML
2.5-5 INJECTION INTRAMUSCULAR; INTRAVENOUS EVERY 10 MIN PRN
Status: DISCONTINUED | OUTPATIENT
Start: 2018-08-03 | End: 2018-08-03 | Stop reason: HOSPADM

## 2018-08-03 RX ORDER — NALOXONE HYDROCHLORIDE 0.4 MG/ML
.1-.4 INJECTION, SOLUTION INTRAMUSCULAR; INTRAVENOUS; SUBCUTANEOUS
Status: DISCONTINUED | OUTPATIENT
Start: 2018-08-03 | End: 2018-08-03 | Stop reason: HOSPADM

## 2018-08-03 RX ORDER — GLYCOPYRROLATE 0.2 MG/ML
INJECTION, SOLUTION INTRAMUSCULAR; INTRAVENOUS PRN
Status: DISCONTINUED | OUTPATIENT
Start: 2018-08-03 | End: 2018-08-03

## 2018-08-03 RX ORDER — LIDOCAINE HYDROCHLORIDE 40 MG/ML
INJECTION, SOLUTION RETROBULBAR PRN
Status: DISCONTINUED | OUTPATIENT
Start: 2018-08-03 | End: 2018-08-03

## 2018-08-03 RX ORDER — PROPOFOL 10 MG/ML
INJECTION, EMULSION INTRAVENOUS PRN
Status: DISCONTINUED | OUTPATIENT
Start: 2018-08-03 | End: 2018-08-03

## 2018-08-03 RX ORDER — OXYMETAZOLINE HYDROCHLORIDE 0.05 G/100ML
2 SPRAY NASAL
Status: COMPLETED | OUTPATIENT
Start: 2018-08-03 | End: 2018-08-03

## 2018-08-03 RX ORDER — DEXAMETHASONE SODIUM PHOSPHATE 10 MG/ML
INJECTION, SOLUTION INTRAMUSCULAR; INTRAVENOUS PRN
Status: DISCONTINUED | OUTPATIENT
Start: 2018-08-03 | End: 2018-08-03

## 2018-08-03 RX ORDER — OXYMETAZOLINE HYDROCHLORIDE 0.05 G/100ML
SPRAY NASAL PRN
Status: DISCONTINUED | OUTPATIENT
Start: 2018-08-03 | End: 2018-08-03 | Stop reason: HOSPADM

## 2018-08-03 RX ORDER — LIDOCAINE HYDROCHLORIDE AND EPINEPHRINE 10; 10 MG/ML; UG/ML
INJECTION, SOLUTION INFILTRATION; PERINEURAL PRN
Status: DISCONTINUED | OUTPATIENT
Start: 2018-08-03 | End: 2018-08-03 | Stop reason: HOSPADM

## 2018-08-03 RX ORDER — AMOXICILLIN 250 MG
1-2 CAPSULE ORAL 2 TIMES DAILY
Qty: 30 TABLET | Refills: 0 | Status: SHIPPED | OUTPATIENT
Start: 2018-08-03 | End: 2019-02-04

## 2018-08-03 RX ORDER — ONDANSETRON 2 MG/ML
4 INJECTION INTRAMUSCULAR; INTRAVENOUS EVERY 30 MIN PRN
Status: DISCONTINUED | OUTPATIENT
Start: 2018-08-03 | End: 2018-08-03 | Stop reason: HOSPADM

## 2018-08-03 RX ADMIN — PROPOFOL 100 MG: 10 INJECTION, EMULSION INTRAVENOUS at 10:43

## 2018-08-03 RX ADMIN — FENTANYL CITRATE 50 MCG: 50 INJECTION, SOLUTION INTRAMUSCULAR; INTRAVENOUS at 11:31

## 2018-08-03 RX ADMIN — ONDANSETRON 4 MG: 2 INJECTION INTRAMUSCULAR; INTRAVENOUS at 11:09

## 2018-08-03 RX ADMIN — OXYMETAZOLINE HYDROCHLORIDE 15 ML: 5 SPRAY NASAL at 11:11

## 2018-08-03 RX ADMIN — FENTANYL CITRATE 100 MCG: 50 INJECTION, SOLUTION INTRAMUSCULAR; INTRAVENOUS at 10:43

## 2018-08-03 RX ADMIN — SUGAMMADEX 100 MG: 100 INJECTION, SOLUTION INTRAVENOUS at 11:53

## 2018-08-03 RX ADMIN — GLYCOPYRROLATE 0.2 MG: 0.2 INJECTION, SOLUTION INTRAMUSCULAR; INTRAVENOUS at 10:51

## 2018-08-03 RX ADMIN — LIDOCAINE HYDROCHLORIDE 4 ML: 40 INJECTION, SOLUTION RETROBULBAR; TOPICAL at 10:46

## 2018-08-03 RX ADMIN — ROCURONIUM BROMIDE 40 MG: 10 INJECTION INTRAVENOUS at 10:43

## 2018-08-03 RX ADMIN — LIDOCAINE HYDROCHLORIDE AND EPINEPHRINE 1 ML: 10; 10 INJECTION, SOLUTION INFILTRATION; PERINEURAL at 11:11

## 2018-08-03 RX ADMIN — OXYMETAZOLINE HYDROCHLORIDE 2 SPRAY: 5 SPRAY NASAL at 09:15

## 2018-08-03 RX ADMIN — ACETAMINOPHEN 650 MG: 325 TABLET, FILM COATED ORAL at 09:12

## 2018-08-03 RX ADMIN — FENTANYL CITRATE 50 MCG: 50 INJECTION, SOLUTION INTRAMUSCULAR; INTRAVENOUS at 11:27

## 2018-08-03 RX ADMIN — ROCURONIUM BROMIDE 10 MG: 10 INJECTION INTRAVENOUS at 11:21

## 2018-08-03 RX ADMIN — PROPOFOL 30 MG: 10 INJECTION, EMULSION INTRAVENOUS at 11:50

## 2018-08-03 RX ADMIN — SODIUM CHLORIDE, POTASSIUM CHLORIDE, SODIUM LACTATE AND CALCIUM CHLORIDE: 600; 310; 30; 20 INJECTION, SOLUTION INTRAVENOUS at 10:22

## 2018-08-03 RX ADMIN — DEXAMETHASONE SODIUM PHOSPHATE 4 MG: 10 INJECTION, SOLUTION INTRAMUSCULAR; INTRAVENOUS at 11:01

## 2018-08-03 ASSESSMENT — COPD QUESTIONNAIRES: COPD: 0

## 2018-08-03 ASSESSMENT — ENCOUNTER SYMPTOMS
DYSRHYTHMIAS: 0
SEIZURES: 0

## 2018-08-03 NOTE — ANESTHESIA POSTPROCEDURE EVALUATION
Patient: Kimberly Chau    Procedure(s):  Stealth Assisted Revision Right Frontal Sinusotomy, Right Stent Placement  **Latex Allergy**  - Wound Class: II-Clean Contaminated    Diagnosis:Chronic Pansinusitis   Diagnosis Additional Information: No value filed.    Anesthesia Type:  General, ETT    Note:  Anesthesia Post Evaluation    Patient location during evaluation: PACU  Patient participation: Able to fully participate in evaluation  Level of consciousness: awake and alert  Pain management: adequate  Airway patency: patent  Cardiovascular status: acceptable  Respiratory status: acceptable  Hydration status: acceptable  PONV: none     Anesthetic complications: None          Last vitals:  Vitals:    08/03/18 1220 08/03/18 1230 08/03/18 1245   BP:  150/78 147/79   Resp: 15 16 16   Temp:      SpO2:  94% 95%         Electronically Signed By: Sanket Layne MD  August 3, 2018  12:56 PM

## 2018-08-03 NOTE — OP NOTE
Procedure Date: 08/03/2018      STAFF SURGEON:  Elyse Eisenberg MD      RESIDENT SURGEON:  Jadyn Sumner MD      PREOPERATIVE DIAGNOSIS:  Chronic right frontal sinusitis.      POSTOPERATIVE DIAGNOSIS:  Chronic right frontal sinusitis.      PROCEDURE:  Stealth-guided endoscopic revision frontal sinusotomy.      ANESTHESIA:  General.      COMPLICATIONS:  None.      ESTIMATED BLOOD LOSS:  10 mL.      FINDINGS:  Significantly stenosed right frontal sinus and right frontal recess, requiring drilling with a significantly opened sinus at the conclusion of the case.  A Gilliam stent was placed into the right frontal sinus.  A culture was also obtained from the middle meatus at the beginning of the case.      INDICATIONS:  Kimberly Chau is a 77-year-old female with recurrent frontal sinus disease on the right.  She is status post endoscopic sinus surgery but continued to have issues.  A CT scan showing opacified sinuses on the right side.  After a discussion of risks, benefits and alternatives, she was consented for the above stated procedure.      DESCRIPTION OF PROCEDURE:  The patient was brought to the operating room and  placed supine on the operating table.  General endotracheal anesthesia was induced and the patient was turned 90 degrees.  Afrin-soaked pledgets were placed in the right nasal passage.  The Stealth system was registered and used for the remainder of the case.  The patient was then prepped and draped in the usual fashion.  An institutional timeout was performed to correctly identify patient, procedure and site.  The Afrin-soaked pledgets were then removed.  We used the 0-degree endoscope to visualize the nasal passage.  Lidocaine 1% with 1:100,000 epinephrine was then injected into the mucosa, the lateral nasal sidewall, and the area of the sphenopalatine artery.  We then medialized the middle turbinate using the [place].  The image-guided suction identified an area in the frontal recess.  The middle meatus  was swabbed and significant purulence was encountered in this area.  We then used the suction microdebrider to remove the remaining portion of the superior portion of the uncinate and opened up the anterior ethmoid cell.  A rongeur was used to remove osteitic bone in this area.  Then, using the curved image-guided suction, we identified the presumed spot of the frontal recess and the entrance to the frontal sinus, which was overgrown with bone.  This area was drilled using a 30-degree scope for direct visualization.  We then used the image-guided curved suction to enter the frontal sinus.  The frontal sinusotomy was then widened using a combination of the upbiting rongeur and the mushroom punch.  We cleaned of the mucosa around the frontal recess and the remaining anterior ethmoid cell with the suction shaver.  Once we were comfortable with the recess, as well as a frontal sinusotomy, a curved suction with irrigation attached was used to copiously irrigate the frontal sinus.  The rest of the nasal passage was suctioned and irrigated.  A Reeves stent was placed without difficulty into the frontal sinus on the right.  We then passed an OG tube to suction any remaining blood from the stomach.  This concluded the procedure.  The patient was turned back to Anesthesia and extubated without difficulty to transfer to the PACU in stable condition.      Dr. Contreras was present for all portions of the procedure.      I was present with the resident during the entire procedure and participated in the critical aspects.    THONY CONTRERAS MD       As dictated by CRYSTAL JACKSON MD            D: 2018   T: 2018   MT: AYAD      Name:     INDIA MCNEILL   MRN:      [id number]        Account:        [id number]   :      1940           Procedure Date: 2018      Document: [id number]

## 2018-08-03 NOTE — ANESTHESIA PREPROCEDURE EVALUATION
Anesthesia Evaluation     . Pt has had prior anesthetic.     No history of anesthetic complications          ROS/MED HX    ENT/Pulmonary:      (-) asthma and COPD   Neurologic: Comment: Hx of c-spine fx (C1/C2) 2/2 syncope (BP meds) s/p multiple level c-spine fusion    R central retinal artery occlusion 2/2 head trauma/syncope, vision back at baseline     (-) seizures, CVA and TIA   Cardiovascular: Comment: TTE 2/2018:  LVEF 60-65%  RV nl  RVSP 30mmHg + RAP    (+) hypertension----. : . . . :. .      (-) arrhythmias and irregular heartbeat/palpitations   METS/Exercise Tolerance:  4 - Raking leaves, gardening   Hematologic:         Musculoskeletal: Comment: Rheumatoid arthritis, hands        GI/Hepatic:     (+) GERD Asymptomatic on medication,      (-) hepatitis   Renal/Genitourinary:      (-) renal disease   Endo:         Psychiatric:         Infectious Disease:         Malignancy:         Other:                     Physical Exam      Airway   Mallampati: III  TM distance: >3 FB  Neck ROM: limited  Comment: Almost no neck extension from neutral position    Dental   (+) missing    Cardiovascular   Rhythm and rate: regular and normal  (-) no weak pulses and no murmur    Pulmonary    breath sounds clear to auscultation(-) no rhonchi                    Anesthesia Plan      History & Physical Review      ASA Status:  2 .    NPO Status:  > 8 hours    Plan for General and ETT with Intravenous induction. Maintenance will be Balanced.      Additional equipment: Videolaryngoscope GETA. PIVx1. Standard ASA monitors. IV opioids, adjuncts. PACU postop    Risks and benefits of anesthetic discussed with patient including sore throat, voice hoarseness, injury to vocal cords, throat, mouth, teeth, tongue, and lips from intubation; nausea/vomiting; cardiac arrest, respiratory complications, MI, stroke, blood clots, death.    Presented opportunity to answer patient and family questions. Questions addressed.        Postoperative  Care      Consents  Anesthetic plan, risks, benefits and alternatives discussed with:  Patient..                          .

## 2018-08-03 NOTE — IP AVS SNAPSHOT
MRN:[id number]                      After Visit Summary   8/3/2018    Kimberly Chau    MRN: [id number]           Thank you!     Thank you for choosing [facility] for your care. Our goal is always to provide you with excellent care. Hearing back from our patients is one way we can continue to improve our services. Please take a few minutes to complete the written survey that you may receive in the mail after you visit with us. Thank you!        Patient Information     Date Of Birth          1940        About your hospital stay     You were admitted on:  August 3, 2018 You last received care in the:  Same Day [facility]    You were discharged on:  August 3, 2018       Who to Call     For medical emergencies, please call 911.  For non-urgent questions about your medical care, please call your primary care provider or clinic, [phone]  For questions related to your surgery, please call your surgery clinic        Attending Provider     Provider Specialty    Elyse Eisenberg MD Otolaryngology       Primary Care Provider Office Phone # Fax #    Guillermo SOLIZ MD [place] [phone]      After Care Instructions     Diet Instructions       Resume pre procedure diet            No Alcohol       For 24 hours following procedure or while taking narcotic pain medication            No driving or operating machinery       until the day after procedure or while taking narcotic pain medication            Wound care       Please adhere to sinus precautions:   - Do not use a straw. Open your mouth when you sneeze. Try not to lean forward and allow blood to build up in your head. Do not blow your nose. No lifting greater than 20 lbs.  - Please notify your doctor if you experience increased purulent nasal drainage and fevers (greater than 101.4) suspicious for infection.   - If you feel it is an acute emergency, please return to the emergency department. If you have questions or concerns  "during the day please call ENT clinic at [phone]. After hours you may call [facility] at [phone] and ask for the \"ENT resident on call\".                  Your next 10 appointments already scheduled     Aug 15, 2018  2:00 PM CDT   (Arrive by 1:45 PM)   Return Visit with Elyse Eisenberg MD   [facility] Ear Nose and Throat ([facility] [place])    [address] [facility] [address] [phone]              Additional Services     Cardiology Eval Adult Referral       Preferred location:  Any location close to patient's home    Please be aware that coverage of these services is subject to the terms and limitations of your health insurance plan.  Call member services at your health plan with any benefit or coverage questions.      Please bring the following to your appointment:  Any x-rays, CTs or MRIs which have been performed. Contact the facility where they were done to arrange for  prior to your scheduled appointment.    List of current medications  This referral request   Any documents/labs given to you for this referral                  Further instructions from your care team       [facility]  Same-Day Surgery   Adult Discharge Orders & Instructions     For 24 hours after surgery    1. Get plenty of rest.  A responsible adult must stay with you for at least 24 hours after you leave the hospital.   2. Do not drive or use heavy equipment.  If you have weakness or tingling, don't drive or use heavy equipment until this feeling goes away.  3. Do not drink alcohol.  4. Avoid strenuous or risky activities.  Ask for help when climbing stairs.   5. You may feel lightheaded.  IF so, sit for a few minutes before standing.  Have someone help you get up.   6. If you have nausea (feel sick to your stomach): Drink only clear liquids such as apple juice, ginger ale, broth or 7-Up.  Rest may also help.  Be sure to drink enough " "fluids.  Move to a regular diet as you feel able.  7. You may have a slight fever. Call the doctor if your fever is over 100 F (37.7 C) (taken under the tongue) or lasts longer than 24 hours.  8. You may have a dry mouth, a sore throat, muscle aches or trouble sleeping.  These should go away after 24 hours.  9. Do not make important or legal decisions.   Call your doctor for any of the followin.  Signs of infection (fever, growing tenderness at the surgery site, a large amount of drainage or bleeding, severe pain, foul-smelling drainage, redness, swelling).    2. It has been over 8 to 10 hours since surgery and you are still not able to urinate (pass water).    3.  Headache for over 24 hours.    4.  Numbness, tingling or weakness the day after surgery (if you had spinal anesthesia).  To contact doctor steven, call [phone] or:        [phone] and ask for the resident on call for ears, nose and throat (answered 24 hours a day)      Emergency Department:    [facility]: [phone]           Pending Results     Date and Time Order Name Status Description    8/3/2018 1104 Tissue Culture Aerobic Bacterial In process             Admission Information     Date & Time Provider Department Dept. Phone    8/3/2018 Elyse Esienberg MD Same Day Surgery [facility] [phone]      Your Vitals Were     Blood Pressure Temperature Respirations Height Weight Pulse Oximetry    147/79 97.7  F (36.5  C) (Oral) 16 1.525 m (5' 0.05\") 51.3 kg (113 lb 1.5 oz) 95%    BMI (Body Mass Index)                   22.05 kg/m2           [employer] Information     [employer] lets you send messages to your doctor, view your test results, renew your prescriptions, schedule appointments and more. To sign up, go to [email or web address] . Click on \"Log in\" on the left side of the screen, which will take you to the Welcome page. Then click on \"Sign up Now\" on the right side of the page.     You will be asked to enter the access code " listed below, as well as some personal information. Please follow the directions to create your username and password.     Your access code is: [email or web address]  Expires: 10/9/2018  1:31 PM     Your access code will  in 90 days. If you need help or a new code, please call your [facility] clinic or [phone].        Care EveryWhere ID     This is your Care EveryWhere ID. This could be used by other organizations to access your [facility] medical records  [id number]        Equal Access to Services     DIANE YATES : Hadii halina chairez hadlizzyo Soomaali, waaxda luqadaha, qaybta kaalmada adecarayasaurav, alejandra chen . So [facility] [phone].    ATENCIÓN: Si habla español, tiene a vázquez disposición servicios gratuitos de asistencia lingüística. Llame al [phone].    We comply with applicable federal civil rights laws and [place] laws. We do not discriminate on the basis of race, color, national origin, age, disability, sex, sexual orientation, or gender identity.               Review of your medicines      START taking        Dose / Directions    oxyCODONE IR 5 MG tablet   Commonly known as:  ROXICODONE   Used for:  Chronic frontal sinusitis        Dose:  5-10 mg   Take 1-2 tablets (5-10 mg) by mouth every 4 hours as needed for pain or other (Moderate to Severe)   Quantity:  12 tablet   Refills:  0       senna-docusate 8.6-50 MG per tablet   Commonly known as:  SENOKOT-S;PERICOLACE   Used for:  Chronic frontal sinusitis        Dose:  1-2 tablet   Take 1-2 tablets by mouth 2 times daily Take while on oral narcotics to prevent or treat constipation.   Quantity:  30 tablet   Refills:  0         CONTINUE these medicines which may have CHANGED, or have new prescriptions. If we are uncertain of the size of tablets/capsules you have at home, strength may be listed as something that might have changed.        Dose / Directions    amLODIPine 10 MG tablet   Commonly known as:  NORVASC   This may have changed:   when to take this   Used for:  Essential hypertension        Dose:  10 mg   Take 1 tablet (10 mg) by mouth daily   Quantity:  90 tablet   Refills:  2       * sodium chloride 0.65 % nasal spray   Commonly known as:  OCEAN   This may have changed:  Another medication with the same name was added. Make sure you understand how and when to take each.   Used for:  Chronic frontal sinusitis        Dose:  1-2 spray   Spray 1-2 sprays in nostril every 2 hours Use in EACH nostril.   Quantity:  1 Bottle   Refills:  1       * sodium chloride 0.65 % nasal spray   Commonly known as:  OCEAN NASAL SPRAY   This may have changed:  You were already taking a medication with the same name, and this prescription was added. Make sure you understand how and when to take each.   Used for:  Chronic frontal sinusitis        Dose:  1 spray   Spray 1 spray into both nostrils daily as needed for congestion   Quantity:  1 Bottle   Refills:  3       * Notice:  This list has 2 medication(s) that are the same as other medications prescribed for you. Read the directions carefully, and ask your doctor or other care provider to review them with you.      CONTINUE these medicines which have NOT CHANGED        Dose / Directions    ACETAMINOPHEN 8 HOUR 650 MG 8 hour tablet   Used for:  C1-C2 instability   Generic drug:  acetaminophen        Dose:  650 mg   Take 650 mg by mouth every 4 hours as needed.   Quantity:  100 tablet   Refills:  0       DICLOFENAC SODIUM PO        Dose:  25 mg   Take 25 mg by mouth daily   Refills:  0       fluocinolone 0.025 % ointment   Commonly known as:  SYNALAR   Used for:  Xerosis of skin, Intrinsic atopic dermatitis        Apply twice a day to affected areas of the face avoiding the eyelids followed by Vanicream moisturizer.   Quantity:  60 g   Refills:  1       FOLIC ACID        daily.   Refills:  0       METHOTREXATE        Dose:  10 tablet   10 tablets once a week On Fridays. Taking 8 tablets on Fridays   Refills:  0        metoprolol tartrate 100 MG tablet   Commonly known as:  LOPRESSOR   Used for:  Essential hypertension        Dose:  200 mg   Take 2 tablets (200 mg) by mouth 2 times daily   Quantity:  360 tablet   Refills:  3       mometasone 50 MCG/ACT spray   Commonly known as:  NASONEX   Used for:  Chronic rhinitis        Dose:  1 spray   Spray 1 spray into both nostrils as needed PRN daily   Quantity:  1 Box   Refills:  11       Multi-vitamin Tabs tablet   Generic drug:  multivitamin, therapeutic with minerals        Dose:  1 tablet   Take 1 tablet by mouth daily.   Refills:  0       oxymetazoline 0.05 % spray   Commonly known as:  AFRIN NASAL SPRAY   Used for:  Chronic frontal sinusitis        Use 2 sprays to each nare two times daily for up to 3 days or as needed for bleeding.   Quantity:  1 Bottle   Refills:  0       PRILOSEC PO        Dose:  20 mg   Take 20 mg by mouth daily as needed   Refills:  0       TYLENOL PM EXTRA STRENGTH PO        Dose:  2 tablet   Take 2 tablets by mouth nightly as needed.   Refills:  0            Where to get your medicines      These medications were sent to [facility] [place], [place] - [address] [address]     Phone:  [phone]     senna-docusate 8.6-50 MG per tablet    sodium chloride 0.65 % nasal spray         Some of these will need a paper prescription and others can be bought over the counter. Ask your nurse if you have questions.     Bring a paper prescription for each of these medications     oxyCODONE IR 5 MG tablet                Protect others around you: Learn how to safely use, store and throw away your medicines at www.disposemymeds.org.        Information about OPIOIDS     PRESCRIPTION OPIOIDS: WHAT YOU NEED TO KNOW   We gave you an opioid (narcotic) pain medicine. It is important to manage your pain, but opioids are not always the best choice. You should first try all the other options your care team gave you.  Take this medicine for as short a time (and as few doses) as possible.     These medicines have risks:    DO NOT drive when on new or higher doses of pain medicine. These medicines can affect your alertness and reaction times, and you could be arrested for driving under the influence (DUI). If you need to use opioids long-term, talk to your care team about driving.    DO NOT operate heave machinery    DO NOT do any other dangerous activities while taking these medicines.     DO NOT drink any alcohol while taking these medicines.      If the opioid prescribed includes acetaminophen, DO NOT take with any other medicines that contain acetaminophen. Read all labels carefully. Look for the word  acetaminophen  or  Tylenol.  Ask your pharmacist if you have questions or are unsure.    You can get addicted to pain medicines, especially if you have a history of addiction (chemical, alcohol or substance dependence). Talk to your care team about ways to reduce this risk.    Store your pills in a secure place, locked if possible. We will not replace any lost or stolen medicine. If you don t finish your medicine, please throw away (dispose) as directed by your pharmacist. The [place] Pollution Control Agency has more information about safe disposal: https://www.pca.[facility].[place].us/living-green/managing-unwanted-medications.     All opioids tend to cause constipation. Drink plenty of water and eat foods that have a lot of fiber, such as fruits, vegetables, prune juice, apple juice and high-fiber cereal. Take a laxative (Miralax, milk of magnesia, Colace, Senna) if you don t move your bowels at least every other day.              Medication List: This is a list of all your medications and when to take them. Check marks below indicate your daily home schedule. Keep this list as a reference.      Medications           Morning Afternoon Evening Bedtime As Needed    ACETAMINOPHEN 8 HOUR 650 MG 8 hour tablet   Take 650 mg by mouth every  4 hours as needed.   Last time this was given:  650 mg on 8/3/2018  9:12 AM   Generic drug:  acetaminophen                                amLODIPine 10 MG tablet   Commonly known as:  NORVASC   Take 1 tablet (10 mg) by mouth daily                                DICLOFENAC SODIUM PO   Take 25 mg by mouth daily                                fluocinolone 0.025 % ointment   Commonly known as:  SYNALAR   Apply twice a day to affected areas of the face avoiding the eyelids followed by Vanicream moisturizer.                                FOLIC ACID   daily.                                METHOTREXATE   10 tablets once a week On Fridays. Taking 8 tablets on Fridays                                metoprolol tartrate 100 MG tablet   Commonly known as:  LOPRESSOR   Take 2 tablets (200 mg) by mouth 2 times daily                                mometasone 50 MCG/ACT spray   Commonly known as:  NASONEX   Spray 1 spray into both nostrils as needed PRN daily                                Multi-vitamin Tabs tablet   Take 1 tablet by mouth daily.   Generic drug:  multivitamin, therapeutic with minerals                                oxyCODONE IR 5 MG tablet   Commonly known as:  ROXICODONE   Take 1-2 tablets (5-10 mg) by mouth every 4 hours as needed for pain or other (Moderate to Severe)                                oxymetazoline 0.05 % spray   Commonly known as:  AFRIN NASAL SPRAY   Use 2 sprays to each nare two times daily for up to 3 days or as needed for bleeding.   Last time this was given:  15 mLs on 8/3/2018 11:11 AM                                PRILOSEC PO   Take 20 mg by mouth daily as needed                                senna-docusate 8.6-50 MG per tablet   Commonly known as:  SENOKOT-S;PERICOLACE   Take 1-2 tablets by mouth 2 times daily Take while on oral narcotics to prevent or treat constipation.                                * sodium chloride 0.65 % nasal spray   Commonly known as:  OCEAN   Spray 1-2 sprays  in nostril every 2 hours Use in EACH nostril.                                * sodium chloride 0.65 % nasal spray   Commonly known as:  OCEAN NASAL SPRAY   [place] 1 spray into both nostrils daily as needed for congestion                                TYLENOL PM EXTRA STRENGTH PO   Take 2 tablets by mouth nightly as needed.                                * Notice:  This list has 2 medication(s) that are the same as other medications prescribed for you. Read the directions carefully, and ask your doctor or other care provider to review them with you.

## 2018-08-03 NOTE — DISCHARGE INSTRUCTIONS
[facility]  Same-Day Surgery   Adult Discharge Orders & Instructions     For 24 hours after surgery    1. Get plenty of rest.  A responsible adult must stay with you for at least 24 hours after you leave the hospital.   2. Do not drive or use heavy equipment.  If you have weakness or tingling, don't drive or use heavy equipment until this feeling goes away.  3. Do not drink alcohol.  4. Avoid strenuous or risky activities.  Ask for help when climbing stairs.   5. You may feel lightheaded.  IF so, sit for a few minutes before standing.  Have someone help you get up.   6. If you have nausea (feel sick to your stomach): Drink only clear liquids such as apple juice, ginger ale, broth or 7-Up.  Rest may also help.  Be sure to drink enough fluids.  Move to a regular diet as you feel able.  7. You may have a slight fever. Call the doctor if your fever is over 100 F (37.7 C) (taken under the tongue) or lasts longer than 24 hours.  8. You may have a dry mouth, a sore throat, muscle aches or trouble sleeping.  These should go away after 24 hours.  9. Do not make important or legal decisions.   Call your doctor for any of the followin.  Signs of infection (fever, growing tenderness at the surgery site, a large amount of drainage or bleeding, severe pain, foul-smelling drainage, redness, swelling).    2. It has been over 8 to 10 hours since surgery and you are still not able to urinate (pass water).    3.  Headache for over 24 hours.    4.  Numbness, tingling or weakness the day after surgery (if you had spinal anesthesia).  To contact doctor harris, call [phone] or:        [phone] and ask for the resident on call for ears, nose and throat (answered 24 hours a day)      Emergency Department:    [facility]: [phone]

## 2018-08-03 NOTE — ANESTHESIA CARE TRANSFER NOTE
Patient: Kimberly Chau    Procedure(s):  Stealth Assisted Revision Right Frontal Sinusotomy, Right Stent Placement  **Latex Allergy**  - Wound Class: II-Clean Contaminated    Diagnosis: Chronic Pansinusitis   Diagnosis Additional Information: No value filed.    Anesthesia Type:   General, ETT     Note:  Airway :Face Mask  Patient transferred to:PACU  Comments: To PACU on supplemental O2 with + air exchange, placed on monitor, waiting for PACU RN.     Report given to RN, questions answered, VSS, patient alert and comfortable.Handoff Report: Identifed the Patient, Identified the Reponsible Provider, Reviewed the pertinent medical history, Discussed the surgical course, Reviewed Intra-OP anesthesia mangement and issues during anesthesia, Set expectations for post-procedure period and Allowed opportunity for questions and acknowledgement of understanding      Vitals: (Last set prior to Anesthesia Care Transfer)    CRNA VITALS  8/3/2018 1129 - 8/3/2018 1209      8/3/2018             Resp Rate (observed): (!)  5                Electronically Signed By: KEMI Chua CRNA  August 3, 2018  12:09 PM

## 2018-08-03 NOTE — IP AVS SNAPSHOT
Same Day Surgery [address] [address]    Phone:  [phone]                                       After Visit Summary   8/3/2018    Kimberly Chau    MRN: [id number]           After Visit Summary Signature Page     I have received my discharge instructions, and my questions have been answered. I have discussed any challenges I see with this plan with the nurse or doctor.    ..........................................................................................................................................  Patient/Patient Representative Signature      ..........................................................................................................................................  Patient Representative Print Name and Relationship to Patient    ..................................................               ................................................  Date                                            Time    ..........................................................................................................................................  Reviewed by Signature/Title    ...................................................              ..............................................  Date                                                            Time

## 2018-08-03 NOTE — BRIEF OP NOTE
[facility], [place]    Brief Operative Note    Pre-operative diagnosis: Chronic Pansinusitis   Post-operative diagnosis * No post-op diagnosis entered *  Procedure: Procedure(s):  Stealth Assisted Revision Right Frontal Sinusotomy, Right Stent Placement  **Latex Allergy**  - Wound Class: II-Clean Contaminated  Surgeon: Surgeon(s) and Role:     * Elyse Eisenberg MD - Primary     * Jadyn Sumner MD - Resident - Assisting  Anesthesia: General   Estimated blood loss: 10 mL  Drains: None  Specimens:   ID Type Source Tests Collected by Time Destination   1 : right middle meatus Tissue Nose TISSUE CULTURE AEROBIC BACTERIAL Elyse Eisenberg MD 8/3/2018 11:03 AM      Findings:   None.  Complications: None.  Implants: [place] stent placed into right frontal sinus

## 2018-08-06 LAB
BACTERIA SPEC CULT: ABNORMAL
BACTERIA SPEC CULT: ABNORMAL
Lab: ABNORMAL
SPECIMEN SOURCE: ABNORMAL

## 2018-08-15 ENCOUNTER — TELEPHONE (OUTPATIENT)
Dept: OTOLARYNGOLOGY | Facility: CLINIC | Age: 78
End: 2018-08-15

## 2018-08-15 ENCOUNTER — OFFICE VISIT (OUTPATIENT)
Dept: OTOLARYNGOLOGY | Facility: CLINIC | Age: 78
End: 2018-08-15
Payer: MEDICARE

## 2018-08-15 VITALS — HEIGHT: 60 IN | BODY MASS INDEX: 22.19 KG/M2 | WEIGHT: 113 LBS

## 2018-08-15 DIAGNOSIS — J32.1 CHRONIC FRONTAL SINUSITIS: Primary | ICD-10-CM

## 2018-08-15 NOTE — PATIENT INSTRUCTIONS
Clinic contact information:  1. To schedule an appointment or to speak to someone regarding your medical care, please call [phone], option #1  2. VeronicaRN: [phone]  3. Surgery scheduling:      Reema Coleman: [phone]      Denise Singletary: [phone]  4. Fax: [phone]  5. [phone]    See me in 2 months.

## 2018-08-15 NOTE — NURSING NOTE
Chief Complaint   Patient presents with     RECHECK     po 8/3/18     Height 1.524 m (5'), weight 51.3 kg (113 lb), not currently breastfeeding.    Nikhil Tejeda

## 2018-08-15 NOTE — MR AVS SNAPSHOT
After Visit Summary   8/15/2018    Kimebrly Chau    MRN: [id number]           Patient Information     Date Of Birth          1940        Visit Information        Provider Department      8/15/2018 2:00 PM Elyse Eisenberg MD [facility]        Care Instructions    Clinic contact information:  1. To schedule an appointment or to speak to someone regarding your medical care, please call [phone], option #1  2. VeronicaRN: [phone]  3. Surgery scheduling:      Reema Coleman: [phone]      Denise Singletary: [phone]  4. Fax: [phone]  5. [phone]    See me in 2 months.          Follow-ups after your visit        Follow-up notes from your care team     Return in about 2 months (around 10/15/2018).      Who to contact     Please call your clinic at [phone] to:    Ask questions about your health    Make or cancel appointments    Discuss your medicines    Learn about your test results    Speak to your doctor            Additional Information About Your Visit        Care EveryWhere ID     This is your Care EveryWhere ID. This could be used by other organizations to access your [place] medical records  [id number]        Your Vitals Were     Height BMI (Body Mass Index)                1.524 m (5') 22.07 kg/m2           Blood Pressure from Last 3 Encounters:   18 140/80   18 135/65   18 144/78    Weight from Last 3 Encounters:   08/15/18 51.3 kg (113 lb)   18 51.3 kg (113 lb 1.5 oz)   18 51.3 kg (113 lb 1.6 oz)              Today, you had the following     No orders found for display         Today's Medication Changes          These changes are accurate as of 8/15/18  2:27 PM.  If you have any questions, ask your nurse or doctor.               These medicines have changed or have updated prescriptions.        Dose/Directions    amLODIPine 10 MG tablet   Commonly known as:  NORVASC   This may have changed:  when to take this   Used  for:  Essential hypertension        Dose:  10 mg   Take 1 tablet (10 mg) by mouth daily   Quantity:  90 tablet   Refills:  2                Primary Care Provider Office Phone # Fax #    Guillermo Castellano [phone] [address] [address]        Equal Access to Services     ELISEODIANE TRUDY : Hadii aad ku hadasho Soomaali, waaxda luqadaha, qaybta kaalmada adeegyada, waxay idiin haystephanien adecara desouza laSteveandrzej magallon. So [facility] [phone].    ATENCIÓN: Si habla español, tiene a vázquez disposición servicios gratuitos de asistencia lingüística. Llame al [phone].    We comply with applicable federal civil rights laws and [place] laws. We do not discriminate on the basis of race, color, national origin, age, disability, sex, sexual orientation, or gender identity.            Thank you!     Thank you for choosing [facility]  for your care. Our goal is always to provide you with excellent care. Hearing back from our patients is one way we can continue to improve our services. Please take a few minutes to complete the written survey that you may receive in the mail after your visit with us. Thank you!             Your Updated Medication List - Protect others around you: Learn how to safely use, store and throw away your medicines at www.disposemymeds.org.          This list is accurate as of 8/15/18  2:27 PM.  Always use your most recent med list.                   Brand Name Dispense Instructions for use Diagnosis    ACETAMINOPHEN 8 HOUR 650 MG 8 hour tablet   Generic drug:  acetaminophen     100 tablet    Take 650 mg by mouth every 4 hours as needed.    C1-C2 instability       amLODIPine 10 MG tablet    NORVASC    90 tablet    Take 1 tablet (10 mg) by mouth daily    Essential hypertension       DICLOFENAC SODIUM PO      Take 25 mg by mouth daily        fluocinolone 0.025 % ointment    SYNALAR    60 g    Apply twice a day to affected areas of the face avoiding the  eyelids followed by Vanicream moisturizer.    Xerosis of skin, Intrinsic atopic dermatitis       FOLIC ACID      daily.        METHOTREXATE      10 tablets once a week On Fridays. Taking 8 tablets on Fridays        metoprolol tartrate 100 MG tablet    LOPRESSOR    360 tablet    Take 2 tablets (200 mg) by mouth 2 times daily    Essential hypertension       mometasone 50 MCG/ACT spray    NASONEX    1 Box    Spray 1 spray into both nostrils as needed PRN daily    Chronic rhinitis       Multi-vitamin Tabs tablet   Generic drug:  multivitamin, therapeutic with minerals      Take 1 tablet by mouth daily.        oxyCODONE IR 5 MG tablet    ROXICODONE    12 tablet    Take 1-2 tablets (5-10 mg) by mouth every 4 hours as needed for pain or other (Moderate to Severe)    Chronic frontal sinusitis       oxymetazoline 0.05 % spray    AFRIN NASAL SPRAY    1 Bottle    Use 2 sprays to each nare two times daily for up to 3 days or as needed for bleeding.    Chronic frontal sinusitis       PRILOSEC PO      Take 20 mg by mouth daily as needed        senna-docusate 8.6-50 MG per tablet    SENOKOT-S;PERICOLACE    30 tablet    Take 1-2 tablets by mouth 2 times daily Take while on oral narcotics to prevent or treat constipation.    Chronic frontal sinusitis       * sodium chloride 0.65 % nasal spray    OCEAN    1 Bottle    Spray 1-2 sprays in nostril every 2 hours Use in EACH nostril.    Chronic frontal sinusitis       * sodium chloride 0.65 % nasal spray    OCEAN NASAL SPRAY    1 Bottle    Spray 1 spray into both nostrils daily as needed for congestion    Chronic frontal sinusitis       TYLENOL PM EXTRA STRENGTH PO      Take 2 tablets by mouth nightly as needed.        * Notice:  This list has 2 medication(s) that are the same as other medications prescribed for you. Read the directions carefully, and ask your doctor or other care provider to review them with you.

## 2018-08-15 NOTE — LETTER
8/15/2018       RE: Kimberly Chau  [address]   [place] [address]     Dear Colleague,    Thank you for referring your patient, Kimberly Chau, to the [facility] at [facility]. Please see a copy of my visit note below.    Kimberly is s/p revision R frontal sinusotomy with Sundance stent placement.  Her headache is better, but she is having a lot of drainage.      Procedure:  Endoscopy indicated for exam of surgical site  Topical anesthetic/decongestant spray applied.  Rigid scope used for visualization.  Findings: R frontal recess with stent in place, old blood and secretions suctioned from around and within the stent.    A/P:  Recommend irrigation.  Culture shows MRSA, will not add further treatment at this time.  See again in 2 months to reassess.     She had cardiac arrythmia during surgery - was hemodynamically stable.  Anesthesia staff felt post op follow up with PMD would be appropriate.  Dr. Castellano informed.       Again, thank you for allowing me to participate in the care of your patient.      Sincerely,    Elyse Eisenberg MD

## 2018-08-16 NOTE — PROGRESS NOTES
Kimberly is s/p revision R frontal sinusotomy with Miller stent placement.  Her headache is better, but she is having a lot of drainage.      Procedure:  Endoscopy indicated for exam of surgical site  Topical anesthetic/decongestant spray applied.  Rigid scope used for visualization.  Findings: R frontal recess with stent in place, old blood and secretions suctioned from around and within the stent.    A/P:  Recommend irrigation.  Culture shows MRSA, will not add further treatment at this time.  See again in 2 months to reassess.     She had cardiac arrythmia during surgery - was hemodynamically stable.  Anesthesia staff felt post op follow up with PMD would be appropriate.  Dr. Castellano informed.

## 2018-08-23 DIAGNOSIS — I10 ESSENTIAL HYPERTENSION: ICD-10-CM

## 2018-08-23 RX ORDER — AMLODIPINE BESYLATE 10 MG/1
10 TABLET ORAL DAILY
Qty: 90 TABLET | Refills: 0 | Status: SHIPPED | OUTPATIENT
Start: 2018-08-23 | End: 2019-04-25

## 2018-08-23 NOTE — TELEPHONE ENCOUNTER
Sheree      Last Written Prescription Date:  12-4-17  Last Fill Quantity: 90,   # refills: 2  Last Office Visit : 7-23-18  Future Office visit:  none    Routing  To RN  because:  BP Readings from Last 3 Encounters:   08/03/18 140/80   07/23/18 135/65   04/06/18 144/78     3 month refill sent to pharmacy.

## 2018-09-10 NOTE — TELEPHONE ENCOUNTER
Dear Coretta;     Please see note from Dr. Eisenberg. I reviewed Kimberly's notes, EKG's and resting echo from 2/2018 and nothing too worrisome. Could I just see her in clinic in the next few weeks to follow up on this.        Appt set up for 09/18/2018 at 12:35pm with Dr Castellano.  Coretta Simeon RN 1:07 PM on 9/10/2018.

## 2018-09-18 ENCOUNTER — OFFICE VISIT (OUTPATIENT)
Dept: INTERNAL MEDICINE | Facility: CLINIC | Age: 78
End: 2018-09-18
Payer: MEDICARE

## 2018-09-18 VITALS
WEIGHT: 115.7 LBS | SYSTOLIC BLOOD PRESSURE: 154 MMHG | HEART RATE: 70 BPM | BODY MASS INDEX: 22.6 KG/M2 | RESPIRATION RATE: 18 BRPM | DIASTOLIC BLOOD PRESSURE: 77 MMHG

## 2018-09-18 DIAGNOSIS — R91.8 PULMONARY NODULES: ICD-10-CM

## 2018-09-18 DIAGNOSIS — L60.0 IGTN (INGROWING TOE NAIL): ICD-10-CM

## 2018-09-18 DIAGNOSIS — I49.8 OTHER CARDIAC ARRHYTHMIA: Primary | ICD-10-CM

## 2018-09-18 ASSESSMENT — PAIN SCALES - GENERAL: PAINLEVEL: NO PAIN (0)

## 2018-09-18 NOTE — NURSING NOTE
Chief Complaint   Patient presents with     Results     Patient is here to follow up tests       Kody Roberts CMA ([facility]) at 12:35 PM on 9/18/2018

## 2018-09-18 NOTE — PROGRESS NOTES
HPI:    Pt. With h/o RA and cervical spine surgery comes in for follow up sinus surgery  with Dr. Eisenberg on 8/3/2018. See notes from Dr. Eisenberg and anesthesia data from that date. Anesthesia felt she had arrhythmia(s) during procedure.  She has chronic R frontal HA's. She had MRI brain 10/17 followed sinus CT scan 11/13/17 showing R frontal and R maxillary sinus disease. She denies fever today. She denies any SOB today She followed in  neurosurgery 2/17 and again in 4/17.   No CP or breathing issues. She had normal CXR 12/5/16.  She was seen by Dr. Cabello  Rheumatology 10/31/17; she has stopped her  methotrexate. She o/w denies additional HEENT, cardiopulmonary, abdominal, , GYN, neurological complaints. She denies any palpitations. She has L great toe nail changes     PE:    Vitals noted gen nad cooperative alert, lungs with  good air movement,  RRR, S1, S2, no MRG, abdomen, nt, nd, She has symmetrical strength in her B arms. L great toe nail dysmorphic.     EKG (1/15/2018): SR at 70; Q waves III and aVF old findings compared 12/5/16 and 1/8/2013    EKG today 3/26/2018 SR at 71; no change from 1/15/2018    Resting echo (2/7/2018) Nl LVF 60-65%. No WMA's.     CXR; some possible finding upper R lobe but less than from 1/15/2018    A/P:    1. Will get Zio patch and cardiology referral for perioperative anesthesia findings. Resting echo as above  2. Refer to Podiatry for toenail changes  3. Refer pulmonary for lung nodules CT scan from 3/2018.     Total time spent 25 minutes.  More than 50% of the time spent with Ms. Chau on counseling / coordinating her care      Progress Notes  Encounter Date: 8/15/2018  Elyse Eisenberg MD   Otolaryngology      []Hide copied text  []Hover for attribution information  Kimberly is s/p revision R frontal sinusotomy with [place] stent placement.  Her headache is better, but she is having a lot of drainage.       Procedure:  Endoscopy indicated for exam of surgical site  Topical  anesthetic/decongestant spray applied.  Rigid scope used for visualization.  Findings: R frontal recess with stent in place, old blood and secretions suctioned from around and within the stent.     A/P:  Recommend irrigation.  Culture shows MRSA, will not add further treatment at this time.  See again in 2 months to reassess.      She had cardiac arrythmia during surgery - was hemodynamically stable.  Anesthesia staff felt post op follow up with PMD would be appropriate.  Dr. Castellano informed.          Electronically signed by Elyse Eisenberg MD at 8/16/2018  2:15 PM       Responsible Staff   08/03/18   Name Role Begin End   Kerri Vines APRN CRNA CRNA 1022 1214   Sanket Layne MD ANESTH 1022 1214   Assessments      8/3/2018 1026 8/3/2018 1030 8/3/2018 1045 8/3/2018 1100   EKG: Sinus rhythm;Mobitz 2 Sinus rhythm;Mobitz 2 Sinus rhythm;Mobitz 2 Sinus rhythm;Sinus bradycardia;PAC's;Mobitz 2   Forced Air Warmer: - - - High;Lower   BP Site: Arm R Arm R Arm R -   EKG & ST segments: EKG;ST segments EKG;ST segments EKG;ST segments -   ECG Leads: V;II V;II V;II -   Temp Source: - - - Axillary             8/3/2018 1115 8/3/2018 1130 8/3/2018 1145 8/3/2018 1205   EKG: Sinus rhythm;Sinus bradycardia;PAC's Sinus rhythm;Sinus bradycardia;PAC's Sinus rhythm Sinus rhythm

## 2018-09-18 NOTE — MR AVS SNAPSHOT
After Visit Summary   9/18/2018    Kimberly Chau    MRN: [id number]           Patient Information     Date Of Birth          1940        Visit Information        Provider Department      9/18/2018 8:35 AM Guillermo Castellano MD [facility]        Today's Diagnoses     Other cardiac arrhythmia    -  1    Pulmonary nodules        IGTN (ingrowing toe nail)           Follow-ups after your visit        Additional Services     CARDIOLOGY EVAL ADULT REFERRAL       Possible ventricular arrhythmia            PODIATRY/FOOT & ANKLE SURGERY REFERRAL       L great toe nail            PULMONARY MEDICINE REFERRAL       Lung nodules                  Your next 10 appointments already scheduled     Sep 19, 2018 11:00 AM CDT   (Arrive by 10:45 AM)   HOLTER MONITOR VISIT with  Cvc Monitor [facility] ([facility])    [address]  [address]  [place] [id number] [phone]            Oct 16, 2018 12:30 PM CDT   (Arrive by 12:15 PM)   NEW ARRHYTHMIA with Elvia Moreno MD   [facility] ([facility])    [address]  [address]  [place] [address] [phone]            Oct 16, 2018  1:20 PM CDT   (Arrive by 1:05 PM)   [facility]    [address]  4th Floor  [place] [address] [phone]            Oct 17, 2018  2:00 PM CDT   (Arrive by 1:45 PM)   Return Visit with Elyse Eisenberg MD   [facility] ([facility])    [address]  4th Floor  [place] [id number] [phone]              Future tests that were ordered for you today     Open Future Orders        Priority Expected Expires Ordered    Zio Patch 48 Hours Routine  11/2/2018 9/18/2018            Who to contact     Please call your clinic at [phone] to:    Ask  questions about your health    Make or cancel appointments    Discuss your medicines    Learn about your test results    Speak to your doctor            Additional Information About Your Visit        Care EveryWhere ID     This is your Care EveryWhere ID. This could be used by other organizations to access your [facility] medical records  [id number]        Your Vitals Were     Pulse Respirations Breastfeeding? BMI (Body Mass Index)          70 18 No 22.6 kg/m2         Blood Pressure from Last 3 Encounters:   09/18/18 154/77   08/03/18 140/80   07/23/18 135/65    Weight from Last 3 Encounters:   09/18/18 52.5 kg (115 lb 11.2 oz)   08/15/18 51.3 kg (113 lb)   08/03/18 51.3 kg (113 lb 1.5 oz)              We Performed the Following     CARDIOLOGY EVAL ADULT REFERRAL     PODIATRY/FOOT & ANKLE SURGERY REFERRAL     PULMONARY MEDICINE REFERRAL        Primary Care Provider Office Phone # Fax #    Guillermo Castellano [phone]       [address] [address]        Equal Access to Services     DIANE [facility] HospitalJARRETT : Hadii aad ku hadasho Soomaali, waaxda luqadaha, qaybta kaalmada adeegyada, waxay idiin haystephanien edilberto chen . So [facility] [phone].    ATENCIÓN: Si habla español, tiene a vázquez disposición servicios gratuitos de asistencia lingüística. LlShel[facility] Arms [facility] [phone].    We comply with applicable federal civil rights laws and [place] laws. We do not discriminate on the basis of race, color, national origin, age, disability, sex, sexual orientation, or gender identity.            Thank you!     Thank you for choosing [facility]  for your care. Our goal is always to provide you with excellent care. Hearing back from our patients is one way we can continue to improve our services. Please take a few minutes to complete the written survey that you may receive in the mail after your visit with us. Thank you!             Your Updated Medication List - Protect others around  you: Learn how to safely use, store and throw away your medicines at www.disposemymeds.org.          This list is accurate as of 9/18/18  1:09 PM.  Always use your most recent med list.                   Brand Name Dispense Instructions for use Diagnosis    ACETAMINOPHEN 8 HOUR 650 MG 8 hour tablet   Generic drug:  acetaminophen     100 tablet    Take 650 mg by mouth every 4 hours as needed.    C1-C2 instability       amLODIPine 10 MG tablet    NORVASC    90 tablet    Take 1 tablet (10 mg) by mouth daily    Essential hypertension       DICLOFENAC SODIUM PO      Take 25 mg by mouth daily        fluocinolone 0.025 % ointment    SYNALAR    60 g    Apply twice a day to affected areas of the face avoiding the eyelids followed by Vanicream moisturizer.    Xerosis of skin, Intrinsic atopic dermatitis       FOLIC ACID      daily.        METHOTREXATE      10 tablets once a week On Fridays. Taking 8 tablets on Fridays        metoprolol tartrate 100 MG tablet    LOPRESSOR    360 tablet    Take 2 tablets (200 mg) by mouth 2 times daily    Essential hypertension       mometasone 50 MCG/ACT spray    NASONEX    1 Box    Spray 1 spray into both nostrils as needed PRN daily    Chronic rhinitis       Multi-vitamin Tabs tablet   Generic drug:  multivitamin, therapeutic with minerals      Take 1 tablet by mouth daily.        oxyCODONE IR 5 MG tablet    ROXICODONE    12 tablet    Take 1-2 tablets (5-10 mg) by mouth every 4 hours as needed for pain or other (Moderate to Severe)    Chronic frontal sinusitis       oxymetazoline 0.05 % spray    AFRIN NASAL SPRAY    1 Bottle    Use 2 sprays to each nare two times daily for up to 3 days or as needed for bleeding.    Chronic frontal sinusitis       PRILOSEC PO      Take 20 mg by mouth daily as needed        senna-docusate 8.6-50 MG per tablet    SENOKOT-S;PERICOLACE    30 tablet    Take 1-2 tablets by mouth 2 times daily Take while on oral narcotics to prevent or treat constipation.    Chronic  frontal sinusitis       * sodium chloride 0.65 % nasal spray    OCEAN    1 Bottle    Spray 1-2 sprays in nostril every 2 hours Use in EACH nostril.    Chronic frontal sinusitis       * sodium chloride 0.65 % nasal spray    OCEAN NASAL SPRAY    1 Bottle    Spray 1 spray into both nostrils daily as needed for congestion    Chronic frontal sinusitis       TYLENOL PM EXTRA STRENGTH PO      Take 2 tablets by mouth nightly as needed.        * Notice:  This list has 2 medication(s) that are the same as other medications prescribed for you. Read the directions carefully, and ask your doctor or other care provider to review them with you.

## 2018-09-19 ENCOUNTER — ALLIED HEALTH/NURSE VISIT (OUTPATIENT)
Dept: CARDIOLOGY | Facility: CLINIC | Age: 78
End: 2018-09-19
Attending: INTERNAL MEDICINE
Payer: MEDICARE

## 2018-09-19 DIAGNOSIS — I49.8 OTHER CARDIAC ARRHYTHMIA: ICD-10-CM

## 2018-09-19 PROCEDURE — 93225 XTRNL ECG REC<48 HRS REC: CPT | Mod: ZF

## 2018-09-19 PROCEDURE — 0298T ZZC EXT ECG > 48HR TO 21 DAY REVIEW AND INTERPRETATN: CPT | Performed by: INTERNAL MEDICINE

## 2018-09-19 NOTE — NURSING NOTE
Per Guillermo Juarez patient to have 48hr zio patch monitor placed.  Diagnosis: Cardiac arrhythmia  Monitor placed: Yes  Patient Instructed: Yes  Patient verbalized understanding: Yes  Holter # [id number]  Placed by CRISTAL Birch

## 2018-09-19 NOTE — LETTER
Patient:  Kimberly Chau  :   1940  MRN:     [id number]        Ms. Kimberly Chau  [address] [place] [facility] [place] [facility] [address]        2018    Dear Ms. Chau,    Thank you for choosing the [facility] for your healthcare needs.  We appreciate the opportunity to serve you.    The following are your recent test results.     Dear Kimberly;     Your heart monitor patch results are attached and I recommend you keep your 10/16/2018 follow up Cardiology appointment with Dr. Moreno on 10/16/2018.       Results for orders placed or performed during the hospital encounter of 18   Glucose by meter   Result Value Ref Range    Glucose 66 (L) 70 - 99 mg/dL   VENOUS PANEL   Result Value Ref Range    Ph Venous 7.36 7.32 - 7.43 pH    PCO2 Venous 46 40 - 50 mm Hg    PO2 Venous 39 25 - 47 mm Hg    Bicarbonate Venous 26 21 - 28 mmol/L    Base Excess Venous 0.3 mmol/L    FIO2 100.0     Sodium 134 133 - 144 mmol/L    Potassium 4.5 3.4 - 5.3 mmol/L    Hemoglobin 11.3 (L) 11.7 - 15.7 g/dL    Glucose 96 70 - 99 mg/dL    Calcium Ionized Whole Blood 4.9 4.4 - 5.2 mg/dL   Lactic acid whole blood   Result Value Ref Range    Lactic Acid 1.5 0.7 - 2.0 mmol/L   Tissue Culture Aerobic Bacterial   Result Value Ref Range    Specimen Description Tissue Right Middle meatus     Special Requests       This specimen was received on a swab. Results may not be optimal. For maximum sensitivity   of detection, submit tissue, fluid, or needle aspirate.      Culture Micro (A)      Moderate growth  Methicillin resistant Staphylococcus aureus (MRSA)  This isolate is presumed to be clindamycin resistant based on detection of inducible   clindamycin resistance. Erythromycin and clindamycin are resistant, therefore, they are   not recommended for use.      Culture Micro (A)      Moderate growth  Strain 2  Methicillin resistant Staphylococcus aureus (MRSA)  This isolate is presumed to be clindamycin resistant  based on detection of inducible   clindamycin resistance. Erythromycin and clindamycin are resistant, therefore, they are   not recommended for use.         Susceptibility    Methicillin resistant staphylococcus aureus (mrsa) - KASH     CLINDAMYCIN  Resistant ug/mL     ERYTHROMYCIN >=8 Resistant ug/mL     GENTAMICIN <=0.5 Sensitive ug/mL     OXACILLIN >=4 Resistant ug/mL     PENICILLIN >=0.5 Resistant ug/mL     TETRACYCLINE <=1 Sensitive ug/mL     Trimethoprim/Sulfa <=0.5/9.5 Sensitive ug/mL     VANCOMYCIN <=0.5 Sensitive ug/mL     LINEZOLID 2 Sensitive ug/mL    Methicillin resistant staphylococcus aureus (mrsa) - KASH     CLINDAMYCIN  Resistant ug/mL     ERYTHROMYCIN >=8 Resistant ug/mL     GENTAMICIN <=0.5 Sensitive ug/mL     OXACILLIN >=4 Resistant ug/mL     PENICILLIN >=0.5 Resistant ug/mL     TETRACYCLINE <=1 Sensitive ug/mL     Trimethoprim/Sulfa <=0.5/9.5 Sensitive ug/mL     VANCOMYCIN <=0.5 Sensitive ug/mL     LINEZOLID 2 Sensitive ug/mL       Please contact your provider if you have any questions or concerns.  We look forward to serving your needs in the future.      Sincerely,    RADHA MENDENHALL

## 2018-09-19 NOTE — MR AVS SNAPSHOT
After Visit Summary   9/19/2018    Kimberly Chau    MRN: [id number]           Patient Information     Date Of Birth          1940        Visit Information        Provider Department      9/19/2018 11:00 AM Tech, [facility] Cvc Monitor, [facility]        Today's Diagnoses     Other cardiac arrhythmia           Follow-ups after your visit        Your next 10 appointments already scheduled     Oct 16, 2018 12:30 PM CDT   (Arrive by 12:15 PM)   NEW ARRHYTHMIA with Elvia Moreno MD   [facility] ([facility])    [address] [place] [address]  [address] [place] [id number] [phone]            Oct 16, 2018  1:20 PM CDT   (Arrive by 1:05 PM)   [facility]    [address] [place] [id number] [phone]            Oct 17, 2018  2:00 PM CDT   (Arrive by 1:45 PM)   Return Visit with Elyse Eisenberg MD   [facility] ([facility])    [address] [place] [address] [id number] [phone]              Who to contact     If you have questions or need follow up information about today's clinic visit or your schedule please contact [facility] directly at [phone].  Normal or non-critical lab and imaging results will be communicated to you by MyChart, letter or phone within 4 business days after the clinic has received the results. If you do not hear from us within 7 days, please contact the clinic through MyChart or phone. If you have a critical or abnormal lab result, we will notify you by phone as soon as possible.  Submit refill requests through EUDOW[facility] or call your pharmacy and they will forward the refill request to us. Please allow 3 business days for your refill to be completed.          Additional Information About Your Visit        Care EveryWhere ID     This is your  Care EveryWhere ID. This could be used by other organizations to access your [facility] medical records  [id number]         Blood Pressure from Last 3 Encounters:   09/18/18 154/77   08/03/18 140/80   07/23/18 135/65    Weight from Last 3 Encounters:   09/18/18 52.5 kg (115 lb 11.2 oz)   08/15/18 51.3 kg (113 lb)   08/03/18 51.3 kg (113 lb 1.5 oz)              We Performed the Following     Zio Patch 48 Hours        Primary Care Provider Office Phone # Fax #    Guillermo Castellano [phone] [address] [address]        Equal Access to Services     ERA YATES : Hadii halina cardenaso Richy, waaxda luqadaha, qaybta kaalmada adeegyada, alejandra magallon. So [facility] [phone].    ATENCIÓN: Si habla español, tiene a vázquez disposición servicios gratuitos de asistencia lingüística. [facility] [phone].    We comply with applicable federal civil rights laws and [place] laws. We do not discriminate on the basis of race, color, national origin, age, disability, sex, sexual orientation, or gender identity.            Thank you!     Thank you for choosing [facility]  for your care. Our goal is always to provide you with excellent care. Hearing back from our patients is one way we can continue to improve our services. Please take a few minutes to complete the written survey that you may receive in the mail after your visit with us. Thank you!             Your Updated Medication List - Protect others around you: Learn how to safely use, store and throw away your medicines at www.disposemymeds.org.          This list is accurate as of 9/19/18 11:59 PM.  Always use your most recent med list.                   Brand Name Dispense Instructions for use Diagnosis    ACETAMINOPHEN 8 HOUR 650 MG 8 hour tablet   Generic drug:  acetaminophen     100 tablet    Take 650 mg by mouth every 4 hours as needed.    C1-C2 instability       amLODIPine 10 MG tablet     NORVASC    90 tablet    Take 1 tablet (10 mg) by mouth daily    Essential hypertension       DICLOFENAC SODIUM PO      Take 25 mg by mouth daily        fluocinolone 0.025 % ointment    SYNALAR    60 g    Apply twice a day to affected areas of the face avoiding the eyelids followed by Vanicream moisturizer.    Xerosis of skin, Intrinsic atopic dermatitis       FOLIC ACID      daily.        METHOTREXATE      10 tablets once a week On Fridays. Taking 8 tablets on Fridays        metoprolol tartrate 100 MG tablet    LOPRESSOR    360 tablet    Take 2 tablets (200 mg) by mouth 2 times daily    Essential hypertension       mometasone 50 MCG/ACT spray    NASONEX    1 Box    Spray 1 spray into both nostrils as needed PRN daily    Chronic rhinitis       Multi-vitamin Tabs tablet   Generic drug:  multivitamin, therapeutic with minerals      Take 1 tablet by mouth daily.        oxyCODONE IR 5 MG tablet    ROXICODONE    12 tablet    Take 1-2 tablets (5-10 mg) by mouth every 4 hours as needed for pain or other (Moderate to Severe)    Chronic frontal sinusitis       oxymetazoline 0.05 % spray    AFRIN NASAL SPRAY    1 Bottle    Use 2 sprays to each nare two times daily for up to 3 days or as needed for bleeding.    Chronic frontal sinusitis       PRILOSEC PO      Take 20 mg by mouth daily as needed        senna-docusate 8.6-50 MG per tablet    SENOKOT-S;PERICOLACE    30 tablet    Take 1-2 tablets by mouth 2 times daily Take while on oral narcotics to prevent or treat constipation.    Chronic frontal sinusitis       * sodium chloride 0.65 % nasal spray    OCEAN    1 Bottle    Spray 1-2 sprays in nostril every 2 hours Use in EACH nostril.    Chronic frontal sinusitis       * sodium chloride 0.65 % nasal spray    OCEAN NASAL SPRAY    1 Bottle    Spray 1 spray into both nostrils daily as needed for congestion    Chronic frontal sinusitis       TYLENOL PM EXTRA STRENGTH PO      Take 2 tablets by mouth nightly as needed.        * Notice:   This list has 2 medication(s) that are the same as other medications prescribed for you. Read the directions carefully, and ask your doctor or other care provider to review them with you.

## 2018-10-05 NOTE — TELEPHONE ENCOUNTER
FUTURE VISIT INFORMATION      FUTURE VISIT INFORMATION:    Date: 10/16/18    Time: 1320    Location: ORTHO  REFERRAL INFORMATION:    Referring provider:  PCP    Referring providers clinic:  [facility]    Reason for visit/diagnosis  INGROWN TOENAIL     RECORDS REQUESTED FROM:       Clinic name Comments Records Status Imaging Status                                         RECORDS STATUS        RECORDS IN CHART

## 2018-10-12 NOTE — TELEPHONE ENCOUNTER
FUTURE VISIT INFORMATION:    Date: 10/16/18    Time: 1230    Location:  Cardiology  REFERRAL INFORMATION:    Referring provider:  Dr. Castellano    Referring providers clinic:  [facility]    Reason for visit/diagnosis:  Other cardiac arrhythmia

## 2018-10-15 ENCOUNTER — TRANSFERRED RECORDS (OUTPATIENT)
Dept: HEALTH INFORMATION MANAGEMENT | Facility: CLINIC | Age: 78
End: 2018-10-15

## 2018-10-16 ENCOUNTER — OFFICE VISIT (OUTPATIENT)
Dept: ORTHOPEDICS | Facility: CLINIC | Age: 78
End: 2018-10-16
Payer: MEDICARE

## 2018-10-16 ENCOUNTER — OFFICE VISIT (OUTPATIENT)
Dept: CARDIOLOGY | Facility: CLINIC | Age: 78
End: 2018-10-16
Attending: INTERNAL MEDICINE
Payer: COMMERCIAL

## 2018-10-16 ENCOUNTER — PRE VISIT (OUTPATIENT)
Dept: ORTHOPEDICS | Facility: CLINIC | Age: 78
End: 2018-10-16

## 2018-10-16 ENCOUNTER — PRE VISIT (OUTPATIENT)
Dept: CARDIOLOGY | Facility: CLINIC | Age: 78
End: 2018-10-16

## 2018-10-16 VITALS
OXYGEN SATURATION: 97 % | DIASTOLIC BLOOD PRESSURE: 76 MMHG | BODY MASS INDEX: 21.67 KG/M2 | SYSTOLIC BLOOD PRESSURE: 148 MMHG | HEART RATE: 80 BPM | HEIGHT: 61 IN | WEIGHT: 114.8 LBS

## 2018-10-16 DIAGNOSIS — I49.1 PAC (PREMATURE ATRIAL CONTRACTION): ICD-10-CM

## 2018-10-16 DIAGNOSIS — I10 ESSENTIAL HYPERTENSION: Primary | ICD-10-CM

## 2018-10-16 DIAGNOSIS — L60.3 NAIL DYSTROPHY: Primary | ICD-10-CM

## 2018-10-16 PROCEDURE — G0463 HOSPITAL OUTPT CLINIC VISIT: HCPCS | Mod: ZF

## 2018-10-16 PROCEDURE — 99204 OFFICE O/P NEW MOD 45 MIN: CPT | Mod: ZP | Performed by: INTERNAL MEDICINE

## 2018-10-16 ASSESSMENT — PAIN SCALES - GENERAL: PAINLEVEL: NO PAIN (0)

## 2018-10-16 NOTE — LETTER
Date:October 17, 2018      Patient was self referred, no letter generated. Do not send.        [facility] Information

## 2018-10-16 NOTE — LETTER
10/16/2018       RE: Kimberly Chau  [address]  [place] [address]     Dear Colleague,    Thank you for referring your patient, Kimberly Chau, to the [facility]. Please see a copy of my visit note below.    Date of Service: 10/16/2018    Chief Complaints and History of Present Illnesses   Patient presents with     Consult     Left hallux. Patient stated that her toe nail has thickened and its ugly.  Patient stated that she has psoriatic arthritis and OA.           HPI: Kimberly is a 77 year old female who presents today for further evaluation of left hallux nail. Relates that the nail has been thickened for the last year or two. She does have psoriasis, however I cannot confirm this with chart review. She has been off of her usual methotrexate dose 2/2 sinus sx. She relates that the nail is not painful for her, but she does not like the way that it looks.     Review of Systems: No n/v/d/f/c/s/sob/cp    PMH:   Past Medical History:   Diagnosis Date     Anemia      Arthritis      Cataracts      Central retinal artery occlusion      Cervical spine fracture (H)     After a fall from orthostatic sx     Chronic pain     Back of head     Gastro-oesophageal reflux disease      Head injury      Hypertension      Hyponatremia     Resolved, 2/2 diuretics     Migraines      Osteoporosis      Pneumonia      Rheumatoid arthritis(714.0)        PSxH:   Past Surgical History:   Procedure Laterality Date     COLONOSCOPY       FUSION CERVICAL POSTERIOR THREE+ LEVELS  1/22/2013    Procedure: FUSION CERVICAL POSTERIOR THREE+ LEVELS;  Cervical 1-6 Posterior Instrumentation and Fusion, Cervical 3-4 Laminectomy  .Instrumentation and fusion to Cervical 6 *Latex Allergy*;  Surgeon: Ra Bar MD;  Location: [facility] OR     GYN SURGERY       HEAD & NECK SURGERY       HYSTERECTOMY       KNEE SURGERY       OPTICAL TRACKING SYSTEM ENDOSCOPIC SINUS SURGERY Right 4/6/2018     Procedure: OPTICAL TRACKING SYSTEM ENDOSCOPIC SINUS SURGERY;  Stealth Assisted Right Maxillary Antrostomy, Anterior Ethmoidectomy And Frontal Sinusotomy;  Surgeon: Elyse Eisenberg MD;  Location:  OR     OPTICAL TRACKING SYSTEM ENDOSCOPIC SINUS SURGERY Right 8/3/2018    Procedure: OPTICAL TRACKING SYSTEM ENDOSCOPIC SINUS SURGERY;  Stealth Assisted Revision Right Frontal Sinusotomy, Right Stent Placement  **Latex Allergy** ;  Surgeon: Elyse Eisenberg MD;  Location:  OR     ORTHOPEDIC SURGERY         Allergies: Hctz; Hydrochlorothiazide; Latex; Benzocaine; Resorcinol-alcohol; Ace inhibitors; and Sulfa drugs    SH:   Social History     Social History     Marital status:      Spouse name: N/A     Number of children: N/A     Years of education: N/A     Occupational History     Not on file.     Social History Main Topics     Smoking status: Never Smoker     Smokeless tobacco: Never Used     Alcohol use Yes      Comment: Wine daily with dinner     Drug use: No     Sexual activity: Not on file     Other Topics Concern     Not on file     Social History Narrative    Ms. Chau is , has two grown children and two grandchildren. She does not work. She was never a smoker, and drinks a glass of wine with dinner each night.        FH:   Family History   Problem Relation Age of Onset     Arthritis Mother      Respiratory Mother      pulmonary fibrosis     Hypertension Mother      Liver Disease Father      from EtOH       Objective:  Data Unavailable Data Unavailable Data Unavailable Data Unavailable Data Unavailable 0 lbs 0 oz    PT and DP pulses are 2/4 bilaterally. CRT is 3 seconds. Positive pedal hair.   Gross sensation is intact bilaterally.   Equinus is not noted bilaterally. No pain with active or passive ROM of the ankle, MTJ, 1st ray, or halluces bilaterally,. HAV deformity with hammertoes BL. Pes planus.   Nail of the left hallux is thickened, dystrophic, yellowed with no pitting or subungual debris.  No open lesions are noted. Psoriatic plaques without silvery scale noted to BL dorsal feet.     Assessment: Left hallux nail dystrophy - onychomycosis vs psoriatic nail vs nail deformity. She relates that she is not concerned whether the nail is fungal or not. She would just like it flatter.     Plan:  - Pt seen and evaluated.  - Nails was debrided of thickness to almost flat.   - Recommend Kerasal to the nail daily.  - See again PRN.              Again, thank you for allowing me to participate in the care of your patient.      Sincerely,    Edvin Max DPM

## 2018-10-16 NOTE — PATIENT INSTRUCTIONS
"You were seen today in the Cardiovascular Clinic at the [facility].     Cardiology Providers you saw during your visit: Dr. Elvia Moreno    Diagnosis:  Premature atrial complexes    Results: discussed with patient    Orders:   None    Medication Changes:   None    Recommendations:   None    Follow-up:   As needed         Please feel free to call me with any questions or concerns.       Cathy Gonzalez LPN     Questions and [phone].   First press #1 for the Doculogy and then press #3 for \"Medical Questions\" to reach us Cardiology Nurses.      On Call Cardiologist for after hours or on weekends: [phone]   option #4 and ask to speak to the on-call Cardiologist.          If you need a medication refill please contact your pharmacy.  Please allow 3 business days for your refill to be completed.    "

## 2018-10-16 NOTE — PROGRESS NOTES
"I am delighted to see Kimberly Chau in consultation for abnormal arrhythmia per anesthesia at recent sinus surgery.     History of Present Illness:  As you know, the patient is a 77 year old  Female who recently had sinus surgery for chronic sinusitis. Anesthesia reported \"arrhythmias\" during procedures, no other information available. A Ziopatch monitor was placed as outpatient by PCP. She wore it for 2 days. No symptoms of irregular or skipped heart beats, no h/o chest discomfort, dizziness, syncope, dyspnea.     She is doing well after sinus surgery. She is very active, takes care of 4 dogs personally, goes up and down stairs at least 15 times a day without any symptoms.    The following portions of the patient's history were reviewed and updated as appropriate: allergies, current medications, past family history, past medical history, past social history, past surgical history, and the problem list.      Past Medical History:  Hypertension  Rheumatoid arthritis  Cervical spine surgery  Syncope 2013 thought to be from orthostasis; it was in the setting of new BP medication and she had gotten up suddenly in the middle of the night. Resultant cervical spine fracture requiring surgery 1/2013.    Medications:   Amlodipine 10 every day  Metoprolol 200 mg bid  Methotrexate  Omeprazole  Oxycodone  Nasal sprasy  Diclofenac      Allergies:    Allergies   Allergen Reactions     Hctz Other (See Comments)     Pt develops symptomatic and significant hyponatremia with HCTZ exposure     Hydrochlorothiazide      Other reaction(s): Hyponatremia  Hyponatremia     Latex      Benzocaine Rash     carba mix,thrium-sunscreen     Resorcinol-Alcohol Rash     carba mix,thrium-sunscreen     Ace Inhibitors      Dizziness and orthostatic changes and electrolyte problems.     Sulfa Drugs          Family History:   Family History   Problem Relation Age of Onset     Arthritis Mother      Respiratory Mother      pulmonary fibrosis     " Hypertension Mother      Liver Disease Father      from EtOH       Psychosocial history:  reports that she has never smoked. She has never used smokeless tobacco. She reports that she drinks alcohol. She reports that she does not use illicit drugs.    Review of systems:   Cardiovascular: No palpitations, chest pain, shortness of breath at rest, dyspnea with exertion, orthopnea, paroxysmal nocturia dyspnea, nocturia, dizziness, syncope.    In addition,   Constitutional: No change in weight, sleep or appetite.  Normal energy.  No fever or chills  Eyes: Negative for vision changes or eye problems  ENT: No problems with ears, nose or throat.  No difficulty swallowing.  Resp: No coughing, wheezing or shortness of breath  GI: No nausea, vomiting,  heartburn, abdominal pain, diarrhea, constipation or change in bowel habits  : No urinary frequency or dysuria, bladder or kidney problems  Musculoskeletal: No significant muscle or joint pains  Neurologic: No headaches, numbness, tingling, weakness, problems with balance or coordination  Psychiatric: No problems with anxiety, depression or mental health  Heme/immune/allergy: No history of bleeding or clotting problems or anemia.  No allergies or immune system problems  Integumentary: No rashes,worrisome lesions or skin problems      Physical examination  Vitals: 148/76 (she states normally ~ 120-130 at home), 80 bpm  BMI= 21    Constitutional: In general, the patient is a pleasant female in no apparent distress.    Eyes: PERRLA.  EOMI.  Sclerae white, not injected.  ENT/mouth: Normiocephalic and atraumatic.  Nares clear.  Pharynx without erythema or exudate.  Dentition intact.  No adenopathy.  No thyromegaly. Carotids +2/2 bilaterally without bruits.  No jugular venous distension.   Card/Vasc: The PMI is in the 5th ICS in the midclavicular line. There is no heave. Regular rate and rhythm. No ectopy. Normal S1, S2. No murmur, rub, click, or gallop. Pulses are normal  bilaterally throughout. No peripheral edema.  Respiratory: Clear to asculation.  No ronchi, wheezes, rales.  No dullness to percussion.   GI: Abdomen is soft, nontender, nondistended. No organomegaly. No AAA.  No bruits.   Integument: No significant bruises or rashes  Neurological: The neurological examination reveal a patient who was oriented to person, place, and time.    Psych: Normal  Heme/Lymph/Immun: no significant adenopathy      I have reviewed the following labs/imaging:  Labs 8/3/18: K 4., cr 0.89, hgb 11, plt 309K  Echo 2/7/18: EF 60-65%, normal RV, normal IVC, no effusion    I have personally and independently reviewed the following:  EKG 4/6/18: sinus, cannot r/o old inferior infarct, normal intervals  Ziopatch 9/2018 x 2 days: sinus rhythm, average 75 bpm, 13% PACs, no symptoms      Assessment :  1. Premature atrial complexes. Noted on Ziopatch, burden 13%. Unclear if this is what anesthesiologist saw during surgery but there are no records from surgery. She has been asymptomatic. I reassured her that PACs are benign.  2. Hypertension. Would continue to monitor. If her BP persistently > 140/80 can consider changing metoprolol tartrate to carvedilol for added BP control.        Plan:  Reassurance offered  F/u with PCP for BP management      I spent a total of 30 minutes face to face with  Kimberly Chau during today's office visit. Over 50% of this time was spent counseling the patient and/or coordinating care regarding symptoms.        The patient is to return as needed. The patient understood the treatment plan as outlined above.  There were no barriers to learning.      Elvia Moreno MD

## 2018-10-16 NOTE — NURSING NOTE
Chief Complaint   Patient presents with     New Patient     76 yo female hx of HTN present for Possible ventricular arrhythmia during surgery on 08/3/18.     Vitals were taken and medications were reconciled. EKG was performed    Barbara BEE  12:27 PM

## 2018-10-16 NOTE — NURSING NOTE
Reason For Visit:   Chief Complaint   Patient presents with     Consult     Left hallux. Patient stated that her toe nail has thickened and its ugly.  Patient stated that she has psoriatic arthritis and OA.        Pain Assessment  Patient Currently in Pain: Denies             Current Outpatient Prescriptions   Medication Sig Dispense Refill     acetaminophen 650 MG TABS Take 650 mg by mouth every 4 hours as needed. 100 tablet 0     amLODIPine (NORVASC) 10 MG tablet Take 1 tablet (10 mg) by mouth daily 90 tablet 0     DICLOFENAC SODIUM PO Take 25 mg by mouth daily       Diphenhydramine-APAP, sleep, (TYLENOL PM EXTRA STRENGTH PO) Take 2 tablets by mouth nightly as needed.       fluocinolone (SYNALAR) 0.025 % ointment Apply twice a day to affected areas of the face avoiding the eyelids followed by Vanicream moisturizer. 60 g 1     FOLIC ACID daily.        METHOTREXATE 10 tablets once a week On Fridays.  Taking 8 tablets on Fridays       metoprolol (LOPRESSOR) 100 MG tablet Take 2 tablets (200 mg) by mouth 2 times daily 360 tablet 3     mometasone (NASONEX) 50 MCG/ACT nasal spray Spray 1 spray into both nostrils as needed PRN daily (Patient not taking: Reported on 10/16/2018) 1 Box 11     Multiple Vitamin (MULTI-VITAMIN) per tablet Take 1 tablet by mouth daily.       Omeprazole (PRILOSEC PO) Take 20 mg by mouth daily as needed        oxyCODONE IR (ROXICODONE) 5 MG tablet Take 1-2 tablets (5-10 mg) by mouth every 4 hours as needed for pain or other (Moderate to Severe) (Patient not taking: Reported on 8/15/2018) 12 tablet 0     oxymetazoline (AFRIN NASAL SPRAY) 0.05 % spray Use 2 sprays to each nare two times daily for up to 3 days or as needed for bleeding. 1 Bottle 0     senna-docusate (SENOKOT-S;PERICOLACE) 8.6-50 MG per tablet Take 1-2 tablets by mouth 2 times daily Take while on oral narcotics to prevent or treat constipation. (Patient not taking: Reported on 10/16/2018) 30 tablet 0     sodium chloride (OCEAN NASAL  SPRAY) 0.65 % nasal spray Spray 1 spray into both nostrils daily as needed for congestion 1 Bottle 3     sodium chloride (OCEAN) 0.65 % nasal spray Spray 1-2 sprays in nostril every 2 hours Use in EACH nostril. 1 Bottle 1          Allergies   Allergen Reactions     Hctz Other (See Comments)     Pt develops symptomatic and significant hyponatremia with HCTZ exposure     Hydrochlorothiazide      Other reaction(s): Hyponatremia  Hyponatremia     Latex      Benzocaine Rash     carba mix,thrium-sunscreen     Resorcinol-Alcohol Rash     carba mix,thrium-sunscreen     Ace Inhibitors      Dizziness and orthostatic changes and electrolyte problems.     Sulfa Drugs

## 2018-10-16 NOTE — LETTER
"10/16/2018      RE: Kimberly Chau  [address] [facility] [address]       Dear Colleague,    Thank you for the opportunity to participate in the care of your patient, Kimberly Chau, at the [facility] HEART [facility]. Please see a copy of my visit note below.    I am delighted to see Kimberly Chau in consultation for abnormal arrhythmia per anesthesia at recent sinus surgery.     History of Present Illness:  As you know, the patient is a 77 year old  Female who recently had sinus surgery for chronic sinusitis. Anesthesia reported \"arrhythmias\" during procedures, no other information available. A Ziopatch monitor was placed as outpatient by PCP. She wore it for 2 days. No symptoms of irregular or skipped heart beats, no h/o chest discomfort, dizziness, syncope, dyspnea.     She is doing well after sinus surgery. She is very active, takes care of 4 dogs personally, goes up and down stairs at least 15 times a day without any symptoms.    The following portions of the patient's history were reviewed and updated as appropriate: allergies, current medications, past family history, past medical history, past social history, past surgical history, and the problem list.      Past Medical History:  Hypertension  Rheumatoid arthritis  Cervical spine surgery  Syncope 2013 thought to be from orthostasis; it was in the setting of new BP medication and she had gotten up suddenly in the middle of the night. Resultant cervical spine fracture requiring surgery 1/2013.    Medications:   Amlodipine 10 every day  Metoprolol 200 mg bid  Methotrexate  Omeprazole  Oxycodone  Nasal sprasy  Diclofenac      Allergies:    Allergies   Allergen Reactions     Hctz Other (See Comments)     Pt develops symptomatic and significant hyponatremia with HCTZ exposure     Hydrochlorothiazide      Other reaction(s): Hyponatremia  Hyponatremia     Latex      Benzocaine Rash     carba mix,thrium-sunscreen     " Resorcinol-Alcohol Rash     carba mix,thrium-sunscreen     Ace Inhibitors      Dizziness and orthostatic changes and electrolyte problems.     Sulfa Drugs          Family History:   Family History   Problem Relation Age of Onset     Arthritis Mother      Respiratory Mother      pulmonary fibrosis     Hypertension Mother      Liver Disease Father      from EtOH       Psychosocial history:  reports that she has never smoked. She has never used smokeless tobacco. She reports that she drinks alcohol. She reports that she does not use illicit drugs.    Review of systems:   Cardiovascular: No palpitations, chest pain, shortness of breath at rest, dyspnea with exertion, orthopnea, paroxysmal nocturia dyspnea, nocturia, dizziness, syncope.    In addition,   Constitutional: No change in weight, sleep or appetite.  Normal energy.  No fever or chills  Eyes: Negative for vision changes or eye problems  ENT: No problems with ears, nose or throat.  No difficulty swallowing.  Resp: No coughing, wheezing or shortness of breath  GI: No nausea, vomiting,  heartburn, abdominal pain, diarrhea, constipation or change in bowel habits  : No urinary frequency or dysuria, bladder or kidney problems  Musculoskeletal: No significant muscle or joint pains  Neurologic: No headaches, numbness, tingling, weakness, problems with balance or coordination  Psychiatric: No problems with anxiety, depression or mental health  Heme/immune/allergy: No history of bleeding or clotting problems or anemia.  No allergies or immune system problems  Integumentary: No rashes,worrisome lesions or skin problems      Physical examination  Vitals: 148/76 (she states normally ~ 120-130 at home), 80 bpm  BMI= 21    Constitutional: In general, the patient is a pleasant female in no apparent distress.    Eyes: PERRLA.  EOMI.  Sclerae white, not injected.  ENT/mouth: Normiocephalic and atraumatic.  Nares clear.  Pharynx without erythema or exudate.  Dentition intact.   No adenopathy.  No thyromegaly. Carotids +2/2 bilaterally without bruits.  No jugular venous distension.   Card/Vasc: The PMI is in the 5th ICS in the midclavicular line. There is no heave. Regular rate and rhythm. No ectopy. Normal S1, S2. No murmur, rub, click, or gallop. Pulses are normal bilaterally throughout. No peripheral edema.  Respiratory: Clear to asculation.  No ronchi, wheezes, rales.  No dullness to percussion.   GI: Abdomen is soft, nontender, nondistended. No organomegaly. No AAA.  No bruits.   Integument: No significant bruises or rashes  Neurological: The neurological examination reveal a patient who was oriented to person, place, and time.    Psych: Normal  Heme/Lymph/Immun: no significant adenopathy      I have reviewed the following labs/imaging:  Labs 8/3/18: K 4., cr 0.89, hgb 11, plt 309K  Echo 2/7/18: EF 60-65%, normal RV, normal IVC, no effusion    I have personally and independently reviewed the following:  EKG 4/6/18: sinus, cannot r/o old inferior infarct, normal intervals  Ziopatch 9/2018 x 2 days: sinus rhythm, average 75 bpm, 13% PACs, no symptoms      Assessment :  1. Premature atrial complexes. Noted on Ziopatch, burden 13%. Unclear if this is what anesthesiologist saw during surgery but there are no records from surgery. She has been asymptomatic. I reassured her that PACs are benign.  2. Hypertension. Would continue to monitor. If her BP persistently > 140/80 can consider changing metoprolol tartrate to carvedilol for added BP control.        Plan:  Reassurance offered  F/u with PCP for BP management      I spent a total of 30 minutes face to face with  Kimberly Chau during today's office visit. Over 50% of this time was spent counseling the patient and/or coordinating care regarding symptoms.        The patient is to return as needed. The patient understood the treatment plan as outlined above.  There were no barriers to learning.      Elvia Moreno MD

## 2018-10-16 NOTE — MR AVS SNAPSHOT
"              After Visit Summary   10/16/2018    Kimberly Chau    MRN: [id number]           Patient Information     Date Of Birth          1940        Visit Information        Provider Department      10/16/2018 12:30 PM Elvia Moreno MD [facility]        Today's Diagnoses     Other cardiac arrhythmia          Care Instructions    You were seen today in the Cardiovascular Clinic at the [facility].     Cardiology Providers you saw during your visit: Dr. Elvia Moreno    Diagnosis:  Premature atrial complexes    Results: discussed with patient    Orders:   None    Medication Changes:   None    Recommendations:   None    Follow-up:   As needed         Please feel free to call me with any questions or concerns.       Cathy Gonzalez LPN     Questions and [phone].   First press #1 for the [employer] and then press #3 for \"Medical Questions\" to reach us Cardiology Nurses.      On Call Cardiologist for after hours or on weekends: [phone]   option #4 and ask to speak to the on-call Cardiologist.          If you need a medication refill please contact your pharmacy.  Please allow 3 business days for your refill to be completed.            Follow-ups after your visit        Your next 10 appointments already scheduled     Oct 16, 2018  1:20 PM CDT   (Arrive by 1:05 PM)   [facility] ([facility]    [address] [place] [address] [phone]            Oct 17, 2018  2:00 PM CDT   (Arrive by 1:45 PM)   Return Visit with Elyse Eisenberg MD   [facility] Ear Nose and Throat ([facility])    [address] [place] [address] [phone]              Who to contact     If you have questions or need follow up information about today's clinic visit or your schedule please contact [facility] directly at " "[phone].  Normal or non-critical lab and imaging results will be communicated to you by MyChart, letter or phone within 4 business days after the clinic has received the results. If you do not hear from us within 7 days, please contact the clinic through MyChart or phone. If you have a critical or abnormal lab result, we will notify you by phone as soon as possible.  Submit refill requests through [employer] or call your pharmacy and they will forward the refill request to us. Please allow 3 business days for your refill to be completed.          Additional Information About Your Visit        Care EveryWhere ID     This is your Care EveryWhere ID. This could be used by other organizations to access your [place] medical records  [id number]        Your Vitals Were     Pulse Height Pulse Oximetry BMI (Body Mass Index)          80 1.549 m (5' 1\") 97% 21.69 kg/m2         Blood Pressure from Last 3 Encounters:   10/16/18 148/76   09/18/18 154/77   08/03/18 140/80    Weight from Last 3 Encounters:   10/16/18 52.1 kg (114 lb 12.8 oz)   09/18/18 52.5 kg (115 lb 11.2 oz)   08/15/18 51.3 kg (113 lb)              Today, you had the following     No orders found for display       Primary Care Provider Office Phone # Fax #    Guillermo Castellano [phone] [address] [address]        Equal Access to Services     [facility] HospitalJARRETT : Hadii aad ku hadasho Soomaali, waaxda luqadaha, qaybta kaalmada adeegyada, alejandra magallon. So [facility] [phone].    ATENCIÓN: Si habla español, tiene a vázquez disposición servicios gratuitos de asistencia lingüística. Llame al [phone].    We comply with applicable federal civil rights laws and [place] laws. We do not discriminate on the basis of race, color, national origin, age, disability, sex, sexual orientation, or gender identity.            Thank you!     Thank you for choosing [facility]  for your care. Our " goal is always to provide you with excellent care. Hearing back from our patients is one way we can continue to improve our services. Please take a few minutes to complete the written survey that you may receive in the mail after your visit with us. Thank you!             Your Updated Medication List - Protect others around you: Learn how to safely use, store and throw away your medicines at www.disposemymeds.org.          This list is accurate as of 10/16/18 12:39 PM.  Always use your most recent med list.                   Brand Name Dispense Instructions for use Diagnosis    ACETAMINOPHEN 8 HOUR 650 MG 8 hour tablet   Generic drug:  acetaminophen     100 tablet    Take 650 mg by mouth every 4 hours as needed.    C1-C2 instability       amLODIPine 10 MG tablet    NORVASC    90 tablet    Take 1 tablet (10 mg) by mouth daily    Essential hypertension       DICLOFENAC SODIUM PO      Take 25 mg by mouth daily        fluocinolone 0.025 % ointment    SYNALAR    60 g    Apply twice a day to affected areas of the face avoiding the eyelids followed by Vanicream moisturizer.    Xerosis of skin, Intrinsic atopic dermatitis       FOLIC ACID      daily.        METHOTREXATE      10 tablets once a week On Fridays. Taking 8 tablets on Fridays        metoprolol tartrate 100 MG tablet    LOPRESSOR    360 tablet    Take 2 tablets (200 mg) by mouth 2 times daily    Essential hypertension       mometasone 50 MCG/ACT spray    NASONEX    1 Box    Spray 1 spray into both nostrils as needed PRN daily    Chronic rhinitis       Multi-vitamin Tabs tablet   Generic drug:  multivitamin, therapeutic with minerals      Take 1 tablet by mouth daily.        oxyCODONE IR 5 MG tablet    ROXICODONE    12 tablet    Take 1-2 tablets (5-10 mg) by mouth every 4 hours as needed for pain or other (Moderate to Severe)    Chronic frontal sinusitis       oxymetazoline 0.05 % spray    AFRIN NASAL SPRAY    1 Bottle    Use 2 sprays to each nare two times daily  for up to 3 days or as needed for bleeding.    Chronic frontal sinusitis       PRILOSEC PO      Take 20 mg by mouth daily as needed        senna-docusate 8.6-50 MG per tablet    SENOKOT-S;PERICOLACE    30 tablet    Take 1-2 tablets by mouth 2 times daily Take while on oral narcotics to prevent or treat constipation.    Chronic frontal sinusitis       * sodium chloride 0.65 % nasal spray    OCEAN    1 Bottle    Spray 1-2 sprays in nostril every 2 hours Use in EACH nostril.    Chronic frontal sinusitis       * sodium chloride 0.65 % nasal spray    OCEAN NASAL SPRAY    1 Bottle    Spray 1 spray into both nostrils daily as needed for congestion    Chronic frontal sinusitis       TYLENOL PM EXTRA STRENGTH PO      Take 2 tablets by mouth nightly as needed.        * Notice:  This list has 2 medication(s) that are the same as other medications prescribed for you. Read the directions carefully, and ask your doctor or other care provider to review them with you.

## 2018-10-16 NOTE — NURSING NOTE
Chief Complaint   Patient presents with     New Patient     76 yo female hx of HTN present for Possible ventricular arrhythmia during surgery on 08/3/18.       Vitals were taken and medications were reconciled.    Barbara Kimble RMA  12:27 PM

## 2018-10-16 NOTE — PROGRESS NOTES
Date of Service: 10/16/2018    Chief Complaints and History of Present Illnesses   Patient presents with     Consult     Left hallux. Patient stated that her toe nail has thickened and its ugly.  Patient stated that she has psoriatic arthritis and OA.           HPI: Kimberly is a 77 year old female who presents today for further evaluation of left hallux nail. Relates that the nail has been thickened for the last year or two. She does have psoriasis, however I cannot confirm this with chart review. She has been off of her usual methotrexate dose 2/2 sinus sx. She relates that the nail is not painful for her, but she does not like the way that it looks.     Review of Systems: No n/v/d/f/c/s/sob/cp    PMH:   Past Medical History:   Diagnosis Date     Anemia      Arthritis      Cataracts      Central retinal artery occlusion      Cervical spine fracture (H)     After a fall from orthostatic sx     Chronic pain     Back of head     Gastro-oesophageal reflux disease      Head injury      Hypertension      Hyponatremia     Resolved, 2/2 diuretics     Migraines      Osteoporosis      Pneumonia      Rheumatoid arthritis(714.0)        PSxH:   Past Surgical History:   Procedure Laterality Date     COLONOSCOPY       FUSION CERVICAL POSTERIOR THREE+ LEVELS  1/22/2013    Procedure: FUSION CERVICAL POSTERIOR THREE+ LEVELS;  Cervical 1-6 Posterior Instrumentation and Fusion, Cervical 3-4 Laminectomy  .Instrumentation and fusion to Cervical 6 *Latex Allergy*;  Surgeon: Ra Bar MD;  Location:  OR     GYN SURGERY       HEAD & NECK SURGERY       HYSTERECTOMY       KNEE SURGERY       OPTICAL TRACKING SYSTEM ENDOSCOPIC SINUS SURGERY Right 4/6/2018    Procedure: OPTICAL TRACKING SYSTEM ENDOSCOPIC SINUS SURGERY;  Stealth Assisted Right Maxillary Antrostomy, Anterior Ethmoidectomy And Frontal Sinusotomy;  Surgeon: Elyse Eisenberg MD;  Location:  OR     OPTICAL TRACKING SYSTEM ENDOSCOPIC SINUS SURGERY Right 8/3/2018     Procedure: OPTICAL TRACKING SYSTEM ENDOSCOPIC SINUS SURGERY;  Stealth Assisted Revision Right Frontal Sinusotomy, Right Stent Placement  **Latex Allergy** ;  Surgeon: Elyse Eisenberg MD;  Location: [facility] OR     ORTHOPEDIC SURGERY         Allergies: Hctz; Hydrochlorothiazide; Latex; Benzocaine; Resorcinol-alcohol; Ace inhibitors; and Sulfa drugs    SH:   Social History     Social History     Marital status:      Spouse name: N/A     Number of children: N/A     Years of education: N/A     Occupational History     Not on file.     Social History Main Topics     Smoking status: Never Smoker     Smokeless tobacco: Never Used     Alcohol use Yes      Comment: Wine daily with dinner     Drug use: No     Sexual activity: Not on file     Other Topics Concern     Not on file     Social History Narrative    Ms. Chau is , has two grown children and two grandchildren. She does not work. She was never a smoker, and drinks a glass of wine with dinner each night.        FH:   Family History   Problem Relation Age of Onset     Arthritis Mother      Respiratory Mother      pulmonary fibrosis     Hypertension Mother      Liver Disease Father      from EtOH       Objective:  Data Unavailable Data Unavailable Data Unavailable Data Unavailable Data Unavailable 0 lbs 0 oz    PT and DP pulses are 2/4 bilaterally. CRT is 3 seconds. Positive pedal hair.   Gross sensation is intact bilaterally.   Equinus is not noted bilaterally. No pain with active or passive ROM of the ankle, MTJ, 1st ray, or halluces bilaterally,. HAV deformity with hammertoes BL. Pes planus.   Nail of the left hallux is thickened, dystrophic, yellowed with no pitting or subungual debris. No open lesions are noted. Psoriatic plaques without silvery scale noted to BL dorsal feet.     Assessment: Left hallux nail dystrophy - onychomycosis vs psoriatic nail vs nail deformity. She relates that she is not concerned whether the nail is fungal or not. She would just  like it flatter.     Plan:  - Pt seen and evaluated.  - Nails was debrided of thickness to almost flat.   - Recommend Kerasal to the nail daily.  - See again PRN.

## 2018-10-16 NOTE — MR AVS SNAPSHOT
After Visit Summary   10/16/2018    Kimberly Chau    MRN: [id number]           Patient Information     Date Of Birth          1940        Visit Information        Provider Department      10/16/2018 1:20 PM Edvin Max, [facility]        Today's Diagnoses     Nail dystrophy    -  1       Follow-ups after your visit        Your next 10 appointments already scheduled     Oct 17, 2018  2:00 PM CDT   (Arrive by 1:45 PM)   Return Visit with Elyse Eisenberg MD   [facility] Ear Nose and Throat ([facility] [place])    [address] [place] [address] [phone]              Who to contact     Please call your clinic at [phone] to:    Ask questions about your health    Make or cancel appointments    Discuss your medicines    Learn about your test results    Speak to your doctor            Additional Information About Your Visit        Care EveryWhere ID     This is your Care EveryWhere ID. This could be used by other organizations to access your [place] medical records  [id number]         Blood Pressure from Last 3 Encounters:   10/16/18 148/76   09/18/18 154/77   08/03/18 140/80    Weight from Last 3 Encounters:   10/16/18 52.1 kg (114 lb 12.8 oz)   09/18/18 52.5 kg (115 lb 11.2 oz)   08/15/18 51.3 kg (113 lb)              Today, you had the following     No orders found for display       Primary Care Provider Office Phone # Fax #    Guillermo Castellano [phone]       [address] [place] [address]        Equal Access to Services     ERA YATES AH: Hadii aad ku hadasho Soomaali, waaxda luqadaha, qaybta kaalmada adeegyada, waxay mylesin haystephanien edilberto magallon. So [facility] [phone].    ATENCIÓN: Si habla español, tiene a vázquez disposición servicios gratuitos de asistencia lingüística. Llame al [phone].    We comply with applicable federal civil rights laws and [place] laws. We do not  discriminate on the basis of race, color, national origin, age, disability, sex, sexual orientation, or gender identity.            Thank you!     Thank you for choosing [facility]  for your care. Our goal is always to provide you with excellent care. Hearing back from our patients is one way we can continue to improve our services. Please take a few minutes to complete the written survey that you may receive in the mail after your visit with us. Thank you!             Your Updated Medication List - Protect others around you: Learn how to safely use, store and throw away your medicines at www.disposemymeds.org.          This list is accurate as of 10/16/18  1:38 PM.  Always use your most recent med list.                   Brand Name Dispense Instructions for use Diagnosis    ACETAMINOPHEN 8 HOUR 650 MG 8 hour tablet   Generic drug:  acetaminophen     100 tablet    Take 650 mg by mouth every 4 hours as needed.    C1-C2 instability       amLODIPine 10 MG tablet    NORVASC    90 tablet    Take 1 tablet (10 mg) by mouth daily    Essential hypertension       DICLOFENAC SODIUM PO      Take 25 mg by mouth daily        fluocinolone 0.025 % ointment    SYNALAR    60 g    Apply twice a day to affected areas of the face avoiding the eyelids followed by Vanicream moisturizer.    Xerosis of skin, Intrinsic atopic dermatitis       FOLIC ACID      daily.        METHOTREXATE      10 tablets once a week On Fridays. Taking 8 tablets on Fridays        metoprolol tartrate 100 MG tablet    LOPRESSOR    360 tablet    Take 2 tablets (200 mg) by mouth 2 times daily    Essential hypertension       mometasone 50 MCG/ACT spray    NASONEX    1 Box    Spray 1 spray into both nostrils as needed PRN daily    Chronic rhinitis       Multi-vitamin Tabs tablet   Generic drug:  multivitamin, therapeutic with minerals      Take 1 tablet by mouth daily.        oxyCODONE IR 5 MG tablet    ROXICODONE    12 tablet    Take 1-2 tablets (5-10  mg) by mouth every 4 hours as needed for pain or other (Moderate to Severe)    Chronic frontal sinusitis       oxymetazoline 0.05 % spray    AFRIN NASAL SPRAY    1 Bottle    Use 2 sprays to each nare two times daily for up to 3 days or as needed for bleeding.    Chronic frontal sinusitis       PRILOSEC PO      Take 20 mg by mouth daily as needed        senna-docusate 8.6-50 MG per tablet    SENOKOT-S;PERICOLACE    30 tablet    Take 1-2 tablets by mouth 2 times daily Take while on oral narcotics to prevent or treat constipation.    Chronic frontal sinusitis       * sodium chloride 0.65 % nasal spray    OCEAN    1 Bottle    Spray 1-2 sprays in nostril every 2 hours Use in EACH nostril.    Chronic frontal sinusitis       * sodium chloride 0.65 % nasal spray    OCEAN NASAL SPRAY    1 Bottle    Spray 1 spray into both nostrils daily as needed for congestion    Chronic frontal sinusitis       TYLENOL PM EXTRA STRENGTH PO      Take 2 tablets by mouth nightly as needed.        * Notice:  This list has 2 medication(s) that are the same as other medications prescribed for you. Read the directions carefully, and ask your doctor or other care provider to review them with you.

## 2018-10-17 ENCOUNTER — OFFICE VISIT (OUTPATIENT)
Dept: OTOLARYNGOLOGY | Facility: CLINIC | Age: 78
End: 2018-10-17
Payer: MEDICARE

## 2018-10-17 VITALS — BODY MASS INDEX: 21.9 KG/M2 | WEIGHT: 116 LBS | HEIGHT: 61 IN

## 2018-10-17 DIAGNOSIS — J32.4 CHRONIC PANSINUSITIS: ICD-10-CM

## 2018-10-17 ASSESSMENT — PAIN SCALES - GENERAL: PAINLEVEL: NO PAIN (0)

## 2018-10-17 NOTE — LETTER
10/17/2018       RE: Kimberly Chau  [address]  [place] [address]     Dear Colleague,    Thank you for referring your patient, Kimberly Chau, to the [facility] EAR NOSE AND THROAT at [facility]. Please see a copy of my visit note below.    S/P revision frontal sinusotomy with rains stent placement in August.  Still has occasional headache and some drainage.  MRSA cultured.     Exam:  No orbital assymetry, external nose normal.     Procedure:  Endoscopy indicated for debridement of frontal recess  Topical anesthetic/decongestant spray applied.  Rigid scope used for visualization.  Findings: small amount of crusting, rains stent patent and mobile. Surrounding mucosa is erythematous and edematous.     A/P: ONgoing symptoms, but stent in place. Will treat MRSA with topical gent, if no response will add oral bactrim. Did not tolerate doxy.   See in 2-3 months.      Again, thank you for allowing me to participate in the care of your patient.      Sincerely,    Elyse Eisenberg MD

## 2018-10-17 NOTE — NURSING NOTE
"Chief Complaint   Patient presents with     Follow Up For     2 months     Ht 1.549 m (5' 1\")  Wt 52.6 kg (116 lb)  BMI 21.92 kg/m2    Manuela Preciado LPN    "

## 2018-10-17 NOTE — MR AVS SNAPSHOT
"              After Visit Summary   10/17/2018    Kimberly Chau    MRN: [id number]           Patient Information     Date Of Birth          1940        Visit Information        Provider Department      10/17/2018 2:00 PM Elyse Eisenberg MD [facility]        Today's Diagnoses     Chronic pansinusitis          Care Instructions    Thank you for choosing  Physicians.  Dr. Eisenberg's recommendations today include:  1) Please followin           Follow-ups after your visit        Follow-up notes from your care team     Return for Follow up after treatment.      Who to contact     Please call your clinic at [phone] to:    Ask questions about your health    Make or cancel appointments    Discuss your medicines    Learn about your test results    Speak to your doctor            Additional Information About Your Visit        Care EveryWhere ID     This is your Care EveryWhere ID. This could be used by other organizations to access your [place] medical records  [id number]        Your Vitals Were     Height BMI (Body Mass Index)                1.549 m (5' 1\") 21.92 kg/m2           Blood Pressure from Last 3 Encounters:   10/16/18 148/76   09/18/18 154/77   08/03/18 140/80    Weight from Last 3 Encounters:   10/17/18 52.6 kg (116 lb)   10/16/18 52.1 kg (114 lb 12.8 oz)   09/18/18 52.5 kg (115 lb 11.2 oz)              We Performed the Following     NASAL ENDOSCOPY, DIAGNOSTIC          Today's Medication Changes          These changes are accurate as of 10/17/18  2:28 PM.  If you have any questions, ask your nurse or doctor.               Start taking these medicines.        Dose/Directions    gentamicin 0.008% in 1000ml 0.9% NaCl Soln nasal irrigation   Commonly known as:  GARAMYCIN   Used for:  Chronic pansinusitis        Dose:  30 mL   Spray 30 mLs in nostril 2 times daily Irrigate 30 mL between each nostril total 60ml/dayPLEASE MAIL TO PATIENT   Quantity:  1000 mL   Refills:  11            Where " to get your medicines      These medications were sent to [id number] - [place], [place] - [address]  [address], [place] [address]    Hours:  Same info as Jessica Harrell Phone:  [phone]     gentamicin 0.008% in 1000ml 0.9% NaCl Soln nasal irrigation                Primary Care Provider Office Phone # Fax #    Guillermo Castellano [phone]       [address] [address]        Equal Access to Services     [facility]: Hadii aad ku hadasho Soomaali, waaxda luqadaha, qaybta kaalmada adeegyada, waxay idiin hayaan adeeg khararachel chen . So [facility] [phone].    ATENCIÓN: Si habla español, tiene a vázquez disposición servicios gratuitos de asistencia lingüística. [facility] [phone].    We comply with applicable federal civil rights laws and [place] laws. We do not discriminate on the basis of race, color, national origin, age, disability, sex, sexual orientation, or gender identity.            Thank you!     Thank you for choosing [facility]  for your care. Our goal is always to provide you with excellent care. Hearing back from our patients is one way we can continue to improve our services. Please take a few minutes to complete the written survey that you may receive in the mail after your visit with us. Thank you!             Your Updated Medication List - Protect others around you: Learn how to safely use, store and throw away your medicines at www.disposemymeds.org.          This list is accurate as of 10/17/18  2:28 PM.  Always use your most recent med list.                   Brand Name Dispense Instructions for use Diagnosis    ACETAMINOPHEN 8 HOUR 650 MG 8 hour tablet   Generic drug:  acetaminophen     100 tablet    Take 650 mg by mouth every 4 hours as needed.    C1-C2 instability       amLODIPine 10 MG tablet    NORVASC    90 tablet    Take 1 tablet (10 mg) by mouth daily    Essential hypertension       DICLOFENAC SODIUM PO       Take 25 mg by mouth daily        fluocinolone 0.025 % ointment    SYNALAR    60 g    Apply twice a day to affected areas of the face avoiding the eyelids followed by Vanicream moisturizer.    Xerosis of skin, Intrinsic atopic dermatitis       FOLIC ACID      daily.        gentamicin 0.008% in 1000ml 0.9% NaCl Soln nasal irrigation    GARAMYCIN    1000 mL    Spray 30 mLs in nostril 2 times daily Irrigate 30 mL between each nostril total 60ml/dayPLEASE MAIL TO PATIENT    Chronic pansinusitis       METHOTREXATE      10 tablets once a week On Fridays. Taking 8 tablets on Fridays        metoprolol tartrate 100 MG tablet    LOPRESSOR    360 tablet    Take 2 tablets (200 mg) by mouth 2 times daily    Essential hypertension       mometasone 50 MCG/ACT spray    NASONEX    1 Box    Spray 1 spray into both nostrils as needed PRN daily    Chronic rhinitis       Multi-vitamin Tabs tablet   Generic drug:  multivitamin, therapeutic with minerals      Take 1 tablet by mouth daily.        oxyCODONE IR 5 MG tablet    ROXICODONE    12 tablet    Take 1-2 tablets (5-10 mg) by mouth every 4 hours as needed for pain or other (Moderate to Severe)    Chronic frontal sinusitis       oxymetazoline 0.05 % spray    AFRIN NASAL SPRAY    1 Bottle    Use 2 sprays to each nare two times daily for up to 3 days or as needed for bleeding.    Chronic frontal sinusitis       PRILOSEC PO      Take 20 mg by mouth daily as needed        senna-docusate 8.6-50 MG per tablet    SENOKOT-S;PERICOLACE    30 tablet    Take 1-2 tablets by mouth 2 times daily Take while on oral narcotics to prevent or treat constipation.    Chronic frontal sinusitis       * sodium chloride 0.65 % nasal spray    OCEAN    1 Bottle    Spray 1-2 sprays in nostril every 2 hours Use in EACH nostril.    Chronic frontal sinusitis       * sodium chloride 0.65 % nasal spray    OCEAN NASAL SPRAY    1 Bottle    Spray 1 spray into both nostrils daily as needed for congestion    Chronic  frontal sinusitis       TYLENOL PM EXTRA STRENGTH PO      Take 2 tablets by mouth nightly as needed.        * Notice:  This list has 2 medication(s) that are the same as other medications prescribed for you. Read the directions carefully, and ask your doctor or other care provider to review them with you.

## 2018-10-17 NOTE — PROGRESS NOTES
S/P revision frontal sinusotomy with rains stent placement in August.  Still has occasional headache and some drainage.  MRSA cultured.     Exam:  No orbital assymetry, external nose normal.     Procedure:  Endoscopy indicated for debridement of frontal recess  Topical anesthetic/decongestant spray applied.  Rigid scope used for visualization.  Findings: small amount of crusting, rains stent patent and mobile. Surrounding mucosa is erythematous and edematous.     A/P: ONgoing symptoms, but stent in place. Will treat MRSA with topical gent, if no response will add oral bactrim. Did not tolerate doxy.   See in 2-3 months.

## 2018-10-17 NOTE — PATIENT INSTRUCTIONS
Thank you for choosing  Physicians.  Dr. Eisenberg's recommendations today include:  1) Gentamicin irrigations  2) Follow up in 2-3 months

## 2018-10-18 DIAGNOSIS — J32.4 CHRONIC PANSINUSITIS: ICD-10-CM

## 2018-10-19 NOTE — TELEPHONE ENCOUNTER
"gentamicin 0.008% in 1000ml 0.9% NaCl (GARAMYCIN) SOLN nasal irrigation:   Fax received from [facility] that reads, \"We are unable to compound this medication. Please send to a compounding pharmacy  "

## 2018-11-30 DIAGNOSIS — I10 ESSENTIAL HYPERTENSION: ICD-10-CM

## 2018-12-03 RX ORDER — METOPROLOL TARTRATE 100 MG
TABLET ORAL
Qty: 360 TABLET | Refills: 0 | Status: SHIPPED | OUTPATIENT
Start: 2018-12-03 | End: 2019-03-01

## 2018-12-03 NOTE — TELEPHONE ENCOUNTER
Last Clinic Visit: 9/18/2018  [facility]  Failed protocol for elevated BP.  FYI sent to clinic RN. 90 day taina approved per protocol.

## 2019-02-04 ENCOUNTER — ANESTHESIA EVENT (OUTPATIENT)
Dept: SURGERY | Facility: CLINIC | Age: 79
End: 2019-02-04

## 2019-02-04 ENCOUNTER — OFFICE VISIT (OUTPATIENT)
Dept: SURGERY | Facility: CLINIC | Age: 79
End: 2019-02-04
Payer: MEDICARE

## 2019-02-04 VITALS
TEMPERATURE: 97.9 F | DIASTOLIC BLOOD PRESSURE: 64 MMHG | SYSTOLIC BLOOD PRESSURE: 137 MMHG | OXYGEN SATURATION: 97 % | HEIGHT: 61 IN | BODY MASS INDEX: 22.43 KG/M2 | RESPIRATION RATE: 18 BRPM | HEART RATE: 74 BPM | WEIGHT: 118.8 LBS

## 2019-02-04 DIAGNOSIS — Z01.818 PRE-OP EXAMINATION: Primary | ICD-10-CM

## 2019-02-04 ASSESSMENT — MIFFLIN-ST. JEOR: SCORE: 946.25

## 2019-02-04 NOTE — H&P
Pre-Operative H & P     CC:  Preoperative exam to assess for increased cardiopulmonary risk while undergoing surgery and anesthesia.    Date of Encounter: 2/4/2019  Primary Care Physician:  Guillermo Castellano  Reason:  Repair hole in macula    HPI  Kimberly Chau is a 78 year old female who presents for pre-operative H & P in preparation for vitrectomy/eye surgery with Dr. Aashish Solis on 2/5/19 at [facility]. Ms. Chau presents to PAC and denies any known coronary artery disease.  She denies any cardiac symptoms including chest pain, SOB, palpitations, syncope, NORWOOD, orthopnea, or PND.  She denies any pulmonary issues and never smoked.  She denies any issues with anesthesia.  She has polyarthritis with both rheumatoid arthritis and psoriatic arthritis and is followed closely by Dr. Roca.      History is obtained from the patient and electronic health record.     Past Medical History  Past Medical History:   Diagnosis Date     Anemia      Arthritis      Cataracts      Central retinal artery occlusion      Cervical spine fracture (H)     After a fall from orthostatic sx     Chronic pain     Back of head     Gastro-oesophageal reflux disease      Head injury      Hypertension      Hyponatremia     Resolved, 2/2 diuretics     Migraines      Osteoporosis      Pneumonia      Rheumatoid arthritis(714.0)        Past Surgical History  Past Surgical History:   Procedure Laterality Date     COLONOSCOPY       FUSION CERVICAL POSTERIOR THREE+ LEVELS  1/22/2013    Procedure: FUSION CERVICAL POSTERIOR THREE+ LEVELS;  Cervical 1-6 Posterior Instrumentation and Fusion, Cervical 3-4 Laminectomy  .Instrumentation and fusion to Cervical 6 *Latex Allergy*;  Surgeon: Ra Bar MD;  Location: [facility] OR     GYN SURGERY       HEAD & NECK SURGERY       HYSTERECTOMY       KNEE SURGERY       OPTICAL TRACKING SYSTEM ENDOSCOPIC SINUS SURGERY Right 4/6/2018    Procedure: OPTICAL TRACKING SYSTEM ENDOSCOPIC  SINUS SURGERY;  Stealth Assisted Right Maxillary Antrostomy, Anterior Ethmoidectomy And Frontal Sinusotomy;  Surgeon: Elyse Eisenberg MD;  Location: [facility] OR     OPTICAL TRACKING SYSTEM ENDOSCOPIC SINUS SURGERY Right 8/3/2018    Procedure: OPTICAL TRACKING SYSTEM ENDOSCOPIC SINUS SURGERY;  Stealth Assisted Revision Right Frontal Sinusotomy, Right Stent Placement  **Latex Allergy** ;  Surgeon: Elyse Eisenberg MD;  Location: [facility] OR     ORTHOPEDIC SURGERY         Hx of Blood transfusions/reactions: denies     Hx of abnormal bleeding or anti-platelet use: denies    Menstrual history: No LMP recorded. Patient has had a hysterectomy.    Steroid use in the last year: denies    Personal or FH with difficulty with Anesthesia:  Denies     Prior to Admission Medications  Current Outpatient Medications   Medication Sig Dispense Refill     acetaminophen 650 MG TABS Take 650 mg by mouth every 4 hours as needed. 100 tablet 0     amLODIPine (NORVASC) 10 MG tablet Take 1 tablet (10 mg) by mouth daily (Patient taking differently: Take 10 mg by mouth At Bedtime ) 90 tablet 0     DICLOFENAC SODIUM PO Take 25 mg by mouth every morning        Diphenhydramine-APAP, sleep, (TYLENOL PM EXTRA STRENGTH PO) Take 2 tablets by mouth At Bedtime        FOLIC ACID 1 tablet every morning        magic mouthwash (ENTER INGREDIENTS IN COMMENTS) suspension Swish and spit 5 ml every 8 hours for 14 days 1000 mL 3     METHOTREXATE 8 tablets once a week On Fridays.  Taking 8 tablets on Fridays       metoprolol tartrate (LOPRESSOR) 100 MG tablet Take 2 tablets by mouth 2 times daily 360 tablet 0     Multiple Vitamin (MULTI-VITAMIN) per tablet Take 1 tablet by mouth every morning        Omeprazole (PRILOSEC PO) Take 20 mg by mouth as needed        sodium chloride (OCEAN NASAL SPRAY) 0.65 % nasal spray Spray 1 spray into both nostrils daily as needed for congestion 1 Bottle 3       Allergies  Allergies   Allergen Reactions     Hctz Other (See Comments)     Pt  develops symptomatic and significant hyponatremia with HCTZ exposure     Hydrochlorothiazide      Other reaction(s): Hyponatremia  Hyponatremia     Latex      Benzocaine Rash     carba mix,thrium-sunscreen     Resorcinol-Alcohol Rash     carba mix,thrium-sunscreen     Ace Inhibitors      Dizziness and orthostatic changes and electrolyte problems.     Sulfa Drugs        Social History  Social History     Socioeconomic History     Marital status:      Spouse name: Not on file     Number of children: Not on file     Years of education: Not on file     Highest education level: Not on file   Social Needs     Financial resource strain: Not on file     Food insecurity - worry: Not on file     Food insecurity - inability: Not on file     Transportation needs - medical: Not on file     Transportation needs - non-medical: Not on file   Occupational History     Not on file   Tobacco Use     Smoking status: Never Smoker     Smokeless tobacco: Never Used   Substance and Sexual Activity     Alcohol use: Yes     Comment: Wine daily with dinner     Drug use: No     Sexual activity: Not on file   Other Topics Concern     Parent/sibling w/ CABG, MI or angioplasty before 65F 55M? Not Asked   Social History Narrative    Ms. Chau is , has two grown children and two grandchildren. She does not work. She was never a smoker, and drinks a glass of wine with dinner each night.        Family History  Family History   Problem Relation Age of Onset     Arthritis Mother      Respiratory Mother         pulmonary fibrosis     Hypertension Mother      Liver Disease Father         from EtOH       Review      ROS/MED HX    ENT/Pulmonary:     (+)WERNER risk factors hypertension, other ENT- H/O panusitis s/p sinus surgery 4/2018 with second surgery 8/2018 requiring stent. , , . .    Neurologic:  - neg neurologic ROS     Cardiovascular: Comment: Evaluated last year for possible arrhythmia with Dr. Castellano.  Work-up was all within normal  "results.     (+) hypertension----. : . . . :. . Previous cardiac testing:  yes     METS/Exercise Tolerance:  >4 METS   Hematologic:  - neg hematologic  ROS       Musculoskeletal: Comment: Followed by Dr. Roca:  Polyarthritis: Rheumatoid arthritis and Psoriasis arthritis.  Reports last flare up about 5 years ago on right 5th digit.     [facility] knee surgery in [facility] in [facility].        GI/Hepatic:     (+) GERD Asymptomatic on medication,       Renal/Genitourinary:  - ROS Renal section negative       Endo:  - neg endo ROS       Psychiatric:  - neg psychiatric ROS       Infectious Disease:  - neg infectious disease ROS       Malignancy:      - no malignancy   Other:    (+) No chance of pregnancy no H/O Chronic Pain,no other significant disability          Temp: 97.9  F (36.6  C) Temp src: Oral BP: 137/64 Pulse: 74   Resp: 18 SpO2: 97 %         118 lbs 12.8 oz  5' 1\"   Body mass index is 22.45 kg/m .       Physical Exam  Constitutional: Awake, alert, cooperative, no apparent distress, and appears stated age.  Eyes: Pupils equal, round and reactive to light, extra ocular muscles intact, sclera clear, conjunctiva normal.  HENT: Normocephalic, oral pharynx with moist mucus membranes, good dentition. No goiter appreciated.   Respiratory: Clear to auscultation bilaterally, no crackles or wheezing.  Cardiovascular: Regular rate and rhythm, normal S1 and S2, and no murmur noted.  Carotids +2, no bruits. No edema. Palpable pulses to radial  DP and PT arteries.   GI: Normal bowel sounds, soft, non-distended, non-tender, no masses palpated, no hepatosplenomegaly.    Lymph/Hematologic: No cervical lymphadenopathy and no supraclavicular lymphadenopathy.  Genitourinary:  na  Skin: Warm and dry.    Musculoskeletal: Cervical spine fused - very limited ROM of neck. There is no redness, warmth, or swelling of the joints. Gross motor strength is normal.    Neurologic: Awake, alert, oriented to name, place and time. Cranial nerves II-XII are " grossly intact. Gait is normal.   Neuropsychiatric: Calm, cooperative. Normal affect.     Labs: (personally reviewed)  Lab Results   Component Value Date    WBC 5.9 07/23/2018     Lab Results   Component Value Date    RBC 3.49 07/23/2018     Lab Results   Component Value Date    HGB 11.3 08/03/2018     Lab Results   Component Value Date    HCT 34.2 07/23/2018     Lab Results   Component Value Date    MCV 98 07/23/2018     Lab Results   Component Value Date    MCH 32.1 07/23/2018     Lab Results   Component Value Date    MCHC 32.7 07/23/2018     Lab Results   Component Value Date    RDW 13.3 07/23/2018     Lab Results   Component Value Date     07/23/2018       Last Comprehensive Metabolic Panel:  Sodium   Date Value Ref Range Status   08/03/2018 134 133 - 144 mmol/L Final     Potassium   Date Value Ref Range Status   08/03/2018 4.5 3.4 - 5.3 mmol/L Final     Chloride   Date Value Ref Range Status   07/23/2018 97 94 - 109 mmol/L Final     Carbon Dioxide   Date Value Ref Range Status   07/23/2018 24 20 - 32 mmol/L Final     Anion Gap   Date Value Ref Range Status   07/23/2018 11 3 - 14 mmol/L Final     Glucose   Date Value Ref Range Status   08/03/2018 96 70 - 99 mg/dL Final     Urea Nitrogen   Date Value Ref Range Status   07/23/2018 21 7 - 30 mg/dL Final     Creatinine   Date Value Ref Range Status   07/23/2018 0.89 0.52 - 1.04 mg/dL Final     GFR Estimate   Date Value Ref Range Status   07/23/2018 61 >60 mL/min/1.7m2 Final     Comment:     Non  GFR Calc     Calcium   Date Value Ref Range Status   07/23/2018 9.1 8.5 - 10.1 mg/dL Final       Procedures  Echodate:2/7/18results:  Interpretation Summary  Left ventricular function, chamber size, wall motion, and wall thickness are  normal.The EF is 60-65%.  Global right ventricular function is normal.  The thoracic aorta is normal. The inferior vena cava was normal in size with  preserved respiratory variability. Estimated right atrial pressure is  "< 5  MmHg.     No pericardial effusion is present.    Outside records reviewed from: Care Everywhere    ASSESSMENT and PLAN  Kimberly Chau is a 78 year old female scheduled to undergo left eye vitrectomy/eye surgery with Dr. Aashish Solis on 2/5/19 at [facility].     She has the following specific operative considerations:   - METS:  >4. RCRI : No serious cardiac risks.  0.4 % risk of major adverse cardiac event.  No further cardiac evaluation needed per 2014 ACC/AHA guidelines for non-cardiac surgery.   - WERNER # of risks 2/8 = low risk  - VTE risk:  0.5%  - Risk of PONV score = 2.  If > 2, anti-emetic intervention recommended.      #  Cardiology - Evaluated by Dr. Castellano and Dr. Moreno, cardiologists, last fall for possible cardiac arrhythmia>>>Zio patch noted premature atrial complexes, 13% burden.  Records indicate cardiologist provided reassurance as these are benign.  Echocardiogram (2/7/18):  EF 60-65%, normal RV, normal IVC and no effusion.  Mets>4. No Know CAD and denies cardiac symptoms.  No further cardiac evaluation needed per 2014 ACC/AHA guidelines for non-cardiac surgery.        - HTN, take amlodipine and metoprolol as prescribed DOS.  #  Pulmonary - no smoking hx  #  GI - GERD: Patient instructed to take PPI as prescribed.  Consider use of RSI techniques with advanced airway maneuvers.  #  Musculoskeletal - polyarthritis:  rheumatoid and psoriatic arthritis followed closely by Dr. Roca with last OV 10/15/18.  Takes Methotrexate every Friday.  Reports last exacerbation ~ 5 years ago.  positioning        - Hx of c-spine fx (C1/C2) 2/2 syncope (BP meds) s/p multiple level c-spine fusion, 2013.    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 Eisenberg)          Arrival time, NPO, shower and medication instructions provided by [facility] nursing staff today.   I spent 30 minutes face to face with patient assessing, educating, counseling and/or coordinating care and examining the patient.  Of that 30 minutes, I spent greater than 50% of my time counseling and/or coordinating care.                      KEMI Avendano CNP  Preoperative Assessment Center  [facility]  Clinic and Surgery Center  Phone: [phone]  Fax: [phone]

## 2019-02-04 NOTE — ANESTHESIA PREPROCEDURE EVALUATION
Anesthesia Pre-Procedure Evaluation    Patient: Kimberly Chau   MRN:     [id number] Gender:   female   Age:    78 year old :      1940        Preoperative Diagnosis: * No surgery found *        Past Medical History:   Diagnosis Date     Anemia      Arthritis      Cataracts      Central retinal artery occlusion      Cervical spine fracture (H)     After a fall from orthostatic sx     Chronic pain     Back of head     Gastro-oesophageal reflux disease      Head injury      Hypertension      Hyponatremia     Resolved, 2/2 diuretics     Migraines      Osteoporosis      Pneumonia      Rheumatoid arthritis(714.0)       Past Surgical History:   Procedure Laterality Date     COLONOSCOPY       FUSION CERVICAL POSTERIOR THREE+ LEVELS  2013    Procedure: FUSION CERVICAL POSTERIOR THREE+ LEVELS;  Cervical 1-6 Posterior Instrumentation and Fusion, Cervical 3-4 Laminectomy  .Instrumentation and fusion to Cervical 6 *Latex Allergy*;  Surgeon: Ra Bar MD;  Location:  OR     GYN SURGERY       HEAD & NECK SURGERY       HYSTERECTOMY       KNEE SURGERY       OPTICAL TRACKING SYSTEM ENDOSCOPIC SINUS SURGERY Right 2018    Procedure: OPTICAL TRACKING SYSTEM ENDOSCOPIC SINUS SURGERY;  Stealth Assisted Right Maxillary Antrostomy, Anterior Ethmoidectomy And Frontal Sinusotomy;  Surgeon: Elyse Eisenberg MD;  Location:  OR     OPTICAL TRACKING SYSTEM ENDOSCOPIC SINUS SURGERY Right 8/3/2018    Procedure: OPTICAL TRACKING SYSTEM ENDOSCOPIC SINUS SURGERY;  Stealth Assisted Revision Right Frontal Sinusotomy, Right Stent Placement  **Latex Allergy** ;  Surgeon: Elyse Eisenberg MD;  [facility]: [facility]     ORTHOPEDIC SURGERY            Anesthesia Evaluation     .      No history of anesthetic complications          ROS/MED HX    ENT/Pulmonary:       Neurologic:       Cardiovascular: Comment: Evaluated last year for possible arrhythmia with Dr. Castellano.  Work-up was all within normal results.         METS/Exercise  "Tolerance:     Hematologic:         Musculoskeletal:         GI/Hepatic:         Renal/Genitourinary:         Endo:         Psychiatric:         Infectious Disease:         Malignancy:         Other:                         PHYSICAL EXAM:   Mental Status/Neuro:    Airway:    Respiratory:    CV:    Comments:                    Lab Results   Component Value Date    WBC 5.9 07/23/2018    HGB 11.3 (L) 08/03/2018    HCT 34.2 (L) 07/23/2018     07/23/2018    CRP 3.2 10/11/2017    SED 15 10/11/2017     08/03/2018    POTASSIUM 4.5 08/03/2018    CHLORIDE 97 07/23/2018    CO2 24 07/23/2018    BUN 21 07/23/2018    CR 0.89 07/23/2018    GLC 96 08/03/2018    KINA 9.1 07/23/2018    PHOS 3.9 07/14/2011    MAG 1.7 06/28/2010    ALBUMIN 3.6 03/26/2018    PROTTOTAL 7.6 03/26/2018    ALT 18 03/26/2018    AST 23 03/26/2018    ALKPHOS 114 03/26/2018    BILITOTAL 0.3 03/26/2018    PTT 31 01/22/2013    INR 0.93 01/22/2013    TSH 1.26 04/27/2015    T4 0.77 09/02/2011       Preop Vitals  BP Readings from Last 3 Encounters:   02/04/19 137/64   10/16/18 148/76   09/18/18 154/77    Pulse Readings from Last 3 Encounters:   02/04/19 74   10/16/18 80   09/18/18 70      Resp Readings from Last 3 Encounters:   02/04/19 18   09/18/18 18   08/03/18 16    SpO2 Readings from Last 3 Encounters:   02/04/19 97%   10/16/18 97%   08/03/18 97%      Temp Readings from Last 1 Encounters:   02/04/19 97.9  F (36.6  C) (Oral)    Ht Readings from Last 1 Encounters:   02/04/19 1.549 m (5' 1\")      Wt Readings from Last 1 Encounters:   02/04/19 53.9 kg (118 lb 12.8 oz)    Estimated body mass index is 22.45 kg/m  as calculated from the following:    Height as of this encounter: 1.549 m (5' 1\").    Weight as of this encounter: 53.9 kg (118 lb 12.8 oz).     LDA:            JAIRO FV AN PLAN NO PONV RULE         PAC Discussion and Assessment    ASA Classification:   Case is suitable for:   Anesthetic techniques and relevant risks discussed:   Invasive " monitoring and risk discussed:   Types:   Possibility and Risk of blood transfusion discussed:   NPO instructions given:   Additional anesthetic preparation and risks discussed:   Needs early admission to pre-op area:   Other:     PAC Resident/NP Anesthesia Assessment:          Mid-Level Provider/Resident:   Date:   Time:     Attending Anesthesiologist Anesthesia Assessment:        Anesthesiologist:   Date:   Time:   Pass/Fail:   Disposition:     PAC Pharmacist Assessment:        Pharmacist:   Date:   Time:        KEMI Avendano CNP

## 2019-02-20 ENCOUNTER — OFFICE VISIT (OUTPATIENT)
Dept: OTOLARYNGOLOGY | Facility: CLINIC | Age: 79
End: 2019-02-20
Payer: MEDICARE

## 2019-02-20 DIAGNOSIS — J32.1 CHRONIC FRONTAL SINUSITIS: Primary | ICD-10-CM

## 2019-02-20 PROCEDURE — 87070 CULTURE OTHR SPECIMN AEROBIC: CPT | Performed by: OTOLARYNGOLOGY

## 2019-02-20 PROCEDURE — 87077 CULTURE AEROBIC IDENTIFY: CPT | Performed by: OTOLARYNGOLOGY

## 2019-02-20 PROCEDURE — 87186 SC STD MICRODIL/AGAR DIL: CPT | Performed by: OTOLARYNGOLOGY

## 2019-02-20 ASSESSMENT — PAIN SCALES - GENERAL: PAINLEVEL: NO PAIN (0)

## 2019-02-20 NOTE — PROGRESS NOTES
Kimberly is still having intermittent headaches on R.  Worst was after eye surgery 2 weeks ago. We treated her chronic frontal sinusitis with surgery x2, stent in place now.  She tried gent irrigations, still has drainage and pain.     Patient Supplied Answers to Review of Systems   ENT ROS 2/20/2019   Neurology Headache   Eyes Visual loss   Ears, Nose, Throat Nasal congestion or drainage   Musculoskeletal Neck pain   Allergy/Immunology Allergies or hay fever   Endocrine -     Exam: external nasal, facial, eye exam normal.     Procedure:  Endoscopy indicated for exam to determine is stent is in place, infection.   Topical anesthetic/decongestant spray applied.  Rigid scope used for visualization.  Findings: R maxillary with scant secretions, minimal inflammation. R frontal recess, stent in place, dry secretions, much less inflammation.  Able to move stent around freely, some purulent secretions cultured.     A/P: await culture - will consider course of bactrim. See again in early April.

## 2019-02-20 NOTE — PATIENT INSTRUCTIONS
Thank You for choosing [facility]. You were seen in the ENT  Clinic today by Dr.Boyer Davenport would like you to follow up prior to April 19,2019.    Will contact you about Sinus culture results.    If you have any questions or concerns after your appointment, please  Call [phone].

## 2019-02-20 NOTE — LETTER
2/20/2019       RE: Kimberly Chau  [id number]   [place] [address]     Dear Colleague,    Thank you for referring your patient, Kimberly Chau, to the [facility] EAR [facility]ROAT at [facility]. Please see a copy of my visit note below.    Kimberly is still having intermittent headaches on R.  Worst was after eye surgery 2 weeks ago. We treated her chronic frontal sinusitis with surgery x2, stent in place now.  She tried gent irrigations, still has drainage and pain.     Patient Supplied Answers to Review of Systems   ENT ROS 2/20/2019   Neurology Headache   Eyes Visual loss   Ears, Nose, Throat Nasal congestion or drainage   Musculoskeletal Neck pain   Allergy/Immunology Allergies or hay fever   Endocrine -     Exam: external nasal, facial, eye exam normal.     Procedure:  Endoscopy indicated for exam to determine is stent is in place, infection.   Topical anesthetic/decongestant spray applied.  Rigid scope used for visualization.  Findings: R maxillary with scant secretions, minimal inflammation. R frontal recess, stent in place, dry secretions, much less inflammation.  Able to move stent around freely, some purulent secretions cultured.     A/P: await culture - will consider course of bactrim. See again in early April.     Elyse Eisenberg MD

## 2019-02-24 LAB
BACTERIA SPEC CULT: ABNORMAL
SPECIMEN SOURCE: ABNORMAL

## 2019-03-01 DIAGNOSIS — I10 ESSENTIAL HYPERTENSION: ICD-10-CM

## 2019-03-03 RX ORDER — METOPROLOL TARTRATE 100 MG
200 TABLET ORAL 2 TIMES DAILY
Qty: 360 TABLET | Refills: 1 | Status: SHIPPED | OUTPATIENT
Start: 2019-03-03 | End: 2019-08-25

## 2019-03-05 ENCOUNTER — TELEPHONE (OUTPATIENT)
Dept: OTOLARYNGOLOGY | Facility: CLINIC | Age: 79
End: 2019-03-05

## 2019-03-05 DIAGNOSIS — J32.1 CHRONIC FRONTAL SINUSITIS: Primary | ICD-10-CM

## 2019-03-05 NOTE — TELEPHONE ENCOUNTER
"Per Sinus Culture Aerobic Bacterial results:    \"Egrardo - let's try a course of oral Augmentin - 875 bid for 14 days.  I will see her back in April.\"    I called and informed patient about results and recommendations with positive teach back.  I encouraged patient to take full dose of antibiotic and suggested probiotics such as pill or foods like yogurt or kefir.    FOV 04/17/2019    I encouraged patient to call with any questions or concerns.  "

## 2019-03-13 ENCOUNTER — TELEPHONE (OUTPATIENT)
Dept: OTOLARYNGOLOGY | Facility: CLINIC | Age: 79
End: 2019-03-13

## 2019-03-13 NOTE — TELEPHONE ENCOUNTER
[facility] Call Center    Phone Message    May a detailed message be left on voicemail: yes    Reason for Call: Other: pt called in and stated that she was given an antibiotic last week and is having some side effects that she would like to discuss. Pt experiencing Diarrhea and also a yeast infection. Pt would like a call back to discuss this and has currently stopped taking the medication at time. Please advise when available.     Action Taken: Message routed to:  [facility] ENT

## 2019-03-14 ENCOUNTER — TELEPHONE (OUTPATIENT)
Dept: OTOLARYNGOLOGY | Facility: CLINIC | Age: 79
End: 2019-03-14

## 2019-03-14 DIAGNOSIS — J32.1 CHRONIC FRONTAL SINUSITIS: Primary | ICD-10-CM

## 2019-03-14 RX ORDER — DOXYCYCLINE 100 MG/1
100 CAPSULE ORAL 2 TIMES DAILY
Qty: 20 CAPSULE | Refills: 0 | Status: SHIPPED | OUTPATIENT
Start: 2019-03-14 | End: 2019-04-17

## 2019-03-14 NOTE — TELEPHONE ENCOUNTER
Spoke to patient regarding concerns.did advise patient that these are common side effects from the medication she was prescribed.also advised pt  had recommended taking a probiotic also,which would help her stomach. Pt did stop taking the medication 5 days ago because of the symptoms,so a message was sent to  for guidance on whether to resume antibiotic from this point or start over.

## 2019-03-14 NOTE — TELEPHONE ENCOUNTER
Spoke to  in regards to patients concerns per the patients previous message to  as  is out of the office. Dr gipson recommended patient take docycycline, bid for 10 days.a prescription was sent to her pharmacy and patient was notified of this.patient verbalized understanding.

## 2019-03-14 NOTE — PROGRESS NOTES
Sent new Rx for doxycycline hyclate (VIBRAMYCIN) 100 MG capsule - Take 1 capsule (100 mg) by mouth 2 times daily for 10 days - per Dr. Taylor (covering for Dr. Eisenberg) due to pt having symptoms of diarrhea from taking Augmentin. Will notify pt of new Rx.      Manuela Preciado LPN

## 2019-04-17 ENCOUNTER — OFFICE VISIT (OUTPATIENT)
Dept: OTOLARYNGOLOGY | Facility: CLINIC | Age: 79
End: 2019-04-17
Payer: MEDICARE

## 2019-04-17 VITALS
BODY MASS INDEX: 22.19 KG/M2 | WEIGHT: 113 LBS | DIASTOLIC BLOOD PRESSURE: 74 MMHG | SYSTOLIC BLOOD PRESSURE: 161 MMHG | HEIGHT: 60 IN | HEART RATE: 78 BPM

## 2019-04-17 DIAGNOSIS — J32.1 CHRONIC FRONTAL SINUSITIS: Primary | ICD-10-CM

## 2019-04-17 ASSESSMENT — PAIN SCALES - GENERAL: PAINLEVEL: NO PAIN (0)

## 2019-04-17 ASSESSMENT — MIFFLIN-ST. JEOR: SCORE: 907.81

## 2019-04-17 NOTE — NURSING NOTE
"Chief Complaint   Patient presents with     RECHECK     follow up sinus surgery and stent      Blood pressure 161/74, pulse 78, height 1.53 m (5' 0.24\"), weight 51.3 kg (113 lb), not currently breastfeeding.    Holden Muhammad LPN    "

## 2019-04-17 NOTE — PATIENT INSTRUCTIONS
Thank You for choosing [facility]. You were seen in the ENT  Clinic today by Dr.Boyer Davenport would like you to follow up in approximately 6 months      If you have any questions or concerns after your appointment, please  Call [phone].

## 2019-04-17 NOTE — PROGRESS NOTES
Kimberly has undergone revision frontal sinus surgery with stent placement. She has had a psot op infection treated recently. Responded to and tolerated doxycycline. Diarrhea from augmentin. She has not head a headache or pain in her frontal sinus region for a couple of weeks.     Patient Supplied Answers to Review of Systems   ENT ROS 4/17/2019   Neurology Headache   Eyes -   Ears, Nose, Throat -   Musculoskeletal Neck pain   Allergy/Immunology Allergies or hay fever   Endocrine -     Exam: alert, no facial asymmetry or periorbital swelling    Procedure:  Endoscopy indicated for exam to see if infection cleared and to see status of stent.  Topical anesthetic/decongestant spray applied.  Rigid scope used for visualization.  Findings: scant crusting around stent, freely mobile, in good position and no purulence noted when suctioning end of tube    A/P: Will call if any symptoms - treat with doxycyline if needed.  See in 6 months, sooner if problems.

## 2019-04-23 ENCOUNTER — TELEPHONE (OUTPATIENT)
Dept: INTERNAL MEDICINE | Facility: CLINIC | Age: 79
End: 2019-04-23

## 2019-04-23 NOTE — TELEPHONE ENCOUNTER
[facility] Call Center    Phone Message    May a detailed message be left on voicemail: yes    Reason for Call: Other: Ev Cook is requesting a call back from Dr. Castellano's nurse regarding--facilitating the f/u appt for pt with Dr. Castellano.  There are a few points to clarify about the f/u with Dr. Castellano. Please call Ev. Thank you!     Action Taken: Message routed to:  [facility]  PCC     Message left for Ev Cook Rehab Manager to call back.  Coretta Simeon RN 8:27 AM on 4/24/2019.  Unsuccessful call back.  Coretta Simeon RN 6:54 AM on 5/2/2019.

## 2019-04-25 ENCOUNTER — ANCILLARY PROCEDURE (OUTPATIENT)
Dept: ULTRASOUND IMAGING | Facility: CLINIC | Age: 79
End: 2019-04-25
Attending: INTERNAL MEDICINE
Payer: MEDICARE

## 2019-04-25 ENCOUNTER — ANCILLARY PROCEDURE (OUTPATIENT)
Dept: GENERAL RADIOLOGY | Facility: CLINIC | Age: 79
End: 2019-04-25
Attending: FAMILY MEDICINE
Payer: MEDICARE

## 2019-04-25 ENCOUNTER — OFFICE VISIT (OUTPATIENT)
Dept: ORTHOPEDICS | Facility: CLINIC | Age: 79
End: 2019-04-25
Payer: MEDICARE

## 2019-04-25 ENCOUNTER — OFFICE VISIT (OUTPATIENT)
Dept: INTERNAL MEDICINE | Facility: CLINIC | Age: 79
End: 2019-04-25
Payer: MEDICARE

## 2019-04-25 VITALS
BODY MASS INDEX: 23.56 KG/M2 | DIASTOLIC BLOOD PRESSURE: 81 MMHG | HEIGHT: 60 IN | WEIGHT: 120 LBS | SYSTOLIC BLOOD PRESSURE: 151 MMHG

## 2019-04-25 VITALS
SYSTOLIC BLOOD PRESSURE: 151 MMHG | WEIGHT: 120 LBS | BODY MASS INDEX: 23.25 KG/M2 | HEART RATE: 75 BPM | TEMPERATURE: 98.2 F | DIASTOLIC BLOOD PRESSURE: 81 MMHG

## 2019-04-25 DIAGNOSIS — S72.002A CLOSED FRACTURE OF LEFT HIP, INITIAL ENCOUNTER (H): ICD-10-CM

## 2019-04-25 DIAGNOSIS — M79.662 PAIN AND SWELLING OF LEFT LOWER LEG: ICD-10-CM

## 2019-04-25 DIAGNOSIS — S52.502A CLOSED FRACTURE OF DISTAL END OF LEFT RADIUS, UNSPECIFIED FRACTURE MORPHOLOGY, INITIAL ENCOUNTER: Primary | ICD-10-CM

## 2019-04-25 DIAGNOSIS — S72.002A HIP FRACTURE, LEFT, CLOSED, INITIAL ENCOUNTER (H): ICD-10-CM

## 2019-04-25 DIAGNOSIS — M79.89 PAIN AND SWELLING OF LEFT LOWER LEG: ICD-10-CM

## 2019-04-25 DIAGNOSIS — I10 ESSENTIAL HYPERTENSION: ICD-10-CM

## 2019-04-25 DIAGNOSIS — S52.502A CLOSED FRACTURE OF LEFT DISTAL RADIUS: ICD-10-CM

## 2019-04-25 DIAGNOSIS — S62.102A WRIST FRACTURE, LEFT, CLOSED, INITIAL ENCOUNTER: Primary | ICD-10-CM

## 2019-04-25 RX ORDER — TRAMADOL HYDROCHLORIDE 50 MG/1
50-100 TABLET ORAL
COMMUNITY
Start: 2019-04-23 | End: 2019-04-30

## 2019-04-25 RX ORDER — MELOXICAM 15 MG/1
15 TABLET ORAL
COMMUNITY
Start: 2019-04-23 | End: 2019-07-10

## 2019-04-25 RX ORDER — AMLODIPINE BESYLATE 10 MG/1
10 TABLET ORAL DAILY
Qty: 90 TABLET | Refills: 3 | Status: SHIPPED | OUTPATIENT
Start: 2019-04-25 | End: 2020-07-02

## 2019-04-25 RX ORDER — GABAPENTIN 100 MG/1
100 CAPSULE ORAL
COMMUNITY
Start: 2019-04-23 | End: 2019-07-10

## 2019-04-25 RX ORDER — FERROUS GLUCONATE 324(38)MG
324 TABLET ORAL
COMMUNITY

## 2019-04-25 ASSESSMENT — MIFFLIN-ST. JEOR: SCORE: 939.79

## 2019-04-25 ASSESSMENT — PAIN SCALES - GENERAL: PAINLEVEL: MODERATE PAIN (5)

## 2019-04-25 NOTE — PROGRESS NOTES
SPORTS & ORTHOPEDIC WALK-IN VISIT 4/25/2019    Primary Care Physician: Dr. Castellano    Fell in bathroom in [place], [place], fractured left wrist and left hip. 4/19/19.  Was hospitalized and underwent Hemiarthoplasty on 4/20. Hip feels okay, does have soreness in upper leg but has been walking and getting around okay.  Did do some physical therapy while in hospital.  She still has dressing in place and was told not to remove until 4/30/2019.  Does have some lower extremity swelling noted by pcp earlier today and LE U/S has been ordered.   Also sustained displaced fracture to distal radius.  Reports 2 separate attempts at reduction and being told that she still may require surgery.  Continues to be very uncomfortable.  Has had significant swelling in the hand after splint placement but denies any tingling or numbness in the fingers.  Records from hospitalization available in care everywhere and images in PACS.     Reason for visit:     What part of your body is injured / painful?  left hip and left wrist    What caused the injury /pain? Fall    How long ago did your injury occur or pain begin? a week ago    What are your most bothersome symptoms? Pain and Swelling    How would you characterize your symptom?  aching    What makes your symptoms better? Tylenol and Other: Tramadol, ice in the hospital     What makes your symptoms worse? Movement, getting up from seated position    Have you been previously seen for this problem? Yes, [place] and PCP    Medical History:    Any recent changes to your medical history? No    Any new medication prescribed since last visit? No    Have you had surgery on this body part before? Yes left hip hemiarthroscopy    Social History:    Occupation: Retired    Handedness: Right    Exercise:     Review of Systems:    Do you have fever, chills, weight loss? No    Do you have any vision problems? No    Do you have any chest pain or edema? No    Do you have any shortness of breath or  "wheezing?  No    Do you have stomach problems? No    Do you have any numbness or focal weakness? Yes, weakness/numbness in left leg    Do you have diabetes? No    Do you have problems with bleeding or clotting? No    Do you have an rashes or other skin lesions? Yes, eczema            Past Medical History, Current Medications, and Allergies are reviewed in the electronic medical record as appropriate.       EXAM:/81   Ht 1.53 m (5' 0.25\")   Wt 54.4 kg (120 lb)   BMI 23.24 kg/m      General: Alert, pleasant, no distress  Left wrist: She has significant tenderness to palpation over the distal radius and ulna.  There is mild deformity and swelling as well as ecchymosis overlying the dorsal wrist.  She has a mild to moderate edema of the digits in the left hand.  She is able to gently flex and extend all fingers.  Otherwise neurovascularly intact in the median, ulnar and radial nerve distributions of the left hand.    Imaging: X-rays of the left wrist and left hip were performed and reviewed independently demonstrating postsurgical changes of the left hip from hemiarthroplasty.  No sign of fracture or loosening.  Left wrist x-rays demonstrate comminuted and shortened distal radius and ulna fractures with slight volar angulation.  No significant change in alignment from previous films performed at outside hospital.  Please see EMR for formal radiology report.      Assessment: Patient is a 78 year old female with:  1.  Left hip fracture status post hemiarthroplasty.  Doing well postoperatively.  Pain well controlled.  2.  Left distal radius and ulnar fractures with significant comminution and displacement.  Would likely benefit from discussion of surgical options.     Recommendations:   Reviewed imaging and assessment with the patient in detail  For follow-up of her hip surgery, she will need to have dressing removed as well as wound check next week as previously directed by her surgeon in [place].  This can be " done either in this clinic or her primary care clinic.  She will then follow-up for a 6-week postop appointment with Dr. Morales in the orthopedics clinic.  For her left wrist fracture follow-up has been arranged to see Dr. Jean Baptiste of the orthopedic hand surgery department tomorrow morning.  For her comfort she was placed in a sugar tong splint prior to discharge.    Naga Malhotra MD      Cast/splint application  Date/Time: 4/25/2019 11:54 AM  Performed by: Speedy Robbins CMA  Authorized by: Naga Malhotra MD     Consent:     Consent obtained:  Verbal    Consent given by:  Patient    Risks discussed:  Discoloration, numbness, pain and swelling    Alternatives discussed:  No treatment  Pre-procedure details:     Sensation:  Normal  Procedure details:     Laterality:  Left    Location:  Wrist    Wrist:  L wrist    Strapping: no      Cast type:  Arm cylinder    Splint type:  Long arm (reverse sugar tong splint applied while patient in finger traps)    Supplies:  Fiberglass  Post-procedure details:     Pain:  Improved    Sensation:  Normal    Patient provided with cast or splint care instructions: Yes

## 2019-04-25 NOTE — PROGRESS NOTES
PRIMARY CARE CENTER       SUBJECTIVE:  Kimberly Chau is a 78 year old female with a PMHx of rheumatoid arthritis and hypertension who presents for hospital follow-up.    Patient was hospitalized on April 19, 2019 following a fall from standing height.  She suffered from a closed left hip fracture as well as a closed left wrist fracture.  She underwent surgery for the left hip fracture at a [facility] hospital in [place], and for left wrist has been set multiple times.  She currently does not have any follow-up with orthopedics.  Since hospital discharge, she reports ongoing pain primarily in her upper extremity at the left wrist.  In her lower extremity she has noted increased swelling over the past day.  In addition, she has tenderness in the muscles of the thigh.  She denies fevers, chills, nausea, vomiting,  diarrhea.  She is unsure when her last bowel movement was.    Patient states that she was unable to obtain home health care services directly after her hospitalization, as she was being cared for at a hospital in [place].  Patient lives at home with her  in a one-story house.    Patient reports that she has been out of amlodipine for several weeks.    Medications and allergies reviewed by me today.       ROS:   10-point ROS otherwise negative except for that noted in subjective.    CURRENT MEDICATIONS:  Medication List:   Current Outpatient Medications   Medication Sig     acetaminophen 650 MG TABS Take 650 mg by mouth every 4 hours as needed.     amLODIPine (NORVASC) 10 MG tablet Take 1 tablet (10 mg) by mouth daily     aspirin (ASA) 81 MG EC tablet Take 81 mg by mouth     Diphenhydramine-APAP, sleep, (TYLENOL PM EXTRA STRENGTH PO) Take 2 tablets by mouth At Bedtime      ferrous gluconate (FERGON) 324 (38 Fe) MG tablet Take 324 mg by mouth     gabapentin (NEURONTIN) 100 MG capsule Take 100 mg by mouth     meloxicam (MOBIC) 15 MG tablet Take 15 mg by mouth     metoprolol tartrate  (LOPRESSOR) 100 MG tablet Take 2 tablets (200 mg) by mouth 2 times daily     Multiple Vitamin (MULTI-VITAMIN) per tablet Take 1 tablet by mouth every morning      Omeprazole (PRILOSEC PO) Take 20 mg by mouth as needed      sodium chloride (OCEAN NASAL SPRAY) 0.65 % nasal spray Spray 1 spray into both nostrils daily as needed for congestion     traMADol (ULTRAM) 50 MG tablet Take  mg by mouth     DICLOFENAC SODIUM PO Take 25 mg by mouth every morning      FOLIC ACID 1 tablet every morning      METHOTREXATE 8 tablets once a week On Fridays.  Taking 8 tablets on Fridays     No current facility-administered medications for this visit.        OBJECTIVE:  /81   Pulse 75   Temp 98.2  F (36.8  C) (Oral)   Wt 54.4 kg (120 lb)   BMI 23.25 kg/m    Wt Readings from Last 1 Encounters:   04/25/19 54.4 kg (120 lb)   GENERAL: well-appearing, alert and no distress  EYES: EOMI, sclera-nonicteric  HENT: NCAT  RESP: normal respiratory effort on room air, CTAB - no rales, rhonchi or wheezes  CV: regular rate and rhythm, normal S1 S2, no S3 or S4, no murmur, click or rub; pedal and posterior tibial pulses +2 bilaterally   ABDOMEN: normoactive bowel sounds, soft, nontender, nondistended, no HSM or masses   MS/Ext: WWP  -L LE: ecchymosis, 2-3+ pitting edema to knee, tenderness to palpation over left hip, strength and ROM intact  -R LE: 1+ pitting edema to knee, nontender to palpation, strength and ROM intact  -L UE: ace wrap present from midhand to midarm, Heberden's and Dipesh's nodules present on all PIPs/DIPs, ecchymosis present on all digits, elbow and shoulder nontender to palpation, digit ROM slightly limited  -R UE: Heberden's and Dipesh's nodules present on all PIPs/DIPs, digit ROM and strength full   SKIN: as above  NEURO: AOx3, spontaneous movement of all extremities  PSYCH: interactive, affect normal/bright, speech normal, mentation and judgment appear normal     ASSESSMENT/PLAN:  Kimberly was seen today for  hospital f/u. Diagnoses and all orders for this visit:    Hip fracture, left, closed, initial encounter (H)  Wrist fracture, left, closed, initial encounter  Patient needs to be followed by Ortho for ongoing care for L hip fracture and to ensure appropriate setting of L wrist fracture. Will place referral or patient can be seen in walk-in clinic. In addition, patient's mobility has been severely decreased due to fractures and she will require PT/OT for rehabilitation at home as she cannot leave without significant assistance.   -     HOME CARE NURSING REFERRAL  -     ORTHOPEDICS ADULT REFERRAL  -     US Lower Extremity Venous Duplex Left; Future    Pain and swelling of left lower leg  Due to worsening L LE edema and ongoing pain, in the setting of orthopedic surgery, will obtain US to rule out DVT. Patient at risk from surgery decreased mobility and lack of anticoagulation.   -     US Lower Extremity Venous Duplex Left; Future    Essential hypertension  Patient has been hypertensive during last 2 clinic visits. Recommended that she restart amlodipine and follow-up with Dr. Castellano in 1 month.   -     amLODIPine (NORVASC) 10 MG tablet; Take 1 tablet (10 mg) by mouth daily         Patient should return to clinic for f/u with Dr. Castellano in 1 month.     Questions and concerns were addressed. Kimberly Chau participated in decision making and agrees with the above plan.    Gilmar Anderson MD, PhD  Internal Medicine, PGY-1  Pager: [phone]    Apr 25, 2019    Pt was seen and examined by me;  I agree with the detailed A/P documentation above.    Genet Joaquin MD

## 2019-04-25 NOTE — TELEPHONE ENCOUNTER
RECORDS RECEIVED FROM: Left distal radius and ulna fracture, records in care everywhere and internal   DATE RECEIVED: Apr 26, 2019    NOTES STATUS DETAILS   OFFICE NOTE from referring provider Internal Dr. Malhotra 04/25/19  Dr. Anderson 04/25/19    OFFICE NOTE from other specialist N/A    DISCHARGE SUMMARY from hospital N/A    DISCHARGE REPORT from the ER N/A    OPERATIVE REPORT N/A    MEDICATION LIST Internal    IMPLANT RECORD/STICKER N/A    LABS     CBC/DIFF N/A    CULTURES N/A    INJECTIONS DONE IN RADIOLOGY N/A    MRI N/A    CT SCAN N/A    XRAYS (IMAGES & REPORTS) Internal 04/25/19    TUMOR     PATHOLOGY  Slides & report N/A

## 2019-04-25 NOTE — PATIENT INSTRUCTIONS
[facility] Medication Refill Request Information:  * Please contact your pharmacy regarding ANY request for medication refills.  ** [facility] Prescription Fax = [phone]  * Please allow 3 business days for routine medication refills.  * Please allow 5 business days for controlled substance medication refills.     [facility] Test Result notification information:  *You will be notified with in 7-10 days of your appointment day regarding the results of your test.  If you are on MyChart you will be notified as soon as the provider has reviewed the results and signed off on them.    [facility]: [phone]            [facility]         Internal Medicine Resident                   Continuity Clinic    Who We Are    Resident Continuity Clinic is a part of the [facility] Primary Care Clinic.  Resident physicians see patients independently and establish a relationship with them over the course of their three-year residency program.  As with the Primary Care Clinic, our Resident Continuity Clinic models a group practice.  If your doctor is not available, you will be able to see another resident physician.  At the end of a resident s training, patients will be transitioned to a new resident physician for ongoing care.     We treat patients with a wide array of medical needs from routine physicals, to acute illnesses, to diabetes and blood pressure management, to complex medical illness.  What is a Resident Physician?    Resident physicians hold medical degrees and are doctors. They are training to become specialists in Internal Medicine. They work under the supervision of board-certified faculty physicians.  Expectations for Your Care    We strive to provide accessible, quality care at all times.    In order to provide this care, it is best to see your primary care resident doctor consistently rather switching between providers.  In the event you do see another physician, you should  schedule a follow-up visit with your usual primary care doctor.    If you are transitioning your care from another clinic, it is helpful to have your records available for your doctor to review.    We do not prescribe controlled substances, such as ADD medications or narcotic pain medications, on your first visit.  We will review your health records and concerns prior to devising a treatment plan with you in order to provide the best care.      Clinic Services     Extended clinic hours; patient  to help navigate your visit;  parking; laboratory and imaging services with evening and weekend hours    Multiple medical and surgical specialties in one building    Complementary services, including Nutrition, Integrative Medicine, Pharmacy consultations, Mental and Behavioral Health, Sports Medicine and Physical Therapy    Thank You    We would like to thank you for choosing the [facility] Internal Medicine Resident Continuity Clinic for your primary care. You are making a priceless contribution to the training of the next generation of health care practitioners.     Contact us at [phone] for appointments or questions.    Resident Clinic Hours are Tuesdays and Thursdays, 7:30am-5:00pm    Residents   Zurdo Seaman MD   (Male)   Brigid Carrasco MD  (Female)  Darling Alvarado MD   (Female)   Suzie Delacruz MD   (Female)   Gold Rose MD  (Male)   Ra Munguia MD  (Male)   Shireen Arteaga MD    (Female)   Thompson Singh MD  (Male)   Jase Sun MD  (Male)    Divina Acosta MD  (Female)   Parker Rodriguez MD  (Male)   Nora Anderson MD  (Female)   Dee Dee Corley MD    (Female)   Roberth Jones MD  (Male)   Naga Dillon MD  (Male)   Ra Shelley MD (Male)   Faustino Hernández MD  (Male)   Bela Robertson MD (Female)    Michelle Mercer MD (Female)   Nitish Mary MD  (Male)   Irais Saldana MD    (Female)   Yesi Sifuentes MD  (Female)    Supervising Physicians   Suni  Alberto, MD Evangelina Robbins, MD Lit Wilkinson, MD Musa Roe, MD Veronica Smith, MD Rosa Leone, MD Ian Sellers, MD Genet Gibbons, MD Marietta Hernadez MD

## 2019-04-25 NOTE — NURSING NOTE
Chief Complaint   Patient presents with     Hospital F/U     Pt is here to follow up from the hospital in [place]. Records are in CareEverywhere under [facility].     Alissa Lewis LPN at 8:21 AM on 4/25/2019.

## 2019-04-25 NOTE — LETTER
4/25/2019       RE: Kimberly Chau  [address]  [place] [address]     Dear Colleague,    Thank you for referring your patient, Kimberly Chau, to the [facility] SPORTS AND ORTHOPAEDIC WALK IN CLINIC at [facility]. Please see a copy of my visit note below.          SPORTS & ORTHOPEDIC WALK-IN VISIT 4/25/2019    Primary Care Physician: Dr. Castellano    Fell in bathroom in [place], [place], fractured left wrist and left hip. 4/19/19.  Was hospitalized and underwent Hemiarthoplasty on 4/20. Hip feels okay, does have soreness in upper leg but has been walking and getting around okay.  Did do some physical therapy while in hospital.  She still has dressing in place and was told not to remove until 4/30/2019.  Does have some lower extremity swelling noted by pcp earlier today and LE U/S has been ordered.   Also sustained displaced fracture to distal radius.  Reports 2 separate attempts at reduction and being told that she still may require surgery.  Continues to be very uncomfortable.  Has had significant swelling in the hand after splint placement but denies any tingling or numbness in the fingers.  Records from hospitalization available in care everywhere and images in PACS.     Reason for visit:     What part of your body is injured / painful?  left hip and left wrist    What caused the injury /pain? Fall    How long ago did your injury occur or pain begin? a week ago    What are your most bothersome symptoms? Pain and Swelling    How would you characterize your symptom?  aching    What makes your symptoms better? Tylenol and Other: Tramadol, ice in the hospital     What makes your symptoms worse? Movement, getting up from seated position    Have you been previously seen for this problem? Yes, [place] and PCP    Medical History:    Any recent changes to your medical history? No    Any new medication prescribed since last visit? No    Have you had surgery on this body part  "before? Yes left hip hemiarthroscopy    Social History:    Occupation: Retired    Handedness: Right    Exercise:     Review of Systems:    Do you have fever, chills, weight loss? No    Do you have any vision problems? No    Do you have any chest pain or edema? No    Do you have any shortness of breath or wheezing?  No    Do you have stomach problems? No    Do you have any numbness or focal weakness? Yes, weakness/numbness in left leg    Do you have diabetes? No    Do you have problems with bleeding or clotting? No    Do you have an rashes or other skin lesions? Yes, eczema        Past Medical History, Current Medications, and Allergies are reviewed in the electronic medical record as appropriate.       EXAM:/81   Ht 1.53 m (5' 0.25\")   Wt 54.4 kg (120 lb)   BMI 23.24 kg/m       General: Alert, pleasant, no distress  Left wrist: She has significant tenderness to palpation over the distal radius and ulna.  There is mild deformity and swelling as well as ecchymosis overlying the dorsal wrist.  She has a mild to moderate edema of the digits in the left hand.  She is able to gently flex and extend all fingers.  Otherwise neurovascularly intact in the median, ulnar and radial nerve distributions of the left hand.    Imaging: X-rays of the left wrist and left hip were performed and reviewed independently demonstrating postsurgical changes of the left hip from hemiarthroplasty.  No sign of fracture or loosening.  Left wrist x-rays demonstrate comminuted and shortened distal radius and ulna fractures with slight volar angulation.  No significant change in alignment from previous films performed at outside hospital.  Please see EMR for formal radiology report.    Assessment: Patient is a 78 year old female with:  1.  Left hip fracture status post hemiarthroplasty.  Doing well postoperatively.  Pain well controlled.  2.  Left distal radius and ulnar fractures with significant comminution and displacement.  Would " likely benefit from discussion of surgical options.     Recommendations:   Reviewed imaging and assessment with the patient in detail  For follow-up of her hip surgery, she will need to have dressing removed as well as wound check next week as previously directed by her surgeon in [place].  This can be done either in this clinic or her primary care clinic.  She will then follow-up for a 6-week postop appointment with Dr. Morales in the orthopedics clinic.  For her left wrist fracture follow-up has been arranged to see Dr. Jean Baptiste of the orthopedic hand surgery department tomorrow morning.  For her comfort she was placed in a sugar tong splint prior to discharge.    Naga Malhotra MD    Cast/splint application  Date/Time: 4/25/2019 11:54 AM  Performed by: Speedy Robbins CMA  Authorized by: Naga Malhotra MD     Consent:     Consent obtained:  Verbal    Consent given by:  Patient    Risks discussed:  Discoloration, numbness, pain and swelling    Alternatives discussed:  No treatment  Pre-procedure details:     Sensation:  Normal  Procedure details:     Laterality:  Left    Location:  Wrist    Wrist:  L wrist    Strapping: no      Cast type:  Arm cylinder    Splint type:  Long arm (reverse sugar tong splint applied while patient in finger traps)    Supplies:  Fiberglass  Post-procedure details:     Pain:  Improved    Sensation:  Normal    Patient provided with cast or splint care instructions: Yes

## 2019-04-26 ENCOUNTER — TELEPHONE (OUTPATIENT)
Dept: INTERNAL MEDICINE | Facility: CLINIC | Age: 79
End: 2019-04-26

## 2019-04-26 ENCOUNTER — MEDICAL CORRESPONDENCE (OUTPATIENT)
Dept: HEALTH INFORMATION MANAGEMENT | Facility: CLINIC | Age: 79
End: 2019-04-26

## 2019-04-26 ENCOUNTER — OFFICE VISIT (OUTPATIENT)
Dept: ORTHOPEDICS | Facility: CLINIC | Age: 79
End: 2019-04-26
Payer: MEDICARE

## 2019-04-26 ENCOUNTER — TELEPHONE (OUTPATIENT)
Dept: ORTHOPEDICS | Facility: CLINIC | Age: 79
End: 2019-04-26

## 2019-04-26 ENCOUNTER — PRE VISIT (OUTPATIENT)
Dept: ORTHOPEDICS | Facility: CLINIC | Age: 79
End: 2019-04-26

## 2019-04-26 VITALS — BODY MASS INDEX: 23.56 KG/M2 | WEIGHT: 120 LBS | HEIGHT: 60 IN

## 2019-04-26 DIAGNOSIS — M25.522 LEFT ELBOW PAIN: Primary | ICD-10-CM

## 2019-04-26 DIAGNOSIS — S52.552A OTHER CLOSED EXTRA-ARTICULAR FRACTURE OF DISTAL END OF LEFT RADIUS, INITIAL ENCOUNTER: ICD-10-CM

## 2019-04-26 ASSESSMENT — MIFFLIN-ST. JEOR: SCORE: 939.79

## 2019-04-26 NOTE — TELEPHONE ENCOUNTER
[facility] Call Center    Phone Message    May a detailed message be left on voicemail: yes    Reason for Call: Order(s): Home Care Orders: Physical Therapy (PT): 1x a week for 3 weeks - OT Eval - Verbal is okay    Action Taken: Message routed to:  [facility] ([facility]

## 2019-04-26 NOTE — NURSING NOTE
Teaching Flowsheet   Relevant Diagnosis: Left distal radius fracture  Teaching Topic: Pre-op for ORIF left distal radius fracture - scheduled at [facility] 04/30/19 with Dr Strong     Person(s) involved in teaching:   Patient  Spouse     Motivation Level:  Asks Questions: Yes  Eager to Learn: Yes  Cooperative: Yes  Receptive (willing/able to accept information): Yes  Any cultural factors/Catholic beliefs that may influence understanding or compliance? No     Family demonstrates understanding of the following:  Reason for the appointment, diagnosis and treatment plan: Yes  Knowledge of proper use of medications and conditions for which they are ordered (with special attention to potential side effects or drug interactions): Yes     Teaching Concerns Addressed:   Pre-op H&P in [facility] Clinic 04/29/19  Aware of post-op limitations     Proper use and care of post-op splint (medical equip, care aids, etc.): Yes  Nutritional needs and diet plan: Yes  Pain management techniques: Yes  Wound Care: Yes  How and/when to access community resources: Yes     Instructional Materials Used/Given: Pre-op packet, surgical soap     Time spent with patient: 12.

## 2019-04-26 NOTE — LETTER
4/26/2019       RE: Kimberly Chau  [address]  [place] [address]     Dear Colleague,    Thank you for referring your patient, Kimberly Chau, to the [facility] at [facility]. Please see a copy of my visit note below.    Orthopaedic Surgery Consultation  4/26/2019 10:26 AM   by Garry Jean Baptiste MD    Kimberly Chau MRN# [id number]   Age: 78 year old YOB: 1940     Reason for consult:  Left wrist injury          Assessment and Plan:   Assessment:   Closed left distal radius fracture, extra-articular, comminuted, displaced, left distal ulnar fracture, comminuted, displaced      Plan:   I did review the x-rays with Kimberly and her  this morning.  We discussed treatment options.  I recommended open reduction and internal fixation of the distal radius fracture.  I do not think the distal ulna will need to be addressed surgically.  I did discuss this with my partner Dr. Strong who is willing to do this early next week.  In the meantime we will place her in a splint for comfort.  Ice and elevation.  We will have her see medicine for clearance.  We discussed the risks and benefits of surgery as well as anticipated perioperative course.  These risks include but are not limited to infection, bleeding, nonunion, malunion, stiffness, scarring, injury to blood vessels, tendons, nerves.  All questions were answered.  Arrangements for surgery will be made at their earliest possible convenience.           History of Present Illness:   78 year old female with left wrist injury.  She fell last week.  This was in [place].  She had a hip fracture.  Hemiarthroplasty was performed for her hip fracture.  She also has a distal radius fracture which they tried to reduced closed.  Complains of pain in her left wrist but denies any other upper extremity injuries.  Denies numbness or tingling.         Past Medical History:     Patient Active Problem List    Diagnosis     Intrinsic atopic dermatitis     Essential hypertension     Rheumatoid arthritis (H)     Retinal artery occlusion     Cervical vertebral fracture (H)     Central retinal artery occlusion of right eye     Bilateral occipital neuralgia     C1-C2 instability     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 factor (H)     Osteoarthritis     Malignant hypertension     Hyponatremia     GERD (gastroesophageal reflux disease)     Eczematous dermatitis     Anxiety state     Anemia     Past Medical History:   Diagnosis Date     Anemia      Arthritis      Cataracts      Central retinal artery occlusion      Cervical spine fracture (H)     After a fall from orthostatic sx     Chronic pain     Back of head     Gastro-oesophageal reflux disease      Head injury      Hypertension      Hyponatremia     Resolved, 2/2 diuretics     Migraines      Osteoporosis      Pneumonia      Rheumatoid arthritis(714.0)             Past Surgical History:   Relevant surgical history as mentioned in HPI.  Past Surgical History:   Procedure Laterality Date     COLONOSCOPY       FUSION CERVICAL POSTERIOR THREE+ LEVELS  1/22/2013    Procedure: FUSION CERVICAL POSTERIOR THREE+ LEVELS;  Cervical 1-6 Posterior Instrumentation and Fusion, Cervical 3-4 Laminectomy  .Instrumentation and fusion to Cervical 6 *Latex Allergy*;  Surgeon: Ra Bar MD;  Location:  OR     GYN SURGERY       HEAD & NECK SURGERY       HYSTERECTOMY       KNEE SURGERY       OPTICAL TRACKING SYSTEM ENDOSCOPIC SINUS SURGERY Right 4/6/2018    Procedure: OPTICAL TRACKING SYSTEM ENDOSCOPIC SINUS SURGERY;  Stealth Assisted Right Maxillary Antrostomy, Anterior Ethmoidectomy And Frontal Sinusotomy;  Surgeon: Elyse Eisenberg MD;  Location:  OR     OPTICAL TRACKING SYSTEM ENDOSCOPIC SINUS SURGERY Right 8/3/2018    Procedure: OPTICAL TRACKING SYSTEM ENDOSCOPIC SINUS SURGERY;  Stealth Assisted Revision Right Frontal Sinusotomy, Right Stent  Placement  **Latex Allergy** ;  Surgeon: Elyse Eisenberg MD;  Location: [facility] OR     ORTHOPEDIC SURGERY              Social History:     Social History     Socioeconomic History     Marital status:      Spouse name: Not on file     Number of children: Not on file     Years of education: Not on file     Highest education level: Not on file   Occupational History     Not on file   Social Needs     Financial resource strain: Not on file     Food insecurity:     Worry: Not on file     Inability: Not on file     Transportation needs:     Medical: Not on file     Non-medical: Not on file   Tobacco Use     Smoking status: Never Smoker     Smokeless tobacco: Never Used   Substance and Sexual Activity     Alcohol use: Yes     Comment: Wine daily with dinner     Drug use: No     Sexual activity: Not on file   Lifestyle     Physical activity:     Days per week: Not on file     Minutes per session: Not on file     Stress: Not on file   Relationships     Social connections:     Talks on phone: Not on file     Gets together: Not on file     Attends Faith service: Not on file     Active member of club or organization: Not on file     Attends meetings of clubs or organizations: Not on file     Relationship status: Not on file     Intimate partner violence:     Fear of current or ex partner: Not on file     Emotionally abused: Not on file     Physically abused: Not on file     Forced sexual activity: Not on file   Other Topics Concern     Parent/sibling w/ CABG, MI or angioplasty before 65F 55M? Not Asked   Social History Narrative    Ms. Chau is , has two grown children and two grandchildren. She does not work. She was never a smoker, and drinks a glass of wine with dinner each night.      Smoking, EtOH,        Family History:     Family History   Problem Relation Age of Onset     Arthritis Mother      Respiratory Mother         pulmonary fibrosis     Hypertension Mother      Liver Disease Father         from EtOH      No history of problems with bleeding or anesthesia       Allergies:     Allergies   Allergen Reactions     Hctz Other (See Comments)     Pt develops symptomatic and significant hyponatremia with HCTZ exposure     Hydrochlorothiazide      Other reaction(s): Hyponatremia  Hyponatremia     Latex      Benzocaine Rash     carba mix,thrium-sunscreen     Resorcinol-Alcohol Rash     carba mix,thrium-sunscreen     Ace Inhibitors      Dizziness and orthostatic changes and electrolyte problems.     Sulfa Drugs           Medications:     Current Outpatient Medications   Medication Sig     acetaminophen 650 MG TABS Take 650 mg by mouth every 4 hours as needed.     amLODIPine (NORVASC) 10 MG tablet Take 1 tablet (10 mg) by mouth daily     aspirin (ASA) 81 MG EC tablet Take 81 mg by mouth     Diphenhydramine-APAP, sleep, (TYLENOL PM EXTRA STRENGTH PO) Take 2 tablets by mouth At Bedtime      ferrous gluconate (FERGON) 324 (38 Fe) MG tablet Take 324 mg by mouth     FOLIC ACID 1 tablet every morning      gabapentin (NEURONTIN) 100 MG capsule Take 100 mg by mouth     meloxicam (MOBIC) 15 MG tablet Take 15 mg by mouth     METHOTREXATE 8 tablets once a week On Fridays.  Taking 8 tablets on Fridays     metoprolol tartrate (LOPRESSOR) 100 MG tablet Take 2 tablets (200 mg) by mouth 2 times daily     Multiple Vitamin (MULTI-VITAMIN) per tablet Take 1 tablet by mouth every morning      Omeprazole (PRILOSEC PO) Take 20 mg by mouth as needed      sodium chloride (OCEAN NASAL SPRAY) 0.65 % nasal spray Spray 1 spray into both nostrils daily as needed for congestion     traMADol (ULTRAM) 50 MG tablet Take  mg by mouth     No current facility-administered medications for this visit.              Review of Systems:   A comprehensive 10 point review of systems (constitutional, ENT, cardiac, peripheral vascular, lymphatic, respiratory, GI, , Musculoskeletal, skin, Neurological) was performed and found to be negative except as described in  "this note.           Physical Exam:     EXAMINATION pertinent findings:   VITAL SIGNS: Height 1.53 m (5' 0.25\"), weight 54.4 kg (120 lb), not currently breastfeeding.  Body mass index is 23.24 kg/m .  RESP: non labored breathing   CARD: comfortable, no acute distress  ABD: benign   Exam of her left wrist after the sugar tong is removed demonstrates no open injuries but ecchymosis volarly and dorsally.  She is tender around the distal radius with market instability.  Diffuse arthritic changes noted in the fingers.  No elbow tenderness.  Full sensation noted throughout.  No signs of acute carpal tunnel syndrome.  No motor, no sensory deficits are noted.  No significant atrophy.  There is brisk capillary refill in all digits and a palpable radial pulse.  No overlying skin changes noted.          Data:     All laboratory data reviewed  All imaging studies reviewed by me.  She has a comminuted distal radius fracture, distal ulnar fracture which are displaced.  Extra-articular on both counts.  Pertinent Imaging and Lab Review:       Total Time = 20 min, 50% of which was spent in counseling and coordination of care as documented above.    Garry Jean Baptiste MD  Orthopedic Surgery, Upper Extremity  Cell [phone]     "

## 2019-04-26 NOTE — TELEPHONE ENCOUNTER
Called only number listed on file for pt. Left vm regarding scheduling an appt with Dr Morales for her hip according to the staff msg. Left clinic number to schedule.

## 2019-04-26 NOTE — PROGRESS NOTES
Orthopaedic Surgery Consultation  4/26/2019 10:26 AM   by Garry Jean Baptiste MD    Kimberly Chau MRN# [id number]   Age: 78 year old YOB: 1940     Reason for consult:  Left wrist injury                       Assessment and Plan:   Assessment:   Closed left distal radius fracture, extra-articular, comminuted, displaced, left distal ulnar fracture, comminuted, displaced      Plan:   I did review the x-rays with Kimberly and her  this morning.  We discussed treatment options.  I recommended open reduction and internal fixation of the distal radius fracture.  I do not think the distal ulna will need to be addressed surgically.  I did discuss this with my partner Dr. Strong who is willing to do this early next week.  In the meantime we will place her in a splint for comfort.  Ice and elevation.  We will have her see medicine for clearance.  We discussed the risks and benefits of surgery as well as anticipated perioperative course.  These risks include but are not limited to infection, bleeding, nonunion, malunion, stiffness, scarring, injury to blood vessels, tendons, nerves.  All questions were answered.  Arrangements for surgery will be made at their earliest possible convenience.              History of Present Illness:   78 year old female with left wrist injury.  She fell last week.  This was in [place].  She had a hip fracture.  Hemiarthroplasty was performed for her hip fracture.  She also has a distal radius fracture which they tried to reduced closed.  Complains of pain in her left wrist but denies any other upper extremity injuries.  Denies numbness or tingling.         Past Medical History:     Patient Active Problem List   Diagnosis     Intrinsic atopic dermatitis     Essential hypertension     Rheumatoid arthritis (H)     Retinal artery occlusion     Cervical vertebral fracture (H)     Central retinal artery occlusion of right eye     Bilateral occipital neuralgia     C1-C2 instability      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 factor (H)     Osteoarthritis     Malignant hypertension     Hyponatremia     GERD (gastroesophageal reflux disease)     Eczematous dermatitis     Anxiety state     Anemia     Past Medical History:   Diagnosis Date     Anemia      Arthritis      Cataracts      Central retinal artery occlusion      Cervical spine fracture (H)     After a fall from orthostatic sx     Chronic pain     Back of head     Gastro-oesophageal reflux disease      Head injury      Hypertension      Hyponatremia     Resolved, 2/2 diuretics     Migraines      Osteoporosis      Pneumonia      Rheumatoid arthritis(714.0)             Past Surgical History:   Relevant surgical history as mentioned in HPI.  Past Surgical History:   Procedure Laterality Date     COLONOSCOPY       FUSION CERVICAL POSTERIOR THREE+ LEVELS  1/22/2013    Procedure: FUSION CERVICAL POSTERIOR THREE+ LEVELS;  Cervical 1-6 Posterior Instrumentation and Fusion, Cervical 3-4 Laminectomy  .Instrumentation and fusion to Cervical 6 *Latex Allergy*;  Surgeon: Ra Bar MD;  Location:  OR     GYN SURGERY       HEAD & NECK SURGERY       HYSTERECTOMY       KNEE SURGERY       OPTICAL TRACKING SYSTEM ENDOSCOPIC SINUS SURGERY Right 4/6/2018    Procedure: OPTICAL TRACKING SYSTEM ENDOSCOPIC SINUS SURGERY;  Stealth Assisted Right Maxillary Antrostomy, Anterior Ethmoidectomy And Frontal Sinusotomy;  Surgeon: Elyse Eisenberg MD;  Location:  OR     OPTICAL TRACKING SYSTEM ENDOSCOPIC SINUS SURGERY Right 8/3/2018    Procedure: OPTICAL TRACKING SYSTEM ENDOSCOPIC SINUS SURGERY;  Stealth Assisted Revision Right Frontal Sinusotomy, Right Stent Placement  **Latex Allergy** ;  Surgeon: Elyse Eisenberg MD;  Location:  OR     ORTHOPEDIC SURGERY              Social History:     Social History     Socioeconomic History     Marital status:      Spouse name: Not on file     Number of children: Not on file     Years of  education: Not on file     Highest education level: Not on file   Occupational History     Not on file   Social Needs     Financial resource strain: Not on file     Food insecurity:     Worry: Not on file     Inability: Not on file     Transportation needs:     Medical: Not on file     Non-medical: Not on file   Tobacco Use     Smoking status: Never Smoker     Smokeless tobacco: Never Used   Substance and Sexual Activity     Alcohol use: Yes     Comment: Wine daily with dinner     Drug use: No     Sexual activity: Not on file   Lifestyle     Physical activity:     Days per week: Not on file     Minutes per session: Not on file     Stress: Not on file   Relationships     Social connections:     Talks on phone: Not on file     Gets together: Not on file     Attends Jainism service: Not on file     Active member of club or organization: Not on file     Attends meetings of clubs or organizations: Not on file     Relationship status: Not on file     Intimate partner violence:     Fear of current or ex partner: Not on file     Emotionally abused: Not on file     Physically abused: Not on file     Forced sexual activity: Not on file   Other Topics Concern     Parent/sibling w/ CABG, MI or angioplasty before 65F 55M? Not Asked   Social History Narrative    Ms. Chau is , has two grown children and two grandchildren. She does not work. She was never a smoker, and drinks a glass of wine with dinner each night.      Smoking, EtOH,        Family History:     Family History   Problem Relation Age of Onset     Arthritis Mother      Respiratory Mother         pulmonary fibrosis     Hypertension Mother      Liver Disease Father         from EtOH     No history of problems with bleeding or anesthesia       Allergies:     Allergies   Allergen Reactions     Hctz Other (See Comments)     Pt develops symptomatic and significant hyponatremia with HCTZ exposure     Hydrochlorothiazide      Other reaction(s):  "Hyponatremia  Hyponatremia     Latex      Benzocaine Rash     carba mix,thrium-sunscreen     Resorcinol-Alcohol Rash     carba mix,thrium-sunscreen     Ace Inhibitors      Dizziness and orthostatic changes and electrolyte problems.     Sulfa Drugs           Medications:     Current Outpatient Medications   Medication Sig     acetaminophen 650 MG TABS Take 650 mg by mouth every 4 hours as needed.     amLODIPine (NORVASC) 10 MG tablet Take 1 tablet (10 mg) by mouth daily     aspirin (ASA) 81 MG EC tablet Take 81 mg by mouth     Diphenhydramine-APAP, sleep, (TYLENOL PM EXTRA STRENGTH PO) Take 2 tablets by mouth At Bedtime      ferrous gluconate (FERGON) 324 (38 Fe) MG tablet Take 324 mg by mouth     FOLIC ACID 1 tablet every morning      gabapentin (NEURONTIN) 100 MG capsule Take 100 mg by mouth     meloxicam (MOBIC) 15 MG tablet Take 15 mg by mouth     METHOTREXATE 8 tablets once a week On Fridays.  Taking 8 tablets on Fridays     metoprolol tartrate (LOPRESSOR) 100 MG tablet Take 2 tablets (200 mg) by mouth 2 times daily     Multiple Vitamin (MULTI-VITAMIN) per tablet Take 1 tablet by mouth every morning      Omeprazole (PRILOSEC PO) Take 20 mg by mouth as needed      sodium chloride (OCEAN NASAL SPRAY) 0.65 % nasal spray Spray 1 spray into both nostrils daily as needed for congestion     traMADol (ULTRAM) 50 MG tablet Take  mg by mouth     No current facility-administered medications for this visit.              Review of Systems:   A comprehensive 10 point review of systems (constitutional, ENT, cardiac, peripheral vascular, lymphatic, respiratory, GI, , Musculoskeletal, skin, Neurological) was performed and found to be negative except as described in this note.           Physical Exam:     EXAMINATION pertinent findings:   VITAL SIGNS: Height 1.53 m (5' 0.25\"), weight 54.4 kg (120 lb), not currently breastfeeding.  Body mass index is 23.24 kg/m .  RESP: non labored breathing   CARD: comfortable, no acute " distress  ABD: benign   Exam of her left wrist after the sugar tong is removed demonstrates no open injuries but ecchymosis volarly and dorsally.  She is tender around the distal radius with market instability.  Diffuse arthritic changes noted in the fingers.  No elbow tenderness.  Full sensation noted throughout.  No signs of acute carpal tunnel syndrome.  No motor, no sensory deficits are noted.  No significant atrophy.  There is brisk capillary refill in all digits and a palpable radial pulse.  No overlying skin changes noted.            Data:     All laboratory data reviewed  All imaging studies reviewed by me.  She has a comminuted distal radius fracture, distal ulnar fracture which are displaced.  Extra-articular on both counts.  Pertinent Imaging and Lab Review:       Total Time = 20 min, 50% of which was spent in counseling and coordination of care as documented above.    Garry Jean Baptiste MD  Orthopedic Surgery, Upper Extremity  Cell [phone]

## 2019-04-26 NOTE — NURSING NOTE
"Reason For Visit:   Chief Complaint   Patient presents with     Consult     Closed fracture of distal end of left radius, unspecified fracture morphology       Primary MD: Guillermo Castellano  Ref. MD: Paul    Age: 78 year old    ?  No      Ht 1.53 m (5' 0.25\")   Wt 54.4 kg (120 lb)   BMI 23.24 kg/m        Pain Assessment  Patient Currently in Pain: Yes  0-10 Pain Scale: 10  Primary Pain Location: Wrist(Elbow )    Hand Dominance Evaluation  Hand Dominance: Right          QuickDASH Assessment  No flowsheet data found.       Current Outpatient Medications   Medication Sig Dispense Refill     acetaminophen 650 MG TABS Take 650 mg by mouth every 4 hours as needed. 100 tablet 0     amLODIPine (NORVASC) 10 MG tablet Take 1 tablet (10 mg) by mouth daily 90 tablet 3     aspirin (ASA) 81 MG EC tablet Take 81 mg by mouth       Diphenhydramine-APAP, sleep, (TYLENOL PM EXTRA STRENGTH PO) Take 2 tablets by mouth At Bedtime        ferrous gluconate (FERGON) 324 (38 Fe) MG tablet Take 324 mg by mouth       FOLIC ACID 1 tablet every morning        gabapentin (NEURONTIN) 100 MG capsule Take 100 mg by mouth       meloxicam (MOBIC) 15 MG tablet Take 15 mg by mouth       METHOTREXATE 8 tablets once a week On Fridays.  Taking 8 tablets on Fridays       metoprolol tartrate (LOPRESSOR) 100 MG tablet Take 2 tablets (200 mg) by mouth 2 times daily 360 tablet 1     Multiple Vitamin (MULTI-VITAMIN) per tablet Take 1 tablet by mouth every morning        Omeprazole (PRILOSEC PO) Take 20 mg by mouth as needed        sodium chloride (OCEAN NASAL SPRAY) 0.65 % nasal spray Spray 1 spray into both nostrils daily as needed for congestion 1 Bottle 3     traMADol (ULTRAM) 50 MG tablet Take  mg by mouth         Allergies   Allergen Reactions     Hctz Other (See Comments)     Pt develops symptomatic and significant hyponatremia with HCTZ exposure     Hydrochlorothiazide      Other reaction(s): Hyponatremia  Hyponatremia     Latex      " Benzocaine Rash     carba mix,thrium-sunscreen     Resorcinol-Alcohol Rash     carba mix,thrium-sunscreen     Ace Inhibitors      Dizziness and orthostatic changes and electrolyte problems.     Sulfa Drugs        Saturnino Albrecht, CMA

## 2019-04-29 ENCOUNTER — ANESTHESIA EVENT (OUTPATIENT)
Dept: SURGERY | Facility: AMBULATORY SURGERY CENTER | Age: 79
End: 2019-04-29

## 2019-04-29 ENCOUNTER — OFFICE VISIT (OUTPATIENT)
Dept: FAMILY MEDICINE | Facility: CLINIC | Age: 79
End: 2019-04-29
Payer: MEDICARE

## 2019-04-29 VITALS
OXYGEN SATURATION: 95 % | WEIGHT: 125.7 LBS | HEIGHT: 60 IN | BODY MASS INDEX: 24.68 KG/M2 | SYSTOLIC BLOOD PRESSURE: 145 MMHG | DIASTOLIC BLOOD PRESSURE: 65 MMHG | HEART RATE: 70 BPM

## 2019-04-29 DIAGNOSIS — S52.552K OTHER CLOSED EXTRA-ARTICULAR FRACTURE OF DISTAL END OF LEFT RADIUS WITH NONUNION, SUBSEQUENT ENCOUNTER: ICD-10-CM

## 2019-04-29 DIAGNOSIS — Z01.818 PREOPERATIVE EXAMINATION: ICD-10-CM

## 2019-04-29 DIAGNOSIS — Z01.818 PREOPERATIVE EXAMINATION: Primary | ICD-10-CM

## 2019-04-29 LAB
ALBUMIN SERPL-MCNC: 3 G/DL (ref 3.4–5)
ALP SERPL-CCNC: 123 U/L (ref 40–150)
ALT SERPL W P-5'-P-CCNC: 23 U/L (ref 0–50)
ANION GAP SERPL CALCULATED.3IONS-SCNC: 10 MMOL/L (ref 3–14)
AST SERPL W P-5'-P-CCNC: 29 U/L (ref 0–45)
BILIRUB SERPL-MCNC: 0.6 MG/DL (ref 0.2–1.3)
BUN SERPL-MCNC: 12 MG/DL (ref 7–30)
CALCIUM SERPL-MCNC: 8.5 MG/DL (ref 8.5–10.1)
CHLORIDE SERPL-SCNC: 93 MMOL/L (ref 94–109)
CO2 SERPL-SCNC: 22 MMOL/L (ref 20–32)
CREAT SERPL-MCNC: 0.96 MG/DL (ref 0.52–1.04)
ERYTHROCYTE [DISTWIDTH] IN BLOOD BY AUTOMATED COUNT: 14.1 % (ref 10–15)
GFR SERPL CREATININE-BSD FRML MDRD: 56 ML/MIN/{1.73_M2}
GLUCOSE SERPL-MCNC: 67 MG/DL (ref 70–99)
HCT VFR BLD AUTO: 28.2 % (ref 35–47)
HGB BLD-MCNC: 9.3 G/DL (ref 11.7–15.7)
MCH RBC QN AUTO: 33.9 PG (ref 26.5–33)
MCHC RBC AUTO-ENTMCNC: 33 G/DL (ref 31.5–36.5)
MCV RBC AUTO: 103 FL (ref 78–100)
PLATELET # BLD AUTO: 472 10E9/L (ref 150–450)
POTASSIUM SERPL-SCNC: 3.8 MMOL/L (ref 3.4–5.3)
PROT SERPL-MCNC: 6.8 G/DL (ref 6.8–8.8)
RBC # BLD AUTO: 2.74 10E12/L (ref 3.8–5.2)
SODIUM SERPL-SCNC: 126 MMOL/L (ref 133–144)
WBC # BLD AUTO: 8.6 10E9/L (ref 4–11)

## 2019-04-29 ASSESSMENT — MIFFLIN-ST. JEOR: SCORE: 961.67

## 2019-04-29 ASSESSMENT — PAIN SCALES - GENERAL: PAINLEVEL: MODERATE PAIN (5)

## 2019-04-29 NOTE — NURSING NOTE
Performed EKG, patient tolerated it well.  Results given to physician.     Fallon Wright [facility]  2:30 PM 4/29/2019

## 2019-04-29 NOTE — LETTER
4/29/2019      RE: Kimberly Chau  [address]  [facility] [address]          [facility]  [address] [place] [address]  5th Floor  [facility] [address]  Phone: [phone]  Fax: [phone]    4/29/2019    Adult PRE-OP Evaluation:    Kimberly Chau, 1940 presents for pre-operative evaluation and assessment as requested by Dr. Strong, prior to undergoing surgery/procedure for treatment of closed fracture of distal end of left radius.      History of presenting illness: Kimberly was hospitalized on April 19, 2019 following a fall from standing height in the bathroom while in [place], [place]. She suffered a closed left hip fracture as well as a closed left wrist fracture.  She underwent hemiarthoplasty in [place], [place] on 4/20 for the left hip fracture at a hospital in [place], and her left wrist has been set multiple times. She was evaluated by a hand surgeon on 4/25/19 at which time x-rays were reviewed and treatment options were discussed. It was recommended the patient undergo open reduction and internal fixation of the distal radius fracture.     Proposed procedure: Open Reduction Internal Fixation Left Distal Radius Fracture    Date of Surgery/ Procedure: 4/30/19  Hospital/Surgical Facility: [facility] [address] [place] [address]     Primary Physician: Guillermo Castellano  Type of Anesthesia Anticipated: Regional  History of anesthesia complications: NONE  History of  abnormal bleeding: NONE   History of blood transfusions: NO  Patient has a Health Care Directive or Living Will:  YES     Preoperative Screening Questions   1- NO - Do you ever have any pain or discomfort in your chest?  2- NO - Have you ever had a severe pain across the front of your chest lasting for half an hour or more?  3- YES - Do you have swelling in your feet or ankles at times? Yes, has had ongoing lower extremity swelling in both legs, L>R since her left hip arthroplasty.   4- NO -  Are you troubled by shortness of breath when: walking on the level/ up a slight hill/ at night?  5- NO - Does your chest ever sound wheezy or whistling?  6- NO - Do you currently have a cold, bronchitis or other respiratory infection?  7- NO - Have you had a cold, bronchitis or other respiratory infection within the last 2 weeks?  8- YES - Do you usually have a cough? Sometimes at night.   9- NO - Do you sometimes get pains in the calves of your legs when you walk?  10-NO - Do you or anyone in your family have previous history of blood clots?  11-NO - Do you or does anyone in your family have serious bleeding problem such as prolonged bleeding following surgeries or cuts?  12-YES - Have you ever had problems with anemia or been told to take iron pills? Currently prescribed iron supplementation, hasn't taken since prior to left hip arthroplasty on 19.  13-NO - Have you had any abnormal blood loss such as black, tarry or bloody stools, or abnormal vaginal bleeding?  14-NO - Have you or any of your relatives ever had problems with anesthesia?  15-NO - Do you snore or stop breathing at night?  16-NO - Do you have any prosthetic heart valves or joints?  17-NO - Is there any chance that you may be pregnant?       Audit C Alcohol Screening Tool  AUDIT   Questions 0 1 2 3 4 Score   1. How often do you have a drink  containing alcohol? Never Monthly or less 2 to 4  times a  month 2 to 3  times a  week 4 or more  times a  week 4   2. How many drinks containing alcohol  do you have on a typical day when you are drinking? 1 or 2 3 or 4 5 or 6 7 to 9 10 or more 0   3. How often do you have more than five  or more drinks on one occasion? Never Less  than  monthly Monthly Weekly Daily or  almost  daily 0     Scorin. A score of 4 for adult men or 3 for adult women is an indication of hazardous drinking (risk for physical or physiological harm). A score of 5 or more for adult men or 4 or more for adult women is an  indication of an alcohol use disorder.    Sleep apnea clinical score: STOP-Bang Questionnaire Score: 2, Low Risk for WERNER      Past Medical History:   Diagnosis Date     Anemia      Arthritis      Cataracts      Central retinal artery occlusion      Cervical spine fracture (H)     After a fall from orthostatic sx     Chronic pain     Back of head     Gastro-oesophageal reflux disease      Head injury      Hypertension      Hyponatremia     Resolved, 2/2 diuretics     Migraines      Osteoporosis      Pneumonia 2018     Rheumatoid arthritis(714.0)      Past Surgical History:   Procedure Laterality Date     COLONOSCOPY       EYE SURGERY Left 02/2019    Hole in macula     FUSION CERVICAL POSTERIOR THREE+ LEVELS  1/22/2013    Procedure: FUSION CERVICAL POSTERIOR THREE+ LEVELS;  Cervical 1-6 Posterior Instrumentation and Fusion, Cervical 3-4 Laminectomy  .Instrumentation and fusion to Cervical 6 *Latex Allergy*;  Surgeon: Ra Bar MD;  Location: [facility] OR     GYN SURGERY       HEAD & NECK SURGERY       HYSTERECTOMY       KNEE SURGERY       OPTICAL TRACKING SYSTEM ENDOSCOPIC SINUS SURGERY Right 4/6/2018    Procedure: OPTICAL TRACKING SYSTEM ENDOSCOPIC SINUS SURGERY;  Stealth Assisted Right Maxillary Antrostomy, Anterior Ethmoidectomy And Frontal Sinusotomy;  Surgeon: Elyse Eisenberg MD;  Location:  OR     OPTICAL TRACKING SYSTEM ENDOSCOPIC SINUS SURGERY Right 8/3/2018    Procedure: OPTICAL TRACKING SYSTEM ENDOSCOPIC SINUS SURGERY;  Stealth Assisted Revision Right Frontal Sinusotomy, Right Stent Placement  **Latex Allergy** ;  Surgeon: Elyse Eisenberg MD;  Location: [facility]     ORTHOPEDIC SURGERY       Family History   Problem Relation Age of Onset     Arthritis Mother      Respiratory Mother         pulmonary fibrosis     Hypertension Mother      Anemia Mother      Liver Disease Father         from EtOH     Alcoholism Father      Multiple Sclerosis Sister      No Known Problems Maternal Grandmother      Heart  Disease Maternal Grandfather      Breast Cancer Sister      Glaucoma Paternal Grandfather      Heart Disease Paternal Grandfather      Social History     Socioeconomic History     Marital status:      Spouse name: Not on file     Number of children: Not on file     Years of education: Not on file     Highest education level: Not on file   Occupational History     Not on file   Social Needs     Financial resource strain: Not on file     Food insecurity:     Worry: Not on file     Inability: Not on file     Transportation needs:     Medical: Not on file     Non-medical: Not on file   Tobacco Use     Smoking status: Never Smoker     Smokeless tobacco: Never Used   Substance and Sexual Activity     Alcohol use: Yes     Comment: Wine daily with dinner     Drug use: No     Sexual activity: Not on file   Lifestyle     Physical activity:     Days per week: Not on file     Minutes per session: Not on file     Stress: Not on file   Relationships     Social connections:     Talks on phone: Not on file     Gets together: Not on file     Attends Voodoo service: Not on file     Active member of club or organization: Not on file     Attends meetings of clubs or organizations: Not on file     Relationship status: Not on file     Intimate partner violence:     Fear of current or ex partner: Not on file     Emotionally abused: Not on file     Physically abused: Not on file     Forced sexual activity: Not on file   Other Topics Concern     Parent/sibling w/ CABG, MI or angioplasty before 65F 55M? Not Asked   Social History Narrative    Ms. Chau is , has two grown children and two grandchildren. She does not work. She was never a smoker, and drinks a glass of wine with dinner each night.        Current Outpatient Medications on File Prior to Visit:  acetaminophen 650 MG TABS Take 650 mg by mouth every 4 hours as needed.   amLODIPine (NORVASC) 10 MG tablet Take 1 tablet (10 mg) by mouth daily   Diphenhydramine-APAP,  sleep, (TYLENOL PM EXTRA STRENGTH PO) Take 2 tablets by mouth At Bedtime    ferrous gluconate (FERGON) 324 (38 Fe) MG tablet Take 324 mg by mouth   FOLIC ACID 1 tablet every morning    gabapentin (NEURONTIN) 100 MG capsule Take 100 mg by mouth   meloxicam (MOBIC) 15 MG tablet Take 15 mg by mouth   METHOTREXATE 8 tablets once a week On Fridays.Taking 8 tablets on Fridays   metoprolol tartrate (LOPRESSOR) 100 MG tablet Take 2 tablets (200 mg) by mouth 2 times daily   Multiple Vitamin (MULTI-VITAMIN) per tablet Take 1 tablet by mouth every morning    Omeprazole (PRILOSEC PO) Take 20 mg by mouth as needed    sodium chloride (OCEAN NASAL SPRAY) 0.65 % nasal spray Spray 1 spray into both nostrils daily as needed for congestion   traMADol (ULTRAM) 50 MG tablet Take  mg by mouth   aspirin (ASA) 81 MG EC tablet Take 81 mg by mouth     No current facility-administered medications on file prior to visit.      Not currently taking methotrexate or folic acid. Only takes folic acid if taking methotrexate.   Last dose of ASA 81 mg about 4 days ago.     OTC products: None except as listed above.     Allergies   Allergen Reactions     Hctz Other (See Comments)     Pt develops symptomatic and significant hyponatremia with HCTZ exposure     Hydrochlorothiazide      Other reaction(s): Hyponatremia  Hyponatremia     Latex      Benzocaine Rash     carba mix,thrium-sunscreen     Resorcinol-Alcohol Rash     carba mix,thrium-sunscreen     Ace Inhibitors      Dizziness and orthostatic changes and electrolyte problems.     Sulfa Drugs      Pt unsure if she is truly allergic.        Latex Allergy: YES: Precautions to take: gets rash with exposure to latex.       REVIEW OF SYSTEMS:   CONSTITUTIONAL: NEGATIVE for fever, chills, change in weight  INTEGUMENTARY/SKIN: NEGATIVE for worrisome rashes, moles or lesions  EYES: NEGATIVE for vision changes or irritation  ENT/MOUTH: NEGATIVE for ear, mouth and throat problems  RESP: NEGATIVE for  significant cough or SOB  BREAST: NEGATIVE for masses, tenderness or discharge  CV: NEGATIVE for chest pain, palpitations or peripheral edema  GI: NEGATIVE for nausea, abdominal pain, heartburn, or change in bowel habits  : NEGATIVE for frequency, dysuria, or hematuria  MUSCULOSKELETAL: POSITIVE for significant arthralgias, s/p left hip arthroplasty and left elbow fracture.   NEURO: NEGATIVE for weakness, dizziness or paresthesias  ENDOCRINE: NEGATIVE for temperature intolerance, skin/hair changes  HEME: NEGATIVE for bleeding problems  PSYCHIATRIC: NEGATIVE for changes in mood or affect    EXAM:     Patient Vitals for the past 24 hrs:   BP Pulse SpO2 Height Weight   04/29/19 1402 145/65 70 95 % 1.524 m (5') 57 kg (125 lb 11.2 oz)     Body mass index is 24.55 kg/m .    Constitutional: appears to be in no acute distress, comfortable, pleasant.   Eyes: anicteric, conjunctiva clear without drainage or erythema. BARB.   Ears, Nose and Throat: unable to visualize TM bilaterally secondary to ceruminosis,  nose without nasal discharge. OP: no erythema to posterior pharynx, negative post nasal drainage, tonsils +1 no erythema or exudate.  Neck: supple, thyroid palpable,not enlarged, no nodules   Breast: Deferred.  Cardiovascular: regular rate and rhythm, normal S1 and S2, no murmurs, rubs or gallops, peripheral pulses full and symmetric; bilateral lower extremity pitting edema, L>R. 3+ pitting edema noted to left lower extremity, compression stocking in place. 2+ pitting edema to right lower extremity extending up to knee.   Respiratory: Air entry throughout. Breathing pattern unlabored without the use of accessory muscles. Clear to auscultation A and P, no wheezes or crackles, normal breath sounds.    Gastrointestinal: rounded, soft. Positive bowel sounds x4, nontender, no masses.   Genitourinary: Deferred.  Musculoskeletal: limited range of motion secondary to left hip injury, left elbow in splint and sling.   Skin:  pink, turgor smooth and elastic. Negative for lesions or dryness.  Neurological: normal gait, no tremor. Ambulates with assistance of cane.   Psychological: appropriate mood and affect.   Lymphatic: no cervical, axillary, supraclavicular, or infraclavicular lymphadenopathy.    DIAGNOSTICS:      EKG: appears normal, NSR with HR 67, normal axis, normal intervals, no acute ST/T changes c/w ischemia. Unable to get optimal tracing secondary to patient inability to tolerate lying flat with pain to left hip and left elbow.    Laboratory testing pending:  CBC with Platelets  Comprehensive Metabolic Panel    Type & Screen 4/19/19  ABO TYPING: O  RH: POSITIVE  ABSC: NEGATIVE    RISK ASSESSMENT:     Cardiovascular Risk:  -Patient is able to perform ADL's without assistance without chest pain.  -The patient does not have chest pain at rest or exertion.  -Patient does not have a history of congestive heart failure.    -The patient does not have a history of stroke and does not have a history of valvular disease.  - History of hypertension, currently managed on metoprolol 200 mg bid and amlodipine 10 mg daily.     Pulmonary Risk:  -In terms of risk factors for pulmonary complication, the patient is older then 60.    Perioperative Complications:  -The patient does not have a history of bleeding or clotting problems in the past.    -The patient has not had complications from surgeries.    -The patient does not have a family history of any anesthesia or surgical complications.      IMPRESSION:     Reason for surgery/procedure: Closed fracture of distal end of left radius.     The proposed surgical procedure is considered INTERMEDIATE risk.    For above listed surgery and anesthesia:   Patient is at high risk for surgery/procedure and perioperative/procedure complications. Proceed with caution.    RECOMMENDATIONS:     Labs:  CBC with platelets  Comprehensive metabolic panel    Type & Screen 4/19/19  ABO TYPING: O  RH: POSITIVE  ABSC:  NEGATIVE    Fasting:  NPO for 12 hours prior to surgery    Preop Plan:  --Approval given to proceed with proposed procedure, without further diagnostic evaluation.    --Patient is currently taking    Anticoagulant or Antiplatelet Medication Use  ASPIRIN: Discontinue ASA 7-10 days prior to procedure to reduce bleeding risk. Pt reports last dose of aspirin administered about 4 days ago. Aspirin started after hip surgery 4/20/19, was never on this medication prior to that.   --Patient has anemia and does not require treatment prior to surgery. Currently prescribed iron supplement but is not taking at this time for reasons that are unclear, CBC with platelets is pending. Monitor Hemoglobin postoperatively. Pt reports baseline Hgb is 9.0. Last hgb/hct 4/23/19, results 7.7, hematocrit 23.4.     Medications:  Patient should take their regular medications the morning of surgery unless otherwise instructed. Plan to hold Meloxicam morning of surgery.   Hold aspirin 7 days prior to surgery.  Hold ibuprofen for 5 days prior.      KEMI Olmedo CNP    Please contact our office if there are any further questions or information required about this patient.    KEMI Olmedo CNP

## 2019-04-29 NOTE — NURSING NOTE
Chief Complaint   Patient presents with     Pre-Op Exam     Pt is here for a pre op      Vital signs:      BP: 145/65 Pulse: 70     SpO2: 95 %     Height: 152.4 cm (5') Weight: 57 kg (125 lb 11.2 oz)  Estimated body mass index is 24.55 kg/m  as calculated from the following:    Height as of this encounter: 1.524 m (5').    Weight as of this encounter: 57 kg (125 lb 11.2 oz).    Fallon Wright [facility]  2:05 PM 4/29/2019

## 2019-04-29 NOTE — PROGRESS NOTES
[facility] NUR[facility]ACT[facility] CLIN[facility]  [address] [place] [address]  5th Floor  [facility] [address]  Phone: [phone]  Fax: [phone]    4/29/2019    Adult PRE-OP Evaluation:    Kimberly Chau, 1940 presents for pre-operative evaluation and assessment as requested by Dr. Strong, prior to undergoing surgery/procedure for treatment of closed fracture of distal end of left radius.      History of presenting illness: Kimberly was hospitalized on April 19, 2019 following a fall from standing height in the bathroom while in [place], [place]. She suffered a closed left hip fracture as well as a closed left wrist fracture.  She underwent hemiarthoplasty in [place], [place] on 4/20 for the left hip fracture at a hospital in [place], and her left wrist has been set multiple times. She was evaluated by a hand surgeon on 4/25/19 at which time x-rays were reviewed and treatment options were discussed. It was recommended the patient undergo open reduction and internal fixation of the distal radius fracture.     Proposed procedure: Open Reduction Internal Fixation Left Distal Radius Fracture    Date of Surgery/ Procedure: 4/30/19  Hospital/Surgical Facility: [facility] [address] [place] [address]     Primary Physician: Guillermo Castellano  Type of Anesthesia Anticipated: Regional  History of anesthesia complications: NONE  History of  abnormal bleeding: NONE   History of blood transfusions: NO  Patient has a Health Care Directive or Living Will:  YES     Preoperative Screening Questions   1- NO - Do you ever have any pain or discomfort in your chest?  2- NO - Have you ever had a severe pain across the front of your chest lasting for half an hour or more?  3- YES - Do you have swelling in your feet or ankles at times? Yes, has had ongoing lower extremity swelling in both legs, L>R since her left hip arthroplasty.   4- NO - Are you troubled by shortness of breath when: walking on the level/ up a slight  hill/ at night?  5- NO - Does your chest ever sound wheezy or whistling?  6- NO - Do you currently have a cold, bronchitis or other respiratory infection?  7- NO - Have you had a cold, bronchitis or other respiratory infection within the last 2 weeks?  8- YES - Do you usually have a cough? Sometimes at night.   9- NO - Do you sometimes get pains in the calves of your legs when you walk?  10-NO - Do you or anyone in your family have previous history of blood clots?  11-NO - Do you or does anyone in your family have serious bleeding problem such as prolonged bleeding following surgeries or cuts?  12-YES - Have you ever had problems with anemia or been told to take iron pills? Currently prescribed iron supplementation, hasn't taken since prior to left hip arthroplasty on 19.  13-NO - Have you had any abnormal blood loss such as black, tarry or bloody stools, or abnormal vaginal bleeding?  14-NO - Have you or any of your relatives ever had problems with anesthesia?  15-NO - Do you snore or stop breathing at night?  16-NO - Do you have any prosthetic heart valves or joints?  17-NO - Is there any chance that you may be pregnant?       Audit C Alcohol Screening Tool  AUDIT   Questions 0 1 2 3 4 Score   1. How often do you have a drink  containing alcohol? Never Monthly or less 2 to 4  times a  month 2 to 3  times a  week 4 or more  times a  week 4   2. How many drinks containing alcohol  do you have on a typical day when you are drinking? 1 or 2 3 or 4 5 or 6 7 to 9 10 or more 0   3. How often do you have more than five  or more drinks on one occasion? Never Less  than  monthly Monthly Weekly Daily or  almost  daily 0     Scorin. A score of 4 for adult men or 3 for adult women is an indication of hazardous drinking (risk for physical or physiological harm). A score of 5 or more for adult men or 4 or more for adult women is an indication of an alcohol use disorder.    Sleep apnea clinical score: STOP-Bang  Questionnaire Score: 2, Low Risk for WERNER      Past Medical History:   Diagnosis Date     Anemia      Arthritis      Cataracts      Central retinal artery occlusion      Cervical spine fracture (H)     After a fall from orthostatic sx     Chronic pain     Back of head     Gastro-oesophageal reflux disease      Head injury      Hypertension      Hyponatremia     Resolved, 2/2 diuretics     Migraines      Osteoporosis      Pneumonia 2018     Rheumatoid arthritis(714.0)      Past Surgical History:   Procedure Laterality Date     COLONOSCOPY       EYE SURGERY Left 02/2019    Hole in macula     FUSION CERVICAL POSTERIOR THREE+ LEVELS  1/22/2013    Procedure: FUSION CERVICAL POSTERIOR THREE+ LEVELS;  Cervical 1-6 Posterior Instrumentation and Fusion, Cervical 3-4 Laminectomy  .Instrumentation and fusion to Cervical 6 *Latex Allergy*;  Surgeon: Ra Bar MD;  [facility]     GYN SURGERY       HEAD & NECK SURGERY       HYSTERECTOMY       KNEE SURGERY       OPTICAL TRACKING SYSTEM ENDOSCOPIC SINUS SURGERY Right 4/6/2018    Procedure: OPTICAL TRACKING SYSTEM ENDOSCOPIC SINUS SURGERY;  Stealth Assisted Right Maxillary Antrostomy, Anterior Ethmoidectomy And Frontal Sinusotomy;  Surgeon: Elyse Eisenberg MD;  Location:  OR     OPTICAL TRACKING SYSTEM ENDOSCOPIC SINUS SURGERY Right 8/3/2018    Procedure: OPTICAL TRACKING SYSTEM ENDOSCOPIC SINUS SURGERY;  Stealth Assisted Revision Right Frontal Sinusotomy, Right Stent Placement  **Latex Allergy** ;  Surgeon: Elyse Eisenberg MD;  [facility]     ORTHOPEDIC SURGERY       Family History   Problem Relation Age of Onset     Arthritis Mother      Respiratory Mother         pulmonary fibrosis     Hypertension Mother      Anemia Mother      Liver Disease Father         from EtOH     Alcoholism Father      Multiple Sclerosis Sister      No Known Problems Maternal Grandmother      Heart Disease Maternal Grandfather      Breast Cancer Sister      Glaucoma Paternal  Grandfather      Heart Disease Paternal Grandfather      Social History     Socioeconomic History     Marital status:      Spouse name: Not on file     Number of children: Not on file     Years of education: Not on file     Highest education level: Not on file   Occupational History     Not on file   Social Needs     Financial resource strain: Not on file     Food insecurity:     Worry: Not on file     Inability: Not on file     Transportation needs:     Medical: Not on file     Non-medical: Not on file   Tobacco Use     Smoking status: Never Smoker     Smokeless tobacco: Never Used   Substance and Sexual Activity     Alcohol use: Yes     Comment: Wine daily with dinner     Drug use: No     Sexual activity: Not on file   Lifestyle     Physical activity:     Days per week: Not on file     Minutes per session: Not on file     Stress: Not on file   Relationships     Social connections:     Talks on phone: Not on file     Gets together: Not on file     Attends Bahai service: Not on file     Active member of club or organization: Not on file     Attends meetings of clubs or organizations: Not on file     Relationship status: Not on file     Intimate partner violence:     Fear of current or ex partner: Not on file     Emotionally abused: Not on file     Physically abused: Not on file     Forced sexual activity: Not on file   Other Topics Concern     Parent/sibling w/ CABG, MI or angioplasty before 65F 55M? Not Asked   Social History Narrative    Ms. Chau is , has two grown children and two grandchildren. She does not work. She was never a smoker, and drinks a glass of wine with dinner each night.        Current Outpatient Medications on File Prior to Visit:  acetaminophen 650 MG TABS Take 650 mg by mouth every 4 hours as needed.   amLODIPine (NORVASC) 10 MG tablet Take 1 tablet (10 mg) by mouth daily   Diphenhydramine-APAP, sleep, (TYLENOL PM EXTRA STRENGTH PO) Take 2 tablets by mouth At Bedtime     ferrous gluconate (FERGON) 324 (38 Fe) MG tablet Take 324 mg by mouth   FOLIC ACID 1 tablet every morning    gabapentin (NEURONTIN) 100 MG capsule Take 100 mg by mouth   meloxicam (MOBIC) 15 MG tablet Take 15 mg by mouth   METHOTREXATE 8 tablets once a week On Fridays.Taking 8 tablets on Fridays   metoprolol tartrate (LOPRESSOR) 100 MG tablet Take 2 tablets (200 mg) by mouth 2 times daily   Multiple Vitamin (MULTI-VITAMIN) per tablet Take 1 tablet by mouth every morning    Omeprazole (PRILOSEC PO) Take 20 mg by mouth as needed    sodium chloride (OCEAN NASAL SPRAY) 0.65 % nasal spray Spray 1 spray into both nostrils daily as needed for congestion   traMADol (ULTRAM) 50 MG tablet Take  mg by mouth   aspirin (ASA) 81 MG EC tablet Take 81 mg by mouth     No current facility-administered medications on file prior to visit.      Not currently taking methotrexate or folic acid. Only takes folic acid if taking methotrexate.   Last dose of ASA 81 mg about 4 days ago.     OTC products: None except as listed above.     Allergies   Allergen Reactions     Hctz Other (See Comments)     Pt develops symptomatic and significant hyponatremia with HCTZ exposure     Hydrochlorothiazide      Other reaction(s): Hyponatremia  Hyponatremia     Latex      Benzocaine Rash     carba mix,thrium-sunscreen     Resorcinol-Alcohol Rash     carba mix,thrium-sunscreen     Ace Inhibitors      Dizziness and orthostatic changes and electrolyte problems.     Sulfa Drugs      Pt unsure if she is truly allergic.        Latex Allergy: YES: Precautions to take: gets rash with exposure to latex.       REVIEW OF SYSTEMS:   CONSTITUTIONAL: NEGATIVE for fever, chills, change in weight  INTEGUMENTARY/SKIN: NEGATIVE for worrisome rashes, moles or lesions  EYES: NEGATIVE for vision changes or irritation  ENT/MOUTH: NEGATIVE for ear, mouth and throat problems  RESP: NEGATIVE for significant cough or SOB  BREAST: NEGATIVE for masses, tenderness or  discharge  CV: NEGATIVE for chest pain, palpitations or peripheral edema  GI: NEGATIVE for nausea, abdominal pain, heartburn, or change in bowel habits  : NEGATIVE for frequency, dysuria, or hematuria  MUSCULOSKELETAL: POSITIVE for significant arthralgias, s/p left hip arthroplasty and left elbow fracture.   NEURO: NEGATIVE for weakness, dizziness or paresthesias  ENDOCRINE: NEGATIVE for temperature intolerance, skin/hair changes  HEME: NEGATIVE for bleeding problems  PSYCHIATRIC: NEGATIVE for changes in mood or affect    EXAM:     Patient Vitals for the past 24 hrs:   BP Pulse SpO2 Height Weight   04/29/19 1402 145/65 70 95 % 1.524 m (5') 57 kg (125 lb 11.2 oz)     Body mass index is 24.55 kg/m .    Constitutional: appears to be in no acute distress, comfortable, pleasant.   Eyes: anicteric, conjunctiva clear without drainage or erythema. BARB.   Ears, Nose and Throat: unable to visualize TM bilaterally secondary to ceruminosis,  nose without nasal discharge. OP: no erythema to posterior pharynx, negative post nasal drainage, tonsils +1 no erythema or exudate.  Neck: supple, thyroid palpable,not enlarged, no nodules   Breast: Deferred.  Cardiovascular: regular rate and rhythm, normal S1 and S2, no murmurs, rubs or gallops, peripheral pulses full and symmetric; bilateral lower extremity pitting edema, L>R. 3+ pitting edema noted to left lower extremity, compression stocking in place. 2+ pitting edema to right lower extremity extending up to knee.   Respiratory: Air entry throughout. Breathing pattern unlabored without the use of accessory muscles. Clear to auscultation A and P, no wheezes or crackles, normal breath sounds.    Gastrointestinal: rounded, soft. Positive bowel sounds x4, nontender, no masses.   Genitourinary: Deferred.  Musculoskeletal: limited range of motion secondary to left hip injury, left elbow in splint and sling.   Skin: pink, turgor smooth and elastic. Negative for lesions or  dryness.  Neurological: normal gait, no tremor. Ambulates with assistance of cane.   Psychological: appropriate mood and affect.   Lymphatic: no cervical, axillary, supraclavicular, or infraclavicular lymphadenopathy.    DIAGNOSTICS:      EKG: appears normal, NSR with HR 67, normal axis, normal intervals, no acute ST/T changes c/w ischemia. Unable to get optimal tracing secondary to patient inability to tolerate lying flat with pain to left hip and left elbow.    Laboratory testing pending:  CBC with Platelets  Comprehensive Metabolic Panel    Type & Screen 4/19/19  ABO TYPING: O  RH: POSITIVE  ABSC: NEGATIVE    RISK ASSESSMENT:     Cardiovascular Risk:  -Patient is able to perform ADL's without assistance without chest pain.  -The patient does not have chest pain at rest or exertion.  -Patient does not have a history of congestive heart failure.    -The patient does not have a history of stroke and does not have a history of valvular disease.  - History of hypertension, currently managed on metoprolol 200 mg bid and amlodipine 10 mg daily.     Pulmonary Risk:  -In terms of risk factors for pulmonary complication, the patient is older then 60.    Perioperative Complications:  -The patient does not have a history of bleeding or clotting problems in the past.    -The patient has not had complications from surgeries.    -The patient does not have a family history of any anesthesia or surgical complications.      IMPRESSION:     Reason for surgery/procedure: Closed fracture of distal end of left radius.     The proposed surgical procedure is considered INTERMEDIATE risk.    For above listed surgery and anesthesia:   Patient is at high risk for surgery/procedure and perioperative/procedure complications. Proceed with caution.    RECOMMENDATIONS:     Labs:  CBC with platelets  Comprehensive metabolic panel    Type & Screen 4/19/19  ABO TYPING: O  RH: POSITIVE  ABSC: NEGATIVE    Fasting:  NPO for 12 hours prior to  surgery    Preop Plan:  --Approval given to proceed with proposed procedure, without further diagnostic evaluation.    --Patient is currently taking    Anticoagulant or Antiplatelet Medication Use  ASPIRIN: Discontinue ASA 7-10 days prior to procedure to reduce bleeding risk. Pt reports last dose of aspirin administered about 4 days ago. Aspirin started after hip surgery 4/20/19, was never on this medication prior to that.   --Patient has anemia and does not require treatment prior to surgery. Currently prescribed iron supplement but is not taking at this time for reasons that are unclear, CBC with platelets is pending. Monitor Hemoglobin postoperatively. Pt reports baseline Hgb is 9.0. Last hgb/hct 4/23/19, results 7.7, hematocrit 23.4.     Medications:  Patient should take their regular medications the morning of surgery unless otherwise instructed. Plan to hold Meloxicam morning of surgery.   Hold aspirin 7 days prior to surgery.  Hold ibuprofen for 5 days prior.      KEMI Olmedo CNP    Please contact our office if there are any further questions or information required about this patient.

## 2019-04-29 NOTE — ANESTHESIA PREPROCEDURE EVALUATION
Anesthesia Pre-Procedure Evaluation    Patient: Kimberly Chau   MRN:     [id number] Gender:   female   Age:    78 year old :      1940        Preoperative Diagnosis: * No surgery found *        Past Medical History:   Diagnosis Date     Anemia      Arthritis      Cataracts      Central retinal artery occlusion      Cervical spine fracture (H)     After a fall from orthostatic sx     Chronic pain     Back of head     Gastro-oesophageal reflux disease      Head injury      Hypertension      Hyponatremia     Resolved, 2/2 diuretics     Migraines      Osteoporosis      Pneumonia 2018     Rheumatoid arthritis(714.0)       Past Surgical History:   Procedure Laterality Date     COLONOSCOPY       EYE SURGERY Left 2019    Hole in macula     FUSION CERVICAL POSTERIOR THREE+ LEVELS  2013    Procedure: FUSION CERVICAL POSTERIOR THREE+ LEVELS;  Cervical 1-6 Posterior Instrumentation and Fusion, Cervical 3-4 Laminectomy  .Instrumentation and fusion to Cervical 6 *Latex Allergy*;  Surgeon: Ra Bar MD;  [facility]     GYN SURGERY       HEAD & NECK SURGERY       HYSTERECTOMY       KNEE SURGERY       OPTICAL TRACKING SYSTEM ENDOSCOPIC SINUS SURGERY Right 2018    Procedure: OPTICAL TRACKING SYSTEM ENDOSCOPIC SINUS SURGERY;  Stealth Assisted Right Maxillary Antrostomy, Anterior Ethmoidectomy And Frontal Sinusotomy;  Surgeon: Elyse Eisenberg MD;  [facility]     OPTICAL TRACKING SYSTEM ENDOSCOPIC SINUS SURGERY Right 8/3/2018    Procedure: OPTICAL TRACKING SYSTEM ENDOSCOPIC SINUS SURGERY;  Stealth Assisted Revision Right Frontal Sinusotomy, Right Stent Placement  **Latex Allergy** ;  Surgeon: Elyse Eisenberg MD;  [facility]     ORTHOPEDIC SURGERY            Anesthesia Evaluation     . Pt has had prior anesthetic. Type: General    No history of anesthetic complications          ROS/MED HX    ENT/Pulmonary:  - neg pulmonary ROS     Neurologic:  - neg neurologic ROS     Cardiovascular: Comment:  Evaluated last year for possible arrhythmia with Dr. Castellano.  Work-up was all within normal results.     (+) hypertension----. : . . . :. .       METS/Exercise Tolerance:  >4 METS   Hematologic:     (+) Anemia, -      Musculoskeletal: Comment: RA, neck fx s/p fusion, hip fracture  (+)  other musculoskeletal-       GI/Hepatic:     (+) GERD       Renal/Genitourinary:  - ROS Renal section negative       Endo:  - neg endo ROS       Psychiatric:     (+) psychiatric history anxiety      Infectious Disease:  - neg infectious disease ROS       Malignancy:      - no malignancy   Other:                         PHYSICAL EXAM:   Mental Status/Neuro: A/A/O   Airway: Facies: Feasible  Mallampati: II  Mouth/Opening: Full  TM distance: > 6 cm  Neck ROM: Full   Respiratory: Auscultation: CTAB     Resp. Rate: Normal     Resp. Effort: Normal      CV: Rhythm: Regular  Rate: Age appropriate  Heart: Normal Sounds   Comments:      Dental:  Dental Comments: Missing teeth                Lab Results   Component Value Date    WBC 8.6 04/29/2019    HGB 9.3 (L) 04/29/2019    HCT 28.2 (L) 04/29/2019     (H) 04/29/2019    CRP 3.2 10/11/2017    SED 15 10/11/2017     (L) 04/29/2019    POTASSIUM 3.8 04/29/2019    CHLORIDE 93 (L) 04/29/2019    CO2 PENDING 04/29/2019    BUN PENDING 04/29/2019    CR PENDING 04/29/2019    GLC PENDING 04/29/2019    KINA PENDING 04/29/2019    PHOS 3.9 07/14/2011    MAG 1.7 06/28/2010    ALBUMIN PENDING 04/29/2019    PROTTOTAL PENDING 04/29/2019    ALT PENDING 04/29/2019    AST PENDING 04/29/2019    ALKPHOS PENDING 04/29/2019    BILITOTAL PENDING 04/29/2019    PTT 31 01/22/2013    INR 0.93 01/22/2013    TSH 1.26 04/27/2015    T4 0.77 09/02/2011       Preop Vitals  BP Readings from Last 3 Encounters:   04/29/19 145/65   04/25/19 151/81   04/25/19 151/81    Pulse Readings from Last 3 Encounters:   04/29/19 70   04/25/19 75   04/17/19 78      Resp Readings from Last 3 Encounters:   02/04/19 18   09/18/18 18    08/03/18 16    SpO2 Readings from Last 3 Encounters:   04/29/19 95%   02/04/19 97%   10/16/18 97%      Temp Readings from Last 1 Encounters:   04/25/19 36.8  C (98.2  F) (Oral)    Ht Readings from Last 1 Encounters:   04/29/19 1.524 m (5')      Wt Readings from Last 1 Encounters:   04/29/19 57 kg (125 lb 11.2 oz)    Estimated body mass index is 24.55 kg/m  as calculated from the following:    Height as of 4/29/19: 1.524 m (5').    Weight as of 4/29/19: 57 kg (125 lb 11.2 oz).     LDA:            Assessment:   ASA SCORE: 3    NPO Status: > 6 hours since completed Solid Foods   Documentation: H&P complete; Preop Testing complete; Consents complete   Proceeding: Proceed without further delay  Tobacco Use:  NO Active use of Tobacco/UNKNOWN Tobacco use status     Plan:   Anes. Type:  Regional; MAC     RA Details:  Pre Induction; Exparel; SS     RA-Location/Type: Nerve Block; Supraclavicular   Pre-Induction: Opioid IV; Acetaminophen PO   Induction:  IV (Standard)   Airway: Native Airway   Access/Monitoring: PIV   Maintenance: Propofol; IV   Emergence: Procedure Site   Logistics: Same Day Surgery     Postop Pain/Sedation Strategy:  Standard-Options: Opioids PRN; Exparel; Block SS     PONV Management:  Adult Risk Factors: Female, Non-Smoker, Postop Opioids  Prevention: Propofol Infusion; Ondansetron     CONSENT: Direct conversation   Plan and risks discussed with: Patient   Blood Products: Consent Deferred (Minimal Blood Loss)                    PAC Discussion and Assessment    ASA Classification:   Case is suitable for:   Anesthetic techniques and relevant risks discussed:   Invasive monitoring and risk discussed:   Types:   Possibility and Risk of blood transfusion discussed:   NPO instructions given:   Additional anesthetic preparation and risks discussed:   Needs early admission to pre-op area:   Other:     PAC Resident/NP Anesthesia Assessment:          Mid-Level Provider/Resident:   Date:   Time:     Attending  Anesthesiologist Anesthesia Assessment:        Anesthesiologist:   Date:   Time:   Pass/Fail:   Disposition:     PAC Pharmacist Assessment:        Pharmacist:   Date:   Time:        Boone Martinez MD

## 2019-04-30 ENCOUNTER — ANCILLARY PROCEDURE (OUTPATIENT)
Dept: RADIOLOGY | Facility: AMBULATORY SURGERY CENTER | Age: 79
End: 2019-04-30
Attending: ORTHOPAEDIC SURGERY
Payer: MEDICARE

## 2019-04-30 ENCOUNTER — ANESTHESIA (OUTPATIENT)
Dept: SURGERY | Facility: AMBULATORY SURGERY CENTER | Age: 79
End: 2019-04-30

## 2019-04-30 ENCOUNTER — HOSPITAL ENCOUNTER (OUTPATIENT)
Facility: AMBULATORY SURGERY CENTER | Age: 79
End: 2019-04-30
Attending: ORTHOPAEDIC SURGERY
Payer: MEDICARE

## 2019-04-30 VITALS
RESPIRATION RATE: 12 BRPM | HEIGHT: 60 IN | SYSTOLIC BLOOD PRESSURE: 135 MMHG | TEMPERATURE: 98.2 F | WEIGHT: 125.66 LBS | OXYGEN SATURATION: 97 % | HEART RATE: 65 BPM | BODY MASS INDEX: 24.67 KG/M2 | DIASTOLIC BLOOD PRESSURE: 86 MMHG

## 2019-04-30 DIAGNOSIS — S62.102A LEFT WRIST FRACTURE: ICD-10-CM

## 2019-04-30 DIAGNOSIS — S52.509A CLOSED FRACTURE OF DISTAL END OF RADIUS, UNSPECIFIED FRACTURE MORPHOLOGY, UNSPECIFIED LATERALITY, INITIAL ENCOUNTER: Primary | ICD-10-CM

## 2019-04-30 LAB — INTERPRETATION ECG - MUSE: NORMAL

## 2019-04-30 DEVICE — GRAFT BONE CHIPS CANC 30ML 27615030: Type: IMPLANTABLE DEVICE | Site: WRIST | Status: FUNCTIONAL

## 2019-04-30 RX ORDER — SODIUM CHLORIDE, SODIUM LACTATE, POTASSIUM CHLORIDE, CALCIUM CHLORIDE 600; 310; 30; 20 MG/100ML; MG/100ML; MG/100ML; MG/100ML
INJECTION, SOLUTION INTRAVENOUS CONTINUOUS
Status: DISCONTINUED | OUTPATIENT
Start: 2019-04-30 | End: 2019-05-01 | Stop reason: HOSPADM

## 2019-04-30 RX ORDER — NALOXONE HYDROCHLORIDE 0.4 MG/ML
.1-.4 INJECTION, SOLUTION INTRAMUSCULAR; INTRAVENOUS; SUBCUTANEOUS
Status: DISCONTINUED | OUTPATIENT
Start: 2019-04-30 | End: 2019-04-30 | Stop reason: HOSPADM

## 2019-04-30 RX ORDER — SODIUM CHLORIDE, SODIUM LACTATE, POTASSIUM CHLORIDE, CALCIUM CHLORIDE 600; 310; 30; 20 MG/100ML; MG/100ML; MG/100ML; MG/100ML
INJECTION, SOLUTION INTRAVENOUS CONTINUOUS
Status: DISCONTINUED | OUTPATIENT
Start: 2019-04-30 | End: 2019-04-30 | Stop reason: HOSPADM

## 2019-04-30 RX ORDER — PROPOFOL 10 MG/ML
INJECTION, EMULSION INTRAVENOUS CONTINUOUS PRN
Status: DISCONTINUED | OUTPATIENT
Start: 2019-04-30 | End: 2019-04-30

## 2019-04-30 RX ORDER — PROPOFOL 10 MG/ML
INJECTION, EMULSION INTRAVENOUS PRN
Status: DISCONTINUED | OUTPATIENT
Start: 2019-04-30 | End: 2019-04-30

## 2019-04-30 RX ORDER — LIDOCAINE 40 MG/G
CREAM TOPICAL
Status: DISCONTINUED | OUTPATIENT
Start: 2019-04-30 | End: 2019-04-30 | Stop reason: HOSPADM

## 2019-04-30 RX ORDER — OXYCODONE HYDROCHLORIDE 5 MG/1
5 TABLET ORAL EVERY 4 HOURS PRN
Status: DISCONTINUED | OUTPATIENT
Start: 2019-04-30 | End: 2019-05-01 | Stop reason: HOSPADM

## 2019-04-30 RX ORDER — ONDANSETRON 4 MG/1
4 TABLET, ORALLY DISINTEGRATING ORAL EVERY 30 MIN PRN
Status: DISCONTINUED | OUTPATIENT
Start: 2019-04-30 | End: 2019-05-01 | Stop reason: HOSPADM

## 2019-04-30 RX ORDER — TRAMADOL HYDROCHLORIDE 50 MG/1
50-100 TABLET ORAL EVERY 6 HOURS PRN
Status: DISCONTINUED | OUTPATIENT
Start: 2019-04-30 | End: 2019-05-01 | Stop reason: HOSPADM

## 2019-04-30 RX ORDER — TRAMADOL HYDROCHLORIDE 50 MG/1
50-100 TABLET ORAL EVERY 6 HOURS PRN
Qty: 20 TABLET | Refills: 0 | Status: SHIPPED | OUTPATIENT
Start: 2019-04-30 | End: 2019-05-06

## 2019-04-30 RX ORDER — BUPIVACAINE HYDROCHLORIDE AND EPINEPHRINE 5; 5 MG/ML; UG/ML
INJECTION, SOLUTION PERINEURAL PRN
Status: DISCONTINUED | OUTPATIENT
Start: 2019-04-30 | End: 2019-04-30

## 2019-04-30 RX ORDER — NALOXONE HYDROCHLORIDE 0.4 MG/ML
.1-.4 INJECTION, SOLUTION INTRAMUSCULAR; INTRAVENOUS; SUBCUTANEOUS
Status: DISCONTINUED | OUTPATIENT
Start: 2019-04-30 | End: 2019-05-01 | Stop reason: HOSPADM

## 2019-04-30 RX ORDER — FENTANYL CITRATE 50 UG/ML
25-50 INJECTION, SOLUTION INTRAMUSCULAR; INTRAVENOUS
Status: DISCONTINUED | OUTPATIENT
Start: 2019-04-30 | End: 2019-04-30 | Stop reason: HOSPADM

## 2019-04-30 RX ORDER — LIDOCAINE HYDROCHLORIDE 20 MG/ML
INJECTION, SOLUTION INFILTRATION; PERINEURAL PRN
Status: DISCONTINUED | OUTPATIENT
Start: 2019-04-30 | End: 2019-04-30

## 2019-04-30 RX ORDER — ONDANSETRON 2 MG/ML
INJECTION INTRAMUSCULAR; INTRAVENOUS PRN
Status: DISCONTINUED | OUTPATIENT
Start: 2019-04-30 | End: 2019-04-30

## 2019-04-30 RX ORDER — FLUMAZENIL 0.1 MG/ML
0.2 INJECTION, SOLUTION INTRAVENOUS
Status: DISCONTINUED | OUTPATIENT
Start: 2019-04-30 | End: 2019-04-30 | Stop reason: HOSPADM

## 2019-04-30 RX ORDER — ACETAMINOPHEN 325 MG/1
975 TABLET ORAL ONCE
Status: DISCONTINUED | OUTPATIENT
Start: 2019-04-30 | End: 2019-04-30 | Stop reason: HOSPADM

## 2019-04-30 RX ORDER — CEFAZOLIN SODIUM 2 G/50ML
2 SOLUTION INTRAVENOUS
Status: COMPLETED | OUTPATIENT
Start: 2019-04-30 | End: 2019-04-30

## 2019-04-30 RX ORDER — CEFAZOLIN SODIUM 1 G/50ML
1 SOLUTION INTRAVENOUS SEE ADMIN INSTRUCTIONS
Status: DISCONTINUED | OUTPATIENT
Start: 2019-04-30 | End: 2019-04-30 | Stop reason: HOSPADM

## 2019-04-30 RX ORDER — KETAMINE HYDROCHLORIDE 10 MG/ML
INJECTION, SOLUTION INTRAMUSCULAR; INTRAVENOUS PRN
Status: DISCONTINUED | OUTPATIENT
Start: 2019-04-30 | End: 2019-04-30

## 2019-04-30 RX ORDER — MEPERIDINE HYDROCHLORIDE 25 MG/ML
12.5 INJECTION INTRAMUSCULAR; INTRAVENOUS; SUBCUTANEOUS
Status: DISCONTINUED | OUTPATIENT
Start: 2019-04-30 | End: 2019-05-01 | Stop reason: HOSPADM

## 2019-04-30 RX ORDER — FENTANYL CITRATE 50 UG/ML
25-50 INJECTION, SOLUTION INTRAMUSCULAR; INTRAVENOUS
Status: DISCONTINUED | OUTPATIENT
Start: 2019-04-30 | End: 2019-05-01 | Stop reason: HOSPADM

## 2019-04-30 RX ORDER — ONDANSETRON 2 MG/ML
4 INJECTION INTRAMUSCULAR; INTRAVENOUS EVERY 30 MIN PRN
Status: DISCONTINUED | OUTPATIENT
Start: 2019-04-30 | End: 2019-05-01 | Stop reason: HOSPADM

## 2019-04-30 RX ADMIN — FENTANYL CITRATE 50 MCG: 50 INJECTION, SOLUTION INTRAMUSCULAR; INTRAVENOUS at 13:21

## 2019-04-30 RX ADMIN — PROPOFOL: 10 INJECTION, EMULSION INTRAVENOUS at 13:49

## 2019-04-30 RX ADMIN — Medication 0.5 MG: at 15:00

## 2019-04-30 RX ADMIN — TRAMADOL HYDROCHLORIDE 50 MG: 50 TABLET ORAL at 15:25

## 2019-04-30 RX ADMIN — PROPOFOL 30 MG: 10 INJECTION, EMULSION INTRAVENOUS at 13:21

## 2019-04-30 RX ADMIN — FENTANYL CITRATE 25 MCG: 50 INJECTION, SOLUTION INTRAMUSCULAR; INTRAVENOUS at 12:57

## 2019-04-30 RX ADMIN — SODIUM CHLORIDE, SODIUM LACTATE, POTASSIUM CHLORIDE, CALCIUM CHLORIDE: 600; 310; 30; 20 INJECTION, SOLUTION INTRAVENOUS at 11:28

## 2019-04-30 RX ADMIN — LIDOCAINE HYDROCHLORIDE 40 MG: 20 INJECTION, SOLUTION INFILTRATION; PERINEURAL at 13:00

## 2019-04-30 RX ADMIN — PROPOFOL 50 MCG/KG/MIN: 10 INJECTION, EMULSION INTRAVENOUS at 13:00

## 2019-04-30 RX ADMIN — PROPOFOL 30 MG: 10 INJECTION, EMULSION INTRAVENOUS at 13:00

## 2019-04-30 RX ADMIN — FENTANYL CITRATE 25 MCG: 50 INJECTION, SOLUTION INTRAMUSCULAR; INTRAVENOUS at 13:13

## 2019-04-30 RX ADMIN — SODIUM CHLORIDE, SODIUM LACTATE, POTASSIUM CHLORIDE, CALCIUM CHLORIDE: 600; 310; 30; 20 INJECTION, SOLUTION INTRAVENOUS at 14:35

## 2019-04-30 RX ADMIN — CEFAZOLIN SODIUM 2 G: 2 SOLUTION INTRAVENOUS at 13:05

## 2019-04-30 RX ADMIN — Medication 0.5 MG: at 14:49

## 2019-04-30 RX ADMIN — KETAMINE HYDROCHLORIDE 20 MG: 10 INJECTION, SOLUTION INTRAMUSCULAR; INTRAVENOUS at 13:29

## 2019-04-30 RX ADMIN — ONDANSETRON 4 MG: 2 INJECTION INTRAMUSCULAR; INTRAVENOUS at 13:11

## 2019-04-30 RX ADMIN — FENTANYL CITRATE 25 MCG: 50 INJECTION, SOLUTION INTRAMUSCULAR; INTRAVENOUS at 11:50

## 2019-04-30 RX ADMIN — LIDOCAINE HYDROCHLORIDE 5 ML: 20 INJECTION, SOLUTION INFILTRATION; PERINEURAL at 11:55

## 2019-04-30 RX ADMIN — FENTANYL CITRATE 25 MCG: 50 INJECTION, SOLUTION INTRAMUSCULAR; INTRAVENOUS at 15:29

## 2019-04-30 RX ADMIN — BUPIVACAINE HYDROCHLORIDE AND EPINEPHRINE 20 ML: 5; 5 INJECTION, SOLUTION PERINEURAL at 11:55

## 2019-04-30 RX ADMIN — KETAMINE HYDROCHLORIDE 5 MG: 10 INJECTION, SOLUTION INTRAMUSCULAR; INTRAVENOUS at 13:40

## 2019-04-30 ASSESSMENT — MIFFLIN-ST. JEOR: SCORE: 961.5

## 2019-04-30 NOTE — ANESTHESIA PROCEDURE NOTES
Peripheral Nerve Block Procedure Note    Staff:     Anesthesiologist:  Shorty Wright MD    Resident/CRNA:  Boone Martinez MD    Block performed by resident/CRNA in the presence of a teaching physician    Location: Pre-op  Procedure Start/Stop TImes:      4/30/2019 11:50 AM     4/30/2019 12:02 PM    patient identified, IV checked, site marked, risks and benefits discussed, informed consent, monitors and equipment checked, pre-op evaluation, at physician/surgeon's request and post-op pain management      Correct Patient: Yes      Correct Position: Yes      Correct Site: Yes      Correct Procedure: Yes      Correct Laterality:  Yes    Site Marked:  Yes  Procedure details:     Procedure:  Supraclavicular    ASA:  3    Diagnosis:  Perioperative pain    Laterality:  Left    Position:  Supine    Sterile Prep: chloraprep, mask and sterile gloves      Local skin infiltration:  None    Needle:  Short bevel    Needle gauge:  21    Needle length (mm):  110    Ultrasound: Yes      Ultrasound used to identify targeted nerve, plexus, or vascular structure and placed a needle adjacent to it      Permanent Image entered into patiient's record      Abnormal pain on injection: No      Blood Aspirated: No      Paresthesias:  No    Bleeding at site: No      Bolus via:  Needle    Infusion Method:  Single Shot    Complications:  None  Assessment/Narrative:     Injection made incrementally with aspirations every (mL):  5

## 2019-04-30 NOTE — BRIEF OP NOTE
[facility]    Brief Operative Note    Pre-operative diagnosis: Left Distal Radius Fracture  Post-operative diagnosis * No post-op diagnosis entered *  Procedure: Procedure(s):  Open Reduction Internal Fixation Left Distal Radius Fracture  Surgeon: Surgeon(s) and Role:     * Dinh Strong MD - Primary  Anesthesia: Combined MAC with Supraclavicular Block   Estimated blood loss: Minimal  Drains: None  Specimens: * No specimens in log *  Findings:   None.  Complications: None.  Implants:    Implant Name Type Inv. Item Serial No.  Lot No. LRB No. Used   GRAFT BONE CHIPS CANC 30ML [id number] Bone/Tissue/Biologic GRAFT BONE CHIPS CANC 30ML [id number] [employer]  Left 1   Synthes 2.4mm VA LCP Two-Column Volar Distal Radius Plate; Left, 6 head holes, 5 shaft holes, 75mm in length      Left 1   IMP WIRE TRACIE [id number] 292.12 Wire IMP WIRE TRACIE [id number]  SYNTHES-STRATE  Left 2   Synthes 2.4mm Variable Angle Locking Screws, with T8 Stardrive Recess 8mm    SYNTHES  Left 1   Synthes 2.4mm Variable Angle Locking Screws, with T8 Stardrive Recess 12mm    SYNTHES  Left 1   Synthes 2.4mm Variable Angle Locking Screws, with T8 Stardrive Recess 14mm    SYNTHES  Left 2   Synthes 2.4mm Variable Angle Locking Screws, with T8 Stardrive Recess 16mm    SYNTHES  Left 2   Synthes 2.4mm Variable Angle Locking Screws, with T8 Stardrive Recess 18mm    SYNTHES  Left 2   Synthes 2.4mm Variable Angle Locking Screws, with T8 Stardrive Recess 20mm    SYNTHES  Left 2        -Discharge to home when meets criteria  -ADAT  -NWB LUE  -Keep splint c/d/i  -Follow-up as previously scheduled

## 2019-04-30 NOTE — DISCHARGE INSTRUCTIONS
"[facility]  Home Care Following Anesthesia  For 24 hours after surgery:  1. Get plenty of rest.  A responsible adult must stay with you for at least 24 hours after you leave the surgery center.  2. Do not drive or use heavy equipment.  If you have weakness or tingling, don't drive or use heavy equipment until this feeling goes away.   3. Do not drink alcohol.   4. Avoid strenuous or risky activities.  Ask for help when climbing stairs.  5. You may feel lightheaded.  IF so, sit for a few minutes before standing.  Have someone help you get up.   6. If you have nausea (feel sick to your stomach): Drink only clear liquids such as apple juice, ginger ale, broth or 7-Up.  Rest may also help.  Be sure to drink enough fluids.  Move to a regular diet as you feel able.   7. You may have a slight fever.  Call the doctor if your fever is over 100 F (37.7 C) (taken under the tongue) or lasts longer than 24 hours.  8. You may have a dry mouth, a sore throat, muscle aches or trouble sleeping. These should go away after 24 hours.  9. Do not make important or legal decisions.        Today you received a Marcaine or bupivacaine block to numb the nerves near your surgery site.  This is a block using local anesthetic or \"numbing\" medication injected around the nerves to anesthetize or \"numb\" the area supplied by those nerves.  This block is injected into the muscle layer near your surgical site.  The medication may numb the location where you had surgery for 6-18 hours, but may last up to 24 hours.  If your surgical site is an arm or leg you should be careful with your affected limb, since it is possible to injure your limb without being aware of it due to the numbing.  Until full feeling returns, you should guard against bumping or hitting your limb, and avoid extreme hot or cold temperatures on the skin.  As the block wears off, the feeling will return as a tingling or prickly sensation near your " surgical site.  You will experience more discomfort from your incision as the feeling returns.  You may want to take a pain pill (a narcotic or Tylenol if this was prescribed by your surgeon) when you start to experience mild pain before the pain beccomes more severe.  If your pain medications do not control your pain you should notifiy your surgeon.    Tips for taking pain medications  To get the best pain relief possible, remember these points:    Take pain medications as directed, before pain becomes severe.    Pain medication can upset your stomach: taking it with food may help.    Constipation is a common side effect of pain medication. Drink plenty of  fluids.    Eat foods high in fiber. Take a stool softener if recommended by your doctor or pharmacist.    Do not drink alcohol, drive or operate machinery while taking pain medications.    Ask about other ways to control pain, such as with heat, ice or relaxation.    Tylenol/Acetaminophen Consumption  To help encourage the safe use of acetaminophen, the makers of TYLENOL  have lowered the maximum daily dose for single-ingredient Extra Strength TYLENOL  (acetaminophen) products sold in the [place] from 8 pills per day (4,000 mg) to 6 pills per day (3,000 mg). The dosing interval has also changed from 2 pills every 4-6 hours to 2 pills every 6 hours.    If you feel your pain relief is insufficient, you may take Tylenol/Acetaminophen in addition to your narcotic pain medication.     Be careful not to exceed 3,000 mg of Tylenol/Acetaminophen in a 24 hour period from all sources.    If you are taking extra strength Tylenol/acetaminophen (500 mg), the maximum dose is 6 tablets in 24 hours.    If you are taking regular strength acetaminophen (325 mg), the maximum dose is 9 tablets in 24 hours.    Call a doctor for any of the followin. Signs of infection (fever, growing tenderness at the surgery site, a large amount of drainage or bleeding, severe pain, foul-smelling  drainage, redness, swelling).  2. It has been over 8 to 10 hours since surgery and you are still not able to urinate (pass water).  3. Headache for over 24 hours.  Your doctor is:       Dr. Dinh Strong, Orthopaedics: [phone]               Or dial [phone] and ask for the resident on call for:  Orthopaedics  For emergency care, call the:  [facility] Emergency Department: [phone] (TTY for hearing impaired: [phone])

## 2019-04-30 NOTE — ANESTHESIA POSTPROCEDURE EVALUATION
Anesthesia POST Procedure Evaluation    Patient: Kimberly Chau   MRN:     [id number] Gender:   female   Age:    78 year old :      1940        Preoperative Diagnosis: Left Distal Radius Fracture   Procedure(s):  Open Reduction Internal Fixation Left Distal Radius Fracture   Postop Comments: No value filed.       Anesthesia Type:  Regional, MAC  No value filed.    Reportable Event: NO     PAIN: Uncomplicated   Sign Out status: Comfortable, Well controlled pain     PONV: No PONV   Sign Out status:  No Nausea or Vomiting     Neuro/Psych: Uneventful perioperative course   Sign Out Status: Preoperative baseline; Age appropriate mentation     Airway/Resp.: Uneventful perioperative course   Sign Out Status: Non labored breathing, age appropriate RR; Resp. Status within EXPECTED Parameters     CV: Uneventful perioperative course   Sign Out status: Appropriate BP and perfusion indices; Appropriate HR/Rhythm     Disposition:   Sign Out in:  PACU  Disposition:  Phase II; Home  Recovery Course: Uneventful  Follow-Up: Not required           Last Anesthesia Record Vitals:  CR[facility] VITALS  2019 1431 - 2019 1531      2019             Pulse:  72    SpO2:  97 %    Resp Rate (set):  10          Last PACU Vitals:  Vitals Value Taken Time   /60 2019  3:45 PM   Temp 37  C (98.6  F) 2019  3:45 PM   Pulse 65 2019  3:45 PM   Resp 12 2019  3:45 PM   SpO2 97 % 2019  3:45 PM   Temp src     NIBP     Pulse     SpO2     Resp     Temp     Ht Rate     Temp 2           Electronically Signed By: Shorty Wright MD, 2019, 4:49 PM

## 2019-04-30 NOTE — OR NURSING
Pt's , Wilder, requested that we remove the pt's left hip bandage from her surgery on 4/20/19 and assess the site. It is due to be changed today. Notified Dr. Strong, who will do it in the post-op recovery room. PACU charge RN notified as well.

## 2019-04-30 NOTE — OR NURSING
Patient received left side Supraclavicular nerve block without Exparel.  Fentanyl 25mcg given. Tolerated procedure well.

## 2019-05-01 ENCOUNTER — TELEPHONE (OUTPATIENT)
Dept: ORTHOPEDICS | Facility: CLINIC | Age: 79
End: 2019-05-01

## 2019-05-01 DIAGNOSIS — M25.539 WRIST PAIN: Primary | ICD-10-CM

## 2019-05-01 RX ORDER — OXYCODONE HYDROCHLORIDE 5 MG/1
5 TABLET ORAL EVERY 4 HOURS PRN
Qty: 30 TABLET | Refills: 0 | Status: SHIPPED | OUTPATIENT
Start: 2019-05-01 | End: 2019-05-06

## 2019-05-01 NOTE — TELEPHONE ENCOUNTER
[facility] Call Center    Phone Message    May a detailed message be left on voicemail: yes    Reason for Call: Medication Question or concern regarding medication   Prescription Clarification  Name of Medication: stronger pain medication needed  Prescribing Provider: Dinh Strong    Pharmacy: n/a    What on the order needs clarification? Per pt  Wilder, the pain medication that was prescribed to patient is not working and patient is in a great deal of pain and is requesting something stronger if possible           Action Taken: Message routed to:  [facility] ([facility]

## 2019-05-01 NOTE — OP NOTE
PREOPERATIVE DIAGNOSIS:  Left extra-articular segmental distal radius fracture, distal ulna fracture      POSTOPERATIVE DIAGNOSIS:  Left extra-articular segmental distal radius fracture, distal ulna fracture     PROCEDURE:  Open reduction internal fixation Left segmental distal radius fracture, closed reduction percutaneous pinning distal ulna fracture (-22 modifier)     ATTENDING SURGEON:  Dinh Strong MD      ASSISTANT:  Ivana Luo PGY4     ANESTHESIA:  Monitored anesthesia care with regional block     ESTIMATED BLOOD LOSS:  Minimal      SPECIMENS:  None.      DRAINS:  None.      IMPLANTS:  Synthes distal radius volar locking plate (5 hole), two 1.25 mm k-wires      COMPLICATIONS:  None apparent.      BRIEF HISTORY:  Kimberly Chau is a 78 year old-year-old female with a history of RA who presented after having sustained a left distal radius fracture. This had been splinted with a prolonged closed reduction in the emergency room in [place], where this injury occurred. She underwent a hemiarthroplasty 4/20 as she also sustained a hip fx. She sought further care for the wrist injury upon returning home to [place].  We discussed the risks, benefits and alternatives to surgery. Risks discussed include bleeding, infection, nerve, tendon, or vessel damage, wound healing problems, complex regional pain syndrome, persistent pain and/or stiffness, malunion, nonunion, postraumatic arthritis, hardware irritation, hardware failure, and the possibility for further surgery. The patient, expressed understanding and elected to proceed. Informed consent was obtained. Of note, she generally takes methotrexate for her RA but has been off it for some time.     INTRAOPERATIVE FINDINGS:  Shortened segmental and highly comminuted fracture of the distal radius with associated displaced ulnar head fracture.     DESCRIPTION OF PROCEDURE:  The patient was identified in the preoperative holding area.  The informed consent was reviewed.  The operative site was identified and marked. A regional block was performed by the anesthesiologist. The patient was then brought to the operating room and positioned with the operative extremity over a hand table. A tourniquet was placed around the upper arm. Preoperative prophylactic antibiotics were administered. The operative arm was prepped and draped in a standard sterile fashion.  A timeout was performed, verifying the correct patient, procedure to be performed, and operative site. The arm was exsanguinated and the tourniquet was inflated to 250 mmHg. A longitudinal incision was made overlying the FCR tendon. The FCR tendon sheath was opened and FCR was retracted ulnarly. The floor of the FCR sheath was then opened and FPL was identified. FPL was mobilized and retracted ulnarly. Protator quadratus was identified. It was released off the radius at its radial insertion, elevated subperiosteally, and retracted ulnarly. Care was taken to maintain the integrity of the volar wrist capsule. Brachioradialis was released off of the radius, taking care to protect the tendons of the first dorsal wrist compartment. The fracture site was then inspected and cleaned with a curette and dental pick. This was a highly comminuted fracture which was extra-articular but segmental in nature with a large comminuted segment situated between the distal and proximal fragments. This comminuted segment did have a large volar cortical component. The fracture was quite shortened and difficult to get out to length, both because of contraction of the soft tissues and because of the comminuted and length unstable middle segment, together with her marked osteopenia. The distal fragment was released radially and dorsally to mobilize it as much as possible. Prolonged traction needed to be applied to begin to get it out to length. The fracture was not stably reducible due to the middle segment. A 5  Hole plate was needed because of how proximal  the fracture went. The plate was secured to the bone distally with two locking screws. The large volar cortical fragment of the comminuted intermediate segment of the fracture was then secured in a reduced position to the distal fragment with a short locking screw. This screw was unicortical only because the distal cortex here was essentially destroyed. Now that this intermediate piece was reduced and secured to the distal fragment, this allowed us to  our length appropriately. We pulled traction on the fracture distally and secured the plate to the proximal shaft with a lobster claw once the distal and proximal fragments were appropriately reduced and length was judged to be correct by our volar cortical read. Proximal holes in the plate were then filled with locking screws due to the poor bone quality. Reaminng distal screws were also filled with locking screws. Multiple x-rays were checked showing appropriate alingment of the radius and hardware and screw position and demonstrating that screws were extra-articular. There was a large void dorsally and another void radially. These were packed with allograft cancellous chips.     We then turned our attention to the distal ulna fracture. The ulnar head was substantially displaced. The reduction was improved with radial deviation and fixated with two k-wires fired retrograde through the syloid. The k-wires were bent, cut and capped. The wound was irrigated copiously. Pronator quadratus was not reparable. Tourniquet was taken down and hemostasis achieved. Skin was clsoed with 3-0 vicryl and 4-0 monocryl. Steristrips wer eplaced as well as a sugartong splint. She was awoken and brought to the reocvery room in stable condition.     POSTOPERATIVE PLAN:  The patient will be discharged to home today with oral pain medications. She will return in 1-2 weeks to be placed in a removable splint. Pins will be removed at 6 weeks postop.           ADDENDUM: At least 50%  additional time and substantial additional expertise was needed to fix this fracture compared to a standard extra-articular distal radius fracture due to its segmental nature and substantial comminution, which created much greater challenges than are typical in obtaining reduction and securing reduction.

## 2019-05-01 NOTE — TELEPHONE ENCOUNTER
Called patient, Dr. Strong will give her a prescription of oxycodone and her  will pick it up from our lockbox, we also set up more follow up appointments for her with Dr. Strong, and Dr. Ledezma for her hip.

## 2019-05-01 NOTE — TELEPHONE ENCOUNTER
Patient had surgery yesterday and was given Tramadol.  Her  called and stated patient's pain is not controlled.  She will try Oxycodone.  He will  the rx in the locked box.

## 2019-05-02 DIAGNOSIS — S62.102A LEFT WRIST FRACTURE: Primary | ICD-10-CM

## 2019-05-06 ENCOUNTER — OFFICE VISIT (OUTPATIENT)
Dept: ORTHOPEDICS | Facility: CLINIC | Age: 79
End: 2019-05-06
Payer: MEDICARE

## 2019-05-06 ENCOUNTER — ANCILLARY PROCEDURE (OUTPATIENT)
Dept: GENERAL RADIOLOGY | Facility: CLINIC | Age: 79
End: 2019-05-06
Attending: ORTHOPAEDIC SURGERY
Payer: MEDICARE

## 2019-05-06 ENCOUNTER — THERAPY VISIT (OUTPATIENT)
Dept: OCCUPATIONAL THERAPY | Facility: CLINIC | Age: 79
End: 2019-05-06
Payer: MEDICARE

## 2019-05-06 DIAGNOSIS — S62.102A LEFT WRIST FRACTURE: ICD-10-CM

## 2019-05-06 DIAGNOSIS — S52.509A CLOSED FRACTURE OF DISTAL END OF RADIUS, UNSPECIFIED FRACTURE MORPHOLOGY, UNSPECIFIED LATERALITY, INITIAL ENCOUNTER: ICD-10-CM

## 2019-05-06 DIAGNOSIS — M79.642 HAND PAIN, LEFT: ICD-10-CM

## 2019-05-06 DIAGNOSIS — M25.532 LEFT WRIST PAIN: ICD-10-CM

## 2019-05-06 DIAGNOSIS — S62.102A CLOSED FRACTURE OF LEFT WRIST, INITIAL ENCOUNTER: Primary | ICD-10-CM

## 2019-05-06 PROCEDURE — 97166 OT EVAL MOD COMPLEX 45 MIN: CPT | Mod: GO | Performed by: OCCUPATIONAL THERAPIST

## 2019-05-06 PROCEDURE — 97110 THERAPEUTIC EXERCISES: CPT | Mod: GO | Performed by: OCCUPATIONAL THERAPIST

## 2019-05-06 RX ORDER — OXYCODONE HYDROCHLORIDE 5 MG/1
5 TABLET ORAL EVERY 4 HOURS PRN
Qty: 30 TABLET | Refills: 0 | Status: SHIPPED | OUTPATIENT
Start: 2019-05-06 | End: 2019-07-10

## 2019-05-06 RX ORDER — TRAMADOL HYDROCHLORIDE 50 MG/1
50 TABLET ORAL EVERY 6 HOURS PRN
Qty: 30 TABLET | Refills: 0 | Status: SHIPPED | OUTPATIENT
Start: 2019-05-06 | End: 2019-07-10

## 2019-05-06 NOTE — LETTER
2019       RE: Kimberly Chau  [address]  [place] [address]     Dear Colleague,    Thank you for referring your patient, Kimberly Chau, to the [facility] at [facility]. Please see a copy of my visit note below.    [facility]  Orthopedics  Dinh Strong MD  2019     Name: Kimberly Chau  MRN: [id number]  Age: 78 year old  : 1940  Referring provider: Rell Hatch     Chief Complaint:   Follow Up (Follow up 2019 Left ORIF )       Date of Surgery: 2019    Procedure: Open reduction internal fixation Left segmental distal radius fracture, closed reduction percutaneous pinning distal ulna fracture     History of Present Illness:   Kimberly Chau is a 78 year old female 1 week status-post open reduction internal fixation left segmental distal radius fracture, closed reduction percutaneous pinning distal ulna fracture who presents for postoperative evaluation. Today the patient is doing generally well postoperatively, but has been significantly uncomfortable at homeddue to wrist pain. She is out of her current pain medication following the surgery. She has most of her pain near the surgical site and describes difficulty with lifting her arm without support from the other arm or another solid object. She has no numbness or tingling in her fingers. She is able to move her fingers. She does has follow-up for her hip fx in . She states the hip is doing well. She has been ambulating with a cane without noted difficulties at this time.     Physical Examination:  Well-developed, well-nourished and in no acute distress.  Alert and oriented to surroundings.    left upper extremity:    Fingers mildly swollen with unchanged rheumatiod deformity, warm and well-perfused  Sensation intact in median, radial and ulnar nerve distributions.    Able to flex and extend all digits and the thumb with some limitation due to pain as expected.    Pin sites are clean, dry and intact.    Incision is well approximately and healing appropriately.     Radiographs:   Radiographs of the left wrist - 3 views (05/06/2019):  Improved alignment of her distal radius and ulnar fractures with two pins on the ulna and a plate on the radius.       Assessment:   78 year old female with postoperative follow-up for the above procedure.     Plan:     Will place her in muenster cast today    No weight bearing in left upper extremity     She will follow-up in 3 weeks for recheck and hand therapy     Plan k-wire removal from ulna at 6 weeks postop.     I provided the patient with a short script refill of her Roxicodone and tramadol to help with her ongoing pain. We discussed weaning.     Follow-up in 3 weeks    Scribe Disclosure:  ITay, am serving as a scribe to document services personally performed by Dinh Strong MD at this visit, based upon the provider's statements to me. All documentation has been reviewed by the aforementioned provider prior to being entered into the official medical record.     Tay JUAN, a scribe, prepared the chart for today's encounter.       Again, thank you for allowing me to participate in the care of your patient.      Sincerely,    Dinh Strong MD

## 2019-05-06 NOTE — LETTER
DEPARTMENT OF HEALTH AND HUMAN SERVICES  CENTERS FOR MEDICARE & MEDICAID SERVICES    PLAN/UPDATED PLAN OF PROGRESS FOR OUTPATIENT REHABILITATION    PATIENTS NAME:  Kimberly Chau   : 1940  PROVIDER NUMBER:    [id number]  HICN:  [id number]   PROVIDER NAME: [facility]  MEDICAL RECORD NUMBER: [id number]   START OF CARE DATE:  SOC Date: 19   TYPE:  PT  PRIMARY/TREATMENT DIAGNOSIS: (Pertinent Medical Diagnosis)  Hand pain, left  Left wrist pain  VISITS FROM START OF CARE:  Rxs Used: 1     Hand Therapy Initial Evaluation  Current Date:  2019    Subjective:  Kimberly Chau is a 78 year old R hand dominant female.  Diagnosis: Left extra-articular segmental distal radius fracture, distal ulna fracture  DOI:  19  DOS:  19  Procedure: Open reduction internal fixation Left segmental distal radius fracture, closed reduction percutaneous pinning distal ulna fracture    MD order: Digit ROM, No Weight bearing on L arm  (Pt will be placed in Long arm cast for 4 weeks, and will then return to MD)  Patient reports symptoms of pain, stiffness/loss of motion, weakness/loss of strength and edema of the L arm which occurred due to Falling and landing on her arm. Since onset symptoms are gradually getting better. Special tests:  x-ray.  Previous treatment: ORIF. General health as reported by patient is good.  Pertinent medical history includes: Rheumatoid Arthritis.  Medical allergies: latex, hematocrit 2, ACE inhibitor, benzocain.  Surgical history: orthopedic: L hip, L wrist.  Medication history: None.    Occupational Profile Information:  Current occupation is Retired  Prior functional level:  no limitations  Barriers include: hip fracture  Mobility: No difficulty  Transportation: drives  Leisure activities/hobbies: Reading, taking care of the dogs    Upper Extremity Functional Index Score:  SCORE:   Column Totals: /80: 14   (A lower score indicates greater disability.)           Kimberly Chau        Objective:  Pain Level (Scale 0-10):   5/6/2019   At Rest 6/10   With Use 10/10     Pain Description:  Date 5/6/2019   Location hand and wrist (circumferential)   Pain Quality Aching and Throbbing, shooting    Frequency constant     Pain is worst  daytime   Exacerbated by  movement, changing position   Relieved by rest and pain medication   Progression Unchanged      Finger Edema  Circumference:  (Measured in cm)  Edema 5/6/2019 5/6/2019   side R L   Index P1 6.4 7.0   PIP     P2     Long P1 5.7 7.5   PIP     P2     Ring P1 6.0 7.0   PIP     P2     Small P1 5.2 5.8   PIP     P2       Sensation: WNL throughout all nerve distributions; per patient report    ROM: MP and Thumb Motion limited by Cast    HAND 5/6/2019 5/6/2019   AROM(PROM) R L   Index MP / /41   PIP / 36/46   DIP / /   RIVERA     Long MP / /40   PIP / 35/50   DIP / /   RIVERA     Ring MP / /35   PIP / 45/50   DIP / /   RIVERA     Small MP / /21   PIP / 20/63   DIP / /   RIVERA       ROM  Thumb   5/6/2019 5/6/2019   AROM  (PROM) R L   MP     IP  0/28 (Blocked by cast)   RAB     PABD     Retropulsion     Kapandji Opposition Scale (0-10/10)       Strength:  Contraindicated    Assessment:  Patient presents with symptoms consistent with diagnosis of Distal radius fracture, and ulnar styloid fracture,  with surgical  intervention.     Patient's limitations or Problem List includes:  Pain, Decreased ROM/motion, Increased edema, Weakness, Decreased , Decreased pinch and Tightness in musculature of the left elbow, wrist, hand, thumb, index finger, long finger, ring finger and small finger which interferes with the patient's ability to perform Self Care Tasks (dressing, eating, bathing, hygiene/toileting), Recreational Activities, Household Chores and Driving  as compared to previous level of function.    Rehab Potential:  Excellent - Return to full activity, no limitations    Patient will benefit from skilled Occupational Therapy to increase ROM,  strength,  pinch strength, forearm strength, stability of wrist, stability of thumb, coordination and dexterity and decrease pain, edema and adherence of scarring to return to previous activity level and resume normal daily tasks and to reach their rehab potential.    Barriers to Learning:  No barrier    Communication Issues:  Patient appears to be able to clearly communicate and understand verbal and written communication and follow directions correctly.    Chart Review: Chart Review, Brief history including review of medical and/or therapy records relating to the presenting problem and Simple history review with patient    Identified Performance Deficits: bathing/showering, hygiene and grooming, care of pets, driving and community mobility, health management and maintenance, home establishment and management, meal preparation and cleanup, shopping and leisure activities    Assessment of Occupational Performance:  5 or more Performance Deficits    Clinical Decision Making (Complexity): Moderate complexity    Treatment Explanation:  The following has been discussed with the patient:  RX ordered/plan of care  Anticipated outcomes  Possible risks and side effects    Plan:  Frequency:  1 X week, once daily  Duration:  for 12 weeks    Treatment Plan:   Modalities:  US, Fluidotherapy and Paraffin  Therapeutic Exercise:  AROM, AAROM, PROM, Tendon Gliding, Blocking, Extensor Tracking, Isotonics, Isometrics and Stabilization  Neuromuscular re-education:  Nerve Gliding, Coordination/Dexterity and Proprioceptive Training  Manual Techniques:  Coordination/Dexterity, Joint mobilization, Scar mobilization, Myofascial release and Manual edema mobilization  Orthotic Fabrication:  Static orthosis  Discharge Plan:  Achieve all LTG.  Independent in home treatment program.  Reach maximal therapeutic benefit.    Home Exercise Program:  Digit AROM - Fisting, Blocking  Edema massage  Elevation  Shoulder AROM    Next Visit:  Edema management -  "Coban?  AROM/PROM to fingers     Kimberly Chau                 Caregiver Signature/Credentials _____________________________ Date ________         BERTHA Ma/L    I have reviewed and certified the need for these services and plan of treatment while under my care.        PHYSICIAN'S SIGNATURE:   _____________________________________ Date___________         Garry Jean Baptiste MD     Certification period:  Beginning of Cert date period: 05/06/19 to  End of Cert period date: 08/03/19     Functional Level Progress Report: Please see attached \"Goal Flow sheet for Functional level.\"    ____X____ Continue Services or       ________ DC Services                Service dates: From  SOC Date: 05/06/19 date to present                         "

## 2019-05-06 NOTE — NURSING NOTE
Reason For Visit:   Chief Complaint   Patient presents with     Follow Up     Follow up 4/30/2019 Left ORIF        Primary MD: Guillermo Castellano  Ref. MD: Est    ?  No    Age: 78 year old    Date of surgery: 4/30/2019  Type of surgery: Left ORIF radial distal fx.        There were no vitals taken for this visit.      Pain Assessment  Patient Currently in Pain: Yes  0-10 Pain Scale: 10  Primary Pain Location: Wrist(Left wrist )               QuickDASH Assessment  No flowsheet data found.       Allergies   Allergen Reactions     Hctz Other (See Comments)     Pt develops symptomatic and significant hyponatremia with HCTZ exposure     Hydrochlorothiazide      Other reaction(s): Hyponatremia  Hyponatremia     Latex      Benzocaine Rash     carba mix,thrium-sunscreen     Resorcinol-Alcohol Rash     carba mix,thrium-sunscreen     Ace Inhibitors Unknown     Dizziness and orthostatic changes and electrolyte problems.     Latex Unknown       Saturnino Albrecht, CMA

## 2019-05-06 NOTE — NURSING NOTE
Relevant Diagnosis:  Left extra-articular segmental distal radius fracture, distal ulna fracture    short arm application and care    Type of Cast applied: cherrister cast   Cast/Splinting material used: fiberglass    Person(s) involved in teaching:   Patient and      Motivation Level:  Asks Questions: Yes  Eager to Learn: Yes  Cooperative: Yes  Receptive (willing/able to accept information): Yes     Patient demonstrates understanding of the following:   Reason for the appointment, diagnosis and treatment plan: Yes    Which situations necessitate calling provider and whom to contact: Yes     Teaching Concerns Addressed:   Comments: none     Proper use and care of short arm cast: Yes  Pain management techniques: Yes  Wound Care: Yes  How and/when to access community resources: Yes     Cast was applied in standard Manner:  Yes  Cast fit well:  Yes  Patient reports cast to fit comfortably:  Yes    Instructional Materials Used/Given: verbal and written instructions given, patient agreeable.

## 2019-05-06 NOTE — PROGRESS NOTES
Hand Therapy Initial Evaluation  Current Date:  5/6/2019    Subjective:  Kimberly Chau is a 78 year old R hand dominant female.    Diagnosis: Left extra-articular segmental distal radius fracture, distal ulna fracture  DOI:  4/19/19  DOS:  4/30/19  Procedure: Open reduction internal fixation Left segmental distal radius fracture, closed reduction percutaneous pinning distal ulna fracture    MD order: Digit ROM, No Weight bearing on L arm  (Pt will be placed in Long arm cast for 4 weeks, and will then return to MD)    Patient reports symptoms of pain, stiffness/loss of motion, weakness/loss of strength and edema of the L arm which occurred due to Falling and landing on her arm. Since onset symptoms are gradually getting better. Special tests:  x-ray.  Previous treatment: ORIF. General health as reported by patient is good.  Pertinent medical history includes: Rheumatoid Arthritis.  Medical allergies: latex, hematocrit 2, ACE inhibitor, benzocain.  Surgical history: orthopedic: L hip, L wrist.  Medication history: None.    Occupational Profile Information:  Current occupation is Retired  Prior functional level:  no limitations  Barriers include: hip fracture  Mobility: No difficulty  Transportation: drives  Leisure activities/hobbies: Reading, taking care of the dogs    Upper Extremity Functional Index Score:  SCORE:   Column Totals: /80: 14   (A lower score indicates greater disability.)    Objective:  Pain Level (Scale 0-10):   5/6/2019   At Rest 6/10   With Use 10/10     Pain Description:  Date 5/6/2019   Location hand and wrist (circumferential)   Pain Quality Aching and Throbbing, shooting    Frequency constant     Pain is worst  daytime   Exacerbated by  movement, changing position   Relieved by rest and pain medication   Progression Unchanged      Finger Edema  Circumference:  (Measured in cm)  Edema 5/6/2019 5/6/2019   side R L   Index P1 6.4 7.0   PIP     P2     Long P1 5.7 7.5   PIP     P2     Ring P1 6.0 7.0    PIP     P2     Small P1 5.2 5.8   PIP     P2       Sensation: WNL throughout all nerve distributions; per patient report    ROM: MP and Thumb Motion limited by Cast    HAND 5/6/2019 5/6/2019   AROM(PROM) R L   Index MP / /41   PIP / 36/46   DIP / /   RIVERA     Long MP / /40   PIP / 35/50   DIP / /   RIVERA     Ring MP / /35   PIP / 45/50   DIP / /   RIVERA     Small MP / /21   PIP / 20/63   DIP / /   RIVERA       ROM  Thumb   5/6/2019 5/6/2019   AROM  (PROM) R L   MP     IP  0/28 (Blocked by cast)   RAB     PABD     Retropulsion     Kapandji Opposition Scale (0-10/10)       Strength:  Contraindicated    Assessment:  Patient presents with symptoms consistent with diagnosis of Distal radius fracture, and ulnar styloid fracture,  with surgical  intervention.     Patient's limitations or Problem List includes:  Pain, Decreased ROM/motion, Increased edema, Weakness, Decreased , Decreased pinch and Tightness in musculature of the left elbow, wrist, hand, thumb, index finger, long finger, ring finger and small finger which interferes with the patient's ability to perform Self Care Tasks (dressing, eating, bathing, hygiene/toileting), Recreational Activities, Household Chores and Driving  as compared to previous level of function.    Rehab Potential:  Excellent - Return to full activity, no limitations    Patient will benefit from skilled Occupational Therapy to increase ROM,  strength, pinch strength, forearm strength, stability of wrist, stability of thumb, coordination and dexterity and decrease pain, edema and adherence of scarring to return to previous activity level and resume normal daily tasks and to reach their rehab potential.    Barriers to Learning:  No barrier    Communication Issues:  Patient appears to be able to clearly communicate and understand verbal and written communication and follow directions correctly.    Chart Review: Chart Review, Brief history including review of medical and/or therapy records  relating to the presenting problem and Simple history review with patient    Identified Performance Deficits: bathing/showering, hygiene and grooming, care of pets, driving and community mobility, health management and maintenance, home establishment and management, meal preparation and cleanup, shopping and leisure activities    Assessment of Occupational Performance:  5 or more Performance Deficits    Clinical Decision Making (Complexity): Moderate complexity    Treatment Explanation:  The following has been discussed with the patient:  RX ordered/plan of care  Anticipated outcomes  Possible risks and side effects    Plan:  Frequency:  1 X week, once daily  Duration:  for 12 weeks    Treatment Plan:   Modalities:  US, Fluidotherapy and Paraffin  Therapeutic Exercise:  AROM, AAROM, PROM, Tendon Gliding, Blocking, Extensor Tracking, Isotonics, Isometrics and Stabilization  Neuromuscular re-education:  Nerve Gliding, Coordination/Dexterity and Proprioceptive Training  Manual Techniques:  Coordination/Dexterity, Joint mobilization, Scar mobilization, Myofascial release and Manual edema mobilization  Orthotic Fabrication:  Static orthosis  Discharge Plan:  Achieve all LTG.  Independent in home treatment program.  Reach maximal therapeutic benefit.    Home Exercise Program:  Digit AROM - Fisting, Blocking  Edema massage  Elevation  Shoulder AROM    Next Visit:  Edema management - Coban?  AROM/PROM to fingers

## 2019-05-06 NOTE — PROGRESS NOTES
[facility]  Orthopedics  Dinh Strong MD  2019     Name: Kimberly Chau  MRN: [id number]  Age: 78 year old  : 1940  Referring provider: Rell Hatch     Chief Complaint:   Follow Up (Follow up 2019 Left ORIF )       Date of Surgery: 2019    Procedure: Open reduction internal fixation Left segmental distal radius fracture, closed reduction percutaneous pinning distal ulna fracture     History of Present Illness:   Kimberly Chau is a 78 year old female 1 week status-post open reduction internal fixation left segmental distal radius fracture, closed reduction percutaneous pinning distal ulna fracture who presents for postoperative evaluation. Today the patient is doing generally well postoperatively, but has been significantly uncomfortable at homeddue to wrist pain. She is out of her current pain medication following the surgery. She has most of her pain near the surgical site and describes difficulty with lifting her arm without support from the other arm or another solid object. She has no numbness or tingling in her fingers. She is able to move her fingers. She does has follow-up for her hip fx in . She states the hip is doing well. She has been ambulating with a cane without noted difficulties at this time.     Physical Examination:  Well-developed, well-nourished and in no acute distress.  Alert and oriented to surroundings.    left upper extremity:    Fingers mildly swollen with unchanged rheumatiod deformity, warm and well-perfused  Sensation intact in median, radial and ulnar nerve distributions.    Able to flex and extend all digits and the thumb with some limitation due to pain as expected.   Pin sites are clean, dry and intact.    Incision is well approximately and healing appropriately.     Radiographs:   Radiographs of the left wrist - 3 views (2019):  Improved alignment of her distal radius and ulnar fractures with two pins on the ulna and a plate on the  radius.       Assessment:   78 year old female with postoperative follow-up for the above procedure.     Plan:     Will place her in muenster cast today    No weight bearing in left upper extremity     She will follow-up in 3 weeks for recheck and hand therapy     Plan k-wire removal from ulna at 6 weeks postop.     I provided the patient with a short script refill of her Roxicodone and tramadol to help with her ongoing pain. We discussed weaning.     Follow-up in 3 weeks    Dinh Strong MD          Scribe Disclosure:  Tay JUAN, am serving as a scribe to document services personally performed by Dinh Strong MD at this visit, based upon the provider's statements to me. All documentation has been reviewed by the aforementioned provider prior to being entered into the official medical record.     Tay JUAN, a scribe, prepared the chart for today's encounter.

## 2019-05-10 ENCOUNTER — MEDICAL CORRESPONDENCE (OUTPATIENT)
Dept: HEALTH INFORMATION MANAGEMENT | Facility: CLINIC | Age: 79
End: 2019-05-10

## 2019-05-15 ENCOUNTER — ANCILLARY PROCEDURE (OUTPATIENT)
Dept: ULTRASOUND IMAGING | Facility: CLINIC | Age: 79
End: 2019-05-15
Attending: INTERNAL MEDICINE
Payer: MEDICARE

## 2019-05-15 ENCOUNTER — OFFICE VISIT (OUTPATIENT)
Dept: INTERNAL MEDICINE | Facility: CLINIC | Age: 79
End: 2019-05-15
Payer: MEDICARE

## 2019-05-15 VITALS — HEART RATE: 71 BPM | RESPIRATION RATE: 16 BRPM | DIASTOLIC BLOOD PRESSURE: 76 MMHG | SYSTOLIC BLOOD PRESSURE: 147 MMHG

## 2019-05-15 DIAGNOSIS — M79.605 PAIN OF LEFT LOWER EXTREMITY: ICD-10-CM

## 2019-05-15 DIAGNOSIS — M79.89 LEG SWELLING: ICD-10-CM

## 2019-05-15 DIAGNOSIS — M79.605 PAIN OF LEFT LOWER EXTREMITY: Primary | ICD-10-CM

## 2019-05-15 LAB
ALBUMIN SERPL-MCNC: 3 G/DL (ref 3.4–5)
ALP SERPL-CCNC: 189 U/L (ref 40–150)
ALT SERPL W P-5'-P-CCNC: 13 U/L (ref 0–50)
ANION GAP SERPL CALCULATED.3IONS-SCNC: 8 MMOL/L (ref 3–14)
AST SERPL W P-5'-P-CCNC: 19 U/L (ref 0–45)
BASOPHILS # BLD AUTO: 0 10E9/L (ref 0–0.2)
BASOPHILS NFR BLD AUTO: 0.6 %
BILIRUB SERPL-MCNC: 0.7 MG/DL (ref 0.2–1.3)
BUN SERPL-MCNC: 11 MG/DL (ref 7–30)
CALCIUM SERPL-MCNC: 8.7 MG/DL (ref 8.5–10.1)
CHLORIDE SERPL-SCNC: 97 MMOL/L (ref 94–109)
CO2 SERPL-SCNC: 26 MMOL/L (ref 20–32)
CREAT SERPL-MCNC: 0.83 MG/DL (ref 0.52–1.04)
DIFFERENTIAL METHOD BLD: ABNORMAL
EOSINOPHIL # BLD AUTO: 0.2 10E9/L (ref 0–0.7)
EOSINOPHIL NFR BLD AUTO: 3.3 %
ERYTHROCYTE [DISTWIDTH] IN BLOOD BY AUTOMATED COUNT: 14.9 % (ref 10–15)
GFR SERPL CREATININE-BSD FRML MDRD: 66 ML/MIN/{1.73_M2}
GLUCOSE SERPL-MCNC: 78 MG/DL (ref 70–99)
HCT VFR BLD AUTO: 30.7 % (ref 35–47)
HGB BLD-MCNC: 10 G/DL (ref 11.7–15.7)
IMM GRANULOCYTES # BLD: 0 10E9/L (ref 0–0.4)
IMM GRANULOCYTES NFR BLD: 0.4 %
LYMPHOCYTES # BLD AUTO: 0.8 10E9/L (ref 0.8–5.3)
LYMPHOCYTES NFR BLD AUTO: 12 %
MCH RBC QN AUTO: 33 PG (ref 26.5–33)
MCHC RBC AUTO-ENTMCNC: 32.6 G/DL (ref 31.5–36.5)
MCV RBC AUTO: 101 FL (ref 78–100)
MONOCYTES # BLD AUTO: 0.9 10E9/L (ref 0–1.3)
MONOCYTES NFR BLD AUTO: 13.2 %
NEUTROPHILS # BLD AUTO: 4.7 10E9/L (ref 1.6–8.3)
NEUTROPHILS NFR BLD AUTO: 70.5 %
NRBC # BLD AUTO: 0 10*3/UL
NRBC BLD AUTO-RTO: 0 /100
PLATELET # BLD AUTO: 318 10E9/L (ref 150–450)
POTASSIUM SERPL-SCNC: 3.9 MMOL/L (ref 3.4–5.3)
PROT SERPL-MCNC: 6.8 G/DL (ref 6.8–8.8)
RBC # BLD AUTO: 3.03 10E12/L (ref 3.8–5.2)
SODIUM SERPL-SCNC: 131 MMOL/L (ref 133–144)
WBC # BLD AUTO: 6.7 10E9/L (ref 4–11)

## 2019-05-15 ASSESSMENT — PAIN SCALES - GENERAL: PAINLEVEL: SEVERE PAIN (7)

## 2019-05-15 NOTE — NURSING NOTE
Chief Complaint   Patient presents with     Surgical Followup     Patient is here for post-surgery follow up 04/30/19        Kody Roberts CMA (AAMA) at 1:31 PM on 5/15/2019

## 2019-05-15 NOTE — PROGRESS NOTES
HPI:    Pt. With h/o RA and cervical spine surgery comes in for follow up recent fall with L hip fracture and surgery and L forearm fracture. She had sinus surgery  with Dr. Eisenberg on 8/3/2018.  She had chronic R frontal HA's. She had MRI brain 10/17 followed sinus CT scan 11/13/17 showing R frontal and R maxillary sinus disease.  She followed in  neurosurgery 2/17 and again in 4/17.   No CP or breathing issues. She had normal CXR 12/5/16.  She was seen by Dr. Cabello  Rheumatology 10/31/17; she has stopped her  methotrexate. She o/w denies additional HEENT, cardiopulmonary, abdominal, , GYN, neurological complaints. She denies any palpitations.     PE:    Vitals noted gen nad cooperative alert, lungs with  good air movement,  RRR, S1, S2, no MRG, abdomen, nt, nd, L forearm in cast and she is wearing a sling. L leg with LE swelling from L knee down.       Resting echo (2/7/2018) Nl LVF 60-65%. No WMA's.     CXR; some possible finding upper R lobe but less than from 1/15/2018    A/P:    1. She will continue to follow with her Rheumatology provider and is now off Methotrexate due to recent orthopedic surgery  2. She had L forearm surgery 4/30/2019 with Dr. Strong orthopedics and had follow up in clinic 5/6/2019  3. She will follow up with Dr. Ledezma, 6/12/2019 for L hip surgery from hip fracture  4. Post op anemia; CBC checked today and improving  5. Negative DVT study for L LE swelling today.     Total time spent 25 minutes.  More than 50% of the time spent with Ms. Chau on counseling / coordinating her care             cbc, bmp, ua, urine cx pending

## 2019-05-16 ENCOUNTER — TELEPHONE (OUTPATIENT)
Dept: INTERNAL MEDICINE | Facility: CLINIC | Age: 79
End: 2019-05-16

## 2019-05-16 DIAGNOSIS — D64.9 ANEMIA, UNSPECIFIED TYPE: Primary | ICD-10-CM

## 2019-05-16 NOTE — TELEPHONE ENCOUNTER
Dear Coretta;     I saw Kimberly yesterday, labs better but she needs recheck (ordered this encounter for 6/12/2019). I would also like to see her back on that same day.    Thanks, RADHA Castellano        Dear Kimberly;    Your labs are stable but some slight abnormalities. I would like to see you back 6/12/2019 the same day as you see Dr. Ledezma in orthopedics and will recheck you tests. My nurse Coretta will give you a call.    RADHA Castellano MD  Pt called and she will have labs drawn before appt at 1:35pm on June 12th with Dr Castellano.  Coretta Simeon RN 8:46 AM on 5/16/2019.

## 2019-05-23 DIAGNOSIS — S62.102A LEFT WRIST FRACTURE: Primary | ICD-10-CM

## 2019-05-29 ENCOUNTER — ANCILLARY PROCEDURE (OUTPATIENT)
Dept: GENERAL RADIOLOGY | Facility: CLINIC | Age: 79
End: 2019-05-29
Attending: ORTHOPAEDIC SURGERY
Payer: MEDICARE

## 2019-05-29 ENCOUNTER — OFFICE VISIT (OUTPATIENT)
Dept: ORTHOPEDICS | Facility: CLINIC | Age: 79
End: 2019-05-29
Payer: MEDICARE

## 2019-05-29 DIAGNOSIS — S62.102A LEFT WRIST FRACTURE: ICD-10-CM

## 2019-05-29 DIAGNOSIS — S52.509A CLOSED FRACTURE OF DISTAL END OF RADIUS, UNSPECIFIED FRACTURE MORPHOLOGY, UNSPECIFIED LATERALITY, INITIAL ENCOUNTER: Primary | ICD-10-CM

## 2019-05-29 LAB — RADIOLOGIST FLAGS: NORMAL

## 2019-05-29 NOTE — PROGRESS NOTES
Cast/splint application  Date/Time: 5/29/2019 3:30 PM  Performed by: Madison Pabon ATC  Authorized by: Dinh Strong MD     Consent:     Consent obtained:  Verbal    Consent given by:  Patient  Pre-procedure details:     Sensation:  Normal  Procedure details:     Laterality:  Left    Location:  Arm    Cast type:  Short arm    Supplies:  Fiberglass  Post-procedure details:     Pain:  Unchanged    Sensation:  Normal    Patient tolerance of procedure:  Tolerated well, no immediate complications    Patient provided with cast or splint care instructions: Yes

## 2019-05-29 NOTE — NURSING NOTE
Reason For Visit:   Chief Complaint   Patient presents with     Follow Up     DOS 4/30/2019 Open Reduction Internal Fixation Left Distal Radius Fracture        Primary MD: Guillermo Castellano  Ref. MD: Est    ?  No    Age: 78 year old    Date of surgery: 4/30/2019  Type of surgery: Open Reduction Internal Fixation Left Distal Radius Fracture .        There were no vitals taken for this visit.      Pain Assessment  Patient Currently in Pain: Yes  0-10 Pain Scale: 5  Primary Pain Location: Wrist  Pain Descriptors: Shooting, Sore(When patient uses it to much )               QuickDASH Assessment  No flowsheet data found.       Allergies   Allergen Reactions     Hctz Other (See Comments)     Pt develops symptomatic and significant hyponatremia with HCTZ exposure     Hydrochlorothiazide      Other reaction(s): Hyponatremia  Hyponatremia     Latex      Benzocaine Rash     carba mix,thrium-sunscreen     Resorcinol-Alcohol Rash     carba mix,thrium-sunscreen     Ace Inhibitors Unknown     Dizziness and orthostatic changes and electrolyte problems.     Latex Unknown       Saturnino Albrecht, CMA

## 2019-05-29 NOTE — LETTER
2019       RE: Kimberly Chau  [address]  [place] [address]     Dear Colleague,    Thank you for referring your patient, Kimberly Chau, to the [facility]. Please see a copy of my visit note below.    [facility]  Orthopedics  Dinh Strong MD  2019     Name: Kimberly Chau  MRN: [id number]  Age: 78 year old  : 1940  Referring provider: Rell Hatch     Chief Complaint: Follow Up (DOS 2019 Open Reduction Internal Fixation Left Distal Radius Fracture )       Date of Surgery: 19    Procedure Performed: Open reduction internal fixation left distal radius fracture.     History of Present Illness:   Kimberly Chau comes to see me in follow-up today from the above surgery. She continues to experience some sensitivity around her wrist and irritation from the pins. No numbness or tingling. She has been doing at-home hand therapy and has been working on range of motion of the fingers. Does feel this is getting better. Has been wearing zipper splint as instructed. No other complaints today.       Physical Examination:  There were no vitals taken for this visit.  Well-developed, well-nourished and in no acute distress.  Alert and oriented to surroundings.  On examination of the left wrist, incision is well-healed. Pins clean, dry, intact, in place and do not feel loose. There is no erythema, drainage, or dehiscence. Sensation is intact in median, radial and ulnar nerve distributions.  Patient can actively flex and extend all digits and thumb.  Fingers are warm and well-perfused. Has a baseline deformity of digits, unchanged, due to rheumatoid arthritis.       Radiographs:   Three views of the left wrist were obtained and reviewed. These demonstrate no changes in hardware or bony alignment with the exception of possibly some pulling back of her 2k wires on the ulna and increased angulation of distal ulna on lateral  x-ray view.     Assessment:   78 year old female Progressing appropriately following the above procedure. Ulna pins backed out slightly.    Plan:    I recommend she continue wearing short arm cast for two weeks. Pins will be removed in 2 weeks. She will begin wrist ROM at that time. No lifting greater than 5 pounds. Avoid painful activities. Otherwise no activity restrictions. I will see the patient back for a postoperative visit in 2 weeks time for cast removal. Knows to contact us if any questions, concerns, or other issues arise.     Dinh Strong MD    Scribe Disclosure:  I, Temo Espinoza, am serving as a scribe to document services personally performed by Dinh Strong MD at this visit, based upon the provider's statements to me. All documentation has been reviewed by the aforementioned provider prior to being entered into the official medical record.      Cast/splint application  Date/Time: 5/29/2019 3:30 PM  Performed by: Madison Pabon ATC  Authorized by: Dinh Strong MD     Consent:     Consent obtained:  Verbal    Consent given by:  Patient  Pre-procedure details:     Sensation:  Normal  Procedure details:     Laterality:  Left    Location:  Arm    Cast type:  Short arm    Supplies:  Fiberglass  Post-procedure details:     Pain:  Unchanged    Sensation:  Normal    Patient tolerance of procedure:  Tolerated well, no immediate complications    Patient provided with cast or splint care instructions: Yes

## 2019-05-29 NOTE — PROGRESS NOTES
[facility]  Orthopedics  Dinh Strong MD  2019     Name: Kimberly Chau  MRN: [id number]  Age: 78 year old  : 1940  Referring provider: Rell Hatch     Chief Complaint: Follow Up (DOS 2019 Open Reduction Internal Fixation Left Distal Radius Fracture )       Date of Surgery: 19    Procedure Performed: Open reduction internal fixation left distal radius fracture.     History of Present Illness:   Kimberly Chau comes to see me in follow-up today from the above surgery. She continues to experience some sensitivity around her wrist and irritation from the pins. No numbness or tingling. She has been doing at-home hand therapy and has been working on range of motion of the fingers. Does feel this is getting better. Has been wearing zipper splint as instructed. No other complaints today.       Physical Examination:  There were no vitals taken for this visit.  Well-developed, well-nourished and in no acute distress.  Alert and oriented to surroundings.  On examination of the left wrist, incision is well-healed. Pins clean, dry, intact, in place and do not feel loose. There is no erythema, drainage, or dehiscence. Sensation is intact in median, radial and ulnar nerve distributions.  Patient can actively flex and extend all digits and thumb.  Fingers are warm and well-perfused. Has a baseline deformity of digits, unchanged, due to rheumatoid arthritis.       Radiographs:   Three views of the left wrist were obtained and reviewed. These demonstrate no changes in hardware or bony alignment with the exception of possibly some pulling back of her 2k wires on the ulna and increased angulation of distal ulna on lateral x-ray view.     Assessment:   78 year old female Progressing appropriately following the above procedure. Ulna pins backed out slightly.    Plan:    I recommend she continue wearing short arm cast for two weeks. Pins will be removed in 2 weeks. She will begin wrist ROM at that  time. No lifting greater than 5 pounds. Avoid painful activities. Otherwise no activity restrictions. I will see the patient back for a postoperative visit in 2 weeks time for cast removal. Knows to contact us if any questions, concerns, or other issues arise.     Dinh Strong MD            Scribe Disclosure:  I, Temo Espinoza, am serving as a scribe to document services personally performed by Dinh Strong MD at this visit, based upon the provider's statements to me. All documentation has been reviewed by the aforementioned provider prior to being entered into the official medical record.

## 2019-06-04 NOTE — PROGRESS NOTES
Hand Therapy Progress Note  Reporting Period:  5/6/2019 through 6/12/2019    Diagnosis: Left extra-articular segmental distal radius fracture, distal ulna fracture  DOI:  4/19/19  DOS:  4/30/19  Post: 6w 1d  Procedure: Open reduction internal fixation Left segmental distal radius fracture, closed reduction percutaneous pinning distal ulna fracture    MD order: Hand Therapy Referral - Zipper splint - wean as tolerated. Active and passive ROM.    Patient reports symptoms of pain, stiffness/loss of motion, weakness/loss of strength and edema of the L arm which occurred due to Falling and landing on her arm. Since onset symptoms are gradually getting better. Special tests:  x-ray.  Previous treatment: ORIF. General health as reported by patient is good.  Pertinent medical history includes: Rheumatoid Arthritis.  Medical allergies: latex, hematocrit 2, ACE inhibitor, benzocain.  Surgical history: orthopedic: L hip, L wrist.  Medication history: None.    Occupational Profile Information:  Current occupation is Retired  Prior functional level:  no limitations  Barriers include: hip fracture  Mobility: No difficulty  Transportation: drives  Leisure activities/hobbies: Reading, taking care of the dogs    Subjective:  Subjective changes as noted by patient: The pins were just pulled. Its sore and stiff, but doing well.   Functional changes noted by patient: No Change to Self Care Tasks (dressing, eating, bathing, hygiene/toileting)  Response to previous treatment: good  Patient has noted adverse reaction to: None    Objective:  Pain Level (Scale 0-10):   5/6/2019 6/12/19   At Rest 6/10 0/10   With Use 10/10 4/10     Pain Description:  Date 5/6/2019 6/12/19   Location hand and wrist (circumferential) L wrist   Pain Quality Aching and Throbbing, shooting  aching   Frequency constant   intermittent   Pain is worst  daytime daytime   Exacerbated by  movement, changing position movement   Relieved by rest and pain medication rest    Progression Unchanged improving      Finger Edema  Circumference:  (Measured in cm)  Edema 5/6/2019 5/6/2019   side R L   Index P1 6.4 7.0   PIP     P2     Long P1 5.7 7.5   PIP     P2     Ring P1 6.0 7.0   PIP     P2     Small P1 5.2 5.8   PIP     P2       Sensation: WNL throughout all nerve distributions; per patient report     ROM:    ROM: Wrist AROM (PROM)  Date: 6/12   Side: Left   Extension 11   Flexion 26   RD 5   UD 10   Supination NT   Pronation NT     HAND 5/6/2019 5/6/2019   AROM(PROM) R L   Index MP / /41   PIP / 36/46   DIP / /   RIVERA     Long MP / /40   PIP / 35/50   DIP / /   RIVERA     Ring MP / /35   PIP / 45/50   DIP / /   RIVERA     Small MP / /21   PIP / 20/63   DIP / /   RIVERA       ROM  Thumb   5/6/2019 5/6/2019   AROM  (PROM) R L   MP     IP  0/28 (Blocked by cast)   RAB     PABD     Retropulsion     Kapandji Opposition Scale (0-10/10)       Strength:  Contraindicated    Assessment:  Response to therapy has been improvement to:  ROM of Wrist:  All Planes  Pain:  frequency is less, intensity of pain is decreased, duration of pain is decreased and less tender over affected area  Overall Assessment:  Patient would benefit from continued therapy to achieve rehab potential  STG/LTG:  See goal sheet for details and updates of remaining functional limitations.     Plan:  I have re-evaluated this patient and find that the nature, scope, duration and intensity of the therapy is appropriate for the medical condition of the patient.  Frequency:  2 X week, once daily for 3 weeks decreasing to 1 x week, once daily  Duration:  For 8 additional weeks     Home Exercise Program:  Digit AROM - Fisting, Blocking  Edema massage  Elevation  Shoulder AROM  Zipper splint - protection only  AROM of wrist, all planes    Next Visit:  Check splint  Initiate scar mobs  Wrist A/AAROM - add FA ROM  Finger A/AAROM    Please refer to the daily flowsheet for treatment today, total treatment time and time spent performing 1:1 timed  codes.

## 2019-06-05 NOTE — TELEPHONE ENCOUNTER
RECORDS RECEIVED FROM: 6 wk post-op DOS 4/20/19 with Dr. Kang in [place]  HIP   DATE RECEIVED: Jun 12, 2019    NOTES STATUS DETAILS   OFFICE NOTE from referring provider N/A    OFFICE NOTE from other specialist N/A    DISCHARGE SUMMARY from hospital Care Everywhere 4/19/19   DISCHARGE REPORT from the ER N/A    OPERATIVE REPORT Care Everywhere 4/20/19   MEDICATION LIST Internal    IMPLANT RECORD/STICKER N/A    LABS     CBC/DIFF N/A    CULTURES N/A    INJECTIONS DONE IN RADIOLOGY N/A    MRI N/A    CT SCAN N/A    XRAYS (IMAGES & REPORTS) Received 4/25/19...   TUMOR     PATHOLOGY  Slides & report N/A

## 2019-06-11 DIAGNOSIS — M25.539 WRIST PAIN: Primary | ICD-10-CM

## 2019-06-11 DIAGNOSIS — M25.552 LEFT HIP PAIN: Primary | ICD-10-CM

## 2019-06-12 ENCOUNTER — ANCILLARY PROCEDURE (OUTPATIENT)
Dept: GENERAL RADIOLOGY | Facility: CLINIC | Age: 79
End: 2019-06-12
Attending: ORTHOPAEDIC SURGERY
Payer: MEDICARE

## 2019-06-12 ENCOUNTER — OFFICE VISIT (OUTPATIENT)
Dept: ORTHOPEDICS | Facility: CLINIC | Age: 79
End: 2019-06-12
Payer: MEDICARE

## 2019-06-12 ENCOUNTER — THERAPY VISIT (OUTPATIENT)
Dept: OCCUPATIONAL THERAPY | Facility: CLINIC | Age: 79
End: 2019-06-12
Payer: MEDICARE

## 2019-06-12 ENCOUNTER — OFFICE VISIT (OUTPATIENT)
Dept: INTERNAL MEDICINE | Facility: CLINIC | Age: 79
End: 2019-06-12
Payer: MEDICARE

## 2019-06-12 ENCOUNTER — PRE VISIT (OUTPATIENT)
Dept: ORTHOPEDICS | Facility: CLINIC | Age: 79
End: 2019-06-12

## 2019-06-12 VITALS
BODY MASS INDEX: 21.03 KG/M2 | HEIGHT: 60 IN | SYSTOLIC BLOOD PRESSURE: 151 MMHG | WEIGHT: 107.1 LBS | DIASTOLIC BLOOD PRESSURE: 80 MMHG | OXYGEN SATURATION: 99 % | HEART RATE: 77 BPM

## 2019-06-12 VITALS — WEIGHT: 125 LBS | HEIGHT: 60 IN | BODY MASS INDEX: 24.54 KG/M2

## 2019-06-12 DIAGNOSIS — Z98.890 STATUS POST HIP SURGERY: Primary | ICD-10-CM

## 2019-06-12 DIAGNOSIS — M25.532 LEFT WRIST PAIN: ICD-10-CM

## 2019-06-12 DIAGNOSIS — R91.8 PULMONARY NODULES: Primary | ICD-10-CM

## 2019-06-12 DIAGNOSIS — M25.552 LEFT HIP PAIN: ICD-10-CM

## 2019-06-12 DIAGNOSIS — M79.642 HAND PAIN, LEFT: ICD-10-CM

## 2019-06-12 DIAGNOSIS — S62.102A CLOSED FRACTURE OF LEFT WRIST, INITIAL ENCOUNTER: Primary | ICD-10-CM

## 2019-06-12 DIAGNOSIS — D64.9 ANEMIA, UNSPECIFIED TYPE: ICD-10-CM

## 2019-06-12 DIAGNOSIS — M25.539 WRIST PAIN: ICD-10-CM

## 2019-06-12 LAB
ALBUMIN SERPL-MCNC: 3.4 G/DL (ref 3.4–5)
ALP SERPL-CCNC: 120 U/L (ref 40–150)
ALT SERPL W P-5'-P-CCNC: 17 U/L (ref 0–50)
ANION GAP SERPL CALCULATED.3IONS-SCNC: 7 MMOL/L (ref 3–14)
AST SERPL W P-5'-P-CCNC: 14 U/L (ref 0–45)
BASOPHILS # BLD AUTO: 0 10E9/L (ref 0–0.2)
BASOPHILS NFR BLD AUTO: 0.7 %
BILIRUB SERPL-MCNC: 0.4 MG/DL (ref 0.2–1.3)
BUN SERPL-MCNC: 11 MG/DL (ref 7–30)
CALCIUM SERPL-MCNC: 8.8 MG/DL (ref 8.5–10.1)
CHLORIDE SERPL-SCNC: 102 MMOL/L (ref 94–109)
CO2 SERPL-SCNC: 26 MMOL/L (ref 20–32)
CREAT SERPL-MCNC: 0.93 MG/DL (ref 0.52–1.04)
DIFFERENTIAL METHOD BLD: ABNORMAL
EOSINOPHIL # BLD AUTO: 0.4 10E9/L (ref 0–0.7)
EOSINOPHIL NFR BLD AUTO: 6.6 %
ERYTHROCYTE [DISTWIDTH] IN BLOOD BY AUTOMATED COUNT: 13.5 % (ref 10–15)
GFR SERPL CREATININE-BSD FRML MDRD: 58 ML/MIN/{1.73_M2}
GLUCOSE SERPL-MCNC: 94 MG/DL (ref 70–99)
HCT VFR BLD AUTO: 34.1 % (ref 35–47)
HGB BLD-MCNC: 11.2 G/DL (ref 11.7–15.7)
IMM GRANULOCYTES # BLD: 0 10E9/L (ref 0–0.4)
IMM GRANULOCYTES NFR BLD: 0.4 %
LYMPHOCYTES # BLD AUTO: 0.8 10E9/L (ref 0.8–5.3)
LYMPHOCYTES NFR BLD AUTO: 14.4 %
MCH RBC QN AUTO: 32.8 PG (ref 26.5–33)
MCHC RBC AUTO-ENTMCNC: 32.8 G/DL (ref 31.5–36.5)
MCV RBC AUTO: 100 FL (ref 78–100)
MONOCYTES # BLD AUTO: 0.7 10E9/L (ref 0–1.3)
MONOCYTES NFR BLD AUTO: 12.6 %
NEUTROPHILS # BLD AUTO: 3.6 10E9/L (ref 1.6–8.3)
NEUTROPHILS NFR BLD AUTO: 65.3 %
NRBC # BLD AUTO: 0 10*3/UL
NRBC BLD AUTO-RTO: 0 /100
PLATELET # BLD AUTO: 213 10E9/L (ref 150–450)
POTASSIUM SERPL-SCNC: 4.2 MMOL/L (ref 3.4–5.3)
PROT SERPL-MCNC: 7.1 G/DL (ref 6.8–8.8)
RBC # BLD AUTO: 3.41 10E12/L (ref 3.8–5.2)
SODIUM SERPL-SCNC: 135 MMOL/L (ref 133–144)
WBC # BLD AUTO: 5.6 10E9/L (ref 4–11)

## 2019-06-12 PROCEDURE — 97110 THERAPEUTIC EXERCISES: CPT | Mod: GO | Performed by: OCCUPATIONAL THERAPIST

## 2019-06-12 PROCEDURE — 97760 ORTHOTIC MGMT&TRAING 1ST ENC: CPT | Mod: GO | Performed by: OCCUPATIONAL THERAPIST

## 2019-06-12 ASSESSMENT — MIFFLIN-ST. JEOR
SCORE: 877.3
SCORE: 958.5
SCORE: 958.5

## 2019-06-12 ASSESSMENT — PAIN SCALES - GENERAL: PAINLEVEL: MODERATE PAIN (4)

## 2019-06-12 NOTE — PROGRESS NOTES
[facility]  Orthopedics  Dinh Strong MD  2019     Name: Kimberly Chau  MRN: [id number]  Age: 78 year old  : 1940  Referring provider: Referred Self     Chief Complaint: Surgical Followup of the Left Wrist and Surgical Followup (6 wk pop DOS 19 ORIF left distal radius fracture)       Date of Surgery: 19    Procedure Performed: Open reduction internal fixation left distal radius fracture    History of Present Illness:   Kimberly Chau comes to see me in follow-up today from the above surgery. Pain has been well-controlled. No numbness or tingling. Has been wearing cast as instructed, which she is tolerating well. No other complaints today.       Physical Examination:  Ht 1.524 m (5')   Wt 56.7 kg (125 lb)   BMI 24.41 kg/m    Well-developed, well-nourished and in no acute distress.  Alert and oriented to surroundings.  On examination of the left wrist, Pins clean, dry, intact. There is no erythema, drainage, or dehiscence. Sensation is intact in median, radial and ulnar nerve distributions.  Patient can actively flex and extend all digits and thumb.  Fingers are warm and well-perfused. Somewhat stiff with fisting.       Radiographs:   Three views of the left wrist were obtained and reviewed. These demonstrate improved alignment from preop. PIns in place, unchanged from last visit.     Assessment: Recovering well following the above procedure    Plan:    Pins were removed. Patient will receive a zipper splint in hand therapy to be worn for comfort. The patient may progress range of motion as tolerated with hand therapy and may begin strengthening exercises in two weeks. No lifting greater than 5 pounds. Avoid painful activities. Otherwise no activity restrictions. I will see the patient back for a postoperative visit in 6 weeks time. Knows to contact us if any questions, concerns, or other issues arise.     Dinh Strong MD      Scribe Disclosure:  I, Teom Espinoza, am serving as a scribe  to document services personally performed by Dinh Strong MD at this visit, based upon the provider's statements to me. All documentation has been reviewed by the aforementioned provider prior to being entered into the official medical record.     ITemo, a scribe, prepared the chart for today's encounter.

## 2019-06-12 NOTE — NURSING NOTE
Reason For Visit:   Chief Complaint   Patient presents with     Consult     F/U to surgery done in [place]. DOS 4/20/19 S/P Left Hip hemiarthroplasty       Primary MD: Guillermo Castellano  Referring MD: Referred Self          Ht 1.524 m (5')   Wt 56.7 kg (125 lb)   BMI 24.41 kg/m      Pain Assessment  Patient Currently in Pain: Denies    Current Outpatient Medications   Medication     acetaminophen 650 MG TABS     amLODIPine (NORVASC) 10 MG tablet     Diphenhydramine-APAP, sleep, (TYLENOL PM EXTRA STRENGTH PO)     ferrous gluconate (FERGON) 324 (38 Fe) MG tablet     FOLIC ACID     gabapentin (NEURONTIN) 100 MG capsule     meloxicam (MOBIC) 15 MG tablet     METHOTREXATE     metoprolol tartrate (LOPRESSOR) 100 MG tablet     Multiple Vitamin (MULTI-VITAMIN) per tablet     Omeprazole (PRILOSEC PO)     oxyCODONE (ROXICODONE) 5 MG tablet     sodium chloride (OCEAN NASAL SPRAY) 0.65 % nasal spray     traMADol (ULTRAM) 50 MG tablet     No current facility-administered medications for this visit.           Allergies   Allergen Reactions     Hctz Other (See Comments)     Pt develops symptomatic and significant hyponatremia with HCTZ exposure     Hydrochlorothiazide      Other reaction(s): Hyponatremia  Hyponatremia     Latex      Benzocaine Rash     carba mix,thrium-sunscreen     Resorcinol-Alcohol Rash     carba mix,thrium-sunscreen     Ace Inhibitors Unknown     Dizziness and orthostatic changes and electrolyte problems.     Latex Unknown           Lida Rene LPN

## 2019-06-12 NOTE — NURSING NOTE
Reason For Visit:   Chief Complaint   Patient presents with     Left Wrist - Surgical Followup     Surgical Followup     6 wk pop DOS 4/30/19 ORIF left distal radius fracture       Primary MD: Guillermo Castellano  Ref. MD: est     ?  No    Age: 78 year old        Date of surgery: 4/30/19   Type of surgery: ORIF left distal radius fracture.        Ht 1.524 m (5')   Wt 56.7 kg (125 lb)   BMI 24.41 kg/m        Pain Assessment  Patient Currently in Pain: No               QuickDASH Assessment  No flowsheet data found.       Allergies   Allergen Reactions     Hctz Other (See Comments)     Pt develops symptomatic and significant hyponatremia with HCTZ exposure     Hydrochlorothiazide      Other reaction(s): Hyponatremia  Hyponatremia     Latex      Benzocaine Rash     carba mix,thrium-sunscreen     Resorcinol-Alcohol Rash     carba mix,thrium-sunscreen     Ace Inhibitors Unknown     Dizziness and orthostatic changes and electrolyte problems.     Latex Unknown       Anabel Robbins, ATC

## 2019-06-12 NOTE — PROGRESS NOTES
HPI:    Pt. With h/o RA and cervical spine surgery comes in for follow up recent fall with L hip fracture and surgery and L forearm fracture. She had sinus surgery  with Dr. Eisenberg on 8/3/2018.  She had chronic R frontal HA's. She had MRI brain 10/17 followed sinus CT scan 11/13/17 showing R frontal and R maxillary sinus disease.  She followed in  neurosurgery 2/17 and again in 4/17.   No CP or breathing issues.  She was seen by Dr. Cabello  Rheumatology 10/31/17; she has stopped her  methotrexate. She o/w denies additional HEENT, cardiopulmonary, abdominal, , GYN, neurological complaints. She denies any palpitations.     PE:    Vitals noted gen nad cooperative alert, lungs with  good air movement,  RRR, S1, S2, no MRG, abdomen, nt, nd, L forearm in cast and she is wearing a sling. Minimal leg swelling today.       Resting echo (2/7/2018) Nl LVF 60-65%. No WMA's.         A/P:    1. She will continue to follow with her Rheumatology provider and is now off Methotrexate due to recent orthopedic surgery  2. She had L forearm surgery 4/30/2019 with Dr. Strong orthopedics and had follow up in clinic today 6/12/2019  3. She will follow up with Dr. Ledezma, today 6/12/2019 for L hip surgery from hip fracture  4. Post op anemia; CBC checked  and improving  5. Negative DVT study for L LE swelling on 5/15/2019  6. Somewhat lower Na+ 131 ordered recheck   7. Ordered Chest CT scan today 6/12/2019 and referred again to pulmonary for lung nodules    Total time spent 25 minutes.  More than 50% of the time spent with Ms. Chau on counseling / coordinating her care

## 2019-06-12 NOTE — PROGRESS NOTES
[facility], Orthopaedic Surgery, Arthritis, Hip and Knee Replacement    Kimberly Chau MRN# [id number]   Age: 78 year old YOB: 1940     Requesting physician: Referred Self              History of Present Illness:   Kimberly Chau is a 78 year old year old female, 2 months s/p L hip bipolar hemiarthroplasty via direct anterior approach for femoral neck fx by Dr. Pritesh Kang on 4/20/19 at [facility] in [place], [place], due to GLF on 4/19/19, who presents today for evaluation and management of left hip pain. She also sustained a left distal radius fracture from the same fall and is seeing Dr. Dinh Strong for this. She has no complaints about her left hip. Rates her left hip pain as 0/10. Off opioids. No numbness/tingling in LLE. Used a cane up till 1 week ago and does not need any assistive devices now. Finished a 6 week course of aspirin 81 mg daily. No CP or SOB.    Per operative report from 4/19/19: Joey and Joey ACTIS size 4 standard collared stem, with a 43/28/1.5 bipolar head combo.         Past Medical History:     Patient Active Problem List   Diagnosis     Intrinsic atopic dermatitis     Essential hypertension     Rheumatoid arthritis (H)     Retinal artery occlusion     Cervical vertebral fracture (H)     Central retinal artery occlusion of right eye     Bilateral occipital neuralgia     C1-C2 instability     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 factor (H)     Osteoarthritis     Malignant hypertension     Hyponatremia     GERD (gastroesophageal reflux disease)     Eczematous dermatitis     Anxiety state     Anemia     Hand pain, left     Left wrist pain     Past Medical History:   Diagnosis Date     Anemia      Arthritis      Cataracts      Central retinal artery occlusion      Cervical spine fracture (H)     After a fall from orthostatic sx     Chronic pain     Back of head     Gastro-oesophageal reflux disease      Head injury      Hypertension       Hyponatremia     Resolved, 2/2 diuretics     Migraines      Osteoporosis      Pneumonia 2018     Rheumatoid arthritis(714.0)      Prior history of blood clot: none  Prior history of bleeding problems: none  Prior history of anesthetic complications: none  Currently on opioids:  none  History of Diabetes (if so, recent A1c): no           Past Surgical History:     Past Surgical History:   Procedure Laterality Date     COLONOSCOPY       EYE SURGERY Left 02/2019    Hole in macula     FUSION CERVICAL POSTERIOR THREE+ LEVELS  1/22/2013    Procedure: FUSION CERVICAL POSTERIOR THREE+ LEVELS;  Cervical 1-6 Posterior Instrumentation and Fusion, Cervical 3-4 Laminectomy  .Instrumentation and fusion to Cervical 6 *Latex Allergy*;  Surgeon: Ra Bar MD;  Location:  OR     GYN SURGERY       HEAD & NECK SURGERY       HYSTERECTOMY       KNEE SURGERY       OPEN REDUCTION INTERNAL FIXATION WRIST Left 4/30/2019    Procedure: Open Reduction Internal Fixation Left Distal Radius Fracture;  Surgeon: Dinh Strong MD;  Location:  OR     OPTICAL TRACKING SYSTEM ENDOSCOPIC SINUS SURGERY Right 4/6/2018    Procedure: OPTICAL TRACKING SYSTEM ENDOSCOPIC SINUS SURGERY;  Stealth Assisted Right Maxillary Antrostomy, Anterior Ethmoidectomy And Frontal Sinusotomy;  Surgeon: Elyse Eisenberg MD;  Location:  OR     OPTICAL TRACKING SYSTEM ENDOSCOPIC SINUS SURGERY Right 8/3/2018    Procedure: OPTICAL TRACKING SYSTEM ENDOSCOPIC SINUS SURGERY;  Stealth Assisted Revision Right Frontal Sinusotomy, Right Stent Placement  **Latex Allergy** ;  Surgeon: Elyse Eisenberg MD;  Location:  OR     ORTHOPEDIC SURGERY              Social History:     Social History     Socioeconomic History     Marital status:      Spouse name: Not on file     Number of children: Not on file     Years of education: Not on file     Highest education level: Not on file   Occupational History     Not on file   Social Needs     Financial resource  strain: Not on file     Food insecurity:     Worry: Not on file     Inability: Not on file     Transportation needs:     Medical: Not on file     Non-medical: Not on file   Tobacco Use     Smoking status: Never Smoker     Smokeless tobacco: Never Used   Substance and Sexual Activity     Alcohol use: Yes     Comment: Wine daily with dinner.     Drug use: No     Sexual activity: Not on file   Lifestyle     Physical activity:     Days per week: Not on file     Minutes per session: Not on file     Stress: Not on file   Relationships     Social connections:     Talks on phone: Not on file     Gets together: Not on file     Attends [employer] service: Not on file     Active member of club or organization: Not on file     Attends meetings of clubs or organizations: Not on file     Relationship status: Not on file     Intimate partner violence:     Fear of current or ex partner: Not on file     Emotionally abused: Not on file     Physically abused: Not on file     Forced sexual activity: Not on file   Other Topics Concern     Parent/sibling w/ CABG, MI or angioplasty before 65F 55M? Not Asked   Social History Narrative    Ms. Chau is , has two grown children and two grandchildren. She does not work. She was never a smoker, and drinks a glass of wine with dinner each night.      Smoking: None  Living situation: Lives in [place], [place], with her .           Family History:       Family History   Problem Relation Age of Onset     Arthritis Mother      Respiratory Mother         pulmonary fibrosis     Hypertension Mother      Anemia Mother      Liver Disease Father         from EtOH     Alcoholism Father      Multiple Sclerosis Sister      No Known Problems Maternal Grandmother      Heart Disease Maternal Grandfather      Breast Cancer Sister      Glaucoma Paternal Grandfather      Heart Disease Paternal Grandfather        Family history of blood clot: none  Family history of bleeding problems: none  Family history  of anesthetic complications: none         Medications:     Current Outpatient Medications   Medication Sig     acetaminophen 650 MG TABS Take 650 mg by mouth every 4 hours as needed.     amLODIPine (NORVASC) 10 MG tablet Take 1 tablet (10 mg) by mouth daily     Diphenhydramine-APAP, sleep, (TYLENOL PM EXTRA STRENGTH PO) Take 2 tablets by mouth At Bedtime      ferrous gluconate (FERGON) 324 (38 Fe) MG tablet Take 324 mg by mouth     FOLIC ACID 1 tablet every morning      gabapentin (NEURONTIN) 100 MG capsule Take 100 mg by mouth     meloxicam (MOBIC) 15 MG tablet Take 15 mg by mouth     METHOTREXATE 8 tablets once a week On Fridays.  Taking 8 tablets on Fridays     metoprolol tartrate (LOPRESSOR) 100 MG tablet Take 2 tablets (200 mg) by mouth 2 times daily     Multiple Vitamin (MULTI-VITAMIN) per tablet Take 1 tablet by mouth every morning      Omeprazole (PRILOSEC PO) Take 20 mg by mouth as needed      oxyCODONE (ROXICODONE) 5 MG tablet Take 1 tablet (5 mg) by mouth every 4 hours as needed for severe pain     sodium chloride (OCEAN NASAL SPRAY) 0.65 % nasal spray Spray 1 spray into both nostrils daily as needed for congestion     traMADol (ULTRAM) 50 MG tablet Take 1 tablet (50 mg) by mouth every 6 hours as needed for severe pain     No current facility-administered medications for this visit.             Review of Systems:   A comprehensive 10 point review of systems (constitutional, ENT, cardiac, peripheral vascular, lymphatic, respiratory, GI, , Musculoskeletal, skin, Neurological) was performed and found to be negative except as described in this note.     Also see intake form completed by patient.             Physical Exam:     EXAMINATION pertinent findings:   VITAL SIGNS: Height 1.524 m (5'), weight 56.7 kg (125 lb), not currently breastfeeding.  Body mass index is 24.41 kg/m .  GEN: AOx3, cooperative, no distress  RESP: non labored breathing   ABD: benign   SKIN: grossly normal   LYMPHATIC: grossly normal    NEURO: grossly normal   VASCULAR: satisfactory perfusion of all extremities  MUSCULOSKELETAL:   Gait normal, no antalgia or Trendelenburg.  No leg length discrepancy.  Focused exam of LLE shows well-healed anterior hip incision. Full, painless hip PROM with flexion 100, IR 15, ER 45. 5/5 EHL, FHL, TA, GSc. SILT sp/dp/s/s/t. 2+ DP. Foot WWP.            Data:   Imaging: All reviewed by me    Left hip XR from 4/20/19 shows subcapital femoral neck fx.   Today's hip xrays show well-placed hemiarthroplasty bipolar implant. No failure/loosening. No osteolysis. No fxs.           Assessment and Plan:   Assessment:  78 year old year old female, 2 months s/p L hip bipolar hemiarthroplasty via direct anterior approach at OSH. Doing well.     Plan:  - Follow up PRN.    This patient was seen & discussed with Dr. Ledezma, who agrees with the findings and plan as above.    [facility]  Orthopaedic PGY4  Pager: [phone]    Attending MD (Dr. Joshua Ledezma) :  I performed the history and physical exam of the patient and discussed the patient's management with the resident and patient. I reviewed the above note and agree with the documented findings and plan of care, which I communicated to the patient.          Joshua Ledezma M.D.     Arthritis and Joint Replacement  Department of Orthopaedic Surgery, [facility]  [email or web address]  [phone] (pager)           Review of Systems:

## 2019-06-12 NOTE — LETTER
6/12/2019       RE: Kimberly Chau  [address]  [place] [address]     Dear Colleague,    Thank you for referring your patient, Kimberly Chau, to the [facility] at [facility]. Please see a copy of my visit note below.    [facility], Orthopaedic Surgery, Arthritis, Hip and Knee Replacement    Kimberly Chau MRN# [id number]   Age: 78 year old YOB: 1940     Requesting physician: Referred Self              History of Present Illness:   Kimberly Chau is a 78 year old year old female, 2 months s/p L hip bipolar hemiarthroplasty via direct anterior approach for femoral neck fx by Dr. Pritesh Kang on 4/20/19 at [facility] in [place], [place], due to GLF on 4/19/19, who presents today for evaluation and management of left hip pain. She also sustained a left distal radius fracture from the same fall and is seeing Dr. Dinh Strong for this. She has no complaints about her left hip. Rates her left hip pain as 0/10. Off opioids. No numbness/tingling in LLE. Used a cane up till 1 week ago and does not need any assistive devices now. Finished a 6 week course of aspirin 81 mg daily. No CP or SOB.    Per operative report from 4/19/19: Joey and Joey ACTIS size 4 standard collared stem, with a 43/28/1.5 bipolar head combo.         Past Medical History:     Patient Active Problem List   Diagnosis     Intrinsic atopic dermatitis     Essential hypertension     Rheumatoid arthritis (H)     Retinal artery occlusion     Cervical vertebral fracture (H)     Central retinal artery occlusion of right eye     Bilateral occipital neuralgia     C1-C2 instability     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 factor (H)     Osteoarthritis     Malignant hypertension     Hyponatremia     GERD (gastroesophageal reflux disease)     Eczematous dermatitis     Anxiety state     Anemia     Hand pain, left     Left wrist pain     Past Medical  History:   Diagnosis Date     Anemia      Arthritis      Cataracts      Central retinal artery occlusion      Cervical spine fracture (H)     After a fall from orthostatic sx     Chronic pain     Back of head     Gastro-oesophageal reflux disease      Head injury      Hypertension      Hyponatremia     Resolved, 2/2 diuretics     Migraines      Osteoporosis      Pneumonia 2018     Rheumatoid arthritis(714.0)      Prior history of blood clot: none  Prior history of bleeding problems: none  Prior history of anesthetic complications: none  Currently on opioids:  none  History of Diabetes (if so, recent A1c): no           Past Surgical History:     Past Surgical History:   Procedure Laterality Date     COLONOSCOPY       EYE SURGERY Left 02/2019    Hole in macula     FUSION CERVICAL POSTERIOR THREE+ LEVELS  1/22/2013    Procedure: FUSION CERVICAL POSTERIOR THREE+ LEVELS;  Cervical 1-6 Posterior Instrumentation and Fusion, Cervical 3-4 Laminectomy  .Instrumentation and fusion to Cervical 6 *Latex Allergy*;  Surgeon: Ra Bar MD;  Location: [facility]     GYN SURGERY       HEAD & NECK SURGERY       HYSTERECTOMY       KNEE SURGERY       OPEN REDUCTION INTERNAL FIXATION WRIST Left 4/30/2019    Procedure: Open Reduction Internal Fixation Left Distal Radius Fracture;  Surgeon: Dinh Strong MD;  Location:  OR     OPTICAL TRACKING SYSTEM ENDOSCOPIC SINUS SURGERY Right 4/6/2018    Procedure: OPTICAL TRACKING SYSTEM ENDOSCOPIC SINUS SURGERY;  Stealth Assisted Right Maxillary Antrostomy, Anterior Ethmoidectomy And Frontal Sinusotomy;  Surgeon: Elyse Eisenberg MD;  Location:  OR     OPTICAL TRACKING SYSTEM ENDOSCOPIC SINUS SURGERY Right 8/3/2018    Procedure: OPTICAL TRACKING SYSTEM ENDOSCOPIC SINUS SURGERY;  Stealth Assisted Revision Right Frontal Sinusotomy, Right Stent Placement  **Latex Allergy** ;  Surgeon: Elyse Eisenberg MD;  Location:  OR     ORTHOPEDIC SURGERY              Social History:     Social  History     Socioeconomic History     Marital status:      Spouse name: Not on file     Number of children: Not on file     Years of education: Not on file     Highest education level: Not on file   Occupational History     Not on file   Social Needs     Financial resource strain: Not on file     Food insecurity:     Worry: Not on file     Inability: Not on file     Transportation needs:     Medical: Not on file     Non-medical: Not on file   Tobacco Use     Smoking status: Never Smoker     Smokeless tobacco: Never Used   Substance and Sexual Activity     Alcohol use: Yes     Comment: Wine daily with dinner.     Drug use: No     Sexual activity: Not on file   Lifestyle     Physical activity:     Days per week: Not on file     Minutes per session: Not on file     Stress: Not on file   Relationships     Social connections:     Talks on phone: Not on file     Gets together: Not on file     Attends Nondenominational service: Not on file     Active member of club or organization: Not on file     Attends meetings of clubs or organizations: Not on file     Relationship status: Not on file     Intimate partner violence:     Fear of current or ex partner: Not on file     Emotionally abused: Not on file     Physically abused: Not on file     Forced sexual activity: Not on file   Other Topics Concern     Parent/sibling w/ CABG, MI or angioplasty before 65F 55M? Not Asked   Social History Narrative    Ms. Chau is , has two grown children and two grandchildren. She does not work. She was never a smoker, and drinks a glass of wine with dinner each night.      Smoking: None  Living situation: Lives in [place], [place], with her .           Family History:       Family History   Problem Relation Age of Onset     Arthritis Mother      Respiratory Mother         pulmonary fibrosis     Hypertension Mother      Anemia Mother      Liver Disease Father         from EtOH     Alcoholism Father      Multiple Sclerosis Sister       No Known Problems Maternal Grandmother      Heart Disease Maternal Grandfather      Breast Cancer Sister      Glaucoma Paternal Grandfather      Heart Disease Paternal Grandfather        Family history of blood clot: none  Family history of bleeding problems: none  Family history of anesthetic complications: none         Medications:     Current Outpatient Medications   Medication Sig     acetaminophen 650 MG TABS Take 650 mg by mouth every 4 hours as needed.     amLODIPine (NORVASC) 10 MG tablet Take 1 tablet (10 mg) by mouth daily     Diphenhydramine-APAP, sleep, (TYLENOL PM EXTRA STRENGTH PO) Take 2 tablets by mouth At Bedtime      ferrous gluconate (FERGON) 324 (38 Fe) MG tablet Take 324 mg by mouth     FOLIC ACID 1 tablet every morning      gabapentin (NEURONTIN) 100 MG capsule Take 100 mg by mouth     meloxicam (MOBIC) 15 MG tablet Take 15 mg by mouth     METHOTREXATE 8 tablets once a week On Fridays.  Taking 8 tablets on Fridays     metoprolol tartrate (LOPRESSOR) 100 MG tablet Take 2 tablets (200 mg) by mouth 2 times daily     Multiple Vitamin (MULTI-VITAMIN) per tablet Take 1 tablet by mouth every morning      Omeprazole (PRILOSEC PO) Take 20 mg by mouth as needed      oxyCODONE (ROXICODONE) 5 MG tablet Take 1 tablet (5 mg) by mouth every 4 hours as needed for severe pain     sodium chloride (OCEAN NASAL SPRAY) 0.65 % nasal spray Spray 1 spray into both nostrils daily as needed for congestion     traMADol (ULTRAM) 50 MG tablet Take 1 tablet (50 mg) by mouth every 6 hours as needed for severe pain     No current facility-administered medications for this visit.             Review of Systems:   A comprehensive 10 point review of systems (constitutional, ENT, cardiac, peripheral vascular, lymphatic, respiratory, GI, , Musculoskeletal, skin, Neurological) was performed and found to be negative except as described in this note.     Also see intake form completed by patient.           Physical Exam:      EXAMINATION pertinent findings:   VITAL SIGNS: Height 1.524 m (5'), weight 56.7 kg (125 lb), not currently breastfeeding.  Body mass index is 24.41 kg/m .  GEN: AOx3, cooperative, no distress  RESP: non labored breathing   ABD: benign   SKIN: grossly normal   LYMPHATIC: grossly normal   NEURO: grossly normal   VASCULAR: satisfactory perfusion of all extremities  MUSCULOSKELETAL:   Gait normal, no antalgia or Trendelenburg.  No leg length discrepancy.  Focused exam of LLE shows well-healed anterior hip incision. Full, painless hip PROM with flexion 100, IR 15, ER 45. 5/5 EHL, FHL, TA, GSc. SILT sp/dp/s/s/t. 2+ DP. Foot WWP.            Data:   Imaging: All reviewed by me    Left hip XR from 4/20/19 shows subcapital femoral neck fx.   Today's hip xrays show well-placed hemiarthroplasty bipolar implant. No failure/loosening. No osteolysis. No fxs.           Assessment and Plan:   Assessment:  78 year old year old female, 2 months s/p L hip bipolar hemiarthroplasty via direct anterior approach at OSH. Doing well.     Plan:  - Follow up PRN.    This patient was seen & discussed with Dr. Ledezma, who agrees with the findings and plan as above.    Nicolás Montgomery [facility]  Orthopaedic PGY4  Pager: [phone]    Attending MD (Dr. Joshua Ledezma) :  I performed the history and physical exam of the patient and discussed the patient's management with the resident and patient. I reviewed the above note and agree with the documented findings and plan of care, which I communicated to the patient.      Joshua Ledezma M.D.     Arthritis and Joint Replacement  Department of Orthopaedic Surgery, [facility]  [email or web address]  [phone] (pager)

## 2019-06-12 NOTE — NURSING NOTE
78 year old  Chief Complaint   Patient presents with     Recheck Medication     Pt is here to discuss lab work.       Blood pressure 151/80, pulse 77, height 1.524 m (5'), weight 48.6 kg (107 lb 1.6 oz), SpO2 99 %, not currently breastfeeding. Body mass index is 20.92 kg/m .  BP completed using cuff size:      Faloln Wright, D[place]  June 12, 2019 2:07 PM

## 2019-06-12 NOTE — LETTER
2019       RE: Kimberly Chau  [address]  [place] [address]     Dear Colleague,    Thank you for referring your patient, Kimberly Cahu, to the [facility] at [facility]. Please see a copy of my visit note below.    [facility]  Orthopedics  Dinh Strong MD  2019     Name: Kimberly Chau  MRN: [id number]  Age: 78 year old  : 1940  Referring provider: Referred Self     Chief Complaint: Surgical Followup of the Left Wrist and Surgical Followup (6 wk pop DOS 19 ORIF left distal radius fracture)     Date of Surgery: 19    Procedure Performed: Open reduction internal fixation left distal radius fracture    History of Present Illness:   Kimberly Chau comes to see me in follow-up today from the above surgery. Pain has been well-controlled. No numbness or tingling. Has been wearing cast as instructed, which she is tolerating well. No other complaints today.     Physical Examination:  Ht 1.524 m (5')   Wt 56.7 kg (125 lb)   BMI 24.41 kg/m     Well-developed, well-nourished and in no acute distress.  Alert and oriented to surroundings.  On examination of the left wrist, Pins clean, dry, intact. There is no erythema, drainage, or dehiscence. Sensation is intact in median, radial and ulnar nerve distributions.  Patient can actively flex and extend all digits and thumb.  Fingers are warm and well-perfused. Somewhat stiff with fisting.     Radiographs:   Three views of the left wrist were obtained and reviewed. These demonstrate improved alignment from preop. PIns in place, unchanged from last visit.     Assessment: Recovering well following the above procedure    Plan:    Pins were removed. Patient will receive a zipper splint in hand therapy to be worn for comfort. The patient may progress range of motion as tolerated with hand therapy and may begin strengthening exercises in two weeks. No lifting greater than 5 pounds. Avoid painful  activities. Otherwise no activity restrictions. I will see the patient back for a postoperative visit in 6 weeks time. Knows to contact us if any questions, concerns, or other issues arise.     Dinh Strong MD    Scribe Disclosure:  ITemo, am serving as a scribe to document services personally performed by Dinh Strong MD at this visit, based upon the provider's statements to me. All documentation has been reviewed by the aforementioned provider prior to being entered into the official medical record.     ITemo, a scribe, prepared the chart for today's encounter.

## 2019-06-12 NOTE — PATIENT INSTRUCTIONS
Primary Care Center Phone Number [phone]  Primary Care Center Medication Refill Request Information:  * Please contact your pharmacy regarding ANY request for medication refills.  ** [facility] Prescription Fax = [phone]  * Please allow 3 business days for routine medication refills.  * Please allow 5 business days for controlled substance medication refills.     [facility] Test Result notification information:  *You will be notified with in 7-10 days of your appointment day regarding the results of your test.  If you are on MyChart you will be notified as soon as the provider has reviewed the results and signed off on them.

## 2019-06-13 NOTE — TELEPHONE ENCOUNTER
ONCOLOGY INTAKE: Records Information      APPT INFORMATION: 07/10 w/Moiz   Referring provider:  Guillermo Castellano MD  Referring provider s clinic:  [facility] MEDICINE  Reason for visit/diagnosis:  Pulmonary nodules [R91.8]  Has patient been notified of appointment date and time?: yes    RECORDS INFORMATION:  Were the records received with the referral (via [employer])? In [facility]    Has patient been seen for any external appt for this diagnosis? no    If yes, where? na    Has patient had any imaging or procedures outside of [facility] for this condition? no      If Yes, where? na    ADDITIONAL INFORMATION:  Pulmonary nodules [R91.8]: caller  [place]: Ref by: Guillermo Castellano MD-[facility] MEDICINE: Records [facility]

## 2019-06-14 NOTE — TELEPHONE ENCOUNTER
RECORDS STATUS - ALL OTHER DIAGNOSIS      RECORDS RECEIVED FROM: [facility]   DATE RECEIVED: 6/14/19   NOTES STATUS DETAILS   OFFICE NOTE from referring provider Guillermo Burnham MD   OFFICE NOTE from medical oncologist     DISCHARGE SUMMARY from hospital     DISCHARGE REPORT from the ER     OPERATIVE REPORT     MEDICATION LIST [facility] As of 5/6/19   CLINICAL TRIAL TREATMENTS TO DATE     LABS     PATHOLOGY REPORTS NA    ANYTHING RELATED TO DIAGNOSIS Epic 6/12/19   GENONOMIC TESTING     TYPE:     IMAGING (NEED IMAGES & REPORT)     CT SCANS PACS    CT Scheduled 6/17/19 3/30/18, 3/26/18   MRI     MAMMO     ULTRASOUND     PET

## 2019-06-17 ENCOUNTER — HOSPITAL ENCOUNTER (OUTPATIENT)
Dept: CT IMAGING | Facility: CLINIC | Age: 79
Discharge: HOME | End: 2019-06-17
Attending: INTERNAL MEDICINE | Admitting: INTERNAL MEDICINE
Payer: MEDICARE

## 2019-06-17 DIAGNOSIS — R91.8 PULMONARY NODULES: ICD-10-CM

## 2019-06-17 PROCEDURE — 71250 CT THORAX DX C-: CPT

## 2019-07-03 ENCOUNTER — THERAPY VISIT (OUTPATIENT)
Dept: OCCUPATIONAL THERAPY | Facility: CLINIC | Age: 79
End: 2019-07-03
Payer: MEDICARE

## 2019-07-03 DIAGNOSIS — M25.532 LEFT WRIST PAIN: ICD-10-CM

## 2019-07-03 DIAGNOSIS — M79.642 HAND PAIN, LEFT: ICD-10-CM

## 2019-07-03 PROCEDURE — 97110 THERAPEUTIC EXERCISES: CPT | Mod: GO | Performed by: OCCUPATIONAL THERAPIST

## 2019-07-03 PROCEDURE — 97140 MANUAL THERAPY 1/> REGIONS: CPT | Mod: GO | Performed by: OCCUPATIONAL THERAPIST

## 2019-07-03 NOTE — PROGRESS NOTES
Hand Therapy Progress Note  Current Date 7/3/19  Reporting period:   Diagnosis: Left extra-articular segmental distal radius fracture, distal ulna fracture  DOI:  4/19/19  DOS:  4/30/19  Post: 9w 1d  Procedure: Open reduction internal fixation Left segmental distal radius fracture, closed reduction percutaneous pinning distal ulna fracture    MD order: Hand Therapy Referral - Zipper splint - wean as tolerated. Active and passive ROM.    Patient reports symptoms of pain, stiffness/loss of motion, weakness/loss of strength and edema of the L arm which occurred due to Falling and landing on her arm.    Occupational Profile Information:  Current occupation is Retired    Subjective:  Subjective changes as noted by patient: I have been reluctant to get rid of my splint. I feel I might hurt it.  Functional changes noted by patient: Has been wearing splint and not progressing with functional tasks.    Upper Extremity Functional Index Score:  SCORE:   Column Totals: /80: 40   (A lower score indicates greater disability.)    Objective:  Pain Level (Scale 0-10):   5/6/2019 6/12/19 7/3/19   At Rest 6/10 0/10 2/10   With Use 10/10 4/10 4-5/10     Pain Description:  Date 5/6/2019 7/3/19   Location hand and wrist (circumferential) L wrist   Pain Quality Aching and Throbbing, shooting  aching   Frequency constant   intermittent   Pain is worst  daytime daytime   Exacerbated by  movement, changing position movement   Relieved by rest and pain medication rest   Progression Unchanged improving      Finger Edema  Circumference:  (Measured in cm)  Edema 5/6/2019 5/6/2019 7/3/19   side R L L   Index P1 6.4 7.0 5.8   PIP      P2      Long P1 5.7 7.5 5.8   PIP      P2      Ring P1 6.0 7.0 5.6   PIP      P2      Small P1 5.2 5.8 4.8   PIP      P2        Sensation: WNL throughout all nerve distributions; per patient report     ROM: Wrist AROM (PROM)  Date: 6/12 7/3/19   Side: Left Left   Extension 11 25 pre  35 post   Flexion 26 15 pre  20  post   RD 5 5   UD 10 10   Supination NT 65   Pronation NT 50     HAND 2019   AROM(PROM) R L   Index MP / /41   PIP / 36/46   DIP / /   RIVERA     Long MP / /40   PIP / 35/50   DIP / /   RIVERA     Ring MP / /35   PIP / 45/50   DIP / /   RIVERA     Small MP / /21   PIP / 20/63   DIP / /   RIVERA       ROM  Thumb   2019 2019 7/3/19   AROM  (PROM) R L L   MP   35   IP  0/28 (Blocked by cast) 30   RAB      PABD      Retropulsion      Kapandji Opposition Scale (0-10/10)        Strength:  Pain free (Measured in pounds)   7/3/19   Trials Right Left   1  2  3  Av  30  32  30 5  5  6  5     Assessment:  Response to therapy has been improvement to:  ROM of Wrist:  Flexion, Supination, Pronation  Pain:  frequency is less, intensity of pain is unchanged to slightly increased, duration of pain is decreased and less tender over affected area  Overall Assessment:  Patient would benefit from continued therapy to achieve rehab potential  STG/LTG:  See goal sheet for details and updates of remaining functional limitations.     Plan:  I have re-evaluated this patient and find that the nature, scope, duration and intensity of the therapy is appropriate for the medical condition of the patient.  Frequency:  1 X week, once daily   Duration:  For 8 additional weeks     Home Exercise Program:  Digit AROM - Fisting, Blocking  Soft tissue massage  Shoulder AROM  Zipper splint - protection only  AROM of wrist, all planes  PROM of wrist flex, ext, sup, pron  Scar massage   strengthening  Encouraged using hand /wrist for functional tasks  Next Visit:  MFR, scar massage  Wrist A/AAROM - add FA ROM  Finger A/AAROM    Please refer to the daily flowsheet for treatment today, total treatment time and time spent performing 1:1 timed codes.

## 2019-07-10 ENCOUNTER — OFFICE VISIT (OUTPATIENT)
Dept: PULMONOLOGY | Facility: CLINIC | Age: 79
End: 2019-07-10
Attending: INTERNAL MEDICINE
Payer: MEDICARE

## 2019-07-10 ENCOUNTER — THERAPY VISIT (OUTPATIENT)
Dept: OCCUPATIONAL THERAPY | Facility: CLINIC | Age: 79
End: 2019-07-10
Payer: MEDICARE

## 2019-07-10 ENCOUNTER — PRE VISIT (OUTPATIENT)
Dept: PULMONOLOGY | Facility: CLINIC | Age: 79
End: 2019-07-10

## 2019-07-10 VITALS
DIASTOLIC BLOOD PRESSURE: 77 MMHG | BODY MASS INDEX: 22.1 KG/M2 | HEART RATE: 72 BPM | RESPIRATION RATE: 16 BRPM | HEIGHT: 60 IN | WEIGHT: 112.6 LBS | OXYGEN SATURATION: 96 % | SYSTOLIC BLOOD PRESSURE: 160 MMHG

## 2019-07-10 DIAGNOSIS — R93.89 ABNORMAL CHEST CT: Primary | ICD-10-CM

## 2019-07-10 DIAGNOSIS — M25.532 LEFT WRIST PAIN: ICD-10-CM

## 2019-07-10 DIAGNOSIS — M79.642 HAND PAIN, LEFT: ICD-10-CM

## 2019-07-10 DIAGNOSIS — R91.8 PULMONARY NODULES: ICD-10-CM

## 2019-07-10 PROCEDURE — 97110 THERAPEUTIC EXERCISES: CPT | Mod: GO | Performed by: OCCUPATIONAL THERAPIST

## 2019-07-10 PROCEDURE — G0463 HOSPITAL OUTPT CLINIC VISIT: HCPCS | Mod: ZF

## 2019-07-10 PROCEDURE — 97140 MANUAL THERAPY 1/> REGIONS: CPT | Mod: GO | Performed by: OCCUPATIONAL THERAPIST

## 2019-07-10 ASSESSMENT — MIFFLIN-ST. JEOR: SCORE: 902.25

## 2019-07-10 ASSESSMENT — PAIN SCALES - GENERAL: PAINLEVEL: NO PAIN (0)

## 2019-07-10 NOTE — LETTER
7/10/2019       RE: Kimberly Chau  [address]  [place] [address]     Dear Colleague,    Thank you for referring your patient, Kimberly Chau, to the [facility] CANCER [facility]IC at [facility]. Please see a copy of my visit note below.    [facility] Nodule Clinic Initial Visit    Reason for Visit  Kimberly Chau is a 78 year old female who is referred by Dr Castellano for lung nodule  Pulmonary HPI    - No new respiratory symptoms or complaints.  Her mother  of pulmonary fibrosis.  - in early 2018 she had CT showed RUL consolidation. Symptoms she might have had a cough.     Other active medical problems include:   - rheumatoid arthritis   - HTN     Exposure history: Denies asbestos or radon exposure   TB risk factors: No  Prior Imaging:Yes  Constitutional Symptoms: No  Personal history of cancer:No    ROS Pulmonary  Dyspnea: No, Cough: No, Chest pain: No, Wheezing: No, Sputum Production: No, Hemoptysis: No  A complete ROS was otherwise negative except as noted in the HPI.  The patient was seen and examined by Emelyn Rockwell MD   Current Outpatient Medications   Medication     acetaminophen 650 MG TABS     amLODIPine (NORVASC) 10 MG tablet     Diphenhydramine-APAP, sleep, (TYLENOL PM EXTRA STRENGTH PO)     ferrous gluconate (FERGON) 324 (38 Fe) MG tablet     metoprolol tartrate (LOPRESSOR) 100 MG tablet     Multiple Vitamin (MULTI-VITAMIN) per tablet     Omeprazole (PRILOSEC PO)     sodium chloride (OCEAN NASAL SPRAY) 0.65 % nasal spray     FOLIC ACID     gabapentin (NEURONTIN) 100 MG capsule     meloxicam (MOBIC) 15 MG tablet     METHOTREXATE     oxyCODONE (ROXICODONE) 5 MG tablet     traMADol (ULTRAM) 50 MG tablet     No current facility-administered medications for this visit.      Allergies   Allergen Reactions     Hctz Other (See Comments)     Pt develops symptomatic and significant hyponatremia with HCTZ exposure      Hydrochlorothiazide      Other reaction(s): Hyponatremia  Hyponatremia     Latex      Benzocaine Rash     carba mix,thrium-sunscreen     Resorcinol-Alcohol Rash     carba mix,thrium-sunscreen     Ace Inhibitors Unknown     Dizziness and orthostatic changes and electrolyte problems.     Latex Unknown     Social History     Socioeconomic History     Marital status:      Spouse name: Not on file     Number of children: Not on file     Years of education: Not on file     Highest education level: Not on file   Occupational History     Not on file   Social Needs     Financial resource strain: Not on file     Food insecurity:     Worry: Not on file     Inability: Not on file     Transportation needs:     Medical: Not on file     Non-medical: Not on file   Tobacco Use     Smoking status: Never Smoker     Smokeless tobacco: Never Used   Substance and Sexual Activity     Alcohol use: Yes     Comment: Wine daily with dinner.     Drug use: No     Sexual activity: Not on file   Lifestyle     Physical activity:     Days per week: Not on file     Minutes per session: Not on file     Stress: Not on file   Relationships     Social connections:     Talks on phone: Not on file     Gets together: Not on file     Attends Roman [employer] service: Not on file     Active member of club or organization: Not on file     Attends meetings of clubs or organizations: Not on file     Relationship status: Not on file     Intimate partner violence:     Fear of current or ex partner: Not on file     Emotionally abused: Not on file     Physically abused: Not on file     Forced sexual activity: Not on file   Other Topics Concern     Parent/sibling w/ CABG, MI or angioplasty before 65F 55M? Not Asked   Social History Narrative    Ms. Chau is , has two grown children and two grandchildren. She does not work. She was never a smoker, and drinks a glass of wine with dinner each night.      Past Medical History:   Diagnosis Date     Anemia       Arthritis      Cataracts      Central retinal artery occlusion      Cervical spine fracture (H)     After a fall from orthostatic sx     Chronic pain     Back of head     Gastro-oesophageal reflux disease      Head injury      Hypertension      Hyponatremia     Resolved, 2/2 diuretics     Migraines      Osteoporosis      Pneumonia 2018     Rheumatoid arthritis(714.0)      Past Surgical History:   Procedure Laterality Date     COLONOSCOPY       EYE SURGERY Left 02/2019    Hole in macula     FUSION CERVICAL POSTERIOR THREE+ LEVELS  1/22/2013    Procedure: FUSION CERVICAL POSTERIOR THREE+ LEVELS;  Cervical 1-6 Posterior Instrumentation and Fusion, Cervical 3-4 Laminectomy  .Instrumentation and fusion to Cervical 6 *Latex Allergy*;  Surgeon: Ra Bar MD;  Location:  OR     GYN SURGERY       HEAD & NECK SURGERY       HYSTERECTOMY       KNEE SURGERY       OPEN REDUCTION INTERNAL FIXATION WRIST Left 4/30/2019    Procedure: Open Reduction Internal Fixation Left Distal Radius Fracture;  Surgeon: Dinh Strong MD;  Location:  OR     OPTICAL TRACKING SYSTEM ENDOSCOPIC SINUS SURGERY Right 4/6/2018    Procedure: OPTICAL TRACKING SYSTEM ENDOSCOPIC SINUS SURGERY;  Stealth Assisted Right Maxillary Antrostomy, Anterior Ethmoidectomy And Frontal Sinusotomy;  Surgeon: Elyse Eisenberg MD;  Location:  OR     OPTICAL TRACKING SYSTEM ENDOSCOPIC SINUS SURGERY Right 8/3/2018    Procedure: OPTICAL TRACKING SYSTEM ENDOSCOPIC SINUS SURGERY;  Stealth Assisted Revision Right Frontal Sinusotomy, Right Stent Placement  **Latex Allergy** ;  Surgeon: Elyse Eisenberg MD;  Location:  OR     ORTHOPEDIC SURGERY       Family History   Problem Relation Age of Onset     Arthritis Mother      Respiratory Mother         pulmonary fibrosis     Hypertension Mother      Anemia Mother      Liver Disease Father         from EtOH     Alcoholism Father      Multiple Sclerosis Sister      No Known Problems Maternal Grandmother      Heart  Disease Maternal Grandfather      Breast Cancer Sister      Glaucoma Paternal Grandfather      Heart Disease Paternal Grandfather        Exam:   /77   Pulse 72   Resp 16   Ht 1.524 m (5')   Wt 51.1 kg (112 lb 9.6 oz)   SpO2 96%   BMI 21.99 kg/m     GENERAL APPEARANCE: Well developed, well nourished, alert, and in no apparent distress.  EYES: PERRL, EOMI  HENT: Nasal mucosa with no edema and no hyperemia. No nasal polyps.  EARS: Canals clear, TMs normal  MOUTH: Oral mucosa is moist, without any lesions, no tonsillar enlargement, no oropharyngeal exudate.  NECK: supple, no masses, no thyromegaly.  LYMPHATICS: No significant axillary, cervical, or supraclavicular nodes.  RESP: normal percussion, good air flow throughout.  No crackles. No rhonchi. No wheezes.  CV: Normal S1, S2, regular rhythm, normal rate. No murmur.  No rub. No gallop. No LE edema.   ABDOMEN:  Bowel sounds normal, soft, nontender, no HSM or masses.   MS: extremities normal. No clubbing. No cyanosis.  SKIN: no rash on limited exam  NEURO: Mentation intact, speech normal, normal strength and tone, normal gait and stance  PSYCH: mentation appears normal. and affect normal/bright  Results:  - My interpretation of the images relevant for this visit includes: CT chest 6/17/19 images reviewed and compared to March 2018. Bilateral dependent subpleural reticular opacities   - My interpretation of the PFT's relevant for this visit includes: None     Assessment and plan: Kimberly is an 79 yo female referred for abnormal CT  Lung nodule - seen on chest CT, resolved on follow up CT  Abnormal CT - possibly suggestive of interstitial lung disease. She does have a family history of pulmonary fibrosis. No crackles on exam, no symptoms, normal oxygenation. I recommend PFTs and then periodic follow up in pulmonary clinic.       Again, thank you for allowing me to participate in the care of your patient.      Sincerely,    Emelyn Rockwell MD

## 2019-07-10 NOTE — PATIENT INSTRUCTIONS
You have some mild abnormalities on CT - NOT concerning for cancer but might be inflammation or scarring (possibly like pulmonary fibrosis).   I recommend we do breathing tests and keep an eye on it periodically.   If you develop difficulty breathing or cough, please let me know.     I'll have you come back in 6-12 months depending on the lung function test results.  You will come back to my lung clinic: [phone]

## 2019-07-10 NOTE — PROGRESS NOTES
[facility] Nodule Clinic Initial Visit    Reason for Visit  Kimberly Chau is a 78 year old female who is referred by Dr Castellano for lung nodule  Pulmonary HPI    - No new respiratory symptoms or complaints.  Her mother  of pulmonary fibrosis.  - in early 2018 she had CT showed RUL consolidation. Symptoms she might have had a cough.     Other active medical problems include:   - rheumatoid arthritis   - HTN     Exposure history: Denies asbestos or radon exposure   TB risk factors: No  Prior Imaging:Yes  Constitutional Symptoms: No  Personal history of cancer:No    ROS Pulmonary  Dyspnea: No, Cough: No, Chest pain: No, Wheezing: No, Sputum Production: No, Hemoptysis: No  A complete ROS was otherwise negative except as noted in the HPI.  The patient was seen and examined by Emelyn Rockwell MD   Current Outpatient Medications   Medication     acetaminophen 650 MG TABS     amLODIPine (NORVASC) 10 MG tablet     Diphenhydramine-APAP, sleep, (TYLENOL PM EXTRA STRENGTH PO)     ferrous gluconate (FERGON) 324 (38 Fe) MG tablet     metoprolol tartrate (LOPRESSOR) 100 MG tablet     Multiple Vitamin (MULTI-VITAMIN) per tablet     Omeprazole (PRILOSEC PO)     sodium chloride (OCEAN NASAL SPRAY) 0.65 % nasal spray     FOLIC ACID     gabapentin (NEURONTIN) 100 MG capsule     meloxicam (MOBIC) 15 MG tablet     METHOTREXATE     oxyCODONE (ROXICODONE) 5 MG tablet     traMADol (ULTRAM) 50 MG tablet     No current facility-administered medications for this visit.      Allergies   Allergen Reactions     Hctz Other (See Comments)     Pt develops symptomatic and significant hyponatremia with HCTZ exposure     Hydrochlorothiazide      Other reaction(s): Hyponatremia  Hyponatremia     Latex      Benzocaine Rash     carba mix,thrium-sunscreen     Resorcinol-Alcohol Rash     carba mix,thrium-sunscreen     Ace Inhibitors Unknown     Dizziness and orthostatic changes and electrolyte problems.     Latex Unknown      Social History     Socioeconomic History     Marital status:      Spouse name: Not on file     Number of children: Not on file     Years of education: Not on file     Highest education level: Not on file   Occupational History     Not on file   Social Needs     Financial resource strain: Not on file     Food insecurity:     Worry: Not on file     Inability: Not on file     Transportation needs:     Medical: Not on file     Non-medical: Not on file   Tobacco Use     Smoking status: Never Smoker     Smokeless tobacco: Never Used   Substance and Sexual Activity     Alcohol use: Yes     Comment: Wine daily with dinner.     Drug use: No     Sexual activity: Not on file   Lifestyle     Physical activity:     Days per week: Not on file     Minutes per session: Not on file     Stress: Not on file   Relationships     Social connections:     Talks on phone: Not on file     Gets together: Not on file     Attends Pentecostal service: Not on file     Active member of club or organization: Not on file     Attends meetings of clubs or organizations: Not on file     Relationship status: Not on file     Intimate partner violence:     Fear of current or ex partner: Not on file     Emotionally abused: Not on file     Physically abused: Not on file     Forced sexual activity: Not on file   Other Topics Concern     Parent/sibling w/ CABG, MI or angioplasty before 65F 55M? Not Asked   Social History Narrative    Ms. Chau is , has two grown children and two grandchildren. She does not work. She was never a smoker, and drinks a glass of wine with dinner each night.      Past Medical History:   Diagnosis Date     Anemia      Arthritis      Cataracts      Central retinal artery occlusion      Cervical spine fracture (H)     After a fall from orthostatic sx     Chronic pain     Back of head     Gastro-oesophageal reflux disease      Head injury      Hypertension      Hyponatremia     Resolved, 2/2 diuretics     Migraines       Osteoporosis      Pneumonia 2018     Rheumatoid arthritis(714.0)      Past Surgical History:   Procedure Laterality Date     COLONOSCOPY       EYE SURGERY Left 02/2019    Hole in macula     FUSION CERVICAL POSTERIOR THREE+ LEVELS  1/22/2013    Procedure: FUSION CERVICAL POSTERIOR THREE+ LEVELS;  Cervical 1-6 Posterior Instrumentation and Fusion, Cervical 3-4 Laminectomy  .Instrumentation and fusion to Cervical 6 *Latex Allergy*;  Surgeon: Ra Bar MD;  Location:  OR     GYN SURGERY       HEAD & NECK SURGERY       HYSTERECTOMY       KNEE SURGERY       OPEN REDUCTION INTERNAL FIXATION WRIST Left 4/30/2019    Procedure: Open Reduction Internal Fixation Left Distal Radius Fracture;  Surgeon: Dinh Strong MD;  Location:  OR     OPTICAL TRACKING SYSTEM ENDOSCOPIC SINUS SURGERY Right 4/6/2018    Procedure: OPTICAL TRACKING SYSTEM ENDOSCOPIC SINUS SURGERY;  Stealth Assisted Right Maxillary Antrostomy, Anterior Ethmoidectomy And Frontal Sinusotomy;  Surgeon: Elyse Eisenberg MD;  Location:  OR     OPTICAL TRACKING SYSTEM ENDOSCOPIC SINUS SURGERY Right 8/3/2018    Procedure: OPTICAL TRACKING SYSTEM ENDOSCOPIC SINUS SURGERY;  Stealth Assisted Revision Right Frontal Sinusotomy, Right Stent Placement  **Latex Allergy** ;  Surgeon: Elyse Eisenberg MD;  Location:  OR     ORTHOPEDIC SURGERY       Family History   Problem Relation Age of Onset     Arthritis Mother      Respiratory Mother         pulmonary fibrosis     Hypertension Mother      Anemia Mother      Liver Disease Father         from EtOH     Alcoholism Father      Multiple Sclerosis Sister      No Known Problems Maternal Grandmother      Heart Disease Maternal Grandfather      Breast Cancer Sister      Glaucoma Paternal Grandfather      Heart Disease Paternal Grandfather        Exam:   /77   Pulse 72   Resp 16   Ht 1.524 m (5')   Wt 51.1 kg (112 lb 9.6 oz)   SpO2 96%   BMI 21.99 kg/m    GENERAL APPEARANCE: Well developed, well  nourished, alert, and in no apparent distress.  EYES: PERRL, EOMI  HENT: Nasal mucosa with no edema and no hyperemia. No nasal polyps.  EARS: Canals clear, TMs normal  MOUTH: Oral mucosa is moist, without any lesions, no tonsillar enlargement, no oropharyngeal exudate.  NECK: supple, no masses, no thyromegaly.  LYMPHATICS: No significant axillary, cervical, or supraclavicular nodes.  RESP: normal percussion, good air flow throughout.  No crackles. No rhonchi. No wheezes.  CV: Normal S1, S2, regular rhythm, normal rate. No murmur.  No rub. No gallop. No LE edema.   ABDOMEN:  Bowel sounds normal, soft, nontender, no HSM or masses.   MS: extremities normal. No clubbing. No cyanosis.  SKIN: no rash on limited exam  NEURO: Mentation intact, speech normal, normal strength and tone, normal gait and stance  PSYCH: mentation appears normal. and affect normal/bright  Results:  - My interpretation of the images relevant for this visit includes: CT chest 6/17/19 images reviewed and compared to March 2018. Bilateral dependent subpleural reticular opacities   - My interpretation of the PFT's relevant for this visit includes: None     Assessment and plan: Kimberly is an 77 yo female referred for abnormal CT  Lung nodule - seen on chest CT, resolved on follow up CT  Abnormal CT - possibly suggestive of interstitial lung disease. She does have a family history of pulmonary fibrosis. No crackles on exam, no symptoms, normal oxygenation. I recommend PFTs and then periodic follow up in pulmonary clinic.

## 2019-07-10 NOTE — NURSING NOTE
Oncology Rooming Note    July 10, 2019 6:05 PM   Kimberly Chau is a 78 year old female who presents for:    Chief Complaint   Patient presents with     New Patient     NEW PT; LUNG NODULE; VITALS COMPLETED BY [facility]      Initial Vitals: /77   Pulse 72   Resp 16   Ht 1.524 m (5')   Wt 51.1 kg (112 lb 9.6 oz)   SpO2 96%   BMI 21.99 kg/m   Estimated body mass index is 21.99 kg/m  as calculated from the following:    Height as of this encounter: 1.524 m (5').    Weight as of this encounter: 51.1 kg (112 lb 9.6 oz). Body surface area is 1.47 meters squared.  No Pain (0) Comment: Data Unavailable   No LMP recorded. Patient has had a hysterectomy.  Allergies reviewed: Yes  Medications reviewed: Yes    Medications: Medication refills not needed today.  Pharmacy name entered into [facility]:    [id number] - [place], [place] - [address] [place] [address] [id number] - [place], [place] - [address]  [facility] [place] [place] - [facility] [address]    Clinical concerns: No new concerns today  Dr. Rockwell was notified.      Anai Parnell

## 2019-07-17 ENCOUNTER — THERAPY VISIT (OUTPATIENT)
Dept: OCCUPATIONAL THERAPY | Facility: CLINIC | Age: 79
End: 2019-07-17
Payer: MEDICARE

## 2019-07-17 DIAGNOSIS — M25.532 LEFT WRIST PAIN: ICD-10-CM

## 2019-07-17 DIAGNOSIS — M79.642 HAND PAIN, LEFT: ICD-10-CM

## 2019-07-17 PROCEDURE — 97140 MANUAL THERAPY 1/> REGIONS: CPT | Mod: GO | Performed by: OCCUPATIONAL THERAPIST

## 2019-07-17 PROCEDURE — 97110 THERAPEUTIC EXERCISES: CPT | Mod: GO | Performed by: OCCUPATIONAL THERAPIST

## 2019-07-17 NOTE — PROGRESS NOTES
Hand Therapy SOAP Note -Objective Iformation  Current Date 7/17/19  Reporting period:   Diagnosis: Left extra-articular segmental distal radius fracture, distal ulna fracture  DOI:  4/19/19  DOS:  4/30/19  Post: 11w 1d  Procedure: Open reduction internal fixation Left segmental distal radius fracture, closed reduction percutaneous pinning distal ulna fracture    MD order: Hand Therapy Referral - Zipper splint - wean as tolerated. Active and passive ROM.    Patient reports symptoms of pain, stiffness/loss of motion, weakness/loss of strength and edema of the L arm which occurred due to Falling and landing on her arm.    Occupational Profile Information:  Current occupation is Retired    Subjective:  Subjective changes as noted by patient: I have been really using the hand much much more at home.  Functional changes noted by patient: doing, open sliding glass doors, bending over and picking up dog.    Upper Extremity Functional Index Score:  SCORE:   Column Totals: /80: 40   (A lower score indicates greater disability.)    Objective:  Pain Level (Scale 0-10):   5/6/2019 6/12/19 7/3/19 7/17/19   At Rest 6/10 0/10 2/10 1/10   With Use 10/10 4/10 4-5/10 2-3/10     Pain Description:  Date 5/6/2019 7/3/19   Location hand and wrist (circumferential) L wrist   Pain Quality Aching and Throbbing, shooting  aching   Frequency constant   intermittent   Pain is worst  daytime daytime   Exacerbated by  movement, changing position movement   Relieved by rest and pain medication rest   Progression Unchanged improving      Finger Edema  Circumference:  (Measured in cm)  Edema 5/6/2019 5/6/2019 7/3/19   side R L L   Index P1 6.4 7.0 5.8   PIP      P2      Long P1 5.7 7.5 5.8   PIP      P2      Ring P1 6.0 7.0 5.6   PIP      P2      Small P1 5.2 5.8 4.8   PIP      P2        Sensation: WNL throughout all nerve distributions; per patient report     ROM: Wrist AROM (PROM)  Date: 6/12 7/3/19 7/17/19   Side: Left Left Left   Extension 11 25  pre  35 post 45 pre   Flexion 26 15 pre  20 post 35 pre   RD 5 5 10   UD 10 10 25   Supination NT 65 80   Pronation NT 50 65     HAND 2019   AROM(PROM) R L   Index MP / /41   PIP / 36/46   DIP / /   RIVERA     Long MP / /40   PIP / 35/50   DIP / /   RIVERA     Ring MP / /35   PIP / 45/50   DIP / /   RIVREA     Small MP / /21   PIP / 20/63   DIP / /   RIVERA       ROM  Thumb   2019 2019 7/3/19 7/17/19   AROM  (PROM) R L L L   MP   35 35   IP  0/ (Blocked by cast) 30 35   RAB       PABD       Retropulsion       Kapandji Opposition Scale (0-10/10)         Strength:  Pain free (Measured in pounds)   7/3/19 7/17/19   Trials Right Left Left   1  2  3  Av  30  32  30 5  5  6  5 12  12  13  12     Home Exercise Program:  Digit AROM - Fisting, Blocking  Soft tissue massage  Shoulder AROM  Zipper splint - protection only  AROM of wrist, all planes  PROM of wrist flex, ext, sup, pron  Scar massage   strengthening  Encouraged using hand /wrist for functional tasks    Next Visit:  MFR, scar massage  Wrist A/AAROM - add FA ROM  Finger A/AAROM  Stengthening  Please refer to the daily flowsheet for treatment today, total treatment time and time spent performing 1:1 timed codes.

## 2019-07-24 ENCOUNTER — OFFICE VISIT (OUTPATIENT)
Dept: INTERNAL MEDICINE | Facility: CLINIC | Age: 79
End: 2019-07-24
Payer: MEDICARE

## 2019-07-24 VITALS
BODY MASS INDEX: 21.85 KG/M2 | WEIGHT: 111.9 LBS | SYSTOLIC BLOOD PRESSURE: 148 MMHG | RESPIRATION RATE: 16 BRPM | HEART RATE: 76 BPM | DIASTOLIC BLOOD PRESSURE: 77 MMHG

## 2019-07-24 DIAGNOSIS — R93.5 ABNORMAL ABDOMINAL CT SCAN: Primary | ICD-10-CM

## 2019-07-24 ASSESSMENT — PAIN SCALES - GENERAL: PAINLEVEL: MILD PAIN (2)

## 2019-07-24 NOTE — PROGRESS NOTES
HPI:    Pt. With h/o RA and cervical spine surgery comes in for follow up recent fall with L hip fracture and surgery and L forearm fracture. She had sinus surgery  with Dr. Eisenberg on 8/3/2018.  She had chronic R frontal HA's. She had MRI brain 10/17 followed sinus CT scan 11/13/17 showing R frontal and R maxillary sinus disease.  She followed in  neurosurgery 2/17 and again in 4/17.   No CP or breathing issues.  She was seen by Dr. Cabello  Rheumatology 10/31/17; she has stopped her  methotrexate. She o/w denies additional HEENT, cardiopulmonary, abdominal, , GYN, neurological complaints. She denies any palpitations.     PE:    Vitals noted gen nad cooperative alert, lungs with  good air movement,  RRR, S1, S2, no MRG, abdomen, nt, nd, L forearm in cast and she is wearing a sling. Minimal leg swelling today. She has some minor R gluteal tenderness to palpation. She has no paraspinous tenderness to palpation. No R hip tenderness to palpation.     Resting echo (2/7/2018) Nl LVF 60-65%. No WMA's.         A/P:    1. She will continue to follow with her Rheumatology provider and is now off Methotrexate due to recent orthopedic surgery  2. She had L forearm surgery 4/30/2019 with Dr. Strong orthopedics and had follow up in clinic  6/12/2019. She has 7/31/2019 follow up with Dr. Strong  3. She will follow up with Dr. Ledezma, 6/12/2019 for L hip surgery from hip fracture  4. Post op anemia; CBC checked  and improving  5. Negative DVT study for L LE swelling on 5/15/2019  6. Somewhat lower Na+ 131 ordered recheck Na+ was 135 on 6/12/2019  7. Chest CT  6/12/2019 and lung findings. She was seen 7/10/2019 by Dr. Rockwell and recommended PFT's (not done yet) and PRN pulmonary follow up.   8. Mild R gluteal discomfort. She wants to wait on imaging and/or orthopedic evaluation. I recommend if worse she should be evaluated.     Total time spent 25 minutes.  More than 50% of the time spent with Ms. Chau on counseling /  coordinating her care

## 2019-07-25 DIAGNOSIS — S62.102A CLOSED FRACTURE OF LEFT WRIST, INITIAL ENCOUNTER: ICD-10-CM

## 2019-07-25 DIAGNOSIS — Z98.890 STATUS POST HIP SURGERY: Primary | ICD-10-CM

## 2019-07-31 ENCOUNTER — OFFICE VISIT (OUTPATIENT)
Dept: ORTHOPEDICS | Facility: CLINIC | Age: 79
End: 2019-07-31
Payer: MEDICARE

## 2019-07-31 ENCOUNTER — ANCILLARY PROCEDURE (OUTPATIENT)
Dept: GENERAL RADIOLOGY | Facility: CLINIC | Age: 79
End: 2019-07-31
Attending: ORTHOPAEDIC SURGERY
Payer: MEDICARE

## 2019-07-31 ENCOUNTER — THERAPY VISIT (OUTPATIENT)
Dept: OCCUPATIONAL THERAPY | Facility: CLINIC | Age: 79
End: 2019-07-31
Payer: MEDICARE

## 2019-07-31 VITALS — WEIGHT: 111 LBS | RESPIRATION RATE: 16 BRPM | BODY MASS INDEX: 21.79 KG/M2 | HEIGHT: 60 IN

## 2019-07-31 DIAGNOSIS — R93.89 ABNORMAL CHEST CT: ICD-10-CM

## 2019-07-31 DIAGNOSIS — M79.642 HAND PAIN, LEFT: ICD-10-CM

## 2019-07-31 DIAGNOSIS — S52.552A OTHER CLOSED EXTRA-ARTICULAR FRACTURE OF DISTAL END OF LEFT RADIUS, INITIAL ENCOUNTER: Primary | ICD-10-CM

## 2019-07-31 DIAGNOSIS — S62.102A CLOSED FRACTURE OF LEFT WRIST, INITIAL ENCOUNTER: ICD-10-CM

## 2019-07-31 DIAGNOSIS — R93.5 ABNORMAL ABDOMINAL CT SCAN: ICD-10-CM

## 2019-07-31 DIAGNOSIS — M25.532 LEFT WRIST PAIN: ICD-10-CM

## 2019-07-31 PROCEDURE — 97110 THERAPEUTIC EXERCISES: CPT | Mod: GO | Performed by: OCCUPATIONAL THERAPIST

## 2019-07-31 PROCEDURE — 97140 MANUAL THERAPY 1/> REGIONS: CPT | Mod: GO | Performed by: OCCUPATIONAL THERAPIST

## 2019-07-31 ASSESSMENT — MIFFLIN-ST. JEOR: SCORE: 894.99

## 2019-07-31 NOTE — LETTER
2019       RE: Kimberly Chau  [address]  [place] [address]     Dear Colleague,    Thank you for referring your patient, Kimberly Chau, to the [facility]. Please see a copy of my visit note below.    [facility]  Orthopedics  Dinh Strong MD  2019     Name: Kimberly Chau  MRN: [id number]  Age: 78 year old  : 1940  Referring provider: Referred Self     Chief Complaint: Surgical Followup (DOS: 19 ORIF left distal radius )       Date of Surgery: 19    Procedure Performed: Open reduction internal fixation left distal radius fracture    History of Present Illness:   Kimberly Chau comes to see me in follow-up today from the above surgery. Reports good progress with range of motion. She denies wrist pain, but has some stiffness although feels it may sharri related to underlying RA. She is back to all activities with no specific limitations. No numbness or tingling. has been released from hand therapy and was given home exercise instructions. She is also pleased with the healing of her incision. Patient notes that she has been off of methotrexate since the surgery.    Physical Examination:  Resp 16   Ht 1.524 m (5')   Wt 50.3 kg (111 lb)   BMI 21.68 kg/m     Well-developed, well-nourished and in no acute distress.  Alert and oriented to surroundings.  On examination of the left wrist, incision is well-healed. No tenderness over the ulna. Stiffness in her fingers which is only slightly more significant than the other side. There is no erythema, drainage, or dehiscence. Sensation is intact in median, radial and ulnar nerve distributions. Thumb opposition is intact. Patient can actively flex and extend all digits and thumb. Interosseous muscles, EPL, and FDP-2 fire. Fingers are warm and well-perfused. AROM of the wrist is as follows: 35  extension, 45  flexion, Symmetric pronation and supination.  No TTP distal radius or  ulna.    Radiographs:   Three views of the left wrist were obtained and reviewed. These demonstrate no changes in hardware or bony alignment. Progression of ulnar fx healing.     Assessment: Recovering well following the above procedure    Plan:    The patient may resume all activities as tolerated. No lifting limit or activity restrictions. She should continue with home exercises. She may resume methotrexate to treat RA. She will followup with me as needed. She will let me know if any further quetsions or concerns.     Dinh Strong MD        Scribe Disclosure:  I, Temo Espinoza, am serving as a scribe to document services personally performed by Dinh Strong MD at this visit, based upon the provider's statements to me. All documentation has been reviewed by the aforementioned provider prior to being entered into the official medical record.

## 2019-07-31 NOTE — PROGRESS NOTES
[facility]  Orthopedics  Dinh Strong MD  2019     Name: Kimberly Chau  MRN: [id number]  Age: 78 year old  : 1940  Referring provider: Referred Self     Chief Complaint: Surgical Followup (DOS: 19 ORIF left distal radius )       Date of Surgery: 19    Procedure Performed: Open reduction internal fixation left distal radius fracture    History of Present Illness:   Kimberly Chau comes to see me in follow-up today from the above surgery. Reports good progress with range of motion. She denies wrist pain, but has some stiffness although feels it may sharri related to underlying RA. She is back to all activities with no specific limitations. No numbness or tingling. has been released from hand therapy and was given home exercise instructions. She is also pleased with the healing of her incision. Patient notes that she has been off of methotrexate since the surgery.    Physical Examination:  Resp 16   Ht 1.524 m (5')   Wt 50.3 kg (111 lb)   BMI 21.68 kg/m    Well-developed, well-nourished and in no acute distress.  Alert and oriented to surroundings.  On examination of the left wrist, incision is well-healed. No tenderness over the ulna. Stiffness in her fingers which is only slightly more significant than the other side. There is no erythema, drainage, or dehiscence. Sensation is intact in median, radial and ulnar nerve distributions. Thumb opposition is intact. Patient can actively flex and extend all digits and thumb. Interosseous muscles, EPL, and FDP-2 fire. Fingers are warm and well-perfused. AROM of the wrist is as follows: 35  extension, 45  flexion, Symmetric pronation and supination.  No TTP distal radius or ulna.    Radiographs:   Three views of the left wrist were obtained and reviewed. These demonstrate no changes in hardware or bony alignment. Progression of ulnar fx healing.     Assessment: Recovering well following the above procedure    Plan:    The patient may resume all  activities as tolerated. No lifting limit or activity restrictions. She should continue with home exercises. She may resume methotrexate to treat RA. She will followup with me as needed. She will let me know if any further quetsions or concerns.     Dinh Strong MD        Scribe Disclosure:  I, Temo Espinoza, am serving as a scribe to document services personally performed by Dinh Strong MD at this visit, based upon the provider's statements to me. All documentation has been reviewed by the aforementioned provider prior to being entered into the official medical record.

## 2019-07-31 NOTE — PROGRESS NOTES
Hand Therapy [facility] Note   Current Date 7/31/19  Initial Evaluation Date:  5/6/19  Reporting period is from 7/3/19 to 7/31/2019  Number of Visits: 6     Diagnosis: Left extra-articular segmental distal radius fracture, distal ulna fracture  DOI:  4/19/19  DOS:  4/30/19  Post: 13w 1d    Subjective:  Subjective changes as noted by patient: The hand feels much stronger and I am able to do most all things at home now.  Functional changes noted by patient: Increased function with hand for functional tasks    Upper Extremity Functional Index Score:  SCORE:   Column Totals: /80: 72  (A lower score indicates greater disability.)    Objective:  Pain Level (Scale 0-10):   5/6/2019 6/12/19 7/3/19 7/17/19 7/31/19   At Rest 6/10 0/10 2/10 1/10 0/10   With Use 10/10 4/10 4-5/10 2-3/10 0/10     Pain Description:  Date 5/6/2019 7/3/19   Location hand and wrist (circumferential) L wrist   Pain Quality Aching and Throbbing, shooting  aching   Frequency constant   intermittent   Pain is worst  daytime daytime   Exacerbated by  movement, changing position movement   Relieved by rest and pain medication rest   Progression Unchanged improving      Finger Edema  Circumference:  (Measured in cm)  Edema 5/6/2019 5/6/2019 7/3/19   side R L L   Index P1 6.4 7.0 5.8   PIP      P2      Long P1 5.7 7.5 5.8   PIP      P2      Ring P1 6.0 7.0 5.6   PIP      P2      Small P1 5.2 5.8 4.8   PIP      P2        Sensation: WNL throughout all nerve distributions; per patient report     ROM: Wrist AROM (PROM)  Date: 6/12 7/3/19 7/17/19 7/31/19   Side: Left Left Left Left   Extension 11 25 pre  35 post 45 pre 55 pre  60 post   Flexion 26 15 pre  20 post 35 pre 40 pre   53 post   RD 5 5 10 15   UD 10 10 25 30   Supination NT 65 80 80   Pronation NT 50 65 65 pre  70 post     HAND 5/6/2019 5/6/2019   AROM(PROM) R L   Index MP / /41   PIP / 36/46   DIP / /   RIVERA     Long MP / /40   PIP / 35/50   DIP / /   RIVERA     Ring MP / /35   PIP / 45/50   DIP / /   RIVERA      Small MP / /21   PIP / 20/63   DIP / /   RIVERA       ROM  Thumb   2019 2019 7/3/19 7/17/19   AROM  (PROM) R L L L   MP   35 35   IP  0 (Blocked by cast) 30 35   RAB       PABD       Retropulsion       [facility] Scale (0-10/10)         Strength:  Pain free (Measured in pounds)   7/3/19 7/17/19 7/31/19   Trials Right Left Left Left   1  2  3  Av  30  32  30 5  5  6  5 12  12  13  12 15  16  17  16       Assessment:  Overall Assessment:  Patient's symptoms are resolving.  Patient is progressing well.  Patient is independent in home exercise program.  Patient is ready to be discharged from therapy and continue their home treatment program.  STG/LTG:  See goal sheet for details and updates.    Plan:  Frequency/Duration:  Discharge from Hand Therapy; continue home program.    Recommendations for Home Program -   Home Exercise Program:  Digit AROM - Fisting, Blocking  Soft tissue massage  Shoulder AROM  AROM of wrist, all planes  PROM of wrist flex, ext, sup, pron  Scar massage   and wrist strengthening  Encouraged using hand /wrist for functional tasks

## 2019-08-01 LAB
DLCOUNC-%PRED-PRE: 76 %
DLCOUNC-PRE: 12.56 ML/MIN/MMHG
DLCOUNC-PRED: 16.34 ML/MIN/MMHG
ERV-%PRED-PRE: 82 %
ERV-PRE: 0.53 L
ERV-PRED: 0.64 L
EXPTIME-PRE: 6.58 SEC
FEF2575-%PRED-PRE: 79 %
FEF2575-PRE: 1.13 L/SEC
FEF2575-PRED: 1.42 L/SEC
FEFMAX-%PRED-PRE: 110 %
FEFMAX-PRE: 4.65 L/SEC
FEFMAX-PRED: 4.2 L/SEC
FEV1-%PRED-PRE: 85 %
FEV1-PRE: 1.44 L
FEV1FEV6-PRE: 76 %
FEV1FEV6-PRED: 78 %
FEV1FVC-PRE: 78 %
FEV1FVC-PRED: 78 %
FEV1SVC-PRE: 77 %
FEV1SVC-PRED: 72 %
FIFMAX-PRE: 4.38 L/SEC
FRCPLETH-%PRED-PRE: 86 %
FRCPLETH-PRE: 2.15 L
FRCPLETH-PRED: 2.49 L
FVC-%PRED-PRE: 84 %
FVC-PRE: 1.85 L
FVC-PRED: 2.2 L
IC-%PRED-PRE: 79 %
IC-PRE: 1.35 L
IC-PRED: 1.7 L
RVPLETH-%PRED-PRE: 79 %
RVPLETH-PRE: 1.63 L
RVPLETH-PRED: 2.04 L
TLCPLETH-%PRED-PRE: 81 %
TLCPLETH-PRE: 3.5 L
TLCPLETH-PRED: 4.27 L
VA-%PRED-PRE: 76 %
VA-PRE: 2.98 L
VC-%PRED-PRE: 79 %
VC-PRE: 1.87 L
VC-PRED: 2.34 L

## 2019-08-05 ENCOUNTER — TELEPHONE (OUTPATIENT)
Dept: INTERNAL MEDICINE | Facility: CLINIC | Age: 79
End: 2019-08-05

## 2019-08-06 NOTE — TELEPHONE ENCOUNTER
Pain moved from the right to the left buttocks. She said she will use a cane to see if that helps, if not she will make an appointment. Silvia Rodas Paramedic on 8/6/2019 at 9:56 AM

## 2019-08-14 ENCOUNTER — TRANSFERRED RECORDS (OUTPATIENT)
Dept: HEALTH INFORMATION MANAGEMENT | Facility: CLINIC | Age: 79
End: 2019-08-14

## 2019-08-25 ENCOUNTER — TELEPHONE (OUTPATIENT)
Dept: INTERNAL MEDICINE | Facility: CLINIC | Age: 79
End: 2019-08-25

## 2019-08-25 DIAGNOSIS — I10 ESSENTIAL HYPERTENSION: ICD-10-CM

## 2019-08-28 RX ORDER — METOPROLOL TARTRATE 100 MG
200 TABLET ORAL 2 TIMES DAILY
Qty: 360 TABLET | Refills: 0 | Status: SHIPPED | OUTPATIENT
Start: 2019-08-28 | End: 2020-01-13

## 2019-08-28 NOTE — TELEPHONE ENCOUNTER
Metoprolol Tartrate Oral Tablet 100 MG  Last Written Prescription Date:  3/3/2019  Last Fill Quantity: 360,   # refills: 1  Last Office Visit : 7/24/2019  Future Office visit: None    Routing refill request to provider for review/approval because:  90 day refill sent as per protocol. I for review elevated BP.      07/24/19 148/77   07/10/19 160/77   06/12/19 151/80

## 2019-09-03 PROBLEM — M25.532 LEFT WRIST PAIN: Status: RESOLVED | Noted: 2019-05-06 | Resolved: 2019-09-03

## 2019-09-03 PROBLEM — M79.642 HAND PAIN, LEFT: Status: RESOLVED | Noted: 2019-05-06 | Resolved: 2019-09-03

## 2019-11-05 DIAGNOSIS — S52.552A OTHER CLOSED EXTRA-ARTICULAR FRACTURE OF DISTAL END OF LEFT RADIUS, INITIAL ENCOUNTER: Primary | ICD-10-CM

## 2019-11-11 ENCOUNTER — ANCILLARY PROCEDURE (OUTPATIENT)
Dept: GENERAL RADIOLOGY | Facility: CLINIC | Age: 79
End: 2019-11-11
Attending: ORTHOPAEDIC SURGERY
Payer: MEDICARE

## 2019-11-11 ENCOUNTER — ANCILLARY PROCEDURE (OUTPATIENT)
Dept: CT IMAGING | Facility: CLINIC | Age: 79
End: 2019-11-11
Attending: ORTHOPAEDIC SURGERY
Payer: MEDICARE

## 2019-11-11 ENCOUNTER — OFFICE VISIT (OUTPATIENT)
Dept: ORTHOPEDICS | Facility: CLINIC | Age: 79
End: 2019-11-11
Payer: MEDICARE

## 2019-11-11 ENCOUNTER — PREP FOR PROCEDURE (OUTPATIENT)
Dept: ORTHOPEDICS | Facility: CLINIC | Age: 79
End: 2019-11-11

## 2019-11-11 DIAGNOSIS — S52.502G CLOSED FRACTURE OF DISTAL END OF LEFT RADIUS WITH DELAYED HEALING, UNSPECIFIED FRACTURE MORPHOLOGY, SUBSEQUENT ENCOUNTER: ICD-10-CM

## 2019-11-11 DIAGNOSIS — S52.552A OTHER CLOSED EXTRA-ARTICULAR FRACTURE OF DISTAL END OF LEFT RADIUS, INITIAL ENCOUNTER: ICD-10-CM

## 2019-11-11 DIAGNOSIS — S52.502G CLOSED FRACTURE OF DISTAL END OF LEFT RADIUS WITH DELAYED HEALING, UNSPECIFIED FRACTURE MORPHOLOGY, SUBSEQUENT ENCOUNTER: Primary | ICD-10-CM

## 2019-11-11 LAB — RADIOLOGIST FLAGS: NORMAL

## 2019-11-11 ASSESSMENT — PATIENT HEALTH QUESTIONNAIRE - PHQ9: SUM OF ALL RESPONSES TO PHQ QUESTIONS 1-9: 1

## 2019-11-11 NOTE — LETTER
2019       RE: Kimberly Chau  [address]  [place] [address]     Dear Colleague,    Thank you for referring your patient, Kimberly Chau, to the [facility] at [facility]. Please see a copy of my visit note below.    [facility]  Orthopedics  Dinh Strong MD  2019     Name: Kimberly Chau  MRN: [id number]  Age: 78 year old  : 1940  Referring provider: Referred Self     Chief Complaint: RECHECK (FU DOS 19 ORIF L Distal Radius fx)    Date of Surgery: 2019    Procedure: Open reduction internal fixation of left distal radius fracture.     History of Present Illness:   Kimberly Chau is a 78 year old female status-post aforementioned procedure who presents for postoperative evaluation. At the time of her last post-op eval on 2019 the patient was overall recovering well and restrictions were lifted. Advised to resume methotrexate for her RA. . She was doing very well with no symptoms whatseover with the wrist until  she noted increased swelling over the distal ulnar wrist joint region and pain. States that this pain developed after she playing with her dog over 3 weeks ago. Was actually just holding her dog in the other hand and used the left hand to open her porch door and had sudden pain. Denies any falls, preceding pain, or any other concerning symptoms. The patient states that she does not have a current regimen for her bone health and notes she was looking at getting placed on Fosamax. The patient is on methotrexate and folate acid for her RA. Of note, the patient has a history of a left hip arthroplasty. She has had bone graft taken from her left hip. Denies any fevers, chills, erythema, or other signs of infection. No N/T     Physical Examination:  There were no vitals taken for this visit.  Well developed, well nourished, in no acute distress. Follow instructions appropriately. Alert and oriented to surroundings.   On  examination of the left upper extremity:  Skin clean, dry and intact.   SILT m/r/u  Fingers are warm and well perfused, capillary refill < 2 seconds.   Prior incision is well healed  Tender directly over the site of the broken plate  Otherwise no TTP   WRist ROM well-maintained per prior exam   Hand deformity consistent with RA  Some prominence over the distal ulnar  Finger motion is per her baseline.    Radiographs:   Radiographs of the left wrist - 3 views (11/11/2019):  Evidence of broken plate from ORIF of left distal radius fracture with associated fx of radial metaphysis. Ulnar neck fx with callus formation but unclear healing status    I have independently reviewed the above imaging studies; the results were discussed with the patient.     Assessment:   78 year old female s/p the aforementioned procedure. Radius plate is broken. 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 Discussed surgical intervention with HWR, revision ORIF and bone grafting, possible Darrach- if ICBG, will take from right hip given history of left hip ICBG in past    Plan:   - CT scan of the left wrist and plan for corrective surgery.   Of note, the patient had a bone graft taken from her left hip in the 1980s from a knee injury so we will take the bone graft from the right side if needed to go to crest. I discussed the risks and benefits of surgery, including but not limited to: infection, bleeding, pain, scarring, damage to nerves, blood vessels, or other nearby structures, failure to improve, recurrence or worsening of symptoms, stiffness, need for further surgery, malunion, nonunion, retained hardware, wound healing issues, and anesthetic risks.   - Advised the patient to use her zipper wrist brace at home constantly unless she is showering until the time of surgery  -Final surgical plan pending CT     Dinh Strong MD    Scribe Disclosure:  Sandeep JUAN, am serving as a scribe to document services personally performed by Dinh ERICKSON  MD Tae at this visit, based upon the provider's statements to me. All documentation has been reviewed by the aforementioned provider prior to being entered into the official medical record

## 2019-11-11 NOTE — PROGRESS NOTES
[facility]  Orthopedics  Dinh Strong MD  2019     Name: Kimberly Chau  MRN: [id number]  Age: 78 year old  : 1940  Referring provider: Referred Self     Chief Complaint: RECHECK (FU DOS 19 ORIF L Distal Radius fx)    Date of Surgery: 2019    Procedure: Open reduction internal fixation of left distal radius fracture.     History of Present Illness:   Kimberly Chau is a 78 year old female status-post aforementioned procedure who presents for postoperative evaluation. At the time of her last post-op eval on 2019 the patient was overall recovering well and restrictions were lifted. Advised to resume methotrexate for her RA. . She was doing very well with no symptoms whatseover with the wrist until  she noted increased swelling over the distal ulnar wrist joint region and pain. States that this pain developed after she playing with her dog over 3 weeks ago. Was actually just holding her dog in the other hand and used the left hand to open her porch door and had sudden pain. Denies any falls, preceding pain, or any other concerning symptoms. The patient states that she does not have a current regimen for her bone health and notes she was looking at getting placed on Fosamax. The patient is on methotrexate and folate acid for her RA. Of note, the patient has a history of a left hip arthroplasty. She has had bone graft taken from her left hip. Denies any fevers, chills, erythema, or other signs of infection. No N/T         Physical Examination:  There were no vitals taken for this visit.  Well developed, well nourished, in no acute distress. Follow instructions appropriately. Alert and oriented to surroundings.   On examination of the left upper extremity:  Skin clean, dry and intact.   SILT m/r/u  Fingers are warm and well perfused, capillary refill < 2 seconds.   Prior incision is well healed  Tender directly over the site of the broken plate  Otherwise no TTP   WRist ROM well-maintained  per prior exam   Hand deformity consistent with RA  Some prominence over the distal ulnar  Finger motion is per her baseline.    Radiographs:   Radiographs of the left wrist - 3 views (11/11/2019):  Evidence of broken plate from ORIF of left distal radius fracture with associated fx of radial metaphysis. Ulnar neck fx with callus formation but unclear healing status    I have independently reviewed the above imaging studies; the results were discussed with the patient.     Assessment:   78 year old female s/p the aforementioned procedure. Radius plate is broken. 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 Discussed surgical intervention with HWR, revision ORIF and bone grafting, possible Darrach- if ICBG, will take from right hip given history of left hip ICBG in past    Plan:   - CT scan of the left wrist and plan for corrective surgery.   Of note, the patient had a bone graft taken from her left hip in the 1980s from a knee injury so we will take the bone graft from the right side if needed to go to [facility]. I discussed the risks and benefits of surgery, including but not limited to: infection, bleeding, pain, scarring, damage to nerves, blood vessels, or other nearby structures, failure to improve, recurrence or worsening of symptoms, stiffness, need for further surgery, malunion, nonunion, retained hardware, wound healing issues, and anesthetic risks.   - Advised the patient to use her zipper wrist brace at home constantly unless she is showering until the time of surgery  -Final surgical plan pending CT     Dinh Strong MD      Scribe Disclosure:  Sandeep JUAN, am serving as a scribe to document services personally performed by Dinh Strong MD at this visit, based upon the provider's statements to me. All documentation has been reviewed by the aforementioned provider prior to being entered into the official medical record

## 2019-11-11 NOTE — NURSING NOTE
Reason For Visit:   Chief Complaint   Patient presents with     RECHECK     FU DOS 4/30/19 ORIF L Distal Radius fx       Primary MD: Guillermo Castellano    Age: 78 year old    ?  No      There were no vitals taken for this visit.         QuickDASH Assessment  No flowsheet data found.       Current Outpatient Medications   Medication Sig Dispense Refill     acetaminophen 650 MG TABS Take 650 mg by mouth every 4 hours as needed. 100 tablet 0     amLODIPine (NORVASC) 10 MG tablet Take 1 tablet (10 mg) by mouth daily 90 tablet 3     Diphenhydramine-APAP, sleep, (TYLENOL PM EXTRA STRENGTH PO) Take 2 tablets by mouth At Bedtime        ferrous gluconate (FERGON) 324 (38 Fe) MG tablet Take 324 mg by mouth       metoprolol tartrate (LOPRESSOR) 100 MG tablet Take 2 tablets (200 mg) by mouth 2 times daily 360 tablet 0     Multiple Vitamin (MULTI-VITAMIN) per tablet Take 1 tablet by mouth every morning        Omeprazole (PRILOSEC PO) Take 20 mg by mouth as needed        sodium chloride (OCEAN NASAL SPRAY) 0.65 % nasal spray Spray 1 spray into both nostrils daily as needed for congestion 1 Bottle 3       Allergies   Allergen Reactions     Hctz Other (See Comments)     Pt develops symptomatic and significant hyponatremia with HCTZ exposure     Hydrochlorothiazide      Other reaction(s): Hyponatremia  Hyponatremia     Latex      Benzocaine Rash     carba mix,thrium-sunscreen     Resorcinol-Alcohol Rash     carba mix,thrium-sunscreen     Ace Inhibitors Unknown     Dizziness and orthostatic changes and electrolyte problems.     Latex Unknown       Fallon Amador ATC

## 2019-11-11 NOTE — NURSING NOTE
Teaching Flowsheet   Relevant Diagnosis: Left distal radial fracture with displaced hardware  Teaching Topic: Open reduction internal fixation left distal radius fracture with possible iliac crest bone graft, left wrist hardware removal.    [facility] under General anesthesia with Dr Dinh Strong.  BMI 21.68  Consent obtained      Request from Dr Strong for case to be done at [facility] at end of the day on Tues 11-19-19 sent to surgery scheduler.    Patient will have CT scan following appointment today, order placed.    Patient to have PAC visit for her pre-op H&P.  Patient given the phone number and she will arrange for appointment.      Person(s) involved in teaching:   Patient and      Motivation Level:  Asks Questions: Yes  Eager to Learn: Yes  Cooperative: Yes  Receptive (willing/able to accept information): Yes  Any cultural factors/Anabaptism beliefs that may influence understanding or compliance? No       Patient demonstrates understanding of the following:  Reason for the appointment, diagnosis and treatment plan: Yes  Knowledge of proper use of medications and conditions for which they are ordered (with special attention to potential side effects or drug interactions): Yes  Which situations necessitate calling provider and whom to contact: Yes       Teaching Concerns Addressed:        Proper use and care of  (medical equip, care aids, etc.): Yes  Nutritional needs and diet plan: Yes  Pain management techniques: Yes  Wound Care: Yes  How and/when to access community resources: Yes     Instructional Materials Used/Given: Preoperative teaching packet and antibacterial soap     Time spent with patient: 10 minutes. Elyse Webster RN

## 2019-11-13 ENCOUNTER — OFFICE VISIT (OUTPATIENT)
Dept: FAMILY MEDICINE | Facility: CLINIC | Age: 79
End: 2019-11-13
Payer: MEDICARE

## 2019-11-13 VITALS
HEIGHT: 60 IN | TEMPERATURE: 97.5 F | BODY MASS INDEX: 22.89 KG/M2 | WEIGHT: 116.6 LBS | OXYGEN SATURATION: 96 % | SYSTOLIC BLOOD PRESSURE: 170 MMHG | DIASTOLIC BLOOD PRESSURE: 93 MMHG | HEART RATE: 73 BPM

## 2019-11-13 DIAGNOSIS — Z01.818 PRE-OP EXAM: ICD-10-CM

## 2019-11-13 DIAGNOSIS — S52.592G OTHER CLOSED FRACTURE OF DISTAL END OF LEFT RADIUS WITH DELAYED HEALING, SUBSEQUENT ENCOUNTER: Primary | ICD-10-CM

## 2019-11-13 LAB
HGB BLD-MCNC: 10.8 G/DL (ref 11.7–15.7)
POTASSIUM SERPL-SCNC: 4.9 MMOL/L (ref 3.4–5.3)

## 2019-11-13 ASSESSMENT — PAIN SCALES - GENERAL: PAINLEVEL: NO PAIN (0)

## 2019-11-13 ASSESSMENT — MIFFLIN-ST. JEOR: SCORE: 920.39

## 2019-11-13 NOTE — PROGRESS NOTES
"[facility]  [address]  [address] [facility] [address] [phone]  Dept: [phone]    PRE-OP EVALUATION:  11/13/19    Kimberly Chau is 78 year old female presents for pre-operative evaluation assessment as requested by Dr. Meehan .  She requires evaluation and anesthesia risk assessment prior to undergoing surgery/procedure for treatment of fracture of left radius .    History of Present Illness   HPI related to upcoming procedure: 78 year old with a history of hypertension. Kimberly Chau is a 78 year old female status-post aforementioned procedure who presents for postoperative evaluation. At the time of her last post-op eval on 7/31/2019 the patient was overall recovering well and restrictions were lifted. Advised to resume methotrexate for her RA. . She was doing very well with no symptoms whatseover with the wrist until  she noted increased swelling over the distal ulnar wrist joint region and pain. States that this pain developed after she playing with her dog over 3 weeks ago. Was actually just holding her dog in the other hand and used the left hand to open her porch door and had sudden pain. Denies any falls, preceding pain, or any other concerning symptoms. The patient states that she does not have a current regimen for her bone health and notes she was looking at getting placed on Fosamax. The patient is on methotrexate and folate acid for her RA. Of note, the patient has a history of a left hip arthroplasty. She has had bone graft taken from her left hip. Denies any fevers, chills, erythema, or other signs of infection. No N/T    {. Problems:521639}    Surgery Information     Primary Care Provider: Guillermo Castellano  Proposed Surgery/Procedure: ***  Date of Surgery/Procedure: ***  Time of Surgery/Procedure: ***  Hospital/Surgical Facility: ***  Type of Anesthesia Anticipated:     Preoperative Screening Questions     Preoperative Screening " "Questions:  Fax number for surgical facility: ***  Primary Physician: Guillermo Castellano  Type of Anesthesia Anticipated: {ANESTHESIA:171958}    Patient has a Health Care Directive or Living Will:  {YES/NO:076382::\"NO\"}    Preop Questions 11/13/2019   Who is doing your surgery? maria luz harp   What are you having done? wrist surgety      }    Audit C Alcohol Screening Tool  AUDIT   Questions 0 1 2 3 4 Score   1. How often do you have a drink  containing alcohol? Never Monthly or less 2 to 4  times a  month 2 to 3  times a  week 4 or more  times a  week    2. How many drinks containing alcohol  do you have on a typical day when you are drinking? 1 or 2 3 or 4 5 or 6 7 to 9 10 or more    3. How often do you have more than five  or more drinks on one occasion? Never Less  than  monthly Monthly Weekly Daily or  almost  daily    Scoring:   A score of 4 for adult men or 3 for adult women is an indication of hazardous drinking (risk for physical or physiological harm).   A score of 5 or more for adult men or 4 or more for adult women is an indication of an alcohol use disorder.      Medical History     Past Medical History:   Diagnosis Date     Anemia      Arthritis      Cataracts      Central retinal artery occlusion      Cervical spine fracture (H)     After a fall from orthostatic sx     Chronic pain     Back of head     Gastro-oesophageal reflux disease      Head injury      Hypertension      Hyponatremia     Resolved, 2/2 diuretics     Migraines      Osteoporosis      Pneumonia 2018     Rheumatoid arthritis(714.0)        Past Surgical History:   Procedure Laterality Date     COLONOSCOPY       EYE SURGERY Left 02/2019    Hole in macula     FUSION CERVICAL POSTERIOR THREE+ LEVELS  1/22/2013    Procedure: FUSION CERVICAL POSTERIOR THREE+ LEVELS;  Cervical 1-6 Posterior Instrumentation and Fusion, Cervical 3-4 Laminectomy  .Instrumentation and fusion to Cervical 6 *Latex Allergy*;  Surgeon: Ra Bar MD;  " Location: [facility] OR     GYN SURGERY       HEAD & NECK SURGERY       HYSTERECTOMY       KNEE SURGERY       OPEN REDUCTION INTERNAL FIXATION WRIST Left 4/30/2019    Procedure: Open Reduction Internal Fixation Left Distal Radius Fracture;  Surgeon: Dinh Strong MD;  Location:  OR     OPTICAL TRACKING SYSTEM ENDOSCOPIC SINUS SURGERY Right 4/6/2018    Procedure: OPTICAL TRACKING SYSTEM ENDOSCOPIC SINUS SURGERY;  Stealth Assisted Right Maxillary Antrostomy, Anterior Ethmoidectomy And Frontal Sinusotomy;  Surgeon: Elyse Eisenberg MD;  Location:  OR     OPTICAL TRACKING SYSTEM ENDOSCOPIC SINUS SURGERY Right 8/3/2018    Procedure: OPTICAL TRACKING SYSTEM ENDOSCOPIC SINUS SURGERY;  Stealth Assisted Revision Right Frontal Sinusotomy, Right Stent Placement  **Latex Allergy** ;  Surgeon: Elyse Eisenberg MD;  Location: [facility] OR     ORTHOPEDIC SURGERY          Problem (# of Occurrences) Relation (Name,Age of Onset)    Alcoholism (1) Father    Anemia (1) Mother    Arthritis (1) Mother    Breast Cancer (1) Sister    Glaucoma (1) Paternal Grandfather    Heart Disease (2) Maternal Grandfather, Paternal Grandfather    Hypertension (1) Mother    Liver Disease (1) Father: from EtOH    Multiple Sclerosis (1) Sister    No Known Problems (1) Maternal Grandmother    Respiratory (1) Mother: pulmonary fibrosis          Social History     Tobacco Use     Smoking status: Never Smoker     Smokeless tobacco: Never Used   Substance Use Topics     Alcohol use: Yes     Comment: Wine daily with dinner.     History   Drug Use No       Current Outpatient Medications   Medication     acetaminophen 650 MG TABS     amLODIPine (NORVASC) 10 MG tablet     Diphenhydramine-APAP, sleep, (TYLENOL PM EXTRA STRENGTH PO)     ferrous gluconate (FERGON) 324 (38 Fe) MG tablet     metoprolol tartrate (LOPRESSOR) 100 MG tablet     Multiple Vitamin (MULTI-VITAMIN) per tablet     Omeprazole (PRILOSEC PO)     sodium chloride (OCEAN NASAL SPRAY) 0.65 % nasal spray  "    No current facility-administered medications for this visit.        OTC products: {OTC ANALGESICS:328507}    Allergies   Allergen Reactions     Hctz Other (See Comments)     Pt develops symptomatic and significant hyponatremia with HCTZ exposure     Hydrochlorothiazide      Other reaction(s): Hyponatremia  Hyponatremia     Latex      Benzocaine Rash     carba mix,thrium-sunscreen     Resorcinol-Alcohol Rash     carba mix,thrium-sunscreen     Ace Inhibitors Unknown     Dizziness and orthostatic changes and electrolyte problems.     Latex Unknown       Latex Allergy: {YES/NO WITH DEFAULT:759472::\"NO\"}      Review Of Systems   {ROS Preop Choices:786461}    Exam   No data found.  There is no height or weight on file to calculate BMI.  {EXAM Preop Choices:017831}    Diagnostics   {DIAGNOSTIC FOR PREOP:738044}    Recent Labs   Lab Test 06/12/19  1131 05/15/19  1438  01/22/13  0724 01/16/13  1115   HGB 11.2* 10.0*   < >  --   --     318   < >  --   --    INR  --   --   --  0.93 0.94    131*   < >  --   --    POTASSIUM 4.2 3.9   < > 4.5  --    CR 0.93 0.83   < >  --   --     < > = values in this interval not displayed.       Risk Assessment     Surgical Risk:  The proposed surgical procedure is considered {HIGH=major cardiovascular or procedures requiring prolonged anesthesia >4 hours or large fluid shifts;  INTERMEDIATE=abdominal, most orthopedic and intrathoracic surgery; LOW= endoscopy, cataract and breast surgery:617342} risk.    Revised Cardiac Risk Index  The patient has the following serious cardiovascular risks for perioperative complications such as (MI, PE, VFib and 3  AV Block):  {PREOP REVISED CARDIAC INDEX (RCI):217826:p:\"No serious cardiac risks\"}    INTERPRETATION: {REVISED CARDIAC RISK INTERPRETATION:217056}    Duke Activity Status Index  Total Points:  Total METs:    Functional capacity is classified as:  Excellent: >10 METs  Good: 7-10 METs   Moderate: 4 - 6 METs  Poor: <4 METs    The " "patient has the following additional risks for perioperative complications:  {Additional perioperative risks:399779:p:\"No identified additional risks\"}    Impression     For above listed surgery and anesthesia:   Patient is at {LOW MODERATE HIGH:505632} risk for surgery/procedure and perioperative/procedure complications.    Recommendations     {Conditions:208822}    {Medications:751049::\"--Patient is to take all scheduled medications on the day of surgery EXCEPT for modifications listed below.\"}    No diagnosis found.    {Preop plan:715049}    Patient is at {LOW MODERATE HIGH:176993} risk for surgery/procedure and perioperative/procedure complications.    Signed Electronically by: Alpa García NP    A copy of this evaluation report will be provided to requesting physician.   Please contact our office if there are any further questions or information required about this patient.    [place] Preop Guidelines    ACC/AHA Guidelines  KEMI Tolentino, CNP    "

## 2019-11-13 NOTE — NURSING NOTE
78 year old  Chief Complaint   Patient presents with     Pre-Op Exam     Pt is here for a pre op       Blood pressure (!) 170/93, pulse 73, temperature 97.5  F (36.4  C), height 1.524 m (5'), weight 52.9 kg (116 lb 9.6 oz), SpO2 96 %, not currently breastfeeding. Body mass index is 22.77 kg/m .  BP completed using cuff size:      CRISTAL Romero  November 13, 2019 11:22 AM

## 2019-11-13 NOTE — PROGRESS NOTES
[facility]  [address]  5th [address]  [facility] [address] [phone]  Dept: [phone]    PRE-OP EVALUATION:  11/13/19    Kimberly Chau is 78 year old female presents for pre-operative evaluation assessment as requested by Dr. Strong.  She  requires evaluation and anesthesia risk assessment prior to undergoing surgery/procedure for treatment of  Delayed healing of a closed fracture of radius.    History of Present Illness   HPI related to upcoming procedure: PMHx of Anemia, GERD, Hypertension. Migraines, Osteoporosis and RA.     See problem list for active medical problems.  Problems all longstanding and stable, except as noted/documented.  See ROS for pertinent symptoms related to these conditions.      Surgery Information     Primary Care Provider: Guillermo Castellano  Proposed Surgery/Procedure: Open reduction internal fixation of left distal radius fracture  Date of Surgery/Procedure: 11/27/2019  Time of Surgery/Procedure: [facility]  [place] [facility] [place] of Anesthesia Anticipated: General    Preoperative Screening Questions     Patient has a Health Care Directive or Living Will:  YES in chart    1. Yes - Do you have a history of heart attack, stroke, stent, bypass or surgery on an artery in the head, neck, heart or legs? Had surgery of cervical spine, also has a stent in her right sinus.   2. NO - Do you ever have any pain or discomfort in your chest?  3. NO - Do you have a history of  Heart Failure?  4. NO - Are you troubled by shortness of breath when: walking on the level, up a slight hill or at night?  5. NO - Do you currently have a cold, bronchitis or other respiratory infection?  6. NO - Do you have a cough, shortness of breath or wheezing? Chronic intermittent cough  7. NO - Do you sometimes get pains in the calves of your legs when you walk?  8. NO - Do you or anyone in your family have previous history of blood clots?  9. NO - Do you or does  anyone in your family have a serious bleeding problem such as prolonged bleeding following surgeries or cuts?  10. Yes - Have you ever had problems with anemia or been told to take iron pills? Currently taking oral iron  11. NO - Have you had any abnormal blood loss such as black, tarry or bloody stools, or abnormal vaginal bleeding?  12. NO - Have you ever had a blood transfusion?  13. NO - Have you or any of your relatives ever had problems with anesthesia?  14. NO - Do you have sleep apnea, excessive snoring or daytime drowsiness?  15. NO - Do you have any prosthetic heart valves?  16. NO - Do you have prosthetic joints?  17. NO - Is there any chance that you may be pregnant?       Audit C Alcohol Screening Tool  AUDIT   Questions 0 1 2 3 4 Score   1. How often do you have a drink  containing alcohol? Never Monthly or less 2 to 4  times a  month 2 to 3  times a  week 4 or more  times a  week 4   2. How many drinks containing alcohol  do you have on a typical day when you are drinking? 1 or 2 3 or 4 5 or 6 7 to 9 10 or more 1   3. How often do you have more than five  or more drinks on one occasion? Never Less  than  monthly Monthly Weekly Daily or  almost  daily 0   Scoring:   A score of 4 for adult men or 3 for adult women is an indication of hazardous drinking (risk for physical or physiological harm).   A score of 5 or more for adult men or 4 or more for adult women is an indication of an alcohol use disorder.      Medical History     Past Medical History:   Diagnosis Date     Anemia      Arthritis      Cataracts      Central retinal artery occlusion      Cervical spine fracture (H)     After a fall from orthostatic sx     Chronic pain     Back of head     Gastro-oesophageal reflux disease      Head injury      Hypertension      Hyponatremia     Resolved, 2/2 diuretics     Migraines      Osteoporosis      Pneumonia 2018     Rheumatoid arthritis(714.0)        Past Surgical History:   Procedure Laterality Date      COLONOSCOPY       EYE SURGERY Left 02/2019    Hole in macula     FUSION CERVICAL POSTERIOR THREE+ LEVELS  1/22/2013    Procedure: FUSION CERVICAL POSTERIOR THREE+ LEVELS;  Cervical 1-6 Posterior Instrumentation and Fusion, Cervical 3-4 Laminectomy  .Instrumentation and fusion to Cervical 6 *Latex Allergy*;  Surgeon: Ra Bar MD;  [facility]     GYN SURGERY       HEAD & NECK SURGERY       HYSTERECTOMY       KNEE SURGERY       OPEN REDUCTION INTERNAL FIXATION WRIST Left 4/30/2019    Procedure: Open Reduction Internal Fixation Left Distal Radius Fracture;  Surgeon: Dinh Strong MD;  Location:  OR     OPTICAL TRACKING SYSTEM ENDOSCOPIC SINUS SURGERY Right 4/6/2018    Procedure: OPTICAL TRACKING SYSTEM ENDOSCOPIC SINUS SURGERY;  Stealth Assisted Right Maxillary Antrostomy, Anterior Ethmoidectomy And Frontal Sinusotomy;  Surgeon: Elyse Eisenberg MD;  Location:  OR     OPTICAL TRACKING SYSTEM ENDOSCOPIC SINUS SURGERY Right 8/3/2018    Procedure: OPTICAL TRACKING SYSTEM ENDOSCOPIC SINUS SURGERY;  Stealth Assisted Revision Right Frontal Sinusotomy, Right Stent Placement  **Latex Allergy** ;  Surgeon: Elyse Eisenberg MD;  Location:  OR     ORTHOPEDIC SURGERY          Problem (# of Occurrences) Relation (Name,Age of Onset)    Alcoholism (1) Father    Anemia (1) Mother    Arthritis (1) Mother    Breast Cancer (1) Sister    Glaucoma (1) Paternal Grandfather    Heart Disease (2) Maternal Grandfather, Paternal Grandfather    Hypertension (1) Mother    Liver Disease (1) Father: from EtOH    Multiple Sclerosis (1) Sister    No Known Problems (1) Maternal Grandmother    Respiratory (1) Mother: pulmonary fibrosis          Social History     Tobacco Use     Smoking status: Never Smoker     Smokeless tobacco: Never Used   Substance Use Topics     Alcohol use: Yes     Comment: Wine daily with dinner.     History   Drug Use No       Current Outpatient Medications   Medication     acetaminophen 650 MG  TABS     amLODIPine (NORVASC) 10 MG tablet     Diphenhydramine-APAP, sleep, (TYLENOL PM EXTRA STRENGTH PO)     ferrous gluconate (FERGON) 324 (38 Fe) MG tablet     metoprolol tartrate (LOPRESSOR) 100 MG tablet     Multiple Vitamin (MULTI-VITAMIN) per tablet     Omeprazole (PRILOSEC PO)     sodium chloride (OCEAN NASAL SPRAY) 0.65 % nasal spray     No current facility-administered medications for this visit.        OTC products: None, except as noted above    Allergies   Allergen Reactions     Hctz Other (See Comments)     Pt develops symptomatic and significant hyponatremia with HCTZ exposure     Hydrochlorothiazide      Other reaction(s): Hyponatremia  Hyponatremia     Latex      Benzocaine Rash     carba mix,thrium-sunscreen     Resorcinol-Alcohol Rash     carba mix,thrium-sunscreen     Ace Inhibitors Unknown     Dizziness and orthostatic changes and electrolyte problems.     Latex Unknown       Latex Allergy: YES: Precautions to take: No latex      Review Of Systems   CONSTITUTIONAL: NEGATIVE for fever, chills, change in weight  ENT/MOUTH: NEGATIVE for ear, mouth and throat problems  RESP: NEGATIVE for significant cough or SOB  CV: NEGATIVE for chest pain, palpitations or peripheral edema    Exam     Patient Vitals for the past 24 hrs:   BP Temp Pulse SpO2 Height Weight   11/13/19 1119 (!) 170/93 -- -- -- -- --   11/13/19 1116 (!) 178/74 97.5  F (36.4  C) 73 96 % 1.524 m (5') 52.9 kg (116 lb 9.6 oz)     Body mass index is 22.77 kg/m .    GENERAL APPEARANCE: healthy, alert and no distress     EYES: EOMI, PERRL     HENT: ear canals and TM's normal and nose and mouth without ulcers or lesions     NECK: no adenopathy, no asymmetry, masses, or scars and thyroid normal to palpation     RESP: lungs clear to auscultation - no rales, rhonchi or wheezes     CV: regular rates and rhythm, normal S1 S2, no S3 or S4 and no murmur, click or rub     ABDOMEN:  soft, nontender, no HSM or masses and bowel sounds normal     MS:  extremities normal- no gross deformities noted, no evidence of inflammation in joints, FROM in all extremities. Left wrist pain due to non healing fracture.      SKIN: no suspicious lesions or rashes     NEURO: Normal strength and tone, sensory exam grossly normal, mentation intact and speech normal     PSYCH: mentation appears normal. and affect normal/bright     LYMPHATICS: No cervical adenopathy    Diagnostics   EKG: unchanged from previous tracings    Recent Labs   Lab Test 06/12/19  1131 05/15/19  1438  01/22/13  0724 01/16/13  1115   HGB 11.2* 10.0*   < >  --   --     318   < >  --   --    INR  --   --   --  0.93 0.94    131*   < >  --   --    POTASSIUM 4.2 3.9   < > 4.5  --    CR 0.93 0.83   < >  --   --     < > = values in this interval not displayed.       Risk Assessment     Surgical Risk:  The proposed surgical procedure is considered LOW risk.    Revised Cardiac Risk Index  The patient has the following serious cardiovascular risks for perioperative complications such as (MI, PE, VFib and 3  AV Block):  No serious cardiac risks    INTERPRETATION: 0 risks: Class I (very low risk - 0.4% complication rate)    Duke Activity Status Index  Total Points:  Total METs:    Functional capacity is classified as:  Excellent: >10 METs  Good: 7-10 METs   Moderate: 4 - 6 METs  Poor: <4 METs    The patient has the following additional risks for perioperative complications:  No identified additional risks    Impression     For above listed surgery and anesthesia:   Patient is at low risk for surgery/procedure and perioperative/procedure complications.    Recommendations     --Consult hospital rounder / IM to assist post-op medical management    --Patient is to take all scheduled medications on the day of surgery EXCEPT for modifications listed below.  Lopressor    ICD-10-CM    1. Other closed fracture of distal end of left radius with delayed healing, subsequent encounter S52.592G    2. Pre-op exam Z01.818  Hemoglobin     Potassium     EKG 12-lead complete w/read - Clinics   BP had improved by the end of the appt.   Proceed with surgery as planned.    Patient is at low risk for surgery/procedure and perioperative/procedure complications.    Signed Electronically by: Alpa García NP    A copy of this evaluation report will be provided to requesting physician.   Please contact our office if there are any further questions or information required about this patient.    [place] Preop Guidelines    ACC/AHA Guidelines  KEMI Tolentino, CNP

## 2019-11-13 NOTE — LETTER
11/13/2019      RE: Kimberly Chau  [address]  [facility] [address]         [facility]  [address]  5th Floor  [facility] [address] [phone]  Dept: [phone]    PRE-OP EVALUATION:  11/13/19    Kimberly Chau is 78 year old female presents for pre-operative evaluation assessment as requested by Dr. Strong.  She  requires evaluation and anesthesia risk assessment prior to undergoing surgery/procedure for treatment of  Delayed healing of a closed fracture of radius.    History of Present Illness   HPI related to upcoming procedure: PMHx of Anemia, GERD, Hypertension. Migraines, Osteoporosis and RA.     See problem list for active medical problems.  Problems all longstanding and stable, except as noted/documented.  See ROS for pertinent symptoms related to these conditions.      Surgery Information     Primary Care Provider: Guillermo Castellano  Proposed Surgery/Procedure: Open reduction internal fixation of left distal radius fracture  Date of Surgery/Procedure: 11/27/2019  Time of Surgery/Procedure: [facility]  Hospital/Surgical Facility: [facility]  Type of Anesthesia Anticipated: General    Preoperative Screening Questions     Patient has a Health Care Directive or Living Will:  YES in chart    1. Yes - Do you have a history of heart attack, stroke, stent, bypass or surgery on an artery in the head, neck, heart or legs? Had surgery of cervical spine, also has a stent in her right sinus.   2. NO - Do you ever have any pain or discomfort in your chest?  3. NO - Do you have a history of  Heart Failure?  4. NO - Are you troubled by shortness of breath when: walking on the level, up a slight hill or at night?  5. NO - Do you currently have a cold, bronchitis or other respiratory infection?  6. NO - Do you have a cough, shortness of breath or wheezing? Chronic intermittent cough  7. NO - Do you sometimes get pains in the calves of your legs when you walk?  8. NO - Do you or anyone  in your family have previous history of blood clots?  9. NO - Do you or does anyone in your family have a serious bleeding problem such as prolonged bleeding following surgeries or cuts?  10. Yes - Have you ever had problems with anemia or been told to take iron pills? Currently taking oral iron  11. NO - Have you had any abnormal blood loss such as black, tarry or bloody stools, or abnormal vaginal bleeding?  12. NO - Have you ever had a blood transfusion?  13. NO - Have you or any of your relatives ever had problems with anesthesia?  14. NO - Do you have sleep apnea, excessive snoring or daytime drowsiness?  15. NO - Do you have any prosthetic heart valves?  16. NO - Do you have prosthetic joints?  17. NO - Is there any chance that you may be pregnant?       Audit C Alcohol Screening Tool  AUDIT   Questions 0 1 2 3 4 Score   1. How often do you have a drink  containing alcohol? Never Monthly or less 2 to 4  times a  month 2 to 3  times a  week 4 or more  times a  week 4   2. How many drinks containing alcohol  do you have on a typical day when you are drinking? 1 or 2 3 or 4 5 or 6 7 to 9 10 or more 1   3. How often do you have more than five  or more drinks on one occasion? Never Less  than  monthly Monthly Weekly Daily or  almost  daily 0   Scoring:   A score of 4 for adult men or 3 for adult women is an indication of hazardous drinking (risk for physical or physiological harm).   A score of 5 or more for adult men or 4 or more for adult women is an indication of an alcohol use disorder.      Medical History     Past Medical History:   Diagnosis Date     Anemia      Arthritis      Cataracts      Central retinal artery occlusion      Cervical spine fracture (H)     After a fall from orthostatic sx     Chronic pain     Back of head     Gastro-oesophageal reflux disease      Head injury      Hypertension      Hyponatremia     Resolved, 2/2 diuretics     Migraines      Osteoporosis      Pneumonia 2018     Rheumatoid  arthritis(714.0)        Past Surgical History:   Procedure Laterality Date     COLONOSCOPY       EYE SURGERY Left 02/2019    Hole in macula     FUSION CERVICAL POSTERIOR THREE+ LEVELS  1/22/2013    Procedure: FUSION CERVICAL POSTERIOR THREE+ LEVELS;  Cervical 1-6 Posterior Instrumentation and Fusion, Cervical 3-4 Laminectomy  .Instrumentation and fusion to Cervical 6 *Latex Allergy*;  Surgeon: Ra Bar MD;  Location: [facility] OR     GYN SURGERY       HEAD & NECK SURGERY       HYSTERECTOMY       KNEE SURGERY       OPEN REDUCTION INTERNAL FIXATION WRIST Left 4/30/2019    Procedure: Open Reduction Internal Fixation Left Distal Radius Fracture;  Surgeon: Dinh Strong MD;  Location:  OR     OPTICAL TRACKING SYSTEM ENDOSCOPIC SINUS SURGERY Right 4/6/2018    Procedure: OPTICAL TRACKING SYSTEM ENDOSCOPIC SINUS SURGERY;  Stealth Assisted Right Maxillary Antrostomy, Anterior Ethmoidectomy And Frontal Sinusotomy;  Surgeon: Elyse Eisenberg MD;  Location:  OR     OPTICAL TRACKING SYSTEM ENDOSCOPIC SINUS SURGERY Right 8/3/2018    Procedure: OPTICAL TRACKING SYSTEM ENDOSCOPIC SINUS SURGERY;  Stealth Assisted Revision Right Frontal Sinusotomy, Right Stent Placement  **Latex Allergy** ;  Surgeon: Elyse Eisenberg MD;  [facility]     ORTHOPEDIC SURGERY          Problem (# of Occurrences) Relation (Name,Age of Onset)    Alcoholism (1) Father    Anemia (1) Mother    Arthritis (1) Mother    Breast Cancer (1) Sister    Glaucoma (1) Paternal Grandfather    Heart Disease (2) Maternal Grandfather, Paternal Grandfather    Hypertension (1) Mother    Liver Disease (1) Father: from EtOH    Multiple Sclerosis (1) Sister    No Known Problems (1) Maternal Grandmother    Respiratory (1) Mother: pulmonary fibrosis          Social History     Tobacco Use     Smoking status: Never Smoker     Smokeless tobacco: Never Used   Substance Use Topics     Alcohol use: Yes     Comment: Wine daily with dinner.     History   Drug Use No        Current Outpatient Medications   Medication     acetaminophen 650 MG TABS     amLODIPine (NORVASC) 10 MG tablet     Diphenhydramine-APAP, sleep, (TYLENOL PM EXTRA STRENGTH PO)     ferrous gluconate (FERGON) 324 (38 Fe) MG tablet     metoprolol tartrate (LOPRESSOR) 100 MG tablet     Multiple Vitamin (MULTI-VITAMIN) per tablet     Omeprazole (PRILOSEC PO)     sodium chloride (OCEAN NASAL SPRAY) 0.65 % nasal spray     No current facility-administered medications for this visit.        OTC products: None, except as noted above    Allergies   Allergen Reactions     Hctz Other (See Comments)     Pt develops symptomatic and significant hyponatremia with HCTZ exposure     Hydrochlorothiazide      Other reaction(s): Hyponatremia  Hyponatremia     Latex      Benzocaine Rash     carba mix,thrium-sunscreen     Resorcinol-Alcohol Rash     carba mix,thrium-sunscreen     Ace Inhibitors Unknown     Dizziness and orthostatic changes and electrolyte problems.     Latex Unknown       Latex Allergy: YES: Precautions to take: No latex      Review Of Systems   CONSTITUTIONAL: NEGATIVE for fever, chills, change in weight  ENT/MOUTH: NEGATIVE for ear, mouth and throat problems  RESP: NEGATIVE for significant cough or SOB  CV: NEGATIVE for chest pain, palpitations or peripheral edema    Exam     Patient Vitals for the past 24 hrs:   BP Temp Pulse SpO2 Height Weight   11/13/19 1119 (!) 170/93 -- -- -- -- --   11/13/19 1116 (!) 178/74 97.5  F (36.4  C) 73 96 % 1.524 m (5') 52.9 kg (116 lb 9.6 oz)     Body mass index is 22.77 kg/m .    GENERAL APPEARANCE: healthy, alert and no distress     EYES: EOMI, PERRL     HENT: ear canals and TM's normal and nose and mouth without ulcers or lesions     NECK: no adenopathy, no asymmetry, masses, or scars and thyroid normal to palpation     RESP: lungs clear to auscultation - no rales, rhonchi or wheezes     CV: regular rates and rhythm, normal S1 S2, no S3 or S4 and no murmur, click or rub      ABDOMEN:  soft, nontender, no HSM or masses and bowel sounds normal     MS: extremities normal- no gross deformities noted, no evidence of inflammation in joints, FROM in all extremities. Left wrist pain due to non healing fracture.      SKIN: no suspicious lesions or rashes     NEURO: Normal strength and tone, sensory exam grossly normal, mentation intact and speech normal     PSYCH: mentation appears normal. and affect normal/bright     LYMPHATICS: No cervical adenopathy    Diagnostics   EKG: unchanged from previous tracings    Recent Labs   Lab Test 06/12/19  1131 05/15/19  1438  01/22/13  0724 01/16/13  1115   HGB 11.2* 10.0*   < >  --   --     318   < >  --   --    INR  --   --   --  0.93 0.94    131*   < >  --   --    POTASSIUM 4.2 3.9   < > 4.5  --    CR 0.93 0.83   < >  --   --     < > = values in this interval not displayed.       Risk Assessment     Surgical Risk:  The proposed surgical procedure is considered LOW risk.    Revised Cardiac Risk Index  The patient has the following serious cardiovascular risks for perioperative complications such as (MI, PE, VFib and 3  AV Block):  No serious cardiac risks    INTERPRETATION: 0 risks: Class I (very low risk - 0.4% complication rate)    [facility] Activity Status Index  Total Points:  Total METs:    Functional capacity is classified as:  Excellent: >10 METs  Good: 7-10 METs   Moderate: 4 - 6 METs  Poor: <4 METs    The patient has the following additional risks for perioperative complications:  No identified additional risks    Impression     For above listed surgery and anesthesia:   Patient is at low risk for surgery/procedure and perioperative/procedure complications.    Recommendations     --Consult hospital rounder / IM to assist post-op medical management    --Patient is to take all scheduled medications on the day of surgery EXCEPT for modifications listed below.  Lopressor    ICD-10-CM    1. Other closed fracture of distal end of left radius with  delayed healing, subsequent encounter S52.592G    2. Pre-op exam Z01.818 Hemoglobin     Potassium     EKG 12-lead complete w/read - Clinics   BP had improved by the end of the appt.   Proceed with surgery as planned.    Patient is at low risk for surgery/procedure and perioperative/procedure complications.    Signed Electronically by: Alpa García NP    A copy of this evaluation report will be provided to requesting physician.   Please contact our office if there are any further questions or information required about this patient.    [facility] Preop Guidelines    ACC/AHA Guidelines  Alpa García, APRN, CNP      Alpa García NP

## 2019-11-13 NOTE — PATIENT INSTRUCTIONS

## 2019-11-14 LAB — INTERPRETATION ECG - MUSE: NORMAL

## 2019-11-15 RX ORDER — CEFAZOLIN SODIUM 2 G/100ML
2 INJECTION, SOLUTION INTRAVENOUS
Status: CANCELLED | OUTPATIENT
Start: 2019-11-19

## 2019-11-15 RX ORDER — CEFAZOLIN SODIUM 1 G/3ML
1 INJECTION, POWDER, FOR SOLUTION INTRAMUSCULAR; INTRAVENOUS SEE ADMIN INSTRUCTIONS
Status: CANCELLED | OUTPATIENT
Start: 2019-11-19

## 2019-11-18 ENCOUNTER — ANESTHESIA EVENT (OUTPATIENT)
Dept: SURGERY | Facility: CLINIC | Age: 79
End: 2019-11-18
Payer: MEDICARE

## 2019-11-18 ENCOUNTER — OFFICE VISIT (OUTPATIENT)
Dept: OTOLARYNGOLOGY | Facility: CLINIC | Age: 79
End: 2019-11-18
Payer: MEDICARE

## 2019-11-18 VITALS — WEIGHT: 117 LBS | HEIGHT: 60 IN | BODY MASS INDEX: 22.97 KG/M2

## 2019-11-18 DIAGNOSIS — J32.1 CHRONIC FRONTAL SINUSITIS: Primary | ICD-10-CM

## 2019-11-18 ASSESSMENT — PAIN SCALES - GENERAL: PAINLEVEL: NO PAIN (0)

## 2019-11-18 ASSESSMENT — MIFFLIN-ST. JEOR: SCORE: 917.21

## 2019-11-18 NOTE — NURSING NOTE
Chief Complaint   Patient presents with     RECHECK     6 month follow-up     Height 1.524 m (5'), weight 53.1 kg (117 lb), not currently breastfeeding.    Jadyn Cook EMT     O-T Plasty Text: The defect edges were debeveled with a #15 scalpel blade.  Given the location of the defect, shape of the defect and the proximity to free margins an O-T plasty was deemed most appropriate.  Using a sterile surgical marker, an appropriate O-T plasty was drawn incorporating the defect and placing the expected incisions within the relaxed skin tension lines where possible.    The area thus outlined was incised deep to adipose tissue with a #15 scalpel blade.  The skin margins were undermined to an appropriate distance in all directions utilizing iris scissors.

## 2019-11-18 NOTE — PATIENT INSTRUCTIONS
Thank You for choosing [facility]. You were seen in the ENT Clinic today.     has recommended the followin.follow up in 6 months      If you have any questions or concerns after your appointment, please  Call [phone].

## 2019-11-18 NOTE — LETTER
2019       RE: Kimberly Chau  [address]   [place] [address]     Dear Colleague,    Thank you for referring your patient, Kimberly Chau, to the [facility] EAR NOSE [facility] at [facility]. Please see a copy of my visit note below.    Otolaryngology Clinic      Name: Kimberly Chau  MRN: [id number]  Age: 79 year old  : 1940      Chief Complaint:   Follow up     History of Present Illness:   Kimberly Chau is a 79 year old female with a history of chronic sinusitis who presents for follow up. She underwent revision frontal sinus surgery with stent placement in 2018. I last saw her on 19 and she was feeling well. She continues to feel overall well with some occasional mucus and congestion. She has not had headaches or frontal sinus pain following stent placement. She is scheduled for revision of left distal radius fracture tomorrow.       8/3/2018 Optical tracking system endoscopic sinus surgery (Right) Procedure: OPTICAL TRACKING SYSTEM ENDOSCOPIC SINUS SURGERY; Stealth Assisted Revision Right Frontal Sinusotomy, Right Stent Placement **Latex Allergy** ; Surgeon: Elyse Eisenberg MD; Location:  OR   2018 Optical tracking system endoscopic sinus surgery (Right) Procedure: OPTICAL TRACKING SYSTEM ENDOSCOPIC SINUS SURGERY; Stealth Assisted Right Maxillary Antrostomy, Anterior Ethmoidectomy And Frontal Sinusotomy; Surgeon: Elyse Eisenberg MD; Location:  OR          Review of Systems:   Pertinent items are noted in HPI or as in patient entered ROS below, remainder of complete ROS is negative.   [facility] ROS 2019   Neurology Headache   Eyes -   Ears, Nose, Throat -   Musculoskeletal Neck pain   Allergy/Immunology Allergies or hay fever   Endocrine -      Physical Exam:   Ht 1.524 m (5')   Wt 53.1 kg (117 lb)   BMI 22.85 kg/m        General Assessment   The patient is alert, oriented and in no acute distress.    Head/Face/Scalp  Grossly normal.   Nose  External nose is straight, skin is normal.     Procedure:  Endoscopy indicated for exam of stent and sinus disease  Topical anesthetic/decongestant spray applied.  Rigid scope used for visualization.  Findings:  Scant mucus around stent, freely mobile, in good position.      Assessment and Plan:  Kimberly Chau is a 79 year old female with history of chronic sinusitis. She has been feeling improved following revision endoscopic sinus surgery with stent placement. She does not get headaches or frontal sinus pain often anymore. She is scheduled for revision left wrist surgery tomorrow and I discussed this with Dr. Strong who will provide prophylactic antibiotic treatment given the patient's sinus disease.     Follow-up: Return in about 6 months (around 5/18/2020).      Scribe Disclosure:  I, Elizabeth Julio César, am serving as a scribe to document services personally performed by Elyse Eisenberg MD at this visit, based upon the provider's statements to me. All documentation has been reviewed by the aforementioned provider prior to being entered into the official medical record.    The documentation recorded by the scribe accurately reflects the services I personally performed and the decisions made by me.  Elyse Eisenberg MD

## 2019-11-19 ENCOUNTER — ANESTHESIA (OUTPATIENT)
Dept: SURGERY | Facility: CLINIC | Age: 79
End: 2019-11-19
Payer: MEDICARE

## 2019-11-19 ENCOUNTER — HOSPITAL ENCOUNTER (OUTPATIENT)
Facility: CLINIC | Age: 79
Discharge: HOME | End: 2019-11-19
Attending: ORTHOPAEDIC SURGERY | Admitting: ORTHOPAEDIC SURGERY
Payer: MEDICARE

## 2019-11-19 ENCOUNTER — APPOINTMENT (OUTPATIENT)
Dept: GENERAL RADIOLOGY | Facility: CLINIC | Age: 79
End: 2019-11-19
Attending: ORTHOPAEDIC SURGERY
Payer: MEDICARE

## 2019-11-19 VITALS
BODY MASS INDEX: 22.81 KG/M2 | WEIGHT: 116.18 LBS | HEIGHT: 60 IN | OXYGEN SATURATION: 97 % | TEMPERATURE: 98.2 F | SYSTOLIC BLOOD PRESSURE: 155 MMHG | DIASTOLIC BLOOD PRESSURE: 78 MMHG | RESPIRATION RATE: 16 BRPM | HEART RATE: 105 BPM

## 2019-11-19 DIAGNOSIS — S52.502G CLOSED FRACTURE OF DISTAL END OF LEFT RADIUS WITH DELAYED HEALING, UNSPECIFIED FRACTURE MORPHOLOGY, SUBSEQUENT ENCOUNTER: ICD-10-CM

## 2019-11-19 LAB — GLUCOSE BLDC GLUCOMTR-MCNC: 81 MG/DL (ref 70–99)

## 2019-11-19 PROCEDURE — 36000055 ZZH SURGERY LEVEL 2 W FLUORO 1ST 30 MIN - UMMC: Performed by: ORTHOPAEDIC SURGERY

## 2019-11-19 PROCEDURE — 36000053 ZZH SURGERY LEVEL 2 EA 15 ADDTL MIN - UMMC: Performed by: ORTHOPAEDIC SURGERY

## 2019-11-19 PROCEDURE — 25000128 H RX IP 250 OP 636: Performed by: ANESTHESIOLOGY

## 2019-11-19 PROCEDURE — 71000027 ZZH RECOVERY PHASE 2 EACH 15 MINS: Performed by: ORTHOPAEDIC SURGERY

## 2019-11-19 PROCEDURE — 87077 CULTURE AEROBIC IDENTIFY: CPT | Performed by: ORTHOPAEDIC SURGERY

## 2019-11-19 PROCEDURE — 40000170 ZZH STATISTIC PRE-PROCEDURE ASSESSMENT II: Performed by: ORTHOPAEDIC SURGERY

## 2019-11-19 PROCEDURE — 82962 GLUCOSE BLOOD TEST: CPT

## 2019-11-19 PROCEDURE — 25000125 ZZHC RX 250: Performed by: ANESTHESIOLOGY

## 2019-11-19 PROCEDURE — 87102 FUNGUS ISOLATION CULTURE: CPT | Mod: XS | Performed by: ORTHOPAEDIC SURGERY

## 2019-11-19 PROCEDURE — 87075 CULTR BACTERIA EXCEPT BLOOD: CPT | Performed by: ORTHOPAEDIC SURGERY

## 2019-11-19 PROCEDURE — 87176 TISSUE HOMOGENIZATION CULTR: CPT | Performed by: ORTHOPAEDIC SURGERY

## 2019-11-19 PROCEDURE — 25800030 ZZH RX IP 258 OP 636: Performed by: NURSE ANESTHETIST, CERTIFIED REGISTERED

## 2019-11-19 PROCEDURE — 25000125 ZZHC RX 250: Performed by: ORTHOPAEDIC SURGERY

## 2019-11-19 PROCEDURE — 37000009 ZZH ANESTHESIA TECHNICAL FEE, EACH ADDTL 15 MIN: Performed by: ORTHOPAEDIC SURGERY

## 2019-11-19 PROCEDURE — 25000128 H RX IP 250 OP 636: Performed by: ORTHOPAEDIC SURGERY

## 2019-11-19 PROCEDURE — 37000008 ZZH ANESTHESIA TECHNICAL FEE, 1ST 30 MIN: Performed by: ORTHOPAEDIC SURGERY

## 2019-11-19 PROCEDURE — 25000128 H RX IP 250 OP 636: Performed by: NURSE ANESTHETIST, CERTIFIED REGISTERED

## 2019-11-19 PROCEDURE — 27210794 ZZH OR GENERAL SUPPLY STERILE: Performed by: ORTHOPAEDIC SURGERY

## 2019-11-19 PROCEDURE — 25000132 ZZH RX MED GY IP 250 OP 250 PS 637: Mod: GY | Performed by: STUDENT IN AN ORGANIZED HEALTH CARE EDUCATION/TRAINING PROGRAM

## 2019-11-19 PROCEDURE — 87070 CULTURE OTHR SPECIMN AEROBIC: CPT | Performed by: ORTHOPAEDIC SURGERY

## 2019-11-19 PROCEDURE — 25000125 ZZHC RX 250: Performed by: NURSE ANESTHETIST, CERTIFIED REGISTERED

## 2019-11-19 PROCEDURE — 87116 MYCOBACTERIA CULTURE: CPT | Performed by: ORTHOPAEDIC SURGERY

## 2019-11-19 PROCEDURE — 87015 SPECIMEN INFECT AGNT CONCNTJ: CPT | Performed by: ORTHOPAEDIC SURGERY

## 2019-11-19 PROCEDURE — 25800030 ZZH RX IP 258 OP 636: Performed by: ANESTHESIOLOGY

## 2019-11-19 PROCEDURE — 87206 SMEAR FLUORESCENT/ACID STAI: CPT | Performed by: ORTHOPAEDIC SURGERY

## 2019-11-19 PROCEDURE — 87186 SC STD MICRODIL/AGAR DIL: CPT | Performed by: ORTHOPAEDIC SURGERY

## 2019-11-19 PROCEDURE — 40000278 XR SURGERY CARM FLUORO LESS THAN 5 MIN: Mod: TC

## 2019-11-19 RX ORDER — BUPIVACAINE HYDROCHLORIDE 5 MG/ML
INJECTION, SOLUTION EPIDURAL; INTRACAUDAL PRN
Status: DISCONTINUED | OUTPATIENT
Start: 2019-11-19 | End: 2019-11-19

## 2019-11-19 RX ORDER — SODIUM CHLORIDE, SODIUM LACTATE, POTASSIUM CHLORIDE, CALCIUM CHLORIDE 600; 310; 30; 20 MG/100ML; MG/100ML; MG/100ML; MG/100ML
INJECTION, SOLUTION INTRAVENOUS CONTINUOUS
Status: DISCONTINUED | OUTPATIENT
Start: 2019-11-19 | End: 2019-11-19 | Stop reason: HOSPADM

## 2019-11-19 RX ORDER — SODIUM CHLORIDE, SODIUM LACTATE, POTASSIUM CHLORIDE, CALCIUM CHLORIDE 600; 310; 30; 20 MG/100ML; MG/100ML; MG/100ML; MG/100ML
INJECTION, SOLUTION INTRAVENOUS CONTINUOUS PRN
Status: DISCONTINUED | OUTPATIENT
Start: 2019-11-19 | End: 2019-11-19

## 2019-11-19 RX ORDER — PROPOFOL 10 MG/ML
INJECTION, EMULSION INTRAVENOUS CONTINUOUS PRN
Status: DISCONTINUED | OUTPATIENT
Start: 2019-11-19 | End: 2019-11-19

## 2019-11-19 RX ORDER — PROPOFOL 10 MG/ML
INJECTION, EMULSION INTRAVENOUS PRN
Status: DISCONTINUED | OUTPATIENT
Start: 2019-11-19 | End: 2019-11-19

## 2019-11-19 RX ORDER — VANCOMYCIN HYDROCHLORIDE 1 G/200ML
1000 INJECTION, SOLUTION INTRAVENOUS
Status: COMPLETED | OUTPATIENT
Start: 2019-11-19 | End: 2019-11-19

## 2019-11-19 RX ORDER — ONDANSETRON 4 MG/1
4 TABLET, ORALLY DISINTEGRATING ORAL EVERY 30 MIN PRN
Status: DISCONTINUED | OUTPATIENT
Start: 2019-11-19 | End: 2019-11-19 | Stop reason: HOSPADM

## 2019-11-19 RX ORDER — DEXAMETHASONE SODIUM PHOSPHATE 10 MG/ML
INJECTION, SOLUTION INTRAMUSCULAR; INTRAVENOUS PRN
Status: DISCONTINUED | OUTPATIENT
Start: 2019-11-19 | End: 2019-11-19

## 2019-11-19 RX ORDER — FENTANYL CITRATE 50 UG/ML
25-50 INJECTION, SOLUTION INTRAMUSCULAR; INTRAVENOUS
Status: DISCONTINUED | OUTPATIENT
Start: 2019-11-19 | End: 2019-11-19 | Stop reason: HOSPADM

## 2019-11-19 RX ORDER — ACETAMINOPHEN 325 MG/1
975 TABLET ORAL ONCE
Status: COMPLETED | OUTPATIENT
Start: 2019-11-19 | End: 2019-11-19

## 2019-11-19 RX ORDER — VANCOMYCIN HYDROCHLORIDE 1 G/200ML
1000 INJECTION, SOLUTION INTRAVENOUS SEE ADMIN INSTRUCTIONS
Status: DISCONTINUED | OUTPATIENT
Start: 2019-11-19 | End: 2019-11-19 | Stop reason: HOSPADM

## 2019-11-19 RX ORDER — NALOXONE HYDROCHLORIDE 0.4 MG/ML
.1-.4 INJECTION, SOLUTION INTRAMUSCULAR; INTRAVENOUS; SUBCUTANEOUS
Status: DISCONTINUED | OUTPATIENT
Start: 2019-11-19 | End: 2019-11-19 | Stop reason: HOSPADM

## 2019-11-19 RX ORDER — FLUMAZENIL 0.1 MG/ML
0.2 INJECTION, SOLUTION INTRAVENOUS
Status: DISCONTINUED | OUTPATIENT
Start: 2019-11-19 | End: 2019-11-19 | Stop reason: HOSPADM

## 2019-11-19 RX ORDER — ONDANSETRON 2 MG/ML
INJECTION INTRAMUSCULAR; INTRAVENOUS PRN
Status: DISCONTINUED | OUTPATIENT
Start: 2019-11-19 | End: 2019-11-19

## 2019-11-19 RX ORDER — FENTANYL CITRATE 50 UG/ML
INJECTION, SOLUTION INTRAMUSCULAR; INTRAVENOUS PRN
Status: DISCONTINUED | OUTPATIENT
Start: 2019-11-19 | End: 2019-11-19

## 2019-11-19 RX ORDER — VANCOMYCIN HYDROCHLORIDE 1 G/20ML
INJECTION, POWDER, LYOPHILIZED, FOR SOLUTION INTRAVENOUS PRN
Status: DISCONTINUED | OUTPATIENT
Start: 2019-11-19 | End: 2019-11-19 | Stop reason: HOSPADM

## 2019-11-19 RX ORDER — OXYCODONE HYDROCHLORIDE 5 MG/1
5-10 TABLET ORAL EVERY 6 HOURS PRN
Qty: 20 TABLET | Refills: 0 | Status: SHIPPED | OUTPATIENT
Start: 2019-11-19 | End: 2020-02-10

## 2019-11-19 RX ORDER — ONDANSETRON 2 MG/ML
4 INJECTION INTRAMUSCULAR; INTRAVENOUS EVERY 30 MIN PRN
Status: DISCONTINUED | OUTPATIENT
Start: 2019-11-19 | End: 2019-11-19 | Stop reason: HOSPADM

## 2019-11-19 RX ORDER — MAGNESIUM HYDROXIDE 1200 MG/15ML
LIQUID ORAL PRN
Status: DISCONTINUED | OUTPATIENT
Start: 2019-11-19 | End: 2019-11-19 | Stop reason: HOSPADM

## 2019-11-19 RX ORDER — OXYCODONE HYDROCHLORIDE 5 MG/1
5 TABLET ORAL EVERY 4 HOURS PRN
Status: DISCONTINUED | OUTPATIENT
Start: 2019-11-19 | End: 2019-11-19 | Stop reason: HOSPADM

## 2019-11-19 RX ORDER — ACETAMINOPHEN 325 MG/1
650 TABLET ORAL EVERY 4 HOURS PRN
Qty: 50 TABLET | Refills: 0 | Status: CANCELLED | OUTPATIENT
Start: 2019-11-19

## 2019-11-19 RX ADMIN — DEXAMETHASONE SODIUM PHOSPHATE 2 MG: 10 INJECTION, SOLUTION INTRAMUSCULAR; INTRAVENOUS at 14:55

## 2019-11-19 RX ADMIN — PROPOFOL 60 MCG/KG/MIN: 10 INJECTION, EMULSION INTRAVENOUS at 15:52

## 2019-11-19 RX ADMIN — FENTANYL CITRATE 25 MCG: 50 INJECTION INTRAMUSCULAR; INTRAVENOUS at 14:54

## 2019-11-19 RX ADMIN — FENTANYL CITRATE 25 MCG: 50 INJECTION, SOLUTION INTRAMUSCULAR; INTRAVENOUS at 15:52

## 2019-11-19 RX ADMIN — BUPIVACAINE HYDROCHLORIDE 20 ML: 5 INJECTION, SOLUTION EPIDURAL; INTRACAUDAL at 14:55

## 2019-11-19 RX ADMIN — DEXMEDETOMIDINE HYDROCHLORIDE 20 MCG: 100 INJECTION, SOLUTION INTRAVENOUS at 14:55

## 2019-11-19 RX ADMIN — ONDANSETRON 4 MG: 2 INJECTION INTRAMUSCULAR; INTRAVENOUS at 17:29

## 2019-11-19 RX ADMIN — VANCOMYCIN HYDROCHLORIDE 1 G: 1 INJECTION, SOLUTION INTRAVENOUS at 16:50

## 2019-11-19 RX ADMIN — PROPOFOL 20 MG: 10 INJECTION, EMULSION INTRAVENOUS at 15:52

## 2019-11-19 RX ADMIN — ACETAMINOPHEN 975 MG: 325 TABLET, FILM COATED ORAL at 14:30

## 2019-11-19 RX ADMIN — PHENYLEPHRINE HYDROCHLORIDE 0.3 MCG/KG/MIN: 10 INJECTION INTRAVENOUS at 16:02

## 2019-11-19 RX ADMIN — FENTANYL CITRATE 25 MCG: 50 INJECTION INTRAMUSCULAR; INTRAVENOUS at 14:55

## 2019-11-19 RX ADMIN — SODIUM CHLORIDE, POTASSIUM CHLORIDE, SODIUM LACTATE AND CALCIUM CHLORIDE: 600; 310; 30; 20 INJECTION, SOLUTION INTRAVENOUS at 15:40

## 2019-11-19 ASSESSMENT — MIFFLIN-ST. JEOR: SCORE: 913.5

## 2019-11-19 NOTE — ANESTHESIA PREPROCEDURE EVALUATION
Anesthesia Pre-Procedure Evaluation    Patient: Kimberly Chau   MRN:     [id number] Gender:   female   Age:    79 year old :      1940        Preoperative Diagnosis: Closed fracture of distal end of left radius with delayed healing, unspecified fracture morphology, subsequent encounter [K59.557G]   Procedure(s):  Open Reduction Internal Fixation Left Distal Radius Fracture  Left Wrist Hardware Removal, possible Distal Ulna Resection  possible Right Iliac Crest Bone Graft     HPI: revision of L distal radius fx. HTN 170s/90 in clinic.     Past Medical History:   Diagnosis Date     Anemia      Arthritis      Cataracts      Central retinal artery occlusion      Cervical spine fracture (H)     After a fall from orthostatic sx     Chronic pain     Back of head     Gastro-oesophageal reflux disease      Head injury      Hypertension      Hyponatremia     Resolved, 2/2 diuretics     Migraines      Osteoporosis      Pneumonia 2018     Rheumatoid arthritis(714.0)       Past Surgical History:   Procedure Laterality Date     COLONOSCOPY       EYE SURGERY Left 2019    Hole in macula     FUSION CERVICAL POSTERIOR THREE+ LEVELS  2013    Procedure: FUSION CERVICAL POSTERIOR THREE+ LEVELS;  Cervical 1-6 Posterior Instrumentation and Fusion, Cervical 3-4 Laminectomy  .Instrumentation and fusion to Cervical 6 *Latex Allergy*;  Surgeon: Ra Bar MD;  Location:  OR     GYN SURGERY       HEAD & NECK SURGERY       HYSTERECTOMY       KNEE SURGERY       OPEN REDUCTION INTERNAL FIXATION WRIST Left 2019    Procedure: Open Reduction Internal Fixation Left Distal Radius Fracture;  Surgeon: Dinh Strong MD;  Location:  OR     OPTICAL TRACKING SYSTEM ENDOSCOPIC SINUS SURGERY Right 2018    Procedure: OPTICAL TRACKING SYSTEM ENDOSCOPIC SINUS SURGERY;  Stealth Assisted Right Maxillary Antrostomy, Anterior Ethmoidectomy And Frontal Sinusotomy;  Surgeon: Elyse Eisenberg MD;  Location:  OR      OPTICAL TRACKING SYSTEM ENDOSCOPIC SINUS SURGERY Right 8/3/2018    Procedure: OPTICAL TRACKING SYSTEM ENDOSCOPIC SINUS SURGERY;  Stealth Assisted Revision Right Frontal Sinusotomy, Right Stent Placement  **Latex Allergy** ;  Surgeon: Elyse Eisenberg MD;  Location: [facility] OR     ORTHOPEDIC SURGERY          Current Outpatient Medications   Medication Sig Dispense Refill     acetaminophen 650 MG TABS Take 650 mg by mouth every 4 hours as needed. 100 tablet 0     amLODIPine (NORVASC) 10 MG tablet Take 1 tablet (10 mg) by mouth daily 90 tablet 3     Diphenhydramine-APAP, sleep, (TYLENOL PM EXTRA STRENGTH PO) Take 2 tablets by mouth At Bedtime        ferrous gluconate (FERGON) 324 (38 Fe) MG tablet Take 324 mg by mouth       metoprolol tartrate (LOPRESSOR) 100 MG tablet Take 2 tablets (200 mg) by mouth 2 times daily 360 tablet 0     Multiple Vitamin (MULTI-VITAMIN) per tablet Take 1 tablet by mouth every morning        Omeprazole (PRILOSEC PO) Take 20 mg by mouth as needed        sodium chloride (OCEAN NASAL SPRAY) 0.65 % nasal spray Spray 1 spray into both nostrils daily as needed for congestion 1 Bottle 3        Anesthesia Evaluation     . Pt has had prior anesthetic. Type: General (CMAC for prior ETT due to limited mouth opening and limited neck mobility )    No history of anesthetic complications          ROS/MED HX    ENT/Pulmonary: Comment: Recurrent sinusitis, s/p ENT sinus surgery in 2019 - neg pulmonary ROS     Neurologic: Comment: Central retinal artery occlusion       Cardiovascular:     (+) hypertension----. : . . . :. .       METS/Exercise Tolerance:  >4 METS   Hematologic:     (+) Anemia, -      Musculoskeletal: Comment: RA, neck fx s/p fusion, hip fracture, osteoporosis   (+)  other musculoskeletal-       GI/Hepatic:     (+) GERD       Renal/Genitourinary:  - ROS Renal section negative       Endo:  - neg endo ROS       Psychiatric:     (+) psychiatric history anxiety      Infectious Disease:  - neg infectious  disease ROS       Malignancy:      - no malignancy   Other:                     JZ[facility] F[facility] AN PHYSICAL EXAM    LABS:  CBC:   Lab Results   Component Value Date    WBC 5.6 06/12/2019    WBC 6.7 05/15/2019    HGB 10.8 (L) 11/13/2019    HGB 11.2 (L) 06/12/2019    HCT 34.1 (L) 06/12/2019    HCT 30.7 (L) 05/15/2019     06/12/2019     05/15/2019     BMP:   Lab Results   Component Value Date     06/12/2019     (L) 05/15/2019    POTASSIUM 4.9 11/13/2019    POTASSIUM 4.2 06/12/2019    CHLORIDE 102 06/12/2019    CHLORIDE 97 05/15/2019    CO2 26 06/12/2019    CO2 26 05/15/2019    BUN 11 06/12/2019    BUN 11 05/15/2019    CR 0.93 06/12/2019    CR 0.83 05/15/2019    GLC 94 06/12/2019    GLC 78 05/15/2019     COAGS:   Lab Results   Component Value Date    PTT 31 01/22/2013    INR 0.93 01/22/2013     POC:   Lab Results   Component Value Date    BGM 66 (L) 08/03/2018     OTHER:   Lab Results   Component Value Date    PH 7.41 01/24/2013    LACT 1.5 08/03/2018    KINA 8.8 06/12/2019    PHOS 3.9 07/14/2011    MAG 1.7 06/28/2010    ALBUMIN 3.4 06/12/2019    PROTTOTAL 7.1 06/12/2019    ALT 17 06/12/2019    AST 14 06/12/2019    ALKPHOS 120 06/12/2019    BILITOTAL 0.4 06/12/2019    TSH 1.26 04/27/2015    T4 0.77 09/02/2011    CRP 3.2 10/11/2017    SED 15 10/11/2017        Preop Vitals    BP Readings from Last 3 Encounters:   11/13/19 (!) 170/93   07/24/19 148/77   07/10/19 160/77    Pulse Readings from Last 3 Encounters:   11/13/19 73   07/24/19 76   07/10/19 72      Resp Readings from Last 3 Encounters:   07/31/19 16   07/24/19 16   07/10/19 16    SpO2 Readings from Last 3 Encounters:   11/13/19 96%   07/10/19 96%   06/12/19 99%      Temp Readings from Last 1 Encounters:   11/13/19 36.4  C (97.5  F)    Ht Readings from Last 1 Encounters:   11/18/19 1.524 m (5')      Wt Readings from Last 1 Encounters:   11/18/19 53.1 kg (117 lb)    Estimated body mass index is 22.85 kg/m  as calculated from the following:    Height  as of 11/18/19: 1.524 m (5').    Weight as of 11/18/19: 53.1 kg (117 lb).     LDA:        Assessment:   ASA SCORE: 3      Smoking Status:  Non-Smoker/Unknown        Plan:   Anes. Type:  Peripheral Nerve Block; MAC; For Post-op pain in coordination with surgeon     Block Details: Supraclav.; Single Shot   Pre-Medication: Acetaminophen   Induction:  N/a   Airway: Native Airway   Access/Monitoring: PIV   Maintenance: TIVA     Postop Plan:   Postop Pain: Opioids  Postop Sedation/Airway: Not planned     PONV Management:   Adult Risk Factors: Female, Non-Smoker, Postop Opioids   Prevention: Ondansetron, Dexamethasone, No Volatiles     CONSENT: Direct conversation   Plan and risks discussed with: Patient   Blood Products: Consent Deferred (Minimal Blood Loss)       Comments for Plan/Consent:  79 year old female presenting for re-do of left radial fx repair.  Not well controlled HTN, GERD, RA, multiple orthopedic problems, and s/p cervical spine fusion. ASA 3.  Low risk for cardiac events.  -  [facility] vs. [facility] at patient's discretion.  If GA, will use videolaryngoscope for airway due to limited neck mobility                 Jorge A Shelton III, MD

## 2019-11-19 NOTE — DISCHARGE INSTRUCTIONS
Discharge Instructions: Following Surgery on your Arm or Hand    Comfort:    Keep the affected arm or hand elevated to reduce pain and swelling. Use pillows at night and a sling (if ordered) during the day.    Take the pain medication as ordered.     Care:    Keep the dressing clean, dry and intact.    Watch for drainage    Check color, motion, and sensation of the fingers/hand on a regular basis.     Activity:    Spend a quiet afternoon and evening at home on the day of your surgery.    No tub baths or showers until your dressing/cast is removed by your doctor.     Report to your doctor at once if:    Your fingers below the dressing/cast are numb, difficult or painful to move, and this is not relieved after elevation.    There is a change in the color of your fingers below the dressing/cast that does not go away when elevated.     The affected arm/hand is much cooler or warmer than the other side.    Your temperature is elevated.    There is an odor or unusual drainage on the dressing/cast.    Your dressing/cast is uncomfortably snug or tight.     Follow up:    Call your doctor s office to schedule a follow-up appointment     Rev. 3/2014      Same-Day Surgery   Adult Discharge Orders & Instructions     For 24 hours after surgery:  1. Get plenty of rest.  A responsible adult must stay with you for at least 24 hours after you leave the hospital.   2. Pain medication can slow your reflexes. Do not drive or use heavy equipment.  If you have weakness or tingling, don't drive or use heavy equipment until this feeling goes away.  3. Mixing alcohol and pain medication can cause dizziness and slow your breathing. It can even be fatal. Do not drink alcohol while taking pain medication.  4. Avoid strenuous or risky activities.  Ask for help when climbing stairs.   5. You may feel lightheaded.  If so, sit for a few minutes before standing.  Have someone help you get up.   6. If you have nausea (feel sick to your stomach),  drink only clear liquids such as apple juice, ginger ale, broth or 7-Up.  Rest may also help.  Be sure to drink enough fluids.  Move to a regular diet as you feel able. Take pain medications with a small amount of solid food, such as toast or crackers, to avoid nausea.   7. A slight fever is normal. Call the doctor if your fever is over 100 F (37.7 C) (taken under the tongue) or lasts longer than 24 hours.  8. You may have a dry mouth, muscle aches, trouble sleeping or a sore throat.  These symptoms should go away after 24 hours.  9. Do not make important or legal decisions.   Pain Management:      1. Take pain medication (if prescribed) for pain as directed by your physician.        2. WARNING: If the pain medication you have been prescribed contains Tylenol  (acetaminophen), DO NOT take additional doses of Tylenol (acetaminophen).     Call your doctor for any of the followin.  Signs of infection (fever, growing tenderness at the surgery site, severe pain, a large amount of drainage or bleeding, foul-smelling drainage, redness, swelling).    2.  It has been over 8 to 10 hours since surgery and you are still not able to urinate (pee).    3.  Headache for over 24 hours.    4.  Numbness, tingling or weakness the day after surgery (if you had spinal anesthesia).  To contact a doctor, call _____________________________________ or:      [phone] and ask for the Resident On Call for:        Orthopedics______ (answered 24 hours a day)      Emergency Department:  [facility] Department: [phone]  [place] [facility]: [phone]               Rev. 10/2014

## 2019-11-19 NOTE — BRIEF OP NOTE
[facility], [place]    Brief Operative Note    Pre-operative diagnosis: Closed fracture of distal end of left radius with delayed healing and hardware failure, unspecified fracture morphology, subsequent encounter [S5.430G]  Post-operative diagnosis:  Same plus left partial flexor pollicis longus rupture    Procedure(s):  Left Distal Radius Hardware Removal, Flexor Pollicus Longus Repair, Deep Cultures  Surgeon(s) and Role:     * Dinh Strong MD - Primary     * Garry Luna MD - Resident - Assisting  Anesthesia: Combined General with Supraclavicular Block   Estimated blood loss: 5 cc    Specimens:   ID Type Source Tests Collected by Time Destination   1 : LEFT ARM ONE Tissue Arm, Forearm Only, Left AFB CULTURE NON BLOOD, ANAEROBIC BACTERIAL CULTURE, FUNGUS CULTURE, TISSUE CULTURE AEROBIC BACTERIAL Dinh Strong MD 11/19/2019  4:49 PM    2 : LEFT ARM TWO Tissue Arm, Forearm Only, Left AFB CULTURE NON BLOOD, ANAEROBIC BACTERIAL CULTURE, FUNGUS CULTURE, TISSUE CULTURE AEROBIC BACTERIAL Dinh Strong MD 11/19/2019  4:50 PM      Findings:  See operative report  Complications: None  Implants:   Implant Name Type Inv. Item Serial No.  Lot No. LRB No. Used   Synthes 2.4mm VA LCP Two-Column Volar Distal Radius Plate; Left, 6 head holes, 5 shaft holes, 75mm in length      Left 1   Synthes 2.4mm Variable Angle Locking Screws, with T8 Stardrive Recess 12mm    SYNTHES  Left 1   Synthes 2.4mm Variable Angle Locking Screws, with T8 Stardrive Recess 14mm    SYNTHES  Left 2   Synthes 2.4mm Variable Angle Locking Screws, with T8 Stardrive Recess 16mm    SYNTHES  Left 2   Synthes 2.4mm Variable Angle Locking Screws, with T8 Stardrive Recess 18mm    SYNTHES  Left 2   Synthes 2.4mm Variable Angle Locking Screws, with T8 Stardrive Recess 20mm    SYNTHES  Left 2   Synthes 2.4mm Variable Angle Locking Screws, with T8 Stardrive Recess 8mm    SYNTHES  Left 1      Postoperative Plan:  - Pain: As prescribed  - Activity: NWB surgical extremity  - Immobilization: Splint  - Wound care: Keep dressings clean, dry and intact  - DVT ppx: None  - Follow up: As scheduled  - Disposition: SDS home per PACU standards    Garry Luna MD, PhD  Orthopedic Surgery Resident  [facility]

## 2019-11-19 NOTE — OP NOTE
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      DRAINS:  None.      IMPLANTS:  None     COMPLICATIONS:  None apparent.      BRIEF HISTORY:  Kimberly Chau is a 79 year old-year-old female who previously underwent ORIF of a distal radius fracture. She was doing well until she opened a door and started noticing some discomfort in the wrist. This happened 3-4 weeks ago. She presented to clinic and x-rays showed a broken plate. We discussed treatment options and I recommended plate removal and culture of suspected nonunion, possible revision ORIF.   Risks discussed include bleeding, infection, nerve, tendon, or vessel damage, wound healing problems, complex regional pain syndrome, persistent pain and/or stiffness, continued nonunion, malunion, postraumatic arthritis, hardware irritation, re-fracture of hardware, and the possibility for further surgery. The patient, expressed understanding and elected to proceed. Informed consent was obtained.    INTRAOPERATIVE FINDINGS:  FPL tendon 90% ruptured, rubbing over broken end of plate. No purulence. Shortening of fracture. Mild sagittal motion and fibrous tissue at nonunion site. Also nonunion of ulna.     DESCRIPTION OF PROCEDURE:  The patient was identified in the preoperative holding area.  The informed consent was reviewed. The operative site was identified and marked. A regional block was performed by the anesthesiologist. The patient was then brought to the operating room and  positioned with the operative extremity over a hand table. Preoperative prophylactic antibiotics were held but later administered after cultures were obtained. The operative arm was prepped and draped in a standard sterile fashion.  A timeout was performed, verifying the correct patient, procedure to be performed, and operative site. A sterile tourniquet was placed about the upper arm. The arm was exsanguinated and the tourniquet was inflated. The  A longitudinal incision was made overlying the FCR tendon. Prior incision was utilized. The FCR tendon sheath was opened and FCR was retracted ulnarly. There was a large quantity of scar. The floor of the FCR sheath was then opened and FPL was identified. FPL was tented over the broken plate. It was 90% ruptured. Tendon looked healthy other that discontinuity with ragged ends at site of rupture. I felt this was reparable. I repaired with it would two modified locking blandon sutures of 3-0 ethibond (total 4 core stitches). I then unroofed the soft tissue off the plate with a knife and freer. The plate and associated screws were removed without difficulty. The fracture site was inspected. There was a large amount of comminution. The fracture was shortened. Fibrous tissue  was present at the fracture site. This was debrided and sent for culture. The wound was irrigated copiously. Vancomycin powder was appled. The tourniquet was taken down and hemostasis achieved. Skin was closed with 3-0 vicryl and 4-0 monocryl. The patient was placed in a volar slab splint with an addition dorsal block splint for the thumb. She was awoken and brought to the recovery room in stable condition. The patient tolerated the procedure well and there were no immediate complications.  All sponge and needle counts were correct at the end of the case.      POSTOPERATIVE PLAN:  The patient will be discharged to home today with oral pain medications   The patient will elevate and ice. The patient will  return to clinic in one week to be placed in a cast. We will await final cultures.

## 2019-11-19 NOTE — ANESTHESIA CARE TRANSFER NOTE
Patient: Kimberly Chau    Procedure(s):  Left Distal Radius Hardware Removal, Flexor Pollicus Longus Repair, Deep Cultures    Diagnosis: Closed fracture of distal end of left radius with delayed healing, unspecified fracture morphology, subsequent encounter [S52.856G]  Diagnosis Additional Information: No value filed.    Anesthesia Type:   General, Peripheral Nerve Block     Note:  Airway :Room Air  Patient transferred to:Phase II  Comments: Report to RN, vss, IV and airway patent, awake, exchanging well on RA, states clearly her comfortHandoff Report: Identifed the Patient, Identified the Reponsible Provider, Reviewed the pertinent medical history, Discussed the surgical course, Reviewed Intra-OP anesthesia mangement and issues during anesthesia, Set expectations for post-procedure period and Allowed opportunity for questions and acknowledgement of understanding      Vitals: (Last set prior to Anesthesia Care Transfer)    CRNA VITALS  11/19/2019 1707 - 11/19/2019 1743      11/19/2019             Pulse:  73    SpO2:  91 %    EKG:  Sinus rhythm                Electronically Signed By: KEMI Arcos CRNA  November 19, 2019  5:43 PM

## 2019-11-19 NOTE — ANESTHESIA PROCEDURE NOTES
Peripheral Nerve Block Procedure Note    Staff:     Anesthesiologist:  Musa Tejada MD    Referred By:  Dinh Strong MD  Location: Pre-op  Procedure Start/Stop TImes:     patient identified, IV checked, site marked, risks and benefits discussed, informed consent, monitors and equipment checked, pre-op evaluation, at physician/surgeon's request and post-op pain management      Correct Patient: Yes      Correct Position: Yes      Correct Site: Yes      Correct Procedure: Yes      Correct Laterality:  Yes    Site Marked:  Yes  Procedure details:     Procedure:  Supraclavicular    ASA:  3    Laterality:  Left    Position:  Sitting    Sterile Prep: chloraprep, mask and sterile gloves      Local skin infiltration:  None    Needle:  Short bevel and insulated    Needle gauge:  21    Needle length (mm):  110    Ultrasound: Yes      Ultrasound used to identify targeted nerve, plexus, or vascular structure and placed a needle adjacent to it      Permanent Image entered into patiient's record      Abnormal pain on injection: No      Blood Aspirated: No      Paresthesias:  No    Bleeding at site: No      Bolus via:  Needle    Infusion Method:  Single Shot    Complications:  None

## 2019-11-20 NOTE — ANESTHESIA POSTPROCEDURE EVALUATION
Anesthesia POST Procedure Evaluation    Patient: Kimberly Chau   MRN:     [id number] Gender:   female   Age:    79 year old :      1940        Preoperative Diagnosis: Closed fracture of distal end of left radius with delayed healing, unspecified fracture morphology, subsequent encounter [S52.502G]   Procedure(s):  Left Distal Radius Hardware Removal, Flexor Pollicus Longus Repair, Deep Cultures   Postop Comments: No value filed.       Anesthesia Type:  Not documented  General, Peripheral Nerve Block    Reportable Event: NO     PAIN: Uncomplicated   Sign Out status: Comfortable, Well controlled pain     PONV: No PONV   Sign Out status:  No Nausea or Vomiting     Neuro/Psych: Uneventful perioperative course   Sign Out Status: Preoperative baseline; Age appropriate mentation     Airway/Resp.: Uneventful perioperative course   Sign Out Status: Non labored breathing, age appropriate RR; Resp. Status within EXPECTED Parameters     CV: Uneventful perioperative course   Sign Out status: Appropriate BP and perfusion indices; Appropriate HR/Rhythm     Disposition:   Sign Out in:  PACU  Disposition:  Phase II; Home  Recovery Course: Uneventful  Follow-Up: Not required           Last Anesthesia Record Vitals:  CRNA VITALS  2019 1707 - 2019 1807      2019             Pulse:  73    SpO2:  91 %    EKG:  Sinus rhythm          Last PACU Vitals:  Vitals Value Taken Time   /66 2019  5:40 PM   Temp 36.6  C (97.9  F) 2019  5:40 PM   Pulse 70 2019  5:40 PM   Resp 12 2019  5:40 PM   SpO2 94 % 2019  5:40 PM   Temp src     NIBP 117/68 2019  5:31 PM   Pulse 73 2019  5:35 PM   SpO2 91 % 2019  5:35 PM   Resp     Temp     Ht Rate 75 2019  5:33 PM   Temp 2 36.5  C (97.7  F) 2019  5:31 PM         Electronically Signed By: Priscila Crowe MD, 2019, 7:04 PM

## 2019-11-21 DIAGNOSIS — S52.502G CLOSED FRACTURE OF DISTAL END OF LEFT RADIUS WITH DELAYED HEALING, UNSPECIFIED FRACTURE MORPHOLOGY, SUBSEQUENT ENCOUNTER: Primary | ICD-10-CM

## 2019-11-22 LAB
ACID FAST STN SPEC QL: NORMAL
SPECIMEN SOURCE: NORMAL
SPECIMEN SOURCE: NORMAL

## 2019-11-24 LAB
BACTERIA SPEC CULT: NO GROWTH
SPECIMEN SOURCE: NORMAL

## 2019-11-26 LAB
BACTERIA SPEC CULT: NORMAL
BACTERIA SPEC CULT: NORMAL
Lab: NORMAL
Lab: NORMAL
SPECIMEN SOURCE: NORMAL
SPECIMEN SOURCE: NORMAL

## 2019-11-27 ENCOUNTER — OFFICE VISIT (OUTPATIENT)
Dept: ORTHOPEDICS | Facility: CLINIC | Age: 79
End: 2019-11-27
Payer: MEDICARE

## 2019-11-27 ENCOUNTER — ANCILLARY PROCEDURE (OUTPATIENT)
Dept: GENERAL RADIOLOGY | Facility: CLINIC | Age: 79
End: 2019-11-27
Attending: ORTHOPAEDIC SURGERY
Payer: MEDICARE

## 2019-11-27 DIAGNOSIS — Z98.890 H/O LEFT WRIST SURGERY: Primary | ICD-10-CM

## 2019-11-27 DIAGNOSIS — S52.502G CLOSED FRACTURE OF DISTAL END OF LEFT RADIUS WITH DELAYED HEALING, UNSPECIFIED FRACTURE MORPHOLOGY, SUBSEQUENT ENCOUNTER: ICD-10-CM

## 2019-11-27 NOTE — TELEPHONE ENCOUNTER
RECORDS RECEIVED FROM: Infection, post-surgery per [place] from [facility]. Referred by Dr. Dinh Strong   DATE RECEIVED: 12.11.2019   NOTES (Gather within 2 years) STATUS DETAILS   OFFICE NOTE from referring provider   N/A    OFFICE NOTE from other specialist N/A    DISCHARGE SUMMARY from hospital Internal 11.19.2019   DISCHARGE REPORT from the ER N/A    LABS (any labs) Internal / CE    MEDICATION LIST Internal / CE    IMAGING  (NEED IMAGES AND REPORTS)     Osteomyelitis: Foot imaging  N/A    Liver Abscess: Abdominal imaging N/A    Other (anything related to diagnoses Internal

## 2019-11-27 NOTE — NURSING NOTE
Reason For Visit:   Chief Complaint   Patient presents with     RECHECK     DOS 11/19/19 Open Reduction Inernal Fixation Left Distal Radius fx     Primary MD: Guillermo Castellano    Age: 79 year old    ?  No    There were no vitals taken for this visit.    Pain Assessment  Patient Currently in Pain: Denies    QuickDASH Assessment  No flowsheet data found.     Current Outpatient Medications   Medication Sig Dispense Refill     acetaminophen 650 MG TABS Take 650 mg by mouth every 4 hours as needed. 100 tablet 0     amLODIPine (NORVASC) 10 MG tablet Take 1 tablet (10 mg) by mouth daily 90 tablet 3     Diphenhydramine-APAP, sleep, (TYLENOL PM EXTRA STRENGTH PO) Take 2 tablets by mouth At Bedtime        ferrous gluconate (FERGON) 324 (38 Fe) MG tablet Take 324 mg by mouth       metoprolol tartrate (LOPRESSOR) 100 MG tablet Take 2 tablets (200 mg) by mouth 2 times daily 360 tablet 0     Multiple Vitamin (MULTI-VITAMIN) per tablet Take 1 tablet by mouth every morning        Omeprazole (PRILOSEC PO) Take 20 mg by mouth as needed        sodium chloride (OCEAN NASAL SPRAY) 0.65 % nasal spray Spray 1 spray into both nostrils daily as needed for congestion 1 Bottle 3     oxyCODONE (ROXICODONE) 5 MG tablet Take 1-2 tablets (5-10 mg) by mouth every 6 hours as needed for moderate to severe pain (Patient not taking: Reported on 11/27/2019) 20 tablet 0       Allergies   Allergen Reactions     Hctz Other (See Comments)     Pt develops symptomatic and significant hyponatremia with HCTZ exposure     Hydrochlorothiazide      Other reaction(s): Hyponatremia  Hyponatremia     Latex      Benzocaine Rash     carba mix,thrium-sunscreen     Resorcinol-Alcohol Rash     carba mix,thrium-sunscreen     Ace Inhibitors Unknown     Dizziness and orthostatic changes and electrolyte problems.     Latex Unknown       Fallon Amador ATC

## 2019-11-27 NOTE — LETTER
2019       RE: Kimberly Chau  [address]  [place] [address]     Dear Colleague,    Thank you for referring your patient, Kimberly Chau, to the [facility] at [facility]. Please see a copy of my visit note below.    [facility]  Orthopedics  Dinh Strong MD  2019     Name: Kimberly Chua  MRN: [id number]  Age: 79 year old  : 1940  Referring provider: Dinh Strong     Chief Complaint: RECHECK (DOS 19 Open Reduction Inernal Fixation Left Distal Radius fx)    Date of Surgery: 19    Procedure: Repair of left flexor pollicis longus tendon, removal of hardware, debridement of bone, and deep cultures    History of Present Illness:   Kimberly Chau is a 79 year old female 1 week status-post the above procedure who presents for postoperative evaluation. She reports that she is doing well and is tolerating her sling. She noted that she has not had much pain since the surgery. No other new symptoms.     2004, she had a culture taken from a boil on her leg by Dr. Reno Vieira and was hospitalized for 50 days with a PICC line after he discovered an infection.     Physical Examination:  General: Healthy appearing female. Affect appropriate. Normal gait. Alert and oriented to surroundings.   Left Upper Extremity:   Splint in place  Moving fingers well with exception of thumb  Thumb motion not tested but tendon repair seems to be intact  Sensation intact throughout  Fingers are well perfused  Incision c/d/i.     Radiographs:   Radiographs of the left wrist - 3 views (2019):  Distal radius hardware removal with unchanged alignment from time of surgery.     I have independently reviewed the above imaging studies; the results were discussed with the patient.     Assessment:   79 year old female 1 week s/p the above procedure. I discussed the results of the culture samples I took during surgery which were positive for a S aureus  in one culture, broth only.  I recommended that she see an infectious disease doctor address whether IV antibiotics would be indicated and what duration of therapy would be ideal. This may just be a contaminant but an infection would explain the nonunion which is quite unusual for this type of fracture.     Plan:   1. Referred to infectious disease   2. We will cast today    3. Follow up in 5 weeks.   4. Will follow susceptibilities    Dinh Strong MD    Scribe Disclosure:  I, Lawrence Marie, am serving as a scribe to document services personally performed by Dinh Strong MD at this visit, based upon the provider's statements to me. All documentation has been reviewed by the aforementioned provider prior to being entered into the official medical record.

## 2019-11-27 NOTE — PROGRESS NOTES
[facility]  Orthopedics  Dinh Strong MD  2019     Name: Kimberly Chau  MRN: [id number]  Age: 79 year old  : 1940  Referring provider: Dinh Strong     Chief Complaint: RECHECK (DOS 19 Open Reduction Inernal Fixation Left Distal Radius fx)    Date of Surgery: 19    Procedure: Repair of left flexor pollicis longus tendon, removal of hardware, debridement of bone, and deep cultures    History of Present Illness:   Kimberly Chau is a 79 year old female 1 week status-post the above procedure who presents for postoperative evaluation. She reports that she is doing well and is tolerating her sling. She noted that she has not had much pain since the surgery. No other new symptoms.     , she had a culture taken from a boil on her leg by Dr. Reno Vieira and was hospitalized for 50 days with a PICC line after he discovered an infection.     Physical Examination:  General: Healthy appearing female. Affect appropriate. Normal gait. Alert and oriented to surroundings.   Left Upper Extremity:   Splint in place  Moving fingers well with exception of thumb  Thumb motion not tested but tendon repair seems to be intact  Sensation intact throughout  Fingers are well perfused  Incision c/d/i.     Radiographs:   Radiographs of the left wrist - 3 views (2019):  Distal radius hardware removal with unchanged alignment from time of surgery.     I have independently reviewed the above imaging studies; the results were discussed with the patient.     Assessment:   79 year old female 1 week s/p the above procedure. I discussed the results of the culture samples I took during surgery which were positive for a S aureus in one culture, broth only.  I recommended that she see an infectious disease doctor address whether IV antibiotics would be indicated and what duration of therapy would be ideal. This may just be a contaminant but an infection would explain the nonunion which is quite unusual for this  type of fracture.     Plan:   1. Referred to infectious disease   2. We will cast today    3. Follow up in 5 weeks.   4. Will follow susceptibilities    Dinh Strong MD        Scribe Disclosure:  I, Lawrence Marie, am serving as a scribe to document services personally performed by Dinh Strong MD at this visit, based upon the provider's statements to me. All documentation has been reviewed by the aforementioned provider prior to being entered into the official medical record.

## 2019-12-02 ENCOUNTER — TELEPHONE (OUTPATIENT)
Dept: ORTHOPEDICS | Facility: CLINIC | Age: 79
End: 2019-12-02

## 2019-12-02 DIAGNOSIS — S52.502G CLOSED FRACTURE OF DISTAL END OF LEFT RADIUS WITH DELAYED HEALING, UNSPECIFIED FRACTURE MORPHOLOGY, SUBSEQUENT ENCOUNTER: Primary | ICD-10-CM

## 2019-12-02 RX ORDER — SULFAMETHOXAZOLE/TRIMETHOPRIM 800-160 MG
1 TABLET ORAL 2 TIMES DAILY
Qty: 28 TABLET | Refills: 0 | Status: SHIPPED | OUTPATIENT
Start: 2019-12-02 | End: 2020-02-05

## 2019-12-02 NOTE — TELEPHONE ENCOUNTER
Dr Strong is prescribing Bactrim DS for 14 days based on susceptibilities on lab results.  Patient informed and agrees, she uses [facility] in [place].     Verified with patient she had no known allergy to any Sulfa medication.     Patient has appointment with Infectious Disease on 12-11-19.  Rx sent to pharmacy per Dr Strong.  Patient will start new antibiotic tonight. Elyse Webster RN

## 2019-12-04 NOTE — TELEPHONE ENCOUNTER
12-4-19  Notification from [employer] that Rx transmission failed.  Called pharmacy to verify.  Pharmacist stated that sure script had a lot of errors on Monday and Tuesday this week.    Verified that patient did get her antibiotic and the prescription actually did go through as ordered. Elyse Webster RN

## 2019-12-10 ENCOUNTER — TELEPHONE (OUTPATIENT)
Dept: INFECTIOUS DISEASES | Facility: CLINIC | Age: 79
End: 2019-12-10

## 2019-12-10 NOTE — TELEPHONE ENCOUNTER
Patient contacted and reminded of upcoming appointment.  Patient confirmed they will be attending.  Patient instructed to bring updated medications list to appointment.Glenn Terrell/[place]  December 10, 2019 5:16 PM

## 2019-12-11 ENCOUNTER — OFFICE VISIT (OUTPATIENT)
Dept: INFECTIOUS DISEASES | Facility: CLINIC | Age: 79
End: 2019-12-11
Attending: INTERNAL MEDICINE
Payer: MEDICARE

## 2019-12-11 ENCOUNTER — PRE VISIT (OUTPATIENT)
Dept: INFECTIOUS DISEASES | Facility: CLINIC | Age: 79
End: 2019-12-11

## 2019-12-11 VITALS — BODY MASS INDEX: 23.22 KG/M2 | WEIGHT: 118.9 LBS | HEART RATE: 75 BPM | OXYGEN SATURATION: 100 %

## 2019-12-11 DIAGNOSIS — Z51.81 MEDICATION MONITORING ENCOUNTER: ICD-10-CM

## 2019-12-11 DIAGNOSIS — A49.01 MSSA (METHICILLIN SUSCEPTIBLE STAPHYLOCOCCUS AUREUS) INFECTION: Primary | ICD-10-CM

## 2019-12-11 DIAGNOSIS — A49.01 MSSA (METHICILLIN SUSCEPTIBLE STAPHYLOCOCCUS AUREUS) INFECTION: ICD-10-CM

## 2019-12-11 DIAGNOSIS — Z79.2 ENCOUNTER FOR LONG-TERM (CURRENT) USE OF ANTIBIOTICS: ICD-10-CM

## 2019-12-11 DIAGNOSIS — Z98.890 H/O LEFT WRIST SURGERY: ICD-10-CM

## 2019-12-11 LAB
ANION GAP SERPL CALCULATED.3IONS-SCNC: 7 MMOL/L (ref 3–14)
BASOPHILS # BLD AUTO: 0 10E9/L (ref 0–0.2)
BASOPHILS NFR BLD AUTO: 0.6 %
BUN SERPL-MCNC: 17 MG/DL (ref 7–30)
CALCIUM SERPL-MCNC: 9.2 MG/DL (ref 8.5–10.1)
CHLORIDE SERPL-SCNC: 94 MMOL/L (ref 94–109)
CO2 SERPL-SCNC: 23 MMOL/L (ref 20–32)
CREAT SERPL-MCNC: 1.13 MG/DL (ref 0.52–1.04)
CRP SERPL-MCNC: 7 MG/L (ref 0–8)
DIFFERENTIAL METHOD BLD: ABNORMAL
EOSINOPHIL # BLD AUTO: 0.1 10E9/L (ref 0–0.7)
EOSINOPHIL NFR BLD AUTO: 1.5 %
ERYTHROCYTE [DISTWIDTH] IN BLOOD BY AUTOMATED COUNT: 13.2 % (ref 10–15)
ERYTHROCYTE [SEDIMENTATION RATE] IN BLOOD BY WESTERGREN METHOD: 29 MM/H (ref 0–30)
GFR SERPL CREATININE-BSD FRML MDRD: 46 ML/MIN/{1.73_M2}
GLUCOSE SERPL-MCNC: 88 MG/DL (ref 70–99)
HCT VFR BLD AUTO: 32.6 % (ref 35–47)
HGB BLD-MCNC: 11 G/DL (ref 11.7–15.7)
IMM GRANULOCYTES # BLD: 0 10E9/L (ref 0–0.4)
IMM GRANULOCYTES NFR BLD: 0.3 %
LYMPHOCYTES # BLD AUTO: 0.6 10E9/L (ref 0.8–5.3)
LYMPHOCYTES NFR BLD AUTO: 9.3 %
MCH RBC QN AUTO: 33.5 PG (ref 26.5–33)
MCHC RBC AUTO-ENTMCNC: 33.7 G/DL (ref 31.5–36.5)
MCV RBC AUTO: 99 FL (ref 78–100)
MONOCYTES # BLD AUTO: 0.4 10E9/L (ref 0–1.3)
MONOCYTES NFR BLD AUTO: 5.7 %
NEUTROPHILS # BLD AUTO: 5.3 10E9/L (ref 1.6–8.3)
NEUTROPHILS NFR BLD AUTO: 82.6 %
NRBC # BLD AUTO: 0 10*3/UL
NRBC BLD AUTO-RTO: 0 /100
PLATELET # BLD AUTO: 296 10E9/L (ref 150–450)
POTASSIUM SERPL-SCNC: 4.6 MMOL/L (ref 3.4–5.3)
RBC # BLD AUTO: 3.28 10E12/L (ref 3.8–5.2)
SODIUM SERPL-SCNC: 124 MMOL/L (ref 133–144)
WBC # BLD AUTO: 6.5 10E9/L (ref 4–11)

## 2019-12-11 PROCEDURE — 85025 COMPLETE CBC W/AUTO DIFF WBC: CPT

## 2019-12-11 PROCEDURE — G0463 HOSPITAL OUTPT CLINIC VISIT: HCPCS | Mod: ZF

## 2019-12-11 PROCEDURE — 80048 BASIC METABOLIC PNL TOTAL CA: CPT

## 2019-12-11 PROCEDURE — 86140 C-REACTIVE PROTEIN: CPT

## 2019-12-11 PROCEDURE — 85652 RBC SED RATE AUTOMATED: CPT

## 2019-12-11 RX ORDER — SULFAMETHOXAZOLE/TRIMETHOPRIM 800-160 MG
1 TABLET ORAL 2 TIMES DAILY
Qty: 60 TABLET | Refills: 0 | Status: SHIPPED | OUTPATIENT
Start: 2019-12-11 | End: 2020-11-17

## 2019-12-11 ASSESSMENT — PAIN SCALES - GENERAL: PAINLEVEL: NO PAIN (0)

## 2019-12-11 NOTE — LETTER
12/11/2019      RE: Kimberly Chau  [address]  [place] [address]       [facility]  Infectious Disease Clinic Note:  New Patient     Patient:  Kimberly Chau, Date of birth 1940, Medical record number [id number]  Date of Visit:  12/11/2019  Consult requested by Dr. Strong for evaluation of MSSA growth from left arm would cultures..         Assessment and Recommendations:   Recommendations:  - Continue bactrim for planned 6 week course  - Renal function checked today and acceptable  - Inflammatory markers checked and not significantly elevated.  Little utility to following since normal at baseline.  - Repeat labs in 2 weeks.    Assessment: 80 y/o woman who developed fracture of plate used in ORIF of left distal radius fracture.  At no time in her course has she had inflammatory symptoms (no fever, night sweats, local redness, warmth or drainage) to suggest an infection.  However, it is unclear why the fracture failed to heal so will plan treatment course for osteomyelitis.  Increasing data from OVIVA trial and other supporting use of oral therapy for bone and joint infections.  Thus, reasonable to continue bactrim to complete 6 week total course.  Bactrim poses risk for renal injury but seems to be tolerating to date based on labs today.    Follow up scheduled for 4 weeks.    Patient discussed with Dr. Fay.    I spent 60 minutes in direct care with this patient, including >50% of time face-to-face with the patient counseling about bone infections.    Julita Lobato MD, PhD  Adult & Pediatric Infectious Diseases Fellow PGY8, [facility]  Pager: [phone]           History of the Infectious Disease lllness:   Patient presents with her   She broke wrist in April 2019 and underwent ORIF by Dr. Strong.  Wound healed in expected time.  No concerns about redness,drainage, delayed closure.    Recalls an injury involving a sliding door in September or October.  Took  "about 5 weeks to get back in to see Dr. Strong.  Incision \"looked great.\".  No redness or drainage.  Plates removed 11/19/19.   Cultures grew MSSA. Casted on 11/27.  She was started no bactrim on 12/2/19.  Had one episode of vomiting today but otherwise has tolerated fine.   No fevers, chills, or nightsweats.  History of left hip micaela-arthoplasty due to hip fracture that occurred at same time as wrist fracture.      Review of Systems:  Patient's electronically submitted complete ROS reviewed and addressed.    Past Medical History:   Diagnosis Date     Anemia      Arthritis      Cataracts      Central retinal artery occlusion      Cervical spine fracture (H)     After a fall from orthostatic sx     Chronic pain     Back of head     Gastro-oesophageal reflux disease      Head injury      Hypertension      Hyponatremia     Resolved, 2/2 diuretics     Migraines      Osteoporosis      Pneumonia 2018     Rheumatoid arthritis(714.0)        Past Surgical History:   Procedure Laterality Date     COLONOSCOPY       EYE SURGERY Left 02/2019    Hole in macula     FUSION CERVICAL POSTERIOR THREE+ LEVELS  1/22/2013    Procedure: FUSION CERVICAL POSTERIOR THREE+ LEVELS;  Cervical 1-6 Posterior Instrumentation and Fusion, Cervical 3-4 Laminectomy  .Instrumentation and fusion to Cervical 6 *Latex Allergy*;  Surgeon: Ra Bar MD;  [facility]     GYN SURGERY       HEAD & NECK SURGERY       HYSTERECTOMY       KNEE SURGERY       OPEN REDUCTION INTERNAL FIXATION WRIST Left 4/30/2019    Procedure: Open Reduction Internal Fixation Left Distal Radius Fracture;  Surgeon: Dinh Strong MD;  Location:  OR     OPTICAL TRACKING SYSTEM ENDOSCOPIC SINUS SURGERY Right 4/6/2018    Procedure: OPTICAL TRACKING SYSTEM ENDOSCOPIC SINUS SURGERY;  Stealth Assisted Right Maxillary Antrostomy, Anterior Ethmoidectomy And Frontal Sinusotomy;  Surgeon: Elyse Eisenberg MD;  Location:  OR     Readz SYSTEM ENDOSCOPIC SINUS " SURGERY Right 8/3/2018    Procedure: OPTICAL TRACKING SYSTEM ENDOSCOPIC SINUS SURGERY;  Stealth Assisted Revision Right Frontal Sinusotomy, Right Stent Placement  **Latex Allergy** ;  Surgeon: Elyse Eisenberg MD;  [facility]     ORTHOPEDIC SURGERY       REMOVE HARDWARE WRIST Left 11/19/2019    Procedure: Left Distal Radius Hardware Removal, Flexor Pollicus Longus Repair, Deep Cultures;  Surgeon: Dinh Strong MD;  Location: [facility] OR       Family History   Problem Relation Age of Onset     Arthritis Mother      Respiratory Mother         pulmonary fibrosis     Hypertension Mother      Anemia Mother      Liver Disease Father         from EtOH     Alcoholism Father      Multiple Sclerosis Sister      No Known Problems Maternal Grandmother      Heart Disease Maternal Grandfather      Breast Cancer Sister      Glaucoma Paternal Grandfather      Heart Disease Paternal Grandfather        Social History     Social History Narrative    Ms. Chau is , has two grown children and two grandchildren. She does not work. She was never a smoker, and drinks a glass of wine with dinner each night.         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      Social History     Tobacco Use     Smoking status: Never Smoker     Smokeless tobacco: Never Used   Substance Use Topics     Alcohol use: Yes     Comment: 2 glasses of wine a day     Drug use: No       Immunization History   Administered Date(s) Administered     DTaP, Unspecified 03/10/2014     Flu, Unspecified 09/27/2010     Influenza (IIV3) PF 11/11/2003, 09/27/2010, 09/27/2010, 01/08/2013     Pneumococcal (PCV 7) 09/25/2011     Pneumococcal 23 valent 06/25/2011     TDAP Vaccine (Boostrix) 03/10/2014     Zoster vaccine, live 04/12/2010       Patient Active Problem List   Diagnosis     Intrinsic atopic dermatitis     Essential hypertension     Rheumatoid arthritis (H)     Retinal artery occlusion     Cervical vertebral fracture (H)     Central  retinal artery occlusion of right eye     Bilateral occipital neuralgia     C1-C2 instability     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 factor (H)     Osteoarthritis     Malignant hypertension     Hyponatremia     GERD (gastroesophageal reflux disease)     Eczematous dermatitis     Anxiety state     Anemia     Closed fracture of distal end of left radius with delayed healing, unspecified fracture morphology, subsequent encounter       Current Outpatient Medications   Medication Sig     acetaminophen 650 MG TABS Take 650 mg by mouth every 4 hours as needed.     amLODIPine (NORVASC) 10 MG tablet Take 1 tablet (10 mg) by mouth daily     Diphenhydramine-APAP, sleep, (TYLENOL PM EXTRA STRENGTH PO) Take 2 tablets by mouth At Bedtime      ferrous gluconate (FERGON) 324 (38 Fe) MG tablet Take 324 mg by mouth     metoprolol tartrate (LOPRESSOR) 100 MG tablet Take 2 tablets (200 mg) by mouth 2 times daily     Multiple Vitamin (MULTI-VITAMIN) per tablet Take 1 tablet by mouth every morning      Omeprazole (PRILOSEC PO) Take 20 mg by mouth as needed      sodium chloride (OCEAN NASAL SPRAY) 0.65 % nasal spray Spray 1 spray into both nostrils daily as needed for congestion     sulfamethoxazole-trimethoprim (BACTRIM DS/SEPTRA DS) 800-160 MG tablet Take 1 tablet by mouth 2 times daily     sulfamethoxazole-trimethoprim (BACTRIM DS/SEPTRA DS) 800-160 MG tablet Take 1 tablet by mouth 2 times daily for 14 days     oxyCODONE (ROXICODONE) 5 MG tablet Take 1-2 tablets (5-10 mg) by mouth every 6 hours as needed for moderate to severe pain (Patient not taking: Reported on 11/27/2019)     No current facility-administered medications for this visit.        Allergies   Allergen Reactions     Hctz Other (See Comments)     Pt develops symptomatic and significant hyponatremia with HCTZ exposure     Hydrochlorothiazide      Other reaction(s): Hyponatremia  Hyponatremia     Latex      Benzocaine Rash     carba  mix,thrium-sunscreen     Resorcinol-Alcohol Rash     carba mix,thrium-sunscreen     Ace Inhibitors Unknown     Dizziness and orthostatic changes and electrolyte problems.     Latex Unknown              Physical Exam:   Vitals were reviewed.  All vitals stable  Pulse 75   Wt 53.9 kg (118 lb 14.4 oz)   SpO2 100%   BMI 23.22 kg/m       Exam:  GENERAL:  well-developed, well-nourished, alert, oriented, in no acute distress.  HEENT:  Head is normocephalic, atraumatic   EYES:  Eyes have anicteric sclerae.    ENT:  Oropharynx is moist without exudates or ulcers.  NECK:  Supple.  LUNGS:  Clear to auscultation.  CARDIOVASCULAR:  Regular rate and rhythm with no murmurs, gallops or rubs.  SKIN:  No acute rashes.    NEUROLOGIC:  Grossly nonfocal.  EXT: Left arm in cast from MCP joints to elbow.  Arthritis with bony joint enlargement of IP joints.         Laboratory Data:     Inflammatory Markers    Recent Labs   Lab Test 19  1724 10/11/17  0901 16  1151 13  1215 13  1627   SED 29 15  --  10 62*   CRP 7.0 3.2 17.0*  --  196.4*      WBC 6.5  CRP 7  ESR 29    Cr 1.13  K 4.6    Imagin/11/19 CT wrist  1. Fracture involving the mid aspect of the volar radial plate.  2. Unchanged alignment without significant interval healing of distal  ulnar fracture.  3. Left distal radial fracture with persistent areas of lucency with  areas of mild osseous bridging.    19 XR wrist  Interim removal of the plate and screw fixation of distal  radius. Distal radial and ulnar fractures are grossly similar  alignment.      Infectious Disease Clinic Staff Note: Ms. Chau was seen, examined, and the case was discussed with Dr. Lobato, ID Fellow -- I agree with her consultative history and examination, assessment and plan in this outpatient ID Consult note. This note reflects my observations and opinions, and the plan outlined fully reflects my approach. I have reviewed the available history, radiology, laboratory  results, and reports with the Fellow.    Julita Lobato MD

## 2019-12-11 NOTE — LETTER
12/11/2019       RE: Kimberly Chau  [address]  [place] [address]     Dear Colleague,    Thank you for referring your patient, Kimberly Chau, to the [facility] AND INFECTIOUS DISEASES at [facility]. Please see a copy of my visit note below.    [facility]  Infectious Disease Clinic Note:  New Patient     Patient:  Kimberly Chau, Date of birth 1940, Medical record number [id number]  Date of Visit:  12/11/2019  Consult requested by Dr. Strong for evaluation of MSSA growth from left arm would cultures..         Assessment and Recommendations:   Recommendations:  - Continue bactrim for planned 6 week course  - Renal function checked today and acceptable  - Inflammatory markers checked and not significantly elevated.  Little utility to following since normal at baseline.  - Repeat labs in 2 weeks.    Assessment: 80 y/o woman who developed fracture of plate used in ORIF of left distal radius fracture.  At no time in her course has she had inflammatory symptoms (no fever, night sweats, local redness, warmth or drainage) to suggest an infection.  However, it is unclear why the fracture failed to heal so will plan treatment course for osteomyelitis.  Increasing data from OVIVA trial and other supporting use of oral therapy for bone and joint infections.  Thus, reasonable to continue bactrim to complete 6 week total course.  Bactrim poses risk for renal injury but seems to be tolerating to date based on labs today.    Follow up scheduled for 4 weeks.    Patient discussed with Dr. Fay.    I spent 60 minutes in direct care with this patient, including >50% of time face-to-face with the patient counseling about bone infections.    Julita Lobato MD, PhD  Adult & Pediatric Infectious Diseases Fellow PGY8, [facility]  Pager: [phone]           History of the Infectious Disease lllness:   Patient presents with her   She  "broke wrist in April 2019 and underwent ORIF by Dr. Strong.  Wound healed in expected time.  No concerns about redness,drainage, delayed closure.    Recalls an injury involving a sliding door in September or October.  Took about 5 weeks to get back in to see Dr. Strong.  Incision \"looked great.\".  No redness or drainage.  Plates removed 11/19/19.   Cultures grew MSSA. Casted on 11/27.  She was started no bactrim on 12/2/19.  Had one episode of vomiting today but otherwise has tolerated fine.   No fevers, chills, or nightsweats.  History of left hip micaela-arthoplasty due to hip fracture that occurred at same time as wrist fracture.      Review of Systems:  Patient's electronically submitted complete ROS reviewed and addressed.    Past Medical History:   Diagnosis Date     Anemia      Arthritis      Cataracts      Central retinal artery occlusion      Cervical spine fracture (H)     After a fall from orthostatic sx     Chronic pain     Back of head     Gastro-oesophageal reflux disease      Head injury      Hypertension      Hyponatremia     Resolved, 2/2 diuretics     Migraines      Osteoporosis      Pneumonia 2018     Rheumatoid arthritis(714.0)        Past Surgical History:   Procedure Laterality Date     COLONOSCOPY       EYE SURGERY Left 02/2019    Hole in macula     FUSION CERVICAL POSTERIOR THREE+ LEVELS  1/22/2013    Procedure: FUSION CERVICAL POSTERIOR THREE+ LEVELS;  Cervical 1-6 Posterior Instrumentation and Fusion, Cervical 3-4 Laminectomy  .Instrumentation and fusion to Cervical 6 *Latex Allergy*;  Surgeon: Ra Bar MD;  [facility]     GYN SURGERY       HEAD & NECK SURGERY       HYSTERECTOMY       KNEE SURGERY       OPEN REDUCTION INTERNAL FIXATION WRIST Left 4/30/2019    Procedure: Open Reduction Internal Fixation Left Distal Radius Fracture;  Surgeon: Dinh Strong MD;  Location:  OR     OPTICAL TRACKING SYSTEM ENDOSCOPIC SINUS SURGERY Right 4/6/2018    Procedure: OPTICAL " TRACKING SYSTEM ENDOSCOPIC SINUS SURGERY;  Stealth Assisted Right Maxillary Antrostomy, Anterior Ethmoidectomy And Frontal Sinusotomy;  Surgeon: Elyse Eisenberg MD;  [facility]     OPTICAL TRACKING SYSTEM ENDOSCOPIC SINUS SURGERY Right 8/3/2018    Procedure: OPTICAL TRACKING SYSTEM ENDOSCOPIC SINUS SURGERY;  Stealth Assisted Revision Right Frontal Sinusotomy, Right Stent Placement  **Latex Allergy** ;  Surgeon: Elyse Eisenberg MD;  [facility]     ORTHOPEDIC SURGERY       REMOVE HARDWARE WRIST Left 11/19/2019    Procedure: Left Distal Radius Hardware Removal, Flexor Pollicus Longus Repair, Deep Cultures;  Surgeon: Dinh Strong MD;  [facility]: [facility]       Family History   Problem Relation Age of Onset     Arthritis Mother      Respiratory Mother         pulmonary fibrosis     Hypertension Mother      Anemia Mother      Liver Disease Father         from EtOH     Alcoholism Father      Multiple Sclerosis Sister      No Known Problems Maternal Grandmother      Heart Disease Maternal Grandfather      Breast Cancer Sister      Glaucoma Paternal Grandfather      Heart Disease Paternal Grandfather        Social History     Social History Narrative    Ms. Chau is , has two grown children and two grandchildren. She does not work. She was never a smoker, and drinks a glass of wine with dinner each night.         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      Social History     Tobacco Use     Smoking status: Never Smoker     Smokeless tobacco: Never Used   Substance Use Topics     Alcohol use: Yes     Comment: 2 glasses of wine a day     Drug use: No       Immunization History   Administered Date(s) Administered     DTaP, Unspecified 03/10/2014     Flu, Unspecified 09/27/2010     Influenza (IIV3) PF 11/11/2003, 09/27/2010, 09/27/2010, 01/08/2013     Pneumococcal (PCV 7) 09/25/2011     Pneumococcal 23 valent 06/25/2011     TDAP Vaccine (Boostrix) 03/10/2014     Zoster vaccine,  live 04/12/2010       Patient Active Problem List   Diagnosis     Intrinsic atopic dermatitis     Essential hypertension     Rheumatoid arthritis (H)     Retinal artery occlusion     Cervical vertebral fracture (H)     Central retinal artery occlusion of right eye     Bilateral occipital neuralgia     C1-C2 instability     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 factor (H)     Osteoarthritis     Malignant hypertension     Hyponatremia     GERD (gastroesophageal reflux disease)     Eczematous dermatitis     Anxiety state     Anemia     Closed fracture of distal end of left radius with delayed healing, unspecified fracture morphology, subsequent encounter       Current Outpatient Medications   Medication Sig     acetaminophen 650 MG TABS Take 650 mg by mouth every 4 hours as needed.     amLODIPine (NORVASC) 10 MG tablet Take 1 tablet (10 mg) by mouth daily     Diphenhydramine-APAP, sleep, (TYLENOL PM EXTRA STRENGTH PO) Take 2 tablets by mouth At Bedtime      ferrous gluconate (FERGON) 324 (38 Fe) MG tablet Take 324 mg by mouth     metoprolol tartrate (LOPRESSOR) 100 MG tablet Take 2 tablets (200 mg) by mouth 2 times daily     Multiple Vitamin (MULTI-VITAMIN) per tablet Take 1 tablet by mouth every morning      Omeprazole (PRILOSEC PO) Take 20 mg by mouth as needed      sodium chloride (OCEAN NASAL SPRAY) 0.65 % nasal spray Spray 1 spray into both nostrils daily as needed for congestion     sulfamethoxazole-trimethoprim (BACTRIM DS/SEPTRA DS) 800-160 MG tablet Take 1 tablet by mouth 2 times daily     sulfamethoxazole-trimethoprim (BACTRIM DS/SEPTRA DS) 800-160 MG tablet Take 1 tablet by mouth 2 times daily for 14 days     oxyCODONE (ROXICODONE) 5 MG tablet Take 1-2 tablets (5-10 mg) by mouth every 6 hours as needed for moderate to severe pain (Patient not taking: Reported on 11/27/2019)     No current facility-administered medications for this visit.        Allergies   Allergen Reactions      Hctz Other (See Comments)     Pt develops symptomatic and significant hyponatremia with HCTZ exposure     Hydrochlorothiazide      Other reaction(s): Hyponatremia  Hyponatremia     Latex      Benzocaine Rash     carba mix,thrium-sunscreen     Resorcinol-Alcohol Rash     carba mix,thrium-sunscreen     Ace Inhibitors Unknown     Dizziness and orthostatic changes and electrolyte problems.     Latex Unknown              Physical Exam:   Vitals were reviewed.  All vitals stable  Pulse 75   Wt 53.9 kg (118 lb 14.4 oz)   SpO2 100%   BMI 23.22 kg/m       Exam:  GENERAL:  well-developed, well-nourished, alert, oriented, in no acute distress.  HEENT:  Head is normocephalic, atraumatic   EYES:  Eyes have anicteric sclerae.    ENT:  Oropharynx is moist without exudates or ulcers.  NECK:  Supple.  LUNGS:  Clear to auscultation.  CARDIOVASCULAR:  Regular rate and rhythm with no murmurs, gallops or rubs.  SKIN:  No acute rashes.    NEUROLOGIC:  Grossly nonfocal.  EXT: Left arm in cast from MCP joints to elbow.  Arthritis with bony joint enlargement of IP joints.         Laboratory Data:     Inflammatory Markers    Recent Labs   Lab Test 19  1724 10/11/17  0901 16  1151 13  1215 13  1627   SED 29 15  --  10 62*   CRP 7.0 3.2 17.0*  --  196.4*      WBC 6.5  CRP 7  ESR 29    Cr 1.13  K 4.6    Imagin/11/19 CT wrist  1. Fracture involving the mid aspect of the volar radial plate.  2. Unchanged alignment without significant interval healing of distal  ulnar fracture.  3. Left distal radial fracture with persistent areas of lucency with  areas of mild osseous bridging.    19 XR wrist  Interim removal of the plate and screw fixation of distal  radius. Distal radial and ulnar fractures are grossly similar  alignment.      Infectious Disease Clinic Staff Note: Ms. Chau was seen, examined, and the case was discussed with Dr. Lobato, ID Fellow -- I agree with her consultative history and  examination, assessment and plan in this outpatient ID Consult note. This note reflects my observations and opinions, and the plan outlined fully reflects my approach. I have reviewed the available history, radiology, laboratory results, and reports with the Fellow.    Again, thank you for allowing me to participate in the care of your patient.      Sincerely,    Julita Lobato MD

## 2019-12-11 NOTE — PATIENT INSTRUCTIONS
Will check labs today and continue bactrim for a total of 6 weeks if kidney function ok  Will check inflammatory markers and repeat if elevated in 2 weeks to follow clinical response.  No need to re-check if normal now.

## 2019-12-11 NOTE — PROGRESS NOTES
"[facility]  Infectious Disease Clinic Note:  New Patient     Patient:  Kimberly Chau, Date of birth 1940, Medical record number [id number]  Date of Visit:  12/11/2019  Consult requested by Dr. Strong for evaluation of MSSA growth from left arm would cultures..         Assessment and Recommendations:   Recommendations:  - Continue bactrim for planned 6 week course  - Renal function checked today and acceptable  - Inflammatory markers checked and not significantly elevated.  Little utility to following since normal at baseline.  - Repeat labs in 2 weeks.    Assessment: 80 y/o woman who developed fracture of plate used in ORIF of left distal radius fracture.  At no time in her course has she had inflammatory symptoms (no fever, night sweats, local redness, warmth or drainage) to suggest an infection.  However, it is unclear why the fracture failed to heal so will plan treatment course for osteomyelitis.  Increasing data from OVIVA trial and other supporting use of oral therapy for bone and joint infections.  Thus, reasonable to continue bactrim to complete 6 week total course.  Bactrim poses risk for renal injury but seems to be tolerating to date based on labs today.    Follow up scheduled for 4 weeks.    Patient discussed with Dr. Fay.    I spent 60 minutes in direct care with this patient, including >50% of time face-to-face with the patient counseling about bone infections.    Julita Lobato MD, PhD  Adult & Pediatric Infectious Diseases Fellow PGY8, [facility]  Pager: [phone]           History of the Infectious Disease lllness:   Patient presents with her   She broke wrist in April 2019 and underwent ORIF by Dr. Strong.  Wound healed in expected time.  No concerns about redness,drainage, delayed closure.    Recalls an injury involving a sliding door in September or October.  Took about 5 weeks to get back in to see Dr. Strong.  Incision \"looked great.\".  No " redness or drainage.  Plates removed 11/19/19.   Cultures grew MSSA. Casted on 11/27.  She was started no bactrim on 12/2/19.  Had one episode of vomiting today but otherwise has tolerated fine.   No fevers, chills, or nightsweats.  History of left hip micaela-arthoplasty due to hip fracture that occurred at same time as wrist fracture.      Review of Systems:  Patient's electronically submitted complete ROS reviewed and addressed.    Past Medical History:   Diagnosis Date     Anemia      Arthritis      Cataracts      Central retinal artery occlusion      Cervical spine fracture (H)     After a fall from orthostatic sx     Chronic pain     Back of head     Gastro-oesophageal reflux disease      Head injury      Hypertension      Hyponatremia     Resolved, 2/2 diuretics     Migraines      Osteoporosis      Pneumonia 2018     Rheumatoid arthritis(714.0)        Past Surgical History:   Procedure Laterality Date     COLONOSCOPY       EYE SURGERY Left 02/2019    Hole in macula     FUSION CERVICAL POSTERIOR THREE+ LEVELS  1/22/2013    Procedure: FUSION CERVICAL POSTERIOR THREE+ LEVELS;  Cervical 1-6 Posterior Instrumentation and Fusion, Cervical 3-4 Laminectomy  .Instrumentation and fusion to Cervical 6 *Latex Allergy*;  Surgeon: Ra Bar MD;  Location: [facility]     GYN SURGERY       HEAD & NECK SURGERY       HYSTERECTOMY       KNEE SURGERY       OPEN REDUCTION INTERNAL FIXATION WRIST Left 4/30/2019    Procedure: Open Reduction Internal Fixation Left Distal Radius Fracture;  Surgeon: Dinh Strong MD;  Location:  OR     OPTICAL TRACKING SYSTEM ENDOSCOPIC SINUS SURGERY Right 4/6/2018    Procedure: OPTICAL TRACKING SYSTEM ENDOSCOPIC SINUS SURGERY;  Stealth Assisted Right Maxillary Antrostomy, Anterior Ethmoidectomy And Frontal Sinusotomy;  Surgeon: Elyse Eisenberg MD;  Location:  OR     OPTICAL TRACKING SYSTEM ENDOSCOPIC SINUS SURGERY Right 8/3/2018    Procedure: OPTICAL TRACKING SYSTEM ENDOSCOPIC SINUS  SURGERY;  Stealth Assisted Revision Right Frontal Sinusotomy, Right Stent Placement  **Latex Allergy** ;  Surgeon: Elyse Eisenberg MD;  Location: [facility] OR     ORTHOPEDIC SURGERY       REMOVE HARDWARE WRIST Left 11/19/2019    Procedure: Left Distal Radius Hardware Removal, Flexor Pollicus Longus Repair, Deep Cultures;  Surgeon: Dinh Strong MD;  Location: [facility] OR       Family History   Problem Relation Age of Onset     Arthritis Mother      Respiratory Mother         pulmonary fibrosis     Hypertension Mother      Anemia Mother      Liver Disease Father         from EtOH     Alcoholism Father      Multiple Sclerosis Sister      No Known Problems Maternal Grandmother      Heart Disease Maternal Grandfather      Breast Cancer Sister      Glaucoma Paternal Grandfather      Heart Disease Paternal Grandfather        Social History     Social History Narrative    Ms. Chau is , has two grown children and two grandchildren. She does not work. She was never a smoker, and drinks a glass of wine with dinner each night.         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      Social History     Tobacco Use     Smoking status: Never Smoker     Smokeless tobacco: Never Used   Substance Use Topics     Alcohol use: Yes     Comment: 2 glasses of wine a day     Drug use: No       Immunization History   Administered Date(s) Administered     DTaP, Unspecified 03/10/2014     Flu, Unspecified 09/27/2010     Influenza (IIV3) PF 11/11/2003, 09/27/2010, 09/27/2010, 01/08/2013     Pneumococcal (PCV 7) 09/25/2011     Pneumococcal 23 valent 06/25/2011     TDAP Vaccine (Boostrix) 03/10/2014     Zoster vaccine, live 04/12/2010       Patient Active Problem List   Diagnosis     Intrinsic atopic dermatitis     Essential hypertension     Rheumatoid arthritis (H)     Retinal artery occlusion     Cervical vertebral fracture (H)     Central retinal artery occlusion of right eye     Bilateral occipital neuralgia      C1-C2 instability     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 factor (H)     Osteoarthritis     Malignant hypertension     Hyponatremia     GERD (gastroesophageal reflux disease)     Eczematous dermatitis     Anxiety state     Anemia     Closed fracture of distal end of left radius with delayed healing, unspecified fracture morphology, subsequent encounter       Current Outpatient Medications   Medication Sig     acetaminophen 650 MG TABS Take 650 mg by mouth every 4 hours as needed.     amLODIPine (NORVASC) 10 MG tablet Take 1 tablet (10 mg) by mouth daily     Diphenhydramine-APAP, sleep, (TYLENOL PM EXTRA STRENGTH PO) Take 2 tablets by mouth At Bedtime      ferrous gluconate (FERGON) 324 (38 Fe) MG tablet Take 324 mg by mouth     metoprolol tartrate (LOPRESSOR) 100 MG tablet Take 2 tablets (200 mg) by mouth 2 times daily     Multiple Vitamin (MULTI-VITAMIN) per tablet Take 1 tablet by mouth every morning      Omeprazole (PRILOSEC PO) Take 20 mg by mouth as needed      sodium chloride (OCEAN NASAL SPRAY) 0.65 % nasal spray Spray 1 spray into both nostrils daily as needed for congestion     sulfamethoxazole-trimethoprim (BACTRIM DS/SEPTRA DS) 800-160 MG tablet Take 1 tablet by mouth 2 times daily     sulfamethoxazole-trimethoprim (BACTRIM DS/SEPTRA DS) 800-160 MG tablet Take 1 tablet by mouth 2 times daily for 14 days     oxyCODONE (ROXICODONE) 5 MG tablet Take 1-2 tablets (5-10 mg) by mouth every 6 hours as needed for moderate to severe pain (Patient not taking: Reported on 11/27/2019)     No current facility-administered medications for this visit.        Allergies   Allergen Reactions     Hctz Other (See Comments)     Pt develops symptomatic and significant hyponatremia with HCTZ exposure     Hydrochlorothiazide      Other reaction(s): Hyponatremia  Hyponatremia     Latex      Benzocaine Rash     carba mix,thrium-sunscreen     Resorcinol-Alcohol Rash     carba mix,thrium-sunscreen      Ace Inhibitors Unknown     Dizziness and orthostatic changes and electrolyte problems.     Latex Unknown              Physical Exam:   Vitals were reviewed.  All vitals stable  Pulse 75   Wt 53.9 kg (118 lb 14.4 oz)   SpO2 100%   BMI 23.22 kg/m      Exam:  GENERAL:  well-developed, well-nourished, alert, oriented, in no acute distress.  HEENT:  Head is normocephalic, atraumatic   EYES:  Eyes have anicteric sclerae.    ENT:  Oropharynx is moist without exudates or ulcers.  NECK:  Supple.  LUNGS:  Clear to auscultation.  CARDIOVASCULAR:  Regular rate and rhythm with no murmurs, gallops or rubs.  SKIN:  No acute rashes.    NEUROLOGIC:  Grossly nonfocal.  EXT: Left arm in cast from MCP joints to elbow.  Arthritis with bony joint enlargement of IP joints.         Laboratory Data:     Inflammatory Markers    Recent Labs   Lab Test 19  1724 10/11/17  0901 16  1151 13  1215 13  1627   SED 29 15  --  10 62*   CRP 7.0 3.2 17.0*  --  196.4*      WBC 6.5  CRP 7  ESR 29    Cr 1.13  K 4.6    Imagin/11/19 CT wrist  1. Fracture involving the mid aspect of the volar radial plate.  2. Unchanged alignment without significant interval healing of distal  ulnar fracture.  3. Left distal radial fracture with persistent areas of lucency with  areas of mild osseous bridging.    19 XR wrist  Interim removal of the plate and screw fixation of distal  radius. Distal radial and ulnar fractures are grossly similar  alignment.

## 2019-12-11 NOTE — NURSING NOTE
Chief Complaint   Patient presents with     Consult     Post surgery     Pulse 75, weight 53.9 kg (118 lb 14.4 oz), SpO2 100 %, not currently breastfeeding.    Glenn Terrell/DENTON  December 11, 2019 4:06 PM

## 2019-12-16 NOTE — PROGRESS NOTES
Infectious Disease Clinic Staff Note: Ms. Chau was seen, examined, and the case was discussed with Dr. Lobato, ID Fellow -- I agree with her consultative history and examination, assessment and plan in this outpatient ID Consult note. This note reflects my observations and opinions, and the plan outlined fully reflects my approach. I have reviewed the available history, radiology, laboratory results, and reports with the Fellow.

## 2019-12-17 LAB
FUNGUS SPEC CULT: NORMAL
FUNGUS SPEC CULT: NORMAL
SPECIMEN SOURCE: NORMAL
SPECIMEN SOURCE: NORMAL

## 2019-12-19 DIAGNOSIS — S52.502G CLOSED FRACTURE OF DISTAL END OF LEFT RADIUS WITH DELAYED HEALING, UNSPECIFIED FRACTURE MORPHOLOGY, SUBSEQUENT ENCOUNTER: Primary | ICD-10-CM

## 2019-12-30 ENCOUNTER — ANCILLARY PROCEDURE (OUTPATIENT)
Dept: GENERAL RADIOLOGY | Facility: CLINIC | Age: 79
End: 2019-12-30
Attending: ORTHOPAEDIC SURGERY
Payer: MEDICARE

## 2019-12-30 ENCOUNTER — OFFICE VISIT (OUTPATIENT)
Dept: ORTHOPEDICS | Facility: CLINIC | Age: 79
End: 2019-12-30
Payer: MEDICARE

## 2019-12-30 DIAGNOSIS — S52.502G CLOSED FRACTURE OF DISTAL END OF LEFT RADIUS WITH DELAYED HEALING, UNSPECIFIED FRACTURE MORPHOLOGY, SUBSEQUENT ENCOUNTER: Primary | ICD-10-CM

## 2019-12-30 DIAGNOSIS — S52.502G CLOSED FRACTURE OF DISTAL END OF LEFT RADIUS WITH DELAYED HEALING, UNSPECIFIED FRACTURE MORPHOLOGY, SUBSEQUENT ENCOUNTER: ICD-10-CM

## 2019-12-30 NOTE — NURSING NOTE
Reason For Visit:   Chief Complaint   Patient presents with     RECHECK     DOS 11/19/19 Open reduction internal fixation L distal Radius Fx with R iliac creast bone graft (left) L Wrist hardware removal     Primary MD: Guillermo Castellano    Age: 79 year old    ?  No    There were no vitals taken for this visit.    Pain Assessment  Patient Currently in Pain: Yes  0-10 Pain Scale: 2  Primary Pain Location: Wrist  Other Pain Locations: pt stated it feel weak/unsuppoted  Pain Descriptors: Aching    QuickDASH Assessment  No flowsheet data found.     Current Outpatient Medications   Medication Sig Dispense Refill     acetaminophen 650 MG TABS Take 650 mg by mouth every 4 hours as needed. 100 tablet 0     amLODIPine (NORVASC) 10 MG tablet Take 1 tablet (10 mg) by mouth daily 90 tablet 3     Diphenhydramine-APAP, sleep, (TYLENOL PM EXTRA STRENGTH PO) Take 2 tablets by mouth At Bedtime        ferrous gluconate (FERGON) 324 (38 Fe) MG tablet Take 324 mg by mouth       metoprolol tartrate (LOPRESSOR) 100 MG tablet Take 2 tablets (200 mg) by mouth 2 times daily 360 tablet 0     Multiple Vitamin (MULTI-VITAMIN) per tablet Take 1 tablet by mouth every morning        Omeprazole (PRILOSEC PO) Take 20 mg by mouth as needed        sodium chloride (OCEAN NASAL SPRAY) 0.65 % nasal spray Spray 1 spray into both nostrils daily as needed for congestion 1 Bottle 3     oxyCODONE (ROXICODONE) 5 MG tablet Take 1-2 tablets (5-10 mg) by mouth every 6 hours as needed for moderate to severe pain (Patient not taking: Reported on 11/27/2019) 20 tablet 0     sulfamethoxazole-trimethoprim (BACTRIM DS/SEPTRA DS) 800-160 MG tablet Take 1 tablet by mouth 2 times daily (Patient not taking: Reported on 12/30/2019) 60 tablet 0     Allergies   Allergen Reactions     Hctz Other (See Comments)     Pt develops symptomatic and significant hyponatremia with HCTZ exposure     Hydrochlorothiazide      Other reaction(s): Hyponatremia  Hyponatremia      Latex      Benzocaine Rash     carba mix,thrium-sunscreen     Resorcinol-Alcohol Rash     carba mix,thrium-sunscreen     Ace Inhibitors Unknown     Dizziness and orthostatic changes and electrolyte problems.     Latex Unknown     Fallon Amador ATC

## 2019-12-30 NOTE — PROGRESS NOTES
[facility]  Orthopedics  Dinh Strong MD  2019     Name: Kimberly Chau  MRN: [id number]  Age: 79 year old  : 1940  Referring provider: Dinh Strong     Chief Complaint: RECHECK (DOS 19 Left wrist hardware removal and FPL repair)    Date of Surgery: 19    Procedure: Repair of left flexor pollicis longus tendon, removal of hardware, debridement of bone, and deep cultures    History of Present Illness:   Kimberly Chau is a 79 year old female 6 weeks status-post the above procedure who presents for postoperative evaluation. Today, she reports that she is doing well overall. Denies pain. No fevers or chills. No numbness or tingling. Patient notes some irritation from her cast. She has not been taking antibiotics since a few days before [place] due to mouth soreness.    Physical Examination:  General: Healthy appearing female. Affect appropriate. Normal gait. Alert and oriented to surroundings.   Left Upper Extremity:   Incision is well-healed.  No distal sensory deficits.  Firing EPL and FPL though active thumb flexion is weak, only about 20 degrees.   She is non-tender at the fracture site. I suspect there is some  subtle motion here that I can appreciate with stress views.     Radiographs:   Radiographs of the left wrist - 3 views (2019):  Unchanged alignment of her radial metadiaphyseal fracture with healing difficult to assess.    I have independently reviewed the above imaging studies; the results were discussed with the patient.     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 Intraop cultures were positive and she was on abx until recently. It is difficulty to assess healing on her x-rays but I do believe that I can palpate motion at the fx site.     Plan:     She will switch from the cast to a zipper splint.  She brought her splint from previous and does seem to fit her still.  I would like her to wear it at  all times except she may remove it for showering.  She will begin to work with hand therapy on mobilizing her FPL.     Given her nonunion I would like to obtain a bone stimulator for her.     She will be seeing infectious disease soon and will discuss with them whether any change to the antibiotics is recommended    Follow-up with me in 6 weeks.     Dinh Strong MD    Scribe Disclosure:  I, Temo Espinoza, am serving as a scribe to document services personally performed by Dinh Strong MD at this visit, based upon the provider's statements to me. All documentation has been reviewed by the aforementioned provider prior to being entered into the official medical record.

## 2019-12-30 NOTE — LETTER
2019       RE: Kimberly Chau  [address]   [place] [address]     Dear Colleague,    Thank you for referring your patient, Kimberly Chau, to the [facility] ORTHOPAEDIC [facility]IC at [facility]. Please see a copy of my visit note below.    [facility]  Orthopedics  Dinh Strong MD  2019     Name: Kimberly Chau  MRN: [id number]  Age: 79 year old  : 1940  Referring provider: Dinh Strong     Chief Complaint: RECHECK (DOS 19 Left wrist hardware removal and FPL repair)    Date of Surgery: 19    Procedure: Repair of left flexor pollicis longus tendon, removal of hardware, debridement of bone, and deep cultures    History of Present Illness:   Kimberly Chau is a 79 year old female 6 weeks status-post the above procedure who presents for postoperative evaluation. Today, she reports that she is doing well overall. Denies pain. No fevers or chills. No numbness or tingling. Patient notes some irritation from her cast. She has not been taking antibiotics since a few days before [place] due to mouth soreness.    Physical Examination:  General: Healthy appearing female. Affect appropriate. Normal gait. Alert and oriented to surroundings.   Left Upper Extremity:   Incision is well-healed.  No distal sensory deficits.  Firing EPL and FPL though active thumb flexion is weak, only about 20 degrees.   She is non-tender at the fracture site. I suspect there is some  subtle motion here that I can appreciate with stress views.     Radiographs:   Radiographs of the left wrist - 3 views (2019):  Unchanged alignment of her radial metadiaphyseal fracture with healing difficult to assess.    I have independently reviewed the above imaging studies; the results were discussed with the patient.     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 Intraop cultures were positive and she was on abx  until recently. It is difficulty to assess healing on her x-rays but I do believe that I can palpate motion at the fx site.     Plan:     She will switch from the cast to a zipper splint.  She brought her splint from previous and does seem to fit her still.  I would like her to wear it at all times except she may remove it for showering.  She will begin to work with hand therapy on mobilizing her FPL.     Given her nonunion I would like to obtain a bone stimulator for her.     She will be seeing infectious disease soon and will discuss with them whether any change to the antibiotics is recommended    Follow-up with me in 6 weeks.     Dinh Strong MD    Scribe Disclosure:  I, Temo Espinoza, am serving as a scribe to document services personally performed by Dinh Strong MD at this visit, based upon the provider's statements to me. All documentation has been reviewed by the aforementioned provider prior to being entered into the official medical record.    Again, thank you for allowing me to participate in the care of your patient.      Sincerely,    Dinh Strong MD

## 2020-01-02 NOTE — NURSING NOTE
Bone stimulator ordered through [employer], Representative Flora Syed. Spoke with Flora and then faxed notes and orders to her at [phone].  Elyse Webster RN

## 2020-01-07 ENCOUNTER — TELEPHONE (OUTPATIENT)
Dept: INFECTIOUS DISEASES | Facility: CLINIC | Age: 80
End: 2020-01-07

## 2020-01-08 ENCOUNTER — OFFICE VISIT (OUTPATIENT)
Dept: INFECTIOUS DISEASES | Facility: CLINIC | Age: 80
End: 2020-01-08
Attending: INTERNAL MEDICINE
Payer: MEDICARE

## 2020-01-08 VITALS
BODY MASS INDEX: 23.24 KG/M2 | DIASTOLIC BLOOD PRESSURE: 67 MMHG | WEIGHT: 119 LBS | SYSTOLIC BLOOD PRESSURE: 156 MMHG | HEART RATE: 82 BPM | OXYGEN SATURATION: 99 %

## 2020-01-08 DIAGNOSIS — Z87.81 HISTORY OF RADIUS FRACTURE: ICD-10-CM

## 2020-01-08 DIAGNOSIS — T84.7XXD: ICD-10-CM

## 2020-01-08 DIAGNOSIS — Z79.2 ENCOUNTER FOR LONG-TERM (CURRENT) USE OF ANTIBIOTICS: ICD-10-CM

## 2020-01-08 DIAGNOSIS — A49.01 MSSA (METHICILLIN SUSCEPTIBLE STAPHYLOCOCCUS AUREUS) INFECTION: Primary | ICD-10-CM

## 2020-01-08 RX ORDER — DOXYCYCLINE HYCLATE 50 MG/1
100 CAPSULE ORAL 2 TIMES DAILY
Qty: 60 CAPSULE | Refills: 1 | Status: SHIPPED | OUTPATIENT
Start: 2020-01-08 | End: 2020-11-17

## 2020-01-08 NOTE — PROGRESS NOTES
[facility]  Infectious Disease Clinic Note:  New Patient     Patient:  Kimberly Chau, Date of birth 1940, Medical record number [id number]  Date of Visit:  01/08/2020  Consult requested by Dr. Strong for evaluation of MSSA growth from left arm would cultures..         Assessment and Recommendations:   Recommendations:  - Will switch to doxycycline today.  Will plan to continue until she has seen Dr. Strong and myself back in clinic but likely and additional 6 weeks to ensure infection is adequately treated.    Assessment: 78 y/o woman who developed fracture of plate used in ORIF of left distal radius fracture.  At no time in her course has she had inflammatory symptoms (no fever, night sweats, local redness, warmth or drainage) to suggest an infection.  However, it is unclear why the fracture failed to heal so will plan treatment course for osteomyelitis.  Increasing data from OVIVA trial and other supporting use of oral therapy for bone and joint infections.      We had been planning on 6 week course of therapy but since our last visit, she stopped bactrim due to mouth pain after ~3 weeks and has been off antibiotics entirely since then.  No other rash or symptoms on other mucosal sites but will not resume.  Doxycycline would be a good alternative since it is relatively well tolerated.  We discussed importance of taking with full glass of water and remaining upright after dose to prevent esophagitis and avoiding sun exposure.  The latter will not be difficult for her since it is winter.    Follow up scheduled for 5 weeks.    Patient discussed with Dr. Fay.    I spent 45 minutes in direct care with this patient, including >50% of time face-to-face with the patient counseling about bone infections.    Julita Lobato MD, PhD  Adult & Pediatric Infectious Diseases Fellow PGY8, [facility]  Pager: [phone]        Interval History:   Last seen by me on 12/11/19.  She is here  "with  Wilder today.    Since she saw me last, she stopped her bactrim due to mouth pain.  Last dose was around 12/21.  Dry skin at baseline but no new rashes.  Mouth started hurting and noticed several \"bump\" on her tongue.  No involvement of lips.  No ocular involvement or genital mucosa.  No fevers, night sweats.  Saw Dr. Strong on 12/30 and cast removed.   Hasn't started with hand therapy yet.  Was prescribed bone stimulator but has not been contacted about that yet.           History of the Infectious Disease lllness:   Patient presents with her   She broke wrist in April 2019 and underwent ORIF by Dr. Strong.  Wound healed in expected time.  No concerns about redness,drainage, delayed closure.    Recalls an injury involving a sliding door in September or October.  Took about 5 weeks to get back in to see Dr. Strong.  Incision \"looked great.\".  No redness or drainage.  Plates removed 11/19/19.   Cultures grew MSSA. Casted on 11/27.  She was started on bactrim on 12/2/19.  Had one episode of vomiting today but otherwise has tolerated fine.   No fevers, chills, or nightsweats, diarrhea, GI upset, problems, eating, cough, shortness of breath.  Mouth symptoms now completely resolved.  History of left hip micaeal-arthoplasty due to hip fracture that occurred at same time as wrist fracture.        Review of Systems:  Patient's electronically submitted complete ROS reviewed and addressed.      Past Medical History:   Diagnosis Date     Anemia      Arthritis      Cataracts      Central retinal artery occlusion      Cervical spine fracture (H)     After a fall from orthostatic sx     Chronic pain     Back of head     Gastro-oesophageal reflux disease      Head injury      Hypertension      Hyponatremia     Resolved, 2/2 diuretics     Migraines      Osteoporosis      Pneumonia 2018     Rheumatoid arthritis(714.0)        Past Surgical History:   Procedure Laterality Date     COLONOSCOPY       EYE SURGERY " Left 02/2019    Hole in macula     FUSION CERVICAL POSTERIOR THREE+ LEVELS  1/22/2013    Procedure: FUSION CERVICAL POSTERIOR THREE+ LEVELS;  Cervical 1-6 Posterior Instrumentation and Fusion, Cervical 3-4 Laminectomy  .Instrumentation and fusion to Cervical 6 *Latex Allergy*;  Surgeon: Ra Bar MD;  Location: [facility]     GYN SURGERY       HEAD & NECK SURGERY       HYSTERECTOMY       KNEE SURGERY       OPEN REDUCTION INTERNAL FIXATION WRIST Left 4/30/2019    Procedure: Open Reduction Internal Fixation Left Distal Radius Fracture;  Surgeon: Dinh Strong MD;  Location:  OR     OPTICAL TRACKING SYSTEM ENDOSCOPIC SINUS SURGERY Right 4/6/2018    Procedure: OPTICAL TRACKING SYSTEM ENDOSCOPIC SINUS SURGERY;  Stealth Assisted Right Maxillary Antrostomy, Anterior Ethmoidectomy And Frontal Sinusotomy;  Surgeon: Elyse Eisenberg MD;  Location:  OR     OPTICAL TRACKING SYSTEM ENDOSCOPIC SINUS SURGERY Right 8/3/2018    Procedure: OPTICAL TRACKING SYSTEM ENDOSCOPIC SINUS SURGERY;  Stealth Assisted Revision Right Frontal Sinusotomy, Right Stent Placement  **Latex Allergy** ;  Surgeon: Elyse Eisenberg MD;  Location:  OR     ORTHOPEDIC SURGERY       REMOVE HARDWARE WRIST Left 11/19/2019    Procedure: Left Distal Radius Hardware Removal, Flexor Pollicus Longus Repair, Deep Cultures;  Surgeon: Dinh Strong MD;  Location:  OR       Family History   Problem Relation Age of Onset     Arthritis Mother      Respiratory Mother         pulmonary fibrosis     Hypertension Mother      Anemia Mother      Liver Disease Father         from EtOH     Alcoholism Father      Multiple Sclerosis Sister      No Known Problems Maternal Grandmother      Heart Disease Maternal Grandfather      Breast Cancer Sister      Glaucoma Paternal Grandfather      Heart Disease Paternal Grandfather        Social History     Social History Narrative    Ms. Chau is , has two grown children and two grandchildren. She does not  work. She was never a smoker, and drinks a glass of wine with dinner each night.         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      Social History     Tobacco Use     Smoking status: Never Smoker     Smokeless tobacco: Never Used   Substance Use Topics     Alcohol use: Yes     Comment: 2 glasses of wine a day     Drug use: No       Immunization History   Administered Date(s) Administered     DTaP, Unspecified 03/10/2014     Flu, Unspecified 09/27/2010     Influenza (IIV3) PF 11/11/2003, 09/27/2010, 09/27/2010, 01/08/2013     Pneumococcal (PCV 7) 09/25/2011     Pneumococcal 23 valent 06/25/2011     TDAP Vaccine (Boostrix) 03/10/2014     Zoster vaccine, live 04/12/2010       Patient Active Problem List   Diagnosis     Intrinsic atopic dermatitis     Essential hypertension     Rheumatoid arthritis (H)     Retinal artery occlusion     Cervical vertebral fracture (H)     Central retinal artery occlusion of right eye     Bilateral occipital neuralgia     C1-C2 instability     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 factor (H)     Osteoarthritis     Malignant hypertension     Hyponatremia     GERD (gastroesophageal reflux disease)     Eczematous dermatitis     Anxiety state     Anemia     Closed fracture of distal end of left radius with delayed healing, unspecified fracture morphology, subsequent encounter       Current Outpatient Medications   Medication Sig     acetaminophen 650 MG TABS Take 650 mg by mouth every 4 hours as needed.     amLODIPine (NORVASC) 10 MG tablet Take 1 tablet (10 mg) by mouth daily     Diphenhydramine-APAP, sleep, (TYLENOL PM EXTRA STRENGTH PO) Take 2 tablets by mouth At Bedtime      ferrous gluconate (FERGON) 324 (38 Fe) MG tablet Take 324 mg by mouth     metoprolol tartrate (LOPRESSOR) 100 MG tablet Take 2 tablets (200 mg) by mouth 2 times daily     Multiple Vitamin (MULTI-VITAMIN) per tablet Take 1 tablet by mouth every morning       Omeprazole (PRILOSEC PO) Take 20 mg by mouth as needed      sodium chloride (OCEAN NASAL SPRAY) 0.65 % nasal spray Spray 1 spray into both nostrils daily as needed for congestion     oxyCODONE (ROXICODONE) 5 MG tablet Take 1-2 tablets (5-10 mg) by mouth every 6 hours as needed for moderate to severe pain (Patient not taking: Reported on 11/27/2019)     sulfamethoxazole-trimethoprim (BACTRIM DS/SEPTRA DS) 800-160 MG tablet Take 1 tablet by mouth 2 times daily (Patient not taking: Reported on 12/30/2019)     No current facility-administered medications for this visit.        Allergies   Allergen Reactions     Hctz Other (See Comments)     Pt develops symptomatic and significant hyponatremia with HCTZ exposure     Hydrochlorothiazide      Other reaction(s): Hyponatremia  Hyponatremia     Latex      Benzocaine Rash     carba mix,thrium-sunscreen     Resorcinol-Alcohol Rash     carba mix,thrium-sunscreen     Ace Inhibitors Unknown     Dizziness and orthostatic changes and electrolyte problems.     Latex Unknown              Physical Exam:   Vitals were reviewed.  All vitals stable  BP (!) 156/67   Pulse 82   Wt 54 kg (119 lb)   SpO2 99%   BMI 23.24 kg/m      Exam:  GENERAL:  well-developed, well-nourished, alert, oriented, in no acute distress.  HEENT:  Head is normocephalic, atraumatic   EYES:  Eyes have anicteric sclerae.    ENT:  Oropharynx is moist without exudates or ulcers.  NECK:  Supple.  LUNGS:  Clear to auscultation.  CARDIOVASCULAR:  Regular rate and rhythm with no murmurs, gallops or rubs.  SKIN:  No acute rashes.    NEUROLOGIC:  Grossly nonfocal.  EXT: Interval placement of zipper cast.  Surgical scar is well healed (see photo in media tab).  Arthritis with bony joint enlargement of IP joints.         Laboratory Data:     Inflammatory Markers    Recent Labs   Lab Test 12/11/19  1724 10/11/17  0901 06/14/16  1151 06/05/13  1215 01/24/13  1627   SED 29 15  --  10 62*   CRP 7.0 3.2 17.0*  --  196.4*       WBC 6.5  CRP 7  ESR 29    Cr 1.13  K 4.6    Imagin/11/19 CT wrist  1. Fracture involving the mid aspect of the volar radial plate.  2. Unchanged alignment without significant interval healing of distal  ulnar fracture.  3. Left distal radial fracture with persistent areas of lucency with  areas of mild osseous bridging.    19 XR wrist  Interim removal of the plate and screw fixation of distal  radius. Distal radial and ulnar fractures are grossly similar  alignment.

## 2020-01-10 DIAGNOSIS — I10 ESSENTIAL HYPERTENSION: ICD-10-CM

## 2020-01-13 NOTE — TELEPHONE ENCOUNTER
Metoprolol Tartrate Oral Tablet 100 MG    Last Written Prescription Date:  8/28/2019  Last Fill Quantity: 360,   # refills: 0  Last Office Visit : 7/24/2019  Future Office visit:  None    Routing refill request to provider for review/approval because:  Blood pressure out of range     Change dose?   Change Med?   Follow up appointment?  Referring to Provider for review.        01/08/20 (!) 156/67   11/19/19 (!) 155/78   11/13/19 (!) 170/93         Cristal Farooq RN  Central Triage Red Flags/Med Refills

## 2020-01-13 NOTE — PROGRESS NOTES
Infectious Disease Clinic Staff Note: Ms. Chau was seen, examined, and the case was discussed with Dr. Lobato, ID Fellow -- I agree with her interval history and examination, assessment and plan in this outpatient ID Progress note. This note reflects my observations and opinions, and the plan outlined fully reflects my approach. I have reviewed the available history, radiology, laboratory results, and reports with the Fellow.

## 2020-01-16 LAB
MYCOBACTERIUM SPEC CULT: NORMAL
SPECIMEN SOURCE: NORMAL
SPECIMEN SOURCE: NORMAL

## 2020-01-16 RX ORDER — METOPROLOL TARTRATE 100 MG
200 TABLET ORAL 2 TIMES DAILY
Qty: 360 TABLET | Refills: 1 | Status: SHIPPED | OUTPATIENT
Start: 2020-01-16 | End: 2020-07-14

## 2020-02-04 ENCOUNTER — TELEPHONE (OUTPATIENT)
Dept: INFECTIOUS DISEASES | Facility: CLINIC | Age: 80
End: 2020-02-04

## 2020-02-04 NOTE — TELEPHONE ENCOUNTER
Patient contacted and reminded of upcoming appointment.  Patient confirmed they will be attending.  Patient instructed to bring updated medications list to appointment.    Chel Sheehan MA

## 2020-02-05 ENCOUNTER — OFFICE VISIT (OUTPATIENT)
Dept: INFECTIOUS DISEASES | Facility: CLINIC | Age: 80
End: 2020-02-05
Attending: INTERNAL MEDICINE
Payer: MEDICARE

## 2020-02-05 VITALS
SYSTOLIC BLOOD PRESSURE: 173 MMHG | WEIGHT: 115.1 LBS | TEMPERATURE: 98.1 F | HEART RATE: 66 BPM | OXYGEN SATURATION: 96 % | HEIGHT: 60 IN | BODY MASS INDEX: 22.6 KG/M2 | DIASTOLIC BLOOD PRESSURE: 83 MMHG

## 2020-02-05 DIAGNOSIS — A49.01 MSSA (METHICILLIN SUSCEPTIBLE STAPHYLOCOCCUS AUREUS) INFECTION: ICD-10-CM

## 2020-02-05 DIAGNOSIS — M00.9 WRIST JOINT INFECTION (H): Primary | ICD-10-CM

## 2020-02-05 DIAGNOSIS — Z78.9 ANTIBIOTIC DRUG INTOLERANCE: ICD-10-CM

## 2020-02-05 DIAGNOSIS — S52.502G CLOSED FRACTURE OF DISTAL END OF LEFT RADIUS WITH DELAYED HEALING, UNSPECIFIED FRACTURE MORPHOLOGY, SUBSEQUENT ENCOUNTER: ICD-10-CM

## 2020-02-05 PROCEDURE — G0463 HOSPITAL OUTPT CLINIC VISIT: HCPCS | Mod: ZF

## 2020-02-05 RX ORDER — SULFAMETHOXAZOLE/TRIMETHOPRIM 800-160 MG
1 TABLET ORAL 2 TIMES DAILY
Qty: 60 TABLET | Refills: 0 | Status: SHIPPED | OUTPATIENT
Start: 2020-02-05 | End: 2020-11-17

## 2020-02-05 RX ORDER — DOXYCYCLINE 100 MG/1
100 TABLET ORAL 2 TIMES DAILY
Qty: 60 TABLET | Refills: 1 | Status: SHIPPED | OUTPATIENT
Start: 2020-02-05 | End: 2020-11-17

## 2020-02-05 ASSESSMENT — PAIN SCALES - GENERAL: PAINLEVEL: NO PAIN (0)

## 2020-02-05 ASSESSMENT — MIFFLIN-ST. JEOR: SCORE: 908.59

## 2020-02-05 NOTE — LETTER
2/5/2020      RE: Kimberly Chau  [address] [place] [address]       [facility]  Infectious Disease Clinic Note:  Follow up Patient     Patient:  Kimberly Chau, Date of birth 1940, Medical record number [id number]  Date of Visit:  02/05/2020  Consult requested by Dr. Strong for evaluation of MSSA growth from left arm would cultures.         Assessment and Recommendations:   Recommendations:  - Will plan to alternative doxycycline and bactrim at one week intervals to minimize risk of side effects with sustained treatment with goal of completing total of 6 weeks of continuous therapy.  - Discussed trial of OTC probiotics to help with antibiotic associated diarrhea  - Follow up with Dr. Strong as planned on 2/10 to discuss next steps  - Was able to call lab to release levofloxacin susceptibility from 11/19/19 isolate.  It is sensitive so this could be an option if current alternating regimen is not tolerated.    Assessment: 80 y/o woman who developed fracture of plate used in ORIF of left distal radius fracture.  At no time in her course has she had inflammatory symptoms (no fever, night sweats, local redness, warmth or drainage) to suggest an infection.  However, it is unclear why the fracture failed to heal so will plan treatment course for osteomyelitis.  Increasing data from OVIVA trial and other supporting use of oral therapy for bone and joint infections.      We had been planning on 6 week course of therapy but at our 1/8/20 visit, she had stopped bactrim due to mouth pain after ~3 weeks and was off antibiotics entirely since 12/21/19.  We tried doxycycline next but that was stopped around 1/25/20 due to diarrhea.   She has essentially no lab abnormalities or physician finding to monitor for ongoing infection but given the failure of her prior ORIF, I would like to complete a continuous 6 week course to ensure adequate treatment of residual  "osteomyelitis.    Follow up scheduled for 6 weeks.    Patient discussed with Dr. Fay.    I spent 40 minutes in direct care with this patient, including >50% of time face-to-face with the patient counseling about bone infections.    Julita Lobato MD, PhD  Adult & Pediatric Infectious Diseases Fellow PGY8, [facility]  Pager: [phone]        Interval History:   Last seen by me on 1/8/20.  She is here with  Wilder today.    Since she saw me last, she stopped her doxycycline due to diarrhea.  Was having about 5 episodes per day for 4 days leading up to stopping.  Last dose was around ~1/25.   No fevers, night sweats.  Arm feels weak but about the same.  Planning to see Dr. Strong on Monday.           History of the Infectious Disease lllness:   Patient presents with her   She broke wrist in April 2019 and underwent ORIF by Dr. Strong.  Wound healed in expected time.  No concerns about redness,drainage, delayed closure.    Recalls an injury involving a sliding door in September or October.  Took about 5 weeks to get back in to see Dr. Strong.  Incision \"looked great.\".  No redness or drainage.  Plates removed 11/19/19.   Cultures grew MSSA. Casted on 11/27.  She was started on bactrim on 12/2/19.  Had one episode of vomiting today but otherwise has tolerated fine.   No fevers, chills, or nightsweats, diarrhea, GI upset, problems, eating, cough, shortness of breath.  Mouth symptoms now completely resolved.  History of left hip micaela-arthoplasty due to hip fracture that occurred at same time as wrist fracture.      Anbiotics  Bactrim 12/2-12/21  Doxycline 1/8-1/25      Review of Systems:  Patient's electronically submitted complete ROS reviewed and addressed.      Past Medical History:   Diagnosis Date     Anemia      Arthritis      Cataracts      Central retinal artery occlusion      Cervical spine fracture (H)     After a fall from orthostatic sx     Chronic pain     Back of head     " Gastro-oesophageal reflux disease      Head injury      Hypertension      Hyponatremia     Resolved, 2/2 diuretics     Migraines      Osteoporosis      Pneumonia 2018     Rheumatoid arthritis(714.0)        Past Surgical History:   Procedure Laterality Date     COLONOSCOPY       EYE SURGERY Left 02/2019    Hole in macula     FUSION CERVICAL POSTERIOR THREE+ LEVELS  1/22/2013    Procedure: FUSION CERVICAL POSTERIOR THREE+ LEVELS;  Cervical 1-6 Posterior Instrumentation and Fusion, Cervical 3-4 Laminectomy  .Instrumentation and fusion to Cervical 6 *Latex Allergy*;  Surgeon: Ra Bar MD;  Location: [facility] OR     GYN SURGERY       HEAD & NECK SURGERY       HYSTERECTOMY       KNEE SURGERY       OPEN REDUCTION INTERNAL FIXATION WRIST Left 4/30/2019    Procedure: Open Reduction Internal Fixation Left Distal Radius Fracture;  Surgeon: Dinh Strong MD;  Location:  OR     OPTICAL TRACKING SYSTEM ENDOSCOPIC SINUS SURGERY Right 4/6/2018    Procedure: OPTICAL TRACKING SYSTEM ENDOSCOPIC SINUS SURGERY;  Stealth Assisted Right Maxillary Antrostomy, Anterior Ethmoidectomy And Frontal Sinusotomy;  Surgeon: Elyse Eisenberg MD;  Location:  OR     OPTICAL TRACKING SYSTEM ENDOSCOPIC SINUS SURGERY Right 8/3/2018    Procedure: OPTICAL TRACKING SYSTEM ENDOSCOPIC SINUS SURGERY;  Stealth Assisted Revision Right Frontal Sinusotomy, Right Stent Placement  **Latex Allergy** ;  Surgeon: Elyse Eisenberg MD;  Location:  OR     ORTHOPEDIC SURGERY       REMOVE HARDWARE WRIST Left 11/19/2019    Procedure: Left Distal Radius Hardware Removal, Flexor Pollicus Longus Repair, Deep Cultures;  Surgeon: Dinh Strong MD;  Location:  OR       Family History   Problem Relation Age of Onset     Arthritis Mother      Respiratory Mother         pulmonary fibrosis     Hypertension Mother      Anemia Mother      Liver Disease Father         from EtOH     Alcoholism Father      Multiple Sclerosis Sister      No Known Problems  Maternal Grandmother      Heart Disease Maternal Grandfather      Breast Cancer Sister      Glaucoma Paternal Grandfather      Heart Disease Paternal Grandfather        Social History     Social History Narrative    Ms. Chau is , has two grown children and two grandchildren. She does not work. She was never a smoker, and drinks a glass of wine with dinner each night.         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      Social History     Tobacco Use     Smoking status: Never Smoker     Smokeless tobacco: Never Used   Substance Use Topics     Alcohol use: Yes     Comment: 2 glasses of wine a day     Drug use: No       Immunization History   Administered Date(s) Administered     DTaP, Unspecified 03/10/2014     Flu, Unspecified 09/27/2010     Influenza (IIV3) PF 11/11/2003, 09/27/2010, 09/27/2010, 01/08/2013     Pneumococcal (PCV 7) 09/25/2011     Pneumococcal 23 valent 06/25/2011     TDAP Vaccine (Boostrix) 03/10/2014     Zoster vaccine, live 04/12/2010       Patient Active Problem List   Diagnosis     Intrinsic atopic dermatitis     Essential hypertension     Rheumatoid arthritis (H)     Retinal artery occlusion     Cervical vertebral fracture (H)     Central retinal artery occlusion of right eye     Bilateral occipital neuralgia     C1-C2 instability     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 factor (H)     Osteoarthritis     Malignant hypertension     Hyponatremia     GERD (gastroesophageal reflux disease)     Eczematous dermatitis     Anxiety state     Anemia     Closed fracture of distal end of left radius with delayed healing, unspecified fracture morphology, subsequent encounter       Current Outpatient Medications   Medication Sig     acetaminophen 650 MG TABS Take 650 mg by mouth every 4 hours as needed.     amLODIPine (NORVASC) 10 MG tablet Take 1 tablet (10 mg) by mouth daily     Diphenhydramine-APAP, sleep, (TYLENOL PM EXTRA STRENGTH PO) Take  2 tablets by mouth At Bedtime      doxycycline hyclate (VIBRAMYCIN) 50 MG capsule Take 2 capsules (100 mg) by mouth 2 times daily     ferrous gluconate (FERGON) 324 (38 Fe) MG tablet Take 324 mg by mouth     metoprolol tartrate (LOPRESSOR) 100 MG tablet Take 2 tablets (200 mg) by mouth 2 times daily     Multiple Vitamin (MULTI-VITAMIN) per tablet Take 1 tablet by mouth every morning      Omeprazole (PRILOSEC PO) Take 20 mg by mouth as needed      oxyCODONE (ROXICODONE) 5 MG tablet Take 1-2 tablets (5-10 mg) by mouth every 6 hours as needed for moderate to severe pain     sodium chloride (OCEAN NASAL SPRAY) 0.65 % nasal spray Spray 1 spray into both nostrils daily as needed for congestion     sulfamethoxazole-trimethoprim (BACTRIM DS/SEPTRA DS) 800-160 MG tablet Take 1 tablet by mouth 2 times daily     No current facility-administered medications for this visit.        Allergies   Allergen Reactions     Hctz Other (See Comments)     Pt develops symptomatic and significant hyponatremia with HCTZ exposure     Hydrochlorothiazide      Other reaction(s): Hyponatremia  Hyponatremia     Latex      Benzocaine Rash     carba mix,thrium-sunscreen     Resorcinol-Alcohol Rash     carba mix,thrium-sunscreen     Ace Inhibitors Unknown     Dizziness and orthostatic changes and electrolyte problems.     Latex Unknown              Physical Exam:   Vitals were reviewed.  All vitals stable  BP (!) 173/83   Pulse 66   Temp 98.1  F (36.7  C) (Oral)   Ht 1.524 m (5')   Wt 52.2 kg (115 lb 1.6 oz)   SpO2 96%   BMI 22.48 kg/m       Exam:  GENERAL:  well-developed, well-nourished, alert, oriented, in no acute distress.  HEENT:  Head is normocephalic, atraumatic   EYES:  Eyes have anicteric sclerae.    ENT:  Oropharynx is moist without exudates or ulcers.  NECK:  Supple.  LUNGS:  Clear to auscultation.  CARDIOVASCULAR:  Regular rate and rhythm with no murmurs, gallops or rubs.  SKIN:  No acute rashes.    NEUROLOGIC:  Grossly  nonfocal.  EXT: Interval placement of zipper cast.  Surgical scar is well healed (see photo in media tab).  Arthritis with bony joint enlargement of IP joints.         Laboratory Data:     Inflammatory Markers    Recent Labs   Lab Test 19  1724 10/11/17  0901 16  1151 13  1215 13  1627   SED 29 15  --  10 62*   CRP 7.0 3.2 17.0*  --  196.4*      WBC 6.5  CRP 7  ESR 29    Cr 1.13  K 4.6    Imagin/11/19 CT wrist  1. Fracture involving the mid aspect of the volar radial plate.  2. Unchanged alignment without significant interval healing of distal  ulnar fracture.  3. Left distal radial fracture with persistent areas of lucency with  areas of mild osseous bridging.    19 XR wrist  Interim removal of the plate and screw fixation of distal  radius. Distal radial and ulnar fractures are grossly similar  alignment.      Infectious Disease Clinic Staff Note: Ms. Chau was seen, examined, and the case was discussed with Dr. Lobato, ID Fellow -- I agree with her interval history and examination, assessment and plan in this outpatient ID Progress note. This note reflects my observations and opinions, and the plan outlined fully reflects my approach. I have reviewed the available history, radiology, laboratory results, and reports with the Fellow.    Julita Lobato MD

## 2020-02-05 NOTE — PROGRESS NOTES
[facility]  Infectious Disease Clinic Note:  Follow up Patient     Patient:  Kimberly Chau, Date of birth 1940, Medical record number [id number]  Date of Visit:  02/05/2020  Consult requested by Dr. Strong for evaluation of MSSA growth from left arm would cultures.         Assessment and Recommendations:   Recommendations:  - Will plan to alternative doxycycline and bactrim at one week intervals to minimize risk of side effects with sustained treatment with goal of completing total of 6 weeks of continuous therapy.  - Discussed trial of OTC probiotics to help with antibiotic associated diarrhea  - Follow up with Dr. Strong as planned on 2/10 to discuss next steps  - Was able to call lab to release levofloxacin susceptibility from 11/19/19 isolate.  It is sensitive so this could be an option if current alternating regimen is not tolerated.    Assessment: 78 y/o woman who developed fracture of plate used in ORIF of left distal radius fracture.  At no time in her course has she had inflammatory symptoms (no fever, night sweats, local redness, warmth or drainage) to suggest an infection.  However, it is unclear why the fracture failed to heal so will plan treatment course for osteomyelitis.  Increasing data from OVIVA trial and other supporting use of oral therapy for bone and joint infections.      We had been planning on 6 week course of therapy but at our 1/8/20 visit, she had stopped bactrim due to mouth pain after ~3 weeks and was off antibiotics entirely since 12/21/19.  We tried doxycycline next but that was stopped around 1/25/20 due to diarrhea.   She has essentially no lab abnormalities or physician finding to monitor for ongoing infection but given the failure of her prior ORIF, I would like to complete a continuous 6 week course to ensure adequate treatment of residual osteomyelitis.    Follow up scheduled for 6 weeks.    Patient discussed with Dr. Fay.    I spent 40  "minutes in direct care with this patient, including >50% of time face-to-face with the patient counseling about bone infections.    Julita Lobato MD, PhD  Adult & Pediatric Infectious Diseases Fellow PGY8, [facility]  Pager: [phone]        Interval History:   Last seen by me on 1/8/20.  She is here with  Wilder today.    Since she saw me last, she stopped her doxycycline due to diarrhea.  Was having about 5 episodes per day for 4 days leading up to stopping.  Last dose was around ~1/25.   No fevers, night sweats.  Arm feels weak but about the same.  Planning to see Dr. Strong on Monday.           History of the Infectious Disease lllness:   Patient presents with her   She broke wrist in April 2019 and underwent ORIF by Dr. Strong.  Wound healed in expected time.  No concerns about redness,drainage, delayed closure.    Recalls an injury involving a sliding door in September or October.  Took about 5 weeks to get back in to see Dr. Strong.  Incision \"looked great.\".  No redness or drainage.  Plates removed 11/19/19.   Cultures grew MSSA. Casted on 11/27.  She was started on bactrim on 12/2/19.  Had one episode of vomiting today but otherwise has tolerated fine.   No fevers, chills, or nightsweats, diarrhea, GI upset, problems, eating, cough, shortness of breath.  Mouth symptoms now completely resolved.  History of left hip micaela-arthoplasty due to hip fracture that occurred at same time as wrist fracture.      Anbiotics  Bactrim 12/2-12/21  Doxycline 1/8-1/25      Review of Systems:  Patient's electronically submitted complete ROS reviewed and addressed.      Past Medical History:   Diagnosis Date     Anemia      Arthritis      Cataracts      Central retinal artery occlusion      Cervical spine fracture (H)     After a fall from orthostatic sx     Chronic pain     Back of head     Gastro-oesophageal reflux disease      Head injury      Hypertension      Hyponatremia     Resolved, 2/2 diuretics "     Migraines      Osteoporosis      Pneumonia 2018     Rheumatoid arthritis(714.0)        Past Surgical History:   Procedure Laterality Date     COLONOSCOPY       EYE SURGERY Left 02/2019    Hole in macula     FUSION CERVICAL POSTERIOR THREE+ LEVELS  1/22/2013    Procedure: FUSION CERVICAL POSTERIOR THREE+ LEVELS;  Cervical 1-6 Posterior Instrumentation and Fusion, Cervical 3-4 Laminectomy  .Instrumentation and fusion to Cervical 6 *Latex Allergy*;  Surgeon: Ra Bar MD;  Location: [facility]     GYN SURGERY       HEAD & NECK SURGERY       HYSTERECTOMY       KNEE SURGERY       OPEN REDUCTION INTERNAL FIXATION WRIST Left 4/30/2019    Procedure: Open Reduction Internal Fixation Left Distal Radius Fracture;  Surgeon: Dinh Strong MD;  Location:  OR     OPTICAL TRACKING SYSTEM ENDOSCOPIC SINUS SURGERY Right 4/6/2018    Procedure: OPTICAL TRACKING SYSTEM ENDOSCOPIC SINUS SURGERY;  Stealth Assisted Right Maxillary Antrostomy, Anterior Ethmoidectomy And Frontal Sinusotomy;  Surgeon: Elyse Eisenberg MD;  Location:  OR     OPTICAL TRACKING SYSTEM ENDOSCOPIC SINUS SURGERY Right 8/3/2018    Procedure: OPTICAL TRACKING SYSTEM ENDOSCOPIC SINUS SURGERY;  Stealth Assisted Revision Right Frontal Sinusotomy, Right Stent Placement  **Latex Allergy** ;  Surgeon: Elyse Eisenberg MD;  Location:  OR     ORTHOPEDIC SURGERY       REMOVE HARDWARE WRIST Left 11/19/2019    Procedure: Left Distal Radius Hardware Removal, Flexor Pollicus Longus Repair, Deep Cultures;  Surgeon: Dinh Strong MD;  Location:  OR       Family History   Problem Relation Age of Onset     Arthritis Mother      Respiratory Mother         pulmonary fibrosis     Hypertension Mother      Anemia Mother      Liver Disease Father         from EtOH     Alcoholism Father      Multiple Sclerosis Sister      No Known Problems Maternal Grandmother      Heart Disease Maternal Grandfather      Breast Cancer Sister      Glaucoma Paternal Grandfather       Heart Disease Paternal Grandfather        Social History     Social History Narrative    Ms. Chau is , has two grown children and two grandchildren. She does not work. She was never a smoker, and drinks a glass of wine with dinner each night.         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      Social History     Tobacco Use     Smoking status: Never Smoker     Smokeless tobacco: Never Used   Substance Use Topics     Alcohol use: Yes     Comment: 2 glasses of wine a day     Drug use: No       Immunization History   Administered Date(s) Administered     DTaP, Unspecified 03/10/2014     Flu, Unspecified 09/27/2010     Influenza (IIV3) PF 11/11/2003, 09/27/2010, 09/27/2010, 01/08/2013     Pneumococcal (PCV 7) 09/25/2011     Pneumococcal 23 valent 06/25/2011     TDAP Vaccine (Boostrix) 03/10/2014     Zoster vaccine, live 04/12/2010       Patient Active Problem List   Diagnosis     Intrinsic atopic dermatitis     Essential hypertension     Rheumatoid arthritis (H)     Retinal artery occlusion     Cervical vertebral fracture (H)     Central retinal artery occlusion of right eye     Bilateral occipital neuralgia     C1-C2 instability     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 factor (H)     Osteoarthritis     Malignant hypertension     Hyponatremia     GERD (gastroesophageal reflux disease)     Eczematous dermatitis     Anxiety state     Anemia     Closed fracture of distal end of left radius with delayed healing, unspecified fracture morphology, subsequent encounter       Current Outpatient Medications   Medication Sig     acetaminophen 650 MG TABS Take 650 mg by mouth every 4 hours as needed.     amLODIPine (NORVASC) 10 MG tablet Take 1 tablet (10 mg) by mouth daily     Diphenhydramine-APAP, sleep, (TYLENOL PM EXTRA STRENGTH PO) Take 2 tablets by mouth At Bedtime      doxycycline hyclate (VIBRAMYCIN) 50 MG capsule Take 2 capsules (100 mg) by mouth 2  times daily     ferrous gluconate (FERGON) 324 (38 Fe) MG tablet Take 324 mg by mouth     metoprolol tartrate (LOPRESSOR) 100 MG tablet Take 2 tablets (200 mg) by mouth 2 times daily     Multiple Vitamin (MULTI-VITAMIN) per tablet Take 1 tablet by mouth every morning      Omeprazole (PRILOSEC PO) Take 20 mg by mouth as needed      oxyCODONE (ROXICODONE) 5 MG tablet Take 1-2 tablets (5-10 mg) by mouth every 6 hours as needed for moderate to severe pain     sodium chloride (OCEAN NASAL SPRAY) 0.65 % nasal spray Spray 1 spray into both nostrils daily as needed for congestion     sulfamethoxazole-trimethoprim (BACTRIM DS/SEPTRA DS) 800-160 MG tablet Take 1 tablet by mouth 2 times daily     No current facility-administered medications for this visit.        Allergies   Allergen Reactions     Hctz Other (See Comments)     Pt develops symptomatic and significant hyponatremia with HCTZ exposure     Hydrochlorothiazide      Other reaction(s): Hyponatremia  Hyponatremia     Latex      Benzocaine Rash     carba mix,thrium-sunscreen     Resorcinol-Alcohol Rash     carba mix,thrium-sunscreen     Ace Inhibitors Unknown     Dizziness and orthostatic changes and electrolyte problems.     Latex Unknown              Physical Exam:   Vitals were reviewed.  All vitals stable  BP (!) 173/83   Pulse 66   Temp 98.1  F (36.7  C) (Oral)   Ht 1.524 m (5')   Wt 52.2 kg (115 lb 1.6 oz)   SpO2 96%   BMI 22.48 kg/m      Exam:  GENERAL:  well-developed, well-nourished, alert, oriented, in no acute distress.  HEENT:  Head is normocephalic, atraumatic   EYES:  Eyes have anicteric sclerae.    ENT:  Oropharynx is moist without exudates or ulcers.  NECK:  Supple.  LUNGS:  Clear to auscultation.  CARDIOVASCULAR:  Regular rate and rhythm with no murmurs, gallops or rubs.  SKIN:  No acute rashes.    NEUROLOGIC:  Grossly nonfocal.  EXT: Interval placement of zipper cast.  Surgical scar is well healed (see photo in media tab).  Arthritis with bony  joint enlargement of IP joints.         Laboratory Data:     Inflammatory Markers    Recent Labs   Lab Test 19  1724 10/11/17  0901 16  1151 13  1215 13  1627   SED 29 15  --  10 62*   CRP 7.0 3.2 17.0*  --  196.4*      WBC 6.5  CRP 7  ESR 29    Cr 1.13  K 4.6    Imagin/11/19 CT wrist  1. Fracture involving the mid aspect of the volar radial plate.  2. Unchanged alignment without significant interval healing of distal  ulnar fracture.  3. Left distal radial fracture with persistent areas of lucency with  areas of mild osseous bridging.    19 XR wrist  Interim removal of the plate and screw fixation of distal  radius. Distal radial and ulnar fractures are grossly similar  alignment.

## 2020-02-05 NOTE — PATIENT INSTRUCTIONS
"Recommend trying to restart doxycycline.  Take twice daily for 1 week, then switch to bactrim twice daily for 1 week.  Continue alternating 1 week at a time until you complete 6 weeks.  Try some over the counter \"probiotic\" along with antibiotics to help with diarrhea. These contain different \"good\" bacteria such as lactobacillus  Call our clinic if you are again having severe diarrhea.    [email or web address]    "

## 2020-02-06 ENCOUNTER — TELEPHONE (OUTPATIENT)
Dept: INFECTIOUS DISEASES | Facility: CLINIC | Age: 80
End: 2020-02-06

## 2020-02-06 DIAGNOSIS — S52.502G CLOSED FRACTURE OF DISTAL END OF LEFT RADIUS WITH DELAYED HEALING, UNSPECIFIED FRACTURE MORPHOLOGY, SUBSEQUENT ENCOUNTER: Primary | ICD-10-CM

## 2020-02-06 LAB
BACTERIA SPEC CULT: ABNORMAL
BACTERIA SPEC CULT: ABNORMAL
SPECIMEN SOURCE: ABNORMAL

## 2020-02-06 NOTE — TELEPHONE ENCOUNTER
[facility] Call Center    Phone Message    May a detailed message be left on voicemail: yes     Reason for Call: Medication Question or concern regarding medication   Prescription Clarification  Name of Medication: doxycycline monohydrate (ADOXA) 100 MG tablet  Prescribing Provider: Julita Lobato   Pharmacy: [id number] - [place], [place] - [address]   What on the order needs clarification? [employer] States Pt is wanting to have the Doxycycline Hyclate 100 MG Capsule due to it is cheaper          Action Taken: Message routed to:  [facility] I.d    Travel Screening: Not Applicable

## 2020-02-10 ENCOUNTER — OFFICE VISIT (OUTPATIENT)
Dept: ORTHOPEDICS | Facility: CLINIC | Age: 80
End: 2020-02-10
Payer: MEDICARE

## 2020-02-10 ENCOUNTER — ANCILLARY PROCEDURE (OUTPATIENT)
Dept: GENERAL RADIOLOGY | Facility: CLINIC | Age: 80
End: 2020-02-10
Attending: ORTHOPAEDIC SURGERY
Payer: MEDICARE

## 2020-02-10 DIAGNOSIS — S52.502G CLOSED FRACTURE OF DISTAL END OF LEFT RADIUS WITH DELAYED HEALING, UNSPECIFIED FRACTURE MORPHOLOGY, SUBSEQUENT ENCOUNTER: ICD-10-CM

## 2020-02-10 DIAGNOSIS — S52.502G CLOSED FRACTURE OF DISTAL END OF LEFT RADIUS WITH DELAYED HEALING, UNSPECIFIED FRACTURE MORPHOLOGY, SUBSEQUENT ENCOUNTER: Primary | ICD-10-CM

## 2020-02-10 NOTE — LETTER
2/10/2020       RE: Kimberly Chau  [address]  [place] [address]     Dear Colleague,    Thank you for referring your patient, Kimberly Chau, to the [facility]TH[facility] at [facility]. Please see a copy of my visit note below.    [facility]  Orthopedics  Dinh Strong MD  02/10/2020     Name: Kimberly Chau  MRN: [id number]  Age: 79 year old  : 1940  Referring provider: Referred Self     Chief Complaint: Postoperative evaluation     Date of Surgery: 19     Procedure: Repair of left flexor pollicis longus tendon, removal of hardware, debridement of bone, and deep cultures    History of Present Illness:   Kimberly Chau is a 79 year old female 3 months status-post the above procedure who presents for postoperative evaluation. She was last seen here on 2019 and switched to a zipper splint. She was also prescribed a bone stimulator.  She has been using a bone stimulator.  She feels that the left hand is weak.  She has difficulty for example pushing herself up from a chair.  She is comfortable wearing the splint but not out of it.  She had to have her course of antibiotics extended because she really was not taking it regularly.  She now has 5 more weeks of antibiotics.  She is taking Bactrim and doxycycline.        Physical Examination:  General: Healthy appearing female. Affect appropriate. Normal gait. Alert and oriented to surroundings.   Left Upper Extremity:   Incision is well-healed.  No distal sensory deficits.  Firing EPL and FPL though active thumb flexion is weak    She is tender at the fracture site.  There is palpable motion here.    Radiographs:   Radiographs of the Left Wrist - 3 views (02/10/2020):  Radiographs of the left distal radius and ulna show mild volar angulation of the distal radius as well as shortening with an nonunited ulnar neck fracture    I have independently reviewed the above imaging studies.     Assessment:   79  year old female with a distal radius and ulnar fracture status post ORIF with subsequent plate breakage.  Underwent hardware removal and intraoperative cultures were positive.  She is completing her antibiotic course currently and is 5 more weeks.  Time was extended because she was not taking it regularly.  She is been using a bone stimulator.  However she does still have motion at the fracture site.    Plan:   I discussed with her that I am concerned that this is not progressing in bone healing and I think revision ORIF will likely be needed to get this to heal.  It is an alternative to treat this without more surgery but I think she will have continuation of her nonunion and instability which does seem to be symptomatic for her.  Kimberly says that she would really prefer to have this feel more stable and would like to go ahead with surgery.  However we should wait first for her to finish her antibiotic course.  I will see her back in 6 weeks at which time her antibiotic course should be done and we will reevaluate and potentially plan for revision ORIF.  In the meantime she will continue wear her splint.    Dinh Strong MD        Scribe Disclosure: I, Andrew Jolly, nina scribe, prepared the chart for today's encounter.

## 2020-02-10 NOTE — PROGRESS NOTES
[facility]  Orthopedics  Dinh Strong MD  02/10/2020     Name: Kimberly Chau  MRN: [id number]  Age: 79 year old  : 1940  Referring provider: Referred Self     Chief Complaint: Postoperative evaluation     Date of Surgery: 19     Procedure: Repair of left flexor pollicis longus tendon, removal of hardware, debridement of bone, and deep cultures    History of Present Illness:   Kimberly Chau is a 79 year old female 3 months status-post the above procedure who presents for postoperative evaluation. She was last seen here on 2019 and switched to a zipper splint. She was also prescribed a bone stimulator.  She has been using a bone stimulator.  She feels that the left hand is weak.  She has difficulty for example pushing herself up from a chair.  She is comfortable wearing the splint but not out of it.  She had to have her course of antibiotics extended because she really was not taking it regularly.  She now has 5 more weeks of antibiotics.  She is taking Bactrim and doxycycline.        Physical Examination:  General: Healthy appearing female. Affect appropriate. Normal gait. Alert and oriented to surroundings.   Left Upper Extremity:   Incision is well-healed.  No distal sensory deficits.  Firing EPL and FPL though active thumb flexion is weak   She is tender at the fracture site.  There is palpable motion here.    Radiographs:   Radiographs of the Left Wrist - 3 views (02/10/2020):  Radiographs of the left distal radius and ulna show mild volar angulation of the distal radius as well as shortening with an nonunited ulnar neck fracture    I have independently reviewed the above imaging studies.     Assessment:   79 year old female with a distal radius and ulnar fracture status post ORIF with subsequent plate breakage.  Underwent hardware removal and intraoperative cultures were positive.  She is completing her antibiotic course currently and is 5 more weeks.  Time was extended because she was not  taking it regularly.  She is been using a bone stimulator.  However she does still have motion at the fracture site.    Plan:   I discussed with her that I am concerned that this is not progressing in bone healing and I think revision ORIF will likely be needed to get this to heal.  It is an alternative to treat this without more surgery but I think she will have continuation of her nonunion and instability which does seem to be symptomatic for her.  Kimberly says that she would really prefer to have this feel more stable and would like to go ahead with surgery.  However we should wait first for her to finish her antibiotic course.  I will see her back in 6 weeks at which time her antibiotic course should be done and we will reevaluate and potentially plan for revision ORIF.  In the meantime she will continue wear her splint.    Dinh Strong MD        Scribe Disclosure: I, Andrew Jolly, a scribe, prepared the chart for today's encounter.

## 2020-02-10 NOTE — NURSING NOTE
Reason For Visit:   Chief Complaint   Patient presents with     RECHECK     DOS 11/19/19 Open reduction internal fixation left distal radius fx     Primary MD: Guillermo Castellano    Age: 79 year old    ?  No    There were no vitals taken for this visit.    Pain Assessment  Patient Currently in Pain: Denies  Primary Pain Location: Wrist    QuickDASH Assessment  No flowsheet data found.     Current Outpatient Medications   Medication Sig Dispense Refill     acetaminophen 650 MG TABS Take 650 mg by mouth every 4 hours as needed. 100 tablet 0     amLODIPine (NORVASC) 10 MG tablet Take 1 tablet (10 mg) by mouth daily 90 tablet 3     Diphenhydramine-APAP, sleep, (TYLENOL PM EXTRA STRENGTH PO) Take 2 tablets by mouth At Bedtime        doxycycline hyclate (VIBRAMYCIN) 50 MG capsule Take 2 capsules (100 mg) by mouth 2 times daily 60 capsule 1     doxycycline monohydrate (ADOXA) 100 MG tablet Take 1 tablet (100 mg) by mouth 2 times daily 60 tablet 1     ferrous gluconate (FERGON) 324 (38 Fe) MG tablet Take 324 mg by mouth       metoprolol tartrate (LOPRESSOR) 100 MG tablet Take 2 tablets (200 mg) by mouth 2 times daily 360 tablet 1     Multiple Vitamin (MULTI-VITAMIN) per tablet Take 1 tablet by mouth every morning        Omeprazole (PRILOSEC PO) Take 20 mg by mouth as needed        sodium chloride (OCEAN NASAL SPRAY) 0.65 % nasal spray Spray 1 spray into both nostrils daily as needed for congestion 1 Bottle 3     sulfamethoxazole-trimethoprim (BACTRIM DS/SEPTRA DS) 800-160 MG tablet Take 1 tablet by mouth 2 times daily 60 tablet 0     sulfamethoxazole-trimethoprim (BACTRIM DS/SEPTRA DS) 800-160 MG tablet Take 1 tablet by mouth 2 times daily 60 tablet 0     oxyCODONE (ROXICODONE) 5 MG tablet Take 1-2 tablets (5-10 mg) by mouth every 6 hours as needed for moderate to severe pain (Patient not taking: Reported on 2/10/2020) 20 tablet 0     Allergies   Allergen Reactions     Hctz Other (See Comments)     Pt develops  symptomatic and significant hyponatremia with HCTZ exposure     Hydrochlorothiazide      Other reaction(s): Hyponatremia  Hyponatremia     Latex      Benzocaine Rash     carba mix,thrium-sunscreen     Resorcinol-Alcohol Rash     carba mix,thrium-sunscreen     Ace Inhibitors Unknown     Dizziness and orthostatic changes and electrolyte problems.     Latex Unknown     Fallon Amador ATC

## 2020-02-11 NOTE — TELEPHONE ENCOUNTER
[facility] Call Center    Phone Message    May a detailed message be left on voicemail: yes     Reason for Call: Medication Question or concern regarding medication   Prescription Clarification  Name of Medication: doxycycline monohydrate (ADOXA) 100 MG tablet  Prescribing Provider: Julita Lobato              Pharmacy: [id number] - [facility] [address]              What on the order needs clarification? Pharmacy calling back asking if Pt would be able to change medication to: Doxycycline Hyclate 100 MG Capsule because it is cheaper. Please fax over new Rx if change approved, or call to discuss.              Action Taken: Message routed to:  [facility] ID     Travel Screening: Not Applicable

## 2020-03-07 NOTE — TELEPHONE ENCOUNTER
Called [facility] pharmacy to let them know OK to swab Antibiotic to Doxycycline Hyclate because it is cheaper.  Sarai Reyes RN

## 2020-04-08 ENCOUNTER — VIRTUAL VISIT (OUTPATIENT)
Dept: INFECTIOUS DISEASES | Facility: CLINIC | Age: 80
End: 2020-04-08
Attending: INTERNAL MEDICINE
Payer: MEDICARE

## 2020-04-08 DIAGNOSIS — M86.9 OSTEOMYELITIS OF LEFT LEG (H): ICD-10-CM

## 2020-04-08 DIAGNOSIS — S82.92XK: ICD-10-CM

## 2020-04-08 DIAGNOSIS — S52.502S CLOSED FRACTURE OF DISTAL END OF LEFT RADIUS, UNSPECIFIED FRACTURE MORPHOLOGY, SEQUELA: Primary | ICD-10-CM

## 2020-04-08 NOTE — PROGRESS NOTES
"Kimberly Chau is a 79 year old female who is being evaluated via a billable telephone visit.      The patient has been notified of following:     \"This telephone visit will be conducted via a call between you and your physician/provider. We have found that certain health care needs can be provided without the need for a physical exam.  This service lets us provide the care you need with a short phone conversation.  If a prescription is necessary we can send it directly to your pharmacy.  If lab work is needed we can place an order for that and you can then stop by our lab to have the test done at a later time.    Telephone visits are billed at different rates depending on your insurance coverage. During this emergency period, for some insurers they may be billed the same as an in-person visit.  Please reach out to your insurance provider with any questions.    If during the course of the call the physician/provider feels a telephone visit is not appropriate, you will not be charged for this service.\"    Patient has given verbal consent for Telephone visit?  Yes    Kimberly Chau complains of some queasiness with taking bactrim.    I have reviewed and updated the patient's Past Medical History, Social History, Family History and Medication List.    ALLERGIES  Hctz; Hydrochlorothiazide; Latex; Benzocaine; Resorcinol-alcohol; Ace inhibitors; and Latex    Additional provider notes:   Interval History:  Last seen by me on 2/5/20.  At that time, we were going to do 6 more weeks of antibiotics alternating bactrim and doxycycline due to past history of intolerances with both drugs.  She has continued to take antibiotics since then, missing only about 1 dose over the past 9 weeks.   No fevers, chills, night sweats, local drainage or redness.  She is still wearing a brace except with showering and after last visit with Dr. Strong, expecting she will need surgery for definitive management, likely bone graft.    She is doing ok " during COVID outbreak.  Her  has been doing to the store and wearing masks.  She has not left the house.     Recommendations:  - Stop antibiotics and monitor off therapy until seen by Dr. Strong.  - Limited utility of labs (WBC, CRP, ESR) since they were normal at start of therapy  - Follow up with Dr. Strong now tentatively scheduled for 5/4/20 due to COVID.  No objection to proceeding with surgery from an ID perspective unless new signs or symptoms suggestive of recurrence.     Assessment: 78 y/o woman who developed fracture of plate used in ORIF of left distal radius fracture.  At no time in her course has she had inflammatory symptoms (no fever, night sweats, local redness, warmth or drainage) to suggest an infection.  However, it is unclear why the fracture failed to heal so completed treatment course for osteomyelitis.    We had been planning on 6 week course of therapy but at our 1/8/20 visit, she had stopped bactrim due to mouth pain after ~3 weeks and was off antibiotics entirely since 12/21/19.  We tried doxycycline next but that was stopped around 1/25/20 due to diarrhea.   She did successfully complete a total of 9 weeks of therapy alternating bactrim and doxycycline between 2/5/20-4/8/20.  At this point, we will consider this adequately treated.  She has essentially no lab abnormalities or physical findings to monitor for ongoing infection but will following clinically for new local signs of recurrent infection.       Phone call duration:18 minutes    Staffed with Dr. Fay.    Julita Lobato MD, PhD  Adult & Pediatric Infectious Diseases Fellow PGY8, [facility]  Pager: [phone]

## 2020-04-08 NOTE — PATIENT INSTRUCTIONS
Stop antibiotics    Monitor for signs of recurrent infection.  This could include fevers, chills, night sweats, or new local symptoms in your arm like pain, warmth, redness or drainage.    No objection to proceeding with surgery when Dr. Strong thinks it is appropriate.    I do not think following labs will be helpful to determine whether infection has recurred because these were all normal at the time we started treatment.

## 2020-04-19 NOTE — PROGRESS NOTES
Infectious Disease Clinic Staff Note: Ms. Chau had a Telephone Follow-up Patient Visit today and I participated in and discussed her case with Dr. Lobato, ID Fellow -- I agree with her interval history and examination, assessment and plan in this outpatient ID Virtual Progress note. This note reflects my observations and opinions and the plan outlined fully reflects my approach. I have reviewed the available history, radiology, laboratory results, and reports with the Fellow.

## 2020-04-28 NOTE — ADDENDUM NOTE
Addendum  created 02/04/19 1202 by Elisabeth Gonzalez APRN CNP    Sign clinical note       You Patient resolved from the Care Transitions episode on 4/28/20  Patient/family has been provided the following resources and education related to COVID-19:                         Signs, symptoms and red flags related to COVID-19            CDC exposure and quarantine guidelines            [facility] exposure contact - [phone]            Contact for their local Department of Health                 Patient currently reports that the following symptoms have improved:  no new/worsening symptoms; patient called ACM back stating that she is doing well. No further outreach scheduled with this CTN/ACM. Episode of Care resolved. Patient has this CTN/ACM contact information if future needs arise. No future appointments. Gali HUNT, RN  Ambulatory Care Manager  [phone]  [email or web address]

## 2020-05-03 NOTE — PROGRESS NOTES
I spoke to Kimberly today in follow-up. 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 She has been on antibiotics and had not yet completed her course at the time of our last visit together. Last xrays were 2/10/2020. She finished her antibiotics. She is able to do all of her activities except a few things like peeling when cooking. She doesn't have pain other than when she does something she should not. She feels everything takes more effort however so her overall fatigue level is higher because she feels she has to work harder to make up for her wrist. Kimberly says that she can still feel the bones moving in her wrist. She is not using the bone stimulator any more, although she was. She is wearing the brace and feels like she really needs it. Her wristis floppy without it. She would like to get this fixed.   At this point Kimberly has completed her antibiotic course.  She would like to proceed with surgical repair.  We discussed that given COVID we cannot do this right now.  However, we will plan to proceed once we are able.  I will put in a surgical order today.  Elyse will call her for preop instructions.  My surgical scheduler will reach out to her when able to schedule.  In the interim she will continue to wear the brace.  She will discontinue the bone stimulator.  She will modulate her activities as needed for comfort level.  She will call me for any questions or concerns.  Time: 10 minutes.

## 2020-05-03 NOTE — PROGRESS NOTES
Otolaryngology Clinic      Name: Kimberly Chau  MRN: [id number]  Age: 79 year old  : 1940     Chief Complaint:   Follow up     History of Present Illness:   Kimberly Chau is a 79 year old female with a history of chronic sinusitis who presents for telephone visit follow up. She underwent revision frontal sinus surgery with stent placement in 2018. I last saw her on 19 and she was feeling well with some occasional mucus and congestion. She underwent revision surgery on her wrist and has been on a 6 week course of alternating doxycycline and bactrim. Today she feels good. The antibiotics seem to have helped her sinuses as well. No significant headaches. Some achiness with eating. Not much drainage at all.     8/3/2018 Optical tracking system endoscopic sinus surgery (Right) Procedure: OPTICAL TRACKING SYSTEM ENDOSCOPIC SINUS SURGERY; Stealth Assisted Revision Right Frontal Sinusotomy, Right Stent Placement **Latex Allergy** ; Surgeon: Elyse Eisenberg MD; Location:  OR   2018 Optical tracking system endoscopic sinus surgery (Right) Procedure: OPTICAL TRACKING SYSTEM ENDOSCOPIC SINUS SURGERY; Stealth Assisted Right Maxillary Antrostomy, Anterior Ethmoidectomy And Frontal Sinusotomy; Surgeon: Elyse Eisenberg MD; Location:  OR             Physical Exam:   There were no vitals taken for this visit.     General Assessment   GENERAL: healthy, alert and no distress  EYES: Eyes grossly normal to inspection, conjunctivae and sclerae normal  RESP: no audible wheeze, cough, or visible cyanosis.  No visible retractions or increased work of breathing.  Able to speak fully in complete sentences.  NEURO: Cranial nerves grossly intact, mentation intact and speech normal  PSYCH: mentation appears normal, affect normal/bright, judgement and insight intact, normal speech and appearance well-groomed       Assessment and Plan:  Kimberly Chau is a 79 year old female with history of chronic sinusitis. She has a  right frontal sinus stent in place. Overall she is doing well with regards to her sinus disease. Continue current management and see me in 6 months.    Elyse Eisenberg MD

## 2020-05-04 ENCOUNTER — VIRTUAL VISIT (OUTPATIENT)
Dept: OTOLARYNGOLOGY | Facility: CLINIC | Age: 80
End: 2020-05-04
Payer: MEDICARE

## 2020-05-04 ENCOUNTER — VIRTUAL VISIT (OUTPATIENT)
Dept: ORTHOPEDICS | Facility: CLINIC | Age: 80
End: 2020-05-04
Payer: MEDICARE

## 2020-05-04 VITALS — WEIGHT: 112 LBS | BODY MASS INDEX: 21.99 KG/M2 | HEIGHT: 60 IN

## 2020-05-04 DIAGNOSIS — S52.502S CLOSED FRACTURE OF DISTAL END OF LEFT RADIUS, UNSPECIFIED FRACTURE MORPHOLOGY, SEQUELA: Primary | ICD-10-CM

## 2020-05-04 DIAGNOSIS — J32.1 CHRONIC FRONTAL SINUSITIS: Primary | ICD-10-CM

## 2020-05-04 ASSESSMENT — PAIN SCALES - GENERAL: PAINLEVEL: NO PAIN (0)

## 2020-05-04 ASSESSMENT — MIFFLIN-ST. JEOR: SCORE: 894.53

## 2020-05-04 NOTE — PROGRESS NOTES
"Kimberly Chau is a 79 year old female who is being evaluated via a billable telephone visit.      The patient has been notified of following:     \"This telephone visit will be conducted via a call between you and your physician/provider. We have found that certain health care needs can be provided without the need for a physical exam.  This service lets us provide the care you need with a short phone conversation.  If a prescription is necessary we can send it directly to your pharmacy.  If lab work is needed we can place an order for that and you can then stop by our lab to have the test done at a later time.    Telephone visits are billed at different rates depending on your insurance coverage. During this emergency period, for some insurers they may be billed the same as an in-person visit.  Please reach out to your insurance provider with any questions.    If during the course of the call the physician/provider feels a telephone visit is not appropriate, you will not be charged for this service.\"    Patient has given verbal consent for Telephone visit?  Yes    What phone number would you like to be contacted at? [phone]    How would you like to obtain your AVS? Mail a copy    Phone call duration: 7 minutes    Elyse Eisenberg MD        "

## 2020-05-06 NOTE — NURSING NOTE
5-6-20  Spoke with patient re: upcoming surgery orders.  Preoperative teaching done via telephone. Packet mailed to patient with all pertinent information outlined.  Phone numbers provided.    Teaching Flowsheet   Relevant Diagnosis: Closed fracture of distal end of left radius  Teaching Topic: Open reduction internal fixation left distal radius nonunion, distal ulna resection, autogenous bone grafting from iliac crest or distal ulna    Ezel under general anesthesia with Dr Dinh Strong  BMI 21.87     Person(s) involved in teaching:   Patient     Motivation Level:  Asks Questions: Yes  Eager to Learn: Yes  Cooperative: Yes  Receptive (willing/able to accept information): Yes  Any cultural factors/Nondenominational beliefs that may influence understanding or compliance? No       Patient demonstrates understanding of the following:  Reason for the appointment, diagnosis and treatment plan: Yes  Knowledge of proper use of medications and conditions for which they are ordered (with special attention to potential side effects or drug interactions): Yes  Which situations necessitate calling provider and whom to contact: Yes       Teaching Concerns Addressed:        Proper use and care of  (medical equip, care aids, etc.): Yes  Nutritional needs and diet plan: Yes  Pain management techniques: Yes  Wound Care: Yes  How and/when to access community resources: Yes     Instructional Materials Used/Given: Preoperative teaching packet mailed to patient. Elyse Webster RN

## 2020-05-28 ENCOUNTER — TELEPHONE (OUTPATIENT)
Dept: ORTHOPEDICS | Facility: CLINIC | Age: 80
End: 2020-05-28

## 2020-05-28 DIAGNOSIS — Z11.59 ENCOUNTER FOR SCREENING FOR OTHER VIRAL DISEASES: Primary | ICD-10-CM

## 2020-05-28 PROBLEM — S52.502S CLOSED FRACTURE OF DISTAL END OF LEFT RADIUS, UNSPECIFIED FRACTURE MORPHOLOGY, SEQUELA: Status: ACTIVE | Noted: 2020-05-28

## 2020-05-28 NOTE — TELEPHONE ENCOUNTER
Patient is scheduled for surgery with Dr. Strong      Spoke or left message with: Spoke with Kimberly    Date of Surgery: 6/12/20    Location: [facility]    Informed patient they will need an adult  Yes    H&P: Patient to schedule with Guillermo Castellano      Additional imaging/appointments: N/a    Surgery packet: Given in clinic     Additional comments: Patient will await pre op phone call 1-2 days prior to surgery for arrival time

## 2020-06-03 ENCOUNTER — TELEPHONE (OUTPATIENT)
Dept: INTERNAL MEDICINE | Facility: CLINIC | Age: 80
End: 2020-06-03

## 2020-06-03 NOTE — TELEPHONE ENCOUNTER
Spoke to patient who is needing a pre-op appointment for her surgery that is scheduled on 6/12/20. Scheduled pre-op appointment, as patient had not received any further instructions as to what she should do prior to surgery. Advised patient to contact ortho by 6/8/20 if she had not heard anything, as she will need to get COVID-19 testing done at least 3 days prior and isolate before surgery but will need to check if there is any other recommendations/protocols she needs to follow prior to surgery. Patient states verbal understanding. Mindi Murry LPN 6/3/2020 12:02 PM

## 2020-06-03 NOTE — TELEPHONE ENCOUNTER
[facility] Call Center    Phone Message    May a detailed message be left on voicemail: yes     Reason for Call: Other: The pt is requesting a pre op for: surgery 6.12.20, Dinh Strong, [place], Broken wrist-graft. Please review per GL and call the pt to schedule. Thanks.     Action Taken: Message routed to:  [facility] med    Travel Screening: Not Applicable

## 2020-06-05 RX ORDER — CEFAZOLIN SODIUM 1 G/3ML
1 INJECTION, POWDER, FOR SOLUTION INTRAMUSCULAR; INTRAVENOUS SEE ADMIN INSTRUCTIONS
Status: CANCELLED | OUTPATIENT
Start: 2020-06-12

## 2020-06-05 RX ORDER — CEFAZOLIN SODIUM 2 G/100ML
2 INJECTION, SOLUTION INTRAVENOUS
Status: CANCELLED | OUTPATIENT
Start: 2020-06-12

## 2020-06-08 ENCOUNTER — OFFICE VISIT (OUTPATIENT)
Dept: INTERNAL MEDICINE | Facility: CLINIC | Age: 80
End: 2020-06-08
Payer: MEDICARE

## 2020-06-08 VITALS
DIASTOLIC BLOOD PRESSURE: 84 MMHG | OXYGEN SATURATION: 95 % | WEIGHT: 112 LBS | SYSTOLIC BLOOD PRESSURE: 163 MMHG | HEART RATE: 75 BPM | BODY MASS INDEX: 21.87 KG/M2

## 2020-06-08 DIAGNOSIS — I10 BENIGN ESSENTIAL HYPERTENSION: ICD-10-CM

## 2020-06-08 DIAGNOSIS — I10 BENIGN ESSENTIAL HYPERTENSION: Primary | ICD-10-CM

## 2020-06-08 LAB
ALBUMIN SERPL-MCNC: 3.5 G/DL (ref 3.4–5)
ALP SERPL-CCNC: 117 U/L (ref 40–150)
ALT SERPL W P-5'-P-CCNC: 21 U/L (ref 0–50)
ANION GAP SERPL CALCULATED.3IONS-SCNC: 6 MMOL/L (ref 3–14)
AST SERPL W P-5'-P-CCNC: 17 U/L (ref 0–45)
BASOPHILS # BLD AUTO: 0.1 10E9/L (ref 0–0.2)
BASOPHILS NFR BLD AUTO: 0.8 %
BILIRUB SERPL-MCNC: 0.4 MG/DL (ref 0.2–1.3)
BUN SERPL-MCNC: 11 MG/DL (ref 7–30)
CALCIUM SERPL-MCNC: 8.6 MG/DL (ref 8.5–10.1)
CHLORIDE SERPL-SCNC: 102 MMOL/L (ref 94–109)
CO2 SERPL-SCNC: 28 MMOL/L (ref 20–32)
CREAT SERPL-MCNC: 0.9 MG/DL (ref 0.52–1.04)
DIFFERENTIAL METHOD BLD: ABNORMAL
EOSINOPHIL # BLD AUTO: 0.4 10E9/L (ref 0–0.7)
EOSINOPHIL NFR BLD AUTO: 6.8 %
ERYTHROCYTE [DISTWIDTH] IN BLOOD BY AUTOMATED COUNT: 13.1 % (ref 10–15)
GFR SERPL CREATININE-BSD FRML MDRD: 60 ML/MIN/{1.73_M2}
GLUCOSE SERPL-MCNC: 92 MG/DL (ref 70–99)
HCT VFR BLD AUTO: 34.8 % (ref 35–47)
HGB BLD-MCNC: 11.5 G/DL (ref 11.7–15.7)
IMM GRANULOCYTES # BLD: 0 10E9/L (ref 0–0.4)
IMM GRANULOCYTES NFR BLD: 0.3 %
INTERPRETATION ECG - MUSE: NORMAL
LYMPHOCYTES # BLD AUTO: 0.9 10E9/L (ref 0.8–5.3)
LYMPHOCYTES NFR BLD AUTO: 13.8 %
MCH RBC QN AUTO: 33.9 PG (ref 26.5–33)
MCHC RBC AUTO-ENTMCNC: 33 G/DL (ref 31.5–36.5)
MCV RBC AUTO: 103 FL (ref 78–100)
MONOCYTES # BLD AUTO: 0.8 10E9/L (ref 0–1.3)
MONOCYTES NFR BLD AUTO: 12.7 %
NEUTROPHILS # BLD AUTO: 4.3 10E9/L (ref 1.6–8.3)
NEUTROPHILS NFR BLD AUTO: 65.6 %
NRBC # BLD AUTO: 0 10*3/UL
NRBC BLD AUTO-RTO: 0 /100
PLATELET # BLD AUTO: 217 10E9/L (ref 150–450)
POTASSIUM SERPL-SCNC: 4.2 MMOL/L (ref 3.4–5.3)
PROT SERPL-MCNC: 7 G/DL (ref 6.8–8.8)
RBC # BLD AUTO: 3.39 10E12/L (ref 3.8–5.2)
SODIUM SERPL-SCNC: 136 MMOL/L (ref 133–144)
WBC # BLD AUTO: 6.5 10E9/L (ref 4–11)

## 2020-06-08 NOTE — PROGRESS NOTES
HPI:    Pt. Comes in for preoperative evaluation for L wrist surgery with Dr. Strong, orthopedic. She had a visit with Dr. Strong 5/4/2020. She also had an ID appt. 4/8/2020 for hardware failure and it was  felt she needed antibiotic treatment;  she is not taking antibiotics. She denies any anesthesia or bleeding issues. She is not currently taking Methotrexate for her RA. She states this is stable. She does not smoke nor abuse EtOH. She denies any cardiopulmonary complaints. No rest/exertional CP/SOB. No other HEENT, cardiopulmonary, abdominal, , GYN, neurological, systemic, psychiatric, vascular endocrine complaints.     Past Medical History:   Diagnosis Date     Anemia      Arthritis      Cataracts      Central retinal artery occlusion      Cervical spine fracture (H)     After a fall from orthostatic sx     Chronic pain     Back of head     Gastro-oesophageal reflux disease      Head injury      Hypertension      Hyponatremia     Resolved, 2/2 diuretics     Migraines      Osteoporosis      Pneumonia 2018     Rheumatoid arthritis(714.0)      Past Surgical History:   Procedure Laterality Date     C HAND/FINGER SURGERY UNLISTED       C PELVIS/HIP JOINT SURGERY UNLISTED       COLONOSCOPY       EYE SURGERY Left 02/2019    Hole in macula     FUSION CERVICAL POSTERIOR THREE+ LEVELS  1/22/2013    Procedure: FUSION CERVICAL POSTERIOR THREE+ LEVELS;  Cervical 1-6 Posterior Instrumentation and Fusion, Cervical 3-4 Laminectomy  .Instrumentation and fusion to Cervical 6 *Latex Allergy*;  Surgeon: Ra Bar MD;  Location: [facility] OR     GYN SURGERY       HEAD & NECK SURGERY       HYSTERECTOMY       KNEE SURGERY       NECK SURGERY       OPEN REDUCTION INTERNAL FIXATION WRIST Left 4/30/2019    Procedure: Open Reduction Internal Fixation Left Distal Radius Fracture;  Surgeon: Dinh Strong MD;  Location:  OR     OPTICAL TRACKING SYSTEM ENDOSCOPIC SINUS SURGERY Right 4/6/2018    Procedure: OPTICAL TRACKING  SYSTEM ENDOSCOPIC SINUS SURGERY;  Stealth Assisted Right Maxillary Antrostomy, Anterior Ethmoidectomy And Frontal Sinusotomy;  Surgeon: Elyse Eisenberg MD;  Location: [facility] OR     OPTICAL TRACKING SYSTEM ENDOSCOPIC SINUS SURGERY Right 8/3/2018    Procedure: OPTICAL TRACKING SYSTEM ENDOSCOPIC SINUS SURGERY;  Stealth Assisted Revision Right Frontal Sinusotomy, Right Stent Placement  **Latex Allergy** ;  Surgeon: Elyse Eisenberg MD;  Location: [facility] OR     ORTHOPEDIC SURGERY       REMOVE HARDWARE WRIST Left 11/19/2019    Procedure: Left Distal Radius Hardware Removal, Flexor Pollicus Longus Repair, Deep Cultures;  Surgeon: Dinh Strong MD;  Location: [facility] OR     PE;    Vitals noted, gen, nad, cooperative, alert, HEENT, she is wearing a mask, neck supple nl rom, lungs with good air movement, RRR, S1, S2, no MRG, abdomen, no acute findings, Grossly normal neurological exam. She has forearm/wrist splint L arm.     EKG; SR at 73; chronic/old Q waves III and aVF no change from 11/13/2019      Resting echo 2/7/2018:    Interpretation Summary  Left ventricular function, chamber size, wall motion, and wall thickness are  normal.The EF is 60-65%.  Global right ventricular function is normal.  The thoracic aorta is normal. The inferior vena cava was normal in size with  preserved respiratory variability. Estimated right atrial pressure is < 5  mmHg.  No pericardial effusion is present.     A/P:    Probably low risk for planned orthopedic procedure.     She will avoid ASA/NAIDS before surgery. No cardiopulmonary complaints. She has EKG with inferior Q waves long-standing but resting echo from 2018 shows no wall motion abnormalities.     She can remain on her other medications    Laboratory tests are stable with chronic slight anemia.     Total time spent 25 minutes.  More than 50% of the time spent with Ms. Chau on counseling / coordinating her care

## 2020-06-08 NOTE — NURSING NOTE
Chief Complaint   Patient presents with     Pre-Op Exam     DOS: 6/12/20 OPEN REDUCTION INTERNAL FIXATION left distal radius nonunion, distal ulna resection and autogenous bone grafting from iliac crest or distal ulna with Dr. Ariel Williams Kimberly Nissen, EMT at 1:28 PM on 6/8/2020

## 2020-06-08 NOTE — LETTER
6/8/2020      RE: Kimberly Chau  [address] [place] [address]       HPI:    Pt. Comes in for preoperative evaluation for L wrist surgery with Dr. Strong, orthopedic. She had a visit with Dr. Strong 5/4/2020. She also had an ID appt. 4/8/2020 for hardware failure and it was  felt she needed antibiotic treatment;  she is not taking antibiotics. She denies any anesthesia or bleeding issues. She is not currently taking Methotrexate for her RA. She states this is stable. She does not smoke nor abuse EtOH. She denies any cardiopulmonary complaints. No rest/exertional CP/SOB. No other HEENT, cardiopulmonary, abdominal, , GYN, neurological, systemic, psychiatric, vascular endocrine complaints.     Past Medical History:   Diagnosis Date     Anemia      Arthritis      Cataracts      Central retinal artery occlusion      Cervical spine fracture (H)     After a fall from orthostatic sx     Chronic pain     Back of head     Gastro-oesophageal reflux disease      Head injury      Hypertension      Hyponatremia     Resolved, 2/2 diuretics     Migraines      Osteoporosis      Pneumonia 2018     Rheumatoid arthritis(714.0)      Past Surgical History:   Procedure Laterality Date     C HAND/FINGER SURGERY UNLISTED       C PELVIS/HIP JOINT SURGERY UNLISTED       COLONOSCOPY       EYE SURGERY Left 02/2019    Hole in macula     FUSION CERVICAL POSTERIOR THREE+ LEVELS  1/22/2013    Procedure: FUSION CERVICAL POSTERIOR THREE+ LEVELS;  Cervical 1-6 Posterior Instrumentation and Fusion, Cervical 3-4 Laminectomy  .Instrumentation and fusion to Cervical 6 *Latex Allergy*;  Surgeon: Ra Bar MD;  Location: [facility] OR     GYN SURGERY       HEAD & NECK SURGERY       HYSTERECTOMY       KNEE SURGERY       NECK SURGERY       OPEN REDUCTION INTERNAL FIXATION WRIST Left 4/30/2019    Procedure: Open Reduction Internal Fixation Left Distal Radius Fracture;  Surgeon: Dinh Strong MD;  Location:  OR     [facility]  TRACKING SYSTEM ENDOSCOPIC SINUS SURGERY Right 4/6/2018    Procedure: OPTICAL TRACKING SYSTEM ENDOSCOPIC SINUS SURGERY;  Stealth Assisted Right Maxillary Antrostomy, Anterior Ethmoidectomy And Frontal Sinusotomy;  Surgeon: Elyse Eisenberg MD;  Location: [facility] OR     OPTICAL TRACKING SYSTEM ENDOSCOPIC SINUS SURGERY Right 8/3/2018    Procedure: OPTICAL TRACKING SYSTEM ENDOSCOPIC SINUS SURGERY;  Stealth Assisted Revision Right Frontal Sinusotomy, Right Stent Placement  **Latex Allergy** ;  Surgeon: Elyse Eisenberg MD;  Location: [facility] OR     ORTHOPEDIC SURGERY       REMOVE HARDWARE WRIST Left 11/19/2019    Procedure: Left Distal Radius Hardware Removal, Flexor Pollicus Longus Repair, Deep Cultures;  Surgeon: Dinh Strong MD;  Location: [facility]     PE;    Vitals noted, gen, nad, cooperative, alert, HEENT, she is wearing a mask, neck supple nl rom, lungs with good air movement, RRR, S1, S2, no MRG, abdomen, no acute findings, Grossly normal neurological exam. She has forearm/wrist splint L arm.     EKG; SR at 73; chronic/old Q waves III and aVF no change from 11/13/2019      Resting echo 2/7/2018:    Interpretation Summary  Left ventricular function, chamber size, wall motion, and wall thickness are  normal.The EF is 60-65%.  Global right ventricular function is normal.  The thoracic aorta is normal. The inferior vena cava was normal in size with  preserved respiratory variability. Estimated right atrial pressure is < 5  mmHg.  No pericardial effusion is present.     A/P:    Probably low risk for planned orthopedic procedure.     She will avoid ASA/NAIDS before surgery. No cardiopulmonary complaints. She has EKG with inferior Q waves long-standing but resting echo from 2018 shows no wall motion abnormalities.     She can remain on her other medications    Laboratory tests are stable with chronic slight anemia.     Total time spent 25 minutes.  More than 50% of the time spent with Ms. Chau on counseling / coordinating  her care          Guillermo Castellano MD

## 2020-06-09 ENCOUNTER — TELEPHONE (OUTPATIENT)
Dept: INTERNAL MEDICINE | Facility: CLINIC | Age: 80
End: 2020-06-09

## 2020-06-09 DIAGNOSIS — D64.9 ANEMIA, UNSPECIFIED TYPE: Primary | ICD-10-CM

## 2020-06-09 NOTE — TELEPHONE ENCOUNTER
Placed future lab orders this encounter    RADHA Castellano        Dear Kimberly;    Your laboratory tests are more-or-less stable. Your hemoglobin is a little low and I recommend rechecking in a month or so. I placed future lab orders and you can call [phone] to schedule this appointment.      RADHA Castellano MD

## 2020-06-10 DIAGNOSIS — Z11.59 ENCOUNTER FOR SCREENING FOR OTHER VIRAL DISEASES: ICD-10-CM

## 2020-06-10 PROCEDURE — 99000 SPECIMEN HANDLING OFFICE-LAB: CPT | Performed by: ORTHOPAEDIC SURGERY

## 2020-06-10 PROCEDURE — U0003 INFECTIOUS AGENT DETECTION BY NUCLEIC ACID (DNA OR RNA); SEVERE ACUTE RESPIRATORY SYNDROME CORONAVIRUS 2 (SARS-COV-2) (CORONAVIRUS DISEASE [COVID-19]), AMPLIFIED PROBE TECHNIQUE, MAKING USE OF HIGH THROUGHPUT TECHNOLOGIES AS DESCRIBED BY CMS-2020-01-R: HCPCS | Performed by: ORTHOPAEDIC SURGERY

## 2020-06-11 ENCOUNTER — ANESTHESIA EVENT (OUTPATIENT)
Dept: SURGERY | Facility: CLINIC | Age: 80
End: 2020-06-11
Payer: MEDICARE

## 2020-06-11 LAB
SARS-COV-2 RNA SPEC QL NAA+PROBE: NOT DETECTED
SPECIMEN SOURCE: NORMAL

## 2020-06-12 ENCOUNTER — APPOINTMENT (OUTPATIENT)
Dept: GENERAL RADIOLOGY | Facility: CLINIC | Age: 80
End: 2020-06-12
Attending: ORTHOPAEDIC SURGERY
Payer: MEDICARE

## 2020-06-12 ENCOUNTER — ANESTHESIA (OUTPATIENT)
Dept: SURGERY | Facility: CLINIC | Age: 80
End: 2020-06-12
Payer: MEDICARE

## 2020-06-12 ENCOUNTER — HOSPITAL ENCOUNTER (OUTPATIENT)
Facility: CLINIC | Age: 80
Discharge: HOME | End: 2020-06-12
Attending: ORTHOPAEDIC SURGERY | Admitting: ORTHOPAEDIC SURGERY
Payer: MEDICARE

## 2020-06-12 VITALS
BODY MASS INDEX: 21.34 KG/M2 | OXYGEN SATURATION: 97 % | WEIGHT: 115.96 LBS | HEART RATE: 72 BPM | SYSTOLIC BLOOD PRESSURE: 145 MMHG | TEMPERATURE: 98.2 F | HEIGHT: 62 IN | DIASTOLIC BLOOD PRESSURE: 75 MMHG | RESPIRATION RATE: 16 BRPM

## 2020-06-12 DIAGNOSIS — Z98.890 POST-OPERATIVE STATE: Primary | ICD-10-CM

## 2020-06-12 DIAGNOSIS — S52.502S CLOSED FRACTURE OF DISTAL END OF LEFT RADIUS, UNSPECIFIED FRACTURE MORPHOLOGY, SEQUELA: ICD-10-CM

## 2020-06-12 LAB — GLUCOSE BLDC GLUCOMTR-MCNC: 72 MG/DL (ref 70–99)

## 2020-06-12 PROCEDURE — 25000566 ZZH SEVOFLURANE, EA 15 MIN: Performed by: ORTHOPAEDIC SURGERY

## 2020-06-12 PROCEDURE — 36000061 ZZH SURGERY LEVEL 3 W FLUORO 1ST 30 MIN - UMMC: Performed by: ORTHOPAEDIC SURGERY

## 2020-06-12 PROCEDURE — 40000278 XR SURGERY CARM FLUORO LESS THAN 5 MIN: Mod: TC

## 2020-06-12 PROCEDURE — 82962 GLUCOSE BLOOD TEST: CPT | Mod: GZ

## 2020-06-12 PROCEDURE — 25000128 H RX IP 250 OP 636: Performed by: ANESTHESIOLOGY

## 2020-06-12 PROCEDURE — 37000008 ZZH ANESTHESIA TECHNICAL FEE, 1ST 30 MIN: Performed by: ORTHOPAEDIC SURGERY

## 2020-06-12 PROCEDURE — C1713 ANCHOR/SCREW BN/BN,TIS/BN: HCPCS | Performed by: ORTHOPAEDIC SURGERY

## 2020-06-12 PROCEDURE — 87176 TISSUE HOMOGENIZATION CULTR: CPT | Performed by: ORTHOPAEDIC SURGERY

## 2020-06-12 PROCEDURE — 25800030 ZZH RX IP 258 OP 636: Performed by: ANESTHESIOLOGY

## 2020-06-12 PROCEDURE — 27211024 ZZHC OR SUPPLY OTHER OPNP: Performed by: ORTHOPAEDIC SURGERY

## 2020-06-12 PROCEDURE — 27210794 ZZH OR GENERAL SUPPLY STERILE: Performed by: ORTHOPAEDIC SURGERY

## 2020-06-12 PROCEDURE — 25000125 ZZHC RX 250: Performed by: NURSE ANESTHETIST, CERTIFIED REGISTERED

## 2020-06-12 PROCEDURE — 25000128 H RX IP 250 OP 636: Performed by: NURSE ANESTHETIST, CERTIFIED REGISTERED

## 2020-06-12 PROCEDURE — 87070 CULTURE OTHR SPECIMN AEROBIC: CPT | Mod: 91 | Performed by: ORTHOPAEDIC SURGERY

## 2020-06-12 PROCEDURE — 71000014 ZZH RECOVERY PHASE 1 LEVEL 2 FIRST HR: Performed by: ORTHOPAEDIC SURGERY

## 2020-06-12 PROCEDURE — 25000132 ZZH RX MED GY IP 250 OP 250 PS 637: Mod: GY | Performed by: PHYSICIAN ASSISTANT

## 2020-06-12 PROCEDURE — 87075 CULTR BACTERIA EXCEPT BLOOD: CPT | Performed by: ORTHOPAEDIC SURGERY

## 2020-06-12 PROCEDURE — 71000027 ZZH RECOVERY PHASE 2 EACH 15 MINS: Performed by: ORTHOPAEDIC SURGERY

## 2020-06-12 PROCEDURE — 37000009 ZZH ANESTHESIA TECHNICAL FEE, EACH ADDTL 15 MIN: Performed by: ORTHOPAEDIC SURGERY

## 2020-06-12 PROCEDURE — 40000170 ZZH STATISTIC PRE-PROCEDURE ASSESSMENT II: Performed by: ORTHOPAEDIC SURGERY

## 2020-06-12 PROCEDURE — 36000059 ZZH SURGERY LEVEL 3 EA 15 ADDTL MIN UMMC: Performed by: ORTHOPAEDIC SURGERY

## 2020-06-12 PROCEDURE — 25000125 ZZHC RX 250: Performed by: ANESTHESIOLOGY

## 2020-06-12 DEVICE — IMP SCR SYN CAN T8 STARDRIVE 2.4X16MM VA-LCP ST 02.210.116: Type: IMPLANTABLE DEVICE | Site: WRIST | Status: FUNCTIONAL

## 2020-06-12 DEVICE — IMP SCR SYN LCP CORTEX 2.4X12MM SELF TAP 201.762: Type: IMPLANTABLE DEVICE | Site: WRIST | Status: FUNCTIONAL

## 2020-06-12 DEVICE — IMPLANTABLE DEVICE: Type: IMPLANTABLE DEVICE | Site: WRIST | Status: FUNCTIONAL

## 2020-06-12 DEVICE — IMP SCR SYN CAN T8 STARDRIVE VA-LCP ST 2.4X22MM 02.210.122: Type: IMPLANTABLE DEVICE | Site: WRIST | Status: FUNCTIONAL

## 2020-06-12 DEVICE — IMP SCR SYN CAN T8 STARDRIVE 2.4X20MM VA-LCP ST 02.210.120: Type: IMPLANTABLE DEVICE | Site: WRIST | Status: FUNCTIONAL

## 2020-06-12 RX ORDER — NALOXONE HYDROCHLORIDE 0.4 MG/ML
.1-.4 INJECTION, SOLUTION INTRAMUSCULAR; INTRAVENOUS; SUBCUTANEOUS
Status: DISCONTINUED | OUTPATIENT
Start: 2020-06-12 | End: 2020-06-12 | Stop reason: HOSPADM

## 2020-06-12 RX ORDER — SODIUM CHLORIDE, SODIUM LACTATE, POTASSIUM CHLORIDE, CALCIUM CHLORIDE 600; 310; 30; 20 MG/100ML; MG/100ML; MG/100ML; MG/100ML
INJECTION, SOLUTION INTRAVENOUS CONTINUOUS
Status: DISCONTINUED | OUTPATIENT
Start: 2020-06-12 | End: 2020-06-12 | Stop reason: HOSPADM

## 2020-06-12 RX ORDER — ONDANSETRON 4 MG/1
4 TABLET, ORALLY DISINTEGRATING ORAL EVERY 30 MIN PRN
Status: DISCONTINUED | OUTPATIENT
Start: 2020-06-12 | End: 2020-06-12 | Stop reason: HOSPADM

## 2020-06-12 RX ORDER — DEXAMETHASONE SODIUM PHOSPHATE 10 MG/ML
INJECTION, SOLUTION INTRAMUSCULAR; INTRAVENOUS PRN
Status: DISCONTINUED | OUTPATIENT
Start: 2020-06-12 | End: 2020-06-12

## 2020-06-12 RX ORDER — FENTANYL CITRATE 50 UG/ML
25-50 INJECTION, SOLUTION INTRAMUSCULAR; INTRAVENOUS
Status: DISCONTINUED | OUTPATIENT
Start: 2020-06-12 | End: 2020-06-12 | Stop reason: HOSPADM

## 2020-06-12 RX ORDER — EPHEDRINE SULFATE 50 MG/ML
INJECTION, SOLUTION INTRAMUSCULAR; INTRAVENOUS; SUBCUTANEOUS PRN
Status: DISCONTINUED | OUTPATIENT
Start: 2020-06-12 | End: 2020-06-12

## 2020-06-12 RX ORDER — MEPERIDINE HYDROCHLORIDE 25 MG/ML
12.5 INJECTION INTRAMUSCULAR; INTRAVENOUS; SUBCUTANEOUS
Status: DISCONTINUED | OUTPATIENT
Start: 2020-06-12 | End: 2020-06-12 | Stop reason: HOSPADM

## 2020-06-12 RX ORDER — OXYCODONE HYDROCHLORIDE 5 MG/1
5 TABLET ORAL EVERY 6 HOURS PRN
Qty: 30 TABLET | Refills: 0 | Status: SHIPPED | OUTPATIENT
Start: 2020-06-12 | End: 2020-11-17

## 2020-06-12 RX ORDER — ONDANSETRON 2 MG/ML
INJECTION INTRAMUSCULAR; INTRAVENOUS PRN
Status: DISCONTINUED | OUTPATIENT
Start: 2020-06-12 | End: 2020-06-12

## 2020-06-12 RX ORDER — BUPIVACAINE HYDROCHLORIDE 5 MG/ML
INJECTION, SOLUTION EPIDURAL; INTRACAUDAL PRN
Status: DISCONTINUED | OUTPATIENT
Start: 2020-06-12 | End: 2020-06-12

## 2020-06-12 RX ORDER — FENTANYL CITRATE 50 UG/ML
INJECTION, SOLUTION INTRAMUSCULAR; INTRAVENOUS PRN
Status: DISCONTINUED | OUTPATIENT
Start: 2020-06-12 | End: 2020-06-12

## 2020-06-12 RX ORDER — LIDOCAINE HYDROCHLORIDE 20 MG/ML
INJECTION, SOLUTION INFILTRATION; PERINEURAL PRN
Status: DISCONTINUED | OUTPATIENT
Start: 2020-06-12 | End: 2020-06-12

## 2020-06-12 RX ORDER — PROPOFOL 10 MG/ML
INJECTION, EMULSION INTRAVENOUS PRN
Status: DISCONTINUED | OUTPATIENT
Start: 2020-06-12 | End: 2020-06-12

## 2020-06-12 RX ORDER — ACETAMINOPHEN 325 MG/1
325 TABLET ORAL ONCE
Status: COMPLETED | OUTPATIENT
Start: 2020-06-12 | End: 2020-06-12

## 2020-06-12 RX ORDER — CEFAZOLIN SODIUM 1 G/3ML
INJECTION, POWDER, FOR SOLUTION INTRAMUSCULAR; INTRAVENOUS PRN
Status: DISCONTINUED | OUTPATIENT
Start: 2020-06-12 | End: 2020-06-12

## 2020-06-12 RX ORDER — ONDANSETRON 2 MG/ML
4 INJECTION INTRAMUSCULAR; INTRAVENOUS EVERY 30 MIN PRN
Status: DISCONTINUED | OUTPATIENT
Start: 2020-06-12 | End: 2020-06-12 | Stop reason: HOSPADM

## 2020-06-12 RX ADMIN — MIDAZOLAM 0.5 MG: 1 INJECTION INTRAMUSCULAR; INTRAVENOUS at 08:39

## 2020-06-12 RX ADMIN — ACETAMINOPHEN 325 MG: 325 TABLET, FILM COATED ORAL at 13:58

## 2020-06-12 RX ADMIN — LIDOCAINE HYDROCHLORIDE 50 MG: 20 INJECTION, SOLUTION INFILTRATION; PERINEURAL at 09:24

## 2020-06-12 RX ADMIN — FENTANYL CITRATE 25 MCG: 50 INJECTION, SOLUTION INTRAMUSCULAR; INTRAVENOUS at 13:08

## 2020-06-12 RX ADMIN — CEFAZOLIN 2 G: 1 INJECTION, POWDER, FOR SOLUTION INTRAMUSCULAR; INTRAVENOUS at 11:15

## 2020-06-12 RX ADMIN — SODIUM CHLORIDE, POTASSIUM CHLORIDE, SODIUM LACTATE AND CALCIUM CHLORIDE: 600; 310; 30; 20 INJECTION, SOLUTION INTRAVENOUS at 09:15

## 2020-06-12 RX ADMIN — MIDAZOLAM 1 MG: 1 INJECTION INTRAMUSCULAR; INTRAVENOUS at 10:51

## 2020-06-12 RX ADMIN — DEXMEDETOMIDINE HYDROCHLORIDE 20 MCG: 100 INJECTION, SOLUTION INTRAVENOUS at 08:54

## 2020-06-12 RX ADMIN — FENTANYL CITRATE 25 MCG: 50 INJECTION, SOLUTION INTRAMUSCULAR; INTRAVENOUS at 12:56

## 2020-06-12 RX ADMIN — SODIUM CHLORIDE, POTASSIUM CHLORIDE, SODIUM LACTATE AND CALCIUM CHLORIDE: 600; 310; 30; 20 INJECTION, SOLUTION INTRAVENOUS at 11:32

## 2020-06-12 RX ADMIN — ONDANSETRON 4 MG: 2 INJECTION INTRAMUSCULAR; INTRAVENOUS at 12:54

## 2020-06-12 RX ADMIN — Medication 10 MG: at 09:57

## 2020-06-12 RX ADMIN — PROPOFOL 120 MG: 10 INJECTION, EMULSION INTRAVENOUS at 09:24

## 2020-06-12 RX ADMIN — DEXAMETHASONE SODIUM PHOSPHATE 2 MG: 10 INJECTION, SOLUTION INTRAMUSCULAR; INTRAVENOUS at 08:54

## 2020-06-12 RX ADMIN — Medication 5 MG: at 11:05

## 2020-06-12 RX ADMIN — FENTANYL CITRATE 50 MCG: 50 INJECTION, SOLUTION INTRAMUSCULAR; INTRAVENOUS at 10:51

## 2020-06-12 RX ADMIN — BUPIVACAINE HYDROCHLORIDE 20 ML: 5 INJECTION, SOLUTION EPIDURAL; INTRACAUDAL at 08:54

## 2020-06-12 RX ADMIN — PROPOFOL 80 MG: 10 INJECTION, EMULSION INTRAVENOUS at 10:51

## 2020-06-12 ASSESSMENT — MIFFLIN-ST. JEOR: SCORE: 944.25

## 2020-06-12 NOTE — OR NURSING
Ela PATEL with orthopedics at bedside in PACU. Patient denies pain in left arm, only has a sore throat that is bothering her. Patient unable to move fingers or thumb yes due to block. Fingers and thumb warm with good cap refill. Unable to palpate Left radial pulse due to cast. Ela will consult with Dr. Strong to see if patient needs to be moving fingers and thumb prior to discharge. Per Ela, if she does not get back to nursing staff, nurse to assume everything is okay per Dr. Strong for discharge.

## 2020-06-12 NOTE — ANESTHESIA PROCEDURE NOTES
Peripheral Nerve Block Procedure Note  Staff -   Anesthesiologist:  Alcon Page MD      Performed By: anesthesiologist        Location: Pre-op  Procedure Start/Stop TImes:      6/12/2020 8:40 AM    patient identified, IV checked, site marked, risks and benefits discussed, informed consent, monitors and equipment checked, pre-op evaluation, at physician/surgeon's request and post-op pain management      Correct Patient: Yes      Correct Position: Yes      Correct Site: Yes      Correct Procedure: Yes      Correct Laterality:  Yes    Site Marked:  Yes  Procedure details:     Procedure:  Other    ASA:  3    Diagnosis:  Left radius fracture    Laterality:  Left    Position:  Supine    Sterile Prep: chloraprep      Local skin infiltration:  1% lidocaine    amount (mL):  3    Needle:  Short bevel    Needle gauge:  21    Needle length (inches):  4    Ultrasound: Yes      Ultrasound used to identify targeted nerve, plexus, or vascular structure and placed a needle adjacent to it      Permanent Image entered into patiient's record      Abnormal pain on injection: No      Blood Aspirated: No      Paresthesias:  No    Bleeding at site: No      Bolus via:  Needle    Infusion Method:  Single Shot    Blood aspirated via catheter: No

## 2020-06-12 NOTE — OR NURSING
Dr. Quoc Munoz notified that patient meets phase 1 discharge criteria. Dr. Kumari updated that patient is not yet able to move her left fingers or thumb, he stated he is okay with that being she received a pre op nerve block. Patient okay to progress to phase 2 per Dr. Quoc Munoz.

## 2020-06-12 NOTE — DISCHARGE INSTRUCTIONS
"Instructions for after your surgery    Splint care  Leave your splint on and keep it dry. Do not remove it or get it wet. Do not attempt to put cream under the splint or to put anything else in your splint.     Keep the splint completely dry at all times. Bathe with the splint well out of the water. Protect it with a large plastic bag kept in place with rubber bands or tape or a \"cast cover\" (these are available online and at some drug stores). If the splint does get wet, use a hair dryer set on \"cool\" to speed up the drying process. A wet splint may cause skin problems.    Your splint should only be removed by your healthcare provider. If the splint feels to tight, you may loosen the ace wrap. If it still feels to tight, contact Dr. Strong office for further instructions.     Activity  Elevation above the level of your heart will help with pain and swelling.     Put cold packs around the splint to reduce swelling and ease discomfort. Do this for 20 minutes every 1-2 waking hours for the first day for pain relief. Continue using the ice packs 3-4 times a day for the next 2 days. Then use it as needed.  Be sure to place a thin towel between the cold pack and exposed skin so that they are not in direct contact.     You can use the hand for light activities like eating, shaving, typing, and brushing your teeth. We encourage range of motion of the fingers as it will help prevent stiffness during the recovery period. If your fingers do feel stiff, make a fist and extend your fingers at least twenty times, five times a day to help alleviate this. If after a week they still feel stiff, notify Dr. Strong's office, as we may recommend hand therapy.     Do not use your operative arm for any lifting, pushing, pulling, weight bearing, or other straining or strenuous activities.     Wear a sling as needed for comfort. If you choose to wear a sling, be sure to take it off and range your shoulder and elbow (if not included in " the splint) a few times a day so they do not get stiff. (If you have received a regional block, wear the sling at all times until the block wears off.)     Pain management  Some discomfort is expected after surgery but with the proper regimen of pain medication, the amount of pain you have can be greatly decreased. We recommend Tylenol Arthritis and Aleve, as these are longer-acting than other over the counter pain medications.  If you do not know whether these medications are safe for you or if you have had a bad reaction to acetaminophen, ibuprofen, aspirin, or other non-steroidal anti-inflammatory medications (NSAIDs) in the past, please speak with your primary care doctor before taking them.   If you are able to take these medications safely, we suggest taking Tylenol Arthritis and Aleve regularly during daylight hours beginning as soon you get home and continuing for at least the first 2-3 days after surgery. After that point just take these medications as needed. These medications can be taken together and work together to make each other stronger. These medications should not be taken at the same time as other medications containing acetaminophen, ibuprofen, or other NSAIDs. Please refer to the instructions on the bottle for dosing instructions.   You have been given a prescription for a narcotic pain medication. In addition to the over the counter pain medication, you may also take the narcotic medication if you need it- many patients do not. As your pain levels diminish with time, you should taper off the narcotic first and the over the counter pain medication second. After most hand, wrist, and elbow procedures, narcotic medication is not usually required beyond the first few days following surgery.   Don t drive or use dangerous equipment or power tools while taking opioids.  Unused or  opioids must be disposed of properly to prevent harm. Don't save your medicine or give it to others for any  reason. To dispose of your medicine safely:   Find your [facility]'s medicine take-back program. This may involve dropping off the medicine at a local police station or pharmacy.   Some pharmacies also have mail-back programs. This typically involves sending the medicine through the mail using a special medicine disposal envelope.   If these options are not available to you, ask your healthcare provider for help.       Follow-up care  You will have a follow-up appointment scheduled with Dr. Strong's office to remove the splint and check your wound. In most cases, you will be transitioned into a cast or a custom removable splint at that time. If you do not know when this appointment is, please call Dr. Strong's office to find out. The phone number is [phone].     When to call your surgeon  Call Dr. Strong's office if you experience any of the following:   Fevers, chills, increasing wound drainage, pain that is not controlled with the medications you have been prescribing, swelling that is not controlled with elevation, problems with your splint, or any other questions or concerns.    Same-Day Surgery   Adult Discharge Orders & Instructions     For 24 hours after surgery:  1. Get plenty of rest.  A responsible adult must stay with you for at least 24 hours after you leave the hospital.   2. Pain medication can slow your reflexes. Do not drive or use heavy equipment.  If you have weakness or tingling, don't drive or use heavy equipment until this feeling goes away.  3. Mixing alcohol and pain medication can cause dizziness and slow your breathing. It can even be fatal. Do not drink alcohol while taking pain medication.  4. Avoid strenuous or risky activities.  Ask for help when climbing stairs.   5. You may feel lightheaded.  If so, sit for a few minutes before standing.  Have someone help you get up.   6. If you have nausea (feel sick to your stomach), drink only clear liquids such as apple juice, olivia  lauro, broth or 7-Up.  Rest may also help.  Be sure to drink enough fluids.  Move to a regular diet as you feel able. Take pain medications with a small amount of solid food, such as toast or crackers, to avoid nausea.   7. A slight fever is normal. Call the doctor if your fever is over 100 F (37.7 C) (taken under the tongue) or lasts longer than 24 hours.  8. You may have a dry mouth, muscle aches, trouble sleeping or a sore throat.  These symptoms should go away after 24 hours.  9. Do not make important or legal decisions.   Pain Management:      1. Take pain medication (if prescribed) for pain as directed by your physician.        2. WARNING: If the pain medication you have been prescribed contains Tylenol  (acetaminophen), DO NOT take additional doses of Tylenol (acetaminophen).     Call your doctor for any of the followin.  Signs of infection (fever, growing tenderness at the surgery site, severe pain, a large amount of drainage or bleeding, foul-smelling drainage, redness, swelling).    2.  It has been over 8 to 10 hours since surgery and you are still not able to urinate (pee).    3.  Headache for over 24 hours.    4.  Numbness, tingling or weakness the day after surgery (if you had spinal anesthesia).  To contact a doctor, call Dr. Strong' clinic at  [phone] or:      [phone] and ask for the Resident On Call for:          Orthopedic Surgery (answered 24 hours a day)      Emergency Department:  [facility]: [phone]  [place] [facility]: [phone]

## 2020-06-12 NOTE — ANESTHESIA PREPROCEDURE EVALUATION
Anesthesia Pre-Procedure Evaluation    Patient: Kimberly Chau   MRN:     [id number] Gender:   female   Age:    79 year old :      1940        Preoperative Diagnosis: Closed fracture of distal end of left radius with delayed healing, unspecified fracture morphology, subsequent encounter [Z12.112G]   Procedure(s):  Open Reduction Internal Fixation Left Distal Radius Fracture  Left Wrist Hardware Removal, possible Distal Ulna Resection  possible Right Iliac Crest Bone Graft     HPI: revision of L distal radius fx. HTN 170s/90 in clinic.     Past Medical History:   Diagnosis Date     Anemia      Arthritis      Cataracts      Central retinal artery occlusion      Cervical spine fracture (H)     After a fall from orthostatic sx     Chronic pain     Back of head     Gastro-oesophageal reflux disease      Head injury      Hypertension      Hyponatremia     Resolved, 2/2 diuretics     Migraines      Osteoporosis      Pneumonia 2018     Rheumatoid arthritis(714.0)       Past Surgical History:   Procedure Laterality Date     C HAND/FINGER SURGERY UNLISTED       C PELVIS/HIP JOINT SURGERY UNLISTED       COLONOSCOPY       EYE SURGERY Left 2019    Hole in macula     FUSION CERVICAL POSTERIOR THREE+ LEVELS  2013    Procedure: FUSION CERVICAL POSTERIOR THREE+ LEVELS;  Cervical 1-6 Posterior Instrumentation and Fusion, Cervical 3-4 Laminectomy  .Instrumentation and fusion to Cervical 6 *Latex Allergy*;  Surgeon: Ra Bar MD;  Location: [facility] OR     GYN SURGERY       HEAD & NECK SURGERY       HYSTERECTOMY       KNEE SURGERY       NECK SURGERY       OPEN REDUCTION INTERNAL FIXATION WRIST Left 2019    Procedure: Open Reduction Internal Fixation Left Distal Radius Fracture;  Surgeon: Dinh Strong MD;  Location:  OR     OPTICAL TRACKING SYSTEM ENDOSCOPIC SINUS SURGERY Right 2018    Procedure: OPTICAL TRACKING SYSTEM ENDOSCOPIC SINUS SURGERY;  Stealth Assisted Right Maxillary Antrostomy,  Anterior Ethmoidectomy And Frontal Sinusotomy;  Surgeon: Elyse Eisenberg MD;  Location: [facility] OR     OPTICAL TRACKING SYSTEM ENDOSCOPIC SINUS SURGERY Right 8/3/2018    Procedure: OPTICAL TRACKING SYSTEM ENDOSCOPIC SINUS SURGERY;  Stealth Assisted Revision Right Frontal Sinusotomy, Right Stent Placement  **Latex Allergy** ;  Surgeon: Elyse Eisenberg MD;  Location: [facility] OR     ORTHOPEDIC SURGERY       REMOVE HARDWARE WRIST Left 11/19/2019    Procedure: Left Distal Radius Hardware Removal, Flexor Pollicus Longus Repair, Deep Cultures;  Surgeon: Dinh Strong MD;  Location: [facility]        Current Outpatient Medications   Medication Sig Dispense Refill     oxyCODONE (ROXICODONE) 5 MG tablet Take 1 tablet (5 mg) by mouth every 6 hours as needed for severe pain 30 tablet 0        Anesthesia Evaluation     . Pt has had prior anesthetic. Type: General (CMAC for prior ETT due to limited mouth opening and limited neck mobility )    No history of anesthetic complications          ROS/MED HX    ENT/Pulmonary: Comment: Recurrent sinusitis, s/p ENT sinus surgery in 2019 - neg pulmonary ROS     Neurologic: Comment: Central retinal artery occlusion       Cardiovascular:     (+) hypertension----. : . . . :. .       METS/Exercise Tolerance:  >4 METS   Hematologic:     (+) Anemia, -      Musculoskeletal: Comment: RA, neck fx s/p fusion, hip fracture, osteoporosis   (+)  other musculoskeletal-       GI/Hepatic:     (+) GERD       Renal/Genitourinary:  - ROS Renal section negative       Endo:  - neg endo ROS       Psychiatric:     (+) psychiatric history anxiety      Infectious Disease:  - neg infectious disease ROS       Malignancy:      - no malignancy   Other:                     JZG FV AN PHYSICAL EXAM    LABS:  CBC:   Lab Results   Component Value Date    WBC 6.5 06/08/2020    WBC 6.5 12/11/2019    HGB 11.5 (L) 06/08/2020    HGB 11.0 (L) 12/11/2019    HCT 34.8 (L) 06/08/2020    HCT 32.6 (L) 12/11/2019     06/08/2020    PLT  "296 12/11/2019     BMP:   Lab Results   Component Value Date     06/08/2020     (L) 12/11/2019    POTASSIUM 4.2 06/08/2020    POTASSIUM 4.6 12/11/2019    CHLORIDE 102 06/08/2020    CHLORIDE 94 12/11/2019    CO2 28 06/08/2020    CO2 23 12/11/2019    BUN 11 06/08/2020    BUN 17 12/11/2019    CR 0.90 06/08/2020    CR 1.13 (H) 12/11/2019    GLC 92 06/08/2020    GLC 88 12/11/2019     COAGS:   Lab Results   Component Value Date    PTT 31 01/22/2013    INR 0.93 01/22/2013     POC:   Lab Results   Component Value Date    BGM 72 06/12/2020     OTHER:   Lab Results   Component Value Date    PH 7.41 01/24/2013    LACT 1.5 08/03/2018    KINA 8.6 06/08/2020    PHOS 3.9 07/14/2011    MAG 1.7 06/28/2010    ALBUMIN 3.5 06/08/2020    PROTTOTAL 7.0 06/08/2020    ALT 21 06/08/2020    AST 17 06/08/2020    ALKPHOS 117 06/08/2020    BILITOTAL 0.4 06/08/2020    TSH 1.26 04/27/2015    T4 0.77 09/02/2011    CRP 7.0 12/11/2019    SED 29 12/11/2019        Preop Vitals    BP Readings from Last 3 Encounters:   06/12/20 136/65   06/08/20 (!) 163/84   02/05/20 (!) 173/83    Pulse Readings from Last 3 Encounters:   06/12/20 73   06/08/20 75   02/05/20 66      Resp Readings from Last 3 Encounters:   06/12/20 12   11/19/19 16   07/31/19 16    SpO2 Readings from Last 3 Encounters:   06/12/20 100%   06/08/20 95%   02/05/20 96%      Temp Readings from Last 1 Encounters:   06/12/20 36.7  C (98.1  F) (Oral)    Ht Readings from Last 1 Encounters:   06/12/20 1.575 m (5' 2\")      Wt Readings from Last 1 Encounters:   06/12/20 52.6 kg (115 lb 15.4 oz)    Estimated body mass index is 21.21 kg/m  as calculated from the following:    Height as of an earlier encounter on 6/12/20: 1.575 m (5' 2\").    Weight as of an earlier encounter on 6/12/20: 52.6 kg (115 lb 15.4 oz).     LDA:  Peripheral IV 11/19/19 Right Lower forearm (Active)   Number of days: 206       Peripheral IV 06/12/20 Right;Posterior Hand (Active)   Site Assessment [facility] 06/12/20 0721 "   Line Status Saline locked 20   Phlebitis Scale 0-->no symptoms 20   Infiltration Scale 0 20   Infiltration Site Treatment Method  None 20   Dressing Intervention New dressing  20   Number of days: 0        Assessment:   ASA SCORE: 3      Smoking Status:  Non-Smoker/Unknown        Plan:   Anes. Type:  Peripheral Nerve Block; MAC; For Post-op pain in coordination with surgeon     Block Details: Supraclav.; Single Shot   Pre-Medication: Acetaminophen   Induction:  N/a   Airway: Native Airway   Access/Monitoring: PIV   Maintenance: TIVA     Postop Plan:   Postop Pain: Opioids  Postop Sedation/Airway: Not planned     PONV Management:   Adult Risk Factors: Female, Non-Smoker, Postop Opioids   Prevention: Ondansetron, Dexamethasone, No Volatiles     CONSENT: Direct conversation   Plan and risks discussed with: Patient   Blood Products: Consent Deferred (Minimal Blood Loss)       Comments for Plan/Consent:  79 year old female presenting for re-do of left radial fx repair.  Not well controlled HTN, GERD, RA, multiple orthopedic problems, and s/p cervical spine fusion. ASA 3.  Low risk for cardiac events.  -  [place] vs. [facility] at patient's discretion.  If [place], will use videolaryngoscope for airway due to limited neck mobility                 Rocky Bashir Pre-Procedure Evaluation    Patient: Kimberly Chau   MRN:     [id number] Gender:   female   Age:    78 year old :      1940        Preoperative Diagnosis: * No surgery found *        Past Medical History:   Diagnosis Date     Anemia      Arthritis      Cataracts      Central retinal artery occlusion      Cervical spine fracture (H)     After a fall from orthostatic sx     Chronic pain     Back of head     Gastro-oesophageal reflux disease      Head injury      Hypertension      Hyponatremia     Resolved, 2/2 diuretics     Migraines      Osteoporosis      Pneumonia 2018     Rheumatoid  arthritis(714.0)       Past Surgical History:   Procedure Laterality Date     C HAND/FINGER SURGERY UNLISTED       C PELVIS/HIP JOINT SURGERY UNLISTED       COLONOSCOPY       EYE SURGERY Left 02/2019    Hole in macula     FUSION CERVICAL POSTERIOR THREE+ LEVELS  1/22/2013    Procedure: FUSION CERVICAL POSTERIOR THREE+ LEVELS;  Cervical 1-6 Posterior Instrumentation and Fusion, Cervical 3-4 Laminectomy  .Instrumentation and fusion to Cervical 6 *Latex Allergy*;  Surgeon: Ra Bar MD;  Location: [facility] OR     GYN SURGERY       HEAD & NECK SURGERY       HYSTERECTOMY       KNEE SURGERY       NECK SURGERY       OPEN REDUCTION INTERNAL FIXATION WRIST Left 4/30/2019    Procedure: Open Reduction Internal Fixation Left Distal Radius Fracture;  Surgeon: Dinh Strong MD;  Location:  OR     OPTICAL TRACKING SYSTEM ENDOSCOPIC SINUS SURGERY Right 4/6/2018    Procedure: OPTICAL TRACKING SYSTEM ENDOSCOPIC SINUS SURGERY;  Stealth Assisted Right Maxillary Antrostomy, Anterior Ethmoidectomy And Frontal Sinusotomy;  Surgeon: Elyse Eisenberg MD;  Location:  OR     OPTICAL TRACKING SYSTEM ENDOSCOPIC SINUS SURGERY Right 8/3/2018    Procedure: OPTICAL TRACKING SYSTEM ENDOSCOPIC SINUS SURGERY;  Stealth Assisted Revision Right Frontal Sinusotomy, Right Stent Placement  **Latex Allergy** ;  Surgeon: Elyse Eisenberg MD;  Location: [facility] OR     ORTHOPEDIC SURGERY       REMOVE HARDWARE WRIST Left 11/19/2019    Procedure: Left Distal Radius Hardware Removal, Flexor Pollicus Longus Repair, Deep Cultures;  Surgeon: Dinh Strong MD;  Location: [facility] OR          Anesthesia Evaluation     .      No history of anesthetic complications          ROS/MED HX    ENT/Pulmonary:       Neurologic:       Cardiovascular: Comment: Evaluated last year for possible arrhythmia with Dr. Castellano.  Work-up was all within normal results.         METS/Exercise Tolerance:     Hematologic:         Musculoskeletal:         GI/Hepatic:        "  Renal/Genitourinary:         Endo:         Psychiatric:         Infectious Disease:         Malignancy:         Other:                         PHYSICAL EXAM:   Mental Status/Neuro:    Airway:    Respiratory:    CV:    Comments:                    Lab Results   Component Value Date    WBC 6.5 06/08/2020    HGB 11.5 (L) 06/08/2020    HCT 34.8 (L) 06/08/2020     06/08/2020    CRP 7.0 12/11/2019    SED 29 12/11/2019     06/08/2020    POTASSIUM 4.2 06/08/2020    CHLORIDE 102 06/08/2020    CO2 28 06/08/2020    BUN 11 06/08/2020    CR 0.90 06/08/2020    GLC 92 06/08/2020    KINA 8.6 06/08/2020    PHOS 3.9 07/14/2011    MAG 1.7 06/28/2010    ALBUMIN 3.5 06/08/2020    PROTTOTAL 7.0 06/08/2020    ALT 21 06/08/2020    AST 17 06/08/2020    ALKPHOS 117 06/08/2020    BILITOTAL 0.4 06/08/2020    PTT 31 01/22/2013    INR 0.93 01/22/2013    TSH 1.26 04/27/2015    T4 0.77 09/02/2011       Preop Vitals  BP Readings from Last 3 Encounters:   06/12/20 136/65   06/08/20 (!) 163/84   02/05/20 (!) 173/83    Pulse Readings from Last 3 Encounters:   06/12/20 73   06/08/20 75   02/05/20 66      Resp Readings from Last 3 Encounters:   06/12/20 12   11/19/19 16   07/31/19 16    SpO2 Readings from Last 3 Encounters:   06/12/20 100%   06/08/20 95%   02/05/20 96%      Temp Readings from Last 1 Encounters:   06/12/20 36.7  C (98.1  F) (Oral)    Ht Readings from Last 1 Encounters:   06/12/20 1.575 m (5' 2\")      Wt Readings from Last 1 Encounters:   06/12/20 52.6 kg (115 lb 15.4 oz)    Estimated body mass index is 21.21 kg/m  as calculated from the following:    Height as of an earlier encounter on 6/12/20: 1.575 m (5' 2\").    Weight as of an earlier encounter on 6/12/20: 52.6 kg (115 lb 15.4 oz).     LDA:  Peripheral IV 11/19/19 Right Lower forearm (Active)   Number of days: 206       Peripheral IV 06/12/20 Right;Posterior Hand (Active)   Site Assessment [facility] 06/12/20 0721   Line Status Saline locked 06/12/20 0721   Phlebitis Scale " 0-->no symptoms 20 0721   Infiltration Scale 0 20 0721   Infiltration Site Treatment Method  None 20   Dressing Intervention New dressing  20 0721   Number of days: 0            JZG FV AN PLAN NO PONV RULE         PAC Discussion and Assessment    ASA Classification:   Case is suitable for:   Anesthetic techniques and relevant risks discussed:   Invasive monitoring and risk discussed:   Types:   Possibility and Risk of blood transfusion discussed:   NPO instructions given:   Additional anesthetic preparation and risks discussed:   Needs early admission to pre-op area:   Other:     PAC Resident/NP Anesthesia Assessment:          Mid-Level Provider/Resident:   Date:   Time:     Attending Anesthesiologist Anesthesia Assessment:        Anesthesiologist:   Date:   Time:   Pass/Fail:   Disposition:     PAC Pharmacist Assessment:        Pharmacist:   Date:   Time:        Rocky Bashir Pre-Procedure Evaluation    Patient: Kimberly Chau   MRN:     [id number] Gender:   female   Age:    78 year old :      1940        Preoperative Diagnosis: * No surgery found *        Past Medical History:   Diagnosis Date     Anemia      Arthritis      Cataracts      Central retinal artery occlusion      Cervical spine fracture (H)     After a fall from orthostatic sx     Chronic pain     Back of head     Gastro-oesophageal reflux disease      Head injury      Hypertension      Hyponatremia     Resolved, 2/2 diuretics     Migraines      Osteoporosis      Pneumonia 2018     Rheumatoid arthritis(714.0)       Past Surgical History:   Procedure Laterality Date     C HAND/FINGER SURGERY UNLISTED       C PELVIS/HIP JOINT SURGERY UNLISTED       COLONOSCOPY       EYE SURGERY Left 2019    Hole in macula     FUSION CERVICAL POSTERIOR THREE+ LEVELS  2013    Procedure: FUSION CERVICAL POSTERIOR THREE+ LEVELS;  Cervical 1-6 Posterior Instrumentation and Fusion, Cervical 3-4 Laminectomy   .Instrumentation and fusion to Cervical 6 *Latex Allergy*;  Surgeon: Ra Bar MD;  Location: [facility] OR     GYN SURGERY       HEAD & NECK SURGERY       HYSTERECTOMY       KNEE SURGERY       NECK SURGERY       OPEN REDUCTION INTERNAL FIXATION WRIST Left 4/30/2019    Procedure: Open Reduction Internal Fixation Left Distal Radius Fracture;  Surgeon: Dinh Strong MD;  Location:  OR     OPTICAL TRACKING SYSTEM ENDOSCOPIC SINUS SURGERY Right 4/6/2018    Procedure: OPTICAL TRACKING SYSTEM ENDOSCOPIC SINUS SURGERY;  Stealth Assisted Right Maxillary Antrostomy, Anterior Ethmoidectomy And Frontal Sinusotomy;  Surgeon: Elyse Eisenberg MD;  Location:  OR     OPTICAL TRACKING SYSTEM ENDOSCOPIC SINUS SURGERY Right 8/3/2018    Procedure: OPTICAL TRACKING SYSTEM ENDOSCOPIC SINUS SURGERY;  Stealth Assisted Revision Right Frontal Sinusotomy, Right Stent Placement  **Latex Allergy** ;  Surgeon: Elyse Eisenberg MD;  [facility]     ORTHOPEDIC SURGERY       REMOVE HARDWARE WRIST Left 11/19/2019    Procedure: Left Distal Radius Hardware Removal, Flexor Pollicus Longus Repair, Deep Cultures;  Surgeon: Dinh Strong MD;  Location: [facility] OR          Anesthesia Evaluation     .      No history of anesthetic complications          ROS/MED HX    ENT/Pulmonary:     (+), recent URI . .    Neurologic:       Cardiovascular: Comment: Evaluated last year for possible arrhythmia with Dr. Castellano.  Work-up was all within normal results.         METS/Exercise Tolerance:     Hematologic:         Musculoskeletal:         GI/Hepatic:         Renal/Genitourinary:         Endo:         Psychiatric:         Infectious Disease:         Malignancy:         Other:                         PHYSICAL EXAM:   Mental Status/Neuro: A/A/O   Airway: Facies: Feasible  Mallampati: II  Mouth/Opening: Full  TM distance: > 6 cm  Neck ROM: Full   Respiratory: Auscultation: CTAB     Resp. Rate: Normal     Resp. Effort: Normal      CV: Rhythm:  "Regular  Rate: Age appropriate  Heart: Normal Sounds  Edema: None   Comments:      Dental: Normal Dentition                Lab Results   Component Value Date    WBC 6.5 06/08/2020    HGB 11.5 (L) 06/08/2020    HCT 34.8 (L) 06/08/2020     06/08/2020    CRP 7.0 12/11/2019    SED 29 12/11/2019     06/08/2020    POTASSIUM 4.2 06/08/2020    CHLORIDE 102 06/08/2020    CO2 28 06/08/2020    BUN 11 06/08/2020    CR 0.90 06/08/2020    GLC 92 06/08/2020    KINA 8.6 06/08/2020    PHOS 3.9 07/14/2011    MAG 1.7 06/28/2010    ALBUMIN 3.5 06/08/2020    PROTTOTAL 7.0 06/08/2020    ALT 21 06/08/2020    AST 17 06/08/2020    ALKPHOS 117 06/08/2020    BILITOTAL 0.4 06/08/2020    PTT 31 01/22/2013    INR 0.93 01/22/2013    TSH 1.26 04/27/2015    T4 0.77 09/02/2011       Preop Vitals  BP Readings from Last 3 Encounters:   06/12/20 (!) 151/83   06/08/20 (!) 163/84   02/05/20 (!) 173/83    Pulse Readings from Last 3 Encounters:   06/12/20 74   06/08/20 75   02/05/20 66      Resp Readings from Last 3 Encounters:   06/12/20 14   11/19/19 16   07/31/19 16    SpO2 Readings from Last 3 Encounters:   06/12/20 99%   06/08/20 95%   02/05/20 96%      Temp Readings from Last 1 Encounters:   06/12/20 36.7  C (98.1  F) (Oral)    Ht Readings from Last 1 Encounters:   06/12/20 1.575 m (5' 2\")      Wt Readings from Last 1 Encounters:   06/12/20 52.6 kg (115 lb 15.4 oz)    Estimated body mass index is 21.21 kg/m  as calculated from the following:    Height as of an earlier encounter on 6/12/20: 1.575 m (5' 2\").    Weight as of an earlier encounter on 6/12/20: 52.6 kg (115 lb 15.4 oz).     LDA:  Peripheral IV 11/19/19 Right Lower forearm (Active)   Number of days: 206       Peripheral IV 06/12/20 Right;Posterior Hand (Active)   Site Assessment [facility] 06/12/20 0721   Line Status Saline locked 06/12/20 0721   Phlebitis Scale 0-->no symptoms 06/12/20 0721   Infiltration Scale 0 06/12/20 0721   Infiltration Site Treatment Method  None 06/12/20 0721 "   Dressing Intervention New dressing  06/12/20 0721   Number of days: 0            Assessment:   ASA SCORE: 2    H&P: History and physical reviewed and following examination; no interval change.    NPO Status: NPO Appropriate     Plan:   Anes. Type:  General; For Post-op pain in coordination with surgeon   Pre-Medication: None   Induction:  IV (Standard)   Airway: ETT; Oral   Access/Monitoring: PIV   Maintenance: Balanced     Postop Plan:   Postop Pain: Opioids  Postop Sedation/Airway: Not planned     PONV Management:   Adult Risk Factors: Female, Postop Opioids   Prevention: Ondansetron     CONSENT: Direct conversation   Plan and risks discussed with: Patient   Blood Products: Consent Deferred (Minimal Blood Loss)                    PAC Discussion and Assessment    ASA Classification:   Case is suitable for:   Anesthetic techniques and relevant risks discussed:   Invasive monitoring and risk discussed:   Types:   Possibility and Risk of blood transfusion discussed:   NPO instructions given:   Additional anesthetic preparation and risks discussed:   Needs early admission to pre-op area:   Other:     PAC Resident/NP Anesthesia Assessment:          Mid-Level Provider/Resident:   Date:   Time:     Attending Anesthesiologist Anesthesia Assessment:        Anesthesiologist:   Date:   Time:   Pass/Fail:   Disposition:     PAC Pharmacist Assessment:        Pharmacist:   Date:   Time:        Alexander Kumari MD

## 2020-06-12 NOTE — ANESTHESIA PREPROCEDURE EVALUATION
Anesthesia Pre-Procedure Evaluation    Patient: Kimberly Chau   MRN:     [id number] Gender:   female   Age:    79 year old :      1940        Preoperative Diagnosis: Closed fracture of distal end of left radius with delayed healing, unspecified fracture morphology, subsequent encounter [I46.540G]   Procedure(s):  Open Reduction Internal Fixation Left Distal Radius Fracture  Left Wrist Hardware Removal, possible Distal Ulna Resection  possible Right Iliac Crest Bone Graft     HPI: revision of L distal radius fx. HTN 170s/90 in clinic.     Past Medical History:   Diagnosis Date     Anemia      Arthritis      Cataracts      Central retinal artery occlusion      Cervical spine fracture (H)     After a fall from orthostatic sx     Chronic pain     Back of head     Gastro-oesophageal reflux disease      Head injury      Hypertension      Hyponatremia     Resolved, 2/2 diuretics     Migraines      Osteoporosis      Pneumonia 2018     Rheumatoid arthritis(714.0)       Past Surgical History:   Procedure Laterality Date     C HAND/FINGER SURGERY UNLISTED       C PELVIS/HIP JOINT SURGERY UNLISTED       COLONOSCOPY       EYE SURGERY Left 2019    Hole in macula     FUSION CERVICAL POSTERIOR THREE+ LEVELS  2013    Procedure: FUSION CERVICAL POSTERIOR THREE+ LEVELS;  Cervical 1-6 Posterior Instrumentation and Fusion, Cervical 3-4 Laminectomy  .Instrumentation and fusion to Cervical 6 *Latex Allergy*;  Surgeon: Ra Bar MD;  Location: [facility] OR     GYN SURGERY       HEAD & NECK SURGERY       HYSTERECTOMY       KNEE SURGERY       NECK SURGERY       OPEN REDUCTION INTERNAL FIXATION WRIST Left 2019    Procedure: Open Reduction Internal Fixation Left Distal Radius Fracture;  Surgeon: Dinh Strong MD;  Location:  OR     OPTICAL TRACKING SYSTEM ENDOSCOPIC SINUS SURGERY Right 2018    Procedure: OPTICAL TRACKING SYSTEM ENDOSCOPIC SINUS SURGERY;  Stealth Assisted Right Maxillary Antrostomy,  Anterior Ethmoidectomy And Frontal Sinusotomy;  Surgeon: Elyse Eisenberg MD;  Location: [facility] OR     OPTICAL TRACKING SYSTEM ENDOSCOPIC SINUS SURGERY Right 8/3/2018    Procedure: OPTICAL TRACKING SYSTEM ENDOSCOPIC SINUS SURGERY;  Stealth Assisted Revision Right Frontal Sinusotomy, Right Stent Placement  **Latex Allergy** ;  Surgeon: Elyse Eisenberg MD;  Location: [facility] OR     ORTHOPEDIC SURGERY       REMOVE HARDWARE WRIST Left 11/19/2019    Procedure: Left Distal Radius Hardware Removal, Flexor Pollicus Longus Repair, Deep Cultures;  Surgeon: Dinh Strong MD;  Location: [facility]        Current Outpatient Medications   Medication Sig Dispense Refill     oxyCODONE (ROXICODONE) 5 MG tablet Take 1 tablet (5 mg) by mouth every 6 hours as needed for severe pain 30 tablet 0        Anesthesia Evaluation     . Pt has had prior anesthetic. Type: General (CMAC for prior ETT due to limited mouth opening and limited neck mobility )    No history of anesthetic complications          ROS/MED HX    ENT/Pulmonary: Comment: Recurrent sinusitis, s/p ENT sinus surgery in 2019 - neg pulmonary ROS     Neurologic: Comment: Central retinal artery occlusion       Cardiovascular:     (+) hypertension----. : . . . :. .       METS/Exercise Tolerance:  >4 METS   Hematologic:     (+) Anemia, -      Musculoskeletal: Comment: RA, neck fx s/p fusion, hip fracture, osteoporosis   (+)  other musculoskeletal-       GI/Hepatic:     (+) GERD       Renal/Genitourinary:  - ROS Renal section negative       Endo:  - neg endo ROS       Psychiatric:     (+) psychiatric history anxiety      Infectious Disease:  - neg infectious disease ROS       Malignancy:      - no malignancy   Other:                         PHYSICAL EXAM:   Mental Status/Neuro: A/A/O   Airway: Facies: Feasible  Mallampati: II  Mouth/Opening: Limited  TM distance: > 6 cm  Neck ROM: Full   Respiratory: Auscultation: CTAB     Resp. Rate: Normal     Resp. Effort: Normal      CV: Rhythm:  "Regular  Rate: Age appropriate  Heart: Normal Sounds  Edema: None   Comments:      Dental: Normal Dentition                LABS:  CBC:   Lab Results   Component Value Date    WBC 6.5 06/08/2020    WBC 6.5 12/11/2019    HGB 11.5 (L) 06/08/2020    HGB 11.0 (L) 12/11/2019    HCT 34.8 (L) 06/08/2020    HCT 32.6 (L) 12/11/2019     06/08/2020     12/11/2019     BMP:   Lab Results   Component Value Date     06/08/2020     (L) 12/11/2019    POTASSIUM 4.2 06/08/2020    POTASSIUM 4.6 12/11/2019    CHLORIDE 102 06/08/2020    CHLORIDE 94 12/11/2019    CO2 28 06/08/2020    CO2 23 12/11/2019    BUN 11 06/08/2020    BUN 17 12/11/2019    CR 0.90 06/08/2020    CR 1.13 (H) 12/11/2019    GLC 92 06/08/2020    GLC 88 12/11/2019     COAGS:   Lab Results   Component Value Date    PTT 31 01/22/2013    INR 0.93 01/22/2013     POC:   Lab Results   Component Value Date    BGM 72 06/12/2020     OTHER:   Lab Results   Component Value Date    PH 7.41 01/24/2013    LACT 1.5 08/03/2018    KINA 8.6 06/08/2020    PHOS 3.9 07/14/2011    MAG 1.7 06/28/2010    ALBUMIN 3.5 06/08/2020    PROTTOTAL 7.0 06/08/2020    ALT 21 06/08/2020    AST 17 06/08/2020    ALKPHOS 117 06/08/2020    BILITOTAL 0.4 06/08/2020    TSH 1.26 04/27/2015    T4 0.77 09/02/2011    CRP 7.0 12/11/2019    SED 29 12/11/2019        Preop Vitals    BP Readings from Last 3 Encounters:   06/12/20 136/65   06/08/20 (!) 163/84   02/05/20 (!) 173/83    Pulse Readings from Last 3 Encounters:   06/12/20 73   06/08/20 75   02/05/20 66      Resp Readings from Last 3 Encounters:   06/12/20 12   11/19/19 16   07/31/19 16    SpO2 Readings from Last 3 Encounters:   06/12/20 100%   06/08/20 95%   02/05/20 96%      Temp Readings from Last 1 Encounters:   06/12/20 36.7  C (98.1  F) (Oral)    Ht Readings from Last 1 Encounters:   06/12/20 1.575 m (5' 2\")      Wt Readings from Last 1 Encounters:   06/12/20 52.6 kg (115 lb 15.4 oz)    Estimated body mass index is 21.21 kg/m  as " "calculated from the following:    Height as of an earlier encounter on 6/12/20: 1.575 m (5' 2\").    Weight as of an earlier encounter on 6/12/20: 52.6 kg (115 lb 15.4 oz).     LDA:  Peripheral IV 11/19/19 Right Lower forearm (Active)   Number of days: 206       Peripheral IV 06/12/20 Right;Posterior Hand (Active)   Site Assessment WDL 06/12/20 0721   Line Status Saline locked 06/12/20 0721   Phlebitis Scale 0-->no symptoms 06/12/20 0721   Infiltration Scale 0 06/12/20 0721   Infiltration Site Treatment Method  None 06/12/20 0721   Dressing Intervention New dressing  06/12/20 0721   Number of days: 0        Assessment:   ASA SCORE: 3      Smoking Status:  Non-Smoker/Unknown        Plan:   Anes. Type:  Peripheral Nerve Block; For Post-op pain in coordination with surgeon; General     Block Details: Axillary      Induction:  IV (Standard)   Airway: LMA   Access/Monitoring: PIV   Maintenance: TIVA     Postop Plan:   Postop Pain: Regional     PONV Management:   Adult Risk Factors: Female, Non-Smoker   Prevention: Ondansetron, Dexamethasone, No Volatiles     CONSENT: Direct conversation   Plan and risks discussed with: Patient   Blood Products: Consent Deferred (Minimal Blood Loss)                       Alexander Kumari MD  "

## 2020-06-12 NOTE — ANESTHESIA CARE TRANSFER NOTE
Patient: Kimberly Chau    Procedure(s):  OPEN REDUCTION INTERNAL FIXATION Left Distal Radius Nonunion, Distal Ulna Resection  Autogenous Bone Grafting from Distal Ulna    Diagnosis: Closed fracture of distal end of left radius, unspecified fracture morphology, sequela [S52.502S]  Diagnosis Additional Information: No value filed.    Anesthesia Type:   Peripheral Nerve Block, For Post-op pain in coordination with surgeon, General     Note:  Airway :Face Mask  Patient transferred to:PACU  Comments: Strong SV, VSS. Report to RN.  Handoff Report: Identifed the Patient, Identified the Reponsible Provider, Reviewed the pertinent medical history, Discussed the surgical course, Reviewed Intra-OP anesthesia mangement and issues during anesthesia, Set expectations for post-procedure period and Allowed opportunity for questions and acknowledgement of understanding      Vitals: (Last set prior to Anesthesia Care Transfer)    CRNA VITALS  6/12/2020 1249 - 6/12/2020 1331      6/12/2020             Pulse:  72    SpO2:  100 %                Electronically Signed By: KEMI Combs CRNA  June 12, 2020  1:31 PM

## 2020-06-12 NOTE — OP NOTE
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      POSTOPERATIVE DIAGNOSIS:  Left distal radius nonunion     PROCEDURE:  Left wrist distal ulna resection   Bone culture of left distal radius  Open reduction left distal radius nonunion with distal ulna autograft     ATTENDING SURGEON:  Dinh Strong MD      ASSISTANT:  JORGE Akhtar     ANESTHESIA:  General    ESTIMATED BLOOD LOSS:  Minimal      SPECIMENS:  None.      DRAINS:  None.      IMPLANTS:  Synthes distal radius volar locking plate     COMPLICATIONS:  None apparent.      BRIEF HISTORY:  Kimberly Chau is a 79 year old-year-old female who presented after having sustained a left distal radius fracture. She previously underwent open reduction internal fixation. This was complicated by nonunion and hardware breakage. She previously underwent hardware removal and cultures as well as antibiotic therapy. She is now indicated for revision ORIF with bone graft.  We discussed the risks, benefits and alternatives to surgery. Risks discussed include bleeding, infection, nerve, tendon, or vessel damage, wound healing problems,   persistent pain and/or stiffness, malunion, nonunion, postraumatic arthritis, hardware irritation, repeat hardware failure, and the possibility for further surgery. The patient, expressed understanding and elected to proceed. Informed consent was obtained.    INTRAOPERATIVE FINDINGS:  The distal ulna was partially united with bony bridging dorsally only. The distal radius was a nonunion with fibrous debris, sclerotic bone ends, and necrotic appearing bone fragments at fracture site. No evidence ongoing infection.     DESCRIPTION OF PROCEDURE:  The patient was identified in the preoperative holding area.  The informed consent was reviewed. The operative site was identified and marked. A regional block was performed by the anesthesiologist. The patient was then brought to the operating room and general anesthesia was  induced. She was positioned with the operative extremity over a hand table. A smith was placed. A tourniquet was placed around the upper arm.  The operative arm was prepped and draped in a standard sterile fashion. Antibiotics were not given until later, after cultures were taken.   A timeout was performed, verifying the correct patient, procedure to be performed, and operative site. The arm was exsanguinated and the tourniquet was inflated to 250 mmHg. A longitudinal incision was taken down dorsally over the distal ulna to the extensor retinaculum which was opened between the fifth and fourth dorsal compartments.  The ulnar fracture site was identified and opened with an osteotome while surrounding tissues were protected by hohmans. The distal ulna  fragment, which was translated very volarly due to fracture displacement was then dissected subperiosteally and removed. It was placed on the back table.     We then turned to the radius.  A longitudinal incision was made overlying the FCR tendon utilizing the prior incision. There was extensive scarring  And the skin had to be carefully elevated off the FCR tendon which it was directly adhered to. The dissection had to be undertaken with great care so as to protect surrounding neurovascular structures. The FPL was identified and retracted ulnarly. Prior FPL repair appeared to be intact though the FPL was embedded in surrounding scar tissue and was not dissected out. It was retracted ulnarly. The distal radius was exposed with help of a freer. The distal fragment had further displaced volarly and radially from prior x-rays. Fibrinous debris was abundant and was dissected out of the fracture site carefully. Cultures were also taken and then antibiotics given.  Sclerotic bone fragments were apparent that did not appear viable and were removed. This left an approximately 1 cm gap. I felt that shortening of the radius would give the patient the best chance of healing the  fracture and so the proximal and distal fragments were mobilized to allow them to be compressed against one another. A small amount of distal ulna was taken additionally to allow this. The bone ends were cleared of all sclerotic nonviable debris with a rongeur and curette till we had good bleeding bone on both sides. The wound was irrigated copiously. A plate was selected. Cancellous bone graft was then harvested from the excised distal ulna and packed into the fracture site. The distal and proximal fragments were reduced and secured with a k-wire through the styloid. The plate was applied with distal locking screws. The distal and proximal bone fragments were then compressed against one another and proximal nonlocking screws were placed. There was excellent bony apposition but we were able to insert a small additional amount of bony graft from dorso-radially. K-wire was removed. The fixation appeared very stable. The wrist was ranged and there was no fracture site motion. The Distal ulna also appeared stable. Dorsally the capsule was closed over the distal ulna in pronation followed by the extensor retinaculum. Tourniquet was taken down and hemostasis achieved. SKin was closed with 3-0 vicryl 4-0 nylon. The patient was placed in a sugartong splint. She was then awoken and brought to the recovery room ins table condition.      POSTOPERATIVE PLAN:  The patient will be discharged to home today with oral pain medications . The patient will elevate and ice. The patient will return to clinic in 1-2 weeks to be placed in a [place] cast.

## 2020-06-15 DIAGNOSIS — S52.502G CLOSED FRACTURE OF DISTAL END OF LEFT RADIUS WITH DELAYED HEALING, UNSPECIFIED FRACTURE MORPHOLOGY, SUBSEQUENT ENCOUNTER: Primary | ICD-10-CM

## 2020-06-17 ENCOUNTER — ANCILLARY PROCEDURE (OUTPATIENT)
Dept: GENERAL RADIOLOGY | Facility: CLINIC | Age: 80
End: 2020-06-17
Attending: ORTHOPAEDIC SURGERY
Payer: MEDICARE

## 2020-06-17 ENCOUNTER — OFFICE VISIT (OUTPATIENT)
Dept: ORTHOPEDICS | Facility: CLINIC | Age: 80
End: 2020-06-17
Payer: MEDICARE

## 2020-06-17 DIAGNOSIS — M83.9 ADULT OSTEOMALACIA, UNSPECIFIED: ICD-10-CM

## 2020-06-17 DIAGNOSIS — D64.9 ANEMIA, UNSPECIFIED TYPE: ICD-10-CM

## 2020-06-17 DIAGNOSIS — S52.502G CLOSED FRACTURE OF DISTAL END OF LEFT RADIUS WITH DELAYED HEALING, UNSPECIFIED FRACTURE MORPHOLOGY, SUBSEQUENT ENCOUNTER: ICD-10-CM

## 2020-06-17 DIAGNOSIS — S52.502G CLOSED FRACTURE OF DISTAL END OF LEFT RADIUS WITH DELAYED HEALING, UNSPECIFIED FRACTURE MORPHOLOGY, SUBSEQUENT ENCOUNTER: Primary | ICD-10-CM

## 2020-06-17 LAB
ALP SERPL-CCNC: 121 U/L (ref 40–150)
BACTERIA SPEC CULT: NO GROWTH
BACTERIA SPEC CULT: NO GROWTH
BASOPHILS # BLD AUTO: 0.1 10E9/L (ref 0–0.2)
BASOPHILS NFR BLD AUTO: 0.8 %
BUN SERPL-MCNC: 9 MG/DL (ref 7–30)
CALCIUM SERPL-MCNC: 8.6 MG/DL (ref 8.5–10.1)
CREAT SERPL-MCNC: 0.85 MG/DL (ref 0.52–1.04)
DIFFERENTIAL METHOD BLD: ABNORMAL
EOSINOPHIL # BLD AUTO: 0.6 10E9/L (ref 0–0.7)
EOSINOPHIL NFR BLD AUTO: 9.2 %
ERYTHROCYTE [DISTWIDTH] IN BLOOD BY AUTOMATED COUNT: 13 % (ref 10–15)
FERRITIN SERPL-MCNC: 213 NG/ML (ref 8–252)
FOLATE SERPL-MCNC: 27.5 NG/ML
GFR SERPL CREATININE-BSD FRML MDRD: 64 ML/MIN/{1.73_M2}
HCT VFR BLD AUTO: 35.1 % (ref 35–47)
HGB BLD-MCNC: 11.4 G/DL (ref 11.7–15.7)
IMM GRANULOCYTES # BLD: 0 10E9/L (ref 0–0.4)
IMM GRANULOCYTES NFR BLD: 0.5 %
IRON SATN MFR SERPL: 21 % (ref 15–46)
IRON SERPL-MCNC: 59 UG/DL (ref 35–180)
LYMPHOCYTES # BLD AUTO: 0.9 10E9/L (ref 0.8–5.3)
LYMPHOCYTES NFR BLD AUTO: 12.9 %
MCH RBC QN AUTO: 34.1 PG (ref 26.5–33)
MCHC RBC AUTO-ENTMCNC: 32.5 G/DL (ref 31.5–36.5)
MCV RBC AUTO: 105 FL (ref 78–100)
MONOCYTES # BLD AUTO: 0.8 10E9/L (ref 0–1.3)
MONOCYTES NFR BLD AUTO: 11.5 %
NEUTROPHILS # BLD AUTO: 4.3 10E9/L (ref 1.6–8.3)
NEUTROPHILS NFR BLD AUTO: 65.1 %
NRBC # BLD AUTO: 0 10*3/UL
NRBC BLD AUTO-RTO: 0 /100
PHOSPHATE SERPL-MCNC: 3.5 MG/DL (ref 2.5–4.5)
PLATELET # BLD AUTO: 252 10E9/L (ref 150–450)
PTH-INTACT SERPL-MCNC: 33 PG/ML (ref 18–80)
RBC # BLD AUTO: 3.34 10E12/L (ref 3.8–5.2)
SPECIMEN SOURCE: NORMAL
SPECIMEN SOURCE: NORMAL
TIBC SERPL-MCNC: 284 UG/DL (ref 240–430)
VIT B12 SERPL-MCNC: 517 PG/ML (ref 193–986)
WBC # BLD AUTO: 6.6 10E9/L (ref 4–11)

## 2020-06-17 PROCEDURE — 83970 ASSAY OF PARATHORMONE: CPT | Performed by: INTERNAL MEDICINE

## 2020-06-17 PROCEDURE — 82306 VITAMIN D 25 HYDROXY: CPT | Performed by: INTERNAL MEDICINE

## 2020-06-17 PROCEDURE — 82746 ASSAY OF FOLIC ACID SERUM: CPT | Performed by: INTERNAL MEDICINE

## 2020-06-17 NOTE — NURSING NOTE
Reason For Visit:   Chief Complaint   Patient presents with     RECHECK     DOS 6/12/20 OPEN REDUCTION INTERNAL FIXATION left distal radius nonunion, distal ulna resection,  autogenous bone grafting from iliac crest or distal ulna (Left)      Primary MD: Guillermo Castellano    Age: 79 year old    ?  No    There were no vitals taken for this visit.    Pain Assessment  Patient Currently in Pain: Yes  0-10 Pain Scale: 3  Primary Pain Location: Hand    QuickDASH Assessment  No flowsheet data found.     Current Outpatient Medications   Medication Sig Dispense Refill     acetaminophen 650 MG TABS Take 650 mg by mouth every 4 hours as needed. 100 tablet 0     amLODIPine (NORVASC) 10 MG tablet Take 1 tablet (10 mg) by mouth daily (Patient taking differently: Take 10 mg by mouth daily Takes at night) 90 tablet 3     Diphenhydramine-APAP, sleep, (TYLENOL PM EXTRA STRENGTH PO) Take 2 tablets by mouth At Bedtime        doxycycline hyclate (VIBRAMYCIN) 50 MG capsule Take 2 capsules (100 mg) by mouth 2 times daily 60 capsule 1     doxycycline monohydrate (ADOXA) 100 MG tablet Take 1 tablet (100 mg) by mouth 2 times daily 60 tablet 1     ferrous gluconate (FERGON) 324 (38 Fe) MG tablet Take 324 mg by mouth       metoprolol tartrate (LOPRESSOR) 100 MG tablet Take 2 tablets (200 mg) by mouth 2 times daily 360 tablet 1     Multiple Vitamin (MULTI-VITAMIN) per tablet Take 1 tablet by mouth every morning        Omeprazole (PRILOSEC PO) Take 20 mg by mouth as needed        sodium chloride (OCEAN NASAL SPRAY) 0.65 % nasal spray Spray 1 spray into both nostrils daily as needed for congestion 1 Bottle 3     sulfamethoxazole-trimethoprim (BACTRIM DS/SEPTRA DS) 800-160 MG tablet Take 1 tablet by mouth 2 times daily 60 tablet 0     oxyCODONE (ROXICODONE) 5 MG tablet Take 1 tablet (5 mg) by mouth every 6 hours as needed for severe pain (Patient not taking: Reported on 6/17/2020) 30 tablet 0     sulfamethoxazole-trimethoprim (BACTRIM  DS/SEPTRA DS) 800-160 MG tablet Take 1 tablet by mouth 2 times daily (Patient not taking: Reported on 5/4/2020) 60 tablet 0     Allergies   Allergen Reactions     Hctz Other (See Comments)     Pt develops symptomatic and significant hyponatremia with HCTZ exposure     Hydrochlorothiazide      Other reaction(s): Hyponatremia  Hyponatremia     Latex      Benzocaine Rash     carba mix,thrium-sunscreen     Resorcinol-Alcohol Rash     carba mix,thrium-sunscreen     Ace Inhibitors Unknown     Dizziness and orthostatic changes and electrolyte problems.     Latex Unknown     Fallon Amador ATC

## 2020-06-17 NOTE — PROGRESS NOTES
Cast/splint application    Date/Time: 6/17/2020 11:53 AM  Performed by: Madison Pabon ATC  Authorized by: Dinh Strong MD     Consent:     Consent obtained:  Verbal    Consent given by:  Patient  Pre-procedure details:     Sensation:  Normal  Procedure details:     Laterality:  Left    Location:  Arm    Splint type: Sugartong.    Supplies:  Plaster  Post-procedure details:     Pain:  Unchanged    Sensation:  Normal    Patient tolerance of procedure:  Tolerated well, no immediate complications    Patient provided with cast or splint care instructions: Yes

## 2020-06-17 NOTE — LETTER
6/17/2020     RE: Kimberly Chau  [address] [place] [address]    Dear Colleague,    Thank you for referring your patient, Kimberly Chau, to the [facility]. Please see a copy of my visit note below.    Cast/splint application    Date/Time: 6/17/2020 11:53 AM  Performed by: Madison Pabon ATC  Authorized by: Dinh Strong MD     Consent:     Consent obtained:  Verbal    Consent given by:  Patient  Pre-procedure details:     Sensation:  Normal  Procedure details:     Laterality:  Left    Location:  Arm    Splint type: Sugartong.    Supplies:  Plaster  Post-procedure details:     Pain:  Unchanged    Sensation:  Normal    Patient tolerance of procedure:  Tolerated well, no immediate complications    Patient provided with cast or splint care instructions: Yes          Date of Service: Jun 17, 2020    Reason for visit: Postoperative follow-up    Date of Surgery: 6/12/20    Procedure Performed: Left wrist distal ulna resection   Bone culture of left distal radius  Open reduction left distal radius nonunion with distal ulna autograft         Interval events: Kimberly Chau comes to see us in follow-up from the above surgery. Patient reports pain has been well managed. Patient denies any new numbness or tingling. No other concerns at this time.    Physical examination:  The patient is well-developed, well nourished and in on acute distress. The patient is alert and oriented to the surroundings.     Examination of the left upper extremity reveals the incisions to be well approximated. There is no erythema or drainage. Swelling is Moderate. Sensation is intact throughout.    Fingers appear well-perfused with good capillary refill    Radiographs: Three views of the left wrist were obtained and reviewed. These demonstrate no changes in hardware or bony alignment.     Assessment: Progressing appropriately following the above procedure    Plan:  Sutures not ready to come out yet. New Sugartong  today. Follow-up in one week for suture removal and muenster cast. NWB surgical side. Metabolic bone labs. She will use her bone stimulator at home. Already has it from before.     Again, thank you for allowing me to participate in the care of your patient.      Sincerely,      Dinh Strong MD

## 2020-06-17 NOTE — PROGRESS NOTES
Date of Service: Jun 17, 2020    Reason for visit: Postoperative follow-up    Date of Surgery: 6/12/20    Procedure Performed: Left wrist distal ulna resection   Bone culture of left distal radius  Open reduction left distal radius nonunion with distal ulna autograft         Interval events: Kimberly Chau comes to see us in follow-up from the above surgery. Patient reports pain has been well managed. Patient denies any new numbness or tingling. No other concerns at this time.    Physical examination:  The patient is well-developed, well nourished and in on acute distress. The patient is alert and oriented to the surroundings.     Examination of the left upper extremity reveals the incisions to be well approximated. There is no erythema or drainage. Swelling is Moderate. Sensation is intact throughout.    Fingers appear well-perfused with good capillary refill    Radiographs: Three views of the left wrist were obtained and reviewed. These demonstrate no changes in hardware or bony alignment.     Assessment: Progressing appropriately following the above procedure    Plan:  Sutures not ready to come out yet. New Sugartonvincenzo today. Follow-up in one week for suture removal and muenster cast. NWB surgical side. Metabolic bone labs. She will use her bone stimulator at home. Already has it from before.

## 2020-06-18 ENCOUNTER — TELEPHONE (OUTPATIENT)
Dept: INTERNAL MEDICINE | Facility: CLINIC | Age: 80
End: 2020-06-18

## 2020-06-18 DIAGNOSIS — D64.9 ANEMIA, UNSPECIFIED TYPE: Primary | ICD-10-CM

## 2020-06-18 NOTE — TELEPHONE ENCOUNTER
Placed future lab orders this encounter    Dear Kimberly;    Your lab tests are stable. I recommend rechecking in about 6 months and I placed a laboratory order for this and you can call [phone] to schedule this appointment.    RADHA Castellano

## 2020-06-19 LAB
BACTERIA SPEC CULT: NORMAL
BACTERIA SPEC CULT: NORMAL
DEPRECATED CALCIDIOL+CALCIFEROL SERPL-MC: 99 UG/L (ref 20–75)
Lab: NORMAL
Lab: NORMAL
SPECIMEN SOURCE: NORMAL
SPECIMEN SOURCE: NORMAL

## 2020-06-19 NOTE — ANESTHESIA POSTPROCEDURE EVALUATION
Anesthesia POST Procedure Evaluation    Patient: Kimberly Chau   MRN:     [id number] Gender:   female   Age:    79 year old :      1940        Preoperative Diagnosis: Closed fracture of distal end of left radius, unspecified fracture morphology, sequela [S52.502S]   Procedure(s):  OPEN REDUCTION INTERNAL FIXATION Left Distal Radius Nonunion, Distal Ulna Resection   Postop Comments: No value filed.     Anesthesia Type: Peripheral Nerve Block, For Post-op pain in coordination with surgeon, General          Postop Pain Control: Uneventful            Sign Out: Well controlled pain   PONV: No   Neuro/Psych: Uneventful            Sign Out: Acceptable/Baseline neuro status   Airway/Respiratory: Uneventful            Sign Out: Acceptable/Baseline resp. status   CV/Hemodynamics: Uneventful            Sign Out: Acceptable CV status   Other NRE: NONE   DID A NON-ROUTINE EVENT OCCUR? No         Last Anesthesia Record Vitals:  CRNA VITALS  2020 1249 - 2020 1349      2020             Pulse:  72    SpO2:  100 %          Last PACU Vitals:  Vitals Value Taken Time   /68 2020  2:35 PM   Temp 36.5  C (97.7  F) 2020  2:35 PM   Pulse 72 2020  2:35 PM   Resp 16 2020  2:35 PM   SpO2 96 % 2020  2:35 PM   Temp src     NIBP     Pulse     SpO2     Resp     Temp     Ht Rate     Temp 2           Electronically Signed By: Alexander Kumari MD, 2020, 2:44 PM

## 2020-06-23 DIAGNOSIS — S52.502G CLOSED FRACTURE OF DISTAL END OF LEFT RADIUS WITH DELAYED HEALING, UNSPECIFIED FRACTURE MORPHOLOGY, SUBSEQUENT ENCOUNTER: Primary | ICD-10-CM

## 2020-07-01 ENCOUNTER — OFFICE VISIT (OUTPATIENT)
Dept: ORTHOPEDICS | Facility: CLINIC | Age: 80
End: 2020-07-01
Payer: MEDICARE

## 2020-07-01 ENCOUNTER — ANCILLARY PROCEDURE (OUTPATIENT)
Dept: GENERAL RADIOLOGY | Facility: CLINIC | Age: 80
End: 2020-07-01
Attending: ORTHOPAEDIC SURGERY
Payer: MEDICARE

## 2020-07-01 DIAGNOSIS — Z98.890 S/P ORIF (OPEN REDUCTION INTERNAL FIXATION) FRACTURE: Primary | ICD-10-CM

## 2020-07-01 DIAGNOSIS — I10 ESSENTIAL HYPERTENSION: ICD-10-CM

## 2020-07-01 DIAGNOSIS — Z87.81 S/P ORIF (OPEN REDUCTION INTERNAL FIXATION) FRACTURE: Primary | ICD-10-CM

## 2020-07-01 DIAGNOSIS — S52.502G CLOSED FRACTURE OF DISTAL END OF LEFT RADIUS WITH DELAYED HEALING, UNSPECIFIED FRACTURE MORPHOLOGY, SUBSEQUENT ENCOUNTER: ICD-10-CM

## 2020-07-01 NOTE — LETTER
7/1/2020     RE: Kimberly Chau  [address] [place] [address]    Dear Colleague,    Thank you for referring your patient, Kimberly Chau, to the [facility]. Please see a copy of my visit note below.    Cast/splint application    Date/Time: 7/1/2020 3:20 PM  Performed by: Kena Luna ATC  Authorized by: Dinh Strong MD     Consent:     Consent obtained:  Verbal    Consent given by:  Patient  Pre-procedure details:     Sensation:  Normal  Procedure details:     Laterality:  Left    Location:  Arm    Arm:  L lower arm    Cast type:  [place]    Supplies:  Fiberglass  Post-procedure details:     Sensation:  Normal    Patient tolerance of procedure:  Tolerated well, no immediate complications    Patient provided with cast or splint care instructions: Yes            Kimberly came in today for suture removal.  She reports she has been doing well.  Fingers feel very stiff.  Splint did not really let her move them.    Her incisions are healing very nicely.  Sutures are ready to come out.  Sensation intact throughout distally.  Fingers are indeed stiff.    X-rays were reviewed today and are unchanged.    Sutures will come out today.  We will put her in a [place] cast.  She will follow-up in 1 month.  In the interim we will refer her for hand therapy to work on range of motion.  She says her bone stimulator is out of minutes.  We will contact any to see if anything can be done.    Again, thank you for allowing me to participate in the care of your patient.      Sincerely,      Dinh Strong MD

## 2020-07-01 NOTE — PROGRESS NOTES
Cast/splint application    Date/Time: 7/1/2020 3:20 PM  Performed by: Kena Luna ATC  Authorized by: Dinh Strong MD     Consent:     Consent obtained:  Verbal    Consent given by:  Patient  Pre-procedure details:     Sensation:  Normal  Procedure details:     Laterality:  Left    Location:  Arm    Arm:  L lower arm    Cast type:  [place]    Supplies:  Fiberglass  Post-procedure details:     Sensation:  Normal    Patient tolerance of procedure:  Tolerated well, no immediate complications    Patient provided with cast or splint care instructions: Yes

## 2020-07-01 NOTE — NURSING NOTE
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  No    There were no vitals taken for this visit.    Pain Assessment  Patient Currently in Pain: Yes  0-10 Pain Scale: 4  Primary Pain Location: Hand    QuickDASH Assessment  No flowsheet data found.     Current Outpatient Medications   Medication Sig Dispense Refill     acetaminophen 650 MG TABS Take 650 mg by mouth every 4 hours as needed. 100 tablet 0     amLODIPine (NORVASC) 10 MG tablet Take 1 tablet (10 mg) by mouth daily (Patient taking differently: Take 10 mg by mouth daily Takes at night) 90 tablet 3     Diphenhydramine-APAP, sleep, (TYLENOL PM EXTRA STRENGTH PO) Take 2 tablets by mouth At Bedtime        ferrous gluconate (FERGON) 324 (38 Fe) MG tablet Take 324 mg by mouth       metoprolol tartrate (LOPRESSOR) 100 MG tablet Take 2 tablets (200 mg) by mouth 2 times daily 360 tablet 1     Multiple Vitamin (MULTI-VITAMIN) per tablet Take 1 tablet by mouth every morning        Omeprazole (PRILOSEC PO) Take 20 mg by mouth as needed        doxycycline hyclate (VIBRAMYCIN) 50 MG capsule Take 2 capsules (100 mg) by mouth 2 times daily (Patient not taking: Reported on 7/1/2020) 60 capsule 1     doxycycline monohydrate (ADOXA) 100 MG tablet Take 1 tablet (100 mg) by mouth 2 times daily (Patient not taking: Reported on 7/1/2020) 60 tablet 1     oxyCODONE (ROXICODONE) 5 MG tablet Take 1 tablet (5 mg) by mouth every 6 hours as needed for severe pain (Patient not taking: Reported on 6/17/2020) 30 tablet 0     sodium chloride (OCEAN NASAL SPRAY) 0.65 % nasal spray Spray 1 spray into both nostrils daily as needed for congestion 1 Bottle 3     sulfamethoxazole-trimethoprim (BACTRIM DS/SEPTRA DS) 800-160 MG tablet Take 1 tablet by mouth 2 times daily (Patient  not taking: Reported on 5/4/2020) 60 tablet 0     sulfamethoxazole-trimethoprim (BACTRIM DS/SEPTRA DS) 800-160 MG tablet Take 1 tablet by mouth 2 times daily (Patient not taking: Reported on 7/1/2020) 60 tablet 0     Allergies   Allergen Reactions     Hctz Other (See Comments)     Pt develops symptomatic and significant hyponatremia with HCTZ exposure     Hydrochlorothiazide      Other reaction(s): Hyponatremia  Hyponatremia     Latex      Benzocaine Rash     carba mix,thrium-sunscreen     Resorcinol-Alcohol Rash     carba mix,thrium-sunscreen     Ace Inhibitors Unknown     Dizziness and orthostatic changes and electrolyte problems.     Latex Unknown     Fallon Amador ATC

## 2020-07-01 NOTE — PROGRESS NOTES
Kimberly came in today for suture removal.  She reports she has been doing well.  Fingers feel very stiff.  Splint did not really let her move them.    Her incisions are healing very nicely.  Sutures are ready to come out.  Sensation intact throughout distally.  Fingers are indeed stiff.    X-rays were reviewed today and are unchanged.    Sutures will come out today.  We will put her in a [place] cast.  She will follow-up in 1 month.  In the interim we will refer her for hand therapy to work on range of motion.  She says her bone stimulator is out of minutes.  We will contact any to see if anything can be done.

## 2020-07-02 RX ORDER — AMLODIPINE BESYLATE 10 MG/1
10 TABLET ORAL DAILY
Qty: 90 TABLET | Refills: 0 | Status: SHIPPED | OUTPATIENT
Start: 2020-07-02 | End: 2022-05-17

## 2020-07-02 NOTE — TELEPHONE ENCOUNTER
Last Clinic Visit: 6/8/2020  [facility]  BP above protocol parameters: FYI to clinic RN  RF 90 day sent to pharm

## 2020-07-11 DIAGNOSIS — I10 ESSENTIAL HYPERTENSION: ICD-10-CM

## 2020-07-14 RX ORDER — METOPROLOL TARTRATE 100 MG
200 TABLET ORAL 2 TIMES DAILY
Qty: 360 TABLET | Refills: 0 | Status: SHIPPED | OUTPATIENT
Start: 2020-07-14 | End: 2020-10-13

## 2020-07-14 NOTE — TELEPHONE ENCOUNTER
Last Clinic Visit: 6/8/2020  [facility]    Metoprolol: BP above protocol parameters - refill sent for 90 days supply and FYI to provider.    BP Readings from Last 3 Encounters:   06/12/20 (!) 145/75   06/08/20 (!) 163/84   02/05/20 (!) 173/83

## 2020-07-20 ENCOUNTER — OFFICE VISIT (OUTPATIENT)
Dept: ORTHOPEDICS | Facility: CLINIC | Age: 80
End: 2020-07-20
Payer: MEDICARE

## 2020-07-20 ENCOUNTER — TELEPHONE (OUTPATIENT)
Dept: ORTHOPEDICS | Facility: CLINIC | Age: 80
End: 2020-07-20

## 2020-07-20 DIAGNOSIS — Z87.81 S/P ORIF (OPEN REDUCTION INTERNAL FIXATION) FRACTURE: Primary | ICD-10-CM

## 2020-07-20 DIAGNOSIS — Z98.890 S/P ORIF (OPEN REDUCTION INTERNAL FIXATION) FRACTURE: Primary | ICD-10-CM

## 2020-07-20 NOTE — NURSING NOTE
Spoke with patient because she had a concern with her bone stimulator running out of minutes.  She had called a few weeks ago with this concern and we had reached out to the bone stimulator rep and she was to call her right away.  Patient informed that at that time the bone stim rep said she still qualified for more treatment.  Patient has her number and will call her tomorrow.  She states she has been getting calls from an unknown number and she may have missed the representative's call. Elyse Webster RN

## 2020-07-20 NOTE — TELEPHONE ENCOUNTER
[facility]    Phone Message    May a detailed message be left on voicemail: yes     Reason for Call: Other:    Cast on left hand.   She is able to move this up and down quite a bit.  And she can wiggle this side to side a bit too.   She was told to call if she needed her cast replaced and she feels that she should do this now.     Please call Kimberly back to discuss.         Action Taken: Message routed to:  [facility] ([facility] Ortho    Travel Screening: Not Applicable

## 2020-07-22 NOTE — PROGRESS NOTES
Kimberly presents to the clinic today for a cast change. The cast was removed in the proper fashion, and skin was normal and intact upon inspection.    A new properly fitting [place] cast was applied. The patient will follow up with Dr. Strong as previously scheduled.    Cast/splint application    Date/Time: 7/20/2020 3:30 PM  Performed by: Madison Pabon ATC  Authorized by: Dinh Strong MD     Consent:     Consent obtained:  Verbal    Consent given by:  Patient  Pre-procedure details:     Sensation:  Normal  Procedure details:     Laterality:  Left    Location:  Arm    Cast type:  [place]    Supplies:  Fiberglass  Post-procedure details:     Pain:  Unchanged    Sensation:  Normal    Patient tolerance of procedure:  Tolerated well, no immediate complications    Patient provided with cast or splint care instructions: Yes

## 2020-07-22 NOTE — NURSING NOTE
7-21-20  Bone stimulator representative emailed RN, she will reach out to patient again. Elyse Webster RN

## 2020-07-23 DIAGNOSIS — Z87.81 S/P ORIF (OPEN REDUCTION INTERNAL FIXATION) FRACTURE: Primary | ICD-10-CM

## 2020-07-23 DIAGNOSIS — Z98.890 S/P ORIF (OPEN REDUCTION INTERNAL FIXATION) FRACTURE: Primary | ICD-10-CM

## 2020-07-29 ENCOUNTER — THERAPY VISIT (OUTPATIENT)
Dept: OCCUPATIONAL THERAPY | Facility: CLINIC | Age: 80
End: 2020-07-29
Payer: MEDICARE

## 2020-07-29 ENCOUNTER — OFFICE VISIT (OUTPATIENT)
Dept: ORTHOPEDICS | Facility: CLINIC | Age: 80
End: 2020-07-29
Payer: MEDICARE

## 2020-07-29 ENCOUNTER — ANCILLARY PROCEDURE (OUTPATIENT)
Dept: GENERAL RADIOLOGY | Facility: CLINIC | Age: 80
End: 2020-07-29
Attending: ORTHOPAEDIC SURGERY
Payer: MEDICARE

## 2020-07-29 DIAGNOSIS — Z87.81 S/P ORIF (OPEN REDUCTION INTERNAL FIXATION) FRACTURE: Primary | ICD-10-CM

## 2020-07-29 DIAGNOSIS — Z98.890 S/P ORIF (OPEN REDUCTION INTERNAL FIXATION) FRACTURE: Primary | ICD-10-CM

## 2020-07-29 DIAGNOSIS — Z87.81 S/P ORIF (OPEN REDUCTION INTERNAL FIXATION) FRACTURE: ICD-10-CM

## 2020-07-29 DIAGNOSIS — Z98.890 S/P ORIF (OPEN REDUCTION INTERNAL FIXATION) FRACTURE: ICD-10-CM

## 2020-07-29 DIAGNOSIS — Z47.89 AFTERCARE FOLLOWING SURGERY OF THE MUSCULOSKELETAL SYSTEM: ICD-10-CM

## 2020-07-29 DIAGNOSIS — M25.532 LEFT WRIST PAIN: Primary | ICD-10-CM

## 2020-07-29 PROCEDURE — 97760 ORTHOTIC MGMT&TRAING 1ST ENC: CPT | Mod: GO | Performed by: OCCUPATIONAL THERAPIST

## 2020-07-29 PROCEDURE — 97165 OT EVAL LOW COMPLEX 30 MIN: CPT | Mod: GO | Performed by: OCCUPATIONAL THERAPIST

## 2020-07-29 PROCEDURE — 97110 THERAPEUTIC EXERCISES: CPT | Mod: GO | Performed by: OCCUPATIONAL THERAPIST

## 2020-07-29 NOTE — LETTER
DEPARTMENT OF HEALTH AND HUMAN SERVICES  CENTERS FOR MEDICARE & MEDICAID SERVICES    PLAN/UPDATED PLAN OF PROGRESS FOR OUTPATIENT REHABILITATION    PATIENTS NAME:  Kimberly Chau   : 1940  PROVIDER NUMBER:    [id number]  HICN:  [id number]   PROVIDER NAME: [facility]  MEDICAL RECORD NUMBER: [id number]   START OF CARE DATE:  SOC Date: 20   TYPE:  PT  PRIMARY/TREATMENT DIAGNOSIS: (Pertinent Medical Diagnosis)  S/P ORIF (open reduction internal fixation) fracture  Aftercare following surgery of the musculoskeletal system  Left wrist pain  VISITS FROM START OF CARE:  Rxs Used: 1     Hand Therapy Initial Evaluation  Current Date:  2020  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    Patient reports symptoms of the left wrist which occurred due to a fall in the bathroom on 19. She previously underwent open reduction internal fixation on 2019. This was complicated by nonunion and hardware breakage.  Since onset symptoms are Gradually getting better.  General health as reported by patient is good.  Pertinent medical history includes:Anemia, High Blood Pressure, Migraines/Headaches, Osteoarthritis, Rheumatoid Arthritis, Osteoporosis, Head injury  Medical allergies:latex.  Surgical history: orthopedic: Left wrist open reduction internal fixation, cervical spine fusion, left hip, other: Eye.  Medication history: See EMR.    Current occupation is retired; cares for dog, cooking, cleaning  Daily Tasks: Lifting, Carrying, Prolonged Sitting, Repetitive Tasks  Occupational Profile Information:  Right hand dominant  Prior functional level:  independent-shared household chores  Patient reports symptoms of pain, stiffness/loss of motion,  weakness/loss of strength and edema  Special tests:  x-ray.    Previous treatment: Hand therapy after prior surgery  Barriers include:none  Mobility: No difficulty  Transportation: Spouse drives  Leisure activities/hobbies: Reading newspaper and magazine  Other: Lives with spouse    Functional Outcome Measure:   Upper Extremity Functional Index Score:  SCORE: 31/80   (A lower score indicates greater disability.)    PATIENTS NAME:  Kimberly Chau   : 1940    Objective:  Pain Level (Scale 0-10)   2020   At Rest 1/10   With Use 3/10     Pain Description  Date 2020   Location Wrist, radial and ulnar aspects   Pain Quality Aching   Frequency intermittent     Pain is worst  daytime   Exacerbated by  Wrist AROM   Relieved by Rest   Progression Gradually improving     Wound/Scar  Surgical incision on volar wrist is well-healed.    Edema  Wrist (Circumference measured in cm)  DWC 2020   Right 5.1   Left 6.2     Hand (Circumference measured in cm)   2020    Right Left   Index P1 6.5 6.6   Long P1 6.0 6.6   Ring P1 6.3 6.2   Small P1 5.5 5.9     Sensation   WNL throughout all nerve distributions; per patient report     ROM  Pain Report: - none  + mild    ++ moderate    +++ severe   Wrist 2020   AROM (PROM) Right Left   Extension 45 0   Flexion 55 15   RD 15 5   UD 30 0   Supination 85 55   Pronation 85 70                   PATIENTS NAME:  Kimberly Chau   : 1940    Hand 2020   AROM(PROM) Right Left   Index MP 0/ -35/   PIP -15/ -30/   DIP -15/ -15/   RIVERA     Long MP 0/ -35/   PIP -15/ -30/   DIP -15/ -15/   RIVERA     Ring MP 0/ -30/   PIP -15/ -35/   DIP -15/ -15/   RIVERA     Small MP 0/ -35/   PIP -15/ -30/   DIP -15/ -15/   RIVERA     Patient able to make a flat fist with the right hand. Left hand lacks 5 cm from distal palmar crease.    Thumb 2020   AROM  (PROM) Right Left   MP NT NT   IP NT NT   RABD NT NT   PABD NT NT   Retropulsion NT NT    Kapandji Opposition Scale (0-10/10) Opposes to PIP joint of small finger Opposes to tip of small finger       Strength  Deferred at this time  Observation  Patient demonstrates ulnar drift of bilateral hands and enlarged MCP and PIP joints due to rheumatoid arthritis.    Assessment:  Patient presents with symptoms consistent with diagnosis of the above condition,  with surgical  intervention.   Patient's limitations or Problem List includes:  Pain, Decreased ROM/motion, Increased edema, Adherent scarring, Decreased , Decreased pinch, Decreased coordination, Decreased dexterity and Tightness in musculature of the left wrist and hand which interferes with the patient's ability to perform Self Care Tasks (dressing, eating, bathing, hygiene/toileting), Recreational Activities and Household Chores as compared to previous level of function.  Rehab Potential:  Good - Return to full activity, some limitations  Patient will benefit from skilled Occupational Therapy to increase ROM,  strength, pinch strength, coordination and dexterity and decrease pain, edema and adherence of scarring to return to previous activity level and resume normal daily tasks and to reach their rehab potential.  Barriers to Learning:  No barrier  Communication Issues:  Patient appears to be able to clearly communicate and understand verbal and written communication and follow directions correctly.  PATIENTS NAME:  Kimberly Chau   : 1940    Chart Review: Chart Review  Identified Performance Deficits: bathing/showering, toileting, dressing, feeding, hygiene and grooming, home establishment and management, meal preparation and cleanup, shopping and leisure activities    Assessment of Occupational Performance:  1-3 Performance Deficits  Clinical Decision Making (Complexity): Low complexity  Treatment Explanation:  The following has been discussed with the patient:  RX ordered/plan of care  Anticipated outcomes  Possible risks and side  "effects    Plan:  Frequency:  1 X week, once daily  Duration:  for 8 weeks  Treatment Plan:   Therapeutic Exercise:  AROM, AAROM, PROM, Tendon Gliding, Blocking, Isotonics and Isometrics  Manual Techniques:  Scar mobilization, Myofascial release and Manual edema mobilization  Orthotic Fabrication:  Static orthosis  Discharge Plan:  Achieve all LTG.  Independent in home treatment program.  Reach maximal therapeutic benefit.  Home Exercise Program:  Zipper orthosis, full-time, remove for hygiene and exercises  Wrist AROM  Hand AROM    Next Visit:  Scar massage  Progress ROM    Caregiver Signature/Credentials _____________________________ Date ________       Rafia Damian OTR/L    I have reviewed and certified the need for these services and plan of treatment while under my care.        PHYSICIAN'S SIGNATURE:   ____________________________________  Date___________    Dinh Strong MD    Certification period:  Beginning of Cert date period: 07/29/20 to  End of Cert period date: 10/26/20     Functional Level Progress Report: Please see attached \"Goal Flow sheet for Functional level.\"    ____X____ Continue Services or       ________ DC Services                Service dates: From  SOC Date: 07/29/20 date to present                         "

## 2020-07-29 NOTE — PROGRESS NOTES
Hand surgery progress note       PROCEDURE:  Left wrist distal ulna resection   Bone culture of left distal radius  Open reduction left distal radius nonunion with distal ulna autograft on 6/12/2020    Kimberly says she has been doing well.  It feels strange to be out of the cast.  She had a superficial area of skin breakdown over the antecubital fossa due to the cast.  She is been applying bacitracin and Band-Aid to this.  No numbness or tingling distally although fingers feel quite stiff.  No pain in wrist.  Continuing to use her bone stimulator    Incision is well-healed.  No erythema or drainage.  Fingers quite stiff, unchanged deformity due to rheumatoid.  Sensation intact throughout.  Minimal tenderness at fracture site.  Superficial abrasion at antecubital fossa, no signs of infection.    X-rays show unchanged alignment of distal radius fracture status post ORIF revision and distal ulna resection.  No evidence of hardware failure.  There is some evidence of bony healing.    She is progressing appropriately.  She will continue her bone stimulator.  She will see hand therapy today for a zipper splint.  Can begin range of motion but wear zipper splint at all other times except showers.  No lifting more than 2 pounds with surgical extremity.  Local wound cares to antecubital fossa superficial ulcer.  Avoid any pressure on area.  Follow-up in 1 month.

## 2020-07-29 NOTE — PROGRESS NOTES
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    Patient reports symptoms of the left wrist which occurred due to a fall in the bathroom on 4/19/19. She previously underwent open reduction internal fixation on 4/30/2019. This was complicated by nonunion and hardware breakage.  Since onset symptoms are Gradually getting better.  General health as reported by patient is good.  Pertinent medical history includes:Anemia, High Blood Pressure, Migraines/Headaches, Osteoarthritis, Rheumatoid Arthritis, Osteoporosis, Head injury  Medical allergies:latex.  Surgical history: orthopedic: Left wrist open reduction internal fixation, cervical spine fusion, left hip, other: Eye.  Medication history: See EMR.    Current occupation is retired; cares for dog, cooking, cleaning  Daily Tasks: Lifting, Carrying, Prolonged Sitting, Repetitive Tasks  Occupational Profile Information:  Right hand dominant  Prior functional level:  independent-shared household chores  Patient reports symptoms of pain, stiffness/loss of motion, weakness/loss of strength and edema  Special tests:  x-ray.    Previous treatment: Hand therapy after prior surgery  Barriers include:none  Mobility: No difficulty  Transportation: Spouse drives  Leisure activities/hobbies: Reading newspaper and magazine  Other: Lives with spouse    Functional Outcome Measure:   Upper Extremity Functional Index Score:  SCORE: 31/80   (A lower score indicates greater disability.)        Objective:  Pain Level (Scale 0-10)   7/29/2020   At Rest 1/10   With Use 3/10     Pain Description  Date 7/29/2020   Location Wrist, radial and ulnar aspects   Pain Quality Aching    Frequency intermittent     Pain is worst  daytime   Exacerbated by  Wrist AROM   Relieved by Rest   Progression Gradually improving     Wound/Scar  Surgical incision on volar wrist is well-healed.    Edema  Wrist (Circumference measured in cm)  DWC 7/29/2020   Right 5.1   Left 6.2     Hand (Circumference measured in cm)   7/29/2020 7/29/2020    Right Left   Index P1 6.5 6.6   Long P1 6.0 6.6   Ring P1 6.3 6.2   Small P1 5.5 5.9     Sensation   WNL throughout all nerve distributions; per patient report     ROM  Pain Report: - none  + mild    ++ moderate    +++ severe   Wrist 7/29/2020 7/29/2020   AROM (PROM) Right Left   Extension 45 0   Flexion 55 15   RD 15 5   UD 30 0   Supination 85 55   Pronation 85 70     Hand 7/29/2020 7/29/2020   AROM(PROM) Right Left   Index MP 0/ -35/   PIP -15/ -30/   DIP -15/ -15/   RIVERA     Long MP 0/ -35/   PIP -15/ -30/   DIP -15/ -15/   RIVERA     Ring MP 0/ -30/   PIP -15/ -35/   DIP -15/ -15/   RIVERA     Small MP 0/ -35/   PIP -15/ -30/   DIP -15/ -15/   RIVERA     Patient able to make a flat fist with the right hand. Left hand lacks 5 cm from distal palmar crease.    Thumb 7/29/2020 7/29/2020   AROM  (PROM) Right Left   MP NT NT   IP NT NT   RABD NT NT   PABD NT NT   Retropulsion NT NT   Kapandji Opposition Scale (0-10/10) Opposes to PIP joint of small finger Opposes to tip of small finger       Strength  Deferred at this time    Observation  Patient demonstrates ulnar drift of bilateral hands and enlarged MCP and PIP joints due to rheumatoid arthritis.      Assessment:  Patient presents with symptoms consistent with diagnosis of the above condition,  with surgical  intervention.     Patient's limitations or Problem List includes:  Pain, Decreased ROM/motion, Increased edema, Adherent scarring, Decreased , Decreased pinch, Decreased coordination, Decreased dexterity and Tightness in musculature of the left wrist and hand which interferes with the patient's ability to perform Self Care  Tasks (dressing, eating, bathing, hygiene/toileting), Recreational Activities and Household Chores as compared to previous level of function.    Rehab Potential:  Good - Return to full activity, some limitations    Patient will benefit from skilled Occupational Therapy to increase ROM,  strength, pinch strength, coordination and dexterity and decrease pain, edema and adherence of scarring to return to previous activity level and resume normal daily tasks and to reach their rehab potential.    Barriers to Learning:  No barrier    Communication Issues:  Patient appears to be able to clearly communicate and understand verbal and written communication and follow directions correctly.    Chart Review: Chart Review    Identified Performance Deficits: bathing/showering, toileting, dressing, feeding, hygiene and grooming, home establishment and management, meal preparation and cleanup, shopping and leisure activities    Assessment of Occupational Performance:  1-3 Performance Deficits    Clinical Decision Making (Complexity): Low complexity    Treatment Explanation:  The following has been discussed with the patient:  RX ordered/plan of care  Anticipated outcomes  Possible risks and side effects    Plan:  Frequency:  1 X week, once daily  Duration:  for 8 weeks    Treatment Plan:   Therapeutic Exercise:  AROM, AAROM, PROM, Tendon Gliding, Blocking, Isotonics and Isometrics  Manual Techniques:  Scar mobilization, Myofascial release and Manual edema mobilization  Orthotic Fabrication:  Static orthosis    Discharge Plan:  Achieve all LTG.  Independent in home treatment program.  Reach maximal therapeutic benefit.    Home Exercise Program:  Zipper orthosis, full-time, remove for hygiene and exercises  Wrist AROM  Hand AROM    Next Visit:  Scar massage  Progress ROM

## 2020-07-29 NOTE — NURSING NOTE
Reason For Visit:   Chief Complaint   Patient presents with     RECHECK     S/P ORIF (open reduction internal fixation) fracture     Primary MD: Guillermo Castellano    Age: 79 year old    ?  No    There were no vitals taken for this visit.    QuickDASH Assessment  No flowsheet data found.     Current Outpatient Medications   Medication Sig Dispense Refill     acetaminophen 650 MG TABS Take 650 mg by mouth every 4 hours as needed. 100 tablet 0     amLODIPine (NORVASC) 10 MG tablet Take 1 tablet (10 mg) by mouth daily 90 tablet 0     Diphenhydramine-APAP, sleep, (TYLENOL PM EXTRA STRENGTH PO) Take 2 tablets by mouth At Bedtime        ferrous gluconate (FERGON) 324 (38 Fe) MG tablet Take 324 mg by mouth       metoprolol tartrate (LOPRESSOR) 100 MG tablet Take 2 tablets (200 mg) by mouth 2 times daily 360 tablet 0     Multiple Vitamin (MULTI-VITAMIN) per tablet Take 1 tablet by mouth every morning        Omeprazole (PRILOSEC PO) Take 20 mg by mouth as needed        sodium chloride (OCEAN NASAL SPRAY) 0.65 % nasal spray Spray 1 spray into both nostrils daily as needed for congestion 1 Bottle 3     doxycycline hyclate (VIBRAMYCIN) 50 MG capsule Take 2 capsules (100 mg) by mouth 2 times daily (Patient not taking: Reported on 7/1/2020) 60 capsule 1     doxycycline monohydrate (ADOXA) 100 MG tablet Take 1 tablet (100 mg) by mouth 2 times daily (Patient not taking: Reported on 7/1/2020) 60 tablet 1     oxyCODONE (ROXICODONE) 5 MG tablet Take 1 tablet (5 mg) by mouth every 6 hours as needed for severe pain (Patient not taking: Reported on 6/17/2020) 30 tablet 0     sulfamethoxazole-trimethoprim (BACTRIM DS/SEPTRA DS) 800-160 MG tablet Take 1 tablet by mouth 2 times daily (Patient not taking: Reported on 5/4/2020) 60 tablet 0     sulfamethoxazole-trimethoprim (BACTRIM DS/SEPTRA DS) 800-160 MG tablet Take 1 tablet by mouth 2 times daily (Patient not taking: Reported on 7/1/2020) 60 tablet 0     Allergies   Allergen  Reactions     Hctz Other (See Comments)     Pt develops symptomatic and significant hyponatremia with HCTZ exposure     Hydrochlorothiazide      Other reaction(s): Hyponatremia  Hyponatremia     Latex      Benzocaine Rash     carba mix,thrium-sunscreen     Resorcinol-Alcohol Rash     carba mix,thrium-sunscreen     Ace Inhibitors Unknown     Dizziness and orthostatic changes and electrolyte problems.     Latex Unknown     Fallon Amador ATC

## 2020-07-29 NOTE — LETTER
7/29/2020       RE: Kimberly Chau  [address]  [place] [address]      Dear Colleague,    Thank you for referring your patient, Kimberly Chau, to the [facility]. Please see a copy of my visit note below.    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  It feels strange to be out of the cast.  She had a superficial area of skin breakdown over the antecubital fossa due to the cast.  She is been applying bacitracin and Band-Aid to this.  No numbness or tingling distally although fingers feel quite stiff.  No pain in wrist.  Continuing to use her bone stimulator    Incision is well-healed.  No erythema or drainage.  Fingers quite stiff, unchanged deformity due to rheumatoid.  Sensation intact throughout.  Minimal tenderness at fracture site.  Superficial abrasion at antecubital fossa, no signs of infection.    X-rays show unchanged alignment of distal radius fracture status post ORIF revision and distal ulna resection.  No evidence of hardware failure.  There is some evidence of bony healing.    She is progressing appropriately.  She will continue her bone stimulator.  She will see hand therapy today for a zipper splint.  Can begin range of motion but wear zipper splint at all other times except showers.  No lifting more than 2 pounds with surgical extremity.  Local wound cares to antecubital fossa superficial ulcer.  Avoid any pressure on area.  Follow-up in 1 month.      Sincerely,        Dinh Strong MD

## 2020-08-12 ENCOUNTER — THERAPY VISIT (OUTPATIENT)
Dept: OCCUPATIONAL THERAPY | Facility: CLINIC | Age: 80
End: 2020-08-12
Attending: ORTHOPAEDIC SURGERY
Payer: MEDICARE

## 2020-08-12 DIAGNOSIS — Z47.89 AFTERCARE FOLLOWING SURGERY OF THE MUSCULOSKELETAL SYSTEM: ICD-10-CM

## 2020-08-12 DIAGNOSIS — M25.532 LEFT WRIST PAIN: ICD-10-CM

## 2020-08-12 PROCEDURE — 97110 THERAPEUTIC EXERCISES: CPT | Mod: GO | Performed by: OCCUPATIONAL THERAPIST

## 2020-08-12 NOTE — PROGRESS NOTES
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    S:  Subjective changes as noted by patient: the splint is working out fine.  Functional changes noted by patient: no changes      O:  Pain Level (Scale 0-10)   7/29/2020 8/12/20   At Rest 1/10 3/10   With Use 3/10 5-6/10     Pain Description  Date 7/29/2020   Location Wrist, radial and ulnar aspects   Pain Quality Aching   Frequency intermittent     Pain is worst  daytime   Exacerbated by  Wrist AROM   Relieved by Rest   Progression Gradually improving     Wound/Scar  Surgical incision on volar wrist is well-healed.    Edema  Wrist (Circumference measured in cm)  DWC 7/29/2020   Right 5.1   Left 6.2     Hand (Circumference measured in cm)   7/29/2020 7/29/2020    Right Left   Index P1 6.5 6.6   Long P1 6.0 6.6   Ring P1 6.3 6.2   Small P1 5.5 5.9     Sensation   WNL throughout all nerve distributions; per patient report     ROM  Pain Report: - none  + mild    ++ moderate    +++ severe   Wrist 7/29/2020 7/29/2020 8/12/20   AROM (PROM) Right Left Left   Extension 45 0 30   Flexion 55 15 15   RD 15 5    UD 30 0    Supination 85 55 70   Pronation 85 70 80     Hand 7/29/2020 7/29/2020   AROM(PROM) Right Left   Index MP 0/ -35/   PIP -15/ -30/   DIP -15/ -15/   RIVERA     Long MP 0/ -35/   PIP -15/ -30/   DIP -15/ -15/   RIVERA     Ring MP 0/ -30/   PIP -15/ -35/   DIP -15/ -15/   RIVERA     Small MP 0/ -35/   PIP -15/ -30/   DIP -15/ -15/   RIVERA     Patient able to make a flat fist with the right hand. Left hand lacks 5 cm from distal palmar crease.    Thumb 7/29/2020 7/29/2020   AROM  (PROM) Right Left   MP NT NT   IP NT NT   RABD NT NT   PABD NT NT   Retropulsion NT NT    Kapandji Opposition Scale (0-10/10) Opposes to PIP joint of small finger Opposes to tip of small finger       Strength  Deferred at this time    Observation  Patient demonstrates ulnar drift of bilateral hands and enlarged MCP and PIP joints due to rheumatoid arthritis.       Please refer to the daily flowsheet for treatment provided today.     Home Exercise Program:  Zipper orthosis, full-time, remove for hygiene and exercises  Wrist AROM  Hand AROM    Next Visit:  Scar massage  Progress ROM

## 2020-08-19 ENCOUNTER — THERAPY VISIT (OUTPATIENT)
Dept: OCCUPATIONAL THERAPY | Facility: CLINIC | Age: 80
End: 2020-08-19
Attending: ORTHOPAEDIC SURGERY
Payer: MEDICARE

## 2020-08-19 DIAGNOSIS — Z87.81 S/P ORIF (OPEN REDUCTION INTERNAL FIXATION) FRACTURE: ICD-10-CM

## 2020-08-19 DIAGNOSIS — Z47.89 AFTERCARE FOLLOWING SURGERY OF THE MUSCULOSKELETAL SYSTEM: ICD-10-CM

## 2020-08-19 DIAGNOSIS — Z98.890 S/P ORIF (OPEN REDUCTION INTERNAL FIXATION) FRACTURE: ICD-10-CM

## 2020-08-19 DIAGNOSIS — M25.532 LEFT WRIST PAIN: ICD-10-CM

## 2020-08-19 PROCEDURE — 97110 THERAPEUTIC EXERCISES: CPT | Mod: GO | Performed by: OCCUPATIONAL THERAPIST

## 2020-08-19 PROCEDURE — 97140 MANUAL THERAPY 1/> REGIONS: CPT | Mod: GO | Performed by: OCCUPATIONAL THERAPIST

## 2020-08-19 NOTE — PROGRESS NOTES
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    S:  Subjective changes as noted by patient:The wrist is stiff.  Functional changes noted by patient: no changes      O:  Pain Level (Scale 0-10)   7/29/2020 8/12/20 8/19/20   At Rest 1/10 3/10 0/10   With Use 3/10 5-6/10 4/10     Pain Description  Date 7/29/2020   Location Wrist, radial and ulnar aspects   Pain Quality Aching   Frequency intermittent     Pain is worst  daytime   Exacerbated by  Wrist AROM   Relieved by Rest   Progression Gradually improving     Wound/Scar  Surgical incision on volar wrist is well-healed.    Edema  Wrist (Circumference measured in cm)  DWC 7/29/2020   Right 5.1   Left 6.2     Hand (Circumference measured in cm)   7/29/2020 7/29/2020    Right Left   Index P1 6.5 6.6   Long P1 6.0 6.6   Ring P1 6.3 6.2   Small P1 5.5 5.9     Sensation   WNL throughout all nerve distributions; per patient report     ROM  Pain Report: - none  + mild    ++ moderate    +++ severe   Wrist 7/29/2020 7/29/2020 8/12/20 8/19/20   AROM (PROM) Right Left Left Left   Extension 45 0 30 30 pre  40 post   Flexion 55 15 15 15 pre  20 post   RD 15 5     UD 30 0     Supination 85 55 70    Pronation 85 70 80      Hand 7/29/2020 7/29/2020   AROM(PROM) Right Left   Index MP 0/ -35/   PIP -15/ -30/   DIP -15/ -15/   RIVEAR     Long MP 0/ -35/   PIP -15/ -30/   DIP -15/ -15/   RIVERA     Ring MP 0/ -30/   PIP -15/ -35/   DIP -15/ -15/   RIVERA     Small MP 0/ -35/   PIP -15/ -30/   DIP -15/ -15/   RIVERA     Patient able to make a flat fist with the right hand. Left hand lacks 5 cm from distal palmar crease.    Thumb 7/29/2020 7/29/2020   AROM  (PROM) Right Left   MP NT NT   IP  NT NT   RABD NT NT   PABD NT NT   Retropulsion NT NT   Kapandji Opposition Scale (0-10/10) Opposes to PIP joint of small finger Opposes to tip of small finger       Strength  Deferred at this time    Observation  Patient demonstrates ulnar drift of bilateral hands and enlarged MCP and PIP joints due to rheumatoid arthritis.       Please refer to the daily flowsheet for treatment provided today.     Home Exercise Program:  Zipper orthosis, full-time, remove for hygiene and exercises  Wrist AROM  Hand AROM  Gentle self PROM of wrist flex/ext    Next Visit:  Scar massage  Progress ROM

## 2020-08-20 DIAGNOSIS — Z98.890 S/P ORIF (OPEN REDUCTION INTERNAL FIXATION) FRACTURE: Primary | ICD-10-CM

## 2020-08-20 DIAGNOSIS — Z87.81 S/P ORIF (OPEN REDUCTION INTERNAL FIXATION) FRACTURE: Primary | ICD-10-CM

## 2020-08-26 ENCOUNTER — OFFICE VISIT (OUTPATIENT)
Dept: ORTHOPEDICS | Facility: CLINIC | Age: 80
End: 2020-08-26
Payer: MEDICARE

## 2020-08-26 ENCOUNTER — ANCILLARY PROCEDURE (OUTPATIENT)
Dept: CT IMAGING | Facility: CLINIC | Age: 80
End: 2020-08-26
Attending: ORTHOPAEDIC SURGERY
Payer: MEDICARE

## 2020-08-26 ENCOUNTER — THERAPY VISIT (OUTPATIENT)
Dept: OCCUPATIONAL THERAPY | Facility: CLINIC | Age: 80
End: 2020-08-26
Payer: MEDICARE

## 2020-08-26 ENCOUNTER — ANCILLARY PROCEDURE (OUTPATIENT)
Dept: GENERAL RADIOLOGY | Facility: CLINIC | Age: 80
End: 2020-08-26
Attending: ORTHOPAEDIC SURGERY
Payer: MEDICARE

## 2020-08-26 DIAGNOSIS — Z98.890 S/P ORIF (OPEN REDUCTION INTERNAL FIXATION) FRACTURE: ICD-10-CM

## 2020-08-26 DIAGNOSIS — Z47.89 AFTERCARE FOLLOWING SURGERY OF THE MUSCULOSKELETAL SYSTEM: ICD-10-CM

## 2020-08-26 DIAGNOSIS — Z87.81 S/P ORIF (OPEN REDUCTION INTERNAL FIXATION) FRACTURE: ICD-10-CM

## 2020-08-26 DIAGNOSIS — Z98.890 S/P ORIF (OPEN REDUCTION INTERNAL FIXATION) FRACTURE: Primary | ICD-10-CM

## 2020-08-26 DIAGNOSIS — Z87.81 S/P ORIF (OPEN REDUCTION INTERNAL FIXATION) FRACTURE: Primary | ICD-10-CM

## 2020-08-26 DIAGNOSIS — M25.532 LEFT WRIST PAIN: Primary | ICD-10-CM

## 2020-08-26 PROCEDURE — 97110 THERAPEUTIC EXERCISES: CPT | Mod: GO | Performed by: OCCUPATIONAL THERAPIST

## 2020-08-26 NOTE — PROGRESS NOTES
Hand Therapy Progress Note  Please refer to the daily flowsheet for treatment today, total treatment time and time spent performing 1:1 timed codes.       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    Reporting period is 7/29 to 8/26/2020    S:  Pt had CT scan today. The MD will call her re: plan for splint.  Has been using the L hand to open the fridge door.      O:  Pain Level (Scale 0-10)   7/29/2020 8/12/20 8/19/20 8/26/2020     At Rest 1/10 3/10 0/10 0-1   With Use 3/10 5-6/10 4/10      Pain Description  Date 7/29/2020 8/26/2020     Location Wrist, radial and ulnar aspects Volar mid FA   Pain Quality Aching tender   Frequency intermittent      Pain is worst  daytime    Exacerbated by  Wrist AROM    Relieved by Rest    Progression Gradually improving      Wound/Scar  Surgical incision on volar wrist is well-healed.    Edema  Wrist (Circumference measured in cm)  DWC 7/29/2020   Right 5.1   Left 6.2     Hand (Circumference measured in cm)   7/29/2020 7/29/2020    Right Left   Index P1 6.5 6.6   Long P1 6.0 6.6   Ring P1 6.3 6.2   Small P1 5.5 5.9     Sensation   WNL throughout all nerve distributions; per patient report     ROM  Pain Report: - none  + mild    ++ moderate    +++ severe   Wrist 7/29/2020 7/29/2020 8/12/20 8/19/20 8/26/2020     AROM (PROM) Right Left Left Left L   Extension 45 0 30 30 pre  40 post 30   Flexion 55 15 15 15 pre  20 post 15   RD 15 5      UD 30 0      Supination 85 55 70     Pronation 85 70 80       Hand 7/29/2020 7/29/2020    AROM(PROM) Right Left    Index MP 0/ -35/    PIP -15/ -30/    DIP -15/ -15/    RIVERA      Long MP 0/ -35/    PIP -15/ -30/    DIP -15/ -15/     RIVERA      Ring MP 0/ -30/    PIP -15/ -35/    DIP -15/ -15/    RIVERA      Small MP 0/ -35/    PIP -15/ -30/    DIP -15/ -15/    RIVERA          Finger AROM  8/26/2020  Patient able to make a flat fist with the right hand. Left hand lacks 5 cm from distal palmar crease. Difficulty extending fingers at the MP and PIPj.    Thumb 7/29/2020 7/29/2020 8/26/2020     AROM  (PROM) Right Left    MP NT NT    IP NT NT    RABD NT NT    PABD NT NT    Retropulsion NT NT    Kapandji Opposition Scale (0-10/10) Opposes to PIP joint of small finger Opposes to tip of small finger Can touch radial side of SF tip       Strength  Deferred at this time    Observation  Patient demonstrates ulnar drift of bilateral hands and enlarged MCP and PIP joints due to rheumatoid arthritis.    Assessment:  Response to therapy has been improvement to:  ROM of Wrist:  All Planes  Pain:  frequency is less    Overall Assessment:  Patient would benefit from continued therapy to achieve rehab potential  STG/LTG:  STGoals have been reviewed and progress or achievement has occurred;  see goal sheet for details and updates.  LTGoals have been reviewed and progress or achievement has occurred:  see goal sheet for details and updates.    Plan:  Frequency/Duration:  Recommend continuing with the current treatment plan.  Appropriateness of Rx I have re-evaluated this patient and find that the nature, scope, duration and intensity of the therapy is appropriate for the medical condition of the patient.    Treatment Plan:  Continue treatment plan as outlined in initial evaluation     Home Exercise Program:  Zipper orthosis, full-time, remove for hygiene and exercises (8/26/2020  Pt will get a call from MD after CT scan is reviewed re: wearing schedule)  Wrist AROM  Hand AROM  Gentle self PROM of wrist flex/ext    Next Visit:  Scar massage  Progress ROM as indicated  Wean orthosis? Check MD recommendation

## 2020-08-26 NOTE — LETTER
8/26/2020         RE: Kimberly Chau  [address]  [place] [address]        Dear Colleague,    Thank you for referring your patient, Kimberly Chau, to the [facility]. Please see a copy of my visit note below.    Hand surgery progress note        PROCEDURE:  Left wrist distal ulna resection   Bone culture of left distal radius  Open reduction left distal radius nonunion with distal ulna autograft on 6/12/2020     I saw Kimberly in follow-up today.  She says she is doing well.  She has minimal pain in the left upper extremity.  The wrist feels stable which it did not before and she is able to do things with it.  The fingers are still stiff but a lot of this is from her pre-existing rheumatoid arthritis.  She is pleased with how she is progressing and has no numbness or tingling.    Examination left upper extremity incisions are well-healed.  There is no swelling.  She is substantial hand deformity and corresponding stiffness consistent with her RA.  Mild tenderness over distal radius.  More volarly over plate than radially.  Sensation intact throughout.    X-rays were reviewed today and demonstrate well-maintained alignment of her distal radius, excision of distal ulna, possible bridging bone and osteotomy site    Assessment and plan: Kimberly is progressing nicely.  I would like to get a CT scan to better evaluate healing and determine further mobilization.  We will call her to discuss the results.    Addendum: CT scan reviewed.  Does not show much cortical bridging but does show medullary continuity.  Overall, I find results reassuring.  I would like her to continue to wear her wrist brace but to remove it for hygiene, when at rest, and for range of motion exercises.  No lifting more than gallon of milk.  Follow-up in 6 weeks.    Again, thank you for allowing me to participate in the care of your patient.        Sincerely,        Dinh Strong MD

## 2020-08-26 NOTE — NURSING NOTE
Reason For Visit:   Chief Complaint   Patient presents with     RECHECK     S/P ORIF Left (open reduction internal fixation) fracture       Primary MD: Guillermo Castellano    Age: 79 year old    ?  No      There were no vitals taken for this visit.      Pain Assessment  Patient Currently in Pain: Yes  0-10 Pain Scale: 1  Primary Pain Location: Wrist(Left)               QuickDASH Assessment  No flowsheet data found.       Current Outpatient Medications   Medication Sig Dispense Refill     acetaminophen 650 MG TABS Take 650 mg by mouth every 4 hours as needed. 100 tablet 0     amLODIPine (NORVASC) 10 MG tablet Take 1 tablet (10 mg) by mouth daily 90 tablet 0     Diphenhydramine-APAP, sleep, (TYLENOL PM EXTRA STRENGTH PO) Take 2 tablets by mouth At Bedtime        ferrous gluconate (FERGON) 324 (38 Fe) MG tablet Take 324 mg by mouth       metoprolol tartrate (LOPRESSOR) 100 MG tablet Take 2 tablets (200 mg) by mouth 2 times daily 360 tablet 0     Multiple Vitamin (MULTI-VITAMIN) per tablet Take 1 tablet by mouth every morning        Omeprazole (PRILOSEC PO) Take 20 mg by mouth as needed        sodium chloride (OCEAN NASAL SPRAY) 0.65 % nasal spray Spray 1 spray into both nostrils daily as needed for congestion 1 Bottle 3     doxycycline hyclate (VIBRAMYCIN) 50 MG capsule Take 2 capsules (100 mg) by mouth 2 times daily (Patient not taking: Reported on 7/1/2020) 60 capsule 1     doxycycline monohydrate (ADOXA) 100 MG tablet Take 1 tablet (100 mg) by mouth 2 times daily (Patient not taking: Reported on 7/1/2020) 60 tablet 1     oxyCODONE (ROXICODONE) 5 MG tablet Take 1 tablet (5 mg) by mouth every 6 hours as needed for severe pain (Patient not taking: Reported on 6/17/2020) 30 tablet 0     sulfamethoxazole-trimethoprim (BACTRIM DS/SEPTRA DS) 800-160 MG tablet Take 1 tablet by mouth 2 times daily (Patient not taking: Reported on 5/4/2020) 60 tablet 0     sulfamethoxazole-trimethoprim (BACTRIM DS/SEPTRA DS) 800-160  MG tablet Take 1 tablet by mouth 2 times daily (Patient not taking: Reported on 7/1/2020) 60 tablet 0       Allergies   Allergen Reactions     Hctz Other (See Comments)     Pt develops symptomatic and significant hyponatremia with HCTZ exposure     Hydrochlorothiazide      Other reaction(s): Hyponatremia  Hyponatremia     Latex      Benzocaine Rash     carba mix,thrium-sunscreen     Resorcinol-Alcohol Rash     carba mix,thrium-sunscreen     Ace Inhibitors Unknown     Dizziness and orthostatic changes and electrolyte problems.     Latex Unknown       Madison Pabon ATC

## 2020-08-28 NOTE — PROGRESS NOTES
Hand surgery progress note        PROCEDURE:  Left wrist distal ulna resection   Bone culture of left distal radius  Open reduction left distal radius nonunion with distal ulna autograft on 6/12/2020     I saw Kimberly in follow-up today.  She says she is doing well.  She has minimal pain in the left upper extremity.  The wrist feels stable which it did not before and she is able to do things with it.  The fingers are still stiff but a lot of this is from her pre-existing rheumatoid arthritis.  She is pleased with how she is progressing and has no numbness or tingling.    Examination left upper extremity incisions are well-healed.  There is no swelling.  She is substantial hand deformity and corresponding stiffness consistent with her RA.  Mild tenderness over distal radius.  More volarly over plate than radially.  Sensation intact throughout.    X-rays were reviewed today and demonstrate well-maintained alignment of her distal radius, excision of distal ulna, possible bridging bone and osteotomy site    Assessment and plan: Kimberly is progressing nicely.  I would like to get a CT scan to better evaluate healing and determine further mobilization.  We will call her to discuss the results.    Addendum: CT scan reviewed.  Does not show much cortical bridging but does show medullary continuity.  Overall, I find results reassuring.  I would like her to continue to wear her wrist brace but to remove it for hygiene, when at rest, and for range of motion exercises.  No lifting more than gallon of milk.  Follow-up in 6 weeks.

## 2020-09-02 ENCOUNTER — THERAPY VISIT (OUTPATIENT)
Dept: OCCUPATIONAL THERAPY | Facility: CLINIC | Age: 80
End: 2020-09-02
Payer: MEDICARE

## 2020-09-02 DIAGNOSIS — Z47.89 AFTERCARE FOLLOWING SURGERY OF THE MUSCULOSKELETAL SYSTEM: ICD-10-CM

## 2020-09-02 DIAGNOSIS — M25.532 LEFT WRIST PAIN: ICD-10-CM

## 2020-09-02 PROCEDURE — 97110 THERAPEUTIC EXERCISES: CPT | Mod: GO | Performed by: OCCUPATIONAL THERAPIST

## 2020-09-02 PROCEDURE — 97140 MANUAL THERAPY 1/> REGIONS: CPT | Mod: GO | Performed by: OCCUPATIONAL THERAPIST

## 2020-09-08 NOTE — PROGRESS NOTES
"[facility] Dermatology Record:  Store and Forward and Telephone [phone]      Dermatology Problem List:  Eczema-atopic dermattiis  -first seen in 209  cyclosporine, lidex desonide  Post herpetic neurology  Encounter Date: Sep 10, 2020    CC:   Chief Complaint   Patient presents with     Derm Problem     Dermatitis        History of Present Illness:  Kimberly Chau is a 79 year old female who presents for \"dermatitis.\" SElf reported history of hayfever and \"mold allergies.\"  Reports its itchy. On extremities. It is itchy. Reports oral and topica benadryl helpful. Reports at the first of the year stopped methotrexate for arthritis and relates rash to that. She reports that she has had this rash since her teen years. She has had it off and on. The methotrexate was controlling.     Has seen derm, was told this is eczema. She has seen Dr. Rosado over ten years ago    Did skin testing on her back.     Also reports scars on legs and does recall when they started. Appeared after being in hospital. She feels these are over ten years old    No hx of skin cancer    ROS: Patient is generally feeling well today      Physical Examination:  General: Well-appearing  appropriately-developed individual.  Skin: Focused examination including thigh and arm was performed.   -scaly red aptches right thigh, excoriations with hemorrhagic crust arms, scars, lower leg with one area of red scaly palque    Labs:  NA    Past Medical History:   Patient Active Problem List   Diagnosis     Intrinsic atopic dermatitis     Essential hypertension     Rheumatoid arthritis (H)     Retinal artery occlusion     Cervical vertebral fracture (H)     Central retinal artery occlusion of right eye     Bilateral occipital neuralgia     C1-C2 instability     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 factor (H)     Osteoarthritis     Malignant hypertension     Hyponatremia     GERD (gastroesophageal reflux disease)     " Eczematous dermatitis     Anxiety state     Anemia     Left wrist pain     Closed fracture of distal end of left radius with delayed healing, unspecified fracture morphology, subsequent encounter     Closed fracture of distal end of left radius, unspecified fracture morphology, sequela     Aftercare following surgery of the musculoskeletal system     Past Medical History:   Diagnosis Date     Anemia      Arthritis      Cataracts      Central retinal artery occlusion      Cervical spine fracture (H)     After a fall from orthostatic sx     Chronic pain     Back of head     Gastro-oesophageal reflux disease      Head injury      Hypertension      Hyponatremia     Resolved, 2/2 diuretics     Migraines      Osteoporosis      Pneumonia 2018     Rheumatoid arthritis(714.0)      Past Surgical History:   Procedure Laterality Date     C HAND/FINGER SURGERY UNLISTED       C PELVIS/HIP JOINT SURGERY UNLISTED       COLONOSCOPY       EYE SURGERY Left 02/2019    Hole in macula     FUSION CERVICAL POSTERIOR THREE+ LEVELS  1/22/2013    Procedure: FUSION CERVICAL POSTERIOR THREE+ LEVELS;  Cervical 1-6 Posterior Instrumentation and Fusion, Cervical 3-4 Laminectomy  .Instrumentation and fusion to Cervical 6 *Latex Allergy*;  Surgeon: Ra Bar MD;  [facility]: [facility]     GYN SURGERY       HEAD & NECK SURGERY       HYSTERECTOMY       KNEE SURGERY       NECK SURGERY       OPEN REDUCTION INTERNAL FIXATION WRIST Left 4/30/2019    Procedure: Open Reduction Internal Fixation Left Distal Radius Fracture;  Surgeon: Dinh Strong MD;  Location:  OR     OPEN REDUCTION INTERNAL FIXATION WRIST Left 6/12/2020    Procedure: OPEN REDUCTION INTERNAL FIXATION Left Distal Radius Nonunion, Distal Ulna Resection;  Surgeon: Dinh Strong MD;  [facility]     OPTICAL TRACKING SYSTEM ENDOSCOPIC SINUS SURGERY Right 4/6/2018    Procedure: OPTICAL TRACKING SYSTEM ENDOSCOPIC SINUS SURGERY;  Stealth Assisted Right Maxillary  Antrostomy, Anterior Ethmoidectomy And Frontal Sinusotomy;  Surgeon: Elyse Eisenberg MD;  Location: [facility] OR     OPTICAL TRACKING SYSTEM ENDOSCOPIC SINUS SURGERY Right 8/3/2018    Procedure: OPTICAL TRACKING SYSTEM ENDOSCOPIC SINUS SURGERY;  Stealth Assisted Revision Right Frontal Sinusotomy, Right Stent Placement  **Latex Allergy** ;  Surgeon: Elyse Eisenberg MD;  Location: [facility] OR     ORTHOPEDIC SURGERY       REMOVE HARDWARE WRIST Left 11/19/2019    Procedure: Left Distal Radius Hardware Removal, Flexor Pollicus Longus Repair, Deep Cultures;  Surgeon: Dinh Strong MD;  [facility]       Social History:  Patient reports that she has never smoked. She has never used smokeless tobacco. She reports current alcohol use. She reports that she does not use drugs.    Family History:  Family History   Problem Relation Age of Onset     Arthritis Mother      Respiratory Mother         pulmonary fibrosis     Hypertension Mother      Anemia Mother      Liver Disease Father         from EtOH     Alcoholism Father      Multiple Sclerosis Sister      No Known Problems Maternal Grandmother      Heart Disease Maternal Grandfather      Breast Cancer Sister      Glaucoma Paternal Grandfather      Heart Disease Paternal Grandfather        Medications:  Current Outpatient Medications   Medication     acetaminophen 650 MG TABS     amLODIPine (NORVASC) 10 MG tablet     Diphenhydramine-APAP, sleep, (TYLENOL PM EXTRA STRENGTH PO)     doxycycline hyclate (VIBRAMYCIN) 50 MG capsule     doxycycline monohydrate (ADOXA) 100 MG tablet     ferrous gluconate (FERGON) 324 (38 Fe) MG tablet     metoprolol tartrate (LOPRESSOR) 100 MG tablet     Multiple Vitamin (MULTI-VITAMIN) per tablet     Omeprazole (PRILOSEC PO)     oxyCODONE (ROXICODONE) 5 MG tablet     sodium chloride (OCEAN NASAL SPRAY) 0.65 % nasal spray     sulfamethoxazole-trimethoprim (BACTRIM DS/SEPTRA DS) 800-160 MG tablet     sulfamethoxazole-trimethoprim (BACTRIM DS/SEPTRA DS)  800-160 MG tablet     No current facility-administered medications for this visit.           Allergies   Allergen Reactions     Hctz Other (See Comments)     Pt develops symptomatic and significant hyponatremia with HCTZ exposure     Hydrochlorothiazide      Other reaction(s): Hyponatremia  Hyponatremia     Latex      Benzocaine Rash     carba mix,thrium-sunscreen     Resorcinol-Alcohol Rash     carba mix,thrium-sunscreen     Ace Inhibitors Unknown     Dizziness and orthostatic changes and electrolyte problems.     Latex Unknown           Impression and Recommendations (Patient Counseled on the Following):  1. Rash consistent with eczematous dermatitis, most likely atopic dermatitis. Has had in the past on record review. Just came off of methotrexate  -triamcinolone 0.1% cream twice daily for 2 weeks, then 3 times weekly as needed, repeat cycle if needed. Reviewed this can thin skin  -vanicream twice daily  -hold benadryl, can cause falls and topical can cause allergy    2. Scars, >ten years told  Will monitor       3. Scaly plaque, right calf- pt reports pickings  -treat at above, stop picking, will monitor      Follow-up:   Follow up in 4 weeks. Photos and phone     Staff only    Marie Hull MD    Department of Dermatology  [facility]: Phone: [phone], Fax:[phone]  [facility]: Phone: [phone], Fax: [phone]      _____________________________________________________________________________    Teledermatology information:  - Location of patient: [place]  - Patient presented as: provider referral  - Location of teledermatologist:  ([facility] )  - Reason teledermatology is appropriate:  of National Emergency Regarding Coronavirus disease (COVID 19) Outbreak  - Image quality and interpretability: acceptable  - Physician has received verbal consent for a  "Video/Photos Visit from the patient? Yes  - In-person dermatology visit recommendation: no  - Date of images: today  - Service start time:2:25pm  - Service end time:2:39pm  - Date of report: 9/10/2020         Teledermatology Nurse Call for NEW Patients (not seen in last 3 years):     The patient was contacted by phone and we reviewed they have a visit in teledermatology upcoming with an MD or PALIZZY;  Importantly, \"a teledermatology visit may not be as thorough as an in-person visit, and the quality of the photograph and/or video sent may not be of the same quality as that taken by the dermatology clinic.\"     This video visit will be conducted via a call between you and your physician/provider via DEUS. We have found that certain health care needs can be provided without the need for an in-person physical exam.  This service lets us provide the care you need with a video conversation.  If a prescription is necessary we can send it directly to your pharmacy.  If lab work is needed we can place an order for that and you can then stop by our lab to have the test done at a later time.If during the course of the call the physician/provider feels a video visit is not appropriate, you will not be charged for this service.Visits are billed at different rates depending on your insurance coverage. Please reach out to your insurance provider with any questions.    The patient will also send photographs via Signicat for review. Instructions for photography are/were sent to the patient via Signicat messaging.       The patient verified the location of the photo/video visit to be [place].(The physician must be notified by nurse if the patient is not in the state of [place] during the encounter)    The patient denied skin pain, fever, mucosal symptoms(lesions, blisters, sores in the mouth, nose, eyes, or genitals)  IF PATIENT ENDORSES ANY OF THESE STOP AND PAGE ON CALL ATTENDING    Additional notes:  Patient summary of " issue:Dermatitis, hx of allergies to molds and hayfever   Location of problem on body: extremities   How long has area/symptoms been present: Since March/April   What makes it better?: Benadryl cream   What makes it worse?: Scratching the areas   Other symptoms include the following: Moderately itchy and very dry skin. No recent flares since photos were taken.   Which medications have been tried, for how long, and did they make it better or worse (ex. Triamcinolone, used twice daily for 2 weeks, not improved): Benadryl oral and topical PRN. Patient voices helps with the itch.   The patient has seen a dermatologist.   The patient hasno past medical history of skin cancer  ROS: The patient is generally feeling well.

## 2020-09-10 ENCOUNTER — VIRTUAL VISIT (OUTPATIENT)
Dept: DERMATOLOGY | Facility: CLINIC | Age: 80
End: 2020-09-10
Payer: MEDICARE

## 2020-09-10 DIAGNOSIS — L30.9 DERMATITIS: Primary | ICD-10-CM

## 2020-09-10 PROCEDURE — 99442 ZZC PHYSICIAN TELEPHONE EVALUATION 11-20 MIN: CPT | Mod: 95 | Performed by: DERMATOLOGY

## 2020-09-10 RX ORDER — TRIAMCINOLONE ACETONIDE 1 MG/G
CREAM TOPICAL
Qty: 454 G | Refills: 0 | Status: SHIPPED | OUTPATIENT
Start: 2020-09-10 | End: 2023-02-21

## 2020-09-10 ASSESSMENT — PAIN SCALES - GENERAL: PAINLEVEL: NO PAIN (0)

## 2020-09-10 NOTE — LETTER
"    9/10/2020         RE: Kimberly Chau  [address] [place] [address]        Dear Colleague,    Thank you for referring your patient, Kimberly Chau, to the [facility]. Please see a copy of my visit note below.                    [facility] Dermatology Record:  Store and Forward and Telephone [phone]      Dermatology Problem List:  Eczema-atopic dermattiis  -first seen in 209  cyclosporine, lidex desonide  Post herpetic neurology  Encounter Date: Sep 10, 2020    CC:   Chief Complaint   Patient presents with     Derm Problem     Dermatitis        History of Present Illness:  Kimberly Chau is a 79 year old female who presents for \"dermatitis.\" SElf reported history of hayfever and \"mold allergies.\"  Reports its itchy. On extremities. It is itchy. Reports oral and topica benadryl helpful. Reports at the first of the year stopped methotrexate for arthritis and relates rash to that. She reports that she has had this rash since her teen years. She has had it off and on. The methotrexate was controlling.     Has seen derm, was told this is eczema. She has seen Dr. Rosado over ten years ago    Did skin testing on her back.     Also reports scars on legs and does recall when they started. Appeared after being in hospital. She feels these are over ten years old    No hx of skin cancer    ROS: Patient is generally feeling well today      Physical Examination:  General: Well-appearing  appropriately-developed individual.  Skin: Focused examination including thigh and arm was performed.   -scaly red aptches right thigh, excoriations with hemorrhagic crust arms, scars, lower leg with one area of red scaly palque    Labs:  NA    Past Medical History:   Patient Active Problem List   Diagnosis     Intrinsic atopic dermatitis     Essential hypertension     Rheumatoid arthritis (H)     Retinal artery occlusion     Cervical vertebral fracture (H)     Central retinal artery occlusion of right eye     " Bilateral occipital neuralgia     C1-C2 instability     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 factor (H)     Osteoarthritis     Malignant hypertension     Hyponatremia     GERD (gastroesophageal reflux disease)     Eczematous dermatitis     Anxiety state     Anemia     Left wrist pain     Closed fracture of distal end of left radius with delayed healing, unspecified fracture morphology, subsequent encounter     Closed fracture of distal end of left radius, unspecified fracture morphology, sequela     Aftercare following surgery of the musculoskeletal system     Past Medical History:   Diagnosis Date     Anemia      Arthritis      Cataracts      Central retinal artery occlusion      Cervical spine fracture (H)     After a fall from orthostatic sx     Chronic pain     Back of head     Gastro-oesophageal reflux disease      Head injury      Hypertension      Hyponatremia     Resolved, 2/2 diuretics     Migraines      Osteoporosis      Pneumonia 2018     Rheumatoid arthritis(714.0)      Past Surgical History:   Procedure Laterality Date     C HAND/FINGER SURGERY UNLISTED       C PELVIS/HIP JOINT SURGERY UNLISTED       COLONOSCOPY       EYE SURGERY Left 02/2019    Hole in macula     FUSION CERVICAL POSTERIOR THREE+ LEVELS  1/22/2013    Procedure: FUSION CERVICAL POSTERIOR THREE+ LEVELS;  Cervical 1-6 Posterior Instrumentation and Fusion, Cervical 3-4 Laminectomy  .Instrumentation and fusion to Cervical 6 *Latex Allergy*;  Surgeon: Ra Bar MD;  Location: [facility] OR     GYN SURGERY       HEAD & NECK SURGERY       HYSTERECTOMY       KNEE SURGERY       NECK SURGERY       OPEN REDUCTION INTERNAL FIXATION WRIST Left 4/30/2019    Procedure: Open Reduction Internal Fixation Left Distal Radius Fracture;  Surgeon: Dinh Strong MD;  Location:  OR     OPEN REDUCTION INTERNAL FIXATION WRIST Left 6/12/2020    Procedure: OPEN REDUCTION INTERNAL FIXATION Left Distal Radius Nonunion, Distal  Ulna Resection;  Surgeon: Dinh Strong MD;  [facility]     OPTICAL TRACKING SYSTEM ENDOSCOPIC SINUS SURGERY Right 4/6/2018    Procedure: OPTICAL TRACKING SYSTEM ENDOSCOPIC SINUS SURGERY;  Stealth Assisted Right Maxillary Antrostomy, Anterior Ethmoidectomy And Frontal Sinusotomy;  Surgeon: Elyse Eisenberg MD;  Location: [facility] OR     OPTICAL TRACKING SYSTEM ENDOSCOPIC SINUS SURGERY Right 8/3/2018    Procedure: OPTICAL TRACKING SYSTEM ENDOSCOPIC SINUS SURGERY;  Stealth Assisted Revision Right Frontal Sinusotomy, Right Stent Placement  **Latex Allergy** ;  Surgeon: Elyse Eisenberg MD;  Location: [facility] OR     ORTHOPEDIC SURGERY       REMOVE HARDWARE WRIST Left 11/19/2019    Procedure: Left Distal Radius Hardware Removal, Flexor Pollicus Longus Repair, Deep Cultures;  Surgeon: Dinh Strong MD;  [facility]       Social History:  Patient reports that she has never smoked. She has never used smokeless tobacco. She reports current alcohol use. She reports that she does not use drugs.    Family History:  Family History   Problem Relation Age of Onset     Arthritis Mother      Respiratory Mother         pulmonary fibrosis     Hypertension Mother      Anemia Mother      Liver Disease Father         from EtOH     Alcoholism Father      Multiple Sclerosis Sister      No Known Problems Maternal Grandmother      Heart Disease Maternal Grandfather      Breast Cancer Sister      Glaucoma Paternal Grandfather      Heart Disease Paternal Grandfather        Medications:  Current Outpatient Medications   Medication     acetaminophen 650 MG TABS     amLODIPine (NORVASC) 10 MG tablet     Diphenhydramine-APAP, sleep, (TYLENOL PM EXTRA STRENGTH PO)     doxycycline hyclate (VIBRAMYCIN) 50 MG capsule     doxycycline monohydrate (ADOXA) 100 MG tablet     ferrous gluconate (FERGON) 324 (38 Fe) MG tablet     metoprolol tartrate (LOPRESSOR) 100 MG tablet     Multiple Vitamin (MULTI-VITAMIN) per tablet     Omeprazole (PRILOSEC  PO)     oxyCODONE (ROXICODONE) 5 MG tablet     sodium chloride (OCEAN NASAL SPRAY) 0.65 % nasal spray     sulfamethoxazole-trimethoprim (BACTRIM DS/SEPTRA DS) 800-160 MG tablet     sulfamethoxazole-trimethoprim (BACTRIM DS/SEPTRA DS) 800-160 MG tablet     No current facility-administered medications for this visit.           Allergies   Allergen Reactions     Hctz Other (See Comments)     Pt develops symptomatic and significant hyponatremia with HCTZ exposure     Hydrochlorothiazide      Other reaction(s): Hyponatremia  Hyponatremia     Latex      Benzocaine Rash     carba mix,thrium-sunscreen     Resorcinol-Alcohol Rash     carba mix,thrium-sunscreen     Ace Inhibitors Unknown     Dizziness and orthostatic changes and electrolyte problems.     Latex Unknown           Impression and Recommendations (Patient Counseled on the Following):  1. Rash consistent with eczematous dermatitis, most likely atopic dermatitis. Has had in the past on record review. Just came off of methotrexate  -triamcinolone 0.1% cream twice daily for 2 weeks, then 3 times weekly as needed, repeat cycle if needed. Reviewed this can thin skin  -vanicream twice daily  -hold benadryl, can cause falls and topical can cause allergy    2. Scars, >ten years told  Will monitor       3. Scaly plaque, right calf- pt reports pickings  -treat at above, stop picking, will monitor      Follow-up:   Follow up in 4 weeks. Photos and phone     Staff only    Marie Hull MD    Department of Dermatology  [facility]: Phone: [phone], Fax:[phone]  [facility]: Phone: [phone], Fax: [phone]      _____________________________________________________________________________    Teledermatology information:  - Location of patient: [place]  - Patient presented as: provider referral  - Location of teledermatologist:  ([facility]  [facility] )  - Reason teledermatology is appropriate:  of National Emergency Regarding Coronavirus disease (COVID 19) Outbreak  - Image quality and interpretability: acceptable  - Physician has received verbal consent for a Video/Photos Visit from the patient? Yes  - In-person dermatology visit recommendation: no  - Date of images: today  - Service start time:2:25pm  - Service end time:2:39pm  - Date of report: 9/10/2020         Teledermatology Nurse Call for NEW Patients (not seen in last 3 years):     The patient was contacted by phone and we reviewed they have a visit in teledermatology upcoming with an MD or TONI;  Importantly, \"a teledermatology visit may not be as thorough as an in-person visit, and the quality of the photograph and/or video sent may not be of the same quality as that taken by the dermatology clinic.\"     This video visit will be conducted via a call between you and your physician/provider via [employer]. We have found that certain health care needs can be provided without the need for an in-person physical exam.  This service lets us provide the care you need with a video conversation.  If a prescription is necessary we can send it directly to your pharmacy.  If lab work is needed we can place an order for that and you can then stop by our lab to have the test done at a later time.If during the course of the call the physician/provider feels a video visit is not appropriate, you will not be charged for this service.Visits are billed at different rates depending on your insurance coverage. Please reach out to your insurance provider with any questions.    The patient will also send photographs via Setred for review. Instructions for photography are/were sent to the patient via Setred messaging.       The patient verified the location of the photo/video visit to be [place].(The physician must be notified by nurse if the patient is not in the state of [place] during the encounter)    The " patient denied skin pain, fever, mucosal symptoms(lesions, blisters, sores in the mouth, nose, eyes, or genitals)  IF PATIENT ENDORSES ANY OF THESE STOP AND PAGE ON CALL ATTENDING    Additional notes:  Patient summary of issue:Dermatitis, hx of allergies to molds and hayfever   Location of problem on body: extremities   How long has area/symptoms been present: Since March/April   What makes it better?: Benadryl cream   What makes it worse?: Scratching the areas   Other symptoms include the following: Moderately itchy and very dry skin. No recent flares since photos were taken.   Which medications have been tried, for how long, and did they make it better or worse (ex. Triamcinolone, used twice daily for 2 weeks, not improved): Benadryl oral and topical PRN. Patient voices helps with the itch.   The patient has seen a dermatologist.   The patient hasno past medical history of skin cancer  ROS: The patient is generally feeling well.                     Again, thank you for allowing me to participate in the care of your patient.        Sincerely,        Marie Hull MD

## 2020-09-10 NOTE — PATIENT INSTRUCTIONS
[facility] Dermatology Visit    Thank you for allowing us to participate in your care.    When should I call my doctor?    If you are worsening or not improving, please, contact us or seek urgent care as noted below.     Who should I call with questions (adults)?    [facility] (adult and pediatric): [phone]     [facility] (adult): [phone]    For urgent needs outside of business hours call the [facility] at [phone] and ask for the dermatology resident on call    If this is a medical emergency and you are unable to reach an ER, Call 911      Who should I call with questions (pediatric)?  [facility]- Pediatric Dermatology  Dr. Nargis Kramer, Dr. Lisseth Guerrero, Dr. Aysha Caceres, Madelyn Chavez, JORGE Grier, Dr. Mary Rosario & Dr. Guillermo Murry  Non Urgent  Nurse Triage Line; [phone]- Marisela and Sonal ABEBE Care Coordinatorsohail John (/Complex ) [phone]    If you need a prescription refill, please contact your pharmacy. Refills are approved or denied by our Physicians during normal business hours, Monday through Fridays  Per office policy, refills will not be granted if you have not been seen within the past year (or sooner depending on your child's condition)    Scheduling Information:  Pediatric Appointment Scheduling and Call Center [phone]  Radiology Scheduling- [phone]  Sedation Unit Scheduling- [phone]  [place] Scheduling- General [phone]; Pediatric Dermatology [phone]  Main  Services: [phone]  Korean: [phone]  Cuban: [phone]  Hmong/Omani/Welsh: [phone]  Preadmission Nursing Department Fax Number: [phone] (Fax all pre-operative paperwork to this number)    For urgent matters arising during evenings, weekends, or holidays that cannot wait for normal business hours  please call [phone] and ask for the Dermatology Resident On-Call to be paged.

## 2020-09-16 ENCOUNTER — THERAPY VISIT (OUTPATIENT)
Dept: OCCUPATIONAL THERAPY | Facility: CLINIC | Age: 80
End: 2020-09-16
Payer: MEDICARE

## 2020-09-16 DIAGNOSIS — M25.532 LEFT WRIST PAIN: ICD-10-CM

## 2020-09-16 DIAGNOSIS — Z47.89 AFTERCARE FOLLOWING SURGERY OF THE MUSCULOSKELETAL SYSTEM: ICD-10-CM

## 2020-09-16 PROCEDURE — 97110 THERAPEUTIC EXERCISES: CPT | Mod: GO | Performed by: OCCUPATIONAL THERAPIST

## 2020-09-16 PROCEDURE — 97140 MANUAL THERAPY 1/> REGIONS: CPT | Mod: GO | Performed by: OCCUPATIONAL THERAPIST

## 2020-09-16 NOTE — PROGRESS NOTES
Hand Therapy SOAP Note - Objective Information  Please refer to the daily flowsheet for treatment today, total treatment time and time spent performing 1:1 timed codes.       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       O:  Pain Level (Scale 0-10)   7/29/2020 8/12/20 8/19/20 8/26/2020 9/16/20   At Rest 1/10 3/10 0/10 0-1 1/10   With Use 3/10 5-6/10 4/10       Pain Description  Date 7/29/2020 8/26/2020     Location Wrist, radial and ulnar aspects Volar mid FA   Pain Quality Aching tender   Frequency intermittent      Pain is worst  daytime    Exacerbated by  Wrist AROM    Relieved by Rest    Progression Gradually improving      Wound/Scar  Surgical incision on volar wrist is well-healed.    Edema  Wrist (Circumference measured in cm)  DWC 7/29/2020   Right 5.1   Left 6.2     Hand (Circumference measured in cm)   7/29/2020 7/29/2020    Right Left   Index P1 6.5 6.6   Long P1 6.0 6.6   Ring P1 6.3 6.2   Small P1 5.5 5.9     Sensation   WNL throughout all nerve distributions; per patient report     ROM  Pain Report: - none  + mild    ++ moderate    +++ severe   Wrist 7/29/2020 7/29/2020 8/12/20 8/19/20 8/26/2020 9/16/20   AROM (PROM) Right Left Left Left L L   Extension 45 0 30 30 pre  40 post 30 40   Flexion 55 15 15 15 pre  20 post 15 15   RD 15 5    10   UD 30 0    5   Supination 85 55 70      Pronation 85 70 80        Hand 7/29/2020 7/29/2020    AROM(PROM) Right Left    Index MP 0/ -35/    PIP -15/ -30/    DIP -15/ -15/    RIVERA      Long MP 0/ -35/    PIP -15/ -30/    DIP -15/ -15/    RIVERA      Ring MP 0/ -30/    PIP -15/ -35/    DIP -15/ -15/    RIVERA      Small MP 0/ -35/    PIP -15/ -30/    DIP -15/ -15/    RIVERA          Finger AROM  8/26/2020  Patient  able to make a flat fist with the right hand. Left hand lacks 5 cm from distal palmar crease. Difficulty extending fingers at the MP and PIPj.    Thumb 7/29/2020 7/29/2020 8/26/2020     AROM  (PROM) Right Left    MP NT NT    IP NT NT    RABD NT NT    PABD NT NT    Retropulsion NT NT    Kapandji Opposition Scale (0-10/10) Opposes to PIP joint of small finger Opposes to tip of small finger Can touch radial side of SF tip       Strength  Deferred at this time    Observation  Patient demonstrates ulnar drift of bilateral hands and enlarged MCP and PIP joints due to rheumatoid arthritis.       Home Exercise Program:  Zipper orthosis, full-time, remove for hygiene and exercises (8/26/2020  Pt will get a call from MD after CT scan is reviewed re: wearing schedule)  Wrist AROM  Hand AROM  Gentle self PROM of wrist flex/ext    Next Visit: 3 weeks  Scar massage  Progress ROM as indicated

## 2020-10-07 ENCOUNTER — THERAPY VISIT (OUTPATIENT)
Dept: OCCUPATIONAL THERAPY | Facility: CLINIC | Age: 80
End: 2020-10-07
Payer: MEDICARE

## 2020-10-07 DIAGNOSIS — M25.532 LEFT WRIST PAIN: ICD-10-CM

## 2020-10-07 DIAGNOSIS — Z47.89 AFTERCARE FOLLOWING SURGERY OF THE MUSCULOSKELETAL SYSTEM: ICD-10-CM

## 2020-10-07 PROCEDURE — 97110 THERAPEUTIC EXERCISES: CPT | Mod: GO | Performed by: OCCUPATIONAL THERAPIST

## 2020-10-07 PROCEDURE — 97140 MANUAL THERAPY 1/> REGIONS: CPT | Mod: GO | Performed by: OCCUPATIONAL THERAPIST

## 2020-10-07 NOTE — PROGRESS NOTES
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 My fingers still can't flatten down on the table.  I think I am getting stronger because I have less difficulty opening the fridge door.      O:  Pain Level (Scale 0-10)   7/29/2020 8/12/20 8/19/20 8/26/2020   9/16/20 10/7/20   At Rest 1/10 3/10 0/10 0-1 1/10 0/10   With Use 3/10 5-6/10 4/10   0/10     Pain Description  Date 7/29/2020 8/26/2020     Location Wrist, radial and ulnar aspects Volar mid FA   Pain Quality Aching tender   Frequency intermittent      Pain is worst  daytime    Exacerbated by  Wrist AROM    Relieved by Rest    Progression Gradually improving      Wound/Scar  Surgical incision on volar wrist is well-healed.    Edema  Wrist (Circumference measured in cm)  DWC 7/29/2020   Right 5.1   Left 6.2     Hand (Circumference measured in cm)   7/29/2020 7/29/2020    Right Left   Index P1 6.5 6.6   Long P1 6.0 6.6   Ring P1 6.3 6.2   Small P1 5.5 5.9     Sensation   WNL throughout all nerve distributions; per patient report     ROM  Pain Report: - none  + mild    ++ moderate    +++ severe   Wrist 7/29/2020 7/29/2020 8/12/20 8/19/20 8/26/2020   9/16/20 10/7/20   AROM (PROM) Right Left Left Left L L L   Extension 45 0 30 30 pre  40 post 30 40 45   Flexion 55 15 15 15 pre  20 post 15 15 25 pre  30 post   RD 15 5    10 15   UD 30 0    5 5   Supination 85 55 70       Pronation 85 70 80         Hand 7/29/2020 7/29/2020   AROM(PROM) Right Left   Index MP 0/ -35/   PIP -15/ -30/   DIP -15/ -15/   RIVERA     Long MP 0/ -35/   PIP -15/ -30/   DIP -15/ -15/   RIVERA     Ring MP 0/ -30/   PIP -15/ -35/   DIP -15/ -15/   RIVERA     Small MP 0/  -35/   PIP -15/ -30/   DIP -15/ -15/   RIVERA         Finger AROM  8/26/2020  Patient able to make a flat fist with the right hand. Left hand lacks 5 cm from distal palmar crease. Difficulty extending fingers at the MP and PIPj.    Thumb 7/29/2020 7/29/2020 8/26/2020   10/7/20   AROM  (PROM) Right Left  Left   MP NT NT     IP NT NT     RABD NT NT     PABD NT NT     Retropulsion NT NT     Kapandji Opposition Scale (0-10/10) Opposes to PIP joint of small finger Opposes to tip of small finger Can touch radial side of SF tip Can touch radial side of SF tip     Strength   Painfree (Measured in pounds)  Pain Report:  + mild    ++ moderate    +++ severe    10/7/2020 10/7/2020   Trials Right Left   1  2  3 37 10   Average         Observation  Patient demonstrates ulnar drift of bilateral hands and enlarged MCP and PIP joints due to rheumatoid arthritis.  day.     Assessment:  Response to therapy has been improvement to:  ROM of Wrist:  All Planes  Fingers: MP joint - Flex, PIP joint - Flex, DIP joint - Flex  Flexibility:  less tightness in involved muscles  Strength:     Pain:  frequency is less, intensity of pain is decreased, duration of pain is decreased and less tender over affected area  Response to therapy has been lack of progress in:  ROM of Thumb:  Flex, Radial Abduction and Palmar Abduction    Overall Assessment:  Patient's symptoms are resolving.  Patient would benefit from continued therapy to achieve rehab potential  STG/LTG:  STGoals have been reviewed and progress or achievement has occurred;  see goal sheet for details and updates.    I have re-evaluated this patient and find that the nature, scope, duration and intensity of the therapy is appropriate for the medical condition of the patient.  Plan:  Frequency/Duration:  Recommend continuing with the current treatment plan. 2 X a month, once daily  for 3 months    Recommendations for Continued Therapy  Additions to Treatment Plan -  Therapeutic  Exercise:  Isotonics, Isometrics and Stabilization    Home Exercise Program:  Zipper orthosis, full-time, remove for hygiene and exercises   Wrist AROM  Hand AROM  Gentle self PROM of wrist flex/ext    Next Visit:   Scar massage- Use cocoa butter  Progress ROM as indicated

## 2020-10-08 DIAGNOSIS — S52.502G CLOSED FRACTURE OF DISTAL END OF LEFT RADIUS WITH DELAYED HEALING, UNSPECIFIED FRACTURE MORPHOLOGY, SUBSEQUENT ENCOUNTER: Primary | ICD-10-CM

## 2020-10-09 DIAGNOSIS — I10 ESSENTIAL HYPERTENSION: Primary | ICD-10-CM

## 2020-10-12 ENCOUNTER — OFFICE VISIT (OUTPATIENT)
Dept: ORTHOPEDICS | Facility: CLINIC | Age: 80
End: 2020-10-12
Payer: MEDICARE

## 2020-10-12 ENCOUNTER — ANCILLARY PROCEDURE (OUTPATIENT)
Dept: GENERAL RADIOLOGY | Facility: CLINIC | Age: 80
End: 2020-10-12
Attending: ORTHOPAEDIC SURGERY
Payer: MEDICARE

## 2020-10-12 DIAGNOSIS — Z98.890 S/P ORIF (OPEN REDUCTION INTERNAL FIXATION) FRACTURE: ICD-10-CM

## 2020-10-12 DIAGNOSIS — S52.502G CLOSED FRACTURE OF DISTAL END OF LEFT RADIUS WITH DELAYED HEALING, UNSPECIFIED FRACTURE MORPHOLOGY, SUBSEQUENT ENCOUNTER: Primary | ICD-10-CM

## 2020-10-12 DIAGNOSIS — S52.502G CLOSED FRACTURE OF DISTAL END OF LEFT RADIUS WITH DELAYED HEALING, UNSPECIFIED FRACTURE MORPHOLOGY, SUBSEQUENT ENCOUNTER: ICD-10-CM

## 2020-10-12 DIAGNOSIS — Z87.81 S/P ORIF (OPEN REDUCTION INTERNAL FIXATION) FRACTURE: ICD-10-CM

## 2020-10-12 PROCEDURE — 73110 X-RAY EXAM OF WRIST: CPT | Mod: LT | Performed by: RADIOLOGY

## 2020-10-12 NOTE — NURSING NOTE
Reason For Visit:   Chief Complaint   Patient presents with     RECHECK     ORIF left wrist follow up       Primary MD: Guillermo Castellano  Age: 79 year old    ?  No      There were no vitals taken for this visit.      Pain Assessment  Patient Currently in Pain: Denies               QuickDASH Assessment  No flowsheet data found.       Current Outpatient Medications   Medication Sig Dispense Refill     acetaminophen 650 MG TABS Take 650 mg by mouth every 4 hours as needed. 100 tablet 0     amLODIPine (NORVASC) 10 MG tablet Take 1 tablet (10 mg) by mouth daily 90 tablet 0     Diphenhydramine-APAP, sleep, (TYLENOL PM EXTRA STRENGTH PO) Take 2 tablets by mouth At Bedtime        ferrous gluconate (FERGON) 324 (38 Fe) MG tablet Take 324 mg by mouth       metoprolol tartrate (LOPRESSOR) 100 MG tablet Take 2 tablets (200 mg) by mouth 2 times daily 360 tablet 0     Multiple Vitamin (MULTI-VITAMIN) per tablet Take 1 tablet by mouth every morning        Omeprazole (PRILOSEC PO) Take 20 mg by mouth as needed        sodium chloride (OCEAN NASAL SPRAY) 0.65 % nasal spray Spray 1 spray into both nostrils daily as needed for congestion 1 Bottle 3     triamcinolone (KENALOG) 0.1 % external cream Apply twice daily for 2 weeks then 3 times weekly for 2 week, repeat cycle as needed 454 g 0     doxycycline hyclate (VIBRAMYCIN) 50 MG capsule Take 2 capsules (100 mg) by mouth 2 times daily (Patient not taking: Reported on 7/1/2020) 60 capsule 1     doxycycline monohydrate (ADOXA) 100 MG tablet Take 1 tablet (100 mg) by mouth 2 times daily (Patient not taking: Reported on 7/1/2020) 60 tablet 1     oxyCODONE (ROXICODONE) 5 MG tablet Take 1 tablet (5 mg) by mouth every 6 hours as needed for severe pain (Patient not taking: Reported on 6/17/2020) 30 tablet 0     sulfamethoxazole-trimethoprim (BACTRIM DS/SEPTRA DS) 800-160 MG tablet Take 1 tablet by mouth 2 times daily (Patient not taking: Reported on 5/4/2020) 60 tablet 0      sulfamethoxazole-trimethoprim (BACTRIM DS/SEPTRA DS) 800-160 MG tablet Take 1 tablet by mouth 2 times daily (Patient not taking: Reported on 7/1/2020) 60 tablet 0       Allergies   Allergen Reactions     Hctz Other (See Comments)     Pt develops symptomatic and significant hyponatremia with HCTZ exposure     Hydrochlorothiazide      Other reaction(s): Hyponatremia  Hyponatremia     Latex      Benzocaine Rash     carba mix,thrium-sunscreen     Resorcinol-Alcohol Rash     carba mix,thrium-sunscreen     Ace Inhibitors Unknown     Dizziness and orthostatic changes and electrolyte problems.     Latex Unknown       Madison Pabon, ATC

## 2020-10-12 NOTE — PROGRESS NOTES
Date of Service: Oct 12, 2020    Chief Complaint: Post operative follow up.     Date of Surgery: 6/12/2020    Procedure Performed: Left wrist distal ulna resection   Bone culture of left distal radius Open reduction left distal radius nonunion with distal ulna autograft on 6/12/2020     Interval events: Kimberly Chau is a 79 year old female who presents today for a postoperative follow up.  Patient reports that she is doing well.  She has had minimal pain.  She is been wearing her removable splint at all times.  She denies any numbness or tingling.  She feels that her strength is improving.  She notes that she has not used her bone stimulator since mid September.    The past medical history was reviewed updated in the EMR. This includes medications, surgeries, social history, and review of systems.    Physical examination:  not currently breastfeeding.  Well-developed, well-nourished and in no acute distress.  Alert and oriented to surroundings.    Examination left upper extremity incisions are well-healed. There is no swelling.  She is substantial hand deformity and corresponding stiffness consistent with her RA.  Mild tenderness over distal radius diffusely not localized to fx site. Sensation is intact in median, radial and ulnar nerve distributions. Fingers are warm and well-perfused. Radial pulse is palpable.     Radiographs: Three views of the left wrist were obtained and reviewed. These demonstrate ongoing healing of comminuted distal radius fracture, status post revision ORIF and darrach.  The volar plate and screws in place with stable alignment.  Osteopenic bones.  Degenerative joint changes in the first CMC.    Assessment: 79 year old female s/p the above procedure, progressing appropriately.     Plan: Discussed with the patient that she is improving.  She can wean out of her splint except continue to wear for strenuous activities.  She does not need to use the bone stimulator anymore.  She should continue  with hand therapy strengthening exercises.  She will follow-up in first week of December for follow-up visit with Dr. Strong.  All questions answered patient is in agreement with plan.    CARO ROBERTSON PA-C  October 12, 2020 11:40 AM   Orthopaedic Surgery     I saw the patient with the PA and agree with the note above.     Dinh Strong MD

## 2020-10-12 NOTE — LETTER
10/12/2020         RE: Kimberly Chau  [address]  [place] [address]        Dear Colleague,    Thank you for referring your patient, Kimberly Chau, to the [facility] [place]. Please see a copy of my visit note below.    Date of Service: Oct 12, 2020    Chief Complaint: Post operative follow up.     Date of Surgery: 6/12/2020    Procedure Performed: Left wrist distal ulna resection   Bone culture of left distal radius Open reduction left distal radius nonunion with distal ulna autograft on 6/12/2020     Interval events: Kimberly Chau is a 79 year old female who presents today for a postoperative follow up.  Patient reports that she is doing well.  She has had minimal pain.  She is been wearing her removable splint at all times.  She denies any numbness or tingling.  She feels that her strength is improving.  She notes that she has not used her bone stimulator since mid September.    The past medical history was reviewed updated in the EMR. This includes medications, surgeries, social history, and review of systems.    Physical examination:  not currently breastfeeding.  Well-developed, well-nourished and in no acute distress.  Alert and oriented to surroundings.    Examination left upper extremity incisions are well-healed. There is no swelling.  She is substantial hand deformity and corresponding stiffness consistent with her RA.  Mild tenderness over distal radius diffusely not localized to fx site. Sensation is intact in median, radial and ulnar nerve distributions. Fingers are warm and well-perfused. Radial pulse is palpable.     Radiographs: Three views of the left wrist were obtained and reviewed. These demonstrate ongoing healing of comminuted distal radius fracture, status post revision ORIF and darrach.  The volar plate and screws in place with stable alignment.  Osteopenic bones.  Degenerative joint changes in the first CMC.    Assessment: 79 year old female s/p the  above procedure, progressing appropriately.     Plan: Discussed with the patient that she is improving.  She can wean out of her splint except continue to wear for strenuous activities.  She does not need to use the bone stimulator anymore.  She should continue with hand therapy strengthening exercises.  She will follow-up in first week of December for follow-up visit with Dr. Strong.  All questions answered patient is in agreement with plan.    CARO ROBERTSON PA-C  October 12, 2020 11:40 AM   Orthopaedic Surgery     I saw the patient with the PA and agree with the note above.     Dinh Strong MD

## 2020-10-13 RX ORDER — METOPROLOL TARTRATE 100 MG
200 TABLET ORAL 2 TIMES DAILY
Qty: 360 TABLET | Refills: 0 | Status: SHIPPED | OUTPATIENT
Start: 2020-10-13 | End: 2021-04-14

## 2020-11-02 ENCOUNTER — OFFICE VISIT (OUTPATIENT)
Dept: OTOLARYNGOLOGY | Facility: CLINIC | Age: 80
End: 2020-11-02
Payer: MEDICARE

## 2020-11-02 VITALS — BODY MASS INDEX: 22.31 KG/M2 | HEART RATE: 76 BPM | TEMPERATURE: 97.7 F | WEIGHT: 122 LBS

## 2020-11-02 DIAGNOSIS — J32.1 CHRONIC FRONTAL SINUSITIS: Primary | ICD-10-CM

## 2020-11-02 DIAGNOSIS — J34.89 NASAL CRUSTING: ICD-10-CM

## 2020-11-02 PROCEDURE — 31231 NASAL ENDOSCOPY DX: CPT | Performed by: OTOLARYNGOLOGY

## 2020-11-02 PROCEDURE — 99213 OFFICE O/P EST LOW 20 MIN: CPT | Mod: 25 | Performed by: OTOLARYNGOLOGY

## 2020-11-02 RX ORDER — MUPIROCIN 20 MG/G
OINTMENT TOPICAL
Qty: 22 G | Refills: 0 | Status: ON HOLD | OUTPATIENT
Start: 2020-11-02 | End: 2021-02-16

## 2020-11-02 ASSESSMENT — PAIN SCALES - GENERAL: PAINLEVEL: NO PAIN (0)

## 2020-11-02 NOTE — PROGRESS NOTES
Otolaryngology Clinic      Name: Kimberly Chau  MRN: [id number]  Age: 79 year old  : 1940      Chief Complaint:   Follow up     History of Present Illness:   Kimberly Chau is a 79 year old female with a history of chronic sinusitis who presents for follow up. She underwent revision frontal sinus surgery with stent placement in 2018. I saw her virtually and she was feeling well. She has occasional thick secretions, but no headaches.        8/3/2018 Optical tracking system endoscopic sinus surgery (Right) Procedure: OPTICAL TRACKING SYSTEM ENDOSCOPIC SINUS SURGERY; Stealth Assisted Revision Right Frontal Sinusotomy, Right Stent Placement **Latex Allergy** ; Surgeon: Elyse Eisenberg MD; Location:  OR   2018 Optical tracking system endoscopic sinus surgery (Right) Procedure: OPTICAL TRACKING SYSTEM ENDOSCOPIC SINUS SURGERY; Stealth Assisted Right Maxillary Antrostomy, Anterior Ethmoidectomy And Frontal Sinusotomy; Surgeon: Elyse Eisenberg MD; Location:  OR        ROS: healing from hand surgery, no new neurologic or visual issues.     Physical Exam:   Pulse 76   Temp 97.7  F (36.5  C)   Wt 55.3 kg (122 lb)   BMI 22.31 kg/m       General Assessment   The patient is alert, oriented and in no acute distress.   Head/Face/Scalp  Grossly normal.   Nose  External nose is straight, skin is normal.     Procedure:  Endoscopy indicated for exam of stent and sinus disease  Topical anesthetic/decongestant spray applied.  Rigid scope used for visualization.  Findings:  Scant mucus around stent, freely mobile, in good position. Moderate crusting anterior nares.      Assessment and Plan:  Kimberly Chau is a 79 year old female with history of chronic sinusitis. She has been feeling improved following revision endoscopic sinus surgery with stent placement.     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 Add mupirocin  ointment for nasal crusting.

## 2020-11-02 NOTE — PATIENT INSTRUCTIONS
1.  You were seen in the ENT Clinic today by . If you have any questions or concerns after your appointment, please call [phone]. Press option #1 for scheduling related needs. Press option #3 for Nurse advice.    2. Plan is to return to clinic in 6 months      Anca Alex LPN  [facility] - Otolaryngology

## 2020-11-02 NOTE — LETTER
2020       RE: Kimberly Chau  [address]  [place] [address]     Dear Colleague,    Thank you for referring your patient, Kimberly Chau, to the [facility] [place] at [facility]. Please see a copy of my visit note below.      Otolaryngology Clinic      Name: Kimberly Chau  MRN: [id number]  Age: 79 year old  : 1940      Chief Complaint:   Follow up     History of Present Illness:   Kimberly Chau is a 79 year old female with a history of chronic sinusitis who presents for follow up. She underwent revision frontal sinus surgery with stent placement in 2018. I saw her virtually and she was feeling well. She has occasional thick secretions, but no headaches.        8/3/2018 Optical tracking system endoscopic sinus surgery (Right) Procedure: OPTICAL TRACKING SYSTEM ENDOSCOPIC SINUS SURGERY; Stealth Assisted Revision Right Frontal Sinusotomy, Right Stent Placement **Latex Allergy** ; Surgeon: Elyse Eisenberg MD; Location:  OR   2018 Optical tracking system endoscopic sinus surgery (Right) Procedure: OPTICAL TRACKING SYSTEM ENDOSCOPIC SINUS SURGERY; Stealth Assisted Right Maxillary Antrostomy, Anterior Ethmoidectomy And Frontal Sinusotomy; Surgeon: Elyse Eisenberg MD; Location:  OR        ROS: healing from hand surgery, no new neurologic or visual issues.     Physical Exam:   Pulse 76   Temp 97.7  F (36.5  C)   Wt 55.3 kg (122 lb)   BMI 22.31 kg/m       General Assessment   The patient is alert, oriented and in no acute distress.   Head/Face/Scalp  Grossly normal.   Nose  External nose is straight, skin is normal.     Procedure:  Endoscopy indicated for exam of stent and sinus disease  Topical anesthetic/decongestant spray applied.  Rigid scope used for visualization.  Findings:  Scant mucus around stent, freely mobile, in good position. Moderate crusting anterior nares.      Assessment and  Plan:  Kimberly Chau is a 79 year old female with history of chronic sinusitis. She has been feeling improved following revision endoscopic sinus surgery with stent placement.     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 Add mupirocin ointment for nasal crusting.           Again, thank you for allowing me to participate in the care of your patient.      Sincerely,    Elyse Eisenberg MD

## 2020-11-02 NOTE — NURSING NOTE
Chief Complaint   Patient presents with     RECHECK     6 month follow up         Pulse 76, temperature 97.7  F (36.5  C), weight 55.3 kg (122 lb), not currently breastfeeding.    Jadyn Cook EMT

## 2020-11-04 ENCOUNTER — VIRTUAL VISIT (OUTPATIENT)
Dept: INTERNAL MEDICINE | Facility: CLINIC | Age: 80
End: 2020-11-04
Payer: MEDICARE

## 2020-11-04 DIAGNOSIS — M25.562 LEFT KNEE PAIN, UNSPECIFIED CHRONICITY: Primary | ICD-10-CM

## 2020-11-04 PROCEDURE — 99441 PR PHYSICIAN TELEPHONE EVALUATION 5-10 MIN: CPT | Mod: 95 | Performed by: INTERNAL MEDICINE

## 2020-11-04 NOTE — Clinical Note
(1) orthopedic referral for L knee pain. She may come in-person Dermatology 11/10/2020 (even though states telephone visit). She will clarify
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
## 2020-11-04 NOTE — NURSING NOTE
Chief Complaint   Patient presents with     Recheck Medication     follow up. Swollen and painful legs       Kimberly Nissen, EMT at 12:51 PM on 11/4/2020    Video Visit Technology for this patient: No video technology available to patient, please call patient over the phone

## 2020-11-09 ENCOUNTER — TELEPHONE (OUTPATIENT)
Dept: INTERNAL MEDICINE | Facility: CLINIC | Age: 80
End: 2020-11-09

## 2020-11-09 NOTE — TELEPHONE ENCOUNTER
Health Call Center    Phone Message    May a detailed message be left on voicemail: yes     Reason for Call: Other: Patient scheduled her Derm appointment at  but found out it was only a telephoen appointment and their first available appointment won't be until January. Patient was wondeirng if Dr. Castellano can reccomend someone from the [facility] for patient to see because patient thinks it's important for her to see someone right away. Please call patient. Thank you.       Action Taken: Message routed to:  [facility]    Travel Screening: Not Applicable

## 2020-11-11 NOTE — TELEPHONE ENCOUNTER
I called and left detailed VM, Dr. Castellano is only familiar with [facility] derm, she should keep her upcoming appointment although she is welcome to see a different derm clinic if she prefers.   Jackie Fenton, EMT at 1:04 PM on 11/11/2020.

## 2020-11-12 ENCOUNTER — TELEPHONE (OUTPATIENT)
Dept: INTERNAL MEDICINE | Facility: CLINIC | Age: 80
End: 2020-11-12

## 2020-11-12 DIAGNOSIS — L98.9 SKIN LESION: Primary | ICD-10-CM

## 2020-11-12 NOTE — TELEPHONE ENCOUNTER
[facility] Call Center    Phone Message    May a detailed message be left on voicemail: yes     Reason for Call: Order(s): Other:   Reason for requested: Referral to Dermatology within [place], for Sores on lower legs, possible infection    Date needed: asap    Provider name: Gray      Action Taken: Message routed to:  [facility]    Travel Screening: Not Applicable                                                                       To Dr Mavis Hughes to advise.

## 2020-11-13 NOTE — TELEPHONE ENCOUNTER
Derm referral placed by Dr. Castellano.  Patient already called this morning and had derm scheduled this Tuesday.        Kody Roberts CMA ([facility]) at 10:20 AM on 11/13/2020

## 2020-11-17 ENCOUNTER — OFFICE VISIT (OUTPATIENT)
Dept: DERMATOLOGY | Facility: CLINIC | Age: 80
End: 2020-11-17
Attending: INTERNAL MEDICINE
Payer: MEDICARE

## 2020-11-17 ENCOUNTER — TELEPHONE (OUTPATIENT)
Dept: DERMATOLOGY | Facility: CLINIC | Age: 80
End: 2020-11-17

## 2020-11-17 DIAGNOSIS — I87.333: Primary | ICD-10-CM

## 2020-11-17 PROCEDURE — 99203 OFFICE O/P NEW LOW 30 MIN: CPT | Performed by: DERMATOLOGY

## 2020-11-17 PROCEDURE — 87186 SC STD MICRODIL/AGAR DIL: CPT | Performed by: DERMATOLOGY

## 2020-11-17 PROCEDURE — 87077 CULTURE AEROBIC IDENTIFY: CPT | Performed by: DERMATOLOGY

## 2020-11-17 PROCEDURE — 87070 CULTURE OTHR SPECIMN AEROBIC: CPT | Performed by: DERMATOLOGY

## 2020-11-17 ASSESSMENT — PAIN SCALES - GENERAL: PAINLEVEL: EXTREME PAIN (8)

## 2020-11-17 NOTE — LETTER
11/17/2020       RE: Kimberly Chau  [address]  [place] [address]     Dear Colleague,    Thank you for referring your patient, Kimberly Chau, to the [facility] [place] at [facility]. Please see a copy of my visit note below.    [facility] Dermatology Note      Dermatology Problem List:  # Pertinent PMH: RA previously on methotrexate.  1. Bilateral LE ulcers, in setting of significant edema. Favor stasis ulcers. Less likely cutaneous manifestation of her RA (?vasculitis) versus manifestation of her atopic dermatitis versus bullous pemphigoid but could consider as she did stop methotrexate about 1 year ago.  - Trial of unna boot placed 11/17/2020  - Stop amlodipine  - Vascular surgery referral  - Bacterial swab pending 11/17/2020  - Consider bx if not improving  2. Atopic dermatitis.  - Prior rx: was on methotrexate for RA previously, discontinued ~11/2019; cyclosporine, discontinued 06/28/2010  - Prior topicals: fluocinolone 0.025% ointment, TMC 0.1% ointment    CC:   Chief Complaint   Patient presents with     Derm Problem     Kimberly is here today for 2 skin lesions on her legs       Encounter Date: Nov 17, 2020    History of Present Illness:  Ms. Kimberly Chau is a 80 year old female who presents as a referral from Dr. Castellano for evaluation of sores on her legs.    She first developed this about 2 months ago. Sores are extremely painful. Has been trying to keep them covered with non-stick bandages.  She previously was on methotrexate for her RA but stopped this about 1 year ago due to wrist surgery/infection.  Was recently seen via virtual visit by Dr. Hull about 1 month and suspected to be having a flare of her atopic dermatitis. Since then the sores on her legs have really expanded.  She is a quite a bit of pain.  No fevers.    Otherwise feeling well, no additional skin concerns.     Past Medical History:   Patient  Active Problem List   Diagnosis     Intrinsic atopic dermatitis     Essential hypertension     Rheumatoid arthritis (H)     Retinal artery occlusion     Cervical vertebral fracture (H)     Central retinal artery occlusion of right eye     Bilateral occipital neuralgia     C1-C2 instability     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 factor (H)     Osteoarthritis     Malignant hypertension     Hyponatremia     GERD (gastroesophageal reflux disease)     Eczematous dermatitis     Anxiety state     Anemia     Left wrist pain     Closed fracture of distal end of left radius with delayed healing, unspecified fracture morphology, subsequent encounter     Closed fracture of distal end of left radius, unspecified fracture morphology, sequela     Aftercare following surgery of the musculoskeletal system     Past Medical History:   Diagnosis Date     Anemia      Arthritis      Cataracts      Central retinal artery occlusion      Cervical spine fracture (H)     After a fall from orthostatic sx     Chronic pain     Back of head     Gastro-oesophageal reflux disease      Head injury      Hypertension      Hyponatremia     Resolved, 2/2 diuretics     Migraines      Osteoporosis      Pneumonia 2018     Rheumatoid arthritis(714.0)      Past Surgical History:   Procedure Laterality Date     C HAND/FINGER SURGERY UNLISTED       C PELVIS/HIP JOINT SURGERY UNLISTED       COLONOSCOPY       EYE SURGERY Left 02/2019    Hole in macula     FUSION CERVICAL POSTERIOR THREE+ LEVELS  1/22/2013    Procedure: FUSION CERVICAL POSTERIOR THREE+ LEVELS;  Cervical 1-6 Posterior Instrumentation and Fusion, Cervical 3-4 Laminectomy  .Instrumentation and fusion to Cervical 6 *Latex Allergy*;  Surgeon: Ra Bar MD;  Location: [facility] OR     GYN SURGERY       HEAD & NECK SURGERY       HYSTERECTOMY       KNEE SURGERY       NECK SURGERY       OPEN REDUCTION INTERNAL FIXATION WRIST Left 4/30/2019    Procedure: Open Reduction  Internal Fixation Left Distal Radius Fracture;  Surgeon: Dinh Strong MD;  [facility] OR     OPEN REDUCTION INTERNAL FIXATION WRIST Left 6/12/2020    Procedure: OPEN REDUCTION INTERNAL FIXATION Left Distal Radius Nonunion, Distal Ulna Resection;  Surgeon: Dinh Strong MD;  Location: [facility] OR     OPTICAL TRACKING SYSTEM ENDOSCOPIC SINUS SURGERY Right 4/6/2018    Procedure: OPTICAL TRACKING SYSTEM ENDOSCOPIC SINUS SURGERY;  Stealth Assisted Right Maxillary Antrostomy, Anterior Ethmoidectomy And Frontal Sinusotomy;  Surgeon: Elyse Eisenberg MD;  Location: [facility] OR     OPTICAL TRACKING SYSTEM ENDOSCOPIC SINUS SURGERY Right 8/3/2018    Procedure: OPTICAL TRACKING SYSTEM ENDOSCOPIC SINUS SURGERY;  Stealth Assisted Revision Right Frontal Sinusotomy, Right Stent Placement  **Latex Allergy** ;  Surgeon: Elyse Eisenberg MD;  Location: [facility] OR     ORTHOPEDIC SURGERY       REMOVE HARDWARE WRIST Left 11/19/2019    Procedure: Left Distal Radius Hardware Removal, Flexor Pollicus Longus Repair, Deep Cultures;  Surgeon: Dinh Strong MD;  Location: [facility] OR       Social History:  Patient reports that she has never smoked. She has never used smokeless tobacco. She reports current alcohol use. She reports that she does not use drugs.    Family History:  Family History   Problem Relation Age of Onset     Arthritis Mother      Respiratory Mother         pulmonary fibrosis     Hypertension Mother      Anemia Mother      Liver Disease Father         from EtOH     Alcoholism Father      Multiple Sclerosis Sister      No Known Problems Maternal Grandmother      Heart Disease Maternal Grandfather      Breast Cancer Sister      Glaucoma Paternal Grandfather      Heart Disease Paternal Grandfather        Medications:  Current Outpatient Medications   Medication Sig Dispense Refill     acetaminophen 650 MG TABS Take 650 mg by mouth every 4 hours as needed. 100 tablet 0     amLODIPine (NORVASC) 10 MG tablet Take 1 tablet (10 mg)  by mouth daily 90 tablet 0     Diphenhydramine-APAP, sleep, (TYLENOL PM EXTRA STRENGTH PO) Take 2 tablets by mouth At Bedtime        ferrous gluconate (FERGON) 324 (38 Fe) MG tablet Take 324 mg by mouth       metoprolol tartrate (LOPRESSOR) 100 MG tablet Take 2 tablets (200 mg) by mouth 2 times daily 360 tablet 0     Multiple Vitamin (MULTI-VITAMIN) per tablet Take 1 tablet by mouth every morning        mupirocin (BACTROBAN) 2 % external ointment Use in nose twice daily for 5 days 22 g 0     Omeprazole (PRILOSEC PO) Take 20 mg by mouth as needed        sodium chloride (OCEAN NASAL SPRAY) 0.65 % nasal spray Spray 1 spray into both nostrils daily as needed for congestion 1 Bottle 3     triamcinolone (KENALOG) 0.1 % external cream Apply twice daily for 2 weeks then 3 times weekly for 2 week, repeat cycle as needed 454 g 0     traMADol (ULTRAM) 50 MG tablet Take 1 tablet (50 mg) by mouth every 6 hours as needed for severe pain 20 tablet 0     Allergies   Allergen Reactions     Hctz Other (See Comments)     Pt develops symptomatic and significant hyponatremia with HCTZ exposure     Hydrochlorothiazide      Other reaction(s): Hyponatremia  Hyponatremia     Latex      Benzocaine Rash     carba mix,thrium-sunscreen     Resorcinol-Alcohol Rash     carba mix,thrium-sunscreen     Ace Inhibitors Unknown     Dizziness and orthostatic changes and electrolyte problems.     Latex Unknown         Review of Systems:  -Constitutional: Otherwise feeling well today, in usual state of health.  -Skin: As above in HPI. No additional skin concerns.    Physical exam:  GEN: This is a well developed, well-nourished female in no acute distress, in a pleasant mood.    SKIN: Focused examination of the bilateral lower extremities was performed.  -Carreon skin type: II  -Bilateral lower legs with significant pitting edema and background erythema. Shins with several shallow ulcerations with yellowish base and at times hemorrhagic crust.  Exquisitely tender.  -No other lesions of concern on areas examined.               Labs:  n/a    Impression/Plan:  1. Bilateral LE ulcers, in setting of significant edema. Favor stasis ulcers. Less likely cutaneous manifestation of her RA (?vasculitis) versus manifestation of her atopic dermatitis versus bullous pemphigoid but could consider as she did stop methotrexate about 1 year ago. Will plan for management as below and continue close follow-up.   - Unna boot placed today 11/17/2020  - Sent message to Dr. Castellano, okay to stop amlodipine  - Vascular surgery referral for venous eval  - Bacterial swab pending 11/17/2020 - consider antibiotics pending results  - Consider bx if not improving    CC Guillermo Castellano MD  [address] [place] [address] on close of this encounter.    Follow-up in 1 week, earlier for new or changing lesions.       Staff Involved:  Staff Only    Amanda Rendon MD    Department of Dermatology  [facility]: Phone: [phone], Fax: [phone]  11/18/2020

## 2020-11-17 NOTE — TELEPHONE ENCOUNTER
[facility] Call Center    Phone Message    May a detailed message be left on voicemail: yes     Reason for Call: Symptoms or Concerns     If patient has red-flag symptoms, warm transfer to triage line    Current symptom or concern: PAIN,  Pt sates on a scale of 1-10 this is a 15.  She says there is no way she can handle it and they Tylenol didn't even take the edge off of it.  HELP!    Symptoms have been present for:  2 week(s)    Has patient previously been seen for this? Yes    By Lizy Rendon MD    Date: 11/17/2020    Are there any new or worsening symptoms? Yes: PAIN    Pt requests help with pain ASAP    Action Taken: Message routed to:  [facility] dermatology    Travel Screening: Not Applicable

## 2020-11-17 NOTE — NURSING NOTE
Dermatology Rooming Note    Kimberly Chau's goals for this visit include:   Chief Complaint   Patient presents with     Derm Problem     Kimberly is here today for 2 skin lesions on her legs     Lyndsay Prince CMA

## 2020-11-18 RX ORDER — TRAMADOL HYDROCHLORIDE 50 MG/1
50 TABLET ORAL EVERY 6 HOURS PRN
Qty: 20 TABLET | Refills: 0 | Status: SHIPPED | OUTPATIENT
Start: 2020-11-18 | End: 2020-11-24

## 2020-11-18 NOTE — PROGRESS NOTES
[facility] Dermatology Note      Dermatology Problem List:  # Pertinent PMH: RA previously on methotrexate.  1. Bilateral LE ulcers, in setting of significant edema. Favor stasis ulcers. Less likely cutaneous manifestation of her RA (?vasculitis) versus manifestation of her atopic dermatitis versus bullous pemphigoid but could consider as she did stop methotrexate about 1 year ago.  - Trial of unna boot placed 11/17/2020  - Stop amlodipine  - Vascular surgery referral  - Bacterial swab pending 11/17/2020  - Consider bx if not improving  2. Atopic dermatitis.  - Prior rx: was on methotrexate for RA previously, discontinued ~11/2019; cyclosporine, discontinued 06/28/2010  - Prior topicals: fluocinolone 0.025% ointment, TMC 0.1% ointment    CC:   Chief Complaint   Patient presents with     Derm Problem     Kimberly is here today for 2 skin lesions on her legs       Encounter Date: Nov 17, 2020    History of Present Illness:  Ms. Kimberly Chau is a 80 year old female who presents as a referral from Dr. Castellano for evaluation of sores on her legs.    She first developed this about 2 months ago. Sores are extremely painful. Has been trying to keep them covered with non-stick bandages.  She previously was on methotrexate for her RA but stopped this about 1 year ago due to wrist surgery/infection.  Was recently seen via virtual visit by Dr. Hull about 1 month and suspected to be having a flare of her atopic dermatitis. Since then the sores on her legs have really expanded.  She is a quite a bit of pain.  No fevers.    Otherwise feeling well, no additional skin concerns.     Past Medical History:   Patient Active Problem List   Diagnosis     Intrinsic atopic dermatitis     Essential hypertension     Rheumatoid arthritis (H)     Retinal artery occlusion     Cervical vertebral fracture (H)     Central retinal artery occlusion of right eye     Bilateral occipital neuralgia     C1-C2 instability     Rheumatoid  arthritis involving both hands with positive rheumatoid factor (H)     Osteoarthritis     Malignant hypertension     Hyponatremia     GERD (gastroesophageal reflux disease)     Eczematous dermatitis     Anxiety state     Anemia     Left wrist pain     Closed fracture of distal end of left radius with delayed healing, unspecified fracture morphology, subsequent encounter     Closed fracture of distal end of left radius, unspecified fracture morphology, sequela     Aftercare following surgery of the musculoskeletal system     Past Medical History:   Diagnosis Date     Anemia      Arthritis      Cataracts      Central retinal artery occlusion      Cervical spine fracture (H)     After a fall from orthostatic sx     Chronic pain     Back of head     Gastro-oesophageal reflux disease      Head injury      Hypertension      Hyponatremia     Resolved, 2/2 diuretics     Migraines      Osteoporosis      Pneumonia 2018     Rheumatoid arthritis(714.0)      Past Surgical History:   Procedure Laterality Date     C HAND/FINGER SURGERY UNLISTED       C PELVIS/HIP JOINT SURGERY UNLISTED       COLONOSCOPY       EYE SURGERY Left 02/2019    Hole in macula     FUSION CERVICAL POSTERIOR THREE+ LEVELS  1/22/2013    Procedure: FUSION CERVICAL POSTERIOR THREE+ LEVELS;  Cervical 1-6 Posterior Instrumentation and Fusion, Cervical 3-4 Laminectomy  .Instrumentation and fusion to Cervical 6 *Latex Allergy*;  Surgeon: Ra Bar MD;  [facility]     GYN SURGERY       HEAD & NECK SURGERY       HYSTERECTOMY       KNEE SURGERY       NECK SURGERY       OPEN REDUCTION INTERNAL FIXATION WRIST Left 4/30/2019    Procedure: Open Reduction Internal Fixation Left Distal Radius Fracture;  Surgeon: Dinh Strong MD;  Location:  OR     OPEN REDUCTION INTERNAL FIXATION WRIST Left 6/12/2020    Procedure: OPEN REDUCTION INTERNAL FIXATION Left Distal Radius Nonunion, Distal Ulna Resection;  Surgeon: Dinh Strong MD;  Location:   OR     OPTICAL TRACKING SYSTEM ENDOSCOPIC SINUS SURGERY Right 4/6/2018    Procedure: OPTICAL TRACKING SYSTEM ENDOSCOPIC SINUS SURGERY;  Stealth Assisted Right Maxillary Antrostomy, Anterior Ethmoidectomy And Frontal Sinusotomy;  Surgeon: Elyse Eisenberg MD;  Location:  OR     OPTICAL TRACKING SYSTEM ENDOSCOPIC SINUS SURGERY Right 8/3/2018    Procedure: OPTICAL TRACKING SYSTEM ENDOSCOPIC SINUS SURGERY;  Stealth Assisted Revision Right Frontal Sinusotomy, Right Stent Placement  **Latex Allergy** ;  Surgeon: Elyse Eisenberg MD;  Location:  OR     ORTHOPEDIC SURGERY       REMOVE HARDWARE WRIST Left 11/19/2019    Procedure: Left Distal Radius Hardware Removal, Flexor Pollicus Longus Repair, Deep Cultures;  Surgeon: Dinh Strong MD;  Location:  OR       Social History:  Patient reports that she has never smoked. She has never used smokeless tobacco. She reports current alcohol use. She reports that she does not use drugs.    Family History:  Family History   Problem Relation Age of Onset     Arthritis Mother      Respiratory Mother         pulmonary fibrosis     Hypertension Mother      Anemia Mother      Liver Disease Father         from EtOH     Alcoholism Father      Multiple Sclerosis Sister      No Known Problems Maternal Grandmother      Heart Disease Maternal Grandfather      Breast Cancer Sister      Glaucoma Paternal Grandfather      Heart Disease Paternal Grandfather        Medications:  Current Outpatient Medications   Medication Sig Dispense Refill     acetaminophen 650 MG TABS Take 650 mg by mouth every 4 hours as needed. 100 tablet 0     amLODIPine (NORVASC) 10 MG tablet Take 1 tablet (10 mg) by mouth daily 90 tablet 0     Diphenhydramine-APAP, sleep, (TYLENOL PM EXTRA STRENGTH PO) Take 2 tablets by mouth At Bedtime        ferrous gluconate (FERGON) 324 (38 Fe) MG tablet Take 324 mg by mouth       metoprolol tartrate (LOPRESSOR) 100 MG tablet Take 2 tablets (200 mg) by mouth 2 times daily 360  tablet 0     Multiple Vitamin (MULTI-VITAMIN) per tablet Take 1 tablet by mouth every morning        mupirocin (BACTROBAN) 2 % external ointment Use in nose twice daily for 5 days 22 g 0     Omeprazole (PRILOSEC PO) Take 20 mg by mouth as needed        sodium chloride (OCEAN NASAL SPRAY) 0.65 % nasal spray Spray 1 spray into both nostrils daily as needed for congestion 1 Bottle 3     triamcinolone (KENALOG) 0.1 % external cream Apply twice daily for 2 weeks then 3 times weekly for 2 week, repeat cycle as needed 454 g 0     traMADol (ULTRAM) 50 MG tablet Take 1 tablet (50 mg) by mouth every 6 hours as needed for severe pain 20 tablet 0     Allergies   Allergen Reactions     Hctz Other (See Comments)     Pt develops symptomatic and significant hyponatremia with HCTZ exposure     Hydrochlorothiazide      Other reaction(s): Hyponatremia  Hyponatremia     Latex      Benzocaine Rash     carba mix,thrium-sunscreen     Resorcinol-Alcohol Rash     carba mix,thrium-sunscreen     Ace Inhibitors Unknown     Dizziness and orthostatic changes and electrolyte problems.     Latex Unknown         Review of Systems:  -Constitutional: Otherwise feeling well today, in usual state of health.  -Skin: As above in HPI. No additional skin concerns.    Physical exam:  GEN: This is a well developed, well-nourished female in no acute distress, in a pleasant mood.    SKIN: Focused examination of the bilateral lower extremities was performed.  -Carreon skin type: II  -Bilateral lower legs with significant pitting edema and background erythema. Shins with several shallow ulcerations with yellowish base and at times hemorrhagic crust. Exquisitely tender.  -No other lesions of concern on areas examined.               Labs:  n/a    Impression/Plan:  1. Bilateral LE ulcers, in setting of significant edema. Favor stasis ulcers. Less likely cutaneous manifestation of her RA (?vasculitis) versus manifestation of her atopic dermatitis versus bullous  pemphigoid but could consider as she did stop methotrexate about 1 year ago. Will plan for management as below and continue close follow-up.   - Unna boot placed today 11/17/2020  - Sent message to Dr. Castellano, okay to stop amlodipine  - Vascular surgery referral for venous eval  - Bacterial swab pending 11/17/2020 - consider antibiotics pending results  - Consider bx if not improving    CC Guillermo Castellano MD  [address] [place] [address] on close of this encounter.    Follow-up in 1 week, earlier for new or changing lesions.       Staff Involved:  Staff Only    Amanda Rendon MD    Department of Dermatology  [facility]: Phone: [phone], Fax: [phone]  11/18/2020

## 2020-11-18 NOTE — TELEPHONE ENCOUNTER
[facility] Call Center    Phone Message    May a detailed message be left on voicemail: yes     Reason for Call: Other: Pt called back. Said tramadol is fine. Please send to [id number] - [place] - [id number]     Action Taken: Message routed to:  [facility] Derm    Travel Screening: Not Applicable

## 2020-11-18 NOTE — TELEPHONE ENCOUNTER
Thanks for your help caring for this patient.  I sent her a short-term supply of tramadol. Please  this can make her sleepy/drowsy and she shouldn't drive/operate heavy machinery until she knows how it will affect her.  Let me know if any issues getting the medication.  I hope this will help get her through this week of unna boot.  If we haven't told her to stop amlodipine yet, can we please do so? (sorry I can't tell if that was communicated with her yet)

## 2020-11-18 NOTE — TELEPHONE ENCOUNTER
Amanda Rendon MD Schrom, Audrey, [place]; Lyndsay Prince CMA; [facility]             If you do reach this patient, can you also let her know we talked to dr winchester and ok to stop the amlodipine?     I am happy to call her later today too please just let me know.

## 2020-11-18 NOTE — TELEPHONE ENCOUNTER
I tried to call patient to discuss but no answer and voicemail full.  I'm sorry the pain is so bad.  I could send something like tramadol to the pharmacy but if she needs something stronger, she may need to check with her primary care doctor.  If needed, we can take the wraps off and think about other things to try. Ideally we leave them on as I think that is important in treating the underlying cause but I don't want her to be suffering.    Please try to call her again with this message. Let me know if she would like to try tramadol. I am also happy to call her back if she lets me know a good time but FYI I am in meetings all afternoon.

## 2020-11-18 NOTE — TELEPHONE ENCOUNTER
Spoke with Kimberly and relayed Dr. Rendon message below. I did inform her to stop taking the amlodipine. I also reminded her that she is scheduled in [place] on Monday.

## 2020-11-19 ENCOUNTER — TELEPHONE (OUTPATIENT)
Dept: RADIOLOGY | Facility: CLINIC | Age: 80
End: 2020-11-19

## 2020-11-19 ENCOUNTER — TELEPHONE (OUTPATIENT)
Dept: DERMATOLOGY | Facility: CLINIC | Age: 80
End: 2020-11-19

## 2020-11-19 NOTE — TELEPHONE ENCOUNTER
----- Message from Amanda Rendon MD sent at 11/18/2020 11:31 AM CST -----  If you do reach this patient, can you also let her know we talked to dr castellano and ok to stop the amlodipine?    I am happy to call her later today too please just let me know.  ----- Message -----  From: Guillermo Castellano MD  Sent: 11/18/2020  11:28 AM CST  To: Amanda Rendon MD    Dear Dr. Rendon;    Thanks for the follow up     Yes OK to stop Amlodipine     Good Gray  ----- Message -----  From: Amanda Rendon MD  Sent: 11/18/2020  10:56 AM CST  To: Guillermo Castellano MD    Hi Dr. Castellano,    I saw this poor patient yesterday. I think mostly likely she has ulcers from stasis. We are going to try unna boots and keep legs wrapped for a week but I'm not sure if she will be able to keep on due to pain as I just received a message that pain was very bad yesterday afternoon after we placed them.    Can she stop the amlodipine to help with LE swelling? I also referred her to vascular.    Lastly FYI re: pain, I tried to call her back today but no answer and voicemail was full. We will keep trying and I will offer tramadol but I advised if she needs something stronger to discuss with you.    Thanks for the help.    Amanda Rendon MD    Department of Dermatology  [facility]: Phone: [phone], Fax: [phone]  11/18/2020        
Spoke with patient and verbal understanding.    Lyndsay Prince, CMA   
guarded

## 2020-11-19 NOTE — TELEPHONE ENCOUNTER
Spoke with patient regarding appointment scheduled for a consult 12/2 at 4:00 telephone visit with .    Patient stated that would be good and understands the date, time, and location of appointment.

## 2020-11-20 DIAGNOSIS — I87.333: Primary | ICD-10-CM

## 2020-11-20 LAB
BACTERIA SPEC CULT: ABNORMAL
Lab: ABNORMAL
SPECIMEN SOURCE: ABNORMAL

## 2020-11-20 RX ORDER — CEPHALEXIN 500 MG/1
500 CAPSULE ORAL 3 TIMES DAILY
Qty: 21 CAPSULE | Refills: 0 | Status: SHIPPED | OUTPATIENT
Start: 2020-11-20 | End: 2020-11-27

## 2020-11-23 ENCOUNTER — OFFICE VISIT (OUTPATIENT)
Dept: DERMATOLOGY | Facility: CLINIC | Age: 80
End: 2020-11-23
Payer: MEDICARE

## 2020-11-23 DIAGNOSIS — I87.333: Primary | ICD-10-CM

## 2020-11-23 PROCEDURE — 29580 STRAPPING UNNA BOOT: CPT | Mod: 50 | Performed by: DERMATOLOGY

## 2020-11-23 RX ORDER — MUPIROCIN 20 MG/G
OINTMENT TOPICAL ONCE
Status: COMPLETED | OUTPATIENT
Start: 2020-11-23 | End: 2020-11-23

## 2020-11-23 RX ORDER — FLUOCINONIDE 0.5 MG/G
OINTMENT TOPICAL ONCE
Status: COMPLETED | OUTPATIENT
Start: 2020-11-23 | End: 2020-11-23

## 2020-11-23 RX ADMIN — FLUOCINONIDE: 0.5 OINTMENT TOPICAL at 13:44

## 2020-11-23 RX ADMIN — MUPIROCIN: 20 OINTMENT TOPICAL at 13:56

## 2020-11-23 ASSESSMENT — PAIN SCALES - GENERAL: PAINLEVEL: MODERATE PAIN (5)

## 2020-11-23 NOTE — PROGRESS NOTES
[facility] Dermatology Note      Dermatology Problem List:  # Pertinent PMH: RA previously on methotrexate.  1. Bilateral LE ulcers, in setting of significant edema. Favor stasis ulcers. Less likely cutaneous manifestation of her RA (?vasculitis) versus manifestation of her atopic dermatitis versus bullous pemphigoid but could consider as she did stop methotrexate about 1 year ago.  - Trial of unna boot placed 11/17/2020; 11/23/2020  - Stop amlodipine  - Vascular surgery referral  - Consider bx if not improving  2. Atopic dermatitis.  - Prior rx: was on methotrexate for RA previously, discontinued ~11/2019; cyclosporine, discontinued 06/28/2010  - Prior topicals: fluocinolone 0.025% ointment, TMC 0.1% ointment       CC:   Chief Complaint   Patient presents with     Derm Problem     Encounter Date: Nov 23, 2020    History of Present Illness:  Ms. Kimberly Chau is a 80 year old female who presents as a follow-up for venous stasis ulcers. The patient was last seen 11/30/2020 when had unna boot applied. Wound cultures grew MSSA and started on oral cephalexin which she continues to take.    Today, she reports her stasis ulcers are overall stable from prior. She does report increased erythema surrounding the areas as compared to last week. She did have some difficulty keeping the Unna boot on all last week as the bandages seemed to loosen a bit from the last week. Health otherwise stable. No other skin concerns.     Past Medical History:   Patient Active Problem List   Diagnosis     Intrinsic atopic dermatitis     Essential hypertension     Rheumatoid arthritis (H)     Retinal artery occlusion     Cervical vertebral fracture (H)     Central retinal artery occlusion of right eye     Bilateral occipital neuralgia     C1-C2 instability     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 factor (H)     Osteoarthritis     Malignant hypertension     Hyponatremia     GERD (gastroesophageal  reflux disease)     Eczematous dermatitis     Anxiety state     Anemia     Left wrist pain     Closed fracture of distal end of left radius with delayed healing, unspecified fracture morphology, subsequent encounter     Closed fracture of distal end of left radius, unspecified fracture morphology, sequela     Aftercare following surgery of the musculoskeletal system     Past Medical History:   Diagnosis Date     Anemia      Arthritis      Cataracts      Central retinal artery occlusion      Cervical spine fracture (H)     After a fall from orthostatic sx     Chronic pain     Back of head     Gastro-oesophageal reflux disease      Head injury      Hypertension      Hyponatremia     Resolved, 2/2 diuretics     Migraines      Osteoporosis      Pneumonia 2018     Rheumatoid arthritis(714.0)      Past Surgical History:   Procedure Laterality Date     C HAND/FINGER SURGERY UNLISTED       C PELVIS/HIP JOINT SURGERY UNLISTED       COLONOSCOPY       EYE SURGERY Left 02/2019    Hole in macula     FUSION CERVICAL POSTERIOR THREE+ LEVELS  1/22/2013    Procedure: FUSION CERVICAL POSTERIOR THREE+ LEVELS;  Cervical 1-6 Posterior Instrumentation and Fusion, Cervical 3-4 Laminectomy  .Instrumentation and fusion to Cervical 6 *Latex Allergy*;  Surgeon: Ra Bar MD;  Location: [facility] OR     GYN SURGERY       HEAD & NECK SURGERY       HYSTERECTOMY       KNEE SURGERY       NECK SURGERY       OPEN REDUCTION INTERNAL FIXATION WRIST Left 4/30/2019    Procedure: Open Reduction Internal Fixation Left Distal Radius Fracture;  Surgeon: Dinh Strong MD;  Location: [facility] OR     OPEN REDUCTION INTERNAL FIXATION WRIST Left 6/12/2020    Procedure: OPEN REDUCTION INTERNAL FIXATION Left Distal Radius Nonunion, Distal Ulna Resection;  Surgeon: Dinh Strong MD;  Location: [facility] OR     OPTICAL TRACKING SYSTEM ENDOSCOPIC SINUS SURGERY Right 4/6/2018    Procedure: OPTICAL TRACKING SYSTEM ENDOSCOPIC SINUS SURGERY;  Stealth Assisted  Right Maxillary Antrostomy, Anterior Ethmoidectomy And Frontal Sinusotomy;  Surgeon: Elyse Eisenberg MD;  Location: [facility] OR     OPTICAL TRACKING SYSTEM ENDOSCOPIC SINUS SURGERY Right 8/3/2018    Procedure: OPTICAL TRACKING SYSTEM ENDOSCOPIC SINUS SURGERY;  Stealth Assisted Revision Right Frontal Sinusotomy, Right Stent Placement  **Latex Allergy** ;  Surgeon: Elyse Eisenberg MD;  Location: [facility] OR     ORTHOPEDIC SURGERY       REMOVE HARDWARE WRIST Left 11/19/2019    Procedure: Left Distal Radius Hardware Removal, Flexor Pollicus Longus Repair, Deep Cultures;  Surgeon: Dinh Strong MD;  Location: [facility] OR       Social History:  Patient reports that she has never smoked. She has never used smokeless tobacco. She reports current alcohol use. She reports that she does not use drugs.    Family History:  Family History   Problem Relation Age of Onset     Arthritis Mother      Respiratory Mother         pulmonary fibrosis     Hypertension Mother      Anemia Mother      Liver Disease Father         from EtOH     Alcoholism Father      Multiple Sclerosis Sister      No Known Problems Maternal Grandmother      Heart Disease Maternal Grandfather      Breast Cancer Sister      Glaucoma Paternal Grandfather      Heart Disease Paternal Grandfather        Medications:  Current Outpatient Medications   Medication Sig Dispense Refill     acetaminophen 650 MG TABS Take 650 mg by mouth every 4 hours as needed. 100 tablet 0     amLODIPine (NORVASC) 10 MG tablet Take 1 tablet (10 mg) by mouth daily 90 tablet 0     cephALEXin (KEFLEX) 500 MG capsule Take 1 capsule (500 mg) by mouth 3 times daily for 7 days 21 capsule 0     Diphenhydramine-APAP, sleep, (TYLENOL PM EXTRA STRENGTH PO) Take 2 tablets by mouth At Bedtime        ferrous gluconate (FERGON) 324 (38 Fe) MG tablet Take 324 mg by mouth       metoprolol tartrate (LOPRESSOR) 100 MG tablet Take 2 tablets (200 mg) by mouth 2 times daily 360 tablet 0     Multiple Vitamin  (MULTI-VITAMIN) per tablet Take 1 tablet by mouth every morning        mupirocin (BACTROBAN) 2 % external ointment Use in nose twice daily for 5 days 22 g 0     Omeprazole (PRILOSEC PO) Take 20 mg by mouth as needed        sodium chloride (OCEAN NASAL SPRAY) 0.65 % nasal spray Spray 1 spray into both nostrils daily as needed for congestion 1 Bottle 3     traMADol (ULTRAM) 50 MG tablet Take 1 tablet (50 mg) by mouth every 6 hours as needed for severe pain 20 tablet 0     triamcinolone (KENALOG) 0.1 % external cream Apply twice daily for 2 weeks then 3 times weekly for 2 week, repeat cycle as needed 454 g 0     Allergies   Allergen Reactions     Hctz Other (See Comments)     Pt develops symptomatic and significant hyponatremia with HCTZ exposure     Hydrochlorothiazide      Other reaction(s): Hyponatremia  Hyponatremia     Latex      Benzocaine Rash     carba mix,thrium-sunscreen     Resorcinol-Alcohol Rash     carba mix,thrium-sunscreen     Ace Inhibitors Unknown     Dizziness and orthostatic changes and electrolyte problems.     Latex Unknown       Review of Systems:  -As per HPI  -Constitutional: Otherwise feeling well today, in usual state of health.  -Skin: As above in HPI. No additional skin concerns.    Physical exam:  Vitals: There were no vitals taken for this visit.  GEN: This is a well developed, well-nourished female in no acute distress, in a pleasant mood.    SKIN: Focused examination of the bilateral lower extremities was performed.  -Carreon skin type: II  - 2-3+ bilateral lower extremity edema, pitting, to the lower thighs bilateral  - Numerous shallow ulcers with peripheral rim of pink-red erythema on the bilateral shins, appear stable from prior.   - 2+ distal pedal pulses bilaterally  -No other lesions of concern on areas examined.     Labs:    TTE 2/18  Left ventricular function, chamber size, wall motion, and wall thickness are  normal.The EF is 60-65%.  Global right ventricular function is  normal.  The thoracic aorta is normal. The inferior vena cava was normal in size with  preserved respiratory variability. Estimated right atrial pressure is < 5  mmHg.  No pericardial effusion is present.    Lower extremity ultrasound 5/15/19    FINDINGS:   The left common femoral, femoral, popliteal, posterior tibial , and  peroneal veins are fully compressible with patent Doppler wave forms.  No thrombus is identified within them on grayscale imaging.  Redemonstration of wall thickening in the left common femoral and  proximal femoral vein. Mild amount of subcutaneous edema in the left  calf.     IMPRESSION:    1. No DVT demonstrated in left lower extremity.  2. Mild subcutaneous edema in the left calf, significantly improved  from prior.       Impression/Plan:    1. Venous stasis ulcers in setting of significant lower extremity edema suspected 2/2 venous insufficiency. Recent TTE wnl 2018. Has had multiple prior LE ultrasounds demonstrating no evidence of DVT and thickening of left common femoral and proximal veins. Recommended continuing with Unna boot application as below. Reiterated this healing is slow and may takes weeks to months.     - Continue cepahlexin to complete 7-day course  - Wounds were cleansed with chlorhexidine  - Mupirocin 2% ointment applied to open areas and lidex 0.05% ointment applied to areas of surrounding skin  - Open areas covered with telfa dressing  - Unna boot x 2 applied to lower extremities.   - Follow-up with vascular surgery as planned; appt scheduled 12/2/2020    Follow-up in 1 week for repeat Unna boot application, earlier for new or changing lesions.     Staff Involved:  Staff Only    Ra Orourke MD  Pronouns: he/him/his    Department of Dermatology  [facility]: Phone: [phone], Fax:[phone]  [facility]: Phone: [phone] Fax:  [phone]

## 2020-11-23 NOTE — LETTER
11/23/2020         RE: Kimberly Chau  [address]  [place] [address]        Dear Colleague,    Thank you for referring your patient, Kimberly Chau, to the [facility]. Please see a copy of my visit note below.    [facility] Dermatology Note      Dermatology Problem List:  # Pertinent PMH: RA previously on methotrexate.  1. Bilateral LE ulcers, in setting of significant edema. Favor stasis ulcers. Less likely cutaneous manifestation of her RA (?vasculitis) versus manifestation of her atopic dermatitis versus bullous pemphigoid but could consider as she did stop methotrexate about 1 year ago.  - Trial of unna boot placed 11/17/2020; 11/23/2020  - Stop amlodipine  - Vascular surgery referral  - Consider bx if not improving  2. Atopic dermatitis.  - Prior rx: was on methotrexate for RA previously, discontinued ~11/2019; cyclosporine, discontinued 06/28/2010  - Prior topicals: fluocinolone 0.025% ointment, TMC 0.1% ointment       CC:   Chief Complaint   Patient presents with     Derm Problem     Encounter Date: Nov 23, 2020    History of Present Illness:  Ms. Kimberly Chau is a 80 year old female who presents as a follow-up for venous stasis ulcers. The patient was last seen 11/30/2020 when had unna boot applied. Wound cultures grew MSSA and started on oral cephalexin which she continues to take.    Today, she reports her stasis ulcers are overall stable from prior. She does report increased erythema surrounding the areas as compared to last week. She did have some difficulty keeping the Unna boot on all last week as the bandages seemed to loosen a bit from the last week. Health otherwise stable. No other skin concerns.     Past Medical History:   Patient Active Problem List   Diagnosis     Intrinsic atopic dermatitis     Essential hypertension     Rheumatoid arthritis (H)     Retinal artery occlusion     Cervical vertebral fracture (H)     Central retinal artery  occlusion of right eye     Bilateral occipital neuralgia     C1-C2 instability     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 factor (H)     Osteoarthritis     Malignant hypertension     Hyponatremia     GERD (gastroesophageal reflux disease)     Eczematous dermatitis     Anxiety state     Anemia     Left wrist pain     Closed fracture of distal end of left radius with delayed healing, unspecified fracture morphology, subsequent encounter     Closed fracture of distal end of left radius, unspecified fracture morphology, sequela     Aftercare following surgery of the musculoskeletal system     Past Medical History:   Diagnosis Date     Anemia      Arthritis      Cataracts      Central retinal artery occlusion      Cervical spine fracture (H)     After a fall from orthostatic sx     Chronic pain     Back of head     Gastro-oesophageal reflux disease      Head injury      Hypertension      Hyponatremia     Resolved, 2/2 diuretics     Migraines      Osteoporosis      Pneumonia 2018     Rheumatoid arthritis(714.0)      Past Surgical History:   Procedure Laterality Date     C HAND/FINGER SURGERY UNLISTED       C PELVIS/HIP JOINT SURGERY UNLISTED       COLONOSCOPY       EYE SURGERY Left 02/2019    Hole in macula     FUSION CERVICAL POSTERIOR THREE+ LEVELS  1/22/2013    Procedure: FUSION CERVICAL POSTERIOR THREE+ LEVELS;  Cervical 1-6 Posterior Instrumentation and Fusion, Cervical 3-4 Laminectomy  .Instrumentation and fusion to Cervical 6 *Latex Allergy*;  Surgeon: Ra Bar MD;  Location: [facility] OR     GYN SURGERY       HEAD & NECK SURGERY       HYSTERECTOMY       KNEE SURGERY       NECK SURGERY       OPEN REDUCTION INTERNAL FIXATION WRIST Left 4/30/2019    Procedure: Open Reduction Internal Fixation Left Distal Radius Fracture;  Surgeon: Dinh Strong MD;  Location:  OR     OPEN REDUCTION INTERNAL FIXATION WRIST Left 6/12/2020    Procedure: OPEN REDUCTION INTERNAL FIXATION Left  Distal Radius Nonunion, Distal Ulna Resection;  Surgeon: Dihn Strong MD;  Location: [facility] OR     OPTICAL TRACKING SYSTEM ENDOSCOPIC SINUS SURGERY Right 4/6/2018    Procedure: OPTICAL TRACKING SYSTEM ENDOSCOPIC SINUS SURGERY;  Stealth Assisted Right Maxillary Antrostomy, Anterior Ethmoidectomy And Frontal Sinusotomy;  Surgeon: Elyse Eisenberg MD;  Location: [facility] OR     OPTICAL TRACKING SYSTEM ENDOSCOPIC SINUS SURGERY Right 8/3/2018    Procedure: OPTICAL TRACKING SYSTEM ENDOSCOPIC SINUS SURGERY;  Stealth Assisted Revision Right Frontal Sinusotomy, Right Stent Placement  **Latex Allergy** ;  Surgeon: Elyse Eisenberg MD;  Location: [facility] OR     ORTHOPEDIC SURGERY       REMOVE HARDWARE WRIST Left 11/19/2019    Procedure: Left Distal Radius Hardware Removal, Flexor Pollicus Longus Repair, Deep Cultures;  Surgeon: Dinh Strong MD;  Location: [facility] OR       Social History:  Patient reports that she has never smoked. She has never used smokeless tobacco. She reports current alcohol use. She reports that she does not use drugs.    Family History:  Family History   Problem Relation Age of Onset     Arthritis Mother      Respiratory Mother         pulmonary fibrosis     Hypertension Mother      Anemia Mother      Liver Disease Father         from EtOH     Alcoholism Father      Multiple Sclerosis Sister      No Known Problems Maternal Grandmother      Heart Disease Maternal Grandfather      Breast Cancer Sister      Glaucoma Paternal Grandfather      Heart Disease Paternal Grandfather        Medications:  Current Outpatient Medications   Medication Sig Dispense Refill     acetaminophen 650 MG TABS Take 650 mg by mouth every 4 hours as needed. 100 tablet 0     amLODIPine (NORVASC) 10 MG tablet Take 1 tablet (10 mg) by mouth daily 90 tablet 0     cephALEXin (KEFLEX) 500 MG capsule Take 1 capsule (500 mg) by mouth 3 times daily for 7 days 21 capsule 0     Diphenhydramine-APAP, sleep, (TYLENOL PM EXTRA STRENGTH PO) Take 2  tablets by mouth At Bedtime        ferrous gluconate (FERGON) 324 (38 Fe) MG tablet Take 324 mg by mouth       metoprolol tartrate (LOPRESSOR) 100 MG tablet Take 2 tablets (200 mg) by mouth 2 times daily 360 tablet 0     Multiple Vitamin (MULTI-VITAMIN) per tablet Take 1 tablet by mouth every morning        mupirocin (BACTROBAN) 2 % external ointment Use in nose twice daily for 5 days 22 g 0     Omeprazole (PRILOSEC PO) Take 20 mg by mouth as needed        sodium chloride (OCEAN NASAL SPRAY) 0.65 % nasal spray Spray 1 spray into both nostrils daily as needed for congestion 1 Bottle 3     traMADol (ULTRAM) 50 MG tablet Take 1 tablet (50 mg) by mouth every 6 hours as needed for severe pain 20 tablet 0     triamcinolone (KENALOG) 0.1 % external cream Apply twice daily for 2 weeks then 3 times weekly for 2 week, repeat cycle as needed 454 g 0     Allergies   Allergen Reactions     Hctz Other (See Comments)     Pt develops symptomatic and significant hyponatremia with HCTZ exposure     Hydrochlorothiazide      Other reaction(s): Hyponatremia  Hyponatremia     Latex      Benzocaine Rash     carba mix,thrium-sunscreen     Resorcinol-Alcohol Rash     carba mix,thrium-sunscreen     Ace Inhibitors Unknown     Dizziness and orthostatic changes and electrolyte problems.     Latex Unknown       Review of Systems:  -As per HPI  -Constitutional: Otherwise feeling well today, in usual state of health.  -Skin: As above in HPI. No additional skin concerns.    Physical exam:  Vitals: There were no vitals taken for this visit.  GEN: This is a well developed, well-nourished female in no acute distress, in a pleasant mood.    SKIN: Focused examination of the bilateral lower extremities was performed.  -Carreon skin type: II  - 2-3+ bilateral lower extremity edema, pitting, to the lower thighs bilateral  - Numerous shallow ulcers with peripheral rim of pink-red erythema on the bilateral shins, appear stable from prior.   - 2+ distal  pedal pulses bilaterally  -No other lesions of concern on areas examined.     Labs:    TTE 2/18  Left ventricular function, chamber size, wall motion, and wall thickness are  normal.The EF is 60-65%.  Global right ventricular function is normal.  The thoracic aorta is normal. The inferior vena cava was normal in size with  preserved respiratory variability. Estimated right atrial pressure is < 5  mmHg.  No pericardial effusion is present.    Lower extremity ultrasound 5/15/19    FINDINGS:   The left common femoral, femoral, popliteal, posterior tibial , and  peroneal veins are fully compressible with patent Doppler wave forms.  No thrombus is identified within them on grayscale imaging.  Redemonstration of wall thickening in the left common femoral and  proximal femoral vein. Mild amount of subcutaneous edema in the left  calf.     IMPRESSION:    1. No DVT demonstrated in left lower extremity.  2. Mild subcutaneous edema in the left calf, significantly improved  from prior.       Impression/Plan:    1. Venous stasis ulcers in setting of significant lower extremity edema suspected 2/2 venous insufficiency. Recent TTE wnl 2018. Has had multiple prior LE ultrasounds demonstrating no evidence of DVT and thickening of left common femoral and proximal veins. Recommended continuing with Unna boot application as below. Reiterated this healing is slow and may takes weeks to months.     - Continue cepahlexin to complete 7-day course  - Wounds were cleansed with chlorhexidine  - Mupirocin 2% ointment applied to open areas and lidex 0.05% ointment applied to areas of surrounding skin  - Open areas covered with telfa dressing  - Unna boot x 2 applied to lower extremities.   - Follow-up with vascular surgery as planned; appt scheduled 12/2/2020    Follow-up in 1 week for repeat Unna boot application, earlier for new or changing lesions.     Staff Involved:  Staff Only    Ra Orourke MD  Pronouns: he/him/his  Assistant  Professor  Department of Dermatology  [facility]: Phone: [phone], Fax:[phone]  [facility]: Phone: [phone] Fax: [phone]            Again, thank you for allowing me to participate in the care of your patient.        Sincerely,        Ra Orourke MD

## 2020-11-30 ENCOUNTER — OFFICE VISIT (OUTPATIENT)
Dept: DERMATOLOGY | Facility: CLINIC | Age: 80
End: 2020-11-30
Payer: MEDICARE

## 2020-11-30 DIAGNOSIS — L97.101 VENOUS STASIS ULCER OF THIGH LIMITED TO BREAKDOWN OF SKIN WITH VARICOSE VEINS, UNSPECIFIED LATERALITY (H): Primary | ICD-10-CM

## 2020-11-30 DIAGNOSIS — I83.001 VENOUS STASIS ULCER OF THIGH LIMITED TO BREAKDOWN OF SKIN WITH VARICOSE VEINS, UNSPECIFIED LATERALITY (H): Primary | ICD-10-CM

## 2020-11-30 PROCEDURE — 29580 STRAPPING UNNA BOOT: CPT | Mod: 50 | Performed by: DERMATOLOGY

## 2020-11-30 RX ORDER — FLUOCINONIDE 0.5 MG/G
OINTMENT TOPICAL ONCE
Status: SHIPPED | OUTPATIENT
Start: 2020-11-30

## 2020-11-30 RX ORDER — MUPIROCIN 20 MG/G
OINTMENT TOPICAL ONCE
Status: ACTIVE | OUTPATIENT
Start: 2020-11-30

## 2020-11-30 ASSESSMENT — PAIN SCALES - GENERAL: PAINLEVEL: MODERATE PAIN (5)

## 2020-11-30 NOTE — LETTER
11/30/2020         RE: Kimberly Chau  [address] [facility] [address]        Dear Colleague,    Thank you for referring your patient, Kimberly Chau, to the [facility]. Please see a copy of my visit note below.    Kimberly Chau's goals for this visit include:   Chief Complaint   Patient presents with     Derm Problem     Unna boot       She requests these members of her care team be copied on today's visit information: no    PCP: Guillermo Castellano    Referring Provider:  No referring provider defined for this encounter.    There were no vitals taken for this visit.    Do you need any medication refills at today's visit? No  Dominique Martinez LPN        [facility] Dermatology Note      Dermatology Problem List:  # Pertinent PMH: RA previously on methotrexate.  1. Bilateral LE ulcers, in setting of significant edema. Favor stasis ulcers. Less likely cutaneous manifestation of her RA (?vasculitis) versus manifestation of her atopic dermatitis versus bullous pemphigoid but could consider as she did stop methotrexate about 1 year ago.  - Trial of unna boot placed 11/17/2020; 11/23/2020; 11/30/2020  - Stop amlodipine  - Vascular surgery referral  - Consider bx if not improving  2. Atopic dermatitis.  - Prior rx: was on methotrexate for RA previously, discontinued ~11/2019; cyclosporine, discontinued 06/28/2010  - Prior topicals: fluocinolone 0.025% ointment, TMC 0.1% ointment     CC:   Chief Complaint   Patient presents with     Derm Problem     Unna boot     Encounter Date: Nov 30, 2020    History of Present Illness:  Ms. Kimberly Chau is a 80 year old female who presents as a follow-up for venous stasis ulcers. The patient was last seen 11230/2020 when had unna boot applied. She states lower extremity edema improved from prior. Did have part of boot become loose about 4 days after last application and had to re-apply Acewrap to keep in place. She had an appointment this  week with vascular surgery. Health otherwise stable. No other skin concerns.     Past Medical History:   Patient Active Problem List   Diagnosis     Intrinsic atopic dermatitis     Essential hypertension     Rheumatoid arthritis (H)     Retinal artery occlusion     Cervical vertebral fracture (H)     Central retinal artery occlusion of right eye     Bilateral occipital neuralgia     C1-C2 instability     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 factor (H)     Osteoarthritis     Malignant hypertension     Hyponatremia     GERD (gastroesophageal reflux disease)     Eczematous dermatitis     Anxiety state     Anemia     Left wrist pain     Closed fracture of distal end of left radius with delayed healing, unspecified fracture morphology, subsequent encounter     Closed fracture of distal end of left radius, unspecified fracture morphology, sequela     Aftercare following surgery of the musculoskeletal system     Past Medical History:   Diagnosis Date     Anemia      Arthritis      Cataracts      Central retinal artery occlusion      Cervical spine fracture (H)     After a fall from orthostatic sx     Chronic pain     Back of head     Gastro-oesophageal reflux disease      Head injury      Hypertension      Hyponatremia     Resolved, 2/2 diuretics     Migraines      Osteoporosis      Pneumonia 2018     Rheumatoid arthritis(714.0)      Past Surgical History:   Procedure Laterality Date     C HAND/FINGER SURGERY UNLISTED       C PELVIS/HIP JOINT SURGERY UNLISTED       COLONOSCOPY       EYE SURGERY Left 02/2019    Hole in macula     FUSION CERVICAL POSTERIOR THREE+ LEVELS  1/22/2013    Procedure: FUSION CERVICAL POSTERIOR THREE+ LEVELS;  Cervical 1-6 Posterior Instrumentation and Fusion, Cervical 3-4 Laminectomy  .Instrumentation and fusion to Cervical 6 *Latex Allergy*;  Surgeon: Ra Bar MD;  Location: [facility] OR     GYN SURGERY       HEAD & NECK SURGERY       HYSTERECTOMY       KNEE  SURGERY       NECK SURGERY       OPEN REDUCTION INTERNAL FIXATION WRIST Left 4/30/2019    Procedure: Open Reduction Internal Fixation Left Distal Radius Fracture;  Surgeon: Dinh Strong MD;  Location:  OR     OPEN REDUCTION INTERNAL FIXATION WRIST Left 6/12/2020    Procedure: OPEN REDUCTION INTERNAL FIXATION Left Distal Radius Nonunion, Distal Ulna Resection;  Surgeon: Dinh Strong MD;  Location:  OR     OPTICAL TRACKING SYSTEM ENDOSCOPIC SINUS SURGERY Right 4/6/2018    Procedure: OPTICAL TRACKING SYSTEM ENDOSCOPIC SINUS SURGERY;  Stealth Assisted Right Maxillary Antrostomy, Anterior Ethmoidectomy And Frontal Sinusotomy;  Surgeon: Elyse Eisenberg MD;  [facility]     OPTICAL TRACKING SYSTEM ENDOSCOPIC SINUS SURGERY Right 8/3/2018    Procedure: OPTICAL TRACKING SYSTEM ENDOSCOPIC SINUS SURGERY;  Stealth Assisted Revision Right Frontal Sinusotomy, Right Stent Placement  **Latex Allergy** ;  Surgeon: Elyse Eisenberg MD;  Location:  OR     ORTHOPEDIC SURGERY       REMOVE HARDWARE WRIST Left 11/19/2019    Procedure: Left Distal Radius Hardware Removal, Flexor Pollicus Longus Repair, Deep Cultures;  Surgeon: Dinh Strong MD;  Location:  OR       Social History:  Patient reports that she has never smoked. She has never used smokeless tobacco. She reports current alcohol use. She reports that she does not use drugs.    Family History:  Family History   Problem Relation Age of Onset     Arthritis Mother      Respiratory Mother         pulmonary fibrosis     Hypertension Mother      Anemia Mother      Liver Disease Father         from EtOH     Alcoholism Father      Multiple Sclerosis Sister      No Known Problems Maternal Grandmother      Heart Disease Maternal Grandfather      Breast Cancer Sister      Glaucoma Paternal Grandfather      Heart Disease Paternal Grandfather        Medications:  Current Outpatient Medications   Medication Sig Dispense Refill     acetaminophen 650 MG TABS  Take 650 mg by mouth every 4 hours as needed. 100 tablet 0     amLODIPine (NORVASC) 10 MG tablet Take 1 tablet (10 mg) by mouth daily 90 tablet 0     Diphenhydramine-APAP, sleep, (TYLENOL PM EXTRA STRENGTH PO) Take 2 tablets by mouth At Bedtime        ferrous gluconate (FERGON) 324 (38 Fe) MG tablet Take 324 mg by mouth       metoprolol tartrate (LOPRESSOR) 100 MG tablet Take 2 tablets (200 mg) by mouth 2 times daily 360 tablet 0     Multiple Vitamin (MULTI-VITAMIN) per tablet Take 1 tablet by mouth every morning        mupirocin (BACTROBAN) 2 % external ointment Use in nose twice daily for 5 days 22 g 0     Omeprazole (PRILOSEC PO) Take 20 mg by mouth as needed        sodium chloride (OCEAN NASAL SPRAY) 0.65 % nasal spray Spray 1 spray into both nostrils daily as needed for congestion 1 Bottle 3     triamcinolone (KENALOG) 0.1 % external cream Apply twice daily for 2 weeks then 3 times weekly for 2 week, repeat cycle as needed 454 g 0     Allergies   Allergen Reactions     Hctz Other (See Comments)     Pt develops symptomatic and significant hyponatremia with HCTZ exposure     Hydrochlorothiazide      Other reaction(s): Hyponatremia  Hyponatremia     Latex      Benzocaine Rash     carba mix,thrium-sunscreen     Resorcinol-Alcohol Rash     carba mix,thrium-sunscreen     Ace Inhibitors Unknown     Dizziness and orthostatic changes and electrolyte problems.     Latex Unknown       Review of Systems:  -As per HPI  -Constitutional: Otherwise feeling well today, in usual state of health.  -Skin: As above in HPI. No additional skin concerns.    Physical exam:  Vitals: There were no vitals taken for this visit.  GEN: This is a well developed, well-nourished female in no acute distress, in a pleasant mood.    SKIN: Focused examination of the bilateral lower extremities was performed.  -Carreon skin type: II  - 2+ pitting edema on the right lower extremity to the knee; improved from prior  - 1-2+ pitting edema on the  left lower extremity to the knee; improved from prior  - Numerous shallow ulcers with peripheral rim of pink-red erythema on the bilateral shins, appear improved from prior with decreased surrounding erythema.   - 2+ distal pedal pulses bilaterally  -No other lesions of concern on areas examined.                 Labs:    TTE 2/18  Left ventricular function, chamber size, wall motion, and wall thickness are  normal.The EF is 60-65%.  Global right ventricular function is normal.  The thoracic aorta is normal. The inferior vena cava was normal in size with  preserved respiratory variability. Estimated right atrial pressure is < 5  mmHg.  No pericardial effusion is present.    Lower extremity ultrasound 5/15/19    FINDINGS:   The left common femoral, femoral, popliteal, posterior tibial , and  peroneal veins are fully compressible with patent Doppler wave forms.  No thrombus is identified within them on grayscale imaging.  Redemonstration of wall thickening in the left common femoral and  proximal femoral vein. Mild amount of subcutaneous edema in the left  calf.     IMPRESSION:    1. No DVT demonstrated in left lower extremity.  2. Mild subcutaneous edema in the left calf, significantly improved  from prior.       Impression/Plan:    1. Venous stasis ulcers in setting of significant lower extremity edema suspected 2/2 venous insufficiency. Recent TTE wnl 2018. Has had multiple prior LE ultrasounds demonstrating no evidence of DVT and thickening of left common femoral and proximal veins. Recommended continuing with Unna boot application as below. Reiterated this healing is slow and may takes weeks to months.     - Wounds were cleansed with chlorhexidine  - Mupirocin 2% ointment applied to open areas and lidex 0.05% ointment applied to areas of surrounding skin  - Open areas covered with telfa dressing  - Unna boot x 2 applied to lower extremities.   - Follow-up with vascular surgery as planned; appt scheduled  12/2/2020    Follow-up in 1 week for repeat Unna boot application, earlier for new or changing lesions.     Staff Involved:  Staff Only    Ra Orourke MD  Pronouns: he/him/his    Department of Dermatology  [facility]: Phone: [phone], Fax:[phone]  [facility]: Phone: [phone] Fax: [phone]            Again, thank you for allowing me to participate in the care of your patient.        Sincerely,        Ra Orourke MD

## 2020-11-30 NOTE — PROGRESS NOTES
Kimberly Chau's goals for this visit include:   Chief Complaint   Patient presents with     Derm Problem     Unna boot       She requests these members of her care team be copied on today's visit information: no    PCP: Guillermo Castellano    Referring Provider:  No referring provider defined for this encounter.    There were no vitals taken for this visit.    Do you need any medication refills at today's visit? No  Dominique Martinez LPN

## 2020-11-30 NOTE — PROGRESS NOTES
[facility] Dermatology Note      Dermatology Problem List:  # Pertinent PMH: RA previously on methotrexate.  1. Bilateral LE ulcers, in setting of significant edema. Favor stasis ulcers. Less likely cutaneous manifestation of her RA (?vasculitis) versus manifestation of her atopic dermatitis versus bullous pemphigoid but could consider as she did stop methotrexate about 1 year ago.  - Trial of unna boot placed 11/17/2020; 11/23/2020; 11/30/2020  - Stop amlodipine  - Vascular surgery referral  - Consider bx if not improving  2. Atopic dermatitis.  - Prior rx: was on methotrexate for RA previously, discontinued ~11/2019; cyclosporine, discontinued 06/28/2010  - Prior topicals: fluocinolone 0.025% ointment, TMC 0.1% ointment     CC:   Chief Complaint   Patient presents with     Derm Problem     Unna boot     Encounter Date: Nov 30, 2020    History of Present Illness:  Ms. Kimberly Chau is a 80 year old female who presents as a follow-up for venous stasis ulcers. The patient was last seen 11230/2020 when had unna boot applied. She states lower extremity edema improved from prior. Did have part of boot become loose about 4 days after last application and had to re-apply Acewrap to keep in place. She had an appointment this week with vascular surgery. Health otherwise stable. No other skin concerns.     Past Medical History:   Patient Active Problem List   Diagnosis     Intrinsic atopic dermatitis     Essential hypertension     Rheumatoid arthritis (H)     Retinal artery occlusion     Cervical vertebral fracture (H)     Central retinal artery occlusion of right eye     Bilateral occipital neuralgia     C1-C2 instability     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 factor (H)     Osteoarthritis     Malignant hypertension     Hyponatremia     GERD (gastroesophageal reflux disease)     Eczematous dermatitis     Anxiety state     Anemia     Left wrist pain     Closed fracture of  distal end of left radius with delayed healing, unspecified fracture morphology, subsequent encounter     Closed fracture of distal end of left radius, unspecified fracture morphology, sequela     Aftercare following surgery of the musculoskeletal system     Past Medical History:   Diagnosis Date     Anemia      Arthritis      Cataracts      Central retinal artery occlusion      Cervical spine fracture (H)     After a fall from orthostatic sx     Chronic pain     Back of head     Gastro-oesophageal reflux disease      Head injury      Hypertension      Hyponatremia     Resolved, 2/2 diuretics     Migraines      Osteoporosis      Pneumonia 2018     Rheumatoid arthritis(714.0)      Past Surgical History:   Procedure Laterality Date     C HAND/FINGER SURGERY UNLISTED       C PELVIS/HIP JOINT SURGERY UNLISTED       COLONOSCOPY       EYE SURGERY Left 02/2019    Hole in macula     FUSION CERVICAL POSTERIOR THREE+ LEVELS  1/22/2013    Procedure: FUSION CERVICAL POSTERIOR THREE+ LEVELS;  Cervical 1-6 Posterior Instrumentation and Fusion, Cervical 3-4 Laminectomy  .Instrumentation and fusion to Cervical 6 *Latex Allergy*;  Surgeon: Ra Bar MD;  Location:  OR     GYN SURGERY       HEAD & NECK SURGERY       HYSTERECTOMY       KNEE SURGERY       NECK SURGERY       OPEN REDUCTION INTERNAL FIXATION WRIST Left 4/30/2019    Procedure: Open Reduction Internal Fixation Left Distal Radius Fracture;  Surgeon: Dinh Strong MD;  Location:  OR     OPEN REDUCTION INTERNAL FIXATION WRIST Left 6/12/2020    Procedure: OPEN REDUCTION INTERNAL FIXATION Left Distal Radius Nonunion, Distal Ulna Resection;  Surgeon: Dinh Strong MD;  Location:  OR     OPTICAL TRACKING SYSTEM ENDOSCOPIC SINUS SURGERY Right 4/6/2018    Procedure: OPTICAL TRACKING SYSTEM ENDOSCOPIC SINUS SURGERY;  Stealth Assisted Right Maxillary Antrostomy, Anterior Ethmoidectomy And Frontal Sinusotomy;  Surgeon: Elyse Eisenberg MD;  Location:   OR     OPTICAL TRACKING SYSTEM ENDOSCOPIC SINUS SURGERY Right 8/3/2018    Procedure: OPTICAL TRACKING SYSTEM ENDOSCOPIC SINUS SURGERY;  Stealth Assisted Revision Right Frontal Sinusotomy, Right Stent Placement  **Latex Allergy** ;  Surgeon: Elyse Eisenberg MD;  [facility]     ORTHOPEDIC SURGERY       REMOVE HARDWARE WRIST Left 11/19/2019    Procedure: Left Distal Radius Hardware Removal, Flexor Pollicus Longus Repair, Deep Cultures;  Surgeon: Dinh Strong MD;  Location:  OR       Social History:  Patient reports that she has never smoked. She has never used smokeless tobacco. She reports current alcohol use. She reports that she does not use drugs.    Family History:  Family History   Problem Relation Age of Onset     Arthritis Mother      Respiratory Mother         pulmonary fibrosis     Hypertension Mother      Anemia Mother      Liver Disease Father         from EtOH     Alcoholism Father      Multiple Sclerosis Sister      No Known Problems Maternal Grandmother      Heart Disease Maternal Grandfather      Breast Cancer Sister      Glaucoma Paternal Grandfather      Heart Disease Paternal Grandfather        Medications:  Current Outpatient Medications   Medication Sig Dispense Refill     acetaminophen 650 MG TABS Take 650 mg by mouth every 4 hours as needed. 100 tablet 0     amLODIPine (NORVASC) 10 MG tablet Take 1 tablet (10 mg) by mouth daily 90 tablet 0     Diphenhydramine-APAP, sleep, (TYLENOL PM EXTRA STRENGTH PO) Take 2 tablets by mouth At Bedtime        ferrous gluconate (FERGON) 324 (38 Fe) MG tablet Take 324 mg by mouth       metoprolol tartrate (LOPRESSOR) 100 MG tablet Take 2 tablets (200 mg) by mouth 2 times daily 360 tablet 0     Multiple Vitamin (MULTI-VITAMIN) per tablet Take 1 tablet by mouth every morning        mupirocin (BACTROBAN) 2 % external ointment Use in nose twice daily for 5 days 22 g 0     Omeprazole (PRILOSEC PO) Take 20 mg by mouth as needed        sodium chloride  (OCEAN NASAL SPRAY) 0.65 % nasal spray Spray 1 spray into both nostrils daily as needed for congestion 1 Bottle 3     triamcinolone (KENALOG) 0.1 % external cream Apply twice daily for 2 weeks then 3 times weekly for 2 week, repeat cycle as needed 454 g 0     Allergies   Allergen Reactions     Hctz Other (See Comments)     Pt develops symptomatic and significant hyponatremia with HCTZ exposure     Hydrochlorothiazide      Other reaction(s): Hyponatremia  Hyponatremia     Latex      Benzocaine Rash     carba mix,thrium-sunscreen     Resorcinol-Alcohol Rash     carba mix,thrium-sunscreen     Ace Inhibitors Unknown     Dizziness and orthostatic changes and electrolyte problems.     Latex Unknown       Review of Systems:  -As per HPI  -Constitutional: Otherwise feeling well today, in usual state of health.  -Skin: As above in HPI. No additional skin concerns.    Physical exam:  Vitals: There were no vitals taken for this visit.  GEN: This is a well developed, well-nourished female in no acute distress, in a pleasant mood.    SKIN: Focused examination of the bilateral lower extremities was performed.  -Carreon skin type: II  - 2+ pitting edema on the right lower extremity to the knee; improved from prior  - 1-2+ pitting edema on the left lower extremity to the knee; improved from prior  - Numerous shallow ulcers with peripheral rim of pink-red erythema on the bilateral shins, appear improved from prior with decreased surrounding erythema.   - 2+ distal pedal pulses bilaterally  -No other lesions of concern on areas examined.                 Labs:    TTE 2/18  Left ventricular function, chamber size, wall motion, and wall thickness are  normal.The EF is 60-65%.  Global right ventricular function is normal.  The thoracic aorta is normal. The inferior vena cava was normal in size with  preserved respiratory variability. Estimated right atrial pressure is < 5  mmHg.  No pericardial effusion is present.    Lower extremity  ultrasound 5/15/19    FINDINGS:   The left common femoral, femoral, popliteal, posterior tibial , and  peroneal veins are fully compressible with patent Doppler wave forms.  No thrombus is identified within them on grayscale imaging.  Redemonstration of wall thickening in the left common femoral and  proximal femoral vein. Mild amount of subcutaneous edema in the left  calf.     IMPRESSION:    1. No DVT demonstrated in left lower extremity.  2. Mild subcutaneous edema in the left calf, significantly improved  from prior.       Impression/Plan:    1. Venous stasis ulcers in setting of significant lower extremity edema suspected 2/2 venous insufficiency. Recent TTE wnl 2018. Has had multiple prior LE ultrasounds demonstrating no evidence of DVT and thickening of left common femoral and proximal veins. Recommended continuing with Unna boot application as below. Reiterated this healing is slow and may takes weeks to months.     - Wounds were cleansed with chlorhexidine  - Mupirocin 2% ointment applied to open areas and lidex 0.05% ointment applied to areas of surrounding skin  - Open areas covered with telfa dressing  - Unna boot x 2 applied to lower extremities.   - Follow-up with vascular surgery as planned; appt scheduled 12/2/2020    Follow-up in 1 week for repeat Unna boot application, earlier for new or changing lesions.     Staff Involved:  Staff Only    Ra Orourke MD  Pronouns: he/him/his    Department of Dermatology  [facility]: Phone: [phone], Fax:[phone]  [facility]: Phone: [phone] Fax: [phone]

## 2020-12-01 ENCOUNTER — TELEPHONE (OUTPATIENT)
Dept: VASCULAR SURGERY | Facility: CLINIC | Age: 80
End: 2020-12-01

## 2020-12-01 DIAGNOSIS — I87.8 VENOUS STASIS: Primary | ICD-10-CM

## 2020-12-01 DIAGNOSIS — I83.893 SYMPTOMATIC VARICOSE VEINS OF BOTH LOWER EXTREMITIES: ICD-10-CM

## 2020-12-01 NOTE — TELEPHONE ENCOUNTER
"Previsit call to pt regarding new consult for pvd; bilateral lower leg ulcers.      Both lower legs have ulcers    Left leg: wrapped currently but she guesses; so not sure.   Right leg: same    Inquired about \"wrapping\" in which she goes to [facility]     She's had them for \"quite awhile\" . Since August 2020.     Inquired about DVT: denies    No family history.     Pain: both legs.   She has worn compression in the past but currently she has bilateral leg swelling.   Unable to get stockings on.     No past imaging noted.     Informed her that we should get imaging prior to the appt    Will go ahead and reschedule the appt and US prior to.     She agrees to plan     I do see the photos noted in her chart.     Will send msg to  to contact her with new appt details    She agrees to plan.     Veronica REYNOLDS RN, BSN  Interventional Radiology/Vascular  Nurse Coordinator   Phone: [phone]  Fax: [phone]                        "

## 2020-12-02 ENCOUNTER — VIRTUAL VISIT (OUTPATIENT)
Dept: ORTHOPEDICS | Facility: CLINIC | Age: 80
End: 2020-12-02
Payer: MEDICARE

## 2020-12-02 DIAGNOSIS — S52.552A OTHER CLOSED EXTRA-ARTICULAR FRACTURE OF DISTAL END OF LEFT RADIUS, INITIAL ENCOUNTER: Primary | ICD-10-CM

## 2020-12-02 PROCEDURE — 99441 PR PHYSICIAN TELEPHONE EVALUATION 5-10 MIN: CPT | Mod: 95 | Performed by: ORTHOPAEDIC SURGERY

## 2020-12-02 NOTE — LETTER
12/2/2020         RE: Kimberly Chau  [address]  [place] [address]        Dear Colleague,    Thank you for referring your patient, Kimberly Chau, to the [facility]. Please see a copy of my visit note below.    Telephone   Date of Service: Dec 2, 2020    Reason for visit: Postoperative follow-up    Procedure Performed: Left wrist distal ulna resection   Bone culture of left distal radius  Open reduction left distal radius nonunion with distal ulna autograft 6/12/2020    Interval events: Kimberly Chau comes to see us in follow-up from the above surgery. The wrist is feeling really good. There is no pain. She is not wearing the splint. She does not notice any functional limitations and is back to her full activity.     She reports taht her wrist has taken a back seat at this point to some issues she's having with sores on her legs. She has been diagnosed with ulcers that are quite painful.  The etiology is still not clear.    Physical examination:  Deferred    Radiographs: Deferred    Assessment: Progressing appropriately following the above procedure    Plan: Fortunately  Ms. Chau is doing very well.  I would like to get 1 final set of x-rays to confirm continued healing of her fracture.  However given the Covid spike right now we are having people avoid in person visits if possible due to the pandemic risk and I think it is reasonable to wait to do this for a month or 2.  We discussed that I will be leaving the practice as she knows.  I have another of hand partners here who would be able to take on her care should any new issues arise.  For the time being we will set up a follow-up visit for her with physician assistant Ela at the end of January.  Hopefully if she continues to do well and x-rays look good this will be a final visit.  In the meantime she will continue to progress her activities as tolerated as she has been doing.  She is not wearing the brace and  that is fine.      Total time: 9 min     Dinh Strong MD

## 2020-12-02 NOTE — NURSING NOTE
"Kimberly Chau is a 80 year old female who is being evaluated via a billable telephone visit.      The patient has been notified of following:     \"This telephone visit will be conducted via a call between you and your physician/provider. We have found that certain health care needs can be provided without the need for a physical exam.  This service lets us provide the care you need with a short phone conversation.  If a prescription is necessary we can send it directly to your pharmacy.  If lab work is needed we can place an order for that and you can then stop by our lab to have the test done at a later time.    Telephone visits are billed at different rates depending on your insurance coverage. During this emergency period, for some insurers they may be billed the same as an in-person visit.  Please reach out to your insurance provider with any questions.    If during the course of the call the physician/provider feels a telephone visit is not appropriate, you will not be charged for this service.\"    Patient has given verbal consent for Telephone visit?  Yes    How would you like to obtain your AVS? MyChart    "

## 2020-12-02 NOTE — PROGRESS NOTES
Telephone   Date of Service: Dec 2, 2020    Reason for visit: Postoperative follow-up    Procedure Performed: Left wrist distal ulna resection   Bone culture of left distal radius  Open reduction left distal radius nonunion with distal ulna autograft 6/12/2020    Interval events: Kimberly Chau comes to see us in follow-up from the above surgery. The wrist is feeling really good. There is no pain. She is not wearing the splint. She does not notice any functional limitations and is back to her full activity.     She reports taht her wrist has taken a back seat at this point to some issues she's having with sores on her legs. She has been diagnosed with ulcers that are quite painful.  The etiology is still not clear.    Physical examination:  Deferred    Radiographs: Deferred    Assessment: Progressing appropriately following the above procedure    Plan: Fortunately  Ms. Chau is doing very well.  I would like to get 1 final set of x-rays to confirm continued healing of her fracture.  However given the Covid spike right now we are having people avoid in person visits if possible due to the pandemic risk and I think it is reasonable to wait to do this for a month or 2.  We discussed that I will be leaving the practice as she knows.  I have another of hand partners here who would be able to take on her care should any new issues arise.  For the time being we will set up a follow-up visit for her with physician assistant Ela at the end of January.  Hopefully if she continues to do well and x-rays look good this will be a final visit.  In the meantime she will continue to progress her activities as tolerated as she has been doing.  She is not wearing the brace and that is fine.      Total time: 9 min

## 2020-12-04 PROBLEM — M25.532 LEFT WRIST PAIN: Status: RESOLVED | Noted: 2019-05-06 | Resolved: 2020-12-04

## 2020-12-04 PROBLEM — Z47.89 AFTERCARE FOLLOWING SURGERY OF THE MUSCULOSKELETAL SYSTEM: Status: RESOLVED | Noted: 2020-07-29 | Resolved: 2020-12-04

## 2020-12-04 NOTE — PROGRESS NOTES
Discharge Summary - Hand Therapy    12/4/2020    Patient did not return to therapy.    This episode of care will be resolved.  Plan: Discharge from hand therapy.    Kerri Meek MS, OTR/L

## 2020-12-07 ENCOUNTER — OFFICE VISIT (OUTPATIENT)
Dept: DERMATOLOGY | Facility: CLINIC | Age: 80
End: 2020-12-07
Payer: MEDICARE

## 2020-12-07 DIAGNOSIS — I83.001 VENOUS STASIS ULCER OF THIGH LIMITED TO BREAKDOWN OF SKIN WITH VARICOSE VEINS, UNSPECIFIED LATERALITY (H): Primary | ICD-10-CM

## 2020-12-07 DIAGNOSIS — L97.101 VENOUS STASIS ULCER OF THIGH LIMITED TO BREAKDOWN OF SKIN WITH VARICOSE VEINS, UNSPECIFIED LATERALITY (H): Primary | ICD-10-CM

## 2020-12-07 PROCEDURE — 29580 STRAPPING UNNA BOOT: CPT | Mod: 50 | Performed by: DERMATOLOGY

## 2020-12-07 RX ORDER — MUPIROCIN 20 MG/G
OINTMENT TOPICAL ONCE
Status: ACTIVE | OUTPATIENT
Start: 2020-12-07

## 2020-12-07 RX ORDER — FLUOCINONIDE 0.5 MG/G
OINTMENT TOPICAL ONCE
Status: SHIPPED | OUTPATIENT
Start: 2020-12-07

## 2020-12-07 ASSESSMENT — PAIN SCALES - GENERAL: PAINLEVEL: NO PAIN (0)

## 2020-12-07 NOTE — PROGRESS NOTES
[facility] Dermatology Note      Dermatology Problem List:  # Pertinent PMH: RA previously on methotrexate.  1. Bilateral LE ulcers, in setting of significant edema. Favor stasis ulcers. Less likely cutaneous manifestation of her RA (?vasculitis) versus manifestation of her atopic dermatitis versus bullous pemphigoid but could consider as she did stop methotrexate about 1 year ago.  - Trial of unna boot placed 11/17/2020; 11/23/2020; 11/30/2020  - Stop amlodipine  - Vascular surgery referral  - Consider bx if not improving  2. Atopic dermatitis.  - Prior rx: was on methotrexate for RA previously, discontinued ~11/2019; cyclosporine, discontinued 06/28/2010  - Prior topicals: fluocinolone 0.025% ointment, TMC 0.1% ointment    Encounter Date: Dec 7, 2020    CC:  Chief Complaint   Patient presents with     Derm Problem     Unna boot     History of Present Illness:  Ms. Kimberly Chau is a 80 year old female who presents as a follow-up for venous stasis ulcers. The patient was last seen 11/30/2020 when the wounds were cleansed with chlorhexidine and unna boot applied. She believes her lower extremity edema has improved from her previous visit. She has appointments scheduled with vascular surgery on 12/14/2020 for a lower extremity ultrasound and then consult on 12/16/2020. Health otherwise stable. No other skin concerns.      Past Medical History:   Patient Active Problem List   Diagnosis     Intrinsic atopic dermatitis     Essential hypertension     Rheumatoid arthritis (H)     Retinal artery occlusion     Cervical vertebral fracture (H)     Central retinal artery occlusion of right eye     Bilateral occipital neuralgia     C1-C2 instability     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 factor (H)     Osteoarthritis     Malignant hypertension     Hyponatremia     GERD (gastroesophageal reflux disease)     Eczematous dermatitis     Anxiety state     Anemia     Closed fracture  of distal end of left radius with delayed healing, unspecified fracture morphology, subsequent encounter     Closed fracture of distal end of left radius, unspecified fracture morphology, sequela     Past Medical History:   Diagnosis Date     Anemia      Arthritis      Cataracts      Central retinal artery occlusion      Cervical spine fracture (H)     After a fall from orthostatic sx     Chronic pain     Back of head     Gastro-oesophageal reflux disease      Head injury      Hypertension      Hyponatremia     Resolved, 2/2 diuretics     Migraines      Osteoporosis      Pneumonia 2018     Rheumatoid arthritis(714.0)      Past Surgical History:   Procedure Laterality Date     C HAND/FINGER SURGERY UNLISTED       C PELVIS/HIP JOINT SURGERY UNLISTED       COLONOSCOPY       EYE SURGERY Left 02/2019    Hole in macula     FUSION CERVICAL POSTERIOR THREE+ LEVELS  1/22/2013    Procedure: FUSION CERVICAL POSTERIOR THREE+ LEVELS;  Cervical 1-6 Posterior Instrumentation and Fusion, Cervical 3-4 Laminectomy  .Instrumentation and fusion to Cervical 6 *Latex Allergy*;  Surgeon: Ra Bar MD;  [facility]     GYN SURGERY       HEAD & NECK SURGERY       HYSTERECTOMY       KNEE SURGERY       NECK SURGERY       OPEN REDUCTION INTERNAL FIXATION WRIST Left 4/30/2019    Procedure: Open Reduction Internal Fixation Left Distal Radius Fracture;  Surgeon: Dinh Strong MD;  Location:  OR     OPEN REDUCTION INTERNAL FIXATION WRIST Left 6/12/2020    Procedure: OPEN REDUCTION INTERNAL FIXATION Left Distal Radius Nonunion, Distal Ulna Resection;  Surgeon: Dinh Strong MD;  Location:  OR     OPTICAL TRACKING SYSTEM ENDOSCOPIC SINUS SURGERY Right 4/6/2018    Procedure: OPTICAL TRACKING SYSTEM ENDOSCOPIC SINUS SURGERY;  Stealth Assisted Right Maxillary Antrostomy, Anterior Ethmoidectomy And Frontal Sinusotomy;  Surgeon: Elyse Eisenberg MD;  Location:  OR     OPTICAL TRACKING SYSTEM ENDOSCOPIC SINUS SURGERY  Right 8/3/2018    Procedure: OPTICAL TRACKING SYSTEM ENDOSCOPIC SINUS SURGERY;  Stealth Assisted Revision Right Frontal Sinusotomy, Right Stent Placement  **Latex Allergy** ;  Surgeon: Elyse Eisenberg MD;  [facility]     ORTHOPEDIC SURGERY       REMOVE HARDWARE WRIST Left 11/19/2019    Procedure: Left Distal Radius Hardware Removal, Flexor Pollicus Longus Repair, Deep Cultures;  Surgeon: Dinh Strong MD;  Location: [facility]       Social History:  Patient reports that she has never smoked. She has never used smokeless tobacco. She reports current alcohol use. She reports that she does not use drugs.    Family History:  Family History   Problem Relation Age of Onset     Arthritis Mother      Respiratory Mother         pulmonary fibrosis     Hypertension Mother      Anemia Mother      Liver Disease Father         from EtOH     Alcoholism Father      Multiple Sclerosis Sister      No Known Problems Maternal Grandmother      Heart Disease Maternal Grandfather      Breast Cancer Sister      Glaucoma Paternal Grandfather      Heart Disease Paternal Grandfather        Medications:  Current Outpatient Medications   Medication Sig Dispense Refill     acetaminophen 650 MG TABS Take 650 mg by mouth every 4 hours as needed. 100 tablet 0     amLODIPine (NORVASC) 10 MG tablet Take 1 tablet (10 mg) by mouth daily 90 tablet 0     Diphenhydramine-APAP, sleep, (TYLENOL PM EXTRA STRENGTH PO) Take 2 tablets by mouth At Bedtime        ferrous gluconate (FERGON) 324 (38 Fe) MG tablet Take 324 mg by mouth       metoprolol tartrate (LOPRESSOR) 100 MG tablet Take 2 tablets (200 mg) by mouth 2 times daily 360 tablet 0     Multiple Vitamin (MULTI-VITAMIN) per tablet Take 1 tablet by mouth every morning        mupirocin (BACTROBAN) 2 % external ointment Use in nose twice daily for 5 days 22 g 0     Omeprazole (PRILOSEC PO) Take 20 mg by mouth as needed        sodium chloride (OCEAN NASAL SPRAY) 0.65 % nasal spray Spray 1 spray into  both nostrils daily as needed for congestion 1 Bottle 3     triamcinolone (KENALOG) 0.1 % external cream Apply twice daily for 2 weeks then 3 times weekly for 2 week, repeat cycle as needed 454 g 0       Allergies   Allergen Reactions     Hctz Other (See Comments)     Pt develops symptomatic and significant hyponatremia with HCTZ exposure     Hydrochlorothiazide      Other reaction(s): Hyponatremia  Hyponatremia     Latex      Benzocaine Rash     carba mix,thrium-sunscreen     Resorcinol-Alcohol Rash     carba mix,thrium-sunscreen     Ace Inhibitors Unknown     Dizziness and orthostatic changes and electrolyte problems.     Latex Unknown       Review of Systems:  -As per HPI  -Constitutional: Otherwise feeling well today, in usual state of health.  -Skin: As above in HPI. No additional skin concerns.    Physical exam:  Vitals: There were no vitals taken for this visit.  GEN: This is a well developed, well-nourished female in no acute distress, in a pleasant mood.    SKIN: Focused examination of the bilateral lower extremities was performed.  -Carreon skin type: II  - 1+ pitting edema on the right lower extremity to the knee; improved from prior  - 1+ pitting edema on the left lower extremity to the knee; improved from prior  - Numerous shallow ulcers with central fibrinous yellow debris and few areas of central red granulation tissue and peripheral rim of mild pink erythema on the bilateral shins, appear improved from prior with decreased surrounding erythema.   - 2+ distal pedal pulses bilaterally  -No other lesions of concern on areas examined.     Labs:  TTE 2/18  Left ventricular function, chamber size, wall motion, and wall thickness are  normal.The EF is 60-65%.  Global right ventricular function is normal.  The thoracic aorta is normal. The inferior vena cava was normal in size with  preserved respiratory variability. Estimated right atrial pressure is < 5  mmHg.  No pericardial effusion is  present.     Lower extremity ultrasound 5/15/19     FINDINGS:   The left common femoral, femoral, popliteal, posterior tibial , and  peroneal veins are fully compressible with patent Doppler wave forms.  No thrombus is identified within them on grayscale imaging.  Redemonstration of wall thickening in the left common femoral and  proximal femoral vein. Mild amount of subcutaneous edema in the left  calf.     IMPRESSION:    1. No DVT demonstrated in left lower extremity.  2. Mild subcutaneous edema in the left calf, significantly improved  from prior.    Impression/Plan:    1. Venous stasis ulcers in setting of significant lower extremity edema suspected 2/2 venous insufficiency. Recent TTE wnl 2018. Has had multiple prior LE ultrasounds demonstrating no evidence of DVT and thickening of left common femoral and proximal veins. Recommended continuing with Unna boot application as below. Reiterated this healing is slow and may takes weeks to months.     - Wounds were cleansed with chlorhexidine  - Mupirocin 2% ointment applied to open areas and lidex 0.05% ointment applied to areas of surrounding skin  - Open areas covered with telfa dressing  - Unna boot x 2 applied to lower extremities.   - Follow-up with vascular surgery as planned; appt scheduled 12/16    Follow-up in 1 week, earlier for new or changing lesions.     Staff Involved:  Scribe/Staff    Provider Disclosure:   The documentation recorded by the scribe accurately reflects the services I personally performed and the decisions made by me.    Ra Orourke MD    Department of Dermatology  [facility]: Phone: [phone], Fax:[phone]  [facility]: Phone: [phone] Fax: [phone]

## 2020-12-07 NOTE — LETTER
12/7/2020         RE: Kimberly Chau  [address]  [place] [address]        Dear Colleague,    Thank you for referring your patient, Kimberly Chau, to the [facility]. Please see a copy of my visit note below.    [facility] Dermatology Note      Dermatology Problem List:  # Pertinent PMH: RA previously on methotrexate.  1. Bilateral LE ulcers, in setting of significant edema. Favor stasis ulcers. Less likely cutaneous manifestation of her RA (?vasculitis) versus manifestation of her atopic dermatitis versus bullous pemphigoid but could consider as she did stop methotrexate about 1 year ago.  - Trial of unna boot placed 11/17/2020; 11/23/2020; 11/30/2020  - Stop amlodipine  - Vascular surgery referral  - Consider bx if not improving  2. Atopic dermatitis.  - Prior rx: was on methotrexate for RA previously, discontinued ~11/2019; cyclosporine, discontinued 06/28/2010  - Prior topicals: fluocinolone 0.025% ointment, TMC 0.1% ointment    Encounter Date: Dec 7, 2020    CC:  Chief Complaint   Patient presents with     Derm Problem     Unna boot     History of Present Illness:  Ms. Kimberly Chau is a 80 year old female who presents as a follow-up for venous stasis ulcers. The patient was last seen 11/30/2020 when the wounds were cleansed with chlorhexidine and unna boot applied. She believes her lower extremity edema has improved from her previous visit. She has appointments scheduled with vascular surgery on 12/14/2020 for a lower extremity ultrasound and then consult on 12/16/2020. Health otherwise stable. No other skin concerns.      Past Medical History:   Patient Active Problem List   Diagnosis     Intrinsic atopic dermatitis     Essential hypertension     Rheumatoid arthritis (H)     Retinal artery occlusion     Cervical vertebral fracture (H)     Central retinal artery occlusion of right eye     Bilateral occipital neuralgia     C1-C2 instability      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 factor (H)     Osteoarthritis     Malignant hypertension     Hyponatremia     GERD (gastroesophageal reflux disease)     Eczematous dermatitis     Anxiety state     Anemia     Closed fracture of distal end of left radius with delayed healing, unspecified fracture morphology, subsequent encounter     Closed fracture of distal end of left radius, unspecified fracture morphology, sequela     Past Medical History:   Diagnosis Date     Anemia      Arthritis      Cataracts      Central retinal artery occlusion      Cervical spine fracture (H)     After a fall from orthostatic sx     Chronic pain     Back of head     Gastro-oesophageal reflux disease      Head injury      Hypertension      Hyponatremia     Resolved, 2/2 diuretics     Migraines      Osteoporosis      Pneumonia 2018     Rheumatoid arthritis(714.0)      Past Surgical History:   Procedure Laterality Date     C HAND/FINGER SURGERY UNLISTED       C PELVIS/HIP JOINT SURGERY UNLISTED       COLONOSCOPY       EYE SURGERY Left 02/2019    Hole in macula     FUSION CERVICAL POSTERIOR THREE+ LEVELS  1/22/2013    Procedure: FUSION CERVICAL POSTERIOR THREE+ LEVELS;  Cervical 1-6 Posterior Instrumentation and Fusion, Cervical 3-4 Laminectomy  .Instrumentation and fusion to Cervical 6 *Latex Allergy*;  Surgeon: Ra Bar MD;  [facility]     GYN SURGERY       HEAD & NECK SURGERY       HYSTERECTOMY       KNEE SURGERY       NECK SURGERY       OPEN REDUCTION INTERNAL FIXATION WRIST Left 4/30/2019    Procedure: Open Reduction Internal Fixation Left Distal Radius Fracture;  Surgeon: Dinh Strong MD;  Location:  OR     OPEN REDUCTION INTERNAL FIXATION WRIST Left 6/12/2020    Procedure: OPEN REDUCTION INTERNAL FIXATION Left Distal Radius Nonunion, Distal Ulna Resection;  Surgeon: Dinh Strong MD;  Location:  OR     [employer] SYSTEM ENDOSCOPIC SINUS SURGERY Right 4/6/2018     Procedure: OPTICAL TRACKING SYSTEM ENDOSCOPIC SINUS SURGERY;  Stealth Assisted Right Maxillary Antrostomy, Anterior Ethmoidectomy And Frontal Sinusotomy;  Surgeon: Elyse Eisenberg MD;  [facility]     OPTICAL TRACKING SYSTEM ENDOSCOPIC SINUS SURGERY Right 8/3/2018    Procedure: OPTICAL TRACKING SYSTEM ENDOSCOPIC SINUS SURGERY;  Stealth Assisted Revision Right Frontal Sinusotomy, Right Stent Placement  **Latex Allergy** ;  Surgeon: Elyse Eisenberg MD;  [facility]     ORTHOPEDIC SURGERY       REMOVE HARDWARE WRIST Left 11/19/2019    Procedure: Left Distal Radius Hardware Removal, Flexor Pollicus Longus Repair, Deep Cultures;  Surgeon: Dinh Strong MD;  Location:  OR       Social History:  Patient reports that she has never smoked. She has never used smokeless tobacco. She reports current alcohol use. She reports that she does not use drugs.    Family History:  Family History   Problem Relation Age of Onset     Arthritis Mother      Respiratory Mother         pulmonary fibrosis     Hypertension Mother      Anemia Mother      Liver Disease Father         from EtOH     Alcoholism Father      Multiple Sclerosis Sister      No Known Problems Maternal Grandmother      Heart Disease Maternal Grandfather      Breast Cancer Sister      Glaucoma Paternal Grandfather      Heart Disease Paternal Grandfather        Medications:  Current Outpatient Medications   Medication Sig Dispense Refill     acetaminophen 650 MG TABS Take 650 mg by mouth every 4 hours as needed. 100 tablet 0     amLODIPine (NORVASC) 10 MG tablet Take 1 tablet (10 mg) by mouth daily 90 tablet 0     Diphenhydramine-APAP, sleep, (TYLENOL PM EXTRA STRENGTH PO) Take 2 tablets by mouth At Bedtime        ferrous gluconate (FERGON) 324 (38 Fe) MG tablet Take 324 mg by mouth       metoprolol tartrate (LOPRESSOR) 100 MG tablet Take 2 tablets (200 mg) by mouth 2 times daily 360 tablet 0     Multiple Vitamin (MULTI-VITAMIN) per tablet Take 1 tablet  by mouth every morning        mupirocin (BACTROBAN) 2 % external ointment Use in nose twice daily for 5 days 22 g 0     Omeprazole (PRILOSEC PO) Take 20 mg by mouth as needed        sodium chloride (OCEAN NASAL SPRAY) 0.65 % nasal spray Spray 1 spray into both nostrils daily as needed for congestion 1 Bottle 3     triamcinolone (KENALOG) 0.1 % external cream Apply twice daily for 2 weeks then 3 times weekly for 2 week, repeat cycle as needed 454 g 0       Allergies   Allergen Reactions     Hctz Other (See Comments)     Pt develops symptomatic and significant hyponatremia with HCTZ exposure     Hydrochlorothiazide      Other reaction(s): Hyponatremia  Hyponatremia     Latex      Benzocaine Rash     carba mix,thrium-sunscreen     Resorcinol-Alcohol Rash     carba mix,thrium-sunscreen     Ace Inhibitors Unknown     Dizziness and orthostatic changes and electrolyte problems.     Latex Unknown       Review of Systems:  -As per HPI  -Constitutional: Otherwise feeling well today, in usual state of health.  -Skin: As above in HPI. No additional skin concerns.    Physical exam:  Vitals: There were no vitals taken for this visit.  GEN: This is a well developed, well-nourished female in no acute distress, in a pleasant mood.    SKIN: Focused examination of the bilateral lower extremities was performed.  -Carreon skin type: II  - 1+ pitting edema on the right lower extremity to the knee; improved from prior  - 1+ pitting edema on the left lower extremity to the knee; improved from prior  - Numerous shallow ulcers with central fibrinous yellow debris and few areas of central red granulation tissue and peripheral rim of mild pink erythema on the bilateral shins, appear improved from prior with decreased surrounding erythema.   - 2+ distal pedal pulses bilaterally  -No other lesions of concern on areas examined.     Labs:  TTE 2/18  Left ventricular function, chamber size, wall motion, and wall thickness are  normal.The EF is  60-65%.  Global right ventricular function is normal.  The thoracic aorta is normal. The inferior vena cava was normal in size with  preserved respiratory variability. Estimated right atrial pressure is < 5  mmHg.  No pericardial effusion is present.     Lower extremity ultrasound 5/15/19     FINDINGS:   The left common femoral, femoral, popliteal, posterior tibial , and  peroneal veins are fully compressible with patent Doppler wave forms.  No thrombus is identified within them on grayscale imaging.  Redemonstration of wall thickening in the left common femoral and  proximal femoral vein. Mild amount of subcutaneous edema in the left  calf.     IMPRESSION:    1. No DVT demonstrated in left lower extremity.  2. Mild subcutaneous edema in the left calf, significantly improved  from prior.    Impression/Plan:    1. Venous stasis ulcers in setting of significant lower extremity edema suspected 2/2 venous insufficiency. Recent TTE wnl 2018. Has had multiple prior LE ultrasounds demonstrating no evidence of DVT and thickening of left common femoral and proximal veins. Recommended continuing with Unna boot application as below. Reiterated this healing is slow and may takes weeks to months.     - Wounds were cleansed with chlorhexidine  - Mupirocin 2% ointment applied to open areas and lidex 0.05% ointment applied to areas of surrounding skin  - Open areas covered with telfa dressing  - Unna boot x 2 applied to lower extremities.   - Follow-up with vascular surgery as planned; appt scheduled 12/16    Follow-up in 1 week, earlier for new or changing lesions.     Staff Involved:  Scribe/Staff    Provider Disclosure:   The documentation recorded by the scribe accurately reflects the services I personally performed and the decisions made by me.    Ra Orourke MD    Department of Dermatology  [facility]: Phone: [phone],  Fax:[phone]  [facility]: Phone: [phone] Fax: [phone]            Kimberly Chau's goals for this visit include:   Chief Complaint   Patient presents with     Derm Problem     Unna boot       She requests these members of her care team be copied on today's visit information: no    PCP: Guillermo Castellano    Referring Provider:  No referring provider defined for this encounter.    There were no vitals taken for this visit.    Do you need any medication refills at today's visit? No  Dominique [place] LPN          Again, thank you for allowing me to participate in the care of your patient.        Sincerely,        Ra Orourke MD

## 2020-12-10 ENCOUNTER — TELEPHONE (OUTPATIENT)
Dept: INTERNAL MEDICINE | Facility: CLINIC | Age: 80
End: 2020-12-10

## 2020-12-10 NOTE — TELEPHONE ENCOUNTER
[facility] Call Center    Phone Message    May a detailed message be left on voicemail: yes     Reason for Call: Symptoms or Concerns     If patient has red-flag symptoms, warm transfer to triage line    Current symptom or concern: sores on lower legs, painful, itchy    Symptoms have been present for:  1 month(s)    Has patient previously been seen for this? Yes    By Dr. Espitia      Date: 11/30/20    Are there any new or worsening symptoms? Yes: very painful    Patient has seen Dr. Orourke for this condition. Patient would like a medication to relieve pain and itching      Action Taken: Message routed to:  [facility] ([facility]    Travel Screening: Not Applicable

## 2020-12-11 NOTE — TELEPHONE ENCOUNTER
Called patient.  She is unavailable at time of call.  She will call back.    When patient calls back.  Please let her know that she will need to request medication to relieve pain and itching from Dr. Espitia since this provider saw her for her sores.  If not then she will need to schedule an in-person appt with Dr. Castellano for this request.  Thanks!        Kody Roberts CMA ([facility]) at 10:59 AM on 12/11/2020

## 2020-12-14 ENCOUNTER — OFFICE VISIT (OUTPATIENT)
Dept: DERMATOLOGY | Facility: CLINIC | Age: 80
End: 2020-12-14
Payer: MEDICARE

## 2020-12-14 ENCOUNTER — ANCILLARY PROCEDURE (OUTPATIENT)
Dept: ULTRASOUND IMAGING | Facility: CLINIC | Age: 80
End: 2020-12-14
Attending: RADIOLOGY
Payer: MEDICARE

## 2020-12-14 DIAGNOSIS — L97.101 VENOUS STASIS ULCER OF THIGH LIMITED TO BREAKDOWN OF SKIN WITH VARICOSE VEINS, UNSPECIFIED LATERALITY (H): Primary | ICD-10-CM

## 2020-12-14 DIAGNOSIS — I87.8 VENOUS STASIS: ICD-10-CM

## 2020-12-14 DIAGNOSIS — I83.893 SYMPTOMATIC VARICOSE VEINS OF BOTH LOWER EXTREMITIES: ICD-10-CM

## 2020-12-14 DIAGNOSIS — I83.001 VENOUS STASIS ULCER OF THIGH LIMITED TO BREAKDOWN OF SKIN WITH VARICOSE VEINS, UNSPECIFIED LATERALITY (H): Primary | ICD-10-CM

## 2020-12-14 DIAGNOSIS — J34.89 NASAL CRUSTING: ICD-10-CM

## 2020-12-14 PROCEDURE — 93970 EXTREMITY STUDY: CPT | Performed by: RADIOLOGY

## 2020-12-14 PROCEDURE — 99213 OFFICE O/P EST LOW 20 MIN: CPT | Performed by: DERMATOLOGY

## 2020-12-14 RX ORDER — FLUOCINONIDE 0.5 MG/G
OINTMENT TOPICAL ONCE
Status: COMPLETED | OUTPATIENT
Start: 2020-12-14 | End: 2020-12-14

## 2020-12-14 RX ORDER — MUPIROCIN 20 MG/G
OINTMENT TOPICAL ONCE
Status: COMPLETED | OUTPATIENT
Start: 2020-12-14 | End: 2020-12-14

## 2020-12-14 RX ADMIN — FLUOCINONIDE: 0.5 OINTMENT TOPICAL at 16:18

## 2020-12-14 RX ADMIN — MUPIROCIN: 20 OINTMENT TOPICAL at 16:19

## 2020-12-14 ASSESSMENT — PAIN SCALES - GENERAL: PAINLEVEL: MODERATE PAIN (5)

## 2020-12-14 NOTE — NURSING NOTE
Kimberly Chau's goals for this visit include:   Chief Complaint   Patient presents with     Derm Problem     Follow up legs and unna boot     She requests these members of her care team be copied on today's visit information: Yes    PCP: Guillermo Castellano    Referring Provider:  No referring provider defined for this encounter.    There were no vitals taken for this visit.    Do you need any medication refills at today's visit? No    Anai Ferrer LPN

## 2020-12-14 NOTE — PROGRESS NOTES
[facility] Dermatology Note      Dermatology Problem List:  # Pertinent PMH: RA previously on methotrexate.  1. Bilateral LE ulcers, in setting of significant edema. Favor stasis ulcers. Less likely cutaneous manifestation of her RA (?vasculitis) versus manifestation of her atopic dermatitis versus bullous pemphigoid but could consider as she did stop methotrexate about 1 year ago.  - Trial of unna boot placed 11/17/2020; 11/23/2020; 11/30/2020  - Stop amlodipine  - Vascular surgery referral  - Consider bx if not improving  2. Atopic dermatitis.  - Prior rx: was on methotrexate for RA previously, discontinued ~11/2019; cyclosporine, discontinued 06/28/2010  - Prior topicals: fluocinolone 0.025% ointment, TMC 0.1% ointment    Encounter Date: Dec 14, 2020    CC:  Chief Complaint   Patient presents with     Derm Problem     Follow up legs and unna boot     History of Present Illness:  Ms. Kimberly Chau is a 80 year old female who presents as a follow-up for venous stasis ulcers. The patient was last seen 12/72020 when the wounds were cleansed with chlorhexidine and unna boot applied. She believes her lower extremity edema has improved from her previous visit. She had her lower extremity ultrasound today and appointment with vascular surgery on 12/16/2020. Health otherwise stable. No other skin concerns.    Past Medical History:   Patient Active Problem List   Diagnosis     Intrinsic atopic dermatitis     Essential hypertension     Rheumatoid arthritis (H)     Retinal artery occlusion     Cervical vertebral fracture (H)     Central retinal artery occlusion of right eye     Bilateral occipital neuralgia     C1-C2 instability     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 factor (H)     Osteoarthritis     Malignant hypertension     Hyponatremia     GERD (gastroesophageal reflux disease)     Eczematous dermatitis     Anxiety state     Anemia     Closed fracture of distal end of  left radius with delayed healing, unspecified fracture morphology, subsequent encounter     Closed fracture of distal end of left radius, unspecified fracture morphology, sequela     Past Medical History:   Diagnosis Date     Anemia      Arthritis      Cataracts      Central retinal artery occlusion      Cervical spine fracture (H)     After a fall from orthostatic sx     Chronic pain     Back of head     Gastro-oesophageal reflux disease      Head injury      Hypertension      Hyponatremia     Resolved, 2/2 diuretics     Migraines      Osteoporosis      Pneumonia 2018     Rheumatoid arthritis(714.0)      Past Surgical History:   Procedure Laterality Date     C HAND/FINGER SURGERY UNLISTED       C PELVIS/HIP JOINT SURGERY UNLISTED       COLONOSCOPY       EYE SURGERY Left 02/2019    Hole in macula     FUSION CERVICAL POSTERIOR THREE+ LEVELS  1/22/2013    Procedure: FUSION CERVICAL POSTERIOR THREE+ LEVELS;  Cervical 1-6 Posterior Instrumentation and Fusion, Cervical 3-4 Laminectomy  .Instrumentation and fusion to Cervical 6 *Latex Allergy*;  Surgeon: Ra Bar MD;  Location: [facility] OR     GYN SURGERY       HEAD & NECK SURGERY       HYSTERECTOMY       KNEE SURGERY       NECK SURGERY       OPEN REDUCTION INTERNAL FIXATION WRIST Left 4/30/2019    Procedure: Open Reduction Internal Fixation Left Distal Radius Fracture;  Surgeon: Dinh Strong MD;  Location:  OR     OPEN REDUCTION INTERNAL FIXATION WRIST Left 6/12/2020    Procedure: OPEN REDUCTION INTERNAL FIXATION Left Distal Radius Nonunion, Distal Ulna Resection;  Surgeon: Dinh Strong MD;  Location:  OR     OPTICAL TRACKING SYSTEM ENDOSCOPIC SINUS SURGERY Right 4/6/2018    Procedure: OPTICAL TRACKING SYSTEM ENDOSCOPIC SINUS SURGERY;  Stealth Assisted Right Maxillary Antrostomy, Anterior Ethmoidectomy And Frontal Sinusotomy;  Surgeon: Elyse Eisenberg MD;  Location:  OR     OPTICAL TRACKING SYSTEM ENDOSCOPIC SINUS SURGERY Right 8/3/2018     Procedure: OPTICAL TRACKING SYSTEM ENDOSCOPIC SINUS SURGERY;  Stealth Assisted Revision Right Frontal Sinusotomy, Right Stent Placement  **Latex Allergy** ;  Surgeon: Elyse Eisenberg MD;  Location: [facility] OR     ORTHOPEDIC SURGERY       REMOVE HARDWARE WRIST Left 11/19/2019    Procedure: Left Distal Radius Hardware Removal, Flexor Pollicus Longus Repair, Deep Cultures;  Surgeon: Dinh Strong MD;  Location:  OR       Social History:  Patient reports that she has never smoked. She has never used smokeless tobacco. She reports current alcohol use. She reports that she does not use drugs.    Family History:  Family History   Problem Relation Age of Onset     Arthritis Mother      Respiratory Mother         pulmonary fibrosis     Hypertension Mother      Anemia Mother      Liver Disease Father         from EtOH     Alcoholism Father      Multiple Sclerosis Sister      No Known Problems Maternal Grandmother      Heart Disease Maternal Grandfather      Breast Cancer Sister      Glaucoma Paternal Grandfather      Heart Disease Paternal Grandfather        Medications:  Current Outpatient Medications   Medication Sig Dispense Refill     acetaminophen 650 MG TABS Take 650 mg by mouth every 4 hours as needed. 100 tablet 0     amLODIPine (NORVASC) 10 MG tablet Take 1 tablet (10 mg) by mouth daily 90 tablet 0     Diphenhydramine-APAP, sleep, (TYLENOL PM EXTRA STRENGTH PO) Take 2 tablets by mouth At Bedtime        ferrous gluconate (FERGON) 324 (38 Fe) MG tablet Take 324 mg by mouth       metoprolol tartrate (LOPRESSOR) 100 MG tablet Take 2 tablets (200 mg) by mouth 2 times daily 360 tablet 0     Multiple Vitamin (MULTI-VITAMIN) per tablet Take 1 tablet by mouth every morning        mupirocin (BACTROBAN) 2 % external ointment Use in nose twice daily for 5 days 22 g 0     Omeprazole (PRILOSEC PO) Take 20 mg by mouth as needed        sodium chloride (OCEAN NASAL SPRAY) 0.65 % nasal spray Spray 1 spray into both nostrils daily  as needed for congestion 1 Bottle 3     triamcinolone (KENALOG) 0.1 % external cream Apply twice daily for 2 weeks then 3 times weekly for 2 week, repeat cycle as needed 454 g 0       Allergies   Allergen Reactions     Hctz Other (See Comments)     Pt develops symptomatic and significant hyponatremia with HCTZ exposure     Hydrochlorothiazide      Other reaction(s): Hyponatremia  Hyponatremia     Latex      Benzocaine Rash     carba mix,thrium-sunscreen     Resorcinol-Alcohol Rash     carba mix,thrium-sunscreen     Ace Inhibitors Unknown     Dizziness and orthostatic changes and electrolyte problems.     Latex Unknown       Review of Systems:  -As per HPI  -Constitutional: Otherwise feeling well today, in usual state of health.  -Skin: As above in HPI. No additional skin concerns.    Physical exam:  Vitals: There were no vitals taken for this visit.  GEN: This is a well developed, well-nourished female in no acute distress, in a pleasant mood.    SKIN: Focused examination of the bilateral lower extremities was performed.  -Carreon skin type: II  - 1+ pitting edema on the right lower extremity to the knee; improved from prior  - 1+ pitting edema on the left lower extremity to the knee; improved from prior  - Numerous shallow ulcers with central fibrinous yellow debris and few areas of central red granulation tissue and peripheral rim of mild pink erythema on the bilateral shins, appear improved from prior with decreased surrounding erythema.   - 2+ distal pedal pulses bilaterally  -No other lesions of concern on areas examined.             Labs:  TTE 2/18  Left ventricular function, chamber size, wall motion, and wall thickness are  normal.The EF is 60-65%.  Global right ventricular function is normal.  The thoracic aorta is normal. The inferior vena cava was normal in size with  preserved respiratory variability. Estimated right atrial pressure is < 5  mmHg.  No pericardial effusion is present.     Lower  extremity ultrasound 5/15/19     FINDINGS:   The left common femoral, femoral, popliteal, posterior tibial , and  peroneal veins are fully compressible with patent Doppler wave forms.  No thrombus is identified within them on grayscale imaging.  Redemonstration of wall thickening in the left common femoral and  proximal femoral vein. Mild amount of subcutaneous edema in the left  calf.     IMPRESSION:    1. No DVT demonstrated in left lower extremity.  2. Mild subcutaneous edema in the left calf, significantly improved  from prior.    Impression/Plan:    1. Venous stasis ulcers in setting of significant lower extremity edema suspected 2/2 venous insufficiency. Recent TTE wnl 2018. Has had multiple prior LE ultrasounds demonstrating no evidence of DVT and thickening of left common femoral and proximal veins. Repeat ultrasound pending. Recommended continuing with Unna boot application as below. Reiterated this healing is slow and may takes weeks to months.     - Wounds were cleansed with chlorhexidine  - Mupirocin 2% ointment applied to open areas and lidex 0.05% ointment applied to areas of surrounding skin  - Open areas covered with telfa dressing  - Unna boot x 2 applied to lower extremities.   - Follow-up with vascular surgery as planned; appt scheduled 12/16    Follow-up in 1 week, earlier for new or changing lesions.     Staff Involved:  Staff only    Ra Orourke MD  Pronouns: he/him/his    Department of Dermatology  [facility]: Phone: [phone], Fax:[phone]  [facility]: Phone: [phone] Fax: [phone]

## 2020-12-14 NOTE — PROGRESS NOTES
[facility] Dermatology Note      Dermatology Problem List:  # Pertinent PMH: RA previously on methotrexate.  1. Bilateral LE ulcers, in setting of significant edema. Favor stasis ulcers. Less likely cutaneous manifestation of her RA (?vasculitis) versus manifestation of her atopic dermatitis versus bullous pemphigoid but could consider as she did stop methotrexate about 1 year ago.  - Trial of unna boot placed 11/17/2020; 11/23/2020; 11/30/2020  - Stop amlodipine  - Vascular surgery referral  - Consider bx if not improving  2. Atopic dermatitis.  - Prior rx: was on methotrexate for RA previously, discontinued ~11/2019; cyclosporine, discontinued 06/28/2010  - Prior topicals: fluocinolone 0.025% ointment, TMC 0.1% ointment    Encounter Date: Dec 14, 2020    CC:  Chief Complaint   Patient presents with     Derm Problem     Follow up legs and unna boot     History of Present Illness:  Ms. Kimberly Chau is a 80 year old female who presents as a follow-up for venous stasis ulcers. The patient was last seen 12/7/2020 when the wounds were cleansed with chlorhexidine, mupirocin application to open wound lidex ointment to surrounding area and unna boot applied. She believes her lower extremity edema has improved from her previous visit. She has appointments scheduled with vascular surgery on 12/14/2020 for a lower extremity ultrasound and then consult on 12/16/2020. Health otherwise stable. No other skin concerns.      Past Medical History:   Patient Active Problem List   Diagnosis     Intrinsic atopic dermatitis     Essential hypertension     Rheumatoid arthritis (H)     Retinal artery occlusion     Cervical vertebral fracture (H)     Central retinal artery occlusion of right eye     Bilateral occipital neuralgia     C1-C2 instability     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 factor (H)     Osteoarthritis     Malignant hypertension     Hyponatremia     GERD (gastroesophageal  reflux disease)     Eczematous dermatitis     Anxiety state     Anemia     Closed fracture of distal end of left radius with delayed healing, unspecified fracture morphology, subsequent encounter     Closed fracture of distal end of left radius, unspecified fracture morphology, sequela     Past Medical History:   Diagnosis Date     Anemia      Arthritis      Cataracts      Central retinal artery occlusion      Cervical spine fracture (H)     After a fall from orthostatic sx     Chronic pain     Back of head     Gastro-oesophageal reflux disease      Head injury      Hypertension      Hyponatremia     Resolved, 2/2 diuretics     Migraines      Osteoporosis      Pneumonia 2018     Rheumatoid arthritis(714.0)      Past Surgical History:   Procedure Laterality Date     C HAND/FINGER SURGERY UNLISTED       C PELVIS/HIP JOINT SURGERY UNLISTED       COLONOSCOPY       EYE SURGERY Left 02/2019    Hole in macula     FUSION CERVICAL POSTERIOR THREE+ LEVELS  1/22/2013    Procedure: FUSION CERVICAL POSTERIOR THREE+ LEVELS;  Cervical 1-6 Posterior Instrumentation and Fusion, Cervical 3-4 Laminectomy  .Instrumentation and fusion to Cervical 6 *Latex Allergy*;  Surgeon: Ra Bar MD;  Location: [facility] OR     GYN SURGERY       HEAD & NECK SURGERY       HYSTERECTOMY       KNEE SURGERY       NECK SURGERY       OPEN REDUCTION INTERNAL FIXATION WRIST Left 4/30/2019    Procedure: Open Reduction Internal Fixation Left Distal Radius Fracture;  Surgeon: Dinh Strong MD;  Location:  OR     OPEN REDUCTION INTERNAL FIXATION WRIST Left 6/12/2020    Procedure: OPEN REDUCTION INTERNAL FIXATION Left Distal Radius Nonunion, Distal Ulna Resection;  Surgeon: Dinh Strong MD;  Location:  OR     OPTICAL TRACKING SYSTEM ENDOSCOPIC SINUS SURGERY Right 4/6/2018    Procedure: OPTICAL TRACKING SYSTEM ENDOSCOPIC SINUS SURGERY;  Stealth Assisted Right Maxillary Antrostomy, Anterior Ethmoidectomy And Frontal Sinusotomy;  Surgeon:  Elyse Eisenberg MD;  [facility]     OPTICAL TRACKING SYSTEM ENDOSCOPIC SINUS SURGERY Right 8/3/2018    Procedure: OPTICAL TRACKING SYSTEM ENDOSCOPIC SINUS SURGERY;  Stealth Assisted Revision Right Frontal Sinusotomy, Right Stent Placement  **Latex Allergy** ;  Surgeon: Elyse Eisenberg MD;  Location:  OR     ORTHOPEDIC SURGERY       REMOVE HARDWARE WRIST Left 11/19/2019    Procedure: Left Distal Radius Hardware Removal, Flexor Pollicus Longus Repair, Deep Cultures;  Surgeon: Dinh Strong MD;  Location:  OR       Social History:  Patient reports that she has never smoked. She has never used smokeless tobacco. She reports current alcohol use. She reports that she does not use drugs.    Family History:  Family History   Problem Relation Age of Onset     Arthritis Mother      Respiratory Mother         pulmonary fibrosis     Hypertension Mother      Anemia Mother      Liver Disease Father         from EtOH     Alcoholism Father      Multiple Sclerosis Sister      No Known Problems Maternal Grandmother      Heart Disease Maternal Grandfather      Breast Cancer Sister      Glaucoma Paternal Grandfather      Heart Disease Paternal Grandfather        Medications:  Current Outpatient Medications   Medication Sig Dispense Refill     acetaminophen 650 MG TABS Take 650 mg by mouth every 4 hours as needed. 100 tablet 0     amLODIPine (NORVASC) 10 MG tablet Take 1 tablet (10 mg) by mouth daily 90 tablet 0     Diphenhydramine-APAP, sleep, (TYLENOL PM EXTRA STRENGTH PO) Take 2 tablets by mouth At Bedtime        ferrous gluconate (FERGON) 324 (38 Fe) MG tablet Take 324 mg by mouth       metoprolol tartrate (LOPRESSOR) 100 MG tablet Take 2 tablets (200 mg) by mouth 2 times daily 360 tablet 0     Multiple Vitamin (MULTI-VITAMIN) per tablet Take 1 tablet by mouth every morning        mupirocin (BACTROBAN) 2 % external ointment Use in nose twice daily for 5 days 22 g 0     Omeprazole (PRILOSEC PO) Take 20 mg by mouth as  needed        sodium chloride (OCEAN NASAL SPRAY) 0.65 % nasal spray Spray 1 spray into both nostrils daily as needed for congestion 1 Bottle 3     triamcinolone (KENALOG) 0.1 % external cream Apply twice daily for 2 weeks then 3 times weekly for 2 week, repeat cycle as needed 454 g 0       Allergies   Allergen Reactions     Hctz Other (See Comments)     Pt develops symptomatic and significant hyponatremia with HCTZ exposure     Hydrochlorothiazide      Other reaction(s): Hyponatremia  Hyponatremia     Latex      Benzocaine Rash     carba mix,thrium-sunscreen     Resorcinol-Alcohol Rash     carba mix,thrium-sunscreen     Ace Inhibitors Unknown     Dizziness and orthostatic changes and electrolyte problems.     Latex Unknown       Review of Systems:  -As per HPI  -Constitutional: Otherwise feeling well today, in usual state of health.  -Skin: As above in HPI. No additional skin concerns.    Physical exam:  Vitals: There were no vitals taken for this visit.  GEN: This is a well developed, well-nourished female in no acute distress, in a pleasant mood.    SKIN: Focused examination of the bilateral lower extremities was performed.  -Carreon skin type: II  - 1+ pitting edema on the right lower extremity to the knee; improved from prior  - 1+ pitting edema on the left lower extremity to the knee; improved from prior  - Numerous shallow ulcers with central fibrinous yellow debris and few areas of central red granulation tissue and peripheral rim of mild pink erythema on the bilateral shins, appear improved from prior with decreased surrounding erythema.   - 2+ distal pedal pulses bilaterally  -No other lesions of concern on areas examined.     Labs:  TTE 2/18  Left ventricular function, chamber size, wall motion, and wall thickness are  normal.The EF is 60-65%.  Global right ventricular function is normal.  The thoracic aorta is normal. The inferior vena cava was normal in size with  preserved respiratory variability.  Estimated right atrial pressure is < 5  mmHg.  No pericardial effusion is present.     Lower extremity ultrasound 5/15/19     FINDINGS:   The left common femoral, femoral, popliteal, posterior tibial , and  peroneal veins are fully compressible with patent Doppler wave forms.  No thrombus is identified within them on grayscale imaging.  Redemonstration of wall thickening in the left common femoral and  proximal femoral vein. Mild amount of subcutaneous edema in the left  calf.     IMPRESSION:    1. No DVT demonstrated in left lower extremity.  2. Mild subcutaneous edema in the left calf, significantly improved  from prior.    Impression/Plan:    1. Venous stasis ulcers in setting of significant lower extremity edema suspected 2/2 venous insufficiency. Recent TTE wnl 2018. Has had multiple prior LE ultrasounds demonstrating no evidence of DVT and thickening of left common femoral and proximal veins. Recommended continuing with Unna boot application as below. Reiterated this healing is slow and may takes weeks to months.     - Wounds were cleansed with chlorhexidine  - Mupirocin 2% ointment applied to open areas and lidex 0.05% ointment applied to areas of surrounding skin  - Open areas covered with telfa dressing  - Unna boot x 2 applied to lower extremities.   - Follow-up with vascular surgery as planned; appt scheduled 12/16    Follow-up in 1 week, earlier for new or changing lesions.     Staff Involved:

## 2020-12-14 NOTE — LETTER
12/14/2020         RE: Kimberly Chau  [address]  [place] [address]        Dear Colleague,    Thank you for referring your patient, Kimberly Chau, to the [facility]. Please see a copy of my visit note below.    [facility] Dermatology Note      Dermatology Problem List:  # Pertinent PMH: RA previously on methotrexate.  1. Bilateral LE ulcers, in setting of significant edema. Favor stasis ulcers. Less likely cutaneous manifestation of her RA (?vasculitis) versus manifestation of her atopic dermatitis versus bullous pemphigoid but could consider as she did stop methotrexate about 1 year ago.  - Trial of unna boot placed 11/17/2020; 11/23/2020; 11/30/2020  - Stop amlodipine  - Vascular surgery referral  - Consider bx if not improving  2. Atopic dermatitis.  - Prior rx: was on methotrexate for RA previously, discontinued ~11/2019; cyclosporine, discontinued 06/28/2010  - Prior topicals: fluocinolone 0.025% ointment, TMC 0.1% ointment    Encounter Date: Dec 14, 2020    CC:  Chief Complaint   Patient presents with     Derm Problem     Follow up legs and unna boot     History of Present Illness:  Ms. Kimberly Chau is a 80 year old female who presents as a follow-up for venous stasis ulcers. The patient was last seen 12/72020 when the wounds were cleansed with chlorhexidine and unna boot applied. She believes her lower extremity edema has improved from her previous visit. She had her lower extremity ultrasound today and appointment with vascular surgery on 12/16/2020. Health otherwise stable. No other skin concerns.    Past Medical History:   Patient Active Problem List   Diagnosis     Intrinsic atopic dermatitis     Essential hypertension     Rheumatoid arthritis (H)     Retinal artery occlusion     Cervical vertebral fracture (H)     Central retinal artery occlusion of right eye     Bilateral occipital neuralgia     C1-C2 instability     Rheumatoid arthritis  involving both hands with positive rheumatoid factor (H)     Osteoarthritis     Malignant hypertension     Hyponatremia     GERD (gastroesophageal reflux disease)     Eczematous dermatitis     Anxiety state     Anemia     Closed fracture of distal end of left radius with delayed healing, unspecified fracture morphology, subsequent encounter     Closed fracture of distal end of left radius, unspecified fracture morphology, sequela     Past Medical History:   Diagnosis Date     Anemia      Arthritis      Cataracts      Central retinal artery occlusion      Cervical spine fracture (H)     After a fall from orthostatic sx     Chronic pain     Back of head     Gastro-oesophageal reflux disease      Head injury      Hypertension      Hyponatremia     Resolved, 2/2 diuretics     Migraines      Osteoporosis      Pneumonia 2018     Rheumatoid arthritis(714.0)      Past Surgical History:   Procedure Laterality Date     C HAND/FINGER SURGERY UNLISTED       C PELVIS/HIP JOINT SURGERY UNLISTED       COLONOSCOPY       EYE SURGERY Left 02/2019    Hole in macula     FUSION CERVICAL POSTERIOR THREE+ LEVELS  1/22/2013    Procedure: FUSION CERVICAL POSTERIOR THREE+ LEVELS;  Cervical 1-6 Posterior Instrumentation and Fusion, Cervical 3-4 Laminectomy  .Instrumentation and fusion to Cervical 6 *Latex Allergy*;  Surgeon: Ra Bra MD;  Location: [facility] OR     GYN SURGERY       HEAD & NECK SURGERY       HYSTERECTOMY       KNEE SURGERY       NECK SURGERY       OPEN REDUCTION INTERNAL FIXATION WRIST Left 4/30/2019    Procedure: Open Reduction Internal Fixation Left Distal Radius Fracture;  Surgeon: Dinh Strong MD;  Location:  OR     OPEN REDUCTION INTERNAL FIXATION WRIST Left 6/12/2020    Procedure: OPEN REDUCTION INTERNAL FIXATION Left Distal Radius Nonunion, Distal Ulna Resection;  Surgeon: Dinh Strong MD;  Location:  OR     OPTICAL TRACKING SYSTEM ENDOSCOPIC SINUS SURGERY Right 4/6/2018    Procedure: OPTICAL  TRACKING SYSTEM ENDOSCOPIC SINUS SURGERY;  Stealth Assisted Right Maxillary Antrostomy, Anterior Ethmoidectomy And Frontal Sinusotomy;  Surgeon: Elyse Eisenberg MD;  Location: [facility] OR     OPTICAL TRACKING SYSTEM ENDOSCOPIC SINUS SURGERY Right 8/3/2018    Procedure: OPTICAL TRACKING SYSTEM ENDOSCOPIC SINUS SURGERY;  Stealth Assisted Revision Right Frontal Sinusotomy, Right Stent Placement  **Latex Allergy** ;  Surgeon: Elyse Eisenberg MD;  Location: [facility] OR     ORTHOPEDIC SURGERY       REMOVE HARDWARE WRIST Left 11/19/2019    Procedure: Left Distal Radius Hardware Removal, Flexor Pollicus Longus Repair, Deep Cultures;  Surgeon: Dinh Strong MD;  Location:  OR       Social History:  Patient reports that she has never smoked. She has never used smokeless tobacco. She reports current alcohol use. She reports that she does not use drugs.    Family History:  Family History   Problem Relation Age of Onset     Arthritis Mother      Respiratory Mother         pulmonary fibrosis     Hypertension Mother      Anemia Mother      Liver Disease Father         from EtOH     Alcoholism Father      Multiple Sclerosis Sister      No Known Problems Maternal Grandmother      Heart Disease Maternal Grandfather      Breast Cancer Sister      Glaucoma Paternal Grandfather      Heart Disease Paternal Grandfather        Medications:  Current Outpatient Medications   Medication Sig Dispense Refill     acetaminophen 650 MG TABS Take 650 mg by mouth every 4 hours as needed. 100 tablet 0     amLODIPine (NORVASC) 10 MG tablet Take 1 tablet (10 mg) by mouth daily 90 tablet 0     Diphenhydramine-APAP, sleep, (TYLENOL PM EXTRA STRENGTH PO) Take 2 tablets by mouth At Bedtime        ferrous gluconate (FERGON) 324 (38 Fe) MG tablet Take 324 mg by mouth       metoprolol tartrate (LOPRESSOR) 100 MG tablet Take 2 tablets (200 mg) by mouth 2 times daily 360 tablet 0     Multiple Vitamin (MULTI-VITAMIN) per tablet Take 1 tablet by mouth every morning         mupirocin (BACTROBAN) 2 % external ointment Use in nose twice daily for 5 days 22 g 0     Omeprazole (PRILOSEC PO) Take 20 mg by mouth as needed        sodium chloride (OCEAN NASAL SPRAY) 0.65 % nasal spray Spray 1 spray into both nostrils daily as needed for congestion 1 Bottle 3     triamcinolone (KENALOG) 0.1 % external cream Apply twice daily for 2 weeks then 3 times weekly for 2 week, repeat cycle as needed 454 g 0       Allergies   Allergen Reactions     Hctz Other (See Comments)     Pt develops symptomatic and significant hyponatremia with HCTZ exposure     Hydrochlorothiazide      Other reaction(s): Hyponatremia  Hyponatremia     Latex      Benzocaine Rash     carba mix,thrium-sunscreen     Resorcinol-Alcohol Rash     carba mix,thrium-sunscreen     Ace Inhibitors Unknown     Dizziness and orthostatic changes and electrolyte problems.     Latex Unknown       Review of Systems:  -As per HPI  -Constitutional: Otherwise feeling well today, in usual state of health.  -Skin: As above in HPI. No additional skin concerns.    Physical exam:  Vitals: There were no vitals taken for this visit.  GEN: This is a well developed, well-nourished female in no acute distress, in a pleasant mood.    SKIN: Focused examination of the bilateral lower extremities was performed.  -Carreon skin type: II  - 1+ pitting edema on the right lower extremity to the knee; improved from prior  - 1+ pitting edema on the left lower extremity to the knee; improved from prior  - Numerous shallow ulcers with central fibrinous yellow debris and few areas of central red granulation tissue and peripheral rim of mild pink erythema on the bilateral shins, appear improved from prior with decreased surrounding erythema.   - 2+ distal pedal pulses bilaterally  -No other lesions of concern on areas examined.             Labs:  TTE 2/18  Left ventricular function, chamber size, wall motion, and wall thickness are  normal.The EF is 60-65%.  Global  right ventricular function is normal.  The thoracic aorta is normal. The inferior vena cava was normal in size with  preserved respiratory variability. Estimated right atrial pressure is < 5  mmHg.  No pericardial effusion is present.     Lower extremity ultrasound 5/15/19     FINDINGS:   The left common femoral, femoral, popliteal, posterior tibial , and  peroneal veins are fully compressible with patent Doppler wave forms.  No thrombus is identified within them on grayscale imaging.  Redemonstration of wall thickening in the left common femoral and  proximal femoral vein. Mild amount of subcutaneous edema in the left  calf.     IMPRESSION:    1. No DVT demonstrated in left lower extremity.  2. Mild subcutaneous edema in the left calf, significantly improved  from prior.    Impression/Plan:    1. Venous stasis ulcers in setting of significant lower extremity edema suspected 2/2 venous insufficiency. Recent TTE wnl 2018. Has had multiple prior LE ultrasounds demonstrating no evidence of DVT and thickening of left common femoral and proximal veins. Repeat ultrasound pending. Recommended continuing with Unna boot application as below. Reiterated this healing is slow and may takes weeks to months.     - Wounds were cleansed with chlorhexidine  - Mupirocin 2% ointment applied to open areas and lidex 0.05% ointment applied to areas of surrounding skin  - Open areas covered with telfa dressing  - Unna boot x 2 applied to lower extremities.   - Follow-up with vascular surgery as planned; appt scheduled 12/16    Follow-up in 1 week, earlier for new or changing lesions.     Staff Involved:  Staff only    Ra Orourke MD  Pronouns: he/him/his    Department of Dermatology  [facility]: Phone: [phone], Fax:[phone]  [facility]: Phone: [phone] Fax: [phone]            Again, thank you  for allowing me to participate in the care of your patient.        Sincerely,        Ra Orourke MD

## 2020-12-15 ENCOUNTER — OFFICE VISIT (OUTPATIENT)
Dept: INTERNAL MEDICINE | Facility: CLINIC | Age: 80
End: 2020-12-15
Payer: MEDICARE

## 2020-12-15 VITALS
SYSTOLIC BLOOD PRESSURE: 186 MMHG | OXYGEN SATURATION: 95 % | BODY MASS INDEX: 19.94 KG/M2 | HEART RATE: 66 BPM | WEIGHT: 109 LBS | DIASTOLIC BLOOD PRESSURE: 90 MMHG

## 2020-12-15 DIAGNOSIS — L97.902 ULCER OF LOWER EXTREMITY WITH FAT LAYER EXPOSED, UNSPECIFIED LATERALITY (H): Primary | ICD-10-CM

## 2020-12-15 PROCEDURE — 99213 OFFICE O/P EST LOW 20 MIN: CPT | Performed by: PEDIATRICS

## 2020-12-15 RX ORDER — TRAMADOL HYDROCHLORIDE 50 MG/1
50-100 TABLET ORAL EVERY 6 HOURS PRN
Qty: 120 TABLET | Refills: 0 | Status: SHIPPED | OUTPATIENT
Start: 2020-12-15 | End: 2022-05-17

## 2020-12-15 NOTE — PROGRESS NOTES
About this note. I use the medical record to document (to the best of my ability) my understanding of:   - What the patient told me,  - Relevant details from my exam, records review, and/or test results, and  - My assessment and plan  Patients with questions are welcome to contact me; I will reply as soon as time allows.    Format: SOAP (Subjective, Objective, Assessment & Plan)  Status: established patient, PCP Guillermo Castellano  Visit type: evaluation & management of one or more problems      Subjective     Kimberly Chau is a 80 year old woman, with concerns including:  Chief Complaint   Patient presents with     Consult     sores on legs        Was seen yesterday by derm for leg ulcers. They recommended to stop amlodipine, started mupirocin, dressed, gave her a boot, and referred to vascular surgery.    The lesions are very painful, and she wants something for pain (her dermatologist asked her to see her PCP). The pain is very uncomfortable.  She listed off a few pain medicine experiences. She remembers tramadolol didn't do much for her leg pain (prescribed in November).  She had oxycodone in June for her surgery. Need to save.    Not supposed to take ibuprofen because of stomach history.        Review of Systems    As part of this encounter, I reviewed (and updated as appropriate) the past medical, family, and social history.      Objective     BP (!) 186/90   Pulse 66   Wt 49.4 kg (109 lb)   SpO2 95%   BMI 19.94 kg/m      Physical Exam     Patient appears comfortable sitting in chair.  Dressings from yesterday are clean, dry, and intact  Reviewed photos from dermatology yesterday          Assessment & Plan       Leg ulcers  We called about an in-person visit, but apparently the vascular clinic is only doing virtual tomorrow. The photos are fairly good, however.  I sent a referral for the wound care team.  Pain control is going to be difficult, without a lot of options. I recommended tramadol again, and  suggested 1-2 pills every 6 hours.    Plan for regular follow-up with PCP.

## 2020-12-15 NOTE — TELEPHONE ENCOUNTER
DIAGNOSIS: PVD   DATE RECEIVED: 12.16.20   NOTES STATUS DETAILS   OFFICE NOTE from referring provider Internal 11.17.20  Dr. Amanda Rendon  [facility] Dermatology   OFFICE NOTE from other specialist Internal 12.14.20, 12.7.20, 11.30.20,  11.23.20  Dr. Ra Orourke  [facility] Dermatology   OPERATIVE REPORT N/A    MEDICATION LIST Internal    PERTINENT LABS Internal    CTA (CT ANGIOGRAPHY) N/A    CT N/A    MRI N/A    ULTRASOUND Internal 12.14.20  US Venous Comp Bilateral

## 2020-12-15 NOTE — NURSING NOTE
Chief Complaint   Patient presents with     Consult     sores on legs       Candis Marcus ATC on 12/15/2020 at 11:17 AM

## 2020-12-16 ENCOUNTER — VIRTUAL VISIT (OUTPATIENT)
Dept: VASCULAR SURGERY | Facility: CLINIC | Age: 80
End: 2020-12-16
Payer: MEDICARE

## 2020-12-16 ENCOUNTER — PRE VISIT (OUTPATIENT)
Dept: VASCULAR SURGERY | Facility: CLINIC | Age: 80
End: 2020-12-16

## 2020-12-16 DIAGNOSIS — I83.893 SYMPTOMATIC VARICOSE VEINS OF BOTH LOWER EXTREMITIES: Primary | ICD-10-CM

## 2020-12-16 PROCEDURE — 99203 OFFICE O/P NEW LOW 30 MIN: CPT | Mod: 95 | Performed by: RADIOLOGY

## 2020-12-16 ASSESSMENT — PAIN SCALES - GENERAL: PAINLEVEL: MODERATE PAIN (5)

## 2020-12-16 NOTE — PATIENT INSTRUCTIONS
Preventive Care:    Colorectal Cancer Screening: During our visit today, we discussed that it is recommended you receive colorectal cancer screening. Please call or make an appointment with your primary care provider to discuss this. You may also call the [facility] scheduling line ([phone]) to set up a colonoscopy appointment.

## 2020-12-16 NOTE — NURSING NOTE
Vascular Rooming Note     Kimberly Chau's goals for this visit include:   Chief Complaint   Patient presents with     Consult     Kimberly, is participating in a virtual visit today for a consult regarding PVD, ulcers bilateral lower legs, as reported by patient.     Fallon Anguiano LPN

## 2020-12-16 NOTE — PROGRESS NOTES
"Kimberly Chau is a 80 year old female who is being evaluated via a billable telephone visit.      The patient has been notified of following:     \"This telephone visit will be conducted via a call between you and your physician/provider. We have found that certain health care needs can be provided without the need for a physical exam.  This service lets us provide the care you need with a short phone conversation.  If a prescription is necessary we can send it directly to your pharmacy.  If lab work is needed we can place an order for that and you can then stop by our lab to have the test done at a later time.    Telephone visits are billed at different rates depending on your insurance coverage. During this emergency period, for some insurers they may be billed the same as an in-person visit.  Please reach out to your insurance provider with any questions.    If during the course of the call the physician/provider feels a telephone visit is not appropriate, you will not be charged for this service.\"    Patient has given verbal consent for Telephone visit?  Yes    What phone number would you like to be contacted at? [phone]    How would you like to obtain your AVS? Mail a copy    Phone call duration: 15 minutes    Ms. Chau is interviewed via telephone visit regarding a referral for lower extremity chronic venous stasis complicated by recent spontaneous ulceration.     She developed swelling and shallow ulcers on the front of her lower shins at the end of September of this year, first part of October. She denies any known trigger, such as trauma or dvt.     She is quite active, on her feet most of the day, takes care of herself, activities in her kitchen, laundry. She takes care of a dog and walks it regularly.     She first noted this around her ankle, then her lower calf, both sides of her legs.     She has been in lower extremity wraps since the beginning of October.     She has been wearing compression " stockings for the past 5 years, which helps control swelling. She denies any known prior history of lbood clots. She has a remote skiing injury. No large prominent varicose veins. Remote hysterectomy.    PE:  Deferred:    Imaging:  I reviewed the lower extremity venous competency ultrasound from 20 which reveals:    1. Right lea. No deep or superficial venous thrombosis.  1b. Deep venous incompetence of the common femoral vein, the remainder  of the deep veins are competent.  1c. Superficial venous incompetence involving the great saphenous vein  in the distal thigh and the small saphenous vein from the  saphenofemoral popliteal junction through the mid calf. Remainder of  the superficial veins are competent.  1d. No incompetent  or varicose veins.     2. Left lea. No deep or superficial venous thrombosis.  2b. No deep venous incompetency.  2c. Superficial venous incompetency of the great saphenous vein from  the mid thigh continuously through the mid calf.  2d. No incompetent  or varicose veins.    A/P:  B/l chronic venous stasis with healing ulcerations, treated with unna boots. I reviewed her photographs in the Media tab which demonstrates shallow ulcerations in the lower legs, which are progressively healing.     I think she should continue her compression, we will process ulcer care stockings.   She only has superficial venous insufficiency of her left great saphenous vein, her right leg does not demonstrate any substantial superficial venous incompetency.     We discussed the risks and benefits of treating her superficial incompetency. Her wounds are healing, they seem a bit worse on her left side. I do think she could benefit from ablation of her left GSV, which may help to push along the healing process and prevent recurrence. However, given that she has wounds on her right leg without incompetency of her superficial veins, she clearly has venous stasis driven also  by other factors.     Total of approximately 25 minutes spent with the patient on the phone of which >50% spent on counseling.

## 2020-12-16 NOTE — LETTER
"12/16/2020       RE: Kimberly Chau  [address]  [place] [address]     Dear Colleague,    Thank you for referring your patient, Kimberly Chau, to the [facility] [place] at [facility]. Please see a copy of my visit note below.    Kimberly Chau is a 80 year old female who is being evaluated via a billable telephone visit.      The patient has been notified of following:     \"This telephone visit will be conducted via a call between you and your physician/provider. We have found that certain health care needs can be provided without the need for a physical exam.  This service lets us provide the care you need with a short phone conversation.  If a prescription is necessary we can send it directly to your pharmacy.  If lab work is needed we can place an order for that and you can then stop by our lab to have the test done at a later time.    Telephone visits are billed at different rates depending on your insurance coverage. During this emergency period, for some insurers they may be billed the same as an in-person visit.  Please reach out to your insurance provider with any questions.    If during the course of the call the physician/provider feels a telephone visit is not appropriate, you will not be charged for this service.\"    Patient has given verbal consent for Telephone visit?  Yes    What phone number would you like to be contacted at? [phone]    How would you like to obtain your AVS? Mail a copy    Phone call duration: 15 minutes        Ms. Chau is interviewed via telephone visit regarding a referral for lower extremity chronic venous stasis complicated by recent spontaneous ulceration.     She developed swelling and shallow ulcers on the front of her lower shins at the end of September of this year, first part of October. She denies any known trigger, such as trauma or dvt.     She is quite active, on her feet most of the day, takes " care of herself, activities in her kitchen, laundry. She takes care of a dog and walks it regularly.     She first noted this around her ankle, then her lower calf, both sides of her legs.     She has been in lower extremity wraps since the beginning of October.     She has been wearing compression stockings for the past 5 years, which helps control swelling. She denies any known prior history of lbood clots. She has a remote skiing injury. No large prominent varicose veins. Remote hysterectomy.    PE:  Deferred:    Imaging:  I reviewed the lower extremity venous competency ultrasound from 20 which reveals:    1. Right lea. No deep or superficial venous thrombosis.  1b. Deep venous incompetence of the common femoral vein, the remainder  of the deep veins are competent.  1c. Superficial venous incompetence involving the great saphenous vein  in the distal thigh and the small saphenous vein from the  saphenofemoral popliteal junction through the mid calf. Remainder of  the superficial veins are competent.  1d. No incompetent  or varicose veins.     2. Left lea. No deep or superficial venous thrombosis.  2b. No deep venous incompetency.  2c. Superficial venous incompetency of the great saphenous vein from  the mid thigh continuously through the mid calf.  2d. No incompetent  or varicose veins.    A/P:  B/l chronic venous stasis with healing ulcerations, treated with unna boots. I reviewed her photographs in the Media tab which demonstrates shallow ulcerations in the lower legs, which are progressively healing.     I think she should continue her compression, we will process ulcer care stockings.   She only has superficial venous insufficiency of her left great saphenous vein, her right leg does not demonstrate any substantial superficial venous incompetency.     We discussed the risks and benefits of treating her superficial incompetency. Her wounds are healing, they seem a  bit worse on her left side. I do think she could benefit from ablation of her left GSV, which may help to push along the healing process and prevent recurrence. However, given that she has wounds on her right leg without incompetency of her superficial veins, she clearly has venous stasis driven also by other factors.     Total of approximately 25 minutes spent with the patient on the phone of which >50% spent on counseling.     Again, thank you for allowing me to participate in the care of your patient.      Sincerely,    Davy Jain MD

## 2020-12-18 DIAGNOSIS — I87.8 VENOUS STASIS: Primary | ICD-10-CM

## 2020-12-18 DIAGNOSIS — I83.893 SYMPTOMATIC VARICOSE VEINS OF BOTH LOWER EXTREMITIES: ICD-10-CM

## 2020-12-21 ENCOUNTER — OFFICE VISIT (OUTPATIENT)
Dept: DERMATOLOGY | Facility: CLINIC | Age: 80
End: 2020-12-21
Payer: MEDICARE

## 2020-12-21 DIAGNOSIS — I83.009 VENOUS ULCER (H): Primary | ICD-10-CM

## 2020-12-21 DIAGNOSIS — L97.909 VENOUS ULCER (H): Primary | ICD-10-CM

## 2020-12-21 PROCEDURE — 29580 STRAPPING UNNA BOOT: CPT | Mod: 50 | Performed by: DERMATOLOGY

## 2020-12-21 RX ORDER — MUPIROCIN 20 MG/G
OINTMENT TOPICAL ONCE
Status: COMPLETED | OUTPATIENT
Start: 2020-12-21 | End: 2020-12-21

## 2020-12-21 RX ORDER — FLUOCINONIDE 0.5 MG/G
OINTMENT TOPICAL ONCE
Status: COMPLETED | OUTPATIENT
Start: 2020-12-21 | End: 2020-12-21

## 2020-12-21 RX ADMIN — FLUOCINONIDE: 0.5 OINTMENT TOPICAL at 14:21

## 2020-12-21 RX ADMIN — MUPIROCIN: 20 OINTMENT TOPICAL at 14:22

## 2020-12-21 ASSESSMENT — PAIN SCALES - GENERAL: PAINLEVEL: MODERATE PAIN (4)

## 2020-12-21 NOTE — NURSING NOTE
Kimberly Chau's goals for this visit include:   Chief Complaint   Patient presents with     Derm Problem     Follow up- legs and unna boot     She requests these members of her care team be copied on today's visit information: Yes    PCP: Guillermo Castellano    Referring Provider:  No referring provider defined for this encounter.    There were no vitals taken for this visit.    Do you need any medication refills at today's visit? No    Anai Ferrer LPN

## 2020-12-21 NOTE — PROGRESS NOTES
[facility] Dermatology Note      Dermatology Problem List:  # Pertinent PMH: RA previously on methotrexate.  1. Bilateral LE ulcers, in setting of significant edema. Favor stasis ulcers. Less likely cutaneous manifestation of her RA (?vasculitis) versus manifestation of her atopic dermatitis versus bullous pemphigoid but could consider as she did stop methotrexate about 1 year ago.  - Trial of unna boot  - Stop amlodipine  - Vascular surgery referral  - Consider bx if not improving  2. Atopic dermatitis.  - Prior rx: was on methotrexate for RA previously, discontinued ~11/2019; cyclosporine, discontinued 06/28/2010  - Prior topicals: fluocinolone 0.025% ointment, TMC 0.1% ointment    Encounter Date: Dec 21, 2020    CC:  Chief Complaint   Patient presents with     Derm Problem     Follow up- legs and unna boot     History of Present Illness:  Ms. Kimberly Chau is a 80 year old female who presents as a follow-up for venous stasis ulcers. The patient was last seen 12/14/2020 when the wounds were cleansed with chlorhexidine and unna boot applied. She saw vascular surgery since last visit. Ultrasound c/w venous insufficiency. Have discussed possibility of surgery but will pursue this in the new year. She also had knee-high compression stockings ordered for her, but she has not yet picked these up. Health otherwise stable. No other skin concerns.     Past Medical History:   Patient Active Problem List   Diagnosis     Intrinsic atopic dermatitis     Essential hypertension     Rheumatoid arthritis (H)     Retinal artery occlusion     Cervical vertebral fracture (H)     Central retinal artery occlusion of right eye     Bilateral occipital neuralgia     C1-C2 instability     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 factor (H)     Osteoarthritis     Malignant hypertension     Hyponatremia     GERD (gastroesophageal reflux disease)     Eczematous dermatitis     Anxiety state      Anemia     Closed fracture of distal end of left radius with delayed healing, unspecified fracture morphology, subsequent encounter     Closed fracture of distal end of left radius, unspecified fracture morphology, sequela     Past Medical History:   Diagnosis Date     Anemia      Arthritis      Cataracts      Central retinal artery occlusion      Cervical spine fracture (H)     After a fall from orthostatic sx     Chronic pain     Back of head     Gastro-oesophageal reflux disease      Head injury      Hypertension      Hyponatremia     Resolved, 2/2 diuretics     Migraines      Osteoporosis      Pneumonia 2018     Rheumatoid arthritis(714.0)      Past Surgical History:   Procedure Laterality Date     C HAND/FINGER SURGERY UNLISTED       C PELVIS/HIP JOINT SURGERY UNLISTED       COLONOSCOPY       EYE SURGERY Left 02/2019    Hole in macula     FUSION CERVICAL POSTERIOR THREE+ LEVELS  1/22/2013    Procedure: FUSION CERVICAL POSTERIOR THREE+ LEVELS;  Cervical 1-6 Posterior Instrumentation and Fusion, Cervical 3-4 Laminectomy  .Instrumentation and fusion to Cervical 6 *Latex Allergy*;  Surgeon: Ra Bar MD;  [facility]     GYN SURGERY       HEAD & NECK SURGERY       HYSTERECTOMY       KNEE SURGERY       NECK SURGERY       OPEN REDUCTION INTERNAL FIXATION WRIST Left 4/30/2019    Procedure: Open Reduction Internal Fixation Left Distal Radius Fracture;  Surgeon: Dinh Strong MD;  Location:  OR     OPEN REDUCTION INTERNAL FIXATION WRIST Left 6/12/2020    Procedure: OPEN REDUCTION INTERNAL FIXATION Left Distal Radius Nonunion, Distal Ulna Resection;  Surgeon: Dinh Strong MD;  Location:  OR     OPTICAL TRACKING SYSTEM ENDOSCOPIC SINUS SURGERY Right 4/6/2018    Procedure: OPTICAL TRACKING SYSTEM ENDOSCOPIC SINUS SURGERY;  Stealth Assisted Right Maxillary Antrostomy, Anterior Ethmoidectomy And Frontal Sinusotomy;  Surgeon: Elyse Eisenberg MD;  Location:  OR     OPTICAL TRACKING SYSTEM  ENDOSCOPIC SINUS SURGERY Right 8/3/2018    Procedure: OPTICAL TRACKING SYSTEM ENDOSCOPIC SINUS SURGERY;  Stealth Assisted Revision Right Frontal Sinusotomy, Right Stent Placement  **Latex Allergy** ;  Surgeon: Elyse Eisenberg MD;  [facility]     ORTHOPEDIC SURGERY       REMOVE HARDWARE WRIST Left 11/19/2019    Procedure: Left Distal Radius Hardware Removal, Flexor Pollicus Longus Repair, Deep Cultures;  Surgeon: Dinh Strong MD;  Location:  OR       Social History:  Patient reports that she has never smoked. She has never used smokeless tobacco. She reports current alcohol use. She reports that she does not use drugs.    Family History:  Family History   Problem Relation Age of Onset     Arthritis Mother      Respiratory Mother         pulmonary fibrosis     Hypertension Mother      Anemia Mother      Liver Disease Father         from EtOH     Alcoholism Father      Multiple Sclerosis Sister      No Known Problems Maternal Grandmother      Heart Disease Maternal Grandfather      Breast Cancer Sister      Glaucoma Paternal Grandfather      Heart Disease Paternal Grandfather        Medications:  Current Outpatient Medications   Medication Sig Dispense Refill     acetaminophen 650 MG TABS Take 650 mg by mouth every 4 hours as needed. 100 tablet 0     amLODIPine (NORVASC) 10 MG tablet Take 1 tablet (10 mg) by mouth daily (Patient not taking: Reported on 12/16/2020) 90 tablet 0     COMPRESSION STOCKINGS Please measure and distribute 1 pair of 30mmHg - 40mmHg knee high ULCERCARE stockings 2 each 4     COMPRESSION STOCKINGS Please measure and distribute 1 pair of 20mmHg - 30mmHg Thigh high open or closed toe compression stockings. Jobst ultrasheer or equivalent. 2 each 4     COMPRESSION STOCKINGS Please measure and distribute 1 pair of 20mmHg - 30mmHg knee high open or closed toe compression stockings. Jobst ultrasheer or equivalent. 2 each 4     Diphenhydramine-APAP, sleep, (TYLENOL PM EXTRA STRENGTH PO)  Take 2 tablets by mouth At Bedtime        ferrous gluconate (FERGON) 324 (38 Fe) MG tablet Take 324 mg by mouth       metoprolol tartrate (LOPRESSOR) 100 MG tablet Take 2 tablets (200 mg) by mouth 2 times daily 360 tablet 0     Multiple Vitamin (MULTI-VITAMIN) per tablet Take 1 tablet by mouth every morning        mupirocin (BACTROBAN) 2 % external ointment Use in nose twice daily for 5 days 22 g 0     Omeprazole (PRILOSEC PO) Take 20 mg by mouth as needed        sodium chloride (OCEAN NASAL SPRAY) 0.65 % nasal spray Spray 1 spray into both nostrils daily as needed for congestion 1 Bottle 3     traMADol (ULTRAM) 50 MG tablet Take 1-2 tablets ( mg) by mouth every 6 hours as needed for severe pain 120 tablet 0     triamcinolone (KENALOG) 0.1 % external cream Apply twice daily for 2 weeks then 3 times weekly for 2 week, repeat cycle as needed 454 g 0       Allergies   Allergen Reactions     Hctz Other (See Comments)     Pt develops symptomatic and significant hyponatremia with HCTZ exposure     Hydrochlorothiazide      Other reaction(s): Hyponatremia  Hyponatremia     Latex      Benzocaine Rash     carba mix,thrium-sunscreen     Resorcinol-Alcohol Rash     carba mix,thrium-sunscreen     Ace Inhibitors Unknown     Dizziness and orthostatic changes and electrolyte problems.     Latex Unknown       Review of Systems:  -As per HPI  -Constitutional: Otherwise feeling well today, in usual state of health.  -Skin: As above in HPI. No additional skin concerns.    Physical exam:  Vitals: There were no vitals taken for this visit.  GEN: This is a well developed, well-nourished female in no acute distress, in a pleasant mood.    SKIN: Focused examination of the bilateral lower extremities was performed.  -Carreon skin type: II  - 1+ pitting edema on the right lower extremity to the knee; improved from prior  - 1+ pitting edema on the left lower extremity to the knee; improved from prior  - Numerous shallow ulcers with  central fibrinous yellow debris and few areas of central red granulation tissue and peripheral rim of mild pink erythema on the bilateral shins, appear improved from prior with decreased surrounding erythema.   - 2+ distal pedal pulses bilaterally  -No other lesions of concern on areas examined.             Labs:  TTE   Left ventricular function, chamber size, wall motion, and wall thickness are  normal.The EF is 60-65%.  Global right ventricular function is normal.  The thoracic aorta is normal. The inferior vena cava was normal in size with  preserved respiratory variability. Estimated right atrial pressure is < 5  mmHg.  No pericardial effusion is present.     Lower extremity ultrasound 5/15/19     FINDINGS:   The left common femoral, femoral, popliteal, posterior tibial , and  peroneal veins are fully compressible with patent Doppler wave forms.  No thrombus is identified within them on grayscale imaging.  Redemonstration of wall thickening in the left common femoral and  proximal femoral vein. Mild amount of subcutaneous edema in the left  Calf.    Venous Ultrasound 2020  1. Right lea. No deep or superficial venous thrombosis.  1b. Deep venous incompetence of the common femoral vein, the remainder  of the deep veins are competent.  1c. Superficial venous incompetence involving the great saphenous vein  in the distal thigh and the small saphenous vein from the  saphenofemoral popliteal junction through the mid calf. Remainder of  the superficial veins are competent.  1d. No incompetent  or varicose veins.     2. Left lea. No deep or superficial venous thrombosis.  2b. No deep venous incompetency.  2c. Superficial venous incompetency of the great saphenous vein from  the mid thigh continuously through the mid calf.  2d. No incompetent  or varicose veins.     IMPRESSION:    1. No DVT demonstrated in left lower extremity.  2. Mild subcutaneous edema in the left calf,  significantly improved  from prior.    Impression/Plan:    1. Venous stasis ulcers in setting of significant lower extremity edema suspected 2/2 venous insufficiency. Recent TTE wnl 2018. Has had multiple prior LE ultrasounds demonstrating no evidence of DVT and thickening of left common femoral and proximal veins.Venous ultrasound 12/14/2020 c/w venous insufficency (see above).    Recommended continuing with Unna boot application as below. Reiterated this healing is slow and may takes weeks to months. Will consider transition to compression stockings only at follow-up next week if continues to improve. Asked patient to please pick these up from her medical supply store.     - Wounds were cleansed with chlorhexidine  - Mupirocin 2% ointment applied to open areas and lidex 0.05% ointment applied to areas of surrounding skin  - Open areas covered with telfa dressing  - Unna boot x 2 applied to lower extremities.   - F/u vascular surgery as planned    Follow-up in 1 week, earlier for new or changing lesions.     Staff Involved:  Staff only    Ra Orourke MD  Pronouns: he/him/his    Department of Dermatology  [facility]: Phone: [phone], Fax:[phone]  [facility]: Phone: [phone] Fax: [phone]

## 2020-12-21 NOTE — LETTER
12/21/2020         RE: Kimberly Chau  [address]  [place] [address]        Dear Colleague,    Thank you for referring your patient, Kimberly Chau, to the [facility]. Please see a copy of my visit note below.    [facility] Dermatology Note      Dermatology Problem List:  # Pertinent PMH: RA previously on methotrexate.  1. Bilateral LE ulcers, in setting of significant edema. Favor stasis ulcers. Less likely cutaneous manifestation of her RA (?vasculitis) versus manifestation of her atopic dermatitis versus bullous pemphigoid but could consider as she did stop methotrexate about 1 year ago.  - Trial of unna boot  - Stop amlodipine  - Vascular surgery referral  - Consider bx if not improving  2. Atopic dermatitis.  - Prior rx: was on methotrexate for RA previously, discontinued ~11/2019; cyclosporine, discontinued 06/28/2010  - Prior topicals: fluocinolone 0.025% ointment, TMC 0.1% ointment    Encounter Date: Dec 21, 2020    CC:  Chief Complaint   Patient presents with     Derm Problem     Follow up- legs and unna boot     History of Present Illness:  Ms. Kimberly Chau is a 80 year old female who presents as a follow-up for venous stasis ulcers. The patient was last seen 12/14/2020 when the wounds were cleansed with chlorhexidine and unna boot applied. She saw vascular surgery since last visit. Ultrasound c/w venous insufficiency. Have discussed possibility of surgery but will pursue this in the new year. She also had knee-high compression stockings ordered for her, but she has not yet picked these up. Health otherwise stable. No other skin concerns.     Past Medical History:   Patient Active Problem List   Diagnosis     Intrinsic atopic dermatitis     Essential hypertension     Rheumatoid arthritis (H)     Retinal artery occlusion     Cervical vertebral fracture (H)     Central retinal artery occlusion of right eye     Bilateral occipital neuralgia     C1-C2  instability     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 factor (H)     Osteoarthritis     Malignant hypertension     Hyponatremia     GERD (gastroesophageal reflux disease)     Eczematous dermatitis     Anxiety state     Anemia     Closed fracture of distal end of left radius with delayed healing, unspecified fracture morphology, subsequent encounter     Closed fracture of distal end of left radius, unspecified fracture morphology, sequela     Past Medical History:   Diagnosis Date     Anemia      Arthritis      Cataracts      Central retinal artery occlusion      Cervical spine fracture (H)     After a fall from orthostatic sx     Chronic pain     Back of head     Gastro-oesophageal reflux disease      Head injury      Hypertension      Hyponatremia     Resolved, 2/2 diuretics     Migraines      Osteoporosis      Pneumonia 2018     Rheumatoid arthritis(714.0)      Past Surgical History:   Procedure Laterality Date     C HAND/FINGER SURGERY UNLISTED       C PELVIS/HIP JOINT SURGERY UNLISTED       COLONOSCOPY       EYE SURGERY Left 02/2019    Hole in macula     FUSION CERVICAL POSTERIOR THREE+ LEVELS  1/22/2013    Procedure: FUSION CERVICAL POSTERIOR THREE+ LEVELS;  Cervical 1-6 Posterior Instrumentation and Fusion, Cervical 3-4 Laminectomy  .Instrumentation and fusion to Cervical 6 *Latex Allergy*;  Surgeon: Ra Bar MD;  [facility] OR     GYN SURGERY       HEAD & NECK SURGERY       HYSTERECTOMY       KNEE SURGERY       NECK SURGERY       OPEN REDUCTION INTERNAL FIXATION WRIST Left 4/30/2019    Procedure: Open Reduction Internal Fixation Left Distal Radius Fracture;  Surgeon: Dinh Strong MD;  Location:  OR     OPEN REDUCTION INTERNAL FIXATION WRIST Left 6/12/2020    Procedure: OPEN REDUCTION INTERNAL FIXATION Left Distal Radius Nonunion, Distal Ulna Resection;  Surgeon: Dinh Strong MD;  Location:  OR     ASOCS SYSTEM ENDOSCOPIC SINUS SURGERY  Right 4/6/2018    Procedure: OPTICAL TRACKING SYSTEM ENDOSCOPIC SINUS SURGERY;  Stealth Assisted Right Maxillary Antrostomy, Anterior Ethmoidectomy And Frontal Sinusotomy;  Surgeon: Elyse Eisenberg MD;  [facility]     OPTICAL TRACKING SYSTEM ENDOSCOPIC SINUS SURGERY Right 8/3/2018    Procedure: OPTICAL TRACKING SYSTEM ENDOSCOPIC SINUS SURGERY;  Stealth Assisted Revision Right Frontal Sinusotomy, Right Stent Placement  **Latex Allergy** ;  Surgeon: Elyse Eisenberg MD;  [facility]     ORTHOPEDIC SURGERY       REMOVE HARDWARE WRIST Left 11/19/2019    Procedure: Left Distal Radius Hardware Removal, Flexor Pollicus Longus Repair, Deep Cultures;  Surgeon: Dinh Strong MD;  Location:  OR       Social History:  Patient reports that she has never smoked. She has never used smokeless tobacco. She reports current alcohol use. She reports that she does not use drugs.    Family History:  Family History   Problem Relation Age of Onset     Arthritis Mother      Respiratory Mother         pulmonary fibrosis     Hypertension Mother      Anemia Mother      Liver Disease Father         from EtOH     Alcoholism Father      Multiple Sclerosis Sister      No Known Problems Maternal Grandmother      Heart Disease Maternal Grandfather      Breast Cancer Sister      Glaucoma Paternal Grandfather      Heart Disease Paternal Grandfather        Medications:  Current Outpatient Medications   Medication Sig Dispense Refill     acetaminophen 650 MG TABS Take 650 mg by mouth every 4 hours as needed. 100 tablet 0     amLODIPine (NORVASC) 10 MG tablet Take 1 tablet (10 mg) by mouth daily (Patient not taking: Reported on 12/16/2020) 90 tablet 0     COMPRESSION STOCKINGS Please measure and distribute 1 pair of 30mmHg - 40mmHg knee high ULCERCARE stockings 2 each 4     COMPRESSION STOCKINGS Please measure and distribute 1 pair of 20mmHg - 30mmHg Thigh high open or closed toe compression stockings. Jobst ultrasheer or equivalent. 2  each 4     COMPRESSION STOCKINGS Please measure and distribute 1 pair of 20mmHg - 30mmHg knee high open or closed toe compression stockings. Jobst ultrasheer or equivalent. 2 each 4     Diphenhydramine-APAP, sleep, (TYLENOL PM EXTRA STRENGTH PO) Take 2 tablets by mouth At Bedtime        ferrous gluconate (FERGON) 324 (38 Fe) MG tablet Take 324 mg by mouth       metoprolol tartrate (LOPRESSOR) 100 MG tablet Take 2 tablets (200 mg) by mouth 2 times daily 360 tablet 0     Multiple Vitamin (MULTI-VITAMIN) per tablet Take 1 tablet by mouth every morning        mupirocin (BACTROBAN) 2 % external ointment Use in nose twice daily for 5 days 22 g 0     Omeprazole (PRILOSEC PO) Take 20 mg by mouth as needed        sodium chloride (OCEAN NASAL SPRAY) 0.65 % nasal spray Spray 1 spray into both nostrils daily as needed for congestion 1 Bottle 3     traMADol (ULTRAM) 50 MG tablet Take 1-2 tablets ( mg) by mouth every 6 hours as needed for severe pain 120 tablet 0     triamcinolone (KENALOG) 0.1 % external cream Apply twice daily for 2 weeks then 3 times weekly for 2 week, repeat cycle as needed 454 g 0       Allergies   Allergen Reactions     Hctz Other (See Comments)     Pt develops symptomatic and significant hyponatremia with HCTZ exposure     Hydrochlorothiazide      Other reaction(s): Hyponatremia  Hyponatremia     Latex      Benzocaine Rash     carba mix,thrium-sunscreen     Resorcinol-Alcohol Rash     carba mix,thrium-sunscreen     Ace Inhibitors Unknown     Dizziness and orthostatic changes and electrolyte problems.     Latex Unknown       Review of Systems:  -As per HPI  -Constitutional: Otherwise feeling well today, in usual state of health.  -Skin: As above in HPI. No additional skin concerns.    Physical exam:  Vitals: There were no vitals taken for this visit.  GEN: This is a well developed, well-nourished female in no acute distress, in a pleasant mood.    SKIN: Focused examination of the bilateral lower  extremities was performed.  -Carreon skin type: II  - 1+ pitting edema on the right lower extremity to the knee; improved from prior  - 1+ pitting edema on the left lower extremity to the knee; improved from prior  - Numerous shallow ulcers with central fibrinous yellow debris and few areas of central red granulation tissue and peripheral rim of mild pink erythema on the bilateral shins, appear improved from prior with decreased surrounding erythema.   - 2+ distal pedal pulses bilaterally  -No other lesions of concern on areas examined.             Labs:  TTE   Left ventricular function, chamber size, wall motion, and wall thickness are  normal.The EF is 60-65%.  Global right ventricular function is normal.  The thoracic aorta is normal. The inferior vena cava was normal in size with  preserved respiratory variability. Estimated right atrial pressure is < 5  mmHg.  No pericardial effusion is present.     Lower extremity ultrasound 5/15/19     FINDINGS:   The left common femoral, femoral, popliteal, posterior tibial , and  peroneal veins are fully compressible with patent Doppler wave forms.  No thrombus is identified within them on grayscale imaging.  Redemonstration of wall thickening in the left common femoral and  proximal femoral vein. Mild amount of subcutaneous edema in the left  Calf.    Venous Ultrasound 2020  1. Right lea. No deep or superficial venous thrombosis.  1b. Deep venous incompetence of the common femoral vein, the remainder  of the deep veins are competent.  1c. Superficial venous incompetence involving the great saphenous vein  in the distal thigh and the small saphenous vein from the  saphenofemoral popliteal junction through the mid calf. Remainder of  the superficial veins are competent.  1d. No incompetent  or varicose veins.     2. Left lea. No deep or superficial venous thrombosis.  2b. No deep venous incompetency.  2c. Superficial venous  incompetency of the great saphenous vein from  the mid thigh continuously through the mid calf.  2d. No incompetent  or varicose veins.     IMPRESSION:    1. No DVT demonstrated in left lower extremity.  2. Mild subcutaneous edema in the left calf, significantly improved  from prior.    Impression/Plan:    1. Venous stasis ulcers in setting of significant lower extremity edema suspected 2/2 venous insufficiency. Recent TTE wnl 2018. Has had multiple prior LE ultrasounds demonstrating no evidence of DVT and thickening of left common femoral and proximal veins.Venous ultrasound 12/14/2020 c/w venous insufficency (see above).    Recommended continuing with Unna boot application as below. Reiterated this healing is slow and may takes weeks to months. Will consider transition to compression stockings only at follow-up next week if continues to improve. Asked patient to please pick these up from her medical supply store.     - Wounds were cleansed with chlorhexidine  - Mupirocin 2% ointment applied to open areas and lidex 0.05% ointment applied to areas of surrounding skin  - Open areas covered with telfa dressing  - Unna boot x 2 applied to lower extremities.   - F/u vascular surgery as planned    Follow-up in 1 week, earlier for new or changing lesions.     Staff Involved:  Staff only    Ra Orourke MD  Pronouns: he/him/his    Department of Dermatology  [facility]: Phone: [phone], Fax:[phone]  [facility]: Phone: [phone] Fax: [phone]            Again, thank you for allowing me to participate in the care of your patient.        Sincerely,        Ra Orourke MD

## 2020-12-28 ENCOUNTER — TELEPHONE (OUTPATIENT)
Dept: DERMATOLOGY | Facility: CLINIC | Age: 80
End: 2020-12-28

## 2020-12-28 ENCOUNTER — OFFICE VISIT (OUTPATIENT)
Dept: DERMATOLOGY | Facility: CLINIC | Age: 80
End: 2020-12-28
Payer: MEDICARE

## 2020-12-28 DIAGNOSIS — L97.909 VENOUS ULCER (H): Primary | ICD-10-CM

## 2020-12-28 DIAGNOSIS — I83.009 VENOUS ULCER (H): Primary | ICD-10-CM

## 2020-12-28 PROCEDURE — 99213 OFFICE O/P EST LOW 20 MIN: CPT | Performed by: DERMATOLOGY

## 2020-12-28 RX ORDER — MUPIROCIN 20 MG/G
OINTMENT TOPICAL ONCE
Status: COMPLETED | OUTPATIENT
Start: 2020-12-28 | End: 2020-12-28

## 2020-12-28 RX ORDER — MUPIROCIN 20 MG/G
OINTMENT TOPICAL
Qty: 60 G | Refills: 11 | Status: SHIPPED | OUTPATIENT
Start: 2020-12-28 | End: 2022-05-17

## 2020-12-28 RX ORDER — FLUOCINONIDE 0.5 MG/G
OINTMENT TOPICAL ONCE
Status: COMPLETED | OUTPATIENT
Start: 2020-12-28 | End: 2020-12-28

## 2020-12-28 RX ADMIN — FLUOCINONIDE: 0.5 OINTMENT TOPICAL at 15:33

## 2020-12-28 RX ADMIN — MUPIROCIN: 20 OINTMENT TOPICAL at 15:34

## 2020-12-28 NOTE — PROGRESS NOTES
[facility] Dermatology Note    Dermatology Problem List:  # Pertinent PMH: RA previously on methotrexate.  1. Venous stasis ulcers  -s/p Unna boot with significant improvement  - compression stockings; telfa/mupirocin 2% ointment  - co-follows with vascular surgery  2. Atopic dermatitis.  - Prior rx: was on methotrexate for RA previously, discontinued ~11/2019; cyclosporine, discontinued 06/28/2010  - Prior topicals: fluocinolone 0.025% ointment, TMC 0.1% ointment    Encounter Date: Dec 28, 2020    CC:  Chief Complaint   Patient presents with     Derm Problem     Follow up legs       History of Present Illness:  Ms. Kimberyl Chau is a 80 year old female who presents as a follow-up for venous stasis ulcers. The patient was last seen 12/21/2020 when the wounds were cleansed with chlorhexidine and unna boot applied. Today, she notes she has picked up compression stockings that go to her knees and is interested in transitioning from Unna boots to compression stockings. She is curious about whether she can shower with compression stockings. She has concerns about putting compression stockings over her sores. She has discussed having a vascular procedure with vascular surgery, and will have an appointment to discuss scheduling in early January. Health otherwise stable. No other skin concerns.       Past Medical History:   Patient Active Problem List   Diagnosis     Intrinsic atopic dermatitis     Essential hypertension     Rheumatoid arthritis (H)     Retinal artery occlusion     Cervical vertebral fracture (H)     Central retinal artery occlusion of right eye     Bilateral occipital neuralgia     C1-C2 instability     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 factor (H)     Osteoarthritis     Malignant hypertension     Hyponatremia     GERD (gastroesophageal reflux disease)     Eczematous dermatitis     Anxiety state     Anemia     Closed fracture of distal end of left radius  with delayed healing, unspecified fracture morphology, subsequent encounter     Closed fracture of distal end of left radius, unspecified fracture morphology, sequela     Past Medical History:   Diagnosis Date     Anemia      Arthritis      Cataracts      Central retinal artery occlusion      Cervical spine fracture (H)     After a fall from orthostatic sx     Chronic pain     Back of head     Gastro-oesophageal reflux disease      Head injury      Hypertension      Hyponatremia     Resolved, 2/2 diuretics     Migraines      Osteoporosis      Pneumonia 2018     Rheumatoid arthritis(714.0)      Past Surgical History:   Procedure Laterality Date     C HAND/FINGER SURGERY UNLISTED       C PELVIS/HIP JOINT SURGERY UNLISTED       COLONOSCOPY       EYE SURGERY Left 02/2019    Hole in macula     FUSION CERVICAL POSTERIOR THREE+ LEVELS  1/22/2013    Procedure: FUSION CERVICAL POSTERIOR THREE+ LEVELS;  Cervical 1-6 Posterior Instrumentation and Fusion, Cervical 3-4 Laminectomy  .Instrumentation and fusion to Cervical 6 *Latex Allergy*;  Surgeon: Ra Bar MD;  Location: [facility] OR     GYN SURGERY       HEAD & NECK SURGERY       HYSTERECTOMY       KNEE SURGERY       NECK SURGERY       OPEN REDUCTION INTERNAL FIXATION WRIST Left 4/30/2019    Procedure: Open Reduction Internal Fixation Left Distal Radius Fracture;  Surgeon: Dinh Strong MD;  Location:  OR     OPEN REDUCTION INTERNAL FIXATION WRIST Left 6/12/2020    Procedure: OPEN REDUCTION INTERNAL FIXATION Left Distal Radius Nonunion, Distal Ulna Resection;  Surgeon: Dinh Strong MD;  Location:  OR     OPTICAL TRACKING SYSTEM ENDOSCOPIC SINUS SURGERY Right 4/6/2018    Procedure: OPTICAL TRACKING SYSTEM ENDOSCOPIC SINUS SURGERY;  Stealth Assisted Right Maxillary Antrostomy, Anterior Ethmoidectomy And Frontal Sinusotomy;  Surgeon: Elyse Eisenberg MD;  Location:  OR     OPTICAL TRACKING SYSTEM ENDOSCOPIC SINUS SURGERY Right 8/3/2018    Procedure:  OPTICAL TRACKING SYSTEM ENDOSCOPIC SINUS SURGERY;  Stealth Assisted Revision Right Frontal Sinusotomy, Right Stent Placement  **Latex Allergy** ;  Surgeon: Elyse Eisenberg MD;  Location: [facility] OR     ORTHOPEDIC SURGERY       REMOVE HARDWARE WRIST Left 11/19/2019    Procedure: Left Distal Radius Hardware Removal, Flexor Pollicus Longus Repair, Deep Cultures;  Surgeon: Dinh Strong MD;  Location:  OR       Social History:  Patient reports that she has never smoked. She has never used smokeless tobacco. She reports current alcohol use. She reports that she does not use drugs.    Family History:  Family History   Problem Relation Age of Onset     Arthritis Mother      Respiratory Mother         pulmonary fibrosis     Hypertension Mother      Anemia Mother      Liver Disease Father         from EtOH     Alcoholism Father      Multiple Sclerosis Sister      No Known Problems Maternal Grandmother      Heart Disease Maternal Grandfather      Breast Cancer Sister      Glaucoma Paternal Grandfather      Heart Disease Paternal Grandfather        Medications:  Current Outpatient Medications   Medication Sig Dispense Refill     acetaminophen 650 MG TABS Take 650 mg by mouth every 4 hours as needed. 100 tablet 0     amLODIPine (NORVASC) 10 MG tablet Take 1 tablet (10 mg) by mouth daily (Patient not taking: Reported on 12/16/2020) 90 tablet 0     COMPRESSION STOCKINGS Please measure and distribute 1 pair of 30mmHg - 40mmHg knee high ULCERCARE stockings 2 each 4     COMPRESSION STOCKINGS Please measure and distribute 1 pair of 20mmHg - 30mmHg Thigh high open or closed toe compression stockings. Jobst ultrasheer or equivalent. 2 each 4     COMPRESSION STOCKINGS Please measure and distribute 1 pair of 20mmHg - 30mmHg knee high open or closed toe compression stockings. Jobst ultrasheer or equivalent. 2 each 4     Diphenhydramine-APAP, sleep, (TYLENOL PM EXTRA STRENGTH PO) Take 2 tablets by mouth At Bedtime        ferrous  gluconate (FERGON) 324 (38 Fe) MG tablet Take 324 mg by mouth       metoprolol tartrate (LOPRESSOR) 100 MG tablet Take 2 tablets (200 mg) by mouth 2 times daily 360 tablet 0     Multiple Vitamin (MULTI-VITAMIN) per tablet Take 1 tablet by mouth every morning        mupirocin (BACTROBAN) 2 % external ointment Use in nose twice daily for 5 days 22 g 0     Omeprazole (PRILOSEC PO) Take 20 mg by mouth as needed        sodium chloride (OCEAN NASAL SPRAY) 0.65 % nasal spray Spray 1 spray into both nostrils daily as needed for congestion 1 Bottle 3     traMADol (ULTRAM) 50 MG tablet Take 1-2 tablets ( mg) by mouth every 6 hours as needed for severe pain 120 tablet 0     triamcinolone (KENALOG) 0.1 % external cream Apply twice daily for 2 weeks then 3 times weekly for 2 week, repeat cycle as needed 454 g 0       Allergies   Allergen Reactions     Hctz Other (See Comments)     Pt develops symptomatic and significant hyponatremia with HCTZ exposure     Hydrochlorothiazide      Other reaction(s): Hyponatremia  Hyponatremia     Latex      Benzocaine Rash     carba mix,thrium-sunscreen     Resorcinol-Alcohol Rash     carba mix,thrium-sunscreen     Ace Inhibitors Unknown     Dizziness and orthostatic changes and electrolyte problems.     Latex Unknown       Review of Systems:  -As per HPI  -Constitutional: Otherwise feeling well today, in usual state of health.  -Skin: As above in HPI. No additional skin concerns.    Physical exam:  Vitals: There were no vitals taken for this visit.  GEN: This is a well developed, well-nourished female in no acute distress, in a pleasant mood.    SKIN: Focused examination of the bilateral lower extremities was performed.  -Carreon skin type: II  - 1+ pitting edema on the right lower extremity to the knee; improved from prior  - 1+ pitting edema on the left lower extremity to the knee; improved from prior  - Numerous shallow ulcers with central fibrinous yellow debris and few areas of  central red granulation tissue and peripheral rim of mild pink erythema on the bilateral shins, appear improved from prior with decreased surrounding erythema and increased islands of granulation tissue centrally.   -No other lesions of concern on areas examined.     Labs:  TTE   Left ventricular function, chamber size, wall motion, and wall thickness are  normal.The EF is 60-65%.  Global right ventricular function is normal.  The thoracic aorta is normal. The inferior vena cava was normal in size with  preserved respiratory variability. Estimated right atrial pressure is < 5  mmHg.  No pericardial effusion is present.     Lower extremity ultrasound 5/15/19     FINDINGS:   The left common femoral, femoral, popliteal, posterior tibial , and  peroneal veins are fully compressible with patent Doppler wave forms.  No thrombus is identified within them on grayscale imaging.  Redemonstration of wall thickening in the left common femoral and  proximal femoral vein. Mild amount of subcutaneous edema in the left  Calf.     Venous Ultrasound 2020  1. Right lea. No deep or superficial venous thrombosis.  1b. Deep venous incompetence of the common femoral vein, the remainder  of the deep veins are competent.  1c. Superficial venous incompetence involving the great saphenous vein  in the distal thigh and the small saphenous vein from the  saphenofemoral popliteal junction through the mid calf. Remainder of  the superficial veins are competent.  1d. No incompetent  or varicose veins.     2. Left lea. No deep or superficial venous thrombosis.  2b. No deep venous incompetency.  2c. Superficial venous incompetency of the great saphenous vein from  the mid thigh continuously through the mid calf.  2d. No incompetent  or varicose veins.     IMPRESSION:    1. No DVT demonstrated in left lower extremity.  2. Mild subcutaneous edema in the left calf, significantly improved  from  prior.    Impression/Plan:    1. Venous stasis ulcers in setting of significant lower extremity edema suspected 2/2 venous insufficiency. Recent TTE wnl 2018. Has had multiple prior LE ultrasounds demonstrating no evidence of DVT and thickening of left common femoral and proximal veins. Venous ultrasound 12/14/2020 c/w venous insufficency (see above). Given improvement, recommended switching from weekly Unna boot application to compression stockings with local wound care as below.     - Wounds were cleansed with chlorhexidine  - Mupirocin 2% ointment applied to open areas and lidex 0.05% ointment applied to areas of surrounding skin  - Open areas covered with telfa dressing  - Recommended changing dressings every other day after showering. Rinse with Dove soap. Apply mupirocin 2% ointment to open areas, followed by telfa dressing secured with paper tape, then compression stockings. Prescription for telfa dressing and mupirocin 2% ointment provided.     Follow-up in 2 weeks, earlier for new or changing lesions.     Staff Involved:  Scribe/Staff    Scribe Disclosure:   I, Sebas Abdalla, am serving as a scribe to document services personally performed by this physician, Dr. Ra Orourke, based on data collection and the provider's statements to me.     Provider Disclosure:   The documentation recorded by the scribe accurately reflects the services I personally performed and the decisions made by me.    Ra Orourke MD    Department of Dermatology  [facility]: Phone: [phone], Fax:[phone]  [facility] [place]: Phone: [phone] Fax: [phone]

## 2020-12-28 NOTE — LETTER
12/28/2020         RE: Kimberly Chau  [address]  [place] [address]        Dear Colleague,    Thank you for referring your patient, Kimberly Chau, to the [facility]. Please see a copy of my visit note below.    [facility] Dermatology Note    Dermatology Problem List:  # Pertinent PMH: RA previously on methotrexate.  1. Venous stasis ulcers  -s/p Unna boot with significant improvement  - compression stockings; telfa/mupirocin 2% ointment  - co-follows with vascular surgery  2. Atopic dermatitis.  - Prior rx: was on methotrexate for RA previously, discontinued ~11/2019; cyclosporine, discontinued 06/28/2010  - Prior topicals: fluocinolone 0.025% ointment, TMC 0.1% ointment    Encounter Date: Dec 28, 2020    CC:  Chief Complaint   Patient presents with     Derm Problem     Follow up legs       History of Present Illness:  Ms. Kimberly Chau is a 80 year old female who presents as a follow-up for venous stasis ulcers. The patient was last seen 12/21/2020 when the wounds were cleansed with chlorhexidine and unna boot applied. Today, she notes she has picked up compression stockings that go to her knees and is interested in transitioning from Unna boots to compression stockings. She is curious about whether she can shower with compression stockings. She has concerns about putting compression stockings over her sores. She has discussed having a vascular procedure with vascular surgery, and will have an appointment to discuss scheduling in early January. Health otherwise stable. No other skin concerns.       Past Medical History:   Patient Active Problem List   Diagnosis     Intrinsic atopic dermatitis     Essential hypertension     Rheumatoid arthritis (H)     Retinal artery occlusion     Cervical vertebral fracture (H)     Central retinal artery occlusion of right eye     Bilateral occipital neuralgia     C1-C2 instability     Rheumatoid arthritis involving both  hands with positive rheumatoid factor (H)     Osteoarthritis     Malignant hypertension     Hyponatremia     GERD (gastroesophageal reflux disease)     Eczematous dermatitis     Anxiety state     Anemia     Closed fracture of distal end of left radius with delayed healing, unspecified fracture morphology, subsequent encounter     Closed fracture of distal end of left radius, unspecified fracture morphology, sequela     Past Medical History:   Diagnosis Date     Anemia      Arthritis      Cataracts      Central retinal artery occlusion      Cervical spine fracture (H)     After a fall from orthostatic sx     Chronic pain     Back of head     Gastro-oesophageal reflux disease      Head injury      Hypertension      Hyponatremia     Resolved, 2/2 diuretics     Migraines      Osteoporosis      Pneumonia 2018     Rheumatoid arthritis(714.0)      Past Surgical History:   Procedure Laterality Date     C HAND/FINGER SURGERY UNLISTED       C PELVIS/HIP JOINT SURGERY UNLISTED       COLONOSCOPY       EYE SURGERY Left 02/2019    Hole in macula     FUSION CERVICAL POSTERIOR THREE+ LEVELS  1/22/2013    Procedure: FUSION CERVICAL POSTERIOR THREE+ LEVELS;  Cervical 1-6 Posterior Instrumentation and Fusion, Cervical 3-4 Laminectomy  .Instrumentation and fusion to Cervical 6 *Latex Allergy*;  Surgeon: Ra Bar MD;  Location: [facility] OR     GYN SURGERY       HEAD & NECK SURGERY       HYSTERECTOMY       KNEE SURGERY       NECK SURGERY       OPEN REDUCTION INTERNAL FIXATION WRIST Left 4/30/2019    Procedure: Open Reduction Internal Fixation Left Distal Radius Fracture;  Surgeon: Dinh Strong MD;  Location:  OR     OPEN REDUCTION INTERNAL FIXATION WRIST Left 6/12/2020    Procedure: OPEN REDUCTION INTERNAL FIXATION Left Distal Radius Nonunion, Distal Ulna Resection;  Surgeon: Dinh Strong MD;  Location:  OR     OPTICAL TRACKING SYSTEM ENDOSCOPIC SINUS SURGERY Right 4/6/2018    Procedure: OPTICAL TRACKING  SYSTEM ENDOSCOPIC SINUS SURGERY;  Stealth Assisted Right Maxillary Antrostomy, Anterior Ethmoidectomy And Frontal Sinusotomy;  Surgeon: Elyse Eisenberg MD;  [facility]     OPTICAL TRACKING SYSTEM ENDOSCOPIC SINUS SURGERY Right 8/3/2018    Procedure: OPTICAL TRACKING SYSTEM ENDOSCOPIC SINUS SURGERY;  Stealth Assisted Revision Right Frontal Sinusotomy, Right Stent Placement  **Latex Allergy** ;  Surgeon: Elyse Eisenberg MD;  Location: [facility] OR     ORTHOPEDIC SURGERY       REMOVE HARDWARE WRIST Left 11/19/2019    Procedure: Left Distal Radius Hardware Removal, Flexor Pollicus Longus Repair, Deep Cultures;  Surgeon: Dinh Strong MD;  Location:  OR       Social History:  Patient reports that she has never smoked. She has never used smokeless tobacco. She reports current alcohol use. She reports that she does not use drugs.    Family History:  Family History   Problem Relation Age of Onset     Arthritis Mother      Respiratory Mother         pulmonary fibrosis     Hypertension Mother      Anemia Mother      Liver Disease Father         from EtOH     Alcoholism Father      Multiple Sclerosis Sister      No Known Problems Maternal Grandmother      Heart Disease Maternal Grandfather      Breast Cancer Sister      Glaucoma Paternal Grandfather      Heart Disease Paternal Grandfather        Medications:  Current Outpatient Medications   Medication Sig Dispense Refill     acetaminophen 650 MG TABS Take 650 mg by mouth every 4 hours as needed. 100 tablet 0     amLODIPine (NORVASC) 10 MG tablet Take 1 tablet (10 mg) by mouth daily (Patient not taking: Reported on 12/16/2020) 90 tablet 0     COMPRESSION STOCKINGS Please measure and distribute 1 pair of 30mmHg - 40mmHg knee high ULCERCARE stockings 2 each 4     COMPRESSION STOCKINGS Please measure and distribute 1 pair of 20mmHg - 30mmHg Thigh high open or closed toe compression stockings. Jobst ultrasheer or equivalent. 2 each 4     COMPRESSION STOCKINGS Please  measure and distribute 1 pair of 20mmHg - 30mmHg knee high open or closed toe compression stockings. Jobst ultrasheer or equivalent. 2 each 4     Diphenhydramine-APAP, sleep, (TYLENOL PM EXTRA STRENGTH PO) Take 2 tablets by mouth At Bedtime        ferrous gluconate (FERGON) 324 (38 Fe) MG tablet Take 324 mg by mouth       metoprolol tartrate (LOPRESSOR) 100 MG tablet Take 2 tablets (200 mg) by mouth 2 times daily 360 tablet 0     Multiple Vitamin (MULTI-VITAMIN) per tablet Take 1 tablet by mouth every morning        mupirocin (BACTROBAN) 2 % external ointment Use in nose twice daily for 5 days 22 g 0     Omeprazole (PRILOSEC PO) Take 20 mg by mouth as needed        sodium chloride (OCEAN NASAL SPRAY) 0.65 % nasal spray Spray 1 spray into both nostrils daily as needed for congestion 1 Bottle 3     traMADol (ULTRAM) 50 MG tablet Take 1-2 tablets ( mg) by mouth every 6 hours as needed for severe pain 120 tablet 0     triamcinolone (KENALOG) 0.1 % external cream Apply twice daily for 2 weeks then 3 times weekly for 2 week, repeat cycle as needed 454 g 0       Allergies   Allergen Reactions     Hctz Other (See Comments)     Pt develops symptomatic and significant hyponatremia with HCTZ exposure     Hydrochlorothiazide      Other reaction(s): Hyponatremia  Hyponatremia     Latex      Benzocaine Rash     carba mix,thrium-sunscreen     Resorcinol-Alcohol Rash     carba mix,thrium-sunscreen     Ace Inhibitors Unknown     Dizziness and orthostatic changes and electrolyte problems.     Latex Unknown       Review of Systems:  -As per HPI  -Constitutional: Otherwise feeling well today, in usual state of health.  -Skin: As above in HPI. No additional skin concerns.    Physical exam:  Vitals: There were no vitals taken for this visit.  GEN: This is a well developed, well-nourished female in no acute distress, in a pleasant mood.    SKIN: Focused examination of the bilateral lower extremities was performed.  -Carreon skin  type: II  - 1+ pitting edema on the right lower extremity to the knee; improved from prior  - 1+ pitting edema on the left lower extremity to the knee; improved from prior  - Numerous shallow ulcers with central fibrinous yellow debris and few areas of central red granulation tissue and peripheral rim of mild pink erythema on the bilateral shins, appear improved from prior with decreased surrounding erythema and increased islands of granulation tissue centrally.   -No other lesions of concern on areas examined.     Labs:  TTE   Left ventricular function, chamber size, wall motion, and wall thickness are  normal.The EF is 60-65%.  Global right ventricular function is normal.  The thoracic aorta is normal. The inferior vena cava was normal in size with  preserved respiratory variability. Estimated right atrial pressure is < 5  mmHg.  No pericardial effusion is present.     Lower extremity ultrasound 5/15/19     FINDINGS:   The left common femoral, femoral, popliteal, posterior tibial , and  peroneal veins are fully compressible with patent Doppler wave forms.  No thrombus is identified within them on grayscale imaging.  Redemonstration of wall thickening in the left common femoral and  proximal femoral vein. Mild amount of subcutaneous edema in the left  Calf.     Venous Ultrasound 2020  1. Right lea. No deep or superficial venous thrombosis.  1b. Deep venous incompetence of the common femoral vein, the remainder  of the deep veins are competent.  1c. Superficial venous incompetence involving the great saphenous vein  in the distal thigh and the small saphenous vein from the  saphenofemoral popliteal junction through the mid calf. Remainder of  the superficial veins are competent.  1d. No incompetent  or varicose veins.     2. Left lea. No deep or superficial venous thrombosis.  2b. No deep venous incompetency.  2c. Superficial venous incompetency of the great saphenous vein  from  the mid thigh continuously through the mid calf.  2d. No incompetent  or varicose veins.     IMPRESSION:    1. No DVT demonstrated in left lower extremity.  2. Mild subcutaneous edema in the left calf, significantly improved  from prior.    Impression/Plan:    1. Venous stasis ulcers in setting of significant lower extremity edema suspected 2/2 venous insufficiency. Recent TTE wnl 2018. Has had multiple prior LE ultrasounds demonstrating no evidence of DVT and thickening of left common femoral and proximal veins. Venous ultrasound 12/14/2020 c/w venous insufficency (see above). Given improvement, recommended switching from weekly Unna boot application to compression stockings with local wound care as below.     - Wounds were cleansed with chlorhexidine  - Mupirocin 2% ointment applied to open areas and lidex 0.05% ointment applied to areas of surrounding skin  - Open areas covered with telfa dressing  - Recommended changing dressings every other day after showering. Rinse with Dove soap. Apply mupirocin 2% ointment to open areas, followed by telfa dressing secured with paper tape, then compression stockings. Prescription for telfa dressing and mupirocin 2% ointment provided.     Follow-up in 2 weeks, earlier for new or changing lesions.     Staff Involved:  Scribe/Staff    Scribe Disclosure:   I, Sebas Abdalla, am serving as a scribe to document services personally performed by this physician, Dr. Ra Orourke, based on data collection and the provider's statements to me.     Provider Disclosure:   The documentation recorded by the scribe accurately reflects the services I personally performed and the decisions made by me.    Ra Orourke MD    Department of Dermatology  [facility]: Phone: [phone], Fax:[phone]  [facility]: Phone: [phone] Fax:  [phone]            Again, thank you for allowing me to participate in the care of your patient.        Sincerely,        Ra Orourke MD

## 2020-12-28 NOTE — TELEPHONE ENCOUNTER
"Spoke to pharmacist and informed them of information below from Dr. Orourke's OVN.     \"- Apply Mupirocin 2% daily during dressing changes.\"    No further questions.    Marjorie Bowie LPN    "

## 2020-12-28 NOTE — TELEPHONE ENCOUNTER
[facility] Call Center    Phone Message    May a detailed message be left on voicemail: yes     Reason for Call: Medication Question or concern regarding medication   Prescription Clarification  Name of Medication: mupirocin (BACTROBAN) 2 % external ointment  Prescribing Provider: [facility]   Pharmacy:    [id number] - [place], [place] - [address]   What on the order needs clarification? The pharmacy called needing clarification on how often the patient is to apply this medication for insurance purposes. Please advise. Thank you.    Action Taken: Message routed to:  Adult Clinics: Dermatology p [id number]    Travel Screening: Not Applicable

## 2020-12-28 NOTE — PATIENT INSTRUCTIONS
-Change your dressings every other day when showering. You can wash the areas with Dove soap.  -When changing the dressings, apply Mupirocin.  - You can take off your compression stockings at night time, as long as you elevate your feet at night.

## 2021-01-06 ENCOUNTER — OFFICE VISIT (OUTPATIENT)
Dept: INTERNAL MEDICINE | Facility: CLINIC | Age: 81
End: 2021-01-06
Payer: MEDICARE

## 2021-01-06 VITALS — DIASTOLIC BLOOD PRESSURE: 82 MMHG | HEART RATE: 110 BPM | OXYGEN SATURATION: 95 % | SYSTOLIC BLOOD PRESSURE: 189 MMHG

## 2021-01-06 DIAGNOSIS — R73.09 INCREASED GLUCOSE LEVEL: ICD-10-CM

## 2021-01-06 DIAGNOSIS — I10 BENIGN ESSENTIAL HYPERTENSION: Primary | ICD-10-CM

## 2021-01-06 DIAGNOSIS — I10 BENIGN ESSENTIAL HYPERTENSION: ICD-10-CM

## 2021-01-06 LAB
ALBUMIN SERPL-MCNC: 2.8 G/DL (ref 3.4–5)
ALP SERPL-CCNC: 150 U/L (ref 40–150)
ALT SERPL W P-5'-P-CCNC: 14 U/L (ref 0–50)
ANION GAP SERPL CALCULATED.3IONS-SCNC: 4 MMOL/L (ref 3–14)
AST SERPL W P-5'-P-CCNC: 12 U/L (ref 0–45)
BASOPHILS # BLD AUTO: 0 10E9/L (ref 0–0.2)
BASOPHILS NFR BLD AUTO: 0.7 %
BILIRUB SERPL-MCNC: 0.4 MG/DL (ref 0.2–1.3)
BUN SERPL-MCNC: 14 MG/DL (ref 7–30)
CALCIUM SERPL-MCNC: 8.8 MG/DL (ref 8.5–10.1)
CHLORIDE SERPL-SCNC: 102 MMOL/L (ref 94–109)
CO2 SERPL-SCNC: 28 MMOL/L (ref 20–32)
CREAT SERPL-MCNC: 0.9 MG/DL (ref 0.52–1.04)
DIFFERENTIAL METHOD BLD: ABNORMAL
EOSINOPHIL # BLD AUTO: 0.3 10E9/L (ref 0–0.7)
EOSINOPHIL NFR BLD AUTO: 6 %
ERYTHROCYTE [DISTWIDTH] IN BLOOD BY AUTOMATED COUNT: 15.9 % (ref 10–15)
GFR SERPL CREATININE-BSD FRML MDRD: 59 ML/MIN/{1.73_M2}
GLUCOSE SERPL-MCNC: 87 MG/DL (ref 70–99)
HBA1C MFR BLD: 5.2 % (ref 0–5.6)
HCT VFR BLD AUTO: 32.1 % (ref 35–47)
HGB BLD-MCNC: 10.3 G/DL (ref 11.7–15.7)
IMM GRANULOCYTES # BLD: 0 10E9/L (ref 0–0.4)
IMM GRANULOCYTES NFR BLD: 0.5 %
LYMPHOCYTES # BLD AUTO: 0.8 10E9/L (ref 0.8–5.3)
LYMPHOCYTES NFR BLD AUTO: 14.9 %
MCH RBC QN AUTO: 31.5 PG (ref 26.5–33)
MCHC RBC AUTO-ENTMCNC: 32.1 G/DL (ref 31.5–36.5)
MCV RBC AUTO: 98 FL (ref 78–100)
MONOCYTES # BLD AUTO: 0.8 10E9/L (ref 0–1.3)
MONOCYTES NFR BLD AUTO: 13.8 %
NEUTROPHILS # BLD AUTO: 3.6 10E9/L (ref 1.6–8.3)
NEUTROPHILS NFR BLD AUTO: 64.1 %
NRBC # BLD AUTO: 0 10*3/UL
NRBC BLD AUTO-RTO: 0 /100
PLATELET # BLD AUTO: 260 10E9/L (ref 150–450)
POTASSIUM SERPL-SCNC: 4 MMOL/L (ref 3.4–5.3)
PROT SERPL-MCNC: 7 G/DL (ref 6.8–8.8)
RBC # BLD AUTO: 3.27 10E12/L (ref 3.8–5.2)
SODIUM SERPL-SCNC: 134 MMOL/L (ref 133–144)
WBC # BLD AUTO: 5.6 10E9/L (ref 4–11)

## 2021-01-06 PROCEDURE — 99214 OFFICE O/P EST MOD 30 MIN: CPT | Performed by: INTERNAL MEDICINE

## 2021-01-06 PROCEDURE — 36415 COLL VENOUS BLD VENIPUNCTURE: CPT | Performed by: PATHOLOGY

## 2021-01-06 PROCEDURE — 83036 HEMOGLOBIN GLYCOSYLATED A1C: CPT | Performed by: PATHOLOGY

## 2021-01-06 PROCEDURE — 85025 COMPLETE CBC W/AUTO DIFF WBC: CPT | Performed by: PATHOLOGY

## 2021-01-06 PROCEDURE — 80053 COMPREHEN METABOLIC PANEL: CPT | Performed by: PATHOLOGY

## 2021-01-06 NOTE — PROGRESS NOTES
HPI:    Telephone visit with me 11/4/2020. Overall stable. She states she is off methotrexate and she has seen her Rheumatology provider. She states she no longer needs this medication. Moreover, she does not need to take Tramadol for lower extremity pain. She states her B LE wounds are stable or getting better. She had venous competency ultrasound study 12/14/2020; negative for B DVT's. She states her L wrist and hand are doing better and she has more function in the R hand. Otherwise, no additional HEENT, cardiopulmonary, abdominal, , GYN, neurological, systemic, psychiatric, lymphatic, endocrine complaints.     Past Medical History:   Diagnosis Date     Anemia      Arthritis      Cataracts      Central retinal artery occlusion      Cervical spine fracture (H)     After a fall from orthostatic sx     Chronic pain     Back of head     Gastro-oesophageal reflux disease      Head injury      Hypertension      Hyponatremia     Resolved, 2/2 diuretics     Migraines      Osteoporosis      Pneumonia 2018     Rheumatoid arthritis(714.0)      Past Surgical History:   Procedure Laterality Date     C HAND/FINGER SURGERY UNLISTED       C PELVIS/HIP JOINT SURGERY UNLISTED       COLONOSCOPY       EYE SURGERY Left 02/2019    Hole in macula     FUSION CERVICAL POSTERIOR THREE+ LEVELS  1/22/2013    Procedure: FUSION CERVICAL POSTERIOR THREE+ LEVELS;  Cervical 1-6 Posterior Instrumentation and Fusion, Cervical 3-4 Laminectomy  .Instrumentation and fusion to Cervical 6 *Latex Allergy*;  Surgeon: Ra Bar MD;  Location: [facility] OR     GYN SURGERY       HEAD & NECK SURGERY       HYSTERECTOMY       KNEE SURGERY       NECK SURGERY       OPEN REDUCTION INTERNAL FIXATION WRIST Left 4/30/2019    Procedure: Open Reduction Internal Fixation Left Distal Radius Fracture;  Surgeon: Dinh Strong MD;  Location:  OR     OPEN REDUCTION INTERNAL FIXATION WRIST Left 6/12/2020    Procedure: OPEN REDUCTION INTERNAL FIXATION Left  Distal Radius Nonunion, Distal Ulna Resection;  Surgeon: Dinh Strong MD;  Location: [facility] OR     OPTICAL TRACKING SYSTEM ENDOSCOPIC SINUS SURGERY Right 4/6/2018    Procedure: OPTICAL TRACKING SYSTEM ENDOSCOPIC SINUS SURGERY;  Stealth Assisted Right Maxillary Antrostomy, Anterior Ethmoidectomy And Frontal Sinusotomy;  Surgeon: Elyse Eisenberg MD;  Location: [facility] OR     OPTICAL TRACKING SYSTEM ENDOSCOPIC SINUS SURGERY Right 8/3/2018    Procedure: OPTICAL TRACKING SYSTEM ENDOSCOPIC SINUS SURGERY;  Stealth Assisted Revision Right Frontal Sinusotomy, Right Stent Placement  **Latex Allergy** ;  Surgeon: Elyse Eisenberg MD;  Location: [facility] OR     ORTHOPEDIC SURGERY       REMOVE HARDWARE WRIST Left 11/19/2019    Procedure: Left Distal Radius Hardware Removal, Flexor Pollicus Longus Repair, Deep Cultures;  Surgeon: Dinh Strong MD;  Location: [facility] OR     PE:    Vitals noted, gen, nad, cooperative, alert, she is wearing a mask, neck supple nl rom, lungs with good air movement, RRR, S1, S2, no MRG, abdomen, no acute findings. L wrist with healed surgical scar. She has arthritic B hand changes. B LE wounds with dressings. Examination of L LE wound appears healing.       A/P:    1. She follows in Dermatology (next visit 1/11/2021) and last visit 12/28/2020 for leg venous issues  2. She was seen in follow up ortho 12/2/2020 Dr. Mack 12/2/2020 follow up L wrist fracture  3. HTN; she is off Amlodipine for B leg swelling. BP up today. Will get labs and review chart and start pt. On alternative medication  4. RA; as mentioned above, clinically she does not seem to need methotrexate and is off this medication.    Total time spent 25 minutes.  More than 50% of the time spent with Ms. Chau on counseling / coordinating her care

## 2021-01-08 ENCOUNTER — TELEPHONE (OUTPATIENT)
Dept: INTERNAL MEDICINE | Facility: CLINIC | Age: 81
End: 2021-01-08

## 2021-01-08 DIAGNOSIS — D64.9 ANEMIA, UNSPECIFIED TYPE: Primary | ICD-10-CM

## 2021-01-08 NOTE — TELEPHONE ENCOUNTER
Placed future lab orders this encounter    Dear Kimberly;    Your laboratory tests show a little lower hemoglobin and I recommend we recheck your labs in a month or so along with some iron and vitamin B12 studies.  You can call [phone] to schedule this laboratory appointment.    RADHA Castellano MD

## 2021-01-11 ENCOUNTER — TEAM CONFERENCE (OUTPATIENT)
Dept: VASCULAR SURGERY | Facility: CLINIC | Age: 81
End: 2021-01-11

## 2021-01-11 ENCOUNTER — OFFICE VISIT (OUTPATIENT)
Dept: DERMATOLOGY | Facility: CLINIC | Age: 81
End: 2021-01-11
Payer: MEDICARE

## 2021-01-11 ENCOUNTER — TELEPHONE (OUTPATIENT)
Dept: VASCULAR SURGERY | Facility: CLINIC | Age: 81
End: 2021-01-11

## 2021-01-11 DIAGNOSIS — L97.101 VENOUS STASIS ULCER OF THIGH LIMITED TO BREAKDOWN OF SKIN WITH VARICOSE VEINS, UNSPECIFIED LATERALITY (H): ICD-10-CM

## 2021-01-11 DIAGNOSIS — Z11.59 ENCOUNTER FOR SCREENING FOR OTHER VIRAL DISEASES: Primary | ICD-10-CM

## 2021-01-11 DIAGNOSIS — I83.893 SYMPTOMATIC VARICOSE VEINS OF BOTH LOWER EXTREMITIES: Primary | ICD-10-CM

## 2021-01-11 DIAGNOSIS — I83.001 VENOUS STASIS ULCER OF THIGH LIMITED TO BREAKDOWN OF SKIN WITH VARICOSE VEINS, UNSPECIFIED LATERALITY (H): ICD-10-CM

## 2021-01-11 DIAGNOSIS — I87.2 VENOUS STASIS DERMATITIS OF BOTH LOWER EXTREMITIES: Primary | ICD-10-CM

## 2021-01-11 PROCEDURE — 99213 OFFICE O/P EST LOW 20 MIN: CPT | Performed by: DERMATOLOGY

## 2021-01-11 RX ORDER — FLUOCINONIDE 0.5 MG/G
OINTMENT TOPICAL ONCE
Status: COMPLETED | OUTPATIENT
Start: 2021-01-11 | End: 2021-01-11

## 2021-01-11 RX ORDER — MUPIROCIN 20 MG/G
OINTMENT TOPICAL ONCE
Status: COMPLETED | OUTPATIENT
Start: 2021-01-11 | End: 2021-01-11

## 2021-01-11 RX ADMIN — MUPIROCIN: 20 OINTMENT TOPICAL at 14:15

## 2021-01-11 RX ADMIN — FLUOCINONIDE: 0.5 OINTMENT TOPICAL at 14:14

## 2021-01-11 ASSESSMENT — PAIN SCALES - GENERAL: PAINLEVEL: NO PAIN (0)

## 2021-01-11 NOTE — TELEPHONE ENCOUNTER
Called pt to [facility] on her varicose vein treatment in which prior auth have been submitted.   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     Informed her that I will see what I can do and that sometime the Prior Auth can take up to 14 days.     She verbalized understanding and will get back to her with details.   She agrees to plan.     Veronica REYNOLDS RN, BSN  Interventional Radiology/Vascular  Nurse Coordinator   Phone: [phone]  Fax: [phone]     Rectal exam deferred/Skin intact

## 2021-01-11 NOTE — NURSING NOTE
Kimberly Chau's goals for this visit include:   Chief Complaint   Patient presents with     Derm Problem     Follow up- wound care on legs     She requests these members of her care team be copied on today's visit information: Yes    PCP: Guillermo Castellano    Referring Provider:  No referring provider defined for this encounter.    There were no vitals taken for this visit.    Do you need any medication refills at today's visit? No    Anai Ferrer LPN

## 2021-01-11 NOTE — LETTER
1/11/2021         RE: Kimberly Chau  [id number]   [place] [address]        Dear Colleague,    Thank you for referring your patient, Kimberly Chau, to the [facility]. Please see a copy of my visit note below.    [facility] Dermatology Note  Encounter Date: Jan 11, 2021  Office Visit     Dermatology Problem List:  # Pertinent PMH: RA previously on methotrexate.  1. Venous stasis ulcers  -s/p Unna boot with significant improvement  - compression stockings; telfa/mupirocin 2% ointment  - co-follows with vascular surgery  2. Atopic dermatitis.  - Prior rx: was on methotrexate for RA previously, discontinued ~11/2019; cyclosporine, discontinued 06/28/2010  - Prior topicals: fluocinolone 0.025% ointment, TMC 0.1% ointment    ____________________________________________    Assessment & Plan:    1. Venous stasis ulcers. Recent TTE wnl 2018. Has had multiple prior LE ultrasounds demonstrating no evidence of DVT and thickening of left common femoral and proximal veins. Venous ultrasound 12/14/2020 c/w venous insufficency (see above).     Continuing to improve with diligent use of compression stockings and wound care. Reiterated that wound healing would be slow. Recommended continuing use of dressings for open areas and compression. Follow-up with vascular surgery as planned.      - Wounds were cleansed with chlorhexidine  - Mupirocin 2% ointment applied to open areas and lidex 0.05% ointment applied to areas of surrounding skin  - Open areas covered with telfa dressing  - Recommended changing dressings every other day after showering. Rinse with Dove soap. Apply mupirocin 2% ointment to open areas, followed by telfa dressing secured with paper tape, then compression stockings.  - Follow-up with vascular surgery    Procedures Performed:   None    Follow-up: 4 week(s) in-person, or earlier for new or changing lesions    Staff:     Ra Orourke MD  Pronouns:  he/him/his    Department of Dermatology  [facility]: Phone: [phone], Fax:[phone]  [facility]: Phone: [phone] Fax: [phone]    ____________________________________________    CC: Derm Problem (Follow up- wound care on legs)      HPI:  Ms. Kimberly Chau is a(n) 80 year old female who presents today as a return patient for venous stasis ulcers. Last seen 2 weeks ago when discontinued unna boots for trial of compression stockings and dressings. She states things are improved from prior and has noticed no new ulcers since last visit and some healing of ulcers, particularly on the right lower extremity. She is wearing compression stockings (thigh high) nearly the entire day and evening. She is changing bandages every other day after showering. Still working on scheduling surgery with vascular surgery (will call her in the next week).     Patient is otherwise feeling well, without additional concerns.    Labs:  Imaging reviewed.    TTE   Left ventricular function, chamber size, wall motion, and wall thickness are  normal.The EF is 60-65%.  Global right ventricular function is normal.  The thoracic aorta is normal. The inferior vena cava was normal in size with  preserved respiratory variability. Estimated right atrial pressure is < 5  mmHg.  No pericardial effusion is present.     Lower extremity ultrasound 5/15/19     FINDINGS:   The left common femoral, femoral, popliteal, posterior tibial , and  peroneal veins are fully compressible with patent Doppler wave forms.  No thrombus is identified within them on grayscale imaging.  Redemonstration of wall thickening in the left common femoral and  proximal femoral vein. Mild amount of subcutaneous edema in the left  Calf.     Venous Ultrasound 2020  1. Right lea. No deep or superficial venous thrombosis.  1b. Deep  "venous incompetence of the common femoral vein, the remainder  of the deep veins are competent.  1c. Superficial venous incompetence involving the great saphenous vein  in the distal thigh and the small saphenous vein from the  saphenofemoral popliteal junction through the mid calf. Remainder of  the superficial veins are competent.  1d. No incompetent  or varicose veins.     2. Left lea. No deep or superficial venous thrombosis.  2b. No deep venous incompetency.  2c. Superficial venous incompetency of the great saphenous vein from  the mid thigh continuously through the mid calf.  2d. No incompetent  or varicose veins.     IMPRESSION:    1. No DVT demonstrated in left lower extremity.  2. Mild subcutaneous edema in the left calf, significantly improved  from prior.    Physical Exam:  Vitals: There were no vitals taken for this visit.  SKIN: Focused examination of the bilateral lower extremities was performed.  - 1+ pitting edema on the right lower extremity to the knee; stable from prior  - 1+ pitting edema on the left lower extremity to the knee; stable prior  - Numerous shallow ulcers with central fibrinous yellow debris and few areas of central red granulation tissue and peripheral rim of mild pink erythema on the bilateral shins, appear improved from prior with decreased surrounding erythema and increased islands of granulation tissue centrally.   - No other lesions of concern on areas examined.     Medications:  Current Outpatient Medications   Medication     acetaminophen 650 MG TABS     COMPRESSION STOCKINGS     COMPRESSION STOCKINGS     COMPRESSION STOCKINGS     Diphenhydramine-APAP, sleep, (TYLENOL PM EXTRA STRENGTH PO)     ferrous gluconate (FERGON) 324 (38 Fe) MG tablet     Gauze Pads & Dressings (TELFA NON-ADHERENT) 3\"X4\" PADS     metoprolol tartrate (LOPRESSOR) 100 MG tablet     Multiple Vitamin (MULTI-VITAMIN) per tablet     mupirocin (BACTROBAN) 2 % external ointment     " mupirocin (BACTROBAN) 2 % external ointment     Omeprazole (PRILOSEC PO)     sodium chloride (OCEAN NASAL SPRAY) 0.65 % nasal spray     traMADol (ULTRAM) 50 MG tablet     triamcinolone (KENALOG) 0.1 % external cream     amLODIPine (NORVASC) 10 MG tablet     Current Facility-Administered Medications   Medication     fluocinonide (LIDEX) 0.05 % ointment     fluocinonide (LIDEX) 0.05 % ointment     mupirocin (BACTROBAN) 2 % ointment     mupirocin (BACTROBAN) 2 % ointment      Past Medical/Surgical History:   Patient Active Problem List   Diagnosis     Intrinsic atopic dermatitis     Essential hypertension     Rheumatoid arthritis (H)     Retinal artery occlusion     Cervical vertebral fracture (H)     Central retinal artery occlusion of right eye     Bilateral occipital neuralgia     C1-C2 instability     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 factor (H)     Osteoarthritis     Malignant hypertension     Hyponatremia     GERD (gastroesophageal reflux disease)     Eczematous dermatitis     Anxiety state     Anemia     Closed fracture of distal end of left radius with delayed healing, unspecified fracture morphology, subsequent encounter     Closed fracture of distal end of left radius, unspecified fracture morphology, sequela     Past Medical History:   Diagnosis Date     Anemia      Arthritis      Cataracts      Central retinal artery occlusion      Cervical spine fracture (H)     After a fall from orthostatic sx     Chronic pain     Back of head     Gastro-oesophageal reflux disease      Head injury      Hypertension      Hyponatremia     Resolved, 2/2 diuretics     Migraines      Osteoporosis      Pneumonia 2018     Rheumatoid arthritis(714.0)        CC No referring provider defined for this encounter. on close of this encounter.        Again, thank you for allowing me to participate in the care of your patient.        Sincerely,        Ra Orourke MD

## 2021-01-11 NOTE — PROGRESS NOTES
[facility] Dermatology Note  Encounter Date: Jan 11, 2021  Office Visit     Dermatology Problem List:  # Pertinent PMH: RA previously on methotrexate.  1. Venous stasis ulcers  -s/p Unna boot with significant improvement  - compression stockings; telfa/mupirocin 2% ointment  - co-follows with vascular surgery  2. Atopic dermatitis.  - Prior rx: was on methotrexate for RA previously, discontinued ~11/2019; cyclosporine, discontinued 06/28/2010  - Prior topicals: fluocinolone 0.025% ointment, TMC 0.1% ointment    ____________________________________________    Assessment & Plan:    1. Venous stasis ulcers. Recent TTE wnl 2018. Has had multiple prior LE ultrasounds demonstrating no evidence of DVT and thickening of left common femoral and proximal veins. Venous ultrasound 12/14/2020 c/w venous insufficency (see above).     Continuing to improve with diligent use of compression stockings and wound care. Reiterated that wound healing would be slow. Recommended continuing use of dressings for open areas and compression. Follow-up with vascular surgery as planned.      - Wounds were cleansed with chlorhexidine  - Mupirocin 2% ointment applied to open areas and lidex 0.05% ointment applied to areas of surrounding skin  - Open areas covered with telfa dressing  - Recommended changing dressings every other day after showering. Rinse with Dove soap. Apply mupirocin 2% ointment to open areas, followed by telfa dressing secured with paper tape, then compression stockings.  - Follow-up with vascular surgery    Procedures Performed:   None    Follow-up: 4 week(s) in-person, or earlier for new or changing lesions    Staff:     Ra Orourke MD  Pronouns: he/him/his    Department of Dermatology  [facility]: Phone: [phone], Fax:[phone]  [facility]: Phone: [phone] Fax:  [phone]    ____________________________________________    CC: Derm Problem (Follow up- wound care on legs)      HPI:  Ms. Kimberly Chau is a(n) 80 year old female who presents today as a return patient for venous stasis ulcers. Last seen 2 weeks ago when discontinued unna boots for trial of compression stockings and dressings. She states things are improved from prior and has noticed no new ulcers since last visit and some healing of ulcers, particularly on the right lower extremity. She is wearing compression stockings (thigh high) nearly the entire day and evening. She is changing bandages every other day after showering. Still working on scheduling surgery with vascular surgery (will call her in the next week).     Patient is otherwise feeling well, without additional concerns.    Labs:  Imaging reviewed.    TTE   Left ventricular function, chamber size, wall motion, and wall thickness are  normal.The EF is 60-65%.  Global right ventricular function is normal.  The thoracic aorta is normal. The inferior vena cava was normal in size with  preserved respiratory variability. Estimated right atrial pressure is < 5  mmHg.  No pericardial effusion is present.     Lower extremity ultrasound 5/15/19     FINDINGS:   The left common femoral, femoral, popliteal, posterior tibial , and  peroneal veins are fully compressible with patent Doppler wave forms.  No thrombus is identified within them on grayscale imaging.  Redemonstration of wall thickening in the left common femoral and  proximal femoral vein. Mild amount of subcutaneous edema in the left  Calf.     Venous Ultrasound 2020  1. Right lea. No deep or superficial venous thrombosis.  1b. Deep venous incompetence of the common femoral vein, the remainder  of the deep veins are competent.  1c. Superficial venous incompetence involving the great saphenous vein  in the distal thigh and the small saphenous vein from the  saphenofemoral popliteal  "junction through the mid calf. Remainder of  the superficial veins are competent.  1d. No incompetent  or varicose veins.     2. Left lea. No deep or superficial venous thrombosis.  2b. No deep venous incompetency.  2c. Superficial venous incompetency of the great saphenous vein from  the mid thigh continuously through the mid calf.  2d. No incompetent  or varicose veins.     IMPRESSION:    1. No DVT demonstrated in left lower extremity.  2. Mild subcutaneous edema in the left calf, significantly improved  from prior.    Physical Exam:  Vitals: There were no vitals taken for this visit.  SKIN: Focused examination of the bilateral lower extremities was performed.  - 1+ pitting edema on the right lower extremity to the knee; stable from prior  - 1+ pitting edema on the left lower extremity to the knee; stable prior  - Numerous shallow ulcers with central fibrinous yellow debris and few areas of central red granulation tissue and peripheral rim of mild pink erythema on the bilateral shins, appear improved from prior with decreased surrounding erythema and increased islands of granulation tissue centrally.   - No other lesions of concern on areas examined.     Medications:  Current Outpatient Medications   Medication     acetaminophen 650 MG TABS     COMPRESSION STOCKINGS     COMPRESSION STOCKINGS     COMPRESSION STOCKINGS     Diphenhydramine-APAP, sleep, (TYLENOL PM EXTRA STRENGTH PO)     ferrous gluconate (FERGON) 324 (38 Fe) MG tablet     Gauze Pads & Dressings (TELFA NON-ADHERENT) 3\"X4\" PADS     metoprolol tartrate (LOPRESSOR) 100 MG tablet     Multiple Vitamin (MULTI-VITAMIN) per tablet     mupirocin (BACTROBAN) 2 % external ointment     mupirocin (BACTROBAN) 2 % external ointment     Omeprazole (PRILOSEC PO)     sodium chloride (OCEAN NASAL SPRAY) 0.65 % nasal spray     traMADol (ULTRAM) 50 MG tablet     triamcinolone (KENALOG) 0.1 % external cream     amLODIPine (NORVASC) 10 MG tablet "     Current Facility-Administered Medications   Medication     fluocinonide (LIDEX) 0.05 % ointment     fluocinonide (LIDEX) 0.05 % ointment     mupirocin (BACTROBAN) 2 % ointment     mupirocin (BACTROBAN) 2 % ointment      Past Medical/Surgical History:   Patient Active Problem List   Diagnosis     Intrinsic atopic dermatitis     Essential hypertension     Rheumatoid arthritis (H)     Retinal artery occlusion     Cervical vertebral fracture (H)     Central retinal artery occlusion of right eye     Bilateral occipital neuralgia     C1-C2 instability     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 factor (H)     Osteoarthritis     Malignant hypertension     Hyponatremia     GERD (gastroesophageal reflux disease)     Eczematous dermatitis     Anxiety state     Anemia     Closed fracture of distal end of left radius with delayed healing, unspecified fracture morphology, subsequent encounter     Closed fracture of distal end of left radius, unspecified fracture morphology, sequela     Past Medical History:   Diagnosis Date     Anemia      Arthritis      Cataracts      Central retinal artery occlusion      Cervical spine fracture (H)     After a fall from orthostatic sx     Chronic pain     Back of head     Gastro-oesophageal reflux disease      Head injury      Hypertension      Hyponatremia     Resolved, 2/2 diuretics     Migraines      Osteoporosis      Pneumonia 2018     Rheumatoid arthritis(714.0)        CC No referring provider defined for this encounter. on close of this encounter.

## 2021-01-11 NOTE — TELEPHONE ENCOUNTER
Patients Name: Kimberly Chau     Patients MRN: [id number]    Doctor s Name: Davy Mccallumestha    Procedure CPT codes:     CPT 79770: IR Endovenous ablation varicose veins ([id number])    Notes: left leg; gsv ablation         Diagnosis Codes: I83.893  Symptomatic varicose veins of bilateral extremities     Bilat/R/L ? : left leg only     Have Conservative treatments been attempted?                Compression Stockings-3 month trial and documented failure:                 Ultrasound : 12/14/2020        Past treatment:    none      Scheduled: Fri 2/12/21        Response: 1/18/21  Approval from 1/12/21-5/11/21, CPT 36478X1.

## 2021-01-20 ENCOUNTER — TELEPHONE (OUTPATIENT)
Dept: VASCULAR SURGERY | Facility: CLINIC | Age: 81
End: 2021-01-20

## 2021-01-20 DIAGNOSIS — I83.893 SYMPTOMATIC VARICOSE VEINS OF BOTH LOWER EXTREMITIES: Primary | ICD-10-CM

## 2021-01-20 NOTE — TELEPHONE ENCOUNTER
Called pt and informed her that she has been approved for her varicose vein procedure with Dr. Jain.    Informed her that we have her scheduled for Tuesday 2/16.     Will send her a letter with appt details    Reminded her that she is to bring her thigh high stockings to this appt as well as she will need a covid test 4 days prior to. A Team will be callling her to make this appt    She is to call me with any other questions.     Veronica REYNOLDS RN, BSN  Interventional Radiology/Vascular  Nurse Coordinator   Phone: [phone]  Fax: [phone]

## 2021-01-21 ENCOUNTER — DOCUMENTATION ONLY (OUTPATIENT)
Dept: CARE COORDINATION | Facility: CLINIC | Age: 81
End: 2021-01-21

## 2021-01-26 DIAGNOSIS — Z98.890 S/P ORIF (OPEN REDUCTION INTERNAL FIXATION) FRACTURE: Primary | ICD-10-CM

## 2021-01-26 DIAGNOSIS — Z87.81 S/P ORIF (OPEN REDUCTION INTERNAL FIXATION) FRACTURE: Primary | ICD-10-CM

## 2021-01-27 ENCOUNTER — OFFICE VISIT (OUTPATIENT)
Dept: ORTHOPEDICS | Facility: CLINIC | Age: 81
End: 2021-01-27
Payer: MEDICARE

## 2021-01-27 ENCOUNTER — ANCILLARY PROCEDURE (OUTPATIENT)
Dept: GENERAL RADIOLOGY | Facility: CLINIC | Age: 81
End: 2021-01-27
Attending: PHYSICIAN ASSISTANT
Payer: MEDICARE

## 2021-01-27 DIAGNOSIS — Z87.81 S/P ORIF (OPEN REDUCTION INTERNAL FIXATION) FRACTURE: Primary | ICD-10-CM

## 2021-01-27 DIAGNOSIS — Z98.890 S/P ORIF (OPEN REDUCTION INTERNAL FIXATION) FRACTURE: ICD-10-CM

## 2021-01-27 DIAGNOSIS — Z87.81 S/P ORIF (OPEN REDUCTION INTERNAL FIXATION) FRACTURE: ICD-10-CM

## 2021-01-27 DIAGNOSIS — Z98.890 S/P ORIF (OPEN REDUCTION INTERNAL FIXATION) FRACTURE: Primary | ICD-10-CM

## 2021-01-27 DIAGNOSIS — S62.337A CLOSED DISPLACED FRACTURE OF NECK OF FIFTH METACARPAL BONE OF LEFT HAND, INITIAL ENCOUNTER: ICD-10-CM

## 2021-01-27 PROCEDURE — 99214 OFFICE O/P EST MOD 30 MIN: CPT | Performed by: PHYSICIAN ASSISTANT

## 2021-01-27 PROCEDURE — 73110 X-RAY EXAM OF WRIST: CPT | Mod: LT | Performed by: RADIOLOGY

## 2021-01-27 NOTE — NURSING NOTE
"Reason For Visit:   Chief Complaint   Patient presents with     Surgical Followup     7 mo pop DOS 6/12/20       Primary MD: Guillermo Castellano  Ref. MD:  pt     Age: 80 year old    ?  No      There were no vitals taken for this visit.                      QuickDASH Assessment  No flowsheet data found.       Current Outpatient Medications   Medication Sig Dispense Refill     acetaminophen 650 MG TABS Take 650 mg by mouth every 4 hours as needed. 100 tablet 0     COMPRESSION STOCKINGS Please measure and distribute 1 pair of 30mmHg - 40mmHg knee high ULCERCARE stockings 2 each 4     COMPRESSION STOCKINGS Please measure and distribute 1 pair of 20mmHg - 30mmHg Thigh high open or closed toe compression stockings. Jobst ultrasheer or equivalent. 2 each 4     COMPRESSION STOCKINGS Please measure and distribute 1 pair of 20mmHg - 30mmHg knee high open or closed toe compression stockings. Jobst ultrasheer or equivalent. 2 each 4     Diphenhydramine-APAP, sleep, (TYLENOL PM EXTRA STRENGTH PO) Take 2 tablets by mouth At Bedtime        ferrous gluconate (FERGON) 324 (38 Fe) MG tablet Take 324 mg by mouth       Gauze Pads & Dressings (TELFA NON-ADHERENT) 3\"X4\" PADS Cut to size for open areas on the lower extremities and secure with paper tape. Change dressing every other day. 30 each 11     metoprolol tartrate (LOPRESSOR) 100 MG tablet Take 2 tablets (200 mg) by mouth 2 times daily 360 tablet 0     Multiple Vitamin (MULTI-VITAMIN) per tablet Take 1 tablet by mouth every morning        mupirocin (BACTROBAN) 2 % external ointment Apply to open areas with dressing changes 60 g 11     mupirocin (BACTROBAN) 2 % external ointment Use in nose twice daily for 5 days 22 g 0     Omeprazole (PRILOSEC PO) Take 20 mg by mouth as needed        sodium chloride (OCEAN NASAL SPRAY) 0.65 % nasal spray Spray 1 spray into both nostrils daily as needed for congestion 1 Bottle 3     triamcinolone (KENALOG) 0.1 % external cream " Apply twice daily for 2 weeks then 3 times weekly for 2 week, repeat cycle as needed 454 g 0     amLODIPine (NORVASC) 10 MG tablet Take 1 tablet (10 mg) by mouth daily (Patient not taking: Reported on 1/11/2021) 90 tablet 0     traMADol (ULTRAM) 50 MG tablet Take 1-2 tablets ( mg) by mouth every 6 hours as needed for severe pain (Patient not taking: Reported on 1/27/2021) 120 tablet 0       Allergies   Allergen Reactions     Hctz Other (See Comments)     Pt develops symptomatic and significant hyponatremia with HCTZ exposure     Hydrochlorothiazide      Other reaction(s): Hyponatremia  Hyponatremia     Latex      Benzocaine Rash     carba mix,thrium-sunscreen     Resorcinol-Alcohol Rash     carba mix,thrium-sunscreen     Ace Inhibitors Unknown     Dizziness and orthostatic changes and electrolyte problems.     Latex Unknown       Anabel Robbins, ATC

## 2021-01-27 NOTE — LETTER
1/27/2021         RE: Kimberly Chau  [address] [place] [address]        Dear Colleague,    Thank you for referring your patient, Kimberly Chau, to the [facility] [place]. Please see a copy of my visit note below.    Date of Service: Jan 27, 2021    Chief Complaint: Post operative follow up.     Date of Surgery: 6/12/2020    Procedure Performed: Left wrist distal ulna resection bone culture of left distal radius, Open reduction left distal radius nonunion with distal ulna autograft    Interval events: Kimberly Chau is a 80 year old female who presents today for a postoperative follow up. The patient reports that she has been doing well.  She has had no pain.  She does feels like her left wrist is little weaker than her right.  Overall she is happy with her procedure and has no concerns today.    Patient was found to have a 5th metacarpal fracture on radiographs today.  The patient denies any injury, or falls to the area.  She denies any pain into the hand.  She states that she has deformed hands due to arthritis but fortunately has minimal pain in her hands.    The past medical history was reviewed updated in the EMR. This includes medications, surgeries, social history, and review of systems.    Physical examination:  Well-developed, well-nourished and in no acute distress.  Alert and oriented to surroundings.  On examination of the  left wrist, incision is well-healed. There is no erythema, drainage, or dehiscence.  She has swelling over the fifth metacarpal.  Tenderness to palpation over the fifth metacarpal fracture.  She is nontender throughout remaining fingers, hand, and wrist.  She is nontender over prior distal radius fracture.  Arthritic deformities throughout the hand. Sensation is intact in median, radial and ulnar nerve distributions.  The patient is able to make a full composite fist. Fingers are warm and well-perfused. Radial pulse is palpable.  Full  supination and pronation, stiffness of the wrist with flexion and extension.    Radiographs: Three views of the left wrist were obtained and reviewed. These demonstrate ongoing healing of prior comminuted distal radius fracture, status post revision ORIF and darrach.  The volar plate and screws in place with stable alignment.  Osteopenic bones.  Degenerative joint changes in the first CMC. She also appears to have a new fifth metacarpal neck fracture with mild angulation.    Assessment: 80 year old female s/p the above procedures, now with new fifth metacarpal fracture.    Plan: Discussed with the patient that in regards to her distal radius fracture, her x-rays show continued healing.  Patient did mention feeling like her left wrist was stiff and slightly weaker than her right.  I did offer hand therapy for range of motion and strengthening.  Patient would like to wait until the Covid status improves in the community prior to attending therapy.    Patient was found to have 1/5 metacarpal fracture on x-rays today, it is ounknown when the fracture occurred.  Discussed with the patient that sometimes these fractures are treated with surgery, however given her history, her lack of pain, and her osteopenic bones, would recommend nonoperative treatment.  Recommended a Velcro removable wrist brace to wear for the next 4 weeks.  She will follow-up at that time for repeat x-rays and evaluation.    All questions were answered and the patient was in agreement with this plan.    CARO ROBERTSON PA-C  January 27, 2021 9:33 AM   Orthopaedic Surgery

## 2021-02-08 ENCOUNTER — TELEPHONE (OUTPATIENT)
Dept: DERMATOLOGY | Facility: CLINIC | Age: 81
End: 2021-02-08

## 2021-02-10 ENCOUNTER — IMMUNIZATION (OUTPATIENT)
Dept: PEDIATRICS | Facility: CLINIC | Age: 81
End: 2021-02-10
Payer: MEDICARE

## 2021-02-10 PROCEDURE — 91300 PR COVID VAC PFIZER DIL RECON 30 MCG/0.3 ML IM: CPT

## 2021-02-10 PROCEDURE — 0001A PR COVID VAC PFIZER DIL RECON 30 MCG/0.3 ML IM: CPT

## 2021-02-12 DIAGNOSIS — Z11.59 ENCOUNTER FOR SCREENING FOR OTHER VIRAL DISEASES: ICD-10-CM

## 2021-02-12 LAB
SARS-COV-2 RNA RESP QL NAA+PROBE: NORMAL
SPECIMEN SOURCE: NORMAL

## 2021-02-12 PROCEDURE — U0005 INFEC AGEN DETEC AMPLI PROBE: HCPCS | Performed by: RADIOLOGY

## 2021-02-12 PROCEDURE — U0003 INFECTIOUS AGENT DETECTION BY NUCLEIC ACID (DNA OR RNA); SEVERE ACUTE RESPIRATORY SYNDROME CORONAVIRUS 2 (SARS-COV-2) (CORONAVIRUS DISEASE [COVID-19]), AMPLIFIED PROBE TECHNIQUE, MAKING USE OF HIGH THROUGHPUT TECHNOLOGIES AS DESCRIBED BY CMS-2020-01-R: HCPCS | Performed by: RADIOLOGY

## 2021-02-13 LAB
LABORATORY COMMENT REPORT: NORMAL
SARS-COV-2 RNA RESP QL NAA+PROBE: NEGATIVE
SPECIMEN SOURCE: NORMAL

## 2021-02-15 RX ORDER — HEPARIN SODIUM 200 [USP'U]/100ML
1 INJECTION, SOLUTION INTRAVENOUS CONTINUOUS PRN
Status: CANCELLED | OUTPATIENT
Start: 2021-02-15

## 2021-02-16 ENCOUNTER — APPOINTMENT (OUTPATIENT)
Dept: INTERVENTIONAL RADIOLOGY/VASCULAR | Facility: CLINIC | Age: 81
End: 2021-02-16
Attending: RADIOLOGY
Payer: MEDICARE

## 2021-02-16 ENCOUNTER — HOSPITAL ENCOUNTER (OUTPATIENT)
Facility: CLINIC | Age: 81
Discharge: HOME | End: 2021-02-16
Attending: RADIOLOGY | Admitting: RADIOLOGY
Payer: MEDICARE

## 2021-02-16 ENCOUNTER — APPOINTMENT (OUTPATIENT)
Dept: MEDSURG UNIT | Facility: CLINIC | Age: 81
End: 2021-02-16
Attending: RADIOLOGY
Payer: MEDICARE

## 2021-02-16 VITALS
HEIGHT: 60 IN | BODY MASS INDEX: 21.4 KG/M2 | TEMPERATURE: 97.5 F | WEIGHT: 109 LBS | OXYGEN SATURATION: 99 % | SYSTOLIC BLOOD PRESSURE: 169 MMHG | HEART RATE: 67 BPM | DIASTOLIC BLOOD PRESSURE: 80 MMHG | RESPIRATION RATE: 16 BRPM

## 2021-02-16 DIAGNOSIS — I83.893 SYMPTOMATIC VARICOSE VEINS OF BOTH LOWER EXTREMITIES: ICD-10-CM

## 2021-02-16 PROCEDURE — 272N000154 HC KIT CR14

## 2021-02-16 PROCEDURE — 250N000013 HC RX MED GY IP 250 OP 250 PS 637: Performed by: PHYSICIAN ASSISTANT

## 2021-02-16 PROCEDURE — 999N000133 HC STATISTIC PP CARE STAGE 2

## 2021-02-16 PROCEDURE — 250N000009 HC RX 250: Performed by: PHYSICIAN ASSISTANT

## 2021-02-16 PROCEDURE — 258N000003 HC RX IP 258 OP 636: Performed by: PHYSICIAN ASSISTANT

## 2021-02-16 PROCEDURE — 250N000013 HC RX MED GY IP 250 OP 250 PS 637: Performed by: RADIOLOGY

## 2021-02-16 PROCEDURE — 36478 ENDOVENOUS LASER 1ST VEIN: CPT | Mod: LT | Performed by: RADIOLOGY

## 2021-02-16 PROCEDURE — 36478 ENDOVENOUS LASER 1ST VEIN: CPT

## 2021-02-16 PROCEDURE — 250N000009 HC RX 250: Performed by: RADIOLOGY

## 2021-02-16 RX ORDER — LIDOCAINE HYDROCHLORIDE 10 MG/ML
1-30 INJECTION, SOLUTION EPIDURAL; INFILTRATION; INTRACAUDAL; PERINEURAL
Status: COMPLETED | OUTPATIENT
Start: 2021-02-16 | End: 2021-02-16

## 2021-02-16 RX ORDER — DIAZEPAM 5 MG
10 TABLET ORAL ONCE
Status: COMPLETED | OUTPATIENT
Start: 2021-02-16 | End: 2021-02-16

## 2021-02-16 RX ORDER — ACETAMINOPHEN 500 MG
1000 TABLET ORAL ONCE
Status: COMPLETED | OUTPATIENT
Start: 2021-02-16 | End: 2021-02-16

## 2021-02-16 RX ORDER — DEXTROSE MONOHYDRATE 25 G/50ML
25-50 INJECTION, SOLUTION INTRAVENOUS
Status: DISCONTINUED | OUTPATIENT
Start: 2021-02-16 | End: 2021-02-16 | Stop reason: HOSPADM

## 2021-02-16 RX ORDER — NICOTINE POLACRILEX 4 MG
15-30 LOZENGE BUCCAL
Status: DISCONTINUED | OUTPATIENT
Start: 2021-02-16 | End: 2021-02-16 | Stop reason: HOSPADM

## 2021-02-16 RX ADMIN — LIDOCAINE HYDROCHLORIDE,EPINEPHRINE BITARTRATE: 10; .01 INJECTION, SOLUTION INFILTRATION; PERINEURAL at 13:54

## 2021-02-16 RX ADMIN — ACETAMINOPHEN 1000 MG: 500 TABLET, FILM COATED ORAL at 15:28

## 2021-02-16 RX ADMIN — DIAZEPAM 10 MG: 5 TABLET ORAL at 11:57

## 2021-02-16 RX ADMIN — LIDOCAINE HYDROCHLORIDE 12 ML: 10 INJECTION, SOLUTION EPIDURAL; INFILTRATION; INTRACAUDAL; PERINEURAL at 13:54

## 2021-02-16 ASSESSMENT — MIFFLIN-ST. JEOR: SCORE: 875.92

## 2021-02-16 NOTE — PROCEDURES
[facility]    Procedure: Left leg GSV EVLA    Date/Time: 2/16/2021 2:06 PM  Performed by: Davy Jain MD  Authorized by: Davy Jain MD     UNIVERSAL PROTOCOL   Site Marked: NA  Prior Images Obtained and Reviewed:  Yes  Required items: Required blood products, implants, devices and special equipment available    Patient identity confirmed:  Verbally with patient, arm band, provided demographic data and hospital-assigned identification number  Patient was reevaluated immediately before administering moderate or deep sedation or anesthesia  Confirmation Checklist:  Patient's identity using two indicators, relevant allergies, procedure was appropriate and matched the consent or emergent situation and correct equipment/implants were available  Time out: Immediately prior to the procedure a time out was called    Universal Protocol: the Joint Commission Universal Protocol was followed    Preparation: Patient was prepped and draped in usual sterile fashion           ANESTHESIA    Anesthesia: Local infiltration  Local Anesthetic:  Lidocaine 1% without epinephrine    See dictated procedure note for full details.  Findings: 34 cm length left GSV ablation    Specimens: none    Complications: None    Condition: Stable    PROCEDURE   Patient Tolerance:  Patient tolerated the procedure well with no immediate complications    Length of time physician/provider present for 1:1 monitoring during sedation: 0

## 2021-02-16 NOTE — IR NOTE
Patient Name: Kimberly Chau  Medical Record Number: [id number]  Today's Date: 2/16/2021    Procedure: image guided left great saphenous ablation  Proceduralist: Dr LISA Jain    Sedation Notes: Valium given on 2A     Procedure start time: 1306  Procedure end time: 1353    Report given to: Christiane ABEBE 2A   : none    Other Notes: Pt arrived to IR room 5 from 2A. Consent reviewed, pt confirmed. Pt denies any questions or concerns regarding procedure. Pt positioned supine and monitored per protocol. Site cleansed and dressed per protocol. Pt hypertensive during procedure. Provider notified, no orders given. Pt transferred back to 2A.

## 2021-02-16 NOTE — IP AVS SNAPSHOT
[facility] [address] [address]                                    After Visit Summary   2/16/2021    Kimberly Chau    MRN: [id number]           After Visit Summary Signature Page    I have received my discharge instructions, and my questions have been answered. I have discussed any challenges I see with this plan with the nurse or doctor.    ..........................................................................................................................................  Patient/Patient Representative Signature      ..........................................................................................................................................  Patient Representative Print Name and Relationship to Patient    ..................................................               ................................................  Date                                   Time    ..........................................................................................................................................  Reviewed by Signature/Title    ...................................................              ..............................................  Date                                               Time          22EPIC Rev 08/18

## 2021-02-16 NOTE — DISCHARGE INSTRUCTIONS
[facility]  Interventional Radiology  Discharge Instructions for Endovenous Laser Ablation of Leg        AFTER YOU GO HOME:       Drink plenty of fluids.       Resume your regular diet, unless otherwise instructed by your Primary Physician.    IF YOU RECEIVED SEDATION:            DO NOT smoke for at least 24 hours       DO NOT drink alcoholic beverages the day of your procedure       DO NOT drive or operate machinery at home or at work for 24 hours.          DO NOT take a bath or shower for at least 12 hours following your procedure.       DO NOT make any important or legal decisions for 24 hours following your procedure.        No tub baths, hot tubs or swimming for one week.        No vigorous lower extremity exercise (i.e. Stair stepper, running, or biking) for one week.    Endovenous laser ablation   After you go home:  Drink plenty of fluids  Resume regular diet  Resume normal activity, wear stockings and a 20 minute a day walk is encouraged.  Hot tubs, baths, use of leg weights and strenuous leg exercises should be avoided for 1 week.   Do not take a shower for at least 12 hours following your procedure  Wear compression stockings for 3 days straight, then wear during day time hours for remainder of 3 weeks.  You may remove briefly to shower and if you have your legs elevated.  For mild pain or discomfort Ibuprofen 600 mg oral every 8 hours as needed, for moderate to severe pain oxycodone 5 mg 1-2 tabs every 6 hours as needed.  IF you received sedation:  Do not smoke for 24 hours following your procedure  Do not drink alcoholic beverages day of your procedure  Do not drive or operate machinery at home or work for 24 hours following your procedure  Do not make any important or legal decisions for 24 hours following your procedure  For follow up appointments you will be called to schedule:  If EVLA only you will have an ultrasound scheduled for 1 week after procedure and an ultrasound  followed by clinic appointment with your provider at 1 month post procedure  Procedural Physician: Dr Jain                                 Date:February 16, 2021  Telephone Number:    [phone] Monday-Friday 8-4:30                                       [phone]    IR clinic RN                                       [phone] After 4:30 PM Monday-Friday, Weekends and Holidays, The hospital  will answer, ask for the Interventional Radiologist on call. Someone is available 24 hours /day

## 2021-02-16 NOTE — PROGRESS NOTES
Pt has returned to unit 2a s/p vein ablation. LLE site CDI, soft, flat. Compression stocking on. Pt alert, tolerating PO. Pt reports some soreness at E,4/10.   BP remains elevated 190s-200s/90s, pt denies headache, vision changes, chest pain. While prepping pt her BP was 169/89 pt reported that was high for her, but attributed to nerves with being in a hospital. Pt reports she took her metoprolol this morning. Dr Jain updated, per MD monitor until 1545. If pt remains asymptomatic ok for pt to discharge with instructions to follow up with PCP tomorrow. Pt updated and verbalizes understanding.  Report given to ANDRAE Garcia

## 2021-02-16 NOTE — IR NOTE
TA VERAS LT GSV    Treatment length :  34cm  Access - 5cm Below Knee  Tumescence - 100cc  Joules - 1728  Sec - 247

## 2021-02-16 NOTE — PROGRESS NOTES
Tolerated bedrest without issues.  BP slowly became lower, but still elevated.  Denies headache, blurred vision.  Left leg CDI with compression stocking on, no drainage noted.  Advised Kimberly to monitor her blood pressure at home, and if she is still hypertensive, to see her PCP.  Kimberly stated that she would do this.  Reviewed discharge instructions with patient and with her  when he picked her up.  Discharged to home with .

## 2021-02-16 NOTE — PROGRESS NOTES
Pt arrives to  for ablation of varicose vein. Consent needs to be signed. Pt currently sitting up in chair, pt has her stockings with her. Pt reports her spouse will be coming post procedure to provide ride home.

## 2021-02-16 NOTE — IP AVS SNAPSHOT
MRN:[id number]                      After Visit Summary   2/16/2021    Kimberly Chau    MRN: [id number]           Visit Information        Department      2/16/2021  9:31 AM [facility] [address] [facility]          Review of your medicines      UNREVIEWED medicines. Ask your doctor about these medicines       Dose / Directions   Acetaminophen 8 Hour 650 MG 8 hour tablet  Used for: C1-C2 instability  Generic drug: acetaminophen      Dose: 650 mg  Take 650 mg by mouth every 4 hours as needed.  Quantity: 100 tablet  Refills: 0     amLODIPine 10 MG tablet  Commonly known as: NORVASC  Used for: Essential hypertension      Dose: 10 mg  Take 1 tablet (10 mg) by mouth daily  Quantity: 90 tablet  Refills: 0     ferrous gluconate 324 (38 Fe) MG tablet  Commonly known as: FERGON      Dose: 324 mg  Take 324 mg by mouth  Refills: 0     metoprolol tartrate 100 MG tablet  Commonly known as: LOPRESSOR  Used for: Essential hypertension      Dose: 200 mg  Take 2 tablets (200 mg) by mouth 2 times daily  Quantity: 360 tablet  Refills: 0     Multi-vitamin tablet  Generic drug: multivitamin w/minerals      Dose: 1 tablet  Take 1 tablet by mouth every morning  Refills: 0     mupirocin 2 % external ointment  Commonly known as: BACTROBAN  Used for: Venous ulcer (H)  Ask about: Which instructions should I use?      Apply to open areas with dressing changes  Quantity: 60 g  Refills: 11     PRILOSEC PO      Dose: 20 mg  Take 20 mg by mouth as needed  Refills: 0     sodium chloride 0.65 % nasal spray  Commonly known as: Ocean Nasal Spray  Used for: Chronic frontal sinusitis      Dose: 1 spray  Spray 1 spray into both nostrils daily as needed for congestion  Quantity: 1 Bottle  Refills: 3     traMADol 50 MG tablet  Commonly known as: ULTRAM  Used for: Ulcer of lower extremity with fat layer exposed, unspecified laterality (H)      Dose:  mg  Take 1-2 tablets ( mg) by mouth every 6 hours as needed for severe  "pain  Quantity: 120 tablet  Refills: 0     triamcinolone 0.1 % external cream  Commonly known as: KENALOG  Used for: Dermatitis      Apply twice daily for 2 weeks then 3 times weekly for 2 week, repeat cycle as needed  Quantity: 454 g  Refills: 0     TYLENOL PM EXTRA STRENGTH PO      Dose: 2 tablet  Take 2 tablets by mouth At Bedtime  Refills: 0        CONTINUE these medicines which have NOT CHANGED       Dose / Directions   COMPRESSION STOCKINGS  Used for: Venous stasis, Symptomatic varicose veins of both lower extremities      Please measure and distribute 1 pair of 30mmHg - 40mmHg knee high ULCERCARE stockings  Quantity: 2 each  Refills: 4     COMPRESSION STOCKINGS  Used for: Venous stasis, Symptomatic varicose veins of both lower extremities      Please measure and distribute 1 pair of 20mmHg - 30mmHg Thigh high open or closed toe compression stockings. Jobst ultrasheer or equivalent.  Quantity: 2 each  Refills: 4     COMPRESSION STOCKINGS  Used for: Venous stasis, Symptomatic varicose veins of both lower extremities      Please measure and distribute 1 pair of 20mmHg - 30mmHg knee high open or closed toe compression stockings. Jobst ultrasheer or equivalent.  Quantity: 2 each  Refills: 4     Telfa Non-Adherent 3\"X4\" Pads  Used for: Venous ulcer (H)      Cut to size for open areas on the lower extremities and secure with paper tape. Change dressing every other day.  Quantity: 30 each  Refills: 11              Protect others around you: Learn how to safely use, store and throw away your medicines at www.disposemymeds.org.       Follow-ups after your visit       Your next 10 appointments already scheduled    Feb 22, 2021  1:45 PM  Return Visit with Ra Orourke MD  [facility] ([facility]) [address] [address] [place] [address] [phone]      Feb 23, 2021 11:00 AM  US LOWER EXTREMITY VENOUS DUPLEX LEFT with [id number]  [facility] " [facility] ([facility] ) [address]  1st Floor  [facility] [address] [phone]   How do I prepare for my exam? (Food and drink instructions)  No Food and Drink Restrictions.    How do I prepare for my exam? (Other instructions)  You do not need to do anything special to prepare for your exam.    What should I wear: Wear comfortable clothes.    How long does the exam take: Most ultrasounds take 30 to 60 minutes.    What should I bring: Bring a list of your medicines, including vitamins, minerals and over-the-counter drugs. It is safest to leave personal items at home.    Do I need a :  No  is needed.    What do I need to tell my doctor: Tell your doctor about any allergies you may have.    What should I do after the exam: No restrictions, You may resume normal activities.    What is this test: An ultrasound uses sound waves to make pictures of the body. Sound waves do not cause pain. The only discomfort may be the pressure of the wand against your skin or full bladder.    Who should I call with questions: If you have any questions, please call the Imaging Department where you will have your exam. Directions, parking instructions, and other information is available on our website, [email or web address].     Mar 03, 2021  3:15 PM  (Arrive by 3:00 PM)  COVID-19 Vaccine 2nd Dose with MG COVID VACCINE 21 DAY PFIZER  [facility] ([facility] [place])  Arrive at: COVID-19 Vaccine [place] [id number] [place] [address] [phone]   If you feel sick or have a fever, please call [phone] to reschedule. Please wear a short sleeved shirt and mask. And only those getting a vaccine should come to allow for social distancing.    Please see [email or web address] to prepare for your vaccine visit.        Care Instructions       Further instructions from your  care team       [facility]  Interventional Radiology  Discharge Instructions for Endovenous Laser Ablation of Leg        AFTER YOU GO HOME:       Drink plenty of fluids.       Resume your regular diet, unless otherwise instructed by your Primary Physician.    IF YOU RECEIVED SEDATION:            DO NOT smoke for at least 24 hours       DO NOT drink alcoholic beverages the day of your procedure       DO NOT drive or operate machinery at home or at work for 24 hours.          DO NOT take a bath or shower for at least 12 hours following your procedure.       DO NOT make any important or legal decisions for 24 hours following your procedure.        No tub baths, hot tubs or swimming for one week.        No vigorous lower extremity exercise (i.e. Stair stepper, running, or biking) for one week.    Endovenous laser ablation   After you go home:  Drink plenty of fluids  Resume regular diet  Resume normal activity, wear stockings and a 20 minute a day walk is encouraged.  Hot tubs, baths, use of leg weights and strenuous leg exercises should be avoided for 1 week.   Do not take a shower for at least 12 hours following your procedure  Wear compression stockings for 3 days straight, then wear during day time hours for remainder of 3 weeks.  You may remove briefly to shower and if you have your legs elevated.  For mild pain or discomfort Ibuprofen 600 mg oral every 8 hours as needed, for moderate to severe pain oxycodone 5 mg 1-2 tabs every 6 hours as needed.  IF you received sedation:  Do not smoke for 24 hours following your procedure  Do not drink alcoholic beverages day of your procedure  Do not drive or operate machinery at home or work for 24 hours following your procedure  Do not make any important or legal decisions for 24 hours following your procedure  For follow up appointments you will be called to schedule:  If EVLA only you will have an ultrasound scheduled for 1 week after procedure and an  ultrasound followed by clinic appointment with your provider at 1 month post procedure  Procedural Physician: Dr Jain                                 Date:February 16, 2021  Telephone Number:    [phone] Monday-Friday 8-4:30                                       [phone]    IR clinic RN                                       [phone] After 4:30 PM Monday-Friday, Weekends and Holidays, The hospital  will answer, ask for the Interventional Radiologist on call. Someone is available 24 hours /day                   Additional Information About Your Visit       [employer] Information    [employer] gives you secure access to your electronic health record. If you see a primary care provider, you can also send messages to your care team and make appointments. If you have questions, please call your primary care clinic.  If you do not have a primary care provider, please call [phone] and they will assist you.       Care EveryWhere ID    This is your Care EveryWhere ID. This could be used by other organizations to access your [facility] medical records  [id number]       Your Vitals Were  Most recent update: 2/16/2021  2:40 PM    Blood Pressure   194/93   (BP Location: Left arm)          Pulse   69          Temperature   97.5  F (36.4  C) (Oral)          Respirations   18          Height   1.524 m (5')             Weight   49.4 kg (109 lb)    Pulse Oximetry   99%    BMI (Body Mass Index)   21.29 kg/m           Primary Care Provider Office Phone # Fax #    Guillermo HEYDI MD [facility] [phone]      Equal Access to Services    [facility]: Hadii halina chairez hadasho Sodarrelali, waaxda luqadaha, qaybta kaalmada adeegyada, alejandra bird'andrzej magallon. So [facility] [phone].    ATENCIÓN: Si habla español, tiene a vázquez disposición servicios gratuitos de asistencia lingüística. Llame al [phone].    We comply with applicable federal and state civil rights laws, including the [place] Human  "Rights Act. We do not discriminate on the basis of race, color, creed, Gnosticist, national origin, marital status, age, disability, sex, sexual orientation, or gender identity.    If you would like an itemization of your charges they will now be available in [email or web address] 30 days after discharge. To access the itemized statements in [email or web address] go to billing/billing summary. From there select view account. There will be multiple tabs showing an overview of your account, detail, payments, and communications. From the communications tab you can see your monthly statements, your itemized statements, and any billing letters generated for your account. If you do not have a [email or web address] account and need help getting access please contact [email or web address] support at [phone].  If you would prefer to have your itemized statements mailed please contact our automated itemized bill request line at [phone] option  2.       Thank you!    Thank you for choosing [facility] for your care. Our goal is always to provide you with excellent care. Hearing back from our patients is one way we can continue to improve our services. Please take a few minutes to complete the written survey that you may receive in the mail after you visit with us. Thank you!            Medication List      Medications          Morning Afternoon Evening Bedtime As Needed    COMPRESSION STOCKINGS  INSTRUCTIONS: Please measure and distribute 1 pair of 30mmHg - 40mmHg knee high ULCERCARE stockings                     COMPRESSION STOCKINGS  INSTRUCTIONS: Please measure and distribute 1 pair of 20mmHg - 30mmHg Thigh high open or closed toe compression stockings. Jobst ultrasheer or equivalent.                     COMPRESSION STOCKINGS  INSTRUCTIONS: Please measure and distribute 1 pair of 20mmHg - 30mmHg knee high open or closed toe compression stockings. Jobst ultrasheer or equivalent.                     Telfa Non-Adherent 3\"X4\" Pads  INSTRUCTIONS: Cut to size for open areas on " the lower extremities and secure with paper tape. Change dressing every other day.                       ASK your doctor about these medications          Morning Afternoon Evening Bedtime As Needed    Acetaminophen 8 Hour 650 MG 8 hour tablet  INSTRUCTIONS: Take 650 mg by mouth every 4 hours as needed.  Generic drug: acetaminophen                     amLODIPine 10 MG tablet  Also known as: NORVASC  INSTRUCTIONS: Take 1 tablet (10 mg) by mouth daily                     ferrous gluconate 324 (38 Fe) MG tablet  Also known as: FERGON  INSTRUCTIONS: Take 324 mg by mouth                     metoprolol tartrate 100 MG tablet  Also known as: LOPRESSOR  INSTRUCTIONS: Take 2 tablets (200 mg) by mouth 2 times daily                     Multi-vitamin tablet  INSTRUCTIONS: Take 1 tablet by mouth every morning  Generic drug: multivitamin w/minerals                     mupirocin 2 % external ointment  Also known as: BACTROBAN  INSTRUCTIONS: Apply to open areas with dressing changes  Ask about: Which instructions should I use?                     PRILOSEC PO  INSTRUCTIONS: Take 20 mg by mouth as needed                     sodium chloride 0.65 % nasal spray  Also known as: Ocean Nasal Spray  INSTRUCTIONS: Spray 1 spray into both nostrils daily as needed for congestion                     traMADol 50 MG tablet  Also known as: ULTRAM  INSTRUCTIONS: Take 1-2 tablets ( mg) by mouth every 6 hours as needed for severe pain                     triamcinolone 0.1 % external cream  Also known as: KENALOG  INSTRUCTIONS: Apply twice daily for 2 weeks then 3 times weekly for 2 week, repeat cycle as needed                     TYLENOL PM EXTRA STRENGTH PO  INSTRUCTIONS: Take 2 tablets by mouth At Bedtime

## 2021-02-19 ENCOUNTER — TELEPHONE (OUTPATIENT)
Dept: VASCULAR SURGERY | Facility: CLINIC | Age: 81
End: 2021-02-19

## 2021-02-19 NOTE — TELEPHONE ENCOUNTER
Called pt to fup on her s/p let leg evla with Dr Jain.      Inquired on how she is doing. She is doing well with no issues.     Reminded her of compression stocking use and that she is to fup with her 1 week US next week.     Informed her that afterwards we will plan on her returning in 1 mos time for another US and followup    Will check on her after her US next week.     She verbalized understanding    Veronica REYNOLDS RN, BSN  Interventional Radiology/Vascular  Nurse Coordinator   Phone: [phone]  Fax: [phone]

## 2021-02-22 ENCOUNTER — OFFICE VISIT (OUTPATIENT)
Dept: DERMATOLOGY | Facility: CLINIC | Age: 81
End: 2021-02-22
Payer: MEDICARE

## 2021-02-22 DIAGNOSIS — I83.008: Primary | ICD-10-CM

## 2021-02-22 DIAGNOSIS — L97.809: Primary | ICD-10-CM

## 2021-02-22 PROCEDURE — 99213 OFFICE O/P EST LOW 20 MIN: CPT | Performed by: DERMATOLOGY

## 2021-02-22 ASSESSMENT — PAIN SCALES - GENERAL: PAINLEVEL: NO PAIN (0)

## 2021-02-22 NOTE — PROGRESS NOTES
[facility] Dermatology Note  Encounter Date: Feb 22, 2021  Office Visit     Dermatology Problem List:  # Pertinent PMH: RA previously on methotrexate.  1. Venous stasis ulcers  - s/p Laser ablation of left great saphenous vein 2/16/21  -s/p Unna boot with significant improvement  - compression stockings; telfa/mupirocin 2% ointment  - co-follows with vascular surgery  2. Atopic dermatitis.  - Prior rx: was on methotrexate for RA previously, discontinued ~11/2019; cyclosporine, discontinued 06/28/2010  - Prior topicals: fluocinolone 0.025% ointment, TMC 0.1% ointment    ____________________________________________    Assessment & Plan:    1. Venous stasis ulcers, resolving. Recent TTE wnl 2018. Has had multiple prior LE ultrasounds demonstrating no evidence of DVT and thickening of left common femoral and proximal veins. Venous ultrasound 12/14/2020 c/w venous insufficency (see above). Since last visit, has had venous ablatio 2/16/21 with repeat venous ultrasound scheduled for 2/23/2021.  She continues using compression stockings on left leg, edema significantly improved.   - Recommended vaseline for crusted erosion on the left shin  - Follow-up with vascular surgery as scheduled    Procedures Performed:   None    Follow-up: As needed in-person for new or changing lesions    Staff and medical student:   I saw and examined this patient in the presence of Dr. Orourke.    Karie Álvarez, MS4  [facility]    Staff Physician:  I was present with the medical student who participated in the service and in the documentation of the note. I have verified the history and personally performed the physical exam and medical decision making. I agree with the assessment and plan of care as documented in the note.     Ra Orourke MD  Pronouns: he/him/his    Department of Dermatology  [facility]: Phone: [phone],  Fax:[phone]  [facility]: Phone: [phone] Fax: [phone]      ____________________________________________    CC: Follow Up (for lower legs. Had procedure to left leg last week. States it went well. Said that sores have scabbed over and is not doing anything for wound care.)      HPI:  Ms. Kimberly Chau is a(n) 80 year old female who presents today as a return patient for wound care. Last seen 4 weeks ago when we addressed wound care regimen. She has had no new ulcers since the last visit. She states the left side ulcer is worse than the right, but both are healing well. The have scabbed over and have no new drainage or bleeding. She had an uncomplicated ultrasound guided laser ablation of the left great saphenous vein no 2/16/21. Since then, she has improved significantly. She no longer is doing any wound care on the ulcers for the past several days. She continues wearing compression stockings on the left leg. She is wearing compression stockings (knee high) nearly the entire day and evening. Her lower extremity edema has improved since the surgery. She has a left lower extremity US scheduled for tomorrow. Patient is otherwise feeling well, without additional concerns.     Labs:  Imaging reviewed.    TTE 2/18  Left ventricular function, chamber size, wall motion, and wall thickness are  normal.The EF is 60-65%.  Global right ventricular function is normal.  The thoracic aorta is normal. The inferior vena cava was normal in size with  preserved respiratory variability. Estimated right atrial pressure is < 5  mmHg.  No pericardial effusion is present.     Lower extremity ultrasound 5/15/19     FINDINGS:   The left common femoral, femoral, popliteal, posterior tibial , and  peroneal veins are fully compressible with patent Doppler wave forms.  No thrombus is identified within them on grayscale imaging.  Redemonstration of wall thickening in the left common  femoral and  proximal femoral vein. Mild amount of subcutaneous edema in the left  Calf.     Venous Ultrasound 2020  1. Right lea. No deep or superficial venous thrombosis.  1b. Deep venous incompetence of the common femoral vein, the remainder  of the deep veins are competent.  1c. Superficial venous incompetence involving the great saphenous vein  in the distal thigh and the small saphenous vein from the  saphenofemoral popliteal junction through the mid calf. Remainder of  the superficial veins are competent.  1d. No incompetent  or varicose veins.     2. Left lea. No deep or superficial venous thrombosis.  2b. No deep venous incompetency.  2c. Superficial venous incompetency of the great saphenous vein from  the mid thigh continuously through the mid calf.  2d. No incompetent  or varicose veins.     IMPRESSION:    1. No DVT demonstrated in left lower extremity.  2. Mild subcutaneous edema in the left calf, significantly improved  from prior.    Physical Exam:   Vitals: There were no vitals taken for this visit.  SKIN: Focused examination of the bilateral lower extremities was performed.  - No pitting edema on the right lower extremity to the knee; improved from prior  - No pitting edema on the left lower extremity to the knee; improved from prior  - Several shallow ulcers with central red granulation tissue on bilateral shins, appears improved from prior with decreased surrounding erythema and resolution of fibrinuos yellow debris  - One shallow ulcer with central fibrinous yellow debris and central red granulation tissue on left shin. Improved from prior with increased granulation tissue centrally.   - No other lesions of concern on areas examined.     Medications:  Current Outpatient Medications   Medication     acetaminophen 650 MG TABS     amLODIPine (NORVASC) 10 MG tablet     COMPRESSION STOCKINGS     COMPRESSION STOCKINGS     COMPRESSION STOCKINGS      "Diphenhydramine-APAP, sleep, (TYLENOL PM EXTRA STRENGTH PO)     ferrous gluconate (FERGON) 324 (38 Fe) MG tablet     Gauze Pads & Dressings (TELFA NON-ADHERENT) 3\"X4\" PADS     metoprolol tartrate (LOPRESSOR) 100 MG tablet     Multiple Vitamin (MULTI-VITAMIN) per tablet     mupirocin (BACTROBAN) 2 % external ointment     Omeprazole (PRILOSEC PO)     sodium chloride (OCEAN NASAL SPRAY) 0.65 % nasal spray     traMADol (ULTRAM) 50 MG tablet     triamcinolone (KENALOG) 0.1 % external cream     Current Facility-Administered Medications   Medication     fluocinonide (LIDEX) 0.05 % ointment     fluocinonide (LIDEX) 0.05 % ointment     mupirocin (BACTROBAN) 2 % ointment     mupirocin (BACTROBAN) 2 % ointment      Past Medical/Surgical History:   Patient Active Problem List   Diagnosis     Intrinsic atopic dermatitis     Essential hypertension     Rheumatoid arthritis (H)     Retinal artery occlusion     Cervical vertebral fracture (H)     Central retinal artery occlusion of right eye     Bilateral occipital neuralgia     C1-C2 instability     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 factor (H)     Osteoarthritis     Malignant hypertension     Hyponatremia     GERD (gastroesophageal reflux disease)     Eczematous dermatitis     Anxiety state     Anemia     Closed fracture of distal end of left radius with delayed healing, unspecified fracture morphology, subsequent encounter     Closed fracture of distal end of left radius, unspecified fracture morphology, sequela     Past Medical History:   Diagnosis Date     Anemia      Arthritis      Cataracts      Central retinal artery occlusion      Cervical spine fracture (H)     After a fall from orthostatic sx     Chronic pain     Back of head     Gastro-oesophageal reflux disease      Head injury      Hypertension      Hyponatremia     Resolved, 2/2 diuretics     Migraines      Osteoporosis      Pneumonia 2018     Rheumatoid arthritis(714.0)      "

## 2021-02-22 NOTE — LETTER
2/22/2021         RE: Kimberly Chau  [address]   [place] [address]        Dear Colleague,    Thank you for referring your patient, Kimberly Chau, to the [facility]. Please see a copy of my visit note below.    [facility] Dermatology Note  Encounter Date: Feb 22, 2021  Office Visit     Dermatology Problem List:  # Pertinent PMH: RA previously on methotrexate.  1. Venous stasis ulcers  - s/p Laser ablation of left great saphenous vein 2/16/21  -s/p Unna boot with significant improvement  - compression stockings; telfa/mupirocin 2% ointment  - co-follows with vascular surgery  2. Atopic dermatitis.  - Prior rx: was on methotrexate for RA previously, discontinued ~11/2019; cyclosporine, discontinued 06/28/2010  - Prior topicals: fluocinolone 0.025% ointment, TMC 0.1% ointment    ____________________________________________    Assessment & Plan:    1. Venous stasis ulcers, resolving. Recent TTE wnl 2018. Has had multiple prior LE ultrasounds demonstrating no evidence of DVT and thickening of left common femoral and proximal veins. Venous ultrasound 12/14/2020 c/w venous insufficency (see above). Since last visit, has had venous ablatio 2/16/21 with repeat venous ultrasound scheduled for 2/23/2021.  She continues using compression stockings on left leg, edema significantly improved.   - Recommended vaseline for crusted erosion on the left shin  - Follow-up with vascular surgery as scheduled    Procedures Performed:   None    Follow-up: As needed in-person for new or changing lesions    Staff and medical student:   I saw and examined this patient in the presence of Dr. Orourke.    Karie Álvarez, MS4  [facility]    Staff Physician:  I was present with the medical student who participated in the service and in the documentation of the note. I have verified the history and personally performed the physical exam and medical decision making. I agree with  the assessment and plan of care as documented in the note.     Ra Orourke MD  Pronouns: he/him/his    Department of Dermatology  [facility]: Phone: [phone], Fax:[phone]  [facility]: Phone: [phone] Fax: [phone]      ____________________________________________    CC: Follow Up (for lower legs. Had procedure to left leg last week. States it went well. Said that sores have scabbed over and is not doing anything for wound care.)      HPI:  Ms. Kimberly Chau is a(n) 80 year old female who presents today as a return patient for wound care. Last seen 4 weeks ago when we addressed wound care regimen. She has had no new ulcers since the last visit. She states the left side ulcer is worse than the right, but both are healing well. The have scabbed over and have no new drainage or bleeding. She had an uncomplicated ultrasound guided laser ablation of the left great saphenous vein no 2/16/21. Since then, she has improved significantly. She no longer is doing any wound care on the ulcers for the past several days. She continues wearing compression stockings on the left leg. She is wearing compression stockings (knee high) nearly the entire day and evening. Her lower extremity edema has improved since the surgery. She has a left lower extremity US scheduled for tomorrow. Patient is otherwise feeling well, without additional concerns.     Labs:  Imaging reviewed.    TTE 2/18  Left ventricular function, chamber size, wall motion, and wall thickness are  normal.The EF is 60-65%.  Global right ventricular function is normal.  The thoracic aorta is normal. The inferior vena cava was normal in size with  preserved respiratory variability. Estimated right atrial pressure is < 5  mmHg.  No pericardial effusion is present.     Lower extremity ultrasound 5/15/19     FINDINGS:   The left common  femoral, femoral, popliteal, posterior tibial , and  peroneal veins are fully compressible with patent Doppler wave forms.  No thrombus is identified within them on grayscale imaging.  Redemonstration of wall thickening in the left common femoral and  proximal femoral vein. Mild amount of subcutaneous edema in the left  Calf.     Venous Ultrasound 2020  1. Right lea. No deep or superficial venous thrombosis.  1b. Deep venous incompetence of the common femoral vein, the remainder  of the deep veins are competent.  1c. Superficial venous incompetence involving the great saphenous vein  in the distal thigh and the small saphenous vein from the  saphenofemoral popliteal junction through the mid calf. Remainder of  the superficial veins are competent.  1d. No incompetent  or varicose veins.     2. Left lea. No deep or superficial venous thrombosis.  2b. No deep venous incompetency.  2c. Superficial venous incompetency of the great saphenous vein from  the mid thigh continuously through the mid calf.  2d. No incompetent  or varicose veins.     IMPRESSION:    1. No DVT demonstrated in left lower extremity.  2. Mild subcutaneous edema in the left calf, significantly improved  from prior.    Physical Exam:   Vitals: There were no vitals taken for this visit.  SKIN: Focused examination of the bilateral lower extremities was performed.  - No pitting edema on the right lower extremity to the knee; improved from prior  - No pitting edema on the left lower extremity to the knee; improved from prior  - Several shallow ulcers with central red granulation tissue on bilateral shins, appears improved from prior with decreased surrounding erythema and resolution of fibrinuos yellow debris  - One shallow ulcer with central fibrinous yellow debris and central red granulation tissue on left shin. Improved from prior with increased granulation tissue centrally.   - No other lesions of concern on  "areas examined.     Medications:  Current Outpatient Medications   Medication     acetaminophen 650 MG TABS     amLODIPine (NORVASC) 10 MG tablet     COMPRESSION STOCKINGS     COMPRESSION STOCKINGS     COMPRESSION STOCKINGS     Diphenhydramine-APAP, sleep, (TYLENOL PM EXTRA STRENGTH PO)     ferrous gluconate (FERGON) 324 (38 Fe) MG tablet     Gauze Pads & Dressings (TELFA NON-ADHERENT) 3\"X4\" PADS     metoprolol tartrate (LOPRESSOR) 100 MG tablet     Multiple Vitamin (MULTI-VITAMIN) per tablet     mupirocin (BACTROBAN) 2 % external ointment     Omeprazole (PRILOSEC PO)     sodium chloride (OCEAN NASAL SPRAY) 0.65 % nasal spray     traMADol (ULTRAM) 50 MG tablet     triamcinolone (KENALOG) 0.1 % external cream     Current Facility-Administered Medications   Medication     fluocinonide (LIDEX) 0.05 % ointment     fluocinonide (LIDEX) 0.05 % ointment     mupirocin (BACTROBAN) 2 % ointment     mupirocin (BACTROBAN) 2 % ointment      Past Medical/Surgical History:   Patient Active Problem List   Diagnosis     Intrinsic atopic dermatitis     Essential hypertension     Rheumatoid arthritis (H)     Retinal artery occlusion     Cervical vertebral fracture (H)     Central retinal artery occlusion of right eye     Bilateral occipital neuralgia     C1-C2 instability     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 factor (H)     Osteoarthritis     Malignant hypertension     Hyponatremia     GERD (gastroesophageal reflux disease)     Eczematous dermatitis     Anxiety state     Anemia     Closed fracture of distal end of left radius with delayed healing, unspecified fracture morphology, subsequent encounter     Closed fracture of distal end of left radius, unspecified fracture morphology, sequela     Past Medical History:   Diagnosis Date     Anemia      Arthritis      Cataracts      Central retinal artery occlusion      Cervical spine fracture (H)     After a fall from orthostatic sx     Chronic pain     " Back of head     Gastro-oesophageal reflux disease      Head injury      Hypertension      Hyponatremia     Resolved, 2/2 diuretics     Migraines      Osteoporosis      Pneumonia 2018     Rheumatoid arthritis(714.0)        Again, thank you for allowing me to participate in the care of your patient.        Sincerely,        Ra Orourke MD

## 2021-02-22 NOTE — NURSING NOTE
Kimberly Chau's goals for this visit include:   Chief Complaint   Patient presents with     Follow Up     for lower legs. Had procedure to left leg last week. States it went well. Said that sores have scabbed over and is not doing anything for wound care.     She requests these members of her care team be copied on today's visit information:     PCP: Guillermo Castellano    Referring Provider:  No referring provider defined for this encounter.    There were no vitals taken for this visit.    Do you need any medication refills at today's visit? No    Alpa Evans LPN

## 2021-02-23 ENCOUNTER — ANCILLARY PROCEDURE (OUTPATIENT)
Dept: ULTRASOUND IMAGING | Facility: CLINIC | Age: 81
End: 2021-02-23
Attending: RADIOLOGY
Payer: MEDICARE

## 2021-02-23 DIAGNOSIS — I83.893 SYMPTOMATIC VARICOSE VEINS OF BOTH LOWER EXTREMITIES: ICD-10-CM

## 2021-02-23 PROCEDURE — 93971 EXTREMITY STUDY: CPT | Mod: LT | Performed by: RADIOLOGY

## 2021-03-03 ENCOUNTER — IMMUNIZATION (OUTPATIENT)
Dept: PEDIATRICS | Facility: CLINIC | Age: 81
End: 2021-03-03
Attending: INTERNAL MEDICINE
Payer: MEDICARE

## 2021-03-03 PROCEDURE — 0002A PR COVID VAC PFIZER DIL RECON 30 MCG/0.3 ML IM: CPT

## 2021-03-03 PROCEDURE — 91300 PR COVID VAC PFIZER DIL RECON 30 MCG/0.3 ML IM: CPT

## 2021-03-08 DIAGNOSIS — I83.893 SYMPTOMATIC VARICOSE VEINS OF BOTH LOWER EXTREMITIES: Primary | ICD-10-CM

## 2021-03-11 ENCOUNTER — ANCILLARY PROCEDURE (OUTPATIENT)
Dept: GENERAL RADIOLOGY | Facility: CLINIC | Age: 81
End: 2021-03-11
Attending: INTERNAL MEDICINE
Payer: MEDICARE

## 2021-03-11 ENCOUNTER — OFFICE VISIT (OUTPATIENT)
Dept: INTERNAL MEDICINE | Facility: CLINIC | Age: 81
End: 2021-03-11
Payer: MEDICARE

## 2021-03-11 VITALS
OXYGEN SATURATION: 97 % | WEIGHT: 100 LBS | SYSTOLIC BLOOD PRESSURE: 178 MMHG | HEART RATE: 69 BPM | DIASTOLIC BLOOD PRESSURE: 77 MMHG | BODY MASS INDEX: 19.53 KG/M2

## 2021-03-11 DIAGNOSIS — G89.29 OTHER CHRONIC BACK PAIN: ICD-10-CM

## 2021-03-11 DIAGNOSIS — M54.89 OTHER CHRONIC BACK PAIN: Primary | ICD-10-CM

## 2021-03-11 DIAGNOSIS — M54.89 OTHER CHRONIC BACK PAIN: ICD-10-CM

## 2021-03-11 DIAGNOSIS — G89.29 OTHER CHRONIC BACK PAIN: Primary | ICD-10-CM

## 2021-03-11 DIAGNOSIS — D64.9 ANEMIA, UNSPECIFIED TYPE: ICD-10-CM

## 2021-03-11 LAB
BASOPHILS # BLD AUTO: 0 10E9/L (ref 0–0.2)
BASOPHILS NFR BLD AUTO: 0.6 %
DIFFERENTIAL METHOD BLD: ABNORMAL
EOSINOPHIL # BLD AUTO: 0.2 10E9/L (ref 0–0.7)
EOSINOPHIL NFR BLD AUTO: 2.8 %
ERYTHROCYTE [DISTWIDTH] IN BLOOD BY AUTOMATED COUNT: 14 % (ref 10–15)
FERRITIN SERPL-MCNC: 430 NG/ML (ref 8–252)
FOLATE SERPL-MCNC: 27.9 NG/ML
HCT VFR BLD AUTO: 35.6 % (ref 35–47)
HGB BLD-MCNC: 11.4 G/DL (ref 11.7–15.7)
IMM GRANULOCYTES # BLD: 0 10E9/L (ref 0–0.4)
IMM GRANULOCYTES NFR BLD: 0.3 %
IRON SATN MFR SERPL: 23 % (ref 15–46)
IRON SERPL-MCNC: 52 UG/DL (ref 35–180)
LYMPHOCYTES # BLD AUTO: 1 10E9/L (ref 0.8–5.3)
LYMPHOCYTES NFR BLD AUTO: 14.7 %
MCH RBC QN AUTO: 32 PG (ref 26.5–33)
MCHC RBC AUTO-ENTMCNC: 32 G/DL (ref 31.5–36.5)
MCV RBC AUTO: 100 FL (ref 78–100)
MONOCYTES # BLD AUTO: 0.8 10E9/L (ref 0–1.3)
MONOCYTES NFR BLD AUTO: 11.9 %
NEUTROPHILS # BLD AUTO: 4.8 10E9/L (ref 1.6–8.3)
NEUTROPHILS NFR BLD AUTO: 69.7 %
NRBC # BLD AUTO: 0 10*3/UL
NRBC BLD AUTO-RTO: 0 /100
PLATELET # BLD AUTO: 333 10E9/L (ref 150–450)
RBC # BLD AUTO: 3.56 10E12/L (ref 3.8–5.2)
TIBC SERPL-MCNC: 230 UG/DL (ref 240–430)
VIT B12 SERPL-MCNC: 619 PG/ML (ref 193–986)
WBC # BLD AUTO: 6.8 10E9/L (ref 4–11)

## 2021-03-11 PROCEDURE — 99214 OFFICE O/P EST MOD 30 MIN: CPT | Performed by: INTERNAL MEDICINE

## 2021-03-11 PROCEDURE — 83540 ASSAY OF IRON: CPT | Performed by: PATHOLOGY

## 2021-03-11 PROCEDURE — 36415 COLL VENOUS BLD VENIPUNCTURE: CPT | Performed by: PATHOLOGY

## 2021-03-11 PROCEDURE — 82607 VITAMIN B-12: CPT | Performed by: PATHOLOGY

## 2021-03-11 PROCEDURE — 82728 ASSAY OF FERRITIN: CPT | Performed by: PATHOLOGY

## 2021-03-11 PROCEDURE — 72110 X-RAY EXAM L-2 SPINE 4/>VWS: CPT | Performed by: RADIOLOGY

## 2021-03-11 PROCEDURE — 82746 ASSAY OF FOLIC ACID SERUM: CPT | Performed by: INTERNAL MEDICINE

## 2021-03-11 PROCEDURE — 83550 IRON BINDING TEST: CPT | Performed by: PATHOLOGY

## 2021-03-11 PROCEDURE — 85025 COMPLETE CBC W/AUTO DIFF WBC: CPT | Performed by: PATHOLOGY

## 2021-03-11 PROCEDURE — 72070 X-RAY EXAM THORAC SPINE 2VWS: CPT | Performed by: STUDENT IN AN ORGANIZED HEALTH CARE EDUCATION/TRAINING PROGRAM

## 2021-03-11 ASSESSMENT — PAIN SCALES - GENERAL: PAINLEVEL: NO PAIN (0)

## 2021-03-11 NOTE — NURSING NOTE
Chief Complaint   Patient presents with     Back Pain     Pt would like to discuss back pain      Lian Abdi EMT at 4:12 PM sign on 3/11/2021

## 2021-03-11 NOTE — PROGRESS NOTES
HPI:    Pt. With h/o RA not currently on medications comes in with about 2 months of lower-mid back pain. She states not better and maybe a little worse. She does not recall any injury. She has a hard time standing for a long period of time because of the pain. She is able to sleep and uses a heating pad. She has used Tylenol PM at night to help out. She has not used any additional analgesia for this pain. No  Urinary complaints. No radicular complaints. No other HEENT, cardiopulmonary, abdominal, , GYN, neurological, systemic, psychiatric complaints.     Past Medical History:   Diagnosis Date     Anemia      Arthritis      Cataracts      Central retinal artery occlusion      Cervical spine fracture (H)     After a fall from orthostatic sx     Chronic pain     Back of head     Gastro-oesophageal reflux disease      Head injury      Hypertension      Hyponatremia     Resolved, 2/2 diuretics     Migraines      Osteoporosis      Pneumonia 2018     Rheumatoid arthritis(714.0)    '  Past Surgical History:   Procedure Laterality Date     C HAND/FINGER SURGERY UNLISTED       C PELVIS/HIP JOINT SURGERY UNLISTED       COLONOSCOPY       EYE SURGERY Left 02/2019    Hole in macula     FUSION CERVICAL POSTERIOR THREE+ LEVELS  1/22/2013    Procedure: FUSION CERVICAL POSTERIOR THREE+ LEVELS;  Cervical 1-6 Posterior Instrumentation and Fusion, Cervical 3-4 Laminectomy  .Instrumentation and fusion to Cervical 6 *Latex Allergy*;  Surgeon: Ra Bar MD;  Location: [facility] OR     GYN SURGERY       HEAD & NECK SURGERY       HYSTERECTOMY       IR ENDOVENOUS ABLATION VARICOSE VEINS  2/16/2021     KNEE SURGERY       NECK SURGERY       OPEN REDUCTION INTERNAL FIXATION WRIST Left 4/30/2019    Procedure: Open Reduction Internal Fixation Left Distal Radius Fracture;  Surgeon: Dinh Strong MD;  Location:  OR     OPEN REDUCTION INTERNAL FIXATION WRIST Left 6/12/2020    Procedure: OPEN REDUCTION INTERNAL FIXATION Left Distal  Radius Nonunion, Distal Ulna Resection;  Surgeon: Dinh Strong MD;  Location: [facility] OR     OPTICAL TRACKING SYSTEM ENDOSCOPIC SINUS SURGERY Right 4/6/2018    Procedure: OPTICAL TRACKING SYSTEM ENDOSCOPIC SINUS SURGERY;  Stealth Assisted Right Maxillary Antrostomy, Anterior Ethmoidectomy And Frontal Sinusotomy;  Surgeon: Elyse Eisenberg MD;  Location: [facility] OR     OPTICAL TRACKING SYSTEM ENDOSCOPIC SINUS SURGERY Right 8/3/2018    Procedure: OPTICAL TRACKING SYSTEM ENDOSCOPIC SINUS SURGERY;  Stealth Assisted Revision Right Frontal Sinusotomy, Right Stent Placement  **Latex Allergy** ;  Surgeon: Elyse Eisenberg MD;  Location: [facility] OR     ORTHOPEDIC SURGERY       REMOVE HARDWARE WRIST Left 11/19/2019    Procedure: Left Distal Radius Hardware Removal, Flexor Pollicus Longus Repair, Deep Cultures;  Surgeon: Dinh Strong MD;  Location: [facility] OR     PE:    Vitals noted, gen, nad, cooperative, alert, neck supple nl rom, lungs with good air movement, RRR, S1, S2, no MRG, abdomen, no acute findings. She has point tenderness to lower thoracic and upper lumbar area.     A/P:    1. Back pain. Plain X-rays today. If unrevealing or worse will get either/and CT/MRI spinal imaging  2. She states she has completed the COVID vaccination series  3. HTN; she remains on Amlodipine and Metoprolol (large dose). Pain Related? Will follow and clarify if she is taking her medications?   4. Anemia, CBC; Hgb done 1/6/2021 10.3 and ordered future labs on 1/8/2021 and will do today 3/11/2021        30 minutes spent on the date of the encounter doing chart review, history and exam, documentation and further activities as noted above

## 2021-03-12 LAB
RADIOLOGIST FLAGS: ABNORMAL
RADIOLOGIST FLAGS: NORMAL

## 2021-03-13 ENCOUNTER — TELEPHONE (OUTPATIENT)
Dept: INTERNAL MEDICINE | Facility: CLINIC | Age: 81
End: 2021-03-13

## 2021-03-13 DIAGNOSIS — S22.000A THORACIC COMPRESSION FRACTURE, CLOSED, INITIAL ENCOUNTER (H): Primary | ICD-10-CM

## 2021-03-13 DIAGNOSIS — D53.8 OTHER SPECIFIED NUTRITIONAL ANEMIAS: ICD-10-CM

## 2021-03-13 NOTE — TELEPHONE ENCOUNTER
Placed future orders this encounter    Dear Kimberly;    Your X-rays show you have a compression fracture and I recommend you see Dr. Hatch, Orthopedic spine and I placed a referral for this today. Also we are going to get a dedicated thoracic CT scan.    RADHA Castellano MD

## 2021-03-15 ENCOUNTER — ANCILLARY PROCEDURE (OUTPATIENT)
Dept: CT IMAGING | Facility: CLINIC | Age: 81
End: 2021-03-15
Attending: INTERNAL MEDICINE
Payer: MEDICARE

## 2021-03-15 ENCOUNTER — ANCILLARY PROCEDURE (OUTPATIENT)
Dept: ULTRASOUND IMAGING | Facility: CLINIC | Age: 81
End: 2021-03-15
Attending: RADIOLOGY
Payer: MEDICARE

## 2021-03-15 DIAGNOSIS — I83.893 SYMPTOMATIC VARICOSE VEINS OF BOTH LOWER EXTREMITIES: ICD-10-CM

## 2021-03-15 DIAGNOSIS — S22.000A THORACIC COMPRESSION FRACTURE, CLOSED, INITIAL ENCOUNTER (H): ICD-10-CM

## 2021-03-15 DIAGNOSIS — D53.8 OTHER SPECIFIED NUTRITIONAL ANEMIAS: ICD-10-CM

## 2021-03-15 PROCEDURE — G1004 CDSM NDSC: HCPCS | Performed by: RADIOLOGY

## 2021-03-15 PROCEDURE — 72128 CT CHEST SPINE W/O DYE: CPT | Mod: MG | Performed by: RADIOLOGY

## 2021-03-15 PROCEDURE — 93971 EXTREMITY STUDY: CPT | Mod: LT | Performed by: RADIOLOGY

## 2021-03-17 ENCOUNTER — VIRTUAL VISIT (OUTPATIENT)
Dept: VASCULAR SURGERY | Facility: CLINIC | Age: 81
End: 2021-03-17
Payer: MEDICARE

## 2021-03-17 DIAGNOSIS — I87.8 VENOUS STASIS: ICD-10-CM

## 2021-03-17 DIAGNOSIS — I83.893 SYMPTOMATIC VARICOSE VEINS OF BOTH LOWER EXTREMITIES: Primary | ICD-10-CM

## 2021-03-17 PROCEDURE — 99213 OFFICE O/P EST LOW 20 MIN: CPT | Mod: 95 | Performed by: RADIOLOGY

## 2021-03-17 ASSESSMENT — PAIN SCALES - GENERAL: PAINLEVEL: SEVERE PAIN (6)

## 2021-03-17 NOTE — PROGRESS NOTES
Kimberly is a 80 year old who is being evaluated via a billable telephone visit.      What phone number would you like to be contacted at? [phone]  How would you like to obtain your AVS? Luisana      Subjective   Kimberly is a 80 year old who follows up 1 month after EVLA of left GSV     HPI     Ms. Chau is a very nice 79 yo being interviewed via telephone visit for f/u after EVLA of her leg GSV on 2/16/2021 for slowly healing venous stasis ulcers, which were almost fully healed. She is doing well since treatment with no complaints. She continues to wear her compression stockings. No new swelling. Her wounds have scabbed over and healed bilaterally.    Review of Systems     Objective    Vitals - Patient Reported  Pain Score: Severe Pain (6)  Pain Loc: Low Back      Physical Exam       Imaging: I reviewed the post EVLA duplex venous ultrasound on 3/15/2021 which demonstrates ablated in thigh.     A/P: ~1 month s/p EVLA for left GSV insufficiency in setting of chronic venous stasis and slow healing ulcers, wounds now fully healed. Continue to wear compression stockings as tolerated. F/u ultrasound post-EVLA duplex at 6 months.    Phone call duration: 15 minutes

## 2021-03-17 NOTE — NURSING NOTE
Vascular Rooming Note     Kimberly Chau's goals for this visit include:   Chief Complaint   Patient presents with     RECHECK     Kimberly, is particpating in virtual visit today for 1 month f/u s/p left leg gsv evla, feeling good, no concerns as this time, as reported by patient.     Fallon Anguiano LPN

## 2021-03-17 NOTE — LETTER
3/17/2021       RE: Kimberly Chau  [id number]   [place] [address]     Dear Colleague,    Thank you for referring your patient, Kimberly Chau, to the [facility] [place] [facility]. Please see a copy of my visit note below.    Kimberly is a 80 year old who is being evaluated via a billable telephone visit.      What phone number would you like to be contacted at? [phone]  How would you like to obtain your AVS? Jodyhart      Subjective   Kimberly is a 80 year old who follows up 1 month after EVLA of left GSV     HPI     Ms. Chau is a very nice 81 yo being interviewed via telephone visit for f/u after EVLA of her leg GSV on 2/16/2021 for slowly healing venous stasis ulcers, which were almost fully healed. She is doing well since treatment with no complaints. She continues to wear her compression stockings. No new swelling. Her wounds have scabbed over and healed bilaterally.    Review of Systems     Objective    Vitals - Patient Reported  Pain Score: Severe Pain (6)  Pain Loc: Low Back    Physical Exam       Imaging: I reviewed the post EVLA duplex venous ultrasound on 3/15/2021 which demonstrates ablated in thigh.     A/P: ~1 month s/p EVLA for left GSV insufficiency in setting of chronic venous stasis and slow healing ulcers, wounds now fully healed. Continue to wear compression stockings as tolerated. F/u ultrasound post-EVLA duplex at 6 months.    Phone call duration: 15 minutes    Again, thank you for allowing me to participate in the care of your patient.      Sincerely,    Davy Jain MD

## 2021-03-19 NOTE — TELEPHONE ENCOUNTER
RECORDS RECEIVED FROM: T11 compression fracture referred by Dr. Marshal lunas per Jayla    DATE RECEIVED: Mar 23, 2021     NOTES STATUS DETAILS   OFFICE NOTE from referring provider Internal  Guillermo Castellano MD      OFFICE NOTE from other specialist N/A    DISCHARGE SUMMARY from hospital N/A    DISCHARGE REPORT from the ER N/A    OPERATIVE REPORT N/A    MEDICATION LIST Internal    IMPLANT RECORD/STICKER N/A    LABS     CBC/DIFF N/A    CULTURES N/A    INJECTIONS DONE IN RADIOLOGY N/A    MRI N/A    CT SCAN Internal    XRAYS (IMAGES & REPORTS) Internal    TUMOR     PATHOLOGY  Slides & report N/A      03/19/21   10:39 AM   COMPLETE  Constance Manzano CMA

## 2021-03-21 ENCOUNTER — HEALTH MAINTENANCE LETTER (OUTPATIENT)
Age: 81
End: 2021-03-21

## 2021-03-23 ENCOUNTER — PRE VISIT (OUTPATIENT)
Dept: ORTHOPEDICS | Facility: CLINIC | Age: 81
End: 2021-03-23

## 2021-03-23 ENCOUNTER — OFFICE VISIT (OUTPATIENT)
Dept: ORTHOPEDICS | Facility: CLINIC | Age: 81
End: 2021-03-23
Payer: MEDICARE

## 2021-03-23 VITALS — HEIGHT: 60 IN | WEIGHT: 100 LBS | BODY MASS INDEX: 19.63 KG/M2

## 2021-03-23 DIAGNOSIS — M86.9 OSTEOMYELITIS OF LEFT LEG (H): ICD-10-CM

## 2021-03-23 DIAGNOSIS — M54.6 PAIN IN THORACIC SPINE: ICD-10-CM

## 2021-03-23 DIAGNOSIS — M80.08XA AGE-RELATED OSTEOPOROSIS WITH CURRENT PATHOLOGICAL FRACTURE, VERTEBRA(E), INITIAL ENCOUNTER FOR FRACTURE (H): Primary | ICD-10-CM

## 2021-03-23 DIAGNOSIS — M05.741 RHEUMATOID ARTHRITIS INVOLVING BOTH HANDS WITH POSITIVE RHEUMATOID FACTOR (H): ICD-10-CM

## 2021-03-23 DIAGNOSIS — M05.742 RHEUMATOID ARTHRITIS INVOLVING BOTH HANDS WITH POSITIVE RHEUMATOID FACTOR (H): ICD-10-CM

## 2021-03-23 PROCEDURE — 99214 OFFICE O/P EST MOD 30 MIN: CPT | Performed by: ORTHOPAEDIC SURGERY

## 2021-03-23 ASSESSMENT — MIFFLIN-ST. JEOR: SCORE: 835.1

## 2021-03-23 NOTE — NURSING NOTE
Reason For Visit:   Chief Complaint   Patient presents with     Consult     compression fracture T11 referred by Dr. Castellano. patient stated that she is having pain directly over her spine at T11 has been having symptoms since january 2021       Primary MD: Guillermo Castellano  Ref. MD: Gray     ?  No  Occupation retired .  Currently working? No.  Work status?  Retired.  Date of injury: January   Type of injury: chronic .  Date of surgery: cervicale 1/22/13  Type of surgery: cervicle fusion .  Smoker: No  Request smoking cessation information: No    Ht 1.524 m (5')   Wt 45.4 kg (100 lb)   BMI 19.53 kg/m           Oswestry (FABIO) Questionnaire    OSWESTRY DISABILITY INDEX 3/20/2021   Count 9   Sum 21   Oswestry Score (%) 46.67   Some recent data might be hidden            Neck Disability Index (NDI) Questionnaire    Neck Disability Index (NDI) 4/4/2017   Neck Disability Index: Count 10   NDI: Total Score = SUM (points for all 10 findings) 6   Neck Disability in Percent = (Total Score) / 50 * 100 12 (%)   Some recent data might be hidden                   Promis 10 Assessment    PROMIS 10 4/4/2017   In general, would you say your health is: Very Good = 4   In general, would you say your quality of life is: Excellent = 5   In general, how would you rate your physical health? Very good = 4   In general, how would you rate your mental health, including your mood and your ability to think? Excellent = 5   In general, how would you rate your satisfaction with your social activities and relationships? Excellent = 5   In general, please rate how well you carry out your usual social activities and roles Excellent = 5   To what extent are you able to carry out your everyday physical activities such as walking, climbing stairs, carrying groceries, or moving a chair? Not At All = 1   How often have you been bothered by emotional problems such as feeling anxious, depressed or irritable? Never = 5   How would you  rate your fatigue on average? Mild = 4   How would you rate your pain on average?   0 = No Pain  to  10 = Worst Imaginable Pain 5   Global Physical Health Score : Raw Score 12 (SUM : G03 - G06 - G07 - G08)   Global Mental Health Score : Raw Score 20 (SUM : G02 - G04 - G05 - G10)   Total (Physical + Mental Health Score) 32   Some recent data might be hidden                Nithin Abarca ATC

## 2021-03-23 NOTE — LETTER
3/23/2021         RE: Kimberly Chau  [address] [facility] [address]        Dear Colleague,    Thank you for referring your patient, Kimberly Chau, to the [facility] [place]. Please see a copy of my visit note below.    Spine Surgery Consultation    REFERRING PHYSICIAN: No ref. provider found   PRIMARY CARE PHYSICIAN: Guillremo Castellano           Chief Complaint:   Consult (compression fracture T11 referred by Dr. Castellano. patient stated that she is having pain directly over her spine at T11 has been having symptoms since january 2021)      History of Present Illness:     Kimberly Chau is a 80 year old female with PMH RA, CKD who presents today for evaluation of thoracic compression fracture. She saw Dr. Castellano on 3/11/21 with new onset LBP and then XR were done, and then a CT. Pain started in January, no trauma or particular event. The pain isn't significantly improving, pain is 7/10. No radiculopathy. No numbness. Uses a cane for intermittent unsteadiness but no falls. No bowel/bladder changes. Taking APAP, no significant relief. The only relief is with sitting in bed with heading pad.     She has h/o osteoporosis and had a ulnar fracture in 6/2020 that required surgery as well. Also had neck fracture 2013. No DEXA, no meds for this.    Oswestry (FABIO) Questionnaire    OSWESTRY DISABILITY INDEX 3/20/2021   Count 9   Sum 21   Oswestry Score (%) 46.67   Some recent data might be hidden     ROS             Past Medical History:     Past Medical History:   Diagnosis Date     Anemia      Arthritis      Cataracts      Central retinal artery occlusion      Cervical spine fracture (H)     After a fall from orthostatic sx     Chronic pain     Back of head     Gastro-oesophageal reflux disease      Head injury      Hypertension      Hyponatremia     Resolved, 2/2 diuretics     Migraines      Osteoporosis      Pneumonia 2018     Rheumatoid arthritis(714.0)             Past Surgical  History:     Past Surgical History:   Procedure Laterality Date     C HAND/FINGER SURGERY UNLISTED       C PELVIS/HIP JOINT SURGERY UNLISTED       COLONOSCOPY       EYE SURGERY Left 02/2019    Hole in macula     FUSION CERVICAL POSTERIOR THREE+ LEVELS  1/22/2013    Procedure: FUSION CERVICAL POSTERIOR THREE+ LEVELS;  Cervical 1-6 Posterior Instrumentation and Fusion, Cervical 3-4 Laminectomy  .Instrumentation and fusion to Cervical 6 *Latex Allergy*;  Surgeon: Ra Bar MD;  [facility]     GYN SURGERY       HEAD & NECK SURGERY       HYSTERECTOMY       IR ENDOVENOUS ABLATION VARICOSE VEINS  2/16/2021     KNEE SURGERY       NECK SURGERY       OPEN REDUCTION INTERNAL FIXATION WRIST Left 4/30/2019    Procedure: Open Reduction Internal Fixation Left Distal Radius Fracture;  Surgeon: Dinh Strong MD;  Location:  OR     OPEN REDUCTION INTERNAL FIXATION WRIST Left 6/12/2020    Procedure: OPEN REDUCTION INTERNAL FIXATION Left Distal Radius Nonunion, Distal Ulna Resection;  Surgeon: Dinh Strong MD;  Location:  OR     OPTICAL TRACKING SYSTEM ENDOSCOPIC SINUS SURGERY Right 4/6/2018    Procedure: OPTICAL TRACKING SYSTEM ENDOSCOPIC SINUS SURGERY;  Stealth Assisted Right Maxillary Antrostomy, Anterior Ethmoidectomy And Frontal Sinusotomy;  Surgeon: Elyse Eisenberg MD;  Location:  OR     OPTICAL TRACKING SYSTEM ENDOSCOPIC SINUS SURGERY Right 8/3/2018    Procedure: OPTICAL TRACKING SYSTEM ENDOSCOPIC SINUS SURGERY;  Stealth Assisted Revision Right Frontal Sinusotomy, Right Stent Placement  **Latex Allergy** ;  Surgeon: Elyse Eisenberg MD;  Location:  OR     ORTHOPEDIC SURGERY       REMOVE HARDWARE WRIST Left 11/19/2019    Procedure: Left Distal Radius Hardware Removal, Flexor Pollicus Longus Repair, Deep Cultures;  Surgeon: Dinh Strong MD;  Location:  OR            Social History:     Social History     Tobacco Use     Smoking status: Never Smoker     Smokeless tobacco: Never  "Used   Substance Use Topics     Alcohol use: Yes     Comment: 2 glasses of wine a day            Family History:     Family History   Problem Relation Age of Onset     Arthritis Mother      Respiratory Mother         pulmonary fibrosis     Hypertension Mother      Anemia Mother      Liver Disease Father         from EtOH     Alcoholism Father      Multiple Sclerosis Sister      No Known Problems Maternal Grandmother      Heart Disease Maternal Grandfather      Breast Cancer Sister      Glaucoma Paternal Grandfather      Heart Disease Paternal Grandfather             Allergies:     Allergies   Allergen Reactions     Hctz Other (See Comments)     Pt develops symptomatic and significant hyponatremia with HCTZ exposure     Hydrochlorothiazide      Other reaction(s): Hyponatremia  Hyponatremia     Latex      Benzocaine Rash     carba mix,thrium-sunscreen     Resorcinol-Alcohol Rash     carba mix,thrium-sunscreen     Ace Inhibitors Unknown     Dizziness and orthostatic changes and electrolyte problems.     Latex Unknown            Medications:     Current Outpatient Medications   Medication     acetaminophen 650 MG TABS     amLODIPine (NORVASC) 10 MG tablet     COMPRESSION STOCKINGS     COMPRESSION STOCKINGS     COMPRESSION STOCKINGS     Diphenhydramine-APAP, sleep, (TYLENOL PM EXTRA STRENGTH PO)     ferrous gluconate (FERGON) 324 (38 Fe) MG tablet     Gauze Pads & Dressings (TELFA NON-ADHERENT) 3\"X4\" PADS     metoprolol tartrate (LOPRESSOR) 100 MG tablet     Multiple Vitamin (MULTI-VITAMIN) per tablet     mupirocin (BACTROBAN) 2 % external ointment     Omeprazole (PRILOSEC PO)     sodium chloride (OCEAN NASAL SPRAY) 0.65 % nasal spray     traMADol (ULTRAM) 50 MG tablet     triamcinolone (KENALOG) 0.1 % external cream     Current Facility-Administered Medications   Medication     fluocinonide (LIDEX) 0.05 % ointment     fluocinonide (LIDEX) 0.05 % ointment     mupirocin (BACTROBAN) 2 % ointment     mupirocin (BACTROBAN) 2 " % ointment             Review of Systems:   Answers for HPI/ROS submitted by the patient on 3/23/2021   General Symptoms: Yes  Skin Symptoms: No  HENT Symptoms: No  EYE SYMPTOMS: No  HEART SYMPTOMS: No  LUNG SYMPTOMS: No  INTESTINAL SYMPTOMS: No  URINARY SYMPTOMS: No  GYNECOLOGIC SYMPTOMS: No  BREAST SYMPTOMS: No  SKELETAL SYMPTOMS: Yes  BLOOD SYMPTOMS: No  NERVOUS SYSTEM SYMPTOMS: No  MENTAL HEALTH SYMPTOMS: No         Physical Exam:     Vitals: Ht 1.524 m (5')   Wt 45.4 kg (100 lb)   BMI 19.53 kg/m      Constitutional: Patient is healthy, well-nourished and appears stated age.    Respiratory: Patient is breathing normally and in no respiratory distress.    Skin: No suspicious rashes or lesions    Gait: Non-antalgic gait without use of assistive devices. Able to tandem gait without difficulty.     Neurologic - Sensation intact to light touch bilaterally. Deep tendon reflexes absent patella, ankle. No ankle clonus. Plantar reflex downgoing.     Musculoskeletal: Strength: 4+/5 iliopsoas, 5/5 quadriceps, 5/5 hamstrings, 5/5 anterior tibialis, 5/5 extensor hallucis longus, 5/5 gastrocnemius.    o Spine: positive sagittal balance with significant thoracolumbar kyphosis, and also exaggerated cervicothoracic kyphosis. No tenderness to palpation of T11/T12 area. L4-L5, L5-S1 facets tender to palpation.          Imaging:   We independently reviewed and interpreted the following imaging at this clinic visit which were also reviewed with patient  CT T spine 3/15/21  Fracture of T11 with retropulsion new since 6/17/19 with moderate central stenosis.     Xrays lumbar 3/11/21  L4-L5 and L5-S1 facet arthropathy, with grade I spondylolisthesis at both levels. T11 fracture.       Assessment:     80 year old female with T11 compression fracture with retropulsion which is asymptomatic. Lumbar spondylosis with low back pain.      Plan:     1. Recommend DEXA and bone health evaluation, she is at high risk of future  fractures.  2. Recommend non-operative management given lack of neurological symptoms. If she developed leg pain, weakness, or bowel/bladder changes, that could be an indication for surgery. Surgery would be a fusion and has high risk of complication including neurological injury, non-healing bone, CSF leak, further fractures, and risks of anesthesia given her age.  3. She can try a corset brace OTC for pain.  4. F/u prn.    Staff statement:  I met with the patient and performed independent history and physical and formulated my own assessment and plan.  She has a T11 burst fracture.  There is some retropulsion of bone.  But she has no neurologic symptoms.  She is mostly dealing with back pain at this time.  She is neurologically intact on exam.  No abnormal reflexes or bowel or bladder issues.  She is 80 years old.  Addressing the burst fracture surgically would require a corpectomy, which certainly would have surgical morbidity risk including the risk of neurologic deficit, nonunion and implant failure given her poor bone quality.  Given this I recommended that not be done unless she develops new neurologic symptoms such as numbness or weakness in the legs gait instability or bowel or bladder changes.  If she starts noticing any of these changes she will let me know and I would want to see her back as soon as possible.  For pain control I recommended a lumbar corset and oral medication management.  I also recommended she see one of our bone health specialists to try to minimize future fracture risk.    Attending MD (Dr. Elijah Hatch) :  I reviewed and verified the history and physical exam of the patient and discussed the patient's management with the other clinical providers involved in this patient's care including any involved residents or physicians assistants. I reviewed the above note and agree with the documented findings and plan of care, which were communicated to the patient.      Elijah TRIPP  MD [place]    Plan formulated with Dr. Hatch who saw patient and examined the patient and reviewed imaging. We used the patient's imaging and models to explain their pathophysiology and treatment options. All the patient's questions were answered to their full satisfaction.     Thank you for allowing me to be a part of this patient's care.    Alvina Whitehead PA-C   Orthopedic Spine Surgery

## 2021-03-23 NOTE — PROGRESS NOTES
Spine Surgery Consultation    REFERRING PHYSICIAN: No ref. provider found   PRIMARY CARE PHYSICIAN: Guillermo Castellano           Chief Complaint:   Consult (compression fracture T11 referred by Dr. Castellano. patient stated that she is having pain directly over her spine at T11 has been having symptoms since january 2021)      History of Present Illness:     Kimberly Chau is a 80 year old female with PMH RA, CKD who presents today for evaluation of thoracic compression fracture. She saw Dr. Castellano on 3/11/21 with new onset LBP and then XR were done, and then a CT. Pain started in January, no trauma or particular event. The pain isn't significantly improving, pain is 7/10. No radiculopathy. No numbness. Uses a cane for intermittent unsteadiness but no falls. No bowel/bladder changes. Taking APAP, no significant relief. The only relief is with sitting in bed with heading pad.     She has h/o osteoporosis and had a ulnar fracture in 6/2020 that required surgery as well. Also had neck fracture 2013. No DEXA, no meds for this.    Oswestry (FABIO) Questionnaire    OSWESTRY DISABILITY INDEX 3/20/2021   Count 9   Sum 21   Oswestry Score (%) 46.67   Some recent data might be hidden     ROS             Past Medical History:     Past Medical History:   Diagnosis Date     Anemia      Arthritis      Cataracts      Central retinal artery occlusion      Cervical spine fracture (H)     After a fall from orthostatic sx     Chronic pain     Back of head     Gastro-oesophageal reflux disease      Head injury      Hypertension      Hyponatremia     Resolved, 2/2 diuretics     Migraines      Osteoporosis      Pneumonia 2018     Rheumatoid arthritis(714.0)             Past Surgical History:     Past Surgical History:   Procedure Laterality Date     C HAND/FINGER SURGERY UNLISTED       C PELVIS/HIP JOINT SURGERY UNLISTED       COLONOSCOPY       EYE SURGERY Left 02/2019    Hole in macula     FUSION CERVICAL POSTERIOR THREE+ LEVELS  1/22/2013     Procedure: FUSION CERVICAL POSTERIOR THREE+ LEVELS;  Cervical 1-6 Posterior Instrumentation and Fusion, Cervical 3-4 Laminectomy  .Instrumentation and fusion to Cervical 6 *Latex Allergy*;  Surgeon: Ra Bar MD;  Location: [facility] OR     GYN SURGERY       HEAD & NECK SURGERY       HYSTERECTOMY       IR ENDOVENOUS ABLATION VARICOSE VEINS  2/16/2021     KNEE SURGERY       NECK SURGERY       OPEN REDUCTION INTERNAL FIXATION WRIST Left 4/30/2019    Procedure: Open Reduction Internal Fixation Left Distal Radius Fracture;  Surgeon: Dinh Strong MD;  Location:  OR     OPEN REDUCTION INTERNAL FIXATION WRIST Left 6/12/2020    Procedure: OPEN REDUCTION INTERNAL FIXATION Left Distal Radius Nonunion, Distal Ulna Resection;  Surgeon: Dinh Strong MD;  Location:  OR     OPTICAL TRACKING SYSTEM ENDOSCOPIC SINUS SURGERY Right 4/6/2018    Procedure: OPTICAL TRACKING SYSTEM ENDOSCOPIC SINUS SURGERY;  Stealth Assisted Right Maxillary Antrostomy, Anterior Ethmoidectomy And Frontal Sinusotomy;  Surgeon: Elyse Eisenberg MD;  Location:  OR     OPTICAL TRACKING SYSTEM ENDOSCOPIC SINUS SURGERY Right 8/3/2018    Procedure: OPTICAL TRACKING SYSTEM ENDOSCOPIC SINUS SURGERY;  Stealth Assisted Revision Right Frontal Sinusotomy, Right Stent Placement  **Latex Allergy** ;  Surgeon: Elyse Eisenberg MD;  Location:  OR     ORTHOPEDIC SURGERY       REMOVE HARDWARE WRIST Left 11/19/2019    Procedure: Left Distal Radius Hardware Removal, Flexor Pollicus Longus Repair, Deep Cultures;  Surgeon: Dinh Strong MD;  Location:  OR            Social History:     Social History     Tobacco Use     Smoking status: Never Smoker     Smokeless tobacco: Never Used   Substance Use Topics     Alcohol use: Yes     Comment: 2 glasses of wine a day            Family History:     Family History   Problem Relation Age of Onset     Arthritis Mother      Respiratory Mother         pulmonary fibrosis     Hypertension Mother       "Anemia Mother      Liver Disease Father         from EtOH     Alcoholism Father      Multiple Sclerosis Sister      No Known Problems Maternal Grandmother      Heart Disease Maternal Grandfather      Breast Cancer Sister      Glaucoma Paternal Grandfather      Heart Disease Paternal Grandfather             Allergies:     Allergies   Allergen Reactions     Hctz Other (See Comments)     Pt develops symptomatic and significant hyponatremia with HCTZ exposure     Hydrochlorothiazide      Other reaction(s): Hyponatremia  Hyponatremia     Latex      Benzocaine Rash     carba mix,thrium-sunscreen     Resorcinol-Alcohol Rash     carba mix,thrium-sunscreen     Ace Inhibitors Unknown     Dizziness and orthostatic changes and electrolyte problems.     Latex Unknown            Medications:     Current Outpatient Medications   Medication     acetaminophen 650 MG TABS     amLODIPine (NORVASC) 10 MG tablet     COMPRESSION STOCKINGS     COMPRESSION STOCKINGS     COMPRESSION STOCKINGS     Diphenhydramine-APAP, sleep, (TYLENOL PM EXTRA STRENGTH PO)     ferrous gluconate (FERGON) 324 (38 Fe) MG tablet     Gauze Pads & Dressings (TELFA NON-ADHERENT) 3\"X4\" PADS     metoprolol tartrate (LOPRESSOR) 100 MG tablet     Multiple Vitamin (MULTI-VITAMIN) per tablet     mupirocin (BACTROBAN) 2 % external ointment     Omeprazole (PRILOSEC PO)     sodium chloride (OCEAN NASAL SPRAY) 0.65 % nasal spray     traMADol (ULTRAM) 50 MG tablet     triamcinolone (KENALOG) 0.1 % external cream     Current Facility-Administered Medications   Medication     fluocinonide (LIDEX) 0.05 % ointment     fluocinonide (LIDEX) 0.05 % ointment     mupirocin (BACTROBAN) 2 % ointment     mupirocin (BACTROBAN) 2 % ointment             Review of Systems:   Answers for HPI/ROS submitted by the patient on 3/23/2021   General Symptoms: Yes  Skin Symptoms: No  HENT Symptoms: No  EYE SYMPTOMS: No  HEART SYMPTOMS: No  LUNG SYMPTOMS: No  INTESTINAL SYMPTOMS: No  URINARY SYMPTOMS: " No  GYNECOLOGIC SYMPTOMS: No  BREAST SYMPTOMS: No  SKELETAL SYMPTOMS: Yes  BLOOD SYMPTOMS: No  NERVOUS SYSTEM SYMPTOMS: No  MENTAL HEALTH SYMPTOMS: No         Physical Exam:     Vitals: Ht 1.524 m (5')   Wt 45.4 kg (100 lb)   BMI 19.53 kg/m      Constitutional: Patient is healthy, well-nourished and appears stated age.    Respiratory: Patient is breathing normally and in no respiratory distress.    Skin: No suspicious rashes or lesions    Gait: Non-antalgic gait without use of assistive devices. Able to tandem gait without difficulty.     Neurologic - Sensation intact to light touch bilaterally. Deep tendon reflexes absent patella, ankle. No ankle clonus. Plantar reflex downgoing.     Musculoskeletal: Strength: 4+/5 iliopsoas, 5/5 quadriceps, 5/5 hamstrings, 5/5 anterior tibialis, 5/5 extensor hallucis longus, 5/5 gastrocnemius.    o Spine: positive sagittal balance with significant thoracolumbar kyphosis, and also exaggerated cervicothoracic kyphosis. No tenderness to palpation of T11/T12 area. L4-L5, L5-S1 facets tender to palpation.          Imaging:   We independently reviewed and interpreted the following imaging at this clinic visit which were also reviewed with patient  CT T spine 3/15/21  Fracture of T11 with retropulsion new since 6/17/19 with moderate central stenosis.     Xrays lumbar 3/11/21  L4-L5 and L5-S1 facet arthropathy, with grade I spondylolisthesis at both levels. T11 fracture.       Assessment:     80 year old female with T11 compression fracture with retropulsion which is asymptomatic. Lumbar spondylosis with low back pain.      Plan:     1. Recommend DEXA and bone health evaluation, she is at high risk of future fractures.  2. Recommend non-operative management given lack of neurological symptoms. If she developed leg pain, weakness, or bowel/bladder changes, that could be an indication for surgery. Surgery would be a fusion and has high risk of complication including neurological injury,  non-healing bone, CSF leak, further fractures, and risks of anesthesia given her age.  3. She can try a corset brace OTC for pain.  4. F/u prn.    Staff statement:  I met with the patient and performed independent history and physical and formulated my own assessment and plan.  She has a T11 burst fracture.  There is some retropulsion of bone.  But she has no neurologic symptoms.  She is mostly dealing with back pain at this time.  She is neurologically intact on exam.  No abnormal reflexes or bowel or bladder issues.  She is 80 years old.  Addressing the burst fracture surgically would require a corpectomy, which certainly would have surgical morbidity risk including the risk of neurologic deficit, nonunion and implant failure given her poor bone quality.  Given this I recommended that not be done unless she develops new neurologic symptoms such as numbness or weakness in the legs gait instability or bowel or bladder changes.  If she starts noticing any of these changes she will let me know and I would want to see her back as soon as possible.  For pain control I recommended a lumbar corset and oral medication management.  I also recommended she see one of our bone health specialists to try to minimize future fracture risk.    Attending MD (Dr. Elijah Hatch) :  I reviewed and verified the history and physical exam of the patient and discussed the patient's management with the other clinical providers involved in this patient's care including any involved residents or physicians assistants. I reviewed the above note and agree with the documented findings and plan of care, which were communicated to the patient.      Elijah Hatch MD    Plan formulated with Dr. Hatch who saw patient and examined the patient and reviewed imaging. We used the patient's imaging and models to explain their pathophysiology and treatment options. All the patient's questions were answered to their full satisfaction.      Thank you for allowing me to be a part of this patient's care.    Alvina Whitehead PA-C   Orthopedic Spine Surgery

## 2021-03-30 NOTE — TELEPHONE ENCOUNTER
DIAGNOSIS: bone health management    APPOINTMENT DATE: 5/12/2021   NOTES STATUS DETAILS   OFFICE NOTE from referring provider N/A    OFFICE NOTE from other specialist Internal 3/23/2021 OV from Alvina Whitehead PA-C ([facility])   DISCHARGE SUMMARY from hospital N/A    DISCHARGE REPORT from the ER N/A    OPERATIVE REPORT N/A    EMG report N/A    MEDICATION LIST Internal    MRI N/A    DEXA (osteoporosis/bone health) N/A    CT SCAN N/A    XRAYS (IMAGES & REPORTS) N/A

## 2021-04-05 ENCOUNTER — ANCILLARY PROCEDURE (OUTPATIENT)
Dept: BONE DENSITY | Facility: CLINIC | Age: 81
End: 2021-04-05
Attending: ORTHOPAEDIC SURGERY
Payer: MEDICARE

## 2021-04-05 DIAGNOSIS — M80.08XA AGE-RELATED OSTEOPOROSIS WITH CURRENT PATHOLOGICAL FRACTURE, VERTEBRA(E), INITIAL ENCOUNTER FOR FRACTURE (H): ICD-10-CM

## 2021-04-05 DIAGNOSIS — M54.6 PAIN IN THORACIC SPINE: ICD-10-CM

## 2021-04-05 PROCEDURE — 77080 DXA BONE DENSITY AXIAL: CPT | Performed by: INTERNAL MEDICINE

## 2021-04-12 DIAGNOSIS — I10 ESSENTIAL HYPERTENSION: ICD-10-CM

## 2021-04-14 RX ORDER — METOPROLOL TARTRATE 100 MG
200 TABLET ORAL 2 TIMES DAILY
Qty: 360 TABLET | Refills: 1 | Status: SHIPPED | OUTPATIENT
Start: 2021-04-14 | End: 2021-10-18

## 2021-04-14 NOTE — TELEPHONE ENCOUNTER
Metoprolol Tartrate Oral Tablet 100 MG  Last Written Prescription Date:  10/13/2021  Last Fill Quantity: 360,   # refills: 0  Last Office Visit : 3/11/2021  Future Office visit:  None    Routing refill request to provider for review/approval because:  Blood pressure out of range   Change dose?   Change med?  Add med?  Follow up B/P check?   Refer to clinic for review     BP Readings from Last 3 Encounters:   03/11/21 (!) 178/77   02/16/21 (!) 169/80   01/06/21 (!) 189/82        Cristal Farooq RN  Central Triage Red Flags/Med Refills

## 2021-04-19 ENCOUNTER — VIRTUAL VISIT (OUTPATIENT)
Dept: ORTHOPEDICS | Facility: CLINIC | Age: 81
End: 2021-04-19
Payer: MEDICARE

## 2021-04-19 ENCOUNTER — TELEPHONE (OUTPATIENT)
Dept: ORTHOPEDICS | Facility: CLINIC | Age: 81
End: 2021-04-19

## 2021-04-19 DIAGNOSIS — Z53.9 ERRONEOUS ENCOUNTER--DISREGARD: Primary | ICD-10-CM

## 2021-04-19 NOTE — TELEPHONE ENCOUNTER
Called the patients husbands mobile phone since the numbers listed for jaylene are the same and that number is out of service. Left a voicemail stating that Dr. Hatch had an emergent surgery this afternoon and that all in clinic patients were being rescheduled but Dr. Hatch was going to contact the patients that were scheduled virtually. I explained in my message that Dr. Hatch would be calling them today  when he is done with the surgery. At this moment I don't have a set time for when that surgery is going to be completed. I explained that Dr. Hatch will be reaching out to them when he is out of the OR. Left the patient the clinic number if they had any questions regarding this.

## 2021-04-19 NOTE — LETTER
4/19/2021         RE: Kimberly Chau  [address]  [place] [address]        Dear Colleague,    Thank you for referring your patient, Kimberly Chau, to the [facility] [place]. Please see a copy of my visit note below.      This encounter was opened in error. Please disregard.      Again, thank you for allowing me to participate in the care of your patient.        Sincerely,        Elijah Hatch MD

## 2021-04-19 NOTE — LETTER
Date:June 12, 2021      Provider requested that no letter be sent. Do not send.       [facility]

## 2021-05-06 ENCOUNTER — OFFICE VISIT (OUTPATIENT)
Dept: ORTHOPEDICS | Facility: CLINIC | Age: 81
End: 2021-05-06
Attending: ORTHOPAEDIC SURGERY
Payer: MEDICARE

## 2021-05-06 VITALS — WEIGHT: 100 LBS | BODY MASS INDEX: 19.63 KG/M2 | HEIGHT: 60 IN

## 2021-05-06 DIAGNOSIS — M54.6 PAIN IN THORACIC SPINE: Primary | ICD-10-CM

## 2021-05-06 DIAGNOSIS — M80.00XA OSTEOPOROSIS WITH CURRENT PATHOLOGICAL FRACTURE, UNSPECIFIED OSTEOPOROSIS TYPE, INITIAL ENCOUNTER: ICD-10-CM

## 2021-05-06 DIAGNOSIS — S22.000A COMPRESSION FRACTURE OF BODY OF THORACIC VERTEBRA (H): ICD-10-CM

## 2021-05-06 LAB
BASOPHILS # BLD AUTO: 0 10E9/L (ref 0–0.2)
BASOPHILS NFR BLD AUTO: 0.4 %
DIFFERENTIAL METHOD BLD: ABNORMAL
EOSINOPHIL # BLD AUTO: 0.1 10E9/L (ref 0–0.7)
EOSINOPHIL NFR BLD AUTO: 1.8 %
ERYTHROCYTE [DISTWIDTH] IN BLOOD BY AUTOMATED COUNT: 15.7 % (ref 10–15)
HCT VFR BLD AUTO: 33.5 % (ref 35–47)
HGB BLD-MCNC: 10.8 G/DL (ref 11.7–15.7)
IMM GRANULOCYTES # BLD: 0 10E9/L (ref 0–0.4)
IMM GRANULOCYTES NFR BLD: 0.5 %
LYMPHOCYTES # BLD AUTO: 1.1 10E9/L (ref 0.8–5.3)
LYMPHOCYTES NFR BLD AUTO: 14 %
MAGNESIUM SERPL-MCNC: 1.8 MG/DL (ref 1.6–2.3)
MCH RBC QN AUTO: 32.9 PG (ref 26.5–33)
MCHC RBC AUTO-ENTMCNC: 32.2 G/DL (ref 31.5–36.5)
MCV RBC AUTO: 102 FL (ref 78–100)
MONOCYTES # BLD AUTO: 0.9 10E9/L (ref 0–1.3)
MONOCYTES NFR BLD AUTO: 11.9 %
NEUTROPHILS # BLD AUTO: 5.4 10E9/L (ref 1.6–8.3)
NEUTROPHILS NFR BLD AUTO: 71.4 %
NRBC # BLD AUTO: 0 10*3/UL
NRBC BLD AUTO-RTO: 0 /100
PHOSPHATE SERPL-MCNC: 4.5 MG/DL (ref 2.5–4.5)
PLATELET # BLD AUTO: 272 10E9/L (ref 150–450)
PTH-INTACT SERPL-MCNC: 19 PG/ML (ref 18–80)
RBC # BLD AUTO: 3.28 10E12/L (ref 3.8–5.2)
TSH SERPL DL<=0.005 MIU/L-ACNC: 1.01 MU/L (ref 0.4–4)
WBC # BLD AUTO: 7.6 10E9/L (ref 4–11)

## 2021-05-06 PROCEDURE — 84100 ASSAY OF PHOSPHORUS: CPT | Performed by: PATHOLOGY

## 2021-05-06 PROCEDURE — 83970 ASSAY OF PARATHORMONE: CPT | Performed by: PATHOLOGY

## 2021-05-06 PROCEDURE — 36415 COLL VENOUS BLD VENIPUNCTURE: CPT | Performed by: PATHOLOGY

## 2021-05-06 PROCEDURE — 82306 VITAMIN D 25 HYDROXY: CPT | Performed by: FAMILY MEDICINE

## 2021-05-06 PROCEDURE — 99214 OFFICE O/P EST MOD 30 MIN: CPT | Mod: GC | Performed by: FAMILY MEDICINE

## 2021-05-06 PROCEDURE — 84443 ASSAY THYROID STIM HORMONE: CPT | Performed by: PATHOLOGY

## 2021-05-06 PROCEDURE — 83735 ASSAY OF MAGNESIUM: CPT | Performed by: PATHOLOGY

## 2021-05-06 PROCEDURE — 85025 COMPLETE CBC W/AUTO DIFF WBC: CPT | Performed by: PATHOLOGY

## 2021-05-06 ASSESSMENT — MIFFLIN-ST. JEOR: SCORE: 835.1

## 2021-05-06 NOTE — LETTER
5/6/2021      RE: Kimberly Chau  [address]  [place] [address]       SUBJECTIVE:      Kimberly Chau is a 80 year old female here today to disuss osteoporosis.  Previous DEXA done 4/5/2021.  T-score showed her to be Osteoporosis with a T-Score of -2.7 at the level of the right femoral neck.  Risk factors for osteroporosis include postmenopausal,  or , thin habitus and RA.    Bone Health History:  - Vitamin D Intake/Level: 1000U per day  - Calcium: cheese, rarely ice cream  - Hx Stress Fx's: No  - tob use: No  - etoh use: No   - Caffeine Use: Couple cups of tea in the morning  - Exercise: Walks  - Hx Kidney Stones: No  - Hx of Chemo, Skeletal Radiation, or Hormone Therapy: No  - Prolonged steroids: No  - Hx of GERD: Yes, Prilosec daily    Past Medical History:   Diagnosis Date     Anemia      Arthritis      Cataracts      Central retinal artery occlusion      Cervical spine fracture (H)     After a fall from orthostatic sx     Chronic pain     Back of head     Gastro-oesophageal reflux disease      Head injury      Hypertension      Hyponatremia     Resolved, 2/2 diuretics     Migraines      Osteoporosis      Pneumonia 2018     Rheumatoid arthritis(714.0)        Past Surgical History:   Procedure Laterality Date     C HAND/FINGER SURGERY UNLISTED       C PELVIS/HIP JOINT SURGERY UNLISTED       COLONOSCOPY       EYE SURGERY Left 02/2019    Hole in macula     FUSION CERVICAL POSTERIOR THREE+ LEVELS  1/22/2013    Procedure: FUSION CERVICAL POSTERIOR THREE+ LEVELS;  Cervical 1-6 Posterior Instrumentation and Fusion, Cervical 3-4 Laminectomy  .Instrumentation and fusion to Cervical 6 *Latex Allergy*;  Surgeon: Ra Bar MD;  Location: [facility] OR     GYN SURGERY       HEAD & NECK SURGERY       HYSTERECTOMY       IR ENDOVENOUS ABLATION VARICOSE VEINS  2/16/2021     KNEE SURGERY       NECK SURGERY       OPEN REDUCTION INTERNAL FIXATION WRIST Left 4/30/2019    Procedure: Open Reduction Internal  "Fixation Left Distal Radius Fracture;  Surgeon: Dinh Strong MD;  [facility]     OPEN REDUCTION INTERNAL FIXATION WRIST Left 6/12/2020    Procedure: OPEN REDUCTION INTERNAL FIXATION Left Distal Radius Nonunion, Distal Ulna Resection;  Surgeon: Dinh Strong MD;  [facility]: [facility]     OPTICAL TRACKING SYSTEM ENDOSCOPIC SINUS SURGERY Right 4/6/2018    Procedure: OPTICAL TRACKING SYSTEM ENDOSCOPIC SINUS SURGERY;  Stealth Assisted Right Maxillary Antrostomy, Anterior Ethmoidectomy And Frontal Sinusotomy;  Surgeon: Elyse Eisenberg MD;  Location: [facility] OR     OPTICAL TRACKING SYSTEM ENDOSCOPIC SINUS SURGERY Right 8/3/2018    Procedure: OPTICAL TRACKING SYSTEM ENDOSCOPIC SINUS SURGERY;  Stealth Assisted Revision Right Frontal Sinusotomy, Right Stent Placement  **Latex Allergy** ;  Surgeon: Elyse Eisenberg MD;  Location: [facility] OR     ORTHOPEDIC SURGERY       REMOVE HARDWARE WRIST Left 11/19/2019    Procedure: Left Distal Radius Hardware Removal, Flexor Pollicus Longus Repair, Deep Cultures;  Surgeon: Dinh Strong MD;  [facility]       Current Outpatient Medications   Medication Sig Dispense Refill     acetaminophen 650 MG TABS Take 650 mg by mouth every 4 hours as needed. 100 tablet 0     COMPRESSION STOCKINGS Please measure and distribute 1 pair of 20mmHg - 30mmHg knee high open or closed toe compression stockings. Jobst ultrasheer or equivalent. 2 each 4     Diphenhydramine-APAP, sleep, (TYLENOL PM EXTRA STRENGTH PO) Take 2 tablets by mouth At Bedtime        ferrous gluconate (FERGON) 324 (38 Fe) MG tablet Take 324 mg by mouth       Gauze Pads & Dressings (TELFA NON-ADHERENT) 3\"X4\" PADS Cut to size for open areas on the lower extremities and secure with paper tape. Change dressing every other day. 30 each 11     metoprolol tartrate (LOPRESSOR) 100 MG tablet Take 2 tablets (200 mg) by mouth 2 times daily 360 tablet 1     Multiple Vitamin (MULTI-VITAMIN) per tablet Take 1 tablet by mouth every " morning        Omeprazole (PRILOSEC PO) Take 20 mg by mouth as needed        sodium chloride (OCEAN NASAL SPRAY) 0.65 % nasal spray Spray 1 spray into both nostrils daily as needed for congestion 1 Bottle 3     amLODIPine (NORVASC) 10 MG tablet Take 1 tablet (10 mg) by mouth daily (Patient not taking: Reported on 1/11/2021) 90 tablet 0     COMPRESSION STOCKINGS Please measure and distribute 1 pair of 30mmHg - 40mmHg knee high ULCERCARE stockings (Patient not taking: Reported on 3/17/2021) 2 each 4     COMPRESSION STOCKINGS Please measure and distribute 1 pair of 20mmHg - 30mmHg Thigh high open or closed toe compression stockings. Jobst ultrasheer or equivalent. (Patient not taking: Reported on 3/17/2021) 2 each 4     mupirocin (BACTROBAN) 2 % external ointment Apply to open areas with dressing changes (Patient not taking: Reported on 2/22/2021) 60 g 11     traMADol (ULTRAM) 50 MG tablet Take 1-2 tablets ( mg) by mouth every 6 hours as needed for severe pain (Patient not taking: Reported on 3/17/2021) 120 tablet 0     triamcinolone (KENALOG) 0.1 % external cream Apply twice daily for 2 weeks then 3 times weekly for 2 week, repeat cycle as needed (Patient not taking: Reported on 3/11/2021) 454 g 0       Family History   Problem Relation Age of Onset     Arthritis Mother      Respiratory Mother         pulmonary fibrosis     Hypertension Mother      Anemia Mother      Liver Disease Father         from EtOH     Alcoholism Father      Multiple Sclerosis Sister      No Known Problems Maternal Grandmother      Heart Disease Maternal Grandfather      Breast Cancer Sister      Glaucoma Paternal Grandfather      Heart Disease Paternal Grandfather        Social History     Socioeconomic History     Marital status:      Spouse name: Not on file     Number of children: Not on file     Years of education: Not on file     Highest education level: Not on file   Occupational History     Not on file   Social Needs      Financial resource strain: Not on file     Food insecurity     Worry: Not on file     Inability: Not on file     Transportation needs     Medical: Not on file     Non-medical: Not on file   Tobacco Use     Smoking status: Never Smoker     Smokeless tobacco: Never Used   Substance and Sexual Activity     Alcohol use: Yes     Comment: 2 glasses of wine a day     Drug use: Never     Sexual activity: Not Currently   Lifestyle     Physical activity     Days per week: Not on file     Minutes per session: Not on file     Stress: Not on file   Relationships     Social connections     Talks on phone: Not on file     Gets together: Not on file     Attends Druze service: Not on file     Active member of club or organization: Not on file     Attends meetings of clubs or organizations: Not on file     Relationship status: Not on file     Intimate partner violence     Fear of current or ex partner: Not on file     Emotionally abused: Not on file     Physically abused: Not on file     Forced sexual activity: Not on file   Other Topics Concern     Parent/sibling w/ CABG, MI or angioplasty before 65F 55M? Not Asked   Social History Narrative    Ms. Chau is , has two grown children and two grandchildren. She does not work. She was never a smoker, and drinks a glass of wine with dinner each night.         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        OBJECTIVE:  Ht 1.524 m (5')   Wt 45.4 kg (100 lb)   BMI 19.53 kg/m    Gen:  Well nourished and in no acute distress  HEENT: PERRL; TMs normal color and landmarks; nasopharynx pink and moist; oropharynx pink and moist  Neck: supple without lymphadenopathy.  Thyroid is not enlarged.  No evidence of any goiters.  No tenderness to palpation.  CV:  RRR  - no murmurs noted   Pulm:  CTAB, no wheezes or crackles noted, good air entry   ABD: soft, nontender, no masses, no rebound, BS intact throughout, no hepatosplenomegaly  Extrem: no cyanosis, edema or  clubbing  Psych: Euthymic       ASSESSMENT:  Osteoporosis     PLAN:  1. Pain in thoracic spine - T11 Compression  Fracture  2. Osteoporosis with current pathological fracture, unspecified osteoporosis type, initial encounter  - Comprehensive metabolic panel; Future  - CBC with platelets differential; Future  - Vitamin D Deficiency; Future  - Phosphorus; Future  - Magnesium; Future  - Parathyroid Hormone Intact  - TSH with free T4 reflex; Future  - The importance of calcium and vitamin D in bone health was discussed in detail.   - A calcium intake of 1500 mg total per day in divided doses, to include diet and supplements, was recommended and 1000 international units of Vit D.  - I have urged the patient to obtain as much as the recommended amount of calcium as possible from the diet.   - I recommended use of the International Osteoporosis Foundation Calcium Calculator to get an estimate of daily total intake in the diet (www.iofcalciumcalculator).    Options for treatment and/or follow-up care were reviewed with the patient was actively involved in the decision making process. Patient verbalized understanding and was in agreement with the plan.    The patient was seen by and discussed with Dr.Suzanne GARRY Collazo MD, CAQ, CCD    Emelyn Hameed DO  Primary Care Sports Medicine Fellow      Attending Note:   I have examined this patient /images and have reviewed the clinical presentation and progress note with the fellow. I agree with the treatment plan as outlined. The plan was formulated with the fellow on the day of the patient's visit.    Alley Collazo MD, CAQ, CCD  [facility]  Sports Medicine and Bone Health

## 2021-05-06 NOTE — PROGRESS NOTES
SUBJECTIVE:      Kimberly Chau is a 80 year old female here today to disuss osteoporosis.  Previous DEXA done 4/5/2021.  T-score showed her to be Osteoporosis with a T-Score of -2.7 at the level of the right femoral neck.  Risk factors for osteroporosis include postmenopausal,  or , thin habitus and RA.    Bone Health History:  - Vitamin D Intake/Level: 1000U per day  - Calcium: cheese, rarely ice cream  - Hx Stress Fx's: No  - tob use: No  - etoh use: No   - Caffeine Use: Couple cups of tea in the morning  - Exercise: Walks  - Hx Kidney Stones: No  - Hx of Chemo, Skeletal Radiation, or Hormone Therapy: No  - Prolonged steroids: No  - Hx of GERD: Yes, Prilosec daily    Past Medical History:   Diagnosis Date     Anemia      Arthritis      Cataracts      Central retinal artery occlusion      Cervical spine fracture (H)     After a fall from orthostatic sx     Chronic pain     Back of head     Gastro-oesophageal reflux disease      Head injury      Hypertension      Hyponatremia     Resolved, 2/2 diuretics     Migraines      Osteoporosis      Pneumonia 2018     Rheumatoid arthritis(714.0)        Past Surgical History:   Procedure Laterality Date     C HAND/FINGER SURGERY UNLISTED       C PELVIS/HIP JOINT SURGERY UNLISTED       COLONOSCOPY       EYE SURGERY Left 02/2019    Hole in macula     FUSION CERVICAL POSTERIOR THREE+ LEVELS  1/22/2013    Procedure: FUSION CERVICAL POSTERIOR THREE+ LEVELS;  Cervical 1-6 Posterior Instrumentation and Fusion, Cervical 3-4 Laminectomy  .Instrumentation and fusion to Cervical 6 *Latex Allergy*;  Surgeon: Ra Bar MD;  Location: [facility] OR     GYN SURGERY       HEAD & NECK SURGERY       HYSTERECTOMY       IR ENDOVENOUS ABLATION VARICOSE VEINS  2/16/2021     KNEE SURGERY       NECK SURGERY       OPEN REDUCTION INTERNAL FIXATION WRIST Left 4/30/2019    Procedure: Open Reduction Internal Fixation Left Distal Radius Fracture;  Surgeon: Dinh Strong MD;   "[facility]     OPEN REDUCTION INTERNAL FIXATION WRIST Left 6/12/2020    Procedure: OPEN REDUCTION INTERNAL FIXATION Left Distal Radius Nonunion, Distal Ulna Resection;  Surgeon: Dinh Strong MD;  [facility]     OPTICAL TRACKING SYSTEM ENDOSCOPIC SINUS SURGERY Right 4/6/2018    Procedure: OPTICAL TRACKING SYSTEM ENDOSCOPIC SINUS SURGERY;  Stealth Assisted Right Maxillary Antrostomy, Anterior Ethmoidectomy And Frontal Sinusotomy;  Surgeon: Elyse Eisenberg MD;  Location: [facility]     OPTICAL TRACKING SYSTEM ENDOSCOPIC SINUS SURGERY Right 8/3/2018    Procedure: OPTICAL TRACKING SYSTEM ENDOSCOPIC SINUS SURGERY;  Stealth Assisted Revision Right Frontal Sinusotomy, Right Stent Placement  **Latex Allergy** ;  Surgeon: Elyse Eisenberg MD;  Location: [facility]     ORTHOPEDIC SURGERY       REMOVE HARDWARE WRIST Left 11/19/2019    Procedure: Left Distal Radius Hardware Removal, Flexor Pollicus Longus Repair, Deep Cultures;  Surgeon: Dinh Strong MD;  [facility]       Current Outpatient Medications   Medication Sig Dispense Refill     acetaminophen 650 MG TABS Take 650 mg by mouth every 4 hours as needed. 100 tablet 0     COMPRESSION STOCKINGS Please measure and distribute 1 pair of 20mmHg - 30mmHg knee high open or closed toe compression stockings. Jobst ultrasheer or equivalent. 2 each 4     Diphenhydramine-APAP, sleep, (TYLENOL PM EXTRA STRENGTH PO) Take 2 tablets by mouth At Bedtime        ferrous gluconate (FERGON) 324 (38 Fe) MG tablet Take 324 mg by mouth       Gauze Pads & Dressings (TELFA NON-ADHERENT) 3\"X4\" PADS Cut to size for open areas on the lower extremities and secure with paper tape. Change dressing every other day. 30 each 11     metoprolol tartrate (LOPRESSOR) 100 MG tablet Take 2 tablets (200 mg) by mouth 2 times daily 360 tablet 1     Multiple Vitamin (MULTI-VITAMIN) per tablet Take 1 tablet by mouth every morning        Omeprazole (PRILOSEC PO) Take 20 mg by mouth as needed        " sodium chloride (OCEAN NASAL SPRAY) 0.65 % nasal spray Spray 1 spray into both nostrils daily as needed for congestion 1 Bottle 3     amLODIPine (NORVASC) 10 MG tablet Take 1 tablet (10 mg) by mouth daily (Patient not taking: Reported on 1/11/2021) 90 tablet 0     COMPRESSION STOCKINGS Please measure and distribute 1 pair of 30mmHg - 40mmHg knee high ULCERCARE stockings (Patient not taking: Reported on 3/17/2021) 2 each 4     COMPRESSION STOCKINGS Please measure and distribute 1 pair of 20mmHg - 30mmHg Thigh high open or closed toe compression stockings. Jobst ultrasheer or equivalent. (Patient not taking: Reported on 3/17/2021) 2 each 4     mupirocin (BACTROBAN) 2 % external ointment Apply to open areas with dressing changes (Patient not taking: Reported on 2/22/2021) 60 g 11     traMADol (ULTRAM) 50 MG tablet Take 1-2 tablets ( mg) by mouth every 6 hours as needed for severe pain (Patient not taking: Reported on 3/17/2021) 120 tablet 0     triamcinolone (KENALOG) 0.1 % external cream Apply twice daily for 2 weeks then 3 times weekly for 2 week, repeat cycle as needed (Patient not taking: Reported on 3/11/2021) 454 g 0       Family History   Problem Relation Age of Onset     Arthritis Mother      Respiratory Mother         pulmonary fibrosis     Hypertension Mother      Anemia Mother      Liver Disease Father         from EtOH     Alcoholism Father      Multiple Sclerosis Sister      No Known Problems Maternal Grandmother      Heart Disease Maternal Grandfather      Breast Cancer Sister      Glaucoma Paternal Grandfather      Heart Disease Paternal Grandfather        Social History     Socioeconomic History     Marital status:      Spouse name: Not on file     Number of children: Not on file     Years of education: Not on file     Highest education level: Not on file   Occupational History     Not on file   Social Needs     Financial resource strain: Not on file     Food insecurity     Worry: Not on  file     Inability: Not on file     Transportation needs     Medical: Not on file     Non-medical: Not on file   Tobacco Use     Smoking status: Never Smoker     Smokeless tobacco: Never Used   Substance and Sexual Activity     Alcohol use: Yes     Comment: 2 glasses of wine a day     Drug use: Never     Sexual activity: Not Currently   Lifestyle     Physical activity     Days per week: Not on file     Minutes per session: Not on file     Stress: Not on file   Relationships     Social connections     Talks on phone: Not on file     Gets together: Not on file     Attends [employer]ism service: Not on file     Active member of club or organization: Not on file     Attends meetings of clubs or organizations: Not on file     Relationship status: Not on file     Intimate partner violence     Fear of current or ex partner: Not on file     Emotionally abused: Not on file     Physically abused: Not on file     Forced sexual activity: Not on file   Other Topics Concern     Parent/sibling w/ CABG, MI or angioplasty before 65F 55M? Not Asked   Social History Narrative    Ms. Chau is , has two grown children and two grandchildren. She does not work. She was never a smoker, and drinks a glass of wine with dinner each night.         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        OBJECTIVE:  Ht 1.524 m (5')   Wt 45.4 kg (100 lb)   BMI 19.53 kg/m    Gen:  Well nourished and in no acute distress  HEENT: PERRL; TMs normal color and landmarks; nasopharynx pink and moist; oropharynx pink and moist  Neck: supple without lymphadenopathy.  Thyroid is not enlarged.  No evidence of any goiters.  No tenderness to palpation.  CV:  RRR  - no murmurs noted   Pulm:  CTAB, no wheezes or crackles noted, good air entry   ABD: soft, nontender, no masses, no rebound, BS intact throughout, no hepatosplenomegaly  Extrem: no cyanosis, edema or clubbing  Psych: Euthymic       ASSESSMENT:  Osteoporosis     PLAN:  1. Pain  in thoracic spine - T11 Compression  Fracture  2. Osteoporosis with current pathological fracture, unspecified osteoporosis type, initial encounter  - Comprehensive metabolic panel; Future  - CBC with platelets differential; Future  - Vitamin D Deficiency; Future  - Phosphorus; Future  - Magnesium; Future  - Parathyroid Hormone Intact  - TSH with free T4 reflex; Future  - The importance of calcium and vitamin D in bone health was discussed in detail.   - A calcium intake of 1500 mg total per day in divided doses, to include diet and supplements, was recommended and 1000 international units of Vit D.  - I have urged the patient to obtain as much as the recommended amount of calcium as possible from the diet.   - I recommended use of the International Osteoporosis Foundation Calcium Calculator to get an estimate of daily total intake in the diet (www.iofcalciumcalculator).    Options for treatment and/or follow-up care were reviewed with the patient was actively involved in the decision making process. Patient verbalized understanding and was in agreement with the plan.    The patient was seen by and discussed with Dr.Suzanne GARRY Collazo MD, CAQ, CCD    Emelyn Hameed DO  Primary Care Sports Medicine Fellow

## 2021-05-07 LAB — DEPRECATED CALCIDIOL+CALCIFEROL SERPL-MC: 77 UG/L (ref 20–75)

## 2021-05-10 NOTE — PROGRESS NOTES
Attending Note:   I have examined this patient /images and have reviewed the clinical presentation and progress note with the fellow. I agree with the treatment plan as outlined. The plan was formulated with the fellow on the day of the patient's visit.    Alley Collazo MD, CAQ, CCD  [facility]  Sports Medicine and Bone Health

## 2021-05-12 ENCOUNTER — PRE VISIT (OUTPATIENT)
Dept: ORTHOPEDICS | Facility: CLINIC | Age: 81
End: 2021-05-12

## 2021-05-17 ENCOUNTER — OFFICE VISIT (OUTPATIENT)
Dept: ORTHOPEDICS | Facility: CLINIC | Age: 81
End: 2021-05-17
Payer: MEDICARE

## 2021-05-17 DIAGNOSIS — M80.08XA AGE-RELATED OSTEOPOROSIS WITH CURRENT PATHOLOGICAL FRACTURE, VERTEBRA(E), INITIAL ENCOUNTER FOR FRACTURE (H): Primary | ICD-10-CM

## 2021-05-17 PROCEDURE — 99213 OFFICE O/P EST LOW 20 MIN: CPT | Performed by: ORTHOPAEDIC SURGERY

## 2021-05-17 NOTE — PROGRESS NOTES
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    She says things are about the same as last visit.  She has a mild amount of chronic back pain which she says is tolerable.  She had a visit with our osteoporosis specialist and has another visit scheduled with them later this week to be initiate treatment.  She denies any new neurologic symptoms.  She was able to get her lumbar corset brace and this has helped somewhat.  She is back today wondering if injections would be useful.            Past Medical History:     Past Medical History:   Diagnosis Date     Anemia      Arthritis      Cataracts      Central retinal artery occlusion      Cervical spine fracture (H)     After a fall from orthostatic sx     Chronic pain     Back of head     Gastro-oesophageal reflux disease      Head injury      Hypertension      Hyponatremia     Resolved, 2/2 diuretics     Migraines      Osteoporosis      Pneumonia 2018     Rheumatoid arthritis(714.0)             Past Surgical History:     Past Surgical History:   Procedure Laterality Date     C HAND/FINGER SURGERY UNLISTED       C PELVIS/HIP JOINT SURGERY UNLISTED       COLONOSCOPY       EYE SURGERY Left 02/2019    Hole in macula     FUSION CERVICAL POSTERIOR THREE+ LEVELS  1/22/2013    Procedure: FUSION CERVICAL POSTERIOR THREE+ LEVELS;  Cervical 1-6 Posterior Instrumentation and Fusion, Cervical 3-4 Laminectomy  .Instrumentation and fusion to Cervical 6 *Latex Allergy*;  Surgeon: Ra Bar MD;  Location: [facility] OR     GYN SURGERY       HEAD & NECK SURGERY       HYSTERECTOMY       IR ENDOVENOUS ABLATION VARICOSE VEINS  2/16/2021     KNEE SURGERY       NECK SURGERY        OPEN REDUCTION INTERNAL FIXATION WRIST Left 4/30/2019    Procedure: Open Reduction Internal Fixation Left Distal Radius Fracture;  Surgeon: Dinh Strong MD;  Location: [facility] OR     OPEN REDUCTION INTERNAL FIXATION WRIST Left 6/12/2020    Procedure: OPEN REDUCTION INTERNAL FIXATION Left Distal Radius Nonunion, Distal Ulna Resection;  Surgeon: Dinh Strong MD;  Location: [facility] OR     OPTICAL TRACKING SYSTEM ENDOSCOPIC SINUS SURGERY Right 4/6/2018    Procedure: OPTICAL TRACKING SYSTEM ENDOSCOPIC SINUS SURGERY;  Stealth Assisted Right Maxillary Antrostomy, Anterior Ethmoidectomy And Frontal Sinusotomy;  Surgeon: Elyse Eisenberg MD;  Location: [facility] OR     OPTICAL TRACKING SYSTEM ENDOSCOPIC SINUS SURGERY Right 8/3/2018    Procedure: OPTICAL TRACKING SYSTEM ENDOSCOPIC SINUS SURGERY;  Stealth Assisted Revision Right Frontal Sinusotomy, Right Stent Placement  **Latex Allergy** ;  Surgeon: Elyse Eisenberg MD;  Location: [facility] OR     ORTHOPEDIC SURGERY       REMOVE HARDWARE WRIST Left 11/19/2019    Procedure: Left Distal Radius Hardware Removal, Flexor Pollicus Longus Repair, Deep Cultures;  Surgeon: Dinh Strong MD;  Location: [facility] OR            Social History:     Social History     Tobacco Use     Smoking status: Never Smoker     Smokeless tobacco: Never Used   Substance Use Topics     Alcohol use: Yes     Comment: 2 glasses of wine a day            Family History:     Family History   Problem Relation Age of Onset     Arthritis Mother      Respiratory Mother         pulmonary fibrosis     Hypertension Mother      Anemia Mother      Liver Disease Father         from EtOH     Alcoholism Father      Multiple Sclerosis Sister      No Known Problems Maternal Grandmother      Heart Disease Maternal Grandfather      Breast Cancer Sister      Glaucoma Paternal Grandfather      Heart Disease Paternal Grandfather             Allergies:     Allergies   Allergen Reactions     Hctz Other (See Comments)     Pt develops  "symptomatic and significant hyponatremia with HCTZ exposure     Hydrochlorothiazide      Other reaction(s): Hyponatremia  Hyponatremia     Latex      Benzocaine Rash     carba mix,thrium-sunscreen     Resorcinol-Alcohol Rash     carba mix,thrium-sunscreen     Ace Inhibitors Unknown     Dizziness and orthostatic changes and electrolyte problems.     Latex Unknown            Medications:     Current Outpatient Medications   Medication     acetaminophen 650 MG TABS     amLODIPine (NORVASC) 10 MG tablet     COMPRESSION STOCKINGS     COMPRESSION STOCKINGS     COMPRESSION STOCKINGS     Diphenhydramine-APAP, sleep, (TYLENOL PM EXTRA STRENGTH PO)     ferrous gluconate (FERGON) 324 (38 Fe) MG tablet     Gauze Pads & Dressings (TELFA NON-ADHERENT) 3\"X4\" PADS     metoprolol tartrate (LOPRESSOR) 100 MG tablet     Multiple Vitamin (MULTI-VITAMIN) per tablet     mupirocin (BACTROBAN) 2 % external ointment     Omeprazole (PRILOSEC PO)     sodium chloride (OCEAN NASAL SPRAY) 0.65 % nasal spray     traMADol (ULTRAM) 50 MG tablet     triamcinolone (KENALOG) 0.1 % external cream     Current Facility-Administered Medications   Medication     fluocinonide (LIDEX) 0.05 % ointment     fluocinonide (LIDEX) 0.05 % ointment     mupirocin (BACTROBAN) 2 % ointment     mupirocin (BACTROBAN) 2 % ointment     romosozumab-aqqg (EVENITY) injection 210 mg             Review of Systems:   A focused musculoskeletal and neurologic ROS was performed with pertinent positives and negatives noted in the HPI.  Additional systems were also reviewed and are documented at the bottom of the note.         Physical Exam:   Vitals: There were no vitals taken for this visit.  Musculoskeletal, Neurologic, and Spine:          Lumbar Spine:    Appearance - No gross stepoffs or deformities    Motor -     L2-3: Hip flexion 5/5 R and 5/5 L strength          L3/4:  Knee extension R 5/5 and L 5/5 strength         L4/5:  Foot dorsiflexion R 5/5 L 5/5 and       EHL " dorsiflexion R 4/5 L 4/5 strength         S1:  Plantarflexion/Peroneal Muscles  R 5/5 and L 5/5 strength    Sensation: intact to light touch L3-S1 distribution BLE      Neurologic:      REFLEXES Left Right                  Patella 1+ 1+   Ankle jerk 1+ 1+   Babinski No upgoing great toe No upgoing great toe   Clonus 0 beats 0 beats                Imaging:     I again reviewed her previous thoracic CT scan and thoracic radiographs which show T11 osteoporotic burst fracture with some retropulsion of bone       Assessment and Plan:     80 year old female with T11 osteoporotic burst fracture.  She is neurologically intact.  She has back pain.    We again discussed that surgery for this problem would be a major undertaking it would be very risky given her age, medical status and bone quality.  Therefore I have recommended that she not have it done.    I explained if she develops new neurologic symptoms including numbness weakness or new gait instability or new onset bowel and bladder deficits I would want her to reach out to me.  In the absence of the symptoms as long as it is back pain alone, I would recommend nonoperative treatment.    I am glad the brace is helping somewhat.  If she wishes to pursue injections I would be happy to make a referral to the pain clinic.  We discussed that sometimes these are temporary.  For now she is okay just monitoring things.  She will let us know if she wants referral to the pain clinic or if there are new neurologic developments.           Respectfully,  Elijah Hatch MD  Spine Surgery  [facility]

## 2021-05-17 NOTE — LETTER
5/17/2021         RE: Kimberly Chau  [address]  [place] [address]        Dear Colleague,    Thank you for referring your patient, Kimberly hCau, to the [facility] [place]. Please see a copy of my visit note below.    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    She says things are about the same as last visit.  She has a mild amount of chronic back pain which she says is tolerable.  She had a visit with our osteoporosis specialist and has another visit scheduled with them later this week to be initiate treatment.  She denies any new neurologic symptoms.  She was able to get her lumbar corset brace and this has helped somewhat.  She is back today wondering if injections would be useful.            Past Medical History:     Past Medical History:   Diagnosis Date     Anemia      Arthritis      Cataracts      Central retinal artery occlusion      Cervical spine fracture (H)     After a fall from orthostatic sx     Chronic pain     Back of head     Gastro-oesophageal reflux disease      Head injury      Hypertension      Hyponatremia     Resolved, 2/2 diuretics     Migraines      Osteoporosis      Pneumonia 2018     Rheumatoid arthritis(714.0)             Past Surgical History:     Past Surgical History:   Procedure Laterality Date     C HAND/FINGER SURGERY UNLISTED       C PELVIS/HIP JOINT SURGERY UNLISTED       COLONOSCOPY       EYE SURGERY Left 02/2019    Hole in macula     FUSION CERVICAL POSTERIOR THREE+ LEVELS  1/22/2013    Procedure: FUSION CERVICAL POSTERIOR THREE+ LEVELS;  Cervical 1-6 Posterior Instrumentation and Fusion, Cervical 3-4 Laminectomy   .Instrumentation and fusion to Cervical 6 *Latex Allergy*;  Surgeon: Ra Bar MD;  Location: [facility] OR     GYN SURGERY       HEAD & NECK SURGERY       HYSTERECTOMY       IR ENDOVENOUS ABLATION VARICOSE VEINS  2/16/2021     KNEE SURGERY       NECK SURGERY       OPEN REDUCTION INTERNAL FIXATION WRIST Left 4/30/2019    Procedure: Open Reduction Internal Fixation Left Distal Radius Fracture;  Surgeon: Dinh Strong MD;  Location:  OR     OPEN REDUCTION INTERNAL FIXATION WRIST Left 6/12/2020    Procedure: OPEN REDUCTION INTERNAL FIXATION Left Distal Radius Nonunion, Distal Ulna Resection;  Surgeon: Dinh Strong MD;  Location:  OR     OPTICAL TRACKING SYSTEM ENDOSCOPIC SINUS SURGERY Right 4/6/2018    Procedure: OPTICAL TRACKING SYSTEM ENDOSCOPIC SINUS SURGERY;  Stealth Assisted Right Maxillary Antrostomy, Anterior Ethmoidectomy And Frontal Sinusotomy;  Surgeon: Elyse Eisenberg MD;  Location:  OR     OPTICAL TRACKING SYSTEM ENDOSCOPIC SINUS SURGERY Right 8/3/2018    Procedure: OPTICAL TRACKING SYSTEM ENDOSCOPIC SINUS SURGERY;  Stealth Assisted Revision Right Frontal Sinusotomy, Right Stent Placement  **Latex Allergy** ;  Surgeon: Elyse Eisenberg MD;  Location:  OR     ORTHOPEDIC SURGERY       REMOVE HARDWARE WRIST Left 11/19/2019    Procedure: Left Distal Radius Hardware Removal, Flexor Pollicus Longus Repair, Deep Cultures;  Surgeon: Dinh Strong MD;  Location:  OR            Social History:     Social History     Tobacco Use     Smoking status: Never Smoker     Smokeless tobacco: Never Used   Substance Use Topics     Alcohol use: Yes     Comment: 2 glasses of wine a day            Family History:     Family History   Problem Relation Age of Onset     Arthritis Mother      Respiratory Mother         pulmonary fibrosis     Hypertension Mother      Anemia Mother      Liver Disease Father         from EtOH     Alcoholism Father      Multiple Sclerosis Sister      No Known Problems  "Maternal Grandmother      Heart Disease Maternal Grandfather      Breast Cancer Sister      Glaucoma Paternal Grandfather      Heart Disease Paternal Grandfather             Allergies:     Allergies   Allergen Reactions     Hctz Other (See Comments)     Pt develops symptomatic and significant hyponatremia with HCTZ exposure     Hydrochlorothiazide      Other reaction(s): Hyponatremia  Hyponatremia     Latex      Benzocaine Rash     carba mix,thrium-sunscreen     Resorcinol-Alcohol Rash     carba mix,thrium-sunscreen     Ace Inhibitors Unknown     Dizziness and orthostatic changes and electrolyte problems.     Latex Unknown            Medications:     Current Outpatient Medications   Medication     acetaminophen 650 MG TABS     amLODIPine (NORVASC) 10 MG tablet     COMPRESSION STOCKINGS     COMPRESSION STOCKINGS     COMPRESSION STOCKINGS     Diphenhydramine-APAP, sleep, (TYLENOL PM EXTRA STRENGTH PO)     ferrous gluconate (FERGON) 324 (38 Fe) MG tablet     Gauze Pads & Dressings (TELFA NON-ADHERENT) 3\"X4\" PADS     metoprolol tartrate (LOPRESSOR) 100 MG tablet     Multiple Vitamin (MULTI-VITAMIN) per tablet     mupirocin (BACTROBAN) 2 % external ointment     Omeprazole (PRILOSEC PO)     sodium chloride (OCEAN NASAL SPRAY) 0.65 % nasal spray     traMADol (ULTRAM) 50 MG tablet     triamcinolone (KENALOG) 0.1 % external cream     Current Facility-Administered Medications   Medication     fluocinonide (LIDEX) 0.05 % ointment     fluocinonide (LIDEX) 0.05 % ointment     mupirocin (BACTROBAN) 2 % ointment     mupirocin (BACTROBAN) 2 % ointment     romosozumab-aqqg (EVENITY) injection 210 mg             Review of Systems:   A focused musculoskeletal and neurologic ROS was performed with pertinent positives and negatives noted in the HPI.  Additional systems were also reviewed and are documented at the bottom of the note.         Physical Exam:   Vitals: There were no vitals taken for this visit.  Musculoskeletal, Neurologic, " and Spine:          Lumbar Spine:    Appearance - No gross stepoffs or deformities    Motor -     L2-3: Hip flexion 5/5 R and 5/5 L strength          L3/4:  Knee extension R 5/5 and L 5/5 strength         L4/5:  Foot dorsiflexion R 5/5 L 5/5 and       EHL dorsiflexion R 4/5 L 4/5 strength         S1:  Plantarflexion/Peroneal Muscles  R 5/5 and L 5/5 strength    Sensation: intact to light touch L3-S1 distribution BLE      Neurologic:      REFLEXES Left Right                  Patella 1+ 1+   Ankle jerk 1+ 1+   Babinski No upgoing great toe No upgoing great toe   Clonus 0 beats 0 beats                Imaging:     I again reviewed her previous thoracic CT scan and thoracic radiographs which show T11 osteoporotic burst fracture with some retropulsion of bone       Assessment and Plan:     80 year old female with T11 osteoporotic burst fracture.  She is neurologically intact.  She has back pain.    We again discussed that surgery for this problem would be a major undertaking it would be very risky given her age, medical status and bone quality.  Therefore I have recommended that she not have it done.    I explained if she develops new neurologic symptoms including numbness weakness or new gait instability or new onset bowel and bladder deficits I would want her to reach out to me.  In the absence of the symptoms as long as it is back pain alone, I would recommend nonoperative treatment.    I am glad the brace is helping somewhat.  If she wishes to pursue injections I would be happy to make a referral to the pain clinic.  We discussed that sometimes these are temporary.  For now she is okay just monitoring things.  She will let us know if she wants referral to the pain clinic or if there are new neurologic developments.           Respectfully,  Elijah Hatch MD  Spine Surgery  [facility]

## 2021-05-20 ENCOUNTER — OFFICE VISIT (OUTPATIENT)
Dept: ORTHOPEDICS | Facility: CLINIC | Age: 81
End: 2021-05-20
Payer: MEDICARE

## 2021-05-20 VITALS — BODY MASS INDEX: 19.63 KG/M2 | HEIGHT: 60 IN | WEIGHT: 100 LBS

## 2021-05-20 DIAGNOSIS — M80.00XA OSTEOPOROSIS WITH CURRENT PATHOLOGICAL FRACTURE, UNSPECIFIED OSTEOPOROSIS TYPE, INITIAL ENCOUNTER: Primary | ICD-10-CM

## 2021-05-20 PROCEDURE — 99214 OFFICE O/P EST MOD 30 MIN: CPT | Mod: 25 | Performed by: FAMILY MEDICINE

## 2021-05-20 PROCEDURE — 96372 THER/PROPH/DIAG INJ SC/IM: CPT | Performed by: FAMILY MEDICINE

## 2021-05-20 ASSESSMENT — MIFFLIN-ST. JEOR: SCORE: 835.1

## 2021-05-20 NOTE — LETTER
5/20/2021      RE: Kimberly Chau  [address]  [facility] [address]       SUBJECTIVE:    Kimberly Chau is a 80 year old female here today to follow up on osteoporosis labs.  Previous DEXA done 4/5/2021.  T-score showed her to be Osteoporosis with a T-Score of -2.7 at the level of the right femoral neck. Laboratory work showed that she was slightly anemic with a hemoglobin at 10.8. She is on iron supplementation.  This is being followed by her primary care doctor.  Patient has no new symptoms.  Has overall    Bone Health History:  - Vitamin D Intake/Level: 2000U per day  - Calcium: cheese, rarely ice cream  - Hx Stress Fx's: No  - tob use: No  - etoh use: No   - Caffeine Use: Couple cups of tea in the morning  - Exercise: Walks  - Hx Kidney Stones: No  - Hx of Chemo, Skeletal Radiation, or Hormone Therapy: No  - Prolonged steroids: No  - Hx of GERD: Yes, Prilosec daily      Past Medical History:   Diagnosis Date     Anemia      Arthritis      Cataracts      Central retinal artery occlusion      Cervical spine fracture (H)     After a fall from orthostatic sx     Chronic pain     Back of head     Gastro-oesophageal reflux disease      Head injury      Hypertension      Hyponatremia     Resolved, 2/2 diuretics     Migraines      Osteoporosis      Pneumonia 2018     Rheumatoid arthritis(714.0)        Past Surgical History:   Procedure Laterality Date     C HAND/FINGER SURGERY UNLISTED       C PELVIS/HIP JOINT SURGERY UNLISTED       COLONOSCOPY       EYE SURGERY Left 02/2019    Hole in macula     FUSION CERVICAL POSTERIOR THREE+ LEVELS  1/22/2013    Procedure: FUSION CERVICAL POSTERIOR THREE+ LEVELS;  Cervical 1-6 Posterior Instrumentation and Fusion, Cervical 3-4 Laminectomy  .Instrumentation and fusion to Cervical 6 *Latex Allergy*;  Surgeon: Ra Bar MD;  Location: [facility] OR     GYN SURGERY       HEAD & NECK SURGERY       HYSTERECTOMY       IR ENDOVENOUS ABLATION VARICOSE VEINS  2/16/2021     KNEE  SURGERY       NECK SURGERY       OPEN REDUCTION INTERNAL FIXATION WRIST Left 4/30/2019    Procedure: Open Reduction Internal Fixation Left Distal Radius Fracture;  Surgeon: Dinh Strong MD;  [facility]     OPEN REDUCTION INTERNAL FIXATION WRIST Left 6/12/2020    Procedure: OPEN REDUCTION INTERNAL FIXATION Left Distal Radius Nonunion, Distal Ulna Resection;  Surgeon: Dinh Strong MD;  [facility]: [facility]     OPTICAL TRACKING SYSTEM ENDOSCOPIC SINUS SURGERY Right 4/6/2018    Procedure: OPTICAL TRACKING SYSTEM ENDOSCOPIC SINUS SURGERY;  Stealth Assisted Right Maxillary Antrostomy, Anterior Ethmoidectomy And Frontal Sinusotomy;  Surgeon: Elyse Eisenberg MD;  Location: [facility] OR     OPTICAL TRACKING SYSTEM ENDOSCOPIC SINUS SURGERY Right 8/3/2018    Procedure: OPTICAL TRACKING SYSTEM ENDOSCOPIC SINUS SURGERY;  Stealth Assisted Revision Right Frontal Sinusotomy, Right Stent Placement  **Latex Allergy** ;  Surgeon: Elyse Eisenberg MD;  Location: [facility] OR     ORTHOPEDIC SURGERY       REMOVE HARDWARE WRIST Left 11/19/2019    Procedure: Left Distal Radius Hardware Removal, Flexor Pollicus Longus Repair, Deep Cultures;  Surgeon: Dinh Strong MD;  [facility]       Current Outpatient Medications   Medication Sig Dispense Refill     acetaminophen 650 MG TABS Take 650 mg by mouth every 4 hours as needed. 100 tablet 0     amLODIPine (NORVASC) 10 MG tablet Take 1 tablet (10 mg) by mouth daily (Patient not taking: Reported on 1/11/2021) 90 tablet 0     COMPRESSION STOCKINGS Please measure and distribute 1 pair of 30mmHg - 40mmHg knee high ULCERCARE stockings (Patient not taking: Reported on 3/17/2021) 2 each 4     COMPRESSION STOCKINGS Please measure and distribute 1 pair of 20mmHg - 30mmHg Thigh high open or closed toe compression stockings. Jobst ultrasheer or equivalent. (Patient not taking: Reported on 3/17/2021) 2 each 4     COMPRESSION STOCKINGS Please measure and distribute 1 pair of 20mmHg - 30mmHg  "knee high open or closed toe compression stockings. Jobst ultrasheer or equivalent. 2 each 4     Diphenhydramine-APAP, sleep, (TYLENOL PM EXTRA STRENGTH PO) Take 2 tablets by mouth At Bedtime        ferrous gluconate (FERGON) 324 (38 Fe) MG tablet Take 324 mg by mouth       Gauze Pads & Dressings (TELFA NON-ADHERENT) 3\"X4\" PADS Cut to size for open areas on the lower extremities and secure with paper tape. Change dressing every other day. 30 each 11     metoprolol tartrate (LOPRESSOR) 100 MG tablet Take 2 tablets (200 mg) by mouth 2 times daily 360 tablet 1     Multiple Vitamin (MULTI-VITAMIN) per tablet Take 1 tablet by mouth every morning        mupirocin (BACTROBAN) 2 % external ointment Apply to open areas with dressing changes (Patient not taking: Reported on 2/22/2021) 60 g 11     Omeprazole (PRILOSEC PO) Take 20 mg by mouth as needed        sodium chloride (OCEAN NASAL SPRAY) 0.65 % nasal spray Spray 1 spray into both nostrils daily as needed for congestion 1 Bottle 3     traMADol (ULTRAM) 50 MG tablet Take 1-2 tablets ( mg) by mouth every 6 hours as needed for severe pain (Patient not taking: Reported on 3/17/2021) 120 tablet 0     triamcinolone (KENALOG) 0.1 % external cream Apply twice daily for 2 weeks then 3 times weekly for 2 week, repeat cycle as needed (Patient not taking: Reported on 3/11/2021) 454 g 0       Family History   Problem Relation Age of Onset     Arthritis Mother      Respiratory Mother         pulmonary fibrosis     Hypertension Mother      Anemia Mother      Liver Disease Father         from EtOH     Alcoholism Father      Multiple Sclerosis Sister      No Known Problems Maternal Grandmother      Heart Disease Maternal Grandfather      Breast Cancer Sister      Glaucoma Paternal Grandfather      Heart Disease Paternal Grandfather        Social History     Socioeconomic History     Marital status:      Spouse name: Not on file     Number of children: Not on file     Years of " education: Not on file     Highest education level: Not on file   Occupational History     Not on file   Social Needs     Financial resource strain: Not on file     Food insecurity     Worry: Not on file     Inability: Not on file     Transportation needs     Medical: Not on file     Non-medical: Not on file   Tobacco Use     Smoking status: Never Smoker     Smokeless tobacco: Never Used   Substance and Sexual Activity     Alcohol use: Yes     Comment: 2 glasses of wine a day     Drug use: Never     Sexual activity: Not Currently   Lifestyle     Physical activity     Days per week: Not on file     Minutes per session: Not on file     Stress: Not on file   Relationships     Social connections     Talks on phone: Not on file     Gets together: Not on file     Attends Sikh service: Not on file     Active member of club or organization: Not on file     Attends meetings of clubs or organizations: Not on file     Relationship status: Not on file     Intimate partner violence     Fear of current or ex partner: Not on file     Emotionally abused: Not on file     Physically abused: Not on file     Forced sexual activity: Not on file   Other Topics Concern     Parent/sibling w/ CABG, MI or angioplasty before 65F 55M? Not Asked   Social History Narrative    Ms. Chau is , has two grown children and two grandchildren. She does not work. She was never a smoker, and drinks a glass of wine with dinner each night.         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        OBJECTIVE:  Ht 1.524 m (5')   Wt 45.4 kg (100 lb)   BMI 19.53 kg/m    Gen:  Well nourished and in no acute distress  HEENT: PERRL; TMs normal color and landmarks; nasopharynx pink and moist; oropharynx pink and moist  Neck: supple without lymphadenopathy.  Thyroid is not enlarged.  No evidence of any goiters.  No tenderness to palpation.  CV:  RRR  - no murmurs noted   Pulm:  CTAB, no wheezes or crackles noted, good air entry    ABD: soft, nontender, no masses, no rebound, BS intact throughout, no hepatosplenomegaly  Extrem: no cyanosis, edema or clubbing  Psych: Euthymic     Magnesium: 1.8  Phosphorus:4.5  TSH: 1.01  Vit D: 77  Hgb: 10.8  PtH: 19    ASSESSMENT:  Osteoporosis     PLAN:  1. Pain in thoracic spine - T11 Compression  Fracture  2. Osteoporosis with current pathological fracture, unspecified osteoporosis type, initial encounter  Laboratory work as noted above was discussed with patient and family member. Evenity was prescribed at last visit to start prior auth process. This was approved.  We will start Evenity injections today.  - Return in one month for follow up and repeat evenity  - Enventity 1/12 shots given today  - Continue with good calcium dietary intake  - Continue 2000IU vit. D daily    Options for treatment and/or follow-up care were reviewed with the patient was actively involved in the decision making process. Patient verbalized understanding and was in agreement with the plan.    The patient was seen by and discussed with Dr.Suzanne GARRY Collazo MD, CAQ, CCD    Emelyn Hameed DO  Primary Care Sports Medicine Fellow      Attending Note:   I have examined this patient/images/labs and have reviewed the clinical presentation and progress note with the fellow. I agree with the treatment plan as outlined. The plan was formulated with the fellow on the day of the patient's visit. I have supervised the Evenity injections.   Alley Collazo MD, CAQ, CCD  [facility]  Sports Medicine and Bone Health      Emelyn Hameed DO

## 2021-05-20 NOTE — NURSING NOTE
[address] [address]  Dept: [phone]  ______________________________________________________________________________    Patient: Kimberly Chau   : 1940   MRN: [id number]   May 20, 2021    INVASIVE PROCEDURE SAFETY CHECKLIST    Date: May 20, 2021   Procedure: Subcutaneous Evenity Injection ()  Patient Name: Kimberly Chau  MRN: [id number]  YOB: 1940    Action: Complete sections as appropriate. Any discrepancy results in a HARD COPY until resolved.     PRE PROCEDURE:  Patient ID verified with 2 identifiers (name and  or MRN): Yes  Procedure and site verified with patient/designee (when able): Yes  Accurate consent documentation in medical record: Yes  H&P (or appropriate assessment) documented in medical record: Yes  H&P must be up to 20 days prior to procedure and updates within 24 hours of procedure as applicable: NA  Relevant diagnostic and radiology test results appropriately labeled and displayed as applicable: NA  Procedure site(s) marked with provider initials: NA    TIMEOUT:  Time-Out performed immediately prior to starting procedure, including verbal and active participation of all team members addressing the following:Yes  * Correct patient identify  * Confirmed that the correct side and site are marked  * An accurate procedure consent form  * Agreement on the procedure to be done  * Correct patient position  * Relevant images and results are properly labeled and appropriately displayed  * The need to administer antibiotics or fluids for irrigation purposes during the procedure as applicable   * Safety precautions based on patient history or medication use    DURING PROCEDURE: Verification of correct person, site, and procedures any time the responsibility for care of the patient is transferred to another member of the care team.       Prior to injection, verified patient identity using patient's name and date of  birth.  Due to injection administration, patient instructed to remain in clinic for 15 minutes  afterwards, and to report any adverse reaction to me immediately.    Evenity Injection    Drug Amount Wasted:  None.  Vial/Syringe: Syringe  Expiration Date:  04/01/2023  NDC 07301-476-00  LOT [id number]      Tanika Haley, ATC  May 20, 2021

## 2021-05-20 NOTE — PROGRESS NOTES
Attending Note:   I have examined this patient/images/labs and have reviewed the clinical presentation and progress note with the fellow. I agree with the treatment plan as outlined. The plan was formulated with the fellow on the day of the patient's visit. I have supervised the Evenity injections.   Alley Collazo MD, CAQ, CCD  [facility]  Sports Medicine and Bone Health

## 2021-05-20 NOTE — PROGRESS NOTES
SUBJECTIVE:    Kimberly Chau is a 80 year old female here today to follow up on osteoporosis labs.  Previous DEXA done 4/5/2021.  T-score showed her to be Osteoporosis with a T-Score of -2.7 at the level of the right femoral neck. Laboratory work showed that she was slightly anemic with a hemoglobin at 10.8. She is on iron supplementation.  This is being followed by her primary care doctor.  Patient has no new symptoms.  Has overall    Bone Health History:  - Vitamin D Intake/Level: 2000U per day  - Calcium: cheese, rarely ice cream  - Hx Stress Fx's: No  - tob use: No  - etoh use: No   - Caffeine Use: Couple cups of tea in the morning  - Exercise: Walks  - Hx Kidney Stones: No  - Hx of Chemo, Skeletal Radiation, or Hormone Therapy: No  - Prolonged steroids: No  - Hx of GERD: Yes, Prilosec daily      Past Medical History:   Diagnosis Date     Anemia      Arthritis      Cataracts      Central retinal artery occlusion      Cervical spine fracture (H)     After a fall from orthostatic sx     Chronic pain     Back of head     Gastro-oesophageal reflux disease      Head injury      Hypertension      Hyponatremia     Resolved, 2/2 diuretics     Migraines      Osteoporosis      Pneumonia 2018     Rheumatoid arthritis(714.0)        Past Surgical History:   Procedure Laterality Date     C HAND/FINGER SURGERY UNLISTED       C PELVIS/HIP JOINT SURGERY UNLISTED       COLONOSCOPY       EYE SURGERY Left 02/2019    Hole in macula     FUSION CERVICAL POSTERIOR THREE+ LEVELS  1/22/2013    Procedure: FUSION CERVICAL POSTERIOR THREE+ LEVELS;  Cervical 1-6 Posterior Instrumentation and Fusion, Cervical 3-4 Laminectomy  .Instrumentation and fusion to Cervical 6 *Latex Allergy*;  Surgeon: Ra Bar MD;  Location: [facility] OR     GYN SURGERY       HEAD & NECK SURGERY       HYSTERECTOMY       IR ENDOVENOUS ABLATION VARICOSE VEINS  2/16/2021     KNEE SURGERY       NECK SURGERY       OPEN REDUCTION INTERNAL FIXATION WRIST Left 4/30/2019     Procedure: Open Reduction Internal Fixation Left Distal Radius Fracture;  Surgeon: Dinh Strong MD;  [facility] OR     OPEN REDUCTION INTERNAL FIXATION WRIST Left 6/12/2020    Procedure: OPEN REDUCTION INTERNAL FIXATION Left Distal Radius Nonunion, Distal Ulna Resection;  Surgeon: Dinh Strong MD;  [facility]: [facility] OR     OPTICAL TRACKING SYSTEM ENDOSCOPIC SINUS SURGERY Right 4/6/2018    Procedure: OPTICAL TRACKING SYSTEM ENDOSCOPIC SINUS SURGERY;  Stealth Assisted Right Maxillary Antrostomy, Anterior Ethmoidectomy And Frontal Sinusotomy;  Surgeon: Elyse Eisenberg MD;  Location: [facility] OR     OPTICAL TRACKING SYSTEM ENDOSCOPIC SINUS SURGERY Right 8/3/2018    Procedure: OPTICAL TRACKING SYSTEM ENDOSCOPIC SINUS SURGERY;  Stealth Assisted Revision Right Frontal Sinusotomy, Right Stent Placement  **Latex Allergy** ;  Surgeon: Elyse Eisenberg MD;  Location:  OR     ORTHOPEDIC SURGERY       REMOVE HARDWARE WRIST Left 11/19/2019    Procedure: Left Distal Radius Hardware Removal, Flexor Pollicus Longus Repair, Deep Cultures;  Surgeon: Dinh Strong MD;  [facility]: [facility] OR       Current Outpatient Medications   Medication Sig Dispense Refill     acetaminophen 650 MG TABS Take 650 mg by mouth every 4 hours as needed. 100 tablet 0     amLODIPine (NORVASC) 10 MG tablet Take 1 tablet (10 mg) by mouth daily (Patient not taking: Reported on 1/11/2021) 90 tablet 0     COMPRESSION STOCKINGS Please measure and distribute 1 pair of 30mmHg - 40mmHg knee high ULCERCARE stockings (Patient not taking: Reported on 3/17/2021) 2 each 4     COMPRESSION STOCKINGS Please measure and distribute 1 pair of 20mmHg - 30mmHg Thigh high open or closed toe compression stockings. Jobst ultrasheer or equivalent. (Patient not taking: Reported on 3/17/2021) 2 each 4     COMPRESSION STOCKINGS Please measure and distribute 1 pair of 20mmHg - 30mmHg knee high open or closed toe compression stockings. Jobst ultrasheer or equivalent. 2  "each 4     Diphenhydramine-APAP, sleep, (TYLENOL PM EXTRA STRENGTH PO) Take 2 tablets by mouth At Bedtime        ferrous gluconate (FERGON) 324 (38 Fe) MG tablet Take 324 mg by mouth       Gauze Pads & Dressings (TELFA NON-ADHERENT) 3\"X4\" PADS Cut to size for open areas on the lower extremities and secure with paper tape. Change dressing every other day. 30 each 11     metoprolol tartrate (LOPRESSOR) 100 MG tablet Take 2 tablets (200 mg) by mouth 2 times daily 360 tablet 1     Multiple Vitamin (MULTI-VITAMIN) per tablet Take 1 tablet by mouth every morning        mupirocin (BACTROBAN) 2 % external ointment Apply to open areas with dressing changes (Patient not taking: Reported on 2/22/2021) 60 g 11     Omeprazole (PRILOSEC PO) Take 20 mg by mouth as needed        sodium chloride (OCEAN NASAL SPRAY) 0.65 % nasal spray Spray 1 spray into both nostrils daily as needed for congestion 1 Bottle 3     traMADol (ULTRAM) 50 MG tablet Take 1-2 tablets ( mg) by mouth every 6 hours as needed for severe pain (Patient not taking: Reported on 3/17/2021) 120 tablet 0     triamcinolone (KENALOG) 0.1 % external cream Apply twice daily for 2 weeks then 3 times weekly for 2 week, repeat cycle as needed (Patient not taking: Reported on 3/11/2021) 454 g 0       Family History   Problem Relation Age of Onset     Arthritis Mother      Respiratory Mother         pulmonary fibrosis     Hypertension Mother      Anemia Mother      Liver Disease Father         from EtOH     Alcoholism Father      Multiple Sclerosis Sister      No Known Problems Maternal Grandmother      Heart Disease Maternal Grandfather      Breast Cancer Sister      Glaucoma Paternal Grandfather      Heart Disease Paternal Grandfather        Social History     Socioeconomic History     Marital status:      Spouse name: Not on file     Number of children: Not on file     Years of education: Not on file     Highest education level: Not on file   Occupational " History     Not on file   Social Needs     Financial resource strain: Not on file     Food insecurity     Worry: Not on file     Inability: Not on file     Transportation needs     Medical: Not on file     Non-medical: Not on file   Tobacco Use     Smoking status: Never Smoker     Smokeless tobacco: Never Used   Substance and Sexual Activity     Alcohol use: Yes     Comment: 2 glasses of wine a day     Drug use: Never     Sexual activity: Not Currently   Lifestyle     Physical activity     Days per week: Not on file     Minutes per session: Not on file     Stress: Not on file   Relationships     Social connections     Talks on phone: Not on file     Gets together: Not on file     Attends [employer]an service: Not on file     Active member of club or organization: Not on file     Attends meetings of clubs or organizations: Not on file     Relationship status: Not on file     Intimate partner violence     Fear of current or ex partner: Not on file     Emotionally abused: Not on file     Physically abused: Not on file     Forced sexual activity: Not on file   Other Topics Concern     Parent/sibling w/ CABG, MI or angioplasty before 65F 55M? Not Asked   Social History Narrative    Ms. Chau is , has two grown children and two grandchildren. She does not work. She was never a smoker, and drinks a glass of wine with dinner each night.         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        OBJECTIVE:  Ht 1.524 m (5')   Wt 45.4 kg (100 lb)   BMI 19.53 kg/m    Gen:  Well nourished and in no acute distress  HEENT: PERRL; TMs normal color and landmarks; nasopharynx pink and moist; oropharynx pink and moist  Neck: supple without lymphadenopathy.  Thyroid is not enlarged.  No evidence of any goiters.  No tenderness to palpation.  CV:  RRR  - no murmurs noted   Pulm:  CTAB, no wheezes or crackles noted, good air entry   ABD: soft, nontender, no masses, no rebound, BS intact throughout, no  hepatosplenomegaly  Extrem: no cyanosis, edema or clubbing  Psych: Euthymic     Magnesium: 1.8  Phosphorus:4.5  TSH: 1.01  Vit D: 77  Hgb: 10.8  PtH: 19    ASSESSMENT:  Osteoporosis     PLAN:  1. Pain in thoracic spine - T11 Compression  Fracture  2. Osteoporosis with current pathological fracture, unspecified osteoporosis type, initial encounter  Laboratory work as noted above was discussed with patient and family member. Evenity was prescribed at last visit to start prior auth process. This was approved.  We will start Evenity injections today.  - Return in one month for follow up and repeat evenity  - Enventity 1/12 shots given today  - Continue with good calcium dietary intake  - Continue 2000IU vit. D daily    Options for treatment and/or follow-up care were reviewed with the patient was actively involved in the decision making process. Patient verbalized understanding and was in agreement with the plan.    The patient was seen by and discussed with Dr.Suzanne GARRY Collazo MD, CAQ, CCD    Emelyn Hameed DO  Primary Care Sports Medicine Fellow

## 2021-05-24 NOTE — MR AVS SNAPSHOT
After Visit Summary   10/11/2017    Kimberly Chau    MRN: [id number]           Patient Information     Date Of Birth          1940        Visit Information        Provider Department      10/11/2017 7:35 AM Guillermo Castellano MD [facility]        Today's Diagnoses     At risk for falling    -  1    High cholesterol        Encounter for screening mammogram for breast cancer        Other headache syndrome        Chronic left shoulder pain          Care Instructions    Primary Care Center: [phone]     Primary Care Center Medication Refill Request Information:  * Please contact your pharmacy regarding ANY request for medication refills.  ** [facility] Prescription Fax = [phone]  * Please allow 3 business days for routine medication refills.  * Please allow 5 business days for controlled substance medication refills.     Primary Care Center Test Result notification information:  *You will be notified with in 7-10 days of your appointment day regarding the results of your test.  If you are on MyChart you will be notified as soon as the provider has reviewed the results and signed off on them.    Radiology (Imaging Center) [phone] to schedule MRI                Follow-ups after your visit        Additional Services     ORTHOPEDICS ADULT REFERRAL       Chronic L shoulder pain today if possible                  Your next 10 appointments already scheduled     Oct 11, 2017 10:00 AM CDT   LAB with  LAB   [facility] ([facility])    [address] [place] [id number] [phone]           Patient must bring picture ID. Patient should be prepared to give a urine specimen  Please do not eat 10-12 hours before your appointment if you are coming in fasting for labs on lipids, cholesterol, or glucose (sugar). Pregnant women should follow their Care Team instructions. Water with medications is okay. Do not drink coffee or other fluids.  If you have concerns about taking  your medications, please ask at office or if scheduling via [employer], send a message by clicking on Secure Messaging, Message Your Care Team.            Nov 13, 2017 12:05 PM CST   (Arrive by 11:50 AM)   Return Visit with Guillermo Castellano MD   [facility] ([facility])    [address]  [address] [facility] [id number] [phone]              Future tests that were ordered for you today     Open Future Orders        Priority Expected Expires Ordered    MRI Brain w/o contrast Routine  10/11/2018 10/11/2017    Erythrocyte sedimentation rate Routine 10/11/2017 10/11/2018 10/11/2017    CRP inflammation Routine 10/11/2017 10/11/2018 10/11/2017    Mammogram, routine screening Routine  10/11/2018 10/11/2017    Lipid panel reflex to direct LDL - -(Today) Routine 10/11/2017 10/11/2018 10/11/2017    CBC with platelets differential Routine 10/11/2017 10/25/2017 10/11/2017    Comprehensive metabolic panel Routine 10/11/2017 10/11/2018 10/11/2017            Who to contact     Please call your clinic at [phone] to:    Ask questions about your health    Make or cancel appointments    Discuss your medicines    Learn about your test results    Speak to your doctor   If you have compliments or concerns about an experience at your clinic, or if you wish to file a complaint, please contact [facility] Patient Relations at [phone] or email us at [email or web address]         Additional Information About Your Visit        [employer] Information     Trellie is an electronic gateway that provides easy, online access to your medical records. With Trellie, you can request a clinic appointment, read your test results, renew a prescription or communicate with your care team.     To sign up for Trellie visit the website at [email or web address]   You will be asked to enter the access code listed below, as  negative well as some personal information. Please follow the directions to create your username and password.     Your access code is: [email or web address]  Expires: 2017  6:30 AM     Your access code will  in 90 days. If you need help or a new code, please contact your [facility] or call [phone] for assistance.        Care EveryWhere ID     This is your Care EveryWhere ID. This could be used by other organizations to access your [facility] medical records  [id number]        Your Vitals Were     Pulse Respirations Breastfeeding? BMI (Body Mass Index)          66 16 No 22.47 kg/m2         Blood Pressure from Last 3 Encounters:   10/11/17 118/70   17 144/68   17 159/73    Weight from Last 3 Encounters:   10/11/17 53.1 kg (117 lb)   17 54.9 kg (121 lb)   17 54.4 kg (120 lb)              We Performed the Following     ORTHOPEDICS ADULT REFERRAL          Today's Medication Changes          These changes are accurate as of: 10/11/17  7:58 AM.  If you have any questions, ask your nurse or doctor.               Stop taking these medicines if you haven't already. Please contact your care team if you have questions.     clobetasol 0.05 % Crea cream   Commonly known as:  CLOBETASOL PROPIONATE EMULSION   Stopped by:  Guillermo Castellano MD           [employer] MT Susp compounding kit   Stopped by:  Guillermo Castellano MD                    Primary Care Provider Office Phone # Fax #    Guillermo Castellano [phone] [address] [address]        Equal Access to Services     [facility] HospitalJARRETT : Hadii aad ku hadasho Soomaali, waaxda luqadaha, qaybta kaalmada adecarayada, alejandra magallon. So [facility] [phone].    ATENCIÓN: Si habla español, tiene a vázquez disposición servicios gratuitos de asistencia lingüística. Llame al [phone].    We comply with applicable federal civil rights laws and [place] laws. We  do not discriminate on the basis of race, color, national origin, age, disability, sex, sexual orientation, or gender identity.            Thank you!     Thank you for choosing [facility]  for your care. Our goal is always to provide you with excellent care. Hearing back from our patients is one way we can continue to improve our services. Please take a few minutes to complete the written survey that you may receive in the mail after your visit with us. Thank you!             Your Updated Medication List - Protect others around you: Learn how to safely use, store and throw away your medicines at www.disposemymeds.org.          This list is accurate as of: 10/11/17  7:58 AM.  Always use your most recent med list.                   Brand Name Dispense Instructions for use Diagnosis    ACETAMINOPHEN 8 HOUR 650 MG 8 hour tablet   Generic drug:  acetaminophen     100 tablet    Take 650 mg by mouth every 4 hours as needed.    C1-C2 instability       amLODIPine 10 MG tablet    NORVASC    90 tablet    Take 1 tablet (10 mg) by mouth daily    Essential hypertension       DICLOFENAC SODIUM PO      Take 25 mg by mouth daily        fluocinolone 0.025 % ointment    SYNALAR    60 g    Apply twice a day to affected areas of the face avoiding the eyelids followed by Vanicream moisturizer.    Xerosis of skin, Intrinsic atopic dermatitis       FOLIC ACID      daily.        METHOTREXATE      10 tablets once a week On Fridays. Taking 8 tablets on Fridays        methotrexate 2.5 MG tablet CHEMO           * methylPREDNISolone 4 MG tablet    MEDROL DOSEPAK    21 tablet    Follow package instructions    Cervical radiculopathy due to degenerative joint disease of spine       * methylPREDNISolone 4 MG tablet    MEDROL DOSEPAK    21 tablet    Follow package instructions    Cervical radiculopathy due to degenerative joint disease of spine       * methylPREDNISolone 4 MG tablet    MEDROL DOSEPAK    21 tablet    Follow package  instructions    Cervical radiculopathy due to degenerative joint disease of spine       metoprolol 100 MG tablet    LOPRESSOR    360 tablet    Take 2 tablets (200 mg) by mouth every 12 hours    Essential hypertension       mometasone 50 MCG/ACT spray    NASONEX    1 Box    Spray 1 spray into both nostrils as needed PRN daily    Chronic rhinitis       Multi-vitamin Tabs tablet   Generic drug:  multivitamin, therapeutic with minerals      Take 1 tablet by mouth daily.        PRILOSEC PO      Take 20 mg by mouth daily as needed        triamcinolone 0.1 % ointment    KENALOG    454 g    Apply to affected area of the body twice a day followed immediately by the Vanicream moisturizer.    Xerosis of skin, Intrinsic atopic dermatitis       TYLENOL PM EXTRA STRENGTH PO      Take 2 tablets by mouth nightly as needed.        * Notice:  This list has 3 medication(s) that are the same as other medications prescribed for you. Read the directions carefully, and ask your doctor or other care provider to review them with you.       Soft, non-tender, no hepatosplenomegaly, normal bowel sounds

## 2021-06-16 ENCOUNTER — OFFICE VISIT (OUTPATIENT)
Dept: ORTHOPEDICS | Facility: CLINIC | Age: 81
End: 2021-06-16
Payer: MEDICARE

## 2021-06-16 VITALS — HEIGHT: 60 IN | WEIGHT: 100 LBS | BODY MASS INDEX: 19.63 KG/M2

## 2021-06-16 DIAGNOSIS — M80.00XA OSTEOPOROSIS WITH CURRENT PATHOLOGICAL FRACTURE, UNSPECIFIED OSTEOPOROSIS TYPE, INITIAL ENCOUNTER: Primary | ICD-10-CM

## 2021-06-16 DIAGNOSIS — S22.000A COMPRESSION FRACTURE OF BODY OF THORACIC VERTEBRA (H): ICD-10-CM

## 2021-06-16 PROCEDURE — 96372 THER/PROPH/DIAG INJ SC/IM: CPT | Performed by: FAMILY MEDICINE

## 2021-06-16 PROCEDURE — 99207 PR NO CHARGE LOS: CPT | Performed by: FAMILY MEDICINE

## 2021-06-16 ASSESSMENT — MIFFLIN-ST. JEOR: SCORE: 835.1

## 2021-06-16 NOTE — PROGRESS NOTES
Attending Note:   I have supervised the Evenity injections.        Alley Collazo MD, CAQ, CCD  [facility]  Sports Medicine and Bone Health

## 2021-06-16 NOTE — PROGRESS NOTES
SUBJECTIVE:    Kimberly Chau is a 80 year old female here today for evenity injection for history of osteoporosis.  Previous DEXA done 4/5/2021.  T-score showed her to be Osteoporosis with a T-Score of -2.7 at the level of the right femoral neck. Laboratory work showed that she was slightly anemic with a hemoglobin at 10.8. She is on iron supplementation.  This is being followed by her primary care doctor.  Patient has no new symptoms.  Patient tolerated her first Evenity injection well.  Second one will be given today.        Past Medical History:   Diagnosis Date     Anemia      Arthritis      Cataracts      Central retinal artery occlusion      Cervical spine fracture (H)     After a fall from orthostatic sx     Chronic pain     Back of head     Gastro-oesophageal reflux disease      Head injury      Hypertension      Hyponatremia     Resolved, 2/2 diuretics     Migraines      Osteoporosis      Pneumonia 2018     Rheumatoid arthritis(714.0)        Past Surgical History:   Procedure Laterality Date     C HAND/FINGER SURGERY UNLISTED       C PELVIS/HIP JOINT SURGERY UNLISTED       COLONOSCOPY       EYE SURGERY Left 02/2019    Hole in macula     FUSION CERVICAL POSTERIOR THREE+ LEVELS  1/22/2013    Procedure: FUSION CERVICAL POSTERIOR THREE+ LEVELS;  Cervical 1-6 Posterior Instrumentation and Fusion, Cervical 3-4 Laminectomy  .Instrumentation and fusion to Cervical 6 *Latex Allergy*;  Surgeon: Ra Bar MD;  Location: [facility] OR     GYN SURGERY       HEAD & NECK SURGERY       HYSTERECTOMY       IR ENDOVENOUS ABLATION VARICOSE VEINS  2/16/2021     KNEE SURGERY       NECK SURGERY       OPEN REDUCTION INTERNAL FIXATION WRIST Left 4/30/2019    Procedure: Open Reduction Internal Fixation Left Distal Radius Fracture;  Surgeon: Dinh Strong MD;  Location:  OR     OPEN REDUCTION INTERNAL FIXATION WRIST Left 6/12/2020    Procedure: OPEN REDUCTION INTERNAL FIXATION Left Distal Radius Nonunion, Distal Ulna  "Resection;  Surgeon: Dinh Strong MD;  Location: [facility] OR     OPTICAL TRACKING SYSTEM ENDOSCOPIC SINUS SURGERY Right 4/6/2018    Procedure: OPTICAL TRACKING SYSTEM ENDOSCOPIC SINUS SURGERY;  Stealth Assisted Right Maxillary Antrostomy, Anterior Ethmoidectomy And Frontal Sinusotomy;  Surgeon: Elyse Eisenberg MD;  Location: [facility] OR     OPTICAL TRACKING SYSTEM ENDOSCOPIC SINUS SURGERY Right 8/3/2018    Procedure: OPTICAL TRACKING SYSTEM ENDOSCOPIC SINUS SURGERY;  Stealth Assisted Revision Right Frontal Sinusotomy, Right Stent Placement  **Latex Allergy** ;  Surgeon: Elyse Eisenberg MD;  Location: [facility] OR     ORTHOPEDIC SURGERY       REMOVE HARDWARE WRIST Left 11/19/2019    Procedure: Left Distal Radius Hardware Removal, Flexor Pollicus Longus Repair, Deep Cultures;  Surgeon: Dinh Strong MD;  Location: [facility] OR       Current Outpatient Medications   Medication Sig Dispense Refill     acetaminophen 650 MG TABS Take 650 mg by mouth every 4 hours as needed. 100 tablet 0     COMPRESSION STOCKINGS Please measure and distribute 1 pair of 20mmHg - 30mmHg knee high open or closed toe compression stockings. Jobst ultrasheer or equivalent. 2 each 4     Diphenhydramine-APAP, sleep, (TYLENOL PM EXTRA STRENGTH PO) Take 2 tablets by mouth At Bedtime        ferrous gluconate (FERGON) 324 (38 Fe) MG tablet Take 324 mg by mouth       Gauze Pads & Dressings (TELFA NON-ADHERENT) 3\"X4\" PADS Cut to size for open areas on the lower extremities and secure with paper tape. Change dressing every other day. 30 each 11     metoprolol tartrate (LOPRESSOR) 100 MG tablet Take 2 tablets (200 mg) by mouth 2 times daily 360 tablet 1     Multiple Vitamin (MULTI-VITAMIN) per tablet Take 1 tablet by mouth every morning        Omeprazole (PRILOSEC PO) Take 20 mg by mouth as needed        sodium chloride (OCEAN NASAL SPRAY) 0.65 % nasal spray Spray 1 spray into both nostrils daily as needed for congestion 1 Bottle 3     amLODIPine (NORVASC) 10 MG " tablet Take 1 tablet (10 mg) by mouth daily (Patient not taking: Reported on 1/11/2021) 90 tablet 0     COMPRESSION STOCKINGS Please measure and distribute 1 pair of 30mmHg - 40mmHg knee high ULCERCARE stockings (Patient not taking: Reported on 3/17/2021) 2 each 4     COMPRESSION STOCKINGS Please measure and distribute 1 pair of 20mmHg - 30mmHg Thigh high open or closed toe compression stockings. Jobst ultrasheer or equivalent. (Patient not taking: Reported on 3/17/2021) 2 each 4     mupirocin (BACTROBAN) 2 % external ointment Apply to open areas with dressing changes (Patient not taking: Reported on 2/22/2021) 60 g 11     traMADol (ULTRAM) 50 MG tablet Take 1-2 tablets ( mg) by mouth every 6 hours as needed for severe pain (Patient not taking: Reported on 3/17/2021) 120 tablet 0     triamcinolone (KENALOG) 0.1 % external cream Apply twice daily for 2 weeks then 3 times weekly for 2 week, repeat cycle as needed (Patient not taking: Reported on 3/11/2021) 454 g 0       Family History   Problem Relation Age of Onset     Arthritis Mother      Respiratory Mother         pulmonary fibrosis     Hypertension Mother      Anemia Mother      Liver Disease Father         from EtOH     Alcoholism Father      Multiple Sclerosis Sister      No Known Problems Maternal Grandmother      Heart Disease Maternal Grandfather      Breast Cancer Sister      Glaucoma Paternal Grandfather      Heart Disease Paternal Grandfather        Social History     Socioeconomic History     Marital status:      Spouse name: Not on file     Number of children: Not on file     Years of education: Not on file     Highest education level: Not on file   Occupational History     Not on file   Social Needs     Financial resource strain: Not on file     Food insecurity     Worry: Not on file     Inability: Not on file     Transportation needs     Medical: Not on file     Non-medical: Not on file   Tobacco Use     Smoking status: Never Smoker      Smokeless tobacco: Never Used   Substance and Sexual Activity     Alcohol use: Yes     Comment: 2 glasses of wine a day     Drug use: Never     Sexual activity: Not Currently   Lifestyle     Physical activity     Days per week: Not on file     Minutes per session: Not on file     Stress: Not on file   Relationships     Social connections     Talks on phone: Not on file     Gets together: Not on file     Attends [employer] service: Not on file     Active member of club or organization: Not on file     Attends meetings of clubs or organizations: Not on file     Relationship status: Not on file     Intimate partner violence     Fear of current or ex partner: Not on file     Emotionally abused: Not on file     Physically abused: Not on file     Forced sexual activity: Not on file   Other Topics Concern     Parent/sibling w/ CABG, MI or angioplasty before 65F 55M? Not Asked   Social History Narrative    Ms. Chau is , has two grown children and two grandchildren. She does not work. She was never a smoker, and drinks a glass of wine with dinner each night.         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        OBJECTIVE:  Ht 1.524 m (5')   Wt 45.4 kg (100 lb)   BMI 19.53 kg/m    Gen:  Well nourished and in no acute distress  HEENT: PERRL; TMs normal color and landmarks; nasopharynx pink and moist; oropharynx pink and moist  Extrem: no cyanosis, edema or clubbing  Psych: Euthymic     Magnesium: 1.8  Phosphorus:4.5  TSH: 1.01  Vit D: 77  Hgb: 10.8  PtH: 19    ASSESSMENT:  Osteoporosis     PLAN:  1. Pain in thoracic spine - T11 Compression  Fracture  2. Osteoporosis with current pathological fracture, unspecified osteoporosis type, initial encounter  Tolerated previous eventity injection. Will repeat today.  - Return in one month for follow up and repeat evenity  - Enventity 2/12 shots given today  - Continue with good calcium dietary intake  - Continue 2000IU vit. D daily    Options for  treatment and/or follow-up care were reviewed with the patient was actively involved in the decision making process. Patient verbalized understanding and was in agreement with the plan.    The patient was seen by and discussed with Dr.Suzanne GARRY Collazo MD, CAQ, CCD    Emelyn Hameed DO  Primary Care Sports Medicine Fellow

## 2021-06-16 NOTE — LETTER
6/16/2021       RE: Kimberly Chau  [address] [place] [address]       SUBJECTIVE:    Kimberly Chau is a 80 year old female here today for evenity injection for history of osteoporosis.  Previous DEXA done 4/5/2021.  T-score showed her to be Osteoporosis with a T-Score of -2.7 at the level of the right femoral neck. Laboratory work showed that she was slightly anemic with a hemoglobin at 10.8. She is on iron supplementation.  This is being followed by her primary care doctor.  Patient has no new symptoms.  Patient tolerated her first Evenity injection well.  Second one will be given today.        Past Medical History:   Diagnosis Date     Anemia      Arthritis      Cataracts      Central retinal artery occlusion      Cervical spine fracture (H)     After a fall from orthostatic sx     Chronic pain     Back of head     Gastro-oesophageal reflux disease      Head injury      Hypertension      Hyponatremia     Resolved, 2/2 diuretics     Migraines      Osteoporosis      Pneumonia 2018     Rheumatoid arthritis(714.0)        Past Surgical History:   Procedure Laterality Date     C HAND/FINGER SURGERY UNLISTED       C PELVIS/HIP JOINT SURGERY UNLISTED       COLONOSCOPY       EYE SURGERY Left 02/2019    Hole in macula     FUSION CERVICAL POSTERIOR THREE+ LEVELS  1/22/2013    Procedure: FUSION CERVICAL POSTERIOR THREE+ LEVELS;  Cervical 1-6 Posterior Instrumentation and Fusion, Cervical 3-4 Laminectomy  .Instrumentation and fusion to Cervical 6 *Latex Allergy*;  Surgeon: Ra Bar MD;  Location: [facility] OR     GYN SURGERY       HEAD & NECK SURGERY       HYSTERECTOMY       IR ENDOVENOUS ABLATION VARICOSE VEINS  2/16/2021     KNEE SURGERY       NECK SURGERY       OPEN REDUCTION INTERNAL FIXATION WRIST Left 4/30/2019    Procedure: Open Reduction Internal Fixation Left Distal Radius Fracture;  Surgeon: Dinh Strong MD;  Location:  OR     OPEN REDUCTION INTERNAL FIXATION WRIST Left 6/12/2020     "Procedure: OPEN REDUCTION INTERNAL FIXATION Left Distal Radius Nonunion, Distal Ulna Resection;  Surgeon: Dinh Strong MD;  Location: [facility] OR     OPTICAL TRACKING SYSTEM ENDOSCOPIC SINUS SURGERY Right 4/6/2018    Procedure: OPTICAL TRACKING SYSTEM ENDOSCOPIC SINUS SURGERY;  Stealth Assisted Right Maxillary Antrostomy, Anterior Ethmoidectomy And Frontal Sinusotomy;  Surgeon: Elyse Eisenberg MD;  Location: [facility] OR     OPTICAL TRACKING SYSTEM ENDOSCOPIC SINUS SURGERY Right 8/3/2018    Procedure: OPTICAL TRACKING SYSTEM ENDOSCOPIC SINUS SURGERY;  Stealth Assisted Revision Right Frontal Sinusotomy, Right Stent Placement  **Latex Allergy** ;  Surgeon: Elyse Eisenberg MD;  Location: [facility] OR     ORTHOPEDIC SURGERY       REMOVE HARDWARE WRIST Left 11/19/2019    Procedure: Left Distal Radius Hardware Removal, Flexor Pollicus Longus Repair, Deep Cultures;  Surgeon: Dinh Strong MD;  Location: [facility]       Current Outpatient Medications   Medication Sig Dispense Refill     acetaminophen 650 MG TABS Take 650 mg by mouth every 4 hours as needed. 100 tablet 0     COMPRESSION STOCKINGS Please measure and distribute 1 pair of 20mmHg - 30mmHg knee high open or closed toe compression stockings. Jobst ultrasheer or equivalent. 2 each 4     Diphenhydramine-APAP, sleep, (TYLENOL PM EXTRA STRENGTH PO) Take 2 tablets by mouth At Bedtime        ferrous gluconate (FERGON) 324 (38 Fe) MG tablet Take 324 mg by mouth       Gauze Pads & Dressings (TELFA NON-ADHERENT) 3\"X4\" PADS Cut to size for open areas on the lower extremities and secure with paper tape. Change dressing every other day. 30 each 11     metoprolol tartrate (LOPRESSOR) 100 MG tablet Take 2 tablets (200 mg) by mouth 2 times daily 360 tablet 1     Multiple Vitamin (MULTI-VITAMIN) per tablet Take 1 tablet by mouth every morning        Omeprazole (PRILOSEC PO) Take 20 mg by mouth as needed        sodium chloride (OCEAN NASAL SPRAY) 0.65 % nasal spray Spray 1 spray into " both nostrils daily as needed for congestion 1 Bottle 3     amLODIPine (NORVASC) 10 MG tablet Take 1 tablet (10 mg) by mouth daily (Patient not taking: Reported on 1/11/2021) 90 tablet 0     COMPRESSION STOCKINGS Please measure and distribute 1 pair of 30mmHg - 40mmHg knee high ULCERCARE stockings (Patient not taking: Reported on 3/17/2021) 2 each 4     COMPRESSION STOCKINGS Please measure and distribute 1 pair of 20mmHg - 30mmHg Thigh high open or closed toe compression stockings. Jobst ultrasheer or equivalent. (Patient not taking: Reported on 3/17/2021) 2 each 4     mupirocin (BACTROBAN) 2 % external ointment Apply to open areas with dressing changes (Patient not taking: Reported on 2/22/2021) 60 g 11     traMADol (ULTRAM) 50 MG tablet Take 1-2 tablets ( mg) by mouth every 6 hours as needed for severe pain (Patient not taking: Reported on 3/17/2021) 120 tablet 0     triamcinolone (KENALOG) 0.1 % external cream Apply twice daily for 2 weeks then 3 times weekly for 2 week, repeat cycle as needed (Patient not taking: Reported on 3/11/2021) 454 g 0       Family History   Problem Relation Age of Onset     Arthritis Mother      Respiratory Mother         pulmonary fibrosis     Hypertension Mother      Anemia Mother      Liver Disease Father         from EtOH     Alcoholism Father      Multiple Sclerosis Sister      No Known Problems Maternal Grandmother      Heart Disease Maternal Grandfather      Breast Cancer Sister      Glaucoma Paternal Grandfather      Heart Disease Paternal Grandfather        Social History     Socioeconomic History     Marital status:      Spouse name: Not on file     Number of children: Not on file     Years of education: Not on file     Highest education level: Not on file   Occupational History     Not on file   Social Needs     Financial resource strain: Not on file     Food insecurity     Worry: Not on file     Inability: Not on file     Transportation needs     Medical: Not on  file     Non-medical: Not on file   Tobacco Use     Smoking status: Never Smoker     Smokeless tobacco: Never Used   Substance and Sexual Activity     Alcohol use: Yes     Comment: 2 glasses of wine a day     Drug use: Never     Sexual activity: Not Currently   Lifestyle     Physical activity     Days per week: Not on file     Minutes per session: Not on file     Stress: Not on file   Relationships     Social connections     Talks on phone: Not on file     Gets together: Not on file     Attends Restoration service: Not on file     Active member of club or organization: Not on file     Attends meetings of clubs or organizations: Not on file     Relationship status: Not on file     Intimate partner violence     Fear of current or ex partner: Not on file     Emotionally abused: Not on file     Physically abused: Not on file     Forced sexual activity: Not on file   Other Topics Concern     Parent/sibling w/ CABG, MI or angioplasty before 65F 55M? Not Asked   Social History Narrative    Ms. Chau is , has two grown children and two grandchildren. She does not work. She was never a smoker, and drinks a glass of wine with dinner each night.         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        OBJECTIVE:  Ht 1.524 m (5')   Wt 45.4 kg (100 lb)   BMI 19.53 kg/m    Gen:  Well nourished and in no acute distress  HEENT: PERRL; TMs normal color and landmarks; nasopharynx pink and moist; oropharynx pink and moist  Extrem: no cyanosis, edema or clubbing  Psych: Euthymic     Magnesium: 1.8  Phosphorus:4.5  TSH: 1.01  Vit D: 77  Hgb: 10.8  PtH: 19    ASSESSMENT:  Osteoporosis     PLAN:  1. Pain in thoracic spine - T11 Compression  Fracture  2. Osteoporosis with current pathological fracture, unspecified osteoporosis type, initial encounter  Tolerated previous eventity injection. Will repeat today.  - Return in one month for follow up and repeat evenity  - Enventity 2/12 shots given today  -  Continue with good calcium dietary intake  - Continue 2000IU vit. D daily    Options for treatment and/or follow-up care were reviewed with the patient was actively involved in the decision making process. Patient verbalized understanding and was in agreement with the plan.    The patient was seen by and discussed with Dr.Suzanne GARRY Collazo MD, CAQ, CCD    Emelyn Hameed DO  Primary Care Sports Medicine Fellow      Attending Note:   I have supervised the Evenity injections.        Alley Collazo MD, CAQ, CCD  [facility]  Sports Medicine and Bone Health      Alley Collazo MD

## 2021-06-16 NOTE — NURSING NOTE
[address] [address]  Dept: [phone]  ______________________________________________________________________________    Patient: Kimberly Chau   : 1940   MRN: [id number]   2021    INVASIVE PROCEDURE SAFETY CHECKLIST    Date: 2021   Procedure: Subcutaneous Evenity Injection ()  Patient Name: Kimberly Chau  MRN: [id number]  YOB: 1940    Action: Complete sections as appropriate. Any discrepancy results in a HARD COPY until resolved.     PRE PROCEDURE:  Patient ID verified with 2 identifiers (name and  or MRN): Yes  Procedure and site verified with patient/designee (when able): Yes  Accurate consent documentation in medical record: Yes  H&P (or appropriate assessment) documented in medical record: Yes  H&P must be up to 20 days prior to procedure and updates within 24 hours of procedure as applicable: NA  Relevant diagnostic and radiology test results appropriately labeled and displayed as applicable: NA  Procedure site(s) marked with provider initials: NA    TIMEOUT:  Time-Out performed immediately prior to starting procedure, including verbal and active participation of all team members addressing the following:Yes  * Correct patient identify  * Confirmed that the correct side and site are marked  * An accurate procedure consent form  * Agreement on the procedure to be done  * Correct patient position  * Relevant images and results are properly labeled and appropriately displayed  * The need to administer antibiotics or fluids for irrigation purposes during the procedure as applicable   * Safety precautions based on patient history or medication use    DURING PROCEDURE: Verification of correct person, site, and procedures any time the responsibility for care of the patient is transferred to another member of the care team.       Prior to injection, verified patient identity using patient's name and date  of birth.  Due to injection administration, patient instructed to remain in clinic for 15 minutes  afterwards, and to report any adverse reaction to me immediately.    Evenity Injection    Drug Amount Wasted:  None.  Vial/Syringe: Syringe  Expiration Date:  05/01/2023  NDC 67850-008-38  LOT [id number]      Tanika Haley, [facility]  June 16, 2021

## 2021-07-15 ENCOUNTER — OFFICE VISIT (OUTPATIENT)
Dept: ORTHOPEDICS | Facility: CLINIC | Age: 81
End: 2021-07-15
Payer: MEDICARE

## 2021-07-15 VITALS — HEIGHT: 60 IN | WEIGHT: 100 LBS | BODY MASS INDEX: 19.63 KG/M2

## 2021-07-15 DIAGNOSIS — S22.000A COMPRESSION FRACTURE OF BODY OF THORACIC VERTEBRA (H): ICD-10-CM

## 2021-07-15 DIAGNOSIS — M80.00XD AGE-RELATED OSTEOPOROSIS WITH CURRENT PATHOLOGICAL FRACTURE WITH ROUTINE HEALING, SUBSEQUENT ENCOUNTER: Primary | ICD-10-CM

## 2021-07-15 PROCEDURE — 99213 OFFICE O/P EST LOW 20 MIN: CPT | Mod: 25 | Performed by: FAMILY MEDICINE

## 2021-07-15 PROCEDURE — 20610 DRAIN/INJ JOINT/BURSA W/O US: CPT | Mod: 50 | Performed by: FAMILY MEDICINE

## 2021-07-15 ASSESSMENT — MIFFLIN-ST. JEOR: SCORE: 835.1

## 2021-07-15 NOTE — NURSING NOTE
[address] [address]  Dept: [phone]  ______________________________________________________________________________    Patient: Kimberly Chau   : 1940   MRN: [id number]   July 15, 2021    INVASIVE PROCEDURE SAFETY CHECKLIST    Date: July 15, 2021   Procedure: Subcutaneous Evenity Injection (3/12)  Patient Name: Kimberly Chau  MRN: [id number]  YOB: 1940    Action: Complete sections as appropriate. Any discrepancy results in a HARD COPY until resolved.     PRE PROCEDURE:  Patient ID verified with 2 identifiers (name and  or MRN): Yes  Procedure and site verified with patient/designee (when able): Yes  Accurate consent documentation in medical record: Yes  H&P (or appropriate assessment) documented in medical record: Yes  H&P must be up to 20 days prior to procedure and updates within 24 hours of procedure as applicable: NA  Relevant diagnostic and radiology test results appropriately labeled and displayed as applicable: NA  Procedure site(s) marked with provider initials: NA    TIMEOUT:  Time-Out performed immediately prior to starting procedure, including verbal and active participation of all team members addressing the following:Yes  * Correct patient identify  * Confirmed that the correct side and site are marked  * An accurate procedure consent form  * Agreement on the procedure to be done  * Correct patient position  * Relevant images and results are properly labeled and appropriately displayed  * The need to administer antibiotics or fluids for irrigation purposes during the procedure as applicable   * Safety precautions based on patient history or medication use    DURING PROCEDURE: Verification of correct person, site, and procedures any time the responsibility for care of the patient is transferred to another member of the care team.       Prior to injection, verified patient identity using patient's name and date  of birth.  Due to injection administration, patient instructed to remain in clinic for 15 minutes  afterwards, and to report any adverse reaction to me immediately.    Evenity ([id number]) Injection    Drug Amount Wasted:  None.  Vial/Syringe: Syringe  Expiration Date:  10/01/2023  NDC 82794-336-87  LOT [id number]      Tanika Haley, [facility]  July 15, 2021

## 2021-07-15 NOTE — PATIENT INSTRUCTIONS
Patient Education     GOAL OF 1200mg of CALCIUM DAILY  Preventing Osteoporosis: Meeting Your Calcium Needs    Your body needs calcium to build and repair bones. But it can't make calcium on its own. That's why it's important to eat calcium-rich foods. Some foods are naturally rich in calcium. Others have calcium added (fortified). It's best to get calcium from the foods you eat. But if you can't get enough, you may want to take calcium supplements. To meet your daily calcium needs, try the foods listed below.  Dairy Fish & beans Other sources   Source   Calcium (mg) per serving   Source   Calcium (mg) per serving   Source   Calcium (mg) per serving   Low-fat yogurt, plain   415 mg/8 oz.   Sardines, Atlantic, canned, with bones   351 mg/3 oz.   Oatmeal, instant, fortified   215 mg/1 cup   Nonfat milk   302 mg/1 cup   [place], sockeye, canned, with bones   239 mg/3 oz.   Tofu made with calcium sulfate   204 mg/3 oz.   Low-fat milk   297 mg/1 cup   Soybeans, fresh, boiled   131 mg/1/2 cup   Collards   179 mg/1/2 cup   Swiss cheese   272 mg/1 oz.   White beans, cooked   81 mg/1/2 cup   English muffin, whole wheat   175 mg/1 muffin   Cheddar cheese   205 mg/1 oz.   Navy beans, cooked   79 mg/1/2 cup   Kale   90 mg/1/2 cup   Ice cream strawberry   79 mg/1/2 cup           Orange, navel   56 mg/1 medium   Note: Calcium levels may vary depending on brand and size.  Daily calcium needs  14 to 18 years old: 1,300 mg  19 to 30 years old: 1,000 mg  31 to 50 years old: 1,000 mg  51 to 70 years old, women: 1,200 mg  51 to 70 years old, men: 1,000 mg  Pregnant or nursin to 18 years old: 1,300 mg, 19 to 50 years old: 1,000 mg  Older than 70 (women and men): 1,200 mg   Tye last reviewed this educational content on 2018-2021 [employer]. All rights reserved. This information is not intended as a substitute for professional medical care. Always follow your healthcare professional's instructions.

## 2021-07-15 NOTE — LETTER
7/15/2021      RE: Kimberly Chau  [address] [facility] [address]       [facility]  [facility]           SUBJECTIVE       Kimberly Chau is a 80 year old female with a PMH of osteoporosis presenting to clinic today for repeat evenity injection.     Last DEXA 4/5/2021: T score of -2.7 at right femoral neck.  Lab work significant for slight anemia at 10.8 on iron supplementation through PCP.   Tolerated her first 2 Evenity injections well.    Continues to take vitamin D supplementation 2000IU. Not taking calcium supplementation. Eats cheese daily in her eggs and sometimes some additional, not sure on the quantity. Is planning to start incorporating sardines/anchovies into her diet.     PMH, Medications and Allergies were reviewed and updated as needed.    ROS:  As noted above otherwise negative.    Patient Active Problem List   Diagnosis     Intrinsic atopic dermatitis     Essential hypertension     Rheumatoid arthritis (H)     Retinal artery occlusion     Cervical vertebral fracture (H)     Central retinal artery occlusion of right eye     Bilateral occipital neuralgia     C1-C2 instability     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 factor (H)     Osteoarthritis     Malignant hypertension     Hyponatremia     GERD (gastroesophageal reflux disease)     Eczematous dermatitis     Anxiety state     Anemia     Closed fracture of distal end of left radius with delayed healing, unspecified fracture morphology, subsequent encounter     Closed fracture of distal end of left radius, unspecified fracture morphology, sequela     Osteomyelitis of left leg (H)       Current Outpatient Medications   Medication Sig Dispense Refill     acetaminophen 650 MG TABS Take 650 mg by mouth every 4 hours as needed. 100 tablet 0     amLODIPine (NORVASC) 10 MG tablet Take 1 tablet (10 mg) by mouth daily (Patient not taking: Reported on 1/11/2021) 90  "tablet 0     COMPRESSION STOCKINGS Please measure and distribute 1 pair of 30mmHg - 40mmHg knee high ULCERCARE stockings (Patient not taking: Reported on 3/17/2021) 2 each 4     COMPRESSION STOCKINGS Please measure and distribute 1 pair of 20mmHg - 30mmHg Thigh high open or closed toe compression stockings. Jobst ultrasheer or equivalent. (Patient not taking: Reported on 3/17/2021) 2 each 4     COMPRESSION STOCKINGS Please measure and distribute 1 pair of 20mmHg - 30mmHg knee high open or closed toe compression stockings. Jobst ultrasheer or equivalent. 2 each 4     Diphenhydramine-APAP, sleep, (TYLENOL PM EXTRA STRENGTH PO) Take 2 tablets by mouth At Bedtime        ferrous gluconate (FERGON) 324 (38 Fe) MG tablet Take 324 mg by mouth       Gauze Pads & Dressings (TELFA NON-ADHERENT) 3\"X4\" PADS Cut to size for open areas on the lower extremities and secure with paper tape. Change dressing every other day. 30 each 11     metoprolol tartrate (LOPRESSOR) 100 MG tablet Take 2 tablets (200 mg) by mouth 2 times daily 360 tablet 1     Multiple Vitamin (MULTI-VITAMIN) per tablet Take 1 tablet by mouth every morning        mupirocin (BACTROBAN) 2 % external ointment Apply to open areas with dressing changes (Patient not taking: Reported on 2/22/2021) 60 g 11     Omeprazole (PRILOSEC PO) Take 20 mg by mouth as needed        sodium chloride (OCEAN NASAL SPRAY) 0.65 % nasal spray Spray 1 spray into both nostrils daily as needed for congestion 1 Bottle 3     traMADol (ULTRAM) 50 MG tablet Take 1-2 tablets ( mg) by mouth every 6 hours as needed for severe pain (Patient not taking: Reported on 3/17/2021) 120 tablet 0     triamcinolone (KENALOG) 0.1 % external cream Apply twice daily for 2 weeks then 3 times weekly for 2 week, repeat cycle as needed (Patient not taking: Reported on 3/11/2021) 454 g 0            OBJECTIVE:       Vitals: There were no vitals filed for this visit.  BMI: There is no height or weight on file to " calculate BMI.    Gen:  Well nourished and in no acute distress  HEENT: Extraocular movement intact  Neck: Supple  Pulm:  Breathing Comfortably. No increased respiratory effort.  Psych: Euthymic. Appropriately answers questions            ASSESSMENT and PLAN:     Diagnoses and all orders for this visit:    Age-related osteoporosis with current pathological fracture with routine healing, subsequent encounter    Compression fracture of body of thoracic vertebra (H)    Tolerated previous injections well, will plan to continue with monthly injections.  - Evenity 3/12 shots given today  - discussed importance of adequate calcium intake for evenity to be effective, reviewed sources of calcium which were put into patient instructions  - continue 2000 international unit(s) vitamin D daily  - follow up in 1 month or repeat evenity    Options for treatment and/or follow-up care were reviewed with the patient was actively involved in the decision making process. Patient verbalized understanding and was in agreement with the plan.    The patient was seen by and discussed with Dr.Suzanne GARRY Collazo MD, CAQ, CCD    Abigail Lugo MD  Primary Care Sports Medicine Fellow        Attending Note:   I have  examined this patient/images and have reviewed the clinical presentation and progress note with the fellow. I agree with the treatment plan as outlined. The plan was formulated with the fellow on the day of the patient's visit. I have directly supervised the injections.   Alley Collazo MD, CAQ, CCD  [facility]  Sports Medicine and Bone Health      Abigail Lugo MD

## 2021-07-16 NOTE — PROGRESS NOTES
Attending Note:   I have  examined this patient/images and have reviewed the clinical presentation and progress note with the fellow. I agree with the treatment plan as outlined. The plan was formulated with the fellow on the day of the patient's visit. I have directly supervised the injections.   Alley Collazo MD, CAQ, CCD  [facility]  Sports Medicine and Bone Health

## 2021-08-18 ENCOUNTER — OFFICE VISIT (OUTPATIENT)
Dept: ORTHOPEDICS | Facility: CLINIC | Age: 81
End: 2021-08-18
Payer: MEDICARE

## 2021-08-18 ENCOUNTER — ANCILLARY PROCEDURE (OUTPATIENT)
Dept: ULTRASOUND IMAGING | Facility: CLINIC | Age: 81
End: 2021-08-18
Attending: RADIOLOGY
Payer: MEDICARE

## 2021-08-18 VITALS — BODY MASS INDEX: 19.63 KG/M2 | HEIGHT: 60 IN | WEIGHT: 100 LBS

## 2021-08-18 DIAGNOSIS — M80.00XD AGE-RELATED OSTEOPOROSIS WITH CURRENT PATHOLOGICAL FRACTURE WITH ROUTINE HEALING, SUBSEQUENT ENCOUNTER: Primary | ICD-10-CM

## 2021-08-18 DIAGNOSIS — I87.8 VENOUS STASIS: ICD-10-CM

## 2021-08-18 DIAGNOSIS — I83.893 SYMPTOMATIC VARICOSE VEINS OF BOTH LOWER EXTREMITIES: ICD-10-CM

## 2021-08-18 PROCEDURE — 93971 EXTREMITY STUDY: CPT | Mod: LT | Performed by: RADIOLOGY

## 2021-08-18 PROCEDURE — 99207 PR DROP WITH A PROCEDURE: CPT | Performed by: STUDENT IN AN ORGANIZED HEALTH CARE EDUCATION/TRAINING PROGRAM

## 2021-08-18 PROCEDURE — 96372 THER/PROPH/DIAG INJ SC/IM: CPT | Performed by: STUDENT IN AN ORGANIZED HEALTH CARE EDUCATION/TRAINING PROGRAM

## 2021-08-18 ASSESSMENT — MIFFLIN-ST. JEOR: SCORE: 835.1

## 2021-08-18 NOTE — PROGRESS NOTES
Attending Note:   I have supervised the Evenity injections.      Alley Collazo MD, CAQ, CCD  [facility]  Sports Medicine and Bone Health

## 2021-08-18 NOTE — LETTER
8/18/2021      RE: Kimberly Chau  [address] [facility] [address]       [facility]  Sports Medicine Clinic  [facility]           SUBJECTIVE       Kimberly Chau is a 80 year old female with a PMH of osteoporosis presenting to clinic today for repeat evenity injection, #4/12 today.     Tolerated her previous Evenity injections without complication.     Continues to take vitamin D supplementation 2000IU. She got a caclium supplementation but hasn't started taking it.     PMH, Medications and Allergies were reviewed and updated as needed.    ROS:  As noted above otherwise negative.    Patient Active Problem List   Diagnosis     Intrinsic atopic dermatitis     Essential hypertension     Rheumatoid arthritis (H)     Retinal artery occlusion     Cervical vertebral fracture (H)     Central retinal artery occlusion of right eye     Bilateral occipital neuralgia     C1-C2 instability     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 factor (H)     Osteoarthritis     Malignant hypertension     Hyponatremia     GERD (gastroesophageal reflux disease)     Eczematous dermatitis     Anxiety state     Anemia     Closed fracture of distal end of left radius with delayed healing, unspecified fracture morphology, subsequent encounter     Closed fracture of distal end of left radius, unspecified fracture morphology, sequela     Osteomyelitis of left leg (H)       Current Outpatient Medications   Medication Sig Dispense Refill     acetaminophen 650 MG TABS Take 650 mg by mouth every 4 hours as needed. 100 tablet 0     amLODIPine (NORVASC) 10 MG tablet Take 1 tablet (10 mg) by mouth daily (Patient not taking: Reported on 1/11/2021) 90 tablet 0     COMPRESSION STOCKINGS Please measure and distribute 1 pair of 30mmHg - 40mmHg knee high ULCERCARE stockings (Patient not taking: Reported on 3/17/2021) 2 each 4     COMPRESSION STOCKINGS Please measure and distribute 1 pair of 20mmHg -  "30mmHg Thigh high open or closed toe compression stockings. Jobst ultrasheer or equivalent. (Patient not taking: Reported on 3/17/2021) 2 each 4     COMPRESSION STOCKINGS Please measure and distribute 1 pair of 20mmHg - 30mmHg knee high open or closed toe compression stockings. Jobst ultrasheer or equivalent. 2 each 4     Diphenhydramine-APAP, sleep, (TYLENOL PM EXTRA STRENGTH PO) Take 2 tablets by mouth At Bedtime        ferrous gluconate (FERGON) 324 (38 Fe) MG tablet Take 324 mg by mouth       Gauze Pads & Dressings (TELFA NON-ADHERENT) 3\"X4\" PADS Cut to size for open areas on the lower extremities and secure with paper tape. Change dressing every other day. 30 each 11     metoprolol tartrate (LOPRESSOR) 100 MG tablet Take 2 tablets (200 mg) by mouth 2 times daily 360 tablet 1     Multiple Vitamin (MULTI-VITAMIN) per tablet Take 1 tablet by mouth every morning        mupirocin (BACTROBAN) 2 % external ointment Apply to open areas with dressing changes (Patient not taking: Reported on 2/22/2021) 60 g 11     Omeprazole (PRILOSEC PO) Take 20 mg by mouth as needed        sodium chloride (OCEAN NASAL SPRAY) 0.65 % nasal spray Spray 1 spray into both nostrils daily as needed for congestion 1 Bottle 3     traMADol (ULTRAM) 50 MG tablet Take 1-2 tablets ( mg) by mouth every 6 hours as needed for severe pain (Patient not taking: Reported on 3/17/2021) 120 tablet 0     triamcinolone (KENALOG) 0.1 % external cream Apply twice daily for 2 weeks then 3 times weekly for 2 week, repeat cycle as needed (Patient not taking: Reported on 3/11/2021) 454 g 0            OBJECTIVE:       Ht 1.524 m (5')   Wt 45.4 kg (100 lb)   BMI 19.53 kg/m      Gen:  Well nourished and in no acute distress  HEENT: Extraocular movement intact  Neck: Supple  Pulm:  Breathing Comfortably. No increased respiratory effort.  Psych: Euthymic. Appropriately answers questions            ASSESSMENT and PLAN:     Diagnoses and all orders for this " visit:    Age-related osteoporosis with current pathological fracture with routine healing, subsequent encounter  -     romosozumab-aqqg (EVENITY) injection 210 mg  -     Injection given today in bilateral anterior thighs subcutaneously without complication    Tolerated previous injections well, will plan to continue with monthly injections.  - Evenity 4/12 shots given today  - Again encouraged her to obtain 1200 mg of calcium from her diet and to make up decifits with a supplement.  We explained that this will help her Evenity work better.   - continue 2000 international unit(s) vitamin D daily  - follow up in 1 month for repeat evenity  -Check calcium.  She will do this at [place].      Options for treatment and/or follow-up care were reviewed with the patient was actively involved in the decision making process. Patient verbalized understanding and was in agreement with the plan.    The patient was seen by and discussed with Dr.Suzanne GARRY Collazo MD, CAQ, CCD    Abigail Lugo MD  Primary Care Sports Medicine Fellow        Attending Note:   I have supervised the Evenity injections.      Alely Collazo MD, CAQ, CCD  [facility]  Sports Medicine and Bone Health      Alley Collazo MD

## 2021-08-18 NOTE — NURSING NOTE
[address] [address]  Dept: [phone]  ______________________________________________________________________________    Patient: Kimberly Chau   : 1940   MRN: [id number]   2021    INVASIVE PROCEDURE SAFETY CHECKLIST    Date: 2021   Procedure: Bilateral Subcutaneous Evenity Injection   Patient Name: Kimberly Chau  MRN: [id number]  YOB: 1940    Action: Complete sections as appropriate. Any discrepancy results in a HARD COPY until resolved.     PRE PROCEDURE:  Patient ID verified with 2 identifiers (name and  or MRN): Yes  Procedure and site verified with patient/designee (when able): Yes  Accurate consent documentation in medical record: Yes  H&P (or appropriate assessment) documented in medical record: Yes  H&P must be up to 20 days prior to procedure and updates within 24 hours of procedure as applicable: NA  Relevant diagnostic and radiology test results appropriately labeled and displayed as applicable: NA  Procedure site(s) marked with provider initials: NA    TIMEOUT:  Time-Out performed immediately prior to starting procedure, including verbal and active participation of all team members addressing the following:Yes  * Correct patient identify  * Confirmed that the correct side and site are marked  * An accurate procedure consent form  * Agreement on the procedure to be done  * Correct patient position  * Relevant images and results are properly labeled and appropriately displayed  * The need to administer antibiotics or fluids for irrigation purposes during the procedure as applicable   * Safety precautions based on patient history or medication use    DURING PROCEDURE: Verification of correct person, site, and procedures any time the responsibility for care of the patient is transferred to another member of the care team.       Prior to injection, verified patient identity using patient's  name and date of birth.  Due to injection administration, patient instructed to remain in clinic for 15 minutes  afterwards, and to report any adverse reaction to me immediately.    Evenity Injection  Romosozumab-aqqg  105 mg/1.17 mL  Two syringes    Drug Amount Wasted:  None.  Vial/Syringe: Syringe  Expiration Date:  10/01/2023  NDC:25375-644-64  LOT: [id number]    Tanika Haley, ATC  August 18, 2021

## 2021-08-18 NOTE — PROGRESS NOTES
[facility]  Sports Medicine Clinic  Clinics and Surgery Center           SUBJECTIVE       Kimberly Chau is a 80 year old female with a PMH of osteoporosis presenting to clinic today for repeat evenity injection, #4/12 today.     Tolerated her previous Evenity injections without complication.     Continues to take vitamin D supplementation 2000IU. She got a caclium supplementation but hasn't started taking it.     PMH, Medications and Allergies were reviewed and updated as needed.    ROS:  As noted above otherwise negative.    Patient Active Problem List   Diagnosis     Intrinsic atopic dermatitis     Essential hypertension     Rheumatoid arthritis (H)     Retinal artery occlusion     Cervical vertebral fracture (H)     Central retinal artery occlusion of right eye     Bilateral occipital neuralgia     C1-C2 instability     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 factor (H)     Osteoarthritis     Malignant hypertension     Hyponatremia     GERD (gastroesophageal reflux disease)     Eczematous dermatitis     Anxiety state     Anemia     Closed fracture of distal end of left radius with delayed healing, unspecified fracture morphology, subsequent encounter     Closed fracture of distal end of left radius, unspecified fracture morphology, sequela     Osteomyelitis of left leg (H)       Current Outpatient Medications   Medication Sig Dispense Refill     acetaminophen 650 MG TABS Take 650 mg by mouth every 4 hours as needed. 100 tablet 0     amLODIPine (NORVASC) 10 MG tablet Take 1 tablet (10 mg) by mouth daily (Patient not taking: Reported on 1/11/2021) 90 tablet 0     COMPRESSION STOCKINGS Please measure and distribute 1 pair of 30mmHg - 40mmHg knee high ULCERCARE stockings (Patient not taking: Reported on 3/17/2021) 2 each 4     COMPRESSION STOCKINGS Please measure and distribute 1 pair of 20mmHg - 30mmHg Thigh high open or closed toe compression stockings. Jobst ultrasheer or  "equivalent. (Patient not taking: Reported on 3/17/2021) 2 each 4     COMPRESSION STOCKINGS Please measure and distribute 1 pair of 20mmHg - 30mmHg knee high open or closed toe compression stockings. Jobst ultrasheer or equivalent. 2 each 4     Diphenhydramine-APAP, sleep, (TYLENOL PM EXTRA STRENGTH PO) Take 2 tablets by mouth At Bedtime        ferrous gluconate (FERGON) 324 (38 Fe) MG tablet Take 324 mg by mouth       Gauze Pads & Dressings (TELFA NON-ADHERENT) 3\"X4\" PADS Cut to size for open areas on the lower extremities and secure with paper tape. Change dressing every other day. 30 each 11     metoprolol tartrate (LOPRESSOR) 100 MG tablet Take 2 tablets (200 mg) by mouth 2 times daily 360 tablet 1     Multiple Vitamin (MULTI-VITAMIN) per tablet Take 1 tablet by mouth every morning        mupirocin (BACTROBAN) 2 % external ointment Apply to open areas with dressing changes (Patient not taking: Reported on 2/22/2021) 60 g 11     Omeprazole (PRILOSEC PO) Take 20 mg by mouth as needed        sodium chloride (OCEAN NASAL SPRAY) 0.65 % nasal spray Spray 1 spray into both nostrils daily as needed for congestion 1 Bottle 3     traMADol (ULTRAM) 50 MG tablet Take 1-2 tablets ( mg) by mouth every 6 hours as needed for severe pain (Patient not taking: Reported on 3/17/2021) 120 tablet 0     triamcinolone (KENALOG) 0.1 % external cream Apply twice daily for 2 weeks then 3 times weekly for 2 week, repeat cycle as needed (Patient not taking: Reported on 3/11/2021) 454 g 0            OBJECTIVE:       Ht 1.524 m (5')   Wt 45.4 kg (100 lb)   BMI 19.53 kg/m      Gen:  Well nourished and in no acute distress  HEENT: Extraocular movement intact  Neck: Supple  Pulm:  Breathing Comfortably. No increased respiratory effort.  Psych: Euthymic. Appropriately answers questions            ASSESSMENT and PLAN:     Diagnoses and all orders for this visit:    Age-related osteoporosis with current pathological fracture with routine " healing, subsequent encounter  -     romosozumab-aqqg (EVENITY) injection 210 mg  -     Injection given today in bilateral anterior thighs subcutaneously without complication    Tolerated previous injections well, will plan to continue with monthly injections.  - Evenity 4/12 shots given today  - Again encouraged her to obtain 1200 mg of calcium from her diet and to make up decifits with a supplement.  We explained that this will help her Evenity work better.   - continue 2000 international unit(s) vitamin D daily  - follow up in 1 month for repeat evenity  -Check calcium.  She will do this at [place].      Options for treatment and/or follow-up care were reviewed with the patient was actively involved in the decision making process. Patient verbalized understanding and was in agreement with the plan.    The patient was seen by and discussed with Dr.Suzanne GARRY Collazo MD, CAQ, CCD    Abigail Lugo MD  Primary Care Sports Medicine Fellow

## 2021-09-04 ENCOUNTER — HEALTH MAINTENANCE LETTER (OUTPATIENT)
Age: 81
End: 2021-09-04

## 2021-09-17 ENCOUNTER — LAB (OUTPATIENT)
Dept: LAB | Facility: CLINIC | Age: 81
End: 2021-09-17
Payer: MEDICARE

## 2021-09-17 DIAGNOSIS — M80.00XD AGE-RELATED OSTEOPOROSIS WITH CURRENT PATHOLOGICAL FRACTURE WITH ROUTINE HEALING, SUBSEQUENT ENCOUNTER: ICD-10-CM

## 2021-09-17 LAB — CALCIUM SERPL-MCNC: 8.8 MG/DL (ref 8.5–10.1)

## 2021-09-17 PROCEDURE — 36415 COLL VENOUS BLD VENIPUNCTURE: CPT

## 2021-09-17 PROCEDURE — 82310 ASSAY OF CALCIUM: CPT

## 2021-10-02 ENCOUNTER — OFFICE VISIT (OUTPATIENT)
Dept: ORTHOPEDICS | Facility: CLINIC | Age: 81
End: 2021-10-02
Payer: MEDICARE

## 2021-10-02 VITALS — BODY MASS INDEX: 19.63 KG/M2 | HEIGHT: 60 IN | WEIGHT: 100 LBS

## 2021-10-02 DIAGNOSIS — M80.00XA OSTEOPOROSIS WITH CURRENT PATHOLOGICAL FRACTURE, UNSPECIFIED OSTEOPOROSIS TYPE, INITIAL ENCOUNTER: Primary | ICD-10-CM

## 2021-10-02 PROCEDURE — 99207 PR DROP WITH A PROCEDURE: CPT | Mod: GC | Performed by: STUDENT IN AN ORGANIZED HEALTH CARE EDUCATION/TRAINING PROGRAM

## 2021-10-02 PROCEDURE — 96372 THER/PROPH/DIAG INJ SC/IM: CPT | Mod: GC | Performed by: STUDENT IN AN ORGANIZED HEALTH CARE EDUCATION/TRAINING PROGRAM

## 2021-10-02 ASSESSMENT — MIFFLIN-ST. JEOR: SCORE: 835.1

## 2021-10-02 NOTE — PROGRESS NOTES
[facility]  Sports Medicine Clinic  Clinics and Surgery Center             SUBJECTIVE       Kimberly Chau is a 80 year old female with a PMH of osteoporosis presenting to clinic today for repeat evenity injection, #5/12 today.      Tolerated her previous Evenity injections without complication.      -Vitamin D supplementation 2000IU.   -Started caclium supplementation and trying to increase calcium from food sources.     PMH, Medications and Allergies were reviewed and updated as needed.     ROS:  As noted above otherwise negative.         Patient Active Problem List   Diagnosis     Intrinsic atopic dermatitis     Essential hypertension     Rheumatoid arthritis (H)     Retinal artery occlusion     Cervical vertebral fracture (H)     Central retinal artery occlusion of right eye     Bilateral occipital neuralgia     C1-C2 instability     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 factor (H)     Osteoarthritis     Malignant hypertension     Hyponatremia     GERD (gastroesophageal reflux disease)     Eczematous dermatitis     Anxiety state     Anemia     Closed fracture of distal end of left radius with delayed healing, unspecified fracture morphology, subsequent encounter     Closed fracture of distal end of left radius, unspecified fracture morphology, sequela     Osteomyelitis of left leg (H)         Current Outpatient Prescriptions          Current Outpatient Medications   Medication Sig Dispense Refill     acetaminophen 650 MG TABS Take 650 mg by mouth every 4 hours as needed. 100 tablet 0     amLODIPine (NORVASC) 10 MG tablet Take 1 tablet (10 mg) by mouth daily (Patient not taking: Reported on 1/11/2021) 90 tablet 0     COMPRESSION STOCKINGS Please measure and distribute 1 pair of 30mmHg - 40mmHg knee high ULCERCARE stockings (Patient not taking: Reported on 3/17/2021) 2 each 4     COMPRESSION STOCKINGS Please measure and distribute 1 pair of 20mmHg - 30mmHg Thigh high open or  "closed toe compression stockings. Jobst ultrasheer or equivalent. (Patient not taking: Reported on 3/17/2021) 2 each 4     COMPRESSION STOCKINGS Please measure and distribute 1 pair of 20mmHg - 30mmHg knee high open or closed toe compression stockings. Jobst ultrasheer or equivalent. 2 each 4     Diphenhydramine-APAP, sleep, (TYLENOL PM EXTRA STRENGTH PO) Take 2 tablets by mouth At Bedtime          ferrous gluconate (FERGON) 324 (38 Fe) MG tablet Take 324 mg by mouth         Gauze Pads & Dressings (TELFA NON-ADHERENT) 3\"X4\" PADS Cut to size for open areas on the lower extremities and secure with paper tape. Change dressing every other day. 30 each 11     metoprolol tartrate (LOPRESSOR) 100 MG tablet Take 2 tablets (200 mg) by mouth 2 times daily 360 tablet 1     Multiple Vitamin (MULTI-VITAMIN) per tablet Take 1 tablet by mouth every morning          mupirocin (BACTROBAN) 2 % external ointment Apply to open areas with dressing changes (Patient not taking: Reported on 2/22/2021) 60 g 11     Omeprazole (PRILOSEC PO) Take 20 mg by mouth as needed          sodium chloride (OCEAN NASAL SPRAY) 0.65 % nasal spray Spray 1 spray into both nostrils daily as needed for congestion 1 Bottle 3     traMADol (ULTRAM) 50 MG tablet Take 1-2 tablets ( mg) by mouth every 6 hours as needed for severe pain (Patient not taking: Reported on 3/17/2021) 120 tablet 0     triamcinolone (KENALOG) 0.1 % external cream Apply twice daily for 2 weeks then 3 times weekly for 2 week, repeat cycle as needed (Patient not taking: Reported on 3/11/2021) 454 g 0                 OBJECTIVE:       Ht 1.524 m (5')   Wt 45.4 kg (100 lb)   BMI 19.53 kg/m       Gen:  Well nourished and in no acute distress  HEENT: Extraocular movement intact  Neck: Supple  Pulm:  Breathing Comfortably. No increased respiratory effort.  Psych: Euthymic. Appropriately answers questions             ASSESSMENT and PLAN:      Diagnoses and all orders for this " visit:     Age-related osteoporosis with current pathological fracture with routine healing, subsequent encounter  -     romosozumab-aqqg (EVENITY) injection 210 mg  -     Injection given today in bilateral anterior thighs subcutaneously without complication     - Evenity 5/12 shots given today  - Continue improved calcium intake (diets plus supplements)  - continue 2000 international unit(s) vitamin D daily  - follow up in 1 month for repeat evenity #6/12  -Check calcium.  She will do this at [place].       Options for treatment and/or follow-up care were reviewed with the patient was actively involved in the decision making process. Patient verbalized understanding and was in agreement with the plan.     The patient was seen by and discussed with Dr.Suzanne GARRY Collazo MD, CAQ, CCD     Abigail Lugo MD  Primary Care Sports Medicine Fellow

## 2021-10-02 NOTE — LETTER
10/2/2021      RE: Kimberly Chau  [address] [facility] [facility] [address]       [facility]  Sports Medicine Clinic  Clinics and Surgery Center             SUBJECTIVE       Kimberly Chau is a 80 year old female with a PMH of osteoporosis presenting to clinic today for repeat evenity injection, #5/12 today.      Tolerated her previous Evenity injections without complication.      -Vitamin D supplementation 2000IU.   -Started caclium supplementation and trying to increase calcium from food sources.     PMH, Medications and Allergies were reviewed and updated as needed.     ROS:  As noted above otherwise negative.         Patient Active Problem List   Diagnosis     Intrinsic atopic dermatitis     Essential hypertension     Rheumatoid arthritis (H)     Retinal artery occlusion     Cervical vertebral fracture (H)     Central retinal artery occlusion of right eye     Bilateral occipital neuralgia     C1-C2 instability     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 factor (H)     Osteoarthritis     Malignant hypertension     Hyponatremia     GERD (gastroesophageal reflux disease)     Eczematous dermatitis     Anxiety state     Anemia     Closed fracture of distal end of left radius with delayed healing, unspecified fracture morphology, subsequent encounter     Closed fracture of distal end of left radius, unspecified fracture morphology, sequela     Osteomyelitis of left leg (H)         Current Outpatient Prescriptions          Current Outpatient Medications   Medication Sig Dispense Refill     acetaminophen 650 MG TABS Take 650 mg by mouth every 4 hours as needed. 100 tablet 0     amLODIPine (NORVASC) 10 MG tablet Take 1 tablet (10 mg) by mouth daily (Patient not taking: Reported on 1/11/2021) 90 tablet 0     COMPRESSION STOCKINGS Please measure and distribute 1 pair of 30mmHg - 40mmHg knee high ULCERCARE stockings (Patient not taking: Reported on 3/17/2021) 2 each 4     COMPRESSION  "STOCKINGS Please measure and distribute 1 pair of 20mmHg - 30mmHg Thigh high open or closed toe compression stockings. Jobst ultrasheer or equivalent. (Patient not taking: Reported on 3/17/2021) 2 each 4     COMPRESSION STOCKINGS Please measure and distribute 1 pair of 20mmHg - 30mmHg knee high open or closed toe compression stockings. Jobst ultrasheer or equivalent. 2 each 4     Diphenhydramine-APAP, sleep, (TYLENOL PM EXTRA STRENGTH PO) Take 2 tablets by mouth At Bedtime          ferrous gluconate (FERGON) 324 (38 Fe) MG tablet Take 324 mg by mouth         Gauze Pads & Dressings (TELFA NON-ADHERENT) 3\"X4\" PADS Cut to size for open areas on the lower extremities and secure with paper tape. Change dressing every other day. 30 each 11     metoprolol tartrate (LOPRESSOR) 100 MG tablet Take 2 tablets (200 mg) by mouth 2 times daily 360 tablet 1     Multiple Vitamin (MULTI-VITAMIN) per tablet Take 1 tablet by mouth every morning          mupirocin (BACTROBAN) 2 % external ointment Apply to open areas with dressing changes (Patient not taking: Reported on 2/22/2021) 60 g 11     Omeprazole (PRILOSEC PO) Take 20 mg by mouth as needed          sodium chloride (OCEAN NASAL SPRAY) 0.65 % nasal spray Spray 1 spray into both nostrils daily as needed for congestion 1 Bottle 3     traMADol (ULTRAM) 50 MG tablet Take 1-2 tablets ( mg) by mouth every 6 hours as needed for severe pain (Patient not taking: Reported on 3/17/2021) 120 tablet 0     triamcinolone (KENALOG) 0.1 % external cream Apply twice daily for 2 weeks then 3 times weekly for 2 week, repeat cycle as needed (Patient not taking: Reported on 3/11/2021) 454 g 0                 OBJECTIVE:       Ht 1.524 m (5')   Wt 45.4 kg (100 lb)   BMI 19.53 kg/m       Gen:  Well nourished and in no acute distress  HEENT: Extraocular movement intact  Neck: Supple  Pulm:  Breathing Comfortably. No increased respiratory effort.  Psych: Euthymic. Appropriately answers " questions             ASSESSMENT and PLAN:      Diagnoses and all orders for this visit:     Age-related osteoporosis with current pathological fracture with routine healing, subsequent encounter  -     romosozumab-aqqg (EVENITY) injection 210 mg  -     Injection given today in bilateral anterior thighs subcutaneously without complication     - Evenity 5/12 shots given today  - Continue improved calcium intake (diets plus supplements)  - continue 2000 international unit(s) vitamin D daily  - follow up in 1 month for repeat evenity #6/12  -Check calcium.  She will do this at [place].       Options for treatment and/or follow-up care were reviewed with the patient was actively involved in the decision making process. Patient verbalized understanding and was in agreement with the plan.     The patient was seen by and discussed with Dr.Suzanne GARRY Collazo MD, CAQ, CCD     Abigail Lugo MD  Primary Care Sports Medicine Fellow           Alley Collazo MD

## 2021-10-02 NOTE — NURSING NOTE
[address] [address]  Dept: [phone]  ______________________________________________________________________________    Patient: Kimberly Chau   : 1940   MRN: [id number]   2021    INVASIVE PROCEDURE SAFETY CHECKLIST    Date: 10/2/21   Procedure: Evenity injection   Patient Name: Kimberly Chau  MRN: [id number]  YOB: 1940    Action: Complete sections as appropriate. Any discrepancy results in a HARD COPY until resolved.     PRE PROCEDURE:  Patient ID verified with 2 identifiers (name and  or MRN): Yes  Procedure and site verified with patient/designee (when able): Yes  Accurate consent documentation in medical record: Yes  H&P (or appropriate assessment) documented in medical record: Yes  H&P must be up to 20 days prior to procedure and updates within 24 hours of procedure as applicable: NA  Relevant diagnostic and radiology test results appropriately labeled and displayed as applicable: Yes  Procedure site(s) marked with provider initials: NA    TIMEOUT:  Time-Out performed immediately prior to starting procedure, including verbal and active participation of all team members addressing the following:Yes  * Correct patient identify  * Confirmed that the correct side and site are marked  * An accurate procedure consent form  * Agreement on the procedure to be done  * Correct patient position  * Relevant images and results are properly labeled and appropriately displayed  * The need to administer antibiotics or fluids for irrigation purposes during the procedure as applicable   * Safety precautions based on patient history or medication use    DURING PROCEDURE: Verification of correct person, site, and procedures any time the responsibility for care of the patient is transferred to another member of the care team.       Prior to injection, verified patient identity using patient's name and date of birth.  Due to  injection administration, patient instructed to remain in clinic for 15 minutes  afterwards, and to report any adverse reaction to me immediately.    Evenity injection was performed    Drug Amount Wasted:  None.  Vial/Syringe: Syringe  Expiration Date:  09/2022      Lyssa Massey ATC  October 2, 2021

## 2021-10-14 DIAGNOSIS — I10 ESSENTIAL HYPERTENSION: ICD-10-CM

## 2021-10-18 RX ORDER — METOPROLOL TARTRATE 100 MG
200 TABLET ORAL 2 TIMES DAILY
Qty: 360 TABLET | Refills: 0 | Status: SHIPPED | OUTPATIENT
Start: 2021-10-18 | End: 2022-01-19

## 2021-11-05 ENCOUNTER — OFFICE VISIT (OUTPATIENT)
Dept: ORTHOPEDICS | Facility: CLINIC | Age: 81
End: 2021-11-05
Payer: MEDICARE

## 2021-11-05 DIAGNOSIS — M80.00XA OSTEOPOROSIS WITH CURRENT PATHOLOGICAL FRACTURE, UNSPECIFIED OSTEOPOROSIS TYPE, INITIAL ENCOUNTER: Primary | ICD-10-CM

## 2021-11-05 PROCEDURE — 96372 THER/PROPH/DIAG INJ SC/IM: CPT | Performed by: FAMILY MEDICINE

## 2021-11-05 PROCEDURE — 99207 PR DROP WITH A PROCEDURE: CPT | Performed by: FAMILY MEDICINE

## 2021-11-05 NOTE — ANESTHESIA CARE TRANSFER NOTE
Patient: Kimberly Chau    Procedure(s):  Open Reduction Internal Fixation Left Distal Radius Fracture    Diagnosis: Left Distal Radius Fracture  Diagnosis Additional Information: No value filed.    Anesthesia Type:   No value filed.     Note:    Patient transferred to:PACU  Comments: Uneventful transport to Phase 1; IV patent; Pt responds appropriately to command; Pt has pain with movement of arm; VSS: Report to RNHandoff Report: Identifed the Patient, Identified the Reponsible Provider, Reviewed the pertinent medical history, Discussed the surgical course, Reviewed Intra-OP anesthesia mangement and issues during anesthesia, Set expectations for post-procedure period and Allowed opportunity for questions and acknowledgement of understanding      Vitals: (Last set prior to Anesthesia Care Transfer)    CRNA VITALS  4/30/2019 1431 - 4/30/2019 1531      4/30/2019             Pulse:  72    SpO2:  97 %    Resp Rate (set):  10                Electronically Signed By: KEMI BENAVIDEZ CRNA  April 30, 2019  3:37 PM  
none

## 2021-11-05 NOTE — PROGRESS NOTES
[facility]  Sports Medicine Clinic  Clinics and Surgery Center             SUBJECTIVE       Kimberly Chau is a 80 year old female with a PMH of osteoporosis presenting to clinic today for repeat evenity injection, #6/12 today.      Tolerated her previous Evenity injections without complication but was more sore in her thigh this time.      -Vitamin D supplementation 2000IU.   -Started caclium supplementation and trying to increase calcium from food sources.     PMH, Medications and Allergies were reviewed and updated as needed.     ROS:  As noted above otherwise negative.           Patient Active Problem List   Diagnosis     Intrinsic atopic dermatitis     Essential hypertension     Rheumatoid arthritis (H)     Retinal artery occlusion     Cervical vertebral fracture (H)     Central retinal artery occlusion of right eye     Bilateral occipital neuralgia     C1-C2 instability     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 factor (H)     Osteoarthritis     Malignant hypertension     Hyponatremia     GERD (gastroesophageal reflux disease)     Eczematous dermatitis     Anxiety state     Anemia     Closed fracture of distal end of left radius with delayed healing, unspecified fracture morphology, subsequent encounter     Closed fracture of distal end of left radius, unspecified fracture morphology, sequela     Osteomyelitis of left leg (H)         Current Outpatient Prescriptions               Current Outpatient Medications   Medication Sig Dispense Refill     acetaminophen 650 MG TABS Take 650 mg by mouth every 4 hours as needed. 100 tablet 0     amLODIPine (NORVASC) 10 MG tablet Take 1 tablet (10 mg) by mouth daily (Patient not taking: Reported on 1/11/2021) 90 tablet 0     COMPRESSION STOCKINGS Please measure and distribute 1 pair of 30mmHg - 40mmHg knee high ULCERCARE stockings (Patient not taking: Reported on 3/17/2021) 2 each 4     COMPRESSION STOCKINGS Please measure and  "distribute 1 pair of 20mmHg - 30mmHg Thigh high open or closed toe compression stockings. Jobst ultrasheer or equivalent. (Patient not taking: Reported on 3/17/2021) 2 each 4     COMPRESSION STOCKINGS Please measure and distribute 1 pair of 20mmHg - 30mmHg knee high open or closed toe compression stockings. Jobst ultrasheer or equivalent. 2 each 4     Diphenhydramine-APAP, sleep, (TYLENOL PM EXTRA STRENGTH PO) Take 2 tablets by mouth At Bedtime          ferrous gluconate (FERGON) 324 (38 Fe) MG tablet Take 324 mg by mouth         Gauze Pads & Dressings (TELFA NON-ADHERENT) 3\"X4\" PADS Cut to size for open areas on the lower extremities and secure with paper tape. Change dressing every other day. 30 each 11     metoprolol tartrate (LOPRESSOR) 100 MG tablet Take 2 tablets (200 mg) by mouth 2 times daily 360 tablet 1     Multiple Vitamin (MULTI-VITAMIN) per tablet Take 1 tablet by mouth every morning          mupirocin (BACTROBAN) 2 % external ointment Apply to open areas with dressing changes (Patient not taking: Reported on 2/22/2021) 60 g 11     Omeprazole (PRILOSEC PO) Take 20 mg by mouth as needed          sodium chloride (OCEAN NASAL SPRAY) 0.65 % nasal spray Spray 1 spray into both nostrils daily as needed for congestion 1 Bottle 3     traMADol (ULTRAM) 50 MG tablet Take 1-2 tablets ( mg) by mouth every 6 hours as needed for severe pain (Patient not taking: Reported on 3/17/2021) 120 tablet 0     triamcinolone (KENALOG) 0.1 % external cream Apply twice daily for 2 weeks then 3 times weekly for 2 week, repeat cycle as needed (Patient not taking: Reported on 3/11/2021) 454 g 0                 OBJECTIVE:       Ht 1.524 m (5')   Wt 45.4 kg (100 lb)   BMI 19.53 kg/m       Gen:  Well nourished and in no acute distress  .  Psych: Euthymic. Appropriately answers questions   Results for INDIA MCNEILL (MRN [id number]) as of 11/5/2021 12:15   Ref. Range 5/6/2021 16:51   Magnesium Latest Ref Range: 1.6 - 2.3 mg/dL " 1.8   Phosphorus Latest Ref Range: 2.5 - 4.5 mg/dL 4.5   TSH Latest Ref Range: 0.40 - 4.00 mU/L 1.01   Vitamin D Deficiency screening Latest Ref Range: 20 - 75 ug/L 77 (H)             ASSESSMENT and PLAN:      Diagnoses and all orders for this visit:     Age-related osteoporosis with current pathological fracture with routine healing, subsequent encounter  -     romosozumab-aqqg (EVENITY) injection 210 mg  -     Injection given today in bilateral posterior upper arms subcutaneously without complication     - Evenity 6/12 shots given today  - Continue improved calcium intake (diets plus supplements)  - Continue 2000 international unit(s) vitamin D daily  - follow up in 1 month for repeat evenity #7/12  .       Options for treatment and/or follow-up care were reviewed with the patient was actively involved in the decision making process. Patient verbalized understanding and was in agreement with the plan.     Injections were performed by Dr. Lio Eisenberg under my direct supervision   Alley Collazo MD, CAQ, FACSM, CCD  [facility]  Sports Medicine and Bone Health  Team Physician;  Athletics

## 2021-11-05 NOTE — LETTER
11/5/2021      RE: Kimberly Chau  [address] [facility] [address]       [facility]  Sports Medicine Clinic  Clinics and Surgery Center             SUBJECTIVE       Kimberly Chau is a 80 year old female with a PMH of osteoporosis presenting to clinic today for repeat evenity injection, #6/12 today.      Tolerated her previous Evenity injections without complication but was more sore in her thigh this time.      -Vitamin D supplementation 2000IU.   -Started caclium supplementation and trying to increase calcium from food sources.     PMH, Medications and Allergies were reviewed and updated as needed.     ROS:  As noted above otherwise negative.           Patient Active Problem List   Diagnosis     Intrinsic atopic dermatitis     Essential hypertension     Rheumatoid arthritis (H)     Retinal artery occlusion     Cervical vertebral fracture (H)     Central retinal artery occlusion of right eye     Bilateral occipital neuralgia     C1-C2 instability     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 factor (H)     Osteoarthritis     Malignant hypertension     Hyponatremia     GERD (gastroesophageal reflux disease)     Eczematous dermatitis     Anxiety state     Anemia     Closed fracture of distal end of left radius with delayed healing, unspecified fracture morphology, subsequent encounter     Closed fracture of distal end of left radius, unspecified fracture morphology, sequela     Osteomyelitis of left leg (H)         Current Outpatient Prescriptions               Current Outpatient Medications   Medication Sig Dispense Refill     acetaminophen 650 MG TABS Take 650 mg by mouth every 4 hours as needed. 100 tablet 0     amLODIPine (NORVASC) 10 MG tablet Take 1 tablet (10 mg) by mouth daily (Patient not taking: Reported on 1/11/2021) 90 tablet 0     COMPRESSION STOCKINGS Please measure and distribute 1 pair of 30mmHg - 40mmHg knee high ULCERCARE stockings (Patient not taking:  "Reported on 3/17/2021) 2 each 4     COMPRESSION STOCKINGS Please measure and distribute 1 pair of 20mmHg - 30mmHg Thigh high open or closed toe compression stockings. Jobst ultrasheer or equivalent. (Patient not taking: Reported on 3/17/2021) 2 each 4     COMPRESSION STOCKINGS Please measure and distribute 1 pair of 20mmHg - 30mmHg knee high open or closed toe compression stockings. Jobst ultrasheer or equivalent. 2 each 4     Diphenhydramine-APAP, sleep, (TYLENOL PM EXTRA STRENGTH PO) Take 2 tablets by mouth At Bedtime          ferrous gluconate (FERGON) 324 (38 Fe) MG tablet Take 324 mg by mouth         Gauze Pads & Dressings (TELFA NON-ADHERENT) 3\"X4\" PADS Cut to size for open areas on the lower extremities and secure with paper tape. Change dressing every other day. 30 each 11     metoprolol tartrate (LOPRESSOR) 100 MG tablet Take 2 tablets (200 mg) by mouth 2 times daily 360 tablet 1     Multiple Vitamin (MULTI-VITAMIN) per tablet Take 1 tablet by mouth every morning          mupirocin (BACTROBAN) 2 % external ointment Apply to open areas with dressing changes (Patient not taking: Reported on 2/22/2021) 60 g 11     Omeprazole (PRILOSEC PO) Take 20 mg by mouth as needed          sodium chloride (OCEAN NASAL SPRAY) 0.65 % nasal spray Spray 1 spray into both nostrils daily as needed for congestion 1 Bottle 3     traMADol (ULTRAM) 50 MG tablet Take 1-2 tablets ( mg) by mouth every 6 hours as needed for severe pain (Patient not taking: Reported on 3/17/2021) 120 tablet 0     triamcinolone (KENALOG) 0.1 % external cream Apply twice daily for 2 weeks then 3 times weekly for 2 week, repeat cycle as needed (Patient not taking: Reported on 3/11/2021) 454 g 0                 OBJECTIVE:       Ht 1.524 m (5')   Wt 45.4 kg (100 lb)   BMI 19.53 kg/m       Gen:  Well nourished and in no acute distress  .  Psych: Euthymic. Appropriately answers questions   Results for INDIA MCNEILL (MRN [id number]) as of 11/5/2021 " 12:15   Ref. Range 5/6/2021 16:51   Magnesium Latest Ref Range: 1.6 - 2.3 mg/dL 1.8   Phosphorus Latest Ref Range: 2.5 - 4.5 mg/dL 4.5   TSH Latest Ref Range: 0.40 - 4.00 mU/L 1.01   Vitamin D Deficiency screening Latest Ref Range: 20 - 75 ug/L 77 (H)             ASSESSMENT and PLAN:      Diagnoses and all orders for this visit:     Age-related osteoporosis with current pathological fracture with routine healing, subsequent encounter  -     romosozumab-aqqg (EVENITY) injection 210 mg  -     Injection given today in bilateral posterior upper arms subcutaneously without complication     - Evenity 6/12 shots given today  - Continue improved calcium intake (diets plus supplements)  - Continue 2000 international unit(s) vitamin D daily  - follow up in 1 month for repeat evenity #7/12  .       Options for treatment and/or follow-up care were reviewed with the patient was actively involved in the decision making process. Patient verbalized understanding and was in agreement with the plan.     Injections were performed by Dr. Lio Eisenberg under my direct supervision   Alley Collazo MD, CAQ, FACSM, CCD  [facility]  Sports Medicine and Bone Health  Team Physician;  Athletics

## 2021-11-05 NOTE — NURSING NOTE
[address] [address]  Dept: [phone]  ______________________________________________________________________________    Patient: Kimberly Chau   : 1940   MRN: [id number]   2021    INVASIVE PROCEDURE SAFETY CHECKLIST    Date: 2021   Procedure:Evenity   Patient Name: Kimberly Chau  MRN: [id number]  YOB: 1940    Action: Complete sections as appropriate. Any discrepancy results in a HARD COPY until resolved.     PRE PROCEDURE:  Patient ID verified with 2 identifiers (name and  or MRN): Yes  Procedure and site verified with patient/designee (when able): Yes  Accurate consent documentation in medical record: Yes  H&P (or appropriate assessment) documented in medical record: Yes  H&P must be up to 20 days prior to procedure and updates within 24 hours of procedure as applicable: NA  Relevant diagnostic and radiology test results appropriately labeled and displayed as applicable: Yes  Procedure site(s) marked with provider initials: NA    TIMEOUT:  Time-Out performed immediately prior to starting procedure, including verbal and active participation of all team members addressing the following:Yes  * Correct patient identify  * Confirmed that the correct side and site are marked  * An accurate procedure consent form  * Agreement on the procedure to be done  * Correct patient position  * Relevant images and results are properly labeled and appropriately displayed  * The need to administer antibiotics or fluids for irrigation purposes during the procedure as applicable   * Safety precautions based on patient history or medication use    DURING PROCEDURE: Verification of correct person, site, and procedures any time the responsibility for care of the patient is transferred to another member of the care team.       Prior to injection, verified patient identity using patient's name and date of birth.  Due to  injection administration, patient instructed to remain in clinic for 15 minutes  afterwards, and to report any adverse reaction to me immediately.    Subcutaneous     Drug Amount Wasted:  None.  Vial/Syringe: Syringe  Expiration Date:  12/1/23      Candis Marcus ATC  November 5, 2021

## 2021-12-01 NOTE — PROGRESS NOTES
[facility]  Sports Medicine Clinic  Clinics and Surgery Center             SUBJECTIVE       Kimberly Chau is a 80 year old female with a PMH of osteoporosis presenting to clinic today for repeat evenity injection, #7/12 today.      Tolerated her previous Evenity injections without complication.       -Vitamin D supplementation 2000IU.   -Continues calcium supplementation and increased calcium from food sources.     PMH, Medications and Allergies were reviewed and updated as needed.     ROS:  As noted above otherwise negative.           Patient Active Problem List   Diagnosis     Intrinsic atopic dermatitis     Essential hypertension     Rheumatoid arthritis (H)     Retinal artery occlusion     Cervical vertebral fracture (H)     Central retinal artery occlusion of right eye     Bilateral occipital neuralgia     C1-C2 instability     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 factor (H)     Osteoarthritis     Malignant hypertension     Hyponatremia     GERD (gastroesophageal reflux disease)     Eczematous dermatitis     Anxiety state     Anemia     Closed fracture of distal end of left radius with delayed healing, unspecified fracture morphology, subsequent encounter     Closed fracture of distal end of left radius, unspecified fracture morphology, sequela     Osteomyelitis of left leg (H)         Current Outpatient Prescriptions               Current Outpatient Medications   Medication Sig Dispense Refill     acetaminophen 650 MG TABS Take 650 mg by mouth every 4 hours as needed. 100 tablet 0     amLODIPine (NORVASC) 10 MG tablet Take 1 tablet (10 mg) by mouth daily (Patient not taking: Reported on 1/11/2021) 90 tablet 0     COMPRESSION STOCKINGS Please measure and distribute 1 pair of 30mmHg - 40mmHg knee high ULCERCARE stockings (Patient not taking: Reported on 3/17/2021) 2 each 4     COMPRESSION STOCKINGS Please measure and distribute 1 pair of 20mmHg - 30mmHg Thigh high open  "or closed toe compression stockings. Jobst ultrasheer or equivalent. (Patient not taking: Reported on 3/17/2021) 2 each 4     COMPRESSION STOCKINGS Please measure and distribute 1 pair of 20mmHg - 30mmHg knee high open or closed toe compression stockings. Jobst ultrasheer or equivalent. 2 each 4     Diphenhydramine-APAP, sleep, (TYLENOL PM EXTRA STRENGTH PO) Take 2 tablets by mouth At Bedtime          ferrous gluconate (FERGON) 324 (38 Fe) MG tablet Take 324 mg by mouth         Gauze Pads & Dressings (TELFA NON-ADHERENT) 3\"X4\" PADS Cut to size for open areas on the lower extremities and secure with paper tape. Change dressing every other day. 30 each 11     metoprolol tartrate (LOPRESSOR) 100 MG tablet Take 2 tablets (200 mg) by mouth 2 times daily 360 tablet 1     Multiple Vitamin (MULTI-VITAMIN) per tablet Take 1 tablet by mouth every morning          mupirocin (BACTROBAN) 2 % external ointment Apply to open areas with dressing changes (Patient not taking: Reported on 2/22/2021) 60 g 11     Omeprazole (PRILOSEC PO) Take 20 mg by mouth as needed          sodium chloride (OCEAN NASAL SPRAY) 0.65 % nasal spray Spray 1 spray into both nostrils daily as needed for congestion 1 Bottle 3     traMADol (ULTRAM) 50 MG tablet Take 1-2 tablets ( mg) by mouth every 6 hours as needed for severe pain (Patient not taking: Reported on 3/17/2021) 120 tablet 0     triamcinolone (KENALOG) 0.1 % external cream Apply twice daily for 2 weeks then 3 times weekly for 2 week, repeat cycle as needed (Patient not taking: Reported on 3/11/2021) 454 g 0                 OBJECTIVE:       Ht 1.524 m (5')   Wt 45.4 kg (100 lb)   BMI 19.53 kg/m       Gen:  Well nourished and in no acute distress  .  Psych: Euthymic. Appropriately answers questions   Results for INDIA MCNEILL (MRN [id number]) as of 11/5/2021 12:15   Ref. Range 5/6/2021 16:51   Magnesium Latest Ref Range: 1.6 - 2.3 mg/dL 1.8   Phosphorus Latest Ref Range: 2.5 - 4.5 mg/dL 4.5 "   TSH Latest Ref Range: 0.40 - 4.00 mU/L 1.01   Vitamin D Deficiency screening Latest Ref Range: 20 - 75 ug/L 77 (H)             ASSESSMENT and PLAN:      Diagnoses and all orders for this visit:     Age-related osteoporosis with current pathological fracture with routine healing, subsequent encounter  -     romosozumab-aqqg (EVENITY) injection 210 mg  -     Injection given today in bilateral posterior upper arms subcutaneously without complication     - Evenity 7/12 shots given today  - Continue improved calcium intake (diets plus supplements)  - Continue 2000 international unit(s) vitamin D daily  - follow up in 1 month for repeat evenity #8/12     Evenity Injection # 7 of 12    The risks and benefits of the medication were re-reviewed and she agreed to proceed.  Prepackage Evenity was obtained from the [facility].  The skin was prepped with an alcohol swab. Two syringes prefilled with Evenity solution (105 mg per syringe) were injected subcutaneously into the Right Posterior Upper Arm and Left Thigh.  Bandaids were placed.  The patient tolerated the injections well. She did not experience any adverse effects.       Options for treatment and/or follow-up care were reviewed with the patient was actively involved in the decision making process. Patient verbalized understanding and was in agreement with the plan.       The entire procedure was supervised by Dr.Suzanne GARRY Collazo MD, CAQ, CCD    Abigail Lugo MD  Primary Care Sports Medicine Fellow

## 2021-12-02 ENCOUNTER — OFFICE VISIT (OUTPATIENT)
Dept: ORTHOPEDICS | Facility: CLINIC | Age: 81
End: 2021-12-02
Payer: MEDICARE

## 2021-12-02 VITALS — WEIGHT: 100 LBS | BODY MASS INDEX: 19.63 KG/M2 | HEIGHT: 60 IN

## 2021-12-02 DIAGNOSIS — M80.00XA OSTEOPOROSIS WITH CURRENT PATHOLOGICAL FRACTURE, UNSPECIFIED OSTEOPOROSIS TYPE, INITIAL ENCOUNTER: Primary | ICD-10-CM

## 2021-12-02 PROCEDURE — 99207 PR DROP WITH A PROCEDURE: CPT | Mod: GC | Performed by: STUDENT IN AN ORGANIZED HEALTH CARE EDUCATION/TRAINING PROGRAM

## 2021-12-02 PROCEDURE — 96372 THER/PROPH/DIAG INJ SC/IM: CPT | Mod: 50 | Performed by: STUDENT IN AN ORGANIZED HEALTH CARE EDUCATION/TRAINING PROGRAM

## 2021-12-02 ASSESSMENT — MIFFLIN-ST. JEOR: SCORE: 830.1

## 2021-12-02 NOTE — NURSING NOTE
[address] [address]  Dept: [phone]  ______________________________________________________________________________    Patient: Kimberly Chau   : 1940   MRN: [id number]   2021    INVASIVE PROCEDURE SAFETY CHECKLIST    Date: 21   Procedure: Evenity injection   Patient Name: Kimberly Chau  MRN: [id number]  YOB: 1940    Action: Complete sections as appropriate. Any discrepancy results in a HARD COPY until resolved.     PRE PROCEDURE:  Patient ID verified with 2 identifiers (name and  or MRN): Yes  Procedure and site verified with patient/designee (when able): Yes  Accurate consent documentation in medical record: Yes  H&P (or appropriate assessment) documented in medical record: Yes  H&P must be up to 20 days prior to procedure and updates within 24 hours of procedure as applicable: NA  Relevant diagnostic and radiology test results appropriately labeled and displayed as applicable: Yes  Procedure site(s) marked with provider initials: NA    TIMEOUT:  Time-Out performed immediately prior to starting procedure, including verbal and active participation of all team members addressing the following:Yes  * Correct patient identify  * Confirmed that the correct side and site are marked  * An accurate procedure consent form  * Agreement on the procedure to be done  * Correct patient position  * Relevant images and results are properly labeled and appropriately displayed  * The need to administer antibiotics or fluids for irrigation purposes during the procedure as applicable   * Safety precautions based on patient history or medication use    DURING PROCEDURE: Verification of correct person, site, and procedures any time the responsibility for care of the patient is transferred to another member of the care team.       Prior to injection, verified patient identity using patient's name and date of birth.  Due to  injection administration, patient instructed to remain in clinic for 15 minutes  afterwards, and to report any adverse reaction to me immediately.    Evenity Injection was performed    Drug Amount Wasted:  None.  Vial/Syringe: Single dose vial  Expiration Date:  4/1/24      Cristian Carlos, ATC  December 2, 2021

## 2021-12-02 NOTE — PROGRESS NOTES
Attending Note:   I have supervised the injections.   Alley Collazo MD, CAQ, CCD  [facility]  Sports Medicine and Bone Health

## 2021-12-02 NOTE — LETTER
12/2/2021      RE: Kimberly Chau  [address] [address] [facility] [address]       [facility]  Sports Medicine Clinic  Cl[facility] and [facility]             SUBJECTIVE       Kimberly Chau is a 80 year old female with a PMH of osteoporosis presenting to clinic today for repeat evenity injection, #7/12 today.      Tolerated her previous Evenity injections without complication.       -Vitamin D supplementation 2000IU.   -Continues calcium supplementation and increased calcium from food sources.     PMH, Medications and Allergies were reviewed and updated as needed.     ROS:  As noted above otherwise negative.           Patient Active Problem List   Diagnosis     Intrinsic atopic dermatitis     Essential hypertension     Rheumatoid arthritis (H)     Retinal artery occlusion     Cervical vertebral fracture (H)     Central retinal artery occlusion of right eye     Bilateral occipital neuralgia     C1-C2 instability     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 factor (H)     Osteoarthritis     Malignant hypertension     Hyponatremia     GERD (gastroesophageal reflux disease)     Eczematous dermatitis     Anxiety state     Anemia     Closed fracture of distal end of left radius with delayed healing, unspecified fracture morphology, subsequent encounter     Closed fracture of distal end of left radius, unspecified fracture morphology, sequela     Osteomyelitis of left leg (H)         Current Outpatient Prescriptions               Current Outpatient Medications   Medication Sig Dispense Refill     acetaminophen 650 MG TABS Take 650 mg by mouth every 4 hours as needed. 100 tablet 0     amLODIPine (NORVASC) 10 MG tablet Take 1 tablet (10 mg) by mouth daily (Patient not taking: Reported on 1/11/2021) 90 tablet 0     COMPRESSION STOCKINGS Please measure and distribute 1 pair of 30mmHg - 40mmHg knee high ULCERCARE stockings (Patient not taking: Reported on 3/17/2021) 2 each 4     COMPRESSION  "STOCKINGS Please measure and distribute 1 pair of 20mmHg - 30mmHg Thigh high open or closed toe compression stockings. Jobst ultrasheer or equivalent. (Patient not taking: Reported on 3/17/2021) 2 each 4     COMPRESSION STOCKINGS Please measure and distribute 1 pair of 20mmHg - 30mmHg knee high open or closed toe compression stockings. Jobst ultrasheer or equivalent. 2 each 4     Diphenhydramine-APAP, sleep, (TYLENOL PM EXTRA STRENGTH PO) Take 2 tablets by mouth At Bedtime          ferrous gluconate (FERGON) 324 (38 Fe) MG tablet Take 324 mg by mouth         Gauze Pads & Dressings (TELFA NON-ADHERENT) 3\"X4\" PADS Cut to size for open areas on the lower extremities and secure with paper tape. Change dressing every other day. 30 each 11     metoprolol tartrate (LOPRESSOR) 100 MG tablet Take 2 tablets (200 mg) by mouth 2 times daily 360 tablet 1     Multiple Vitamin (MULTI-VITAMIN) per tablet Take 1 tablet by mouth every morning          mupirocin (BACTROBAN) 2 % external ointment Apply to open areas with dressing changes (Patient not taking: Reported on 2/22/2021) 60 g 11     Omeprazole (PRILOSEC PO) Take 20 mg by mouth as needed          sodium chloride (OCEAN NASAL SPRAY) 0.65 % nasal spray Spray 1 spray into both nostrils daily as needed for congestion 1 Bottle 3     traMADol (ULTRAM) 50 MG tablet Take 1-2 tablets ( mg) by mouth every 6 hours as needed for severe pain (Patient not taking: Reported on 3/17/2021) 120 tablet 0     triamcinolone (KENALOG) 0.1 % external cream Apply twice daily for 2 weeks then 3 times weekly for 2 week, repeat cycle as needed (Patient not taking: Reported on 3/11/2021) 454 g 0                 OBJECTIVE:       Ht 1.524 m (5')   Wt 45.4 kg (100 lb)   BMI 19.53 kg/m       Gen:  Well nourished and in no acute distress  .  Psych: Euthymic. Appropriately answers questions   Results for INDIA MCNEILL (MRN [id number]) as of 11/5/2021 12:15   Ref. Range 5/6/2021 16:51   Magnesium Latest " Ref Range: 1.6 - 2.3 mg/dL 1.8   Phosphorus Latest Ref Range: 2.5 - 4.5 mg/dL 4.5   TSH Latest Ref Range: 0.40 - 4.00 mU/L 1.01   Vitamin D Deficiency screening Latest Ref Range: 20 - 75 ug/L 77 (H)             ASSESSMENT and PLAN:      Diagnoses and all orders for this visit:     Age-related osteoporosis with current pathological fracture with routine healing, subsequent encounter  -     romosozumab-aqqg (EVENITY) injection 210 mg  -     Injection given today in bilateral posterior upper arms subcutaneously without complication     - Evenity 7/12 shots given today  - Continue improved calcium intake (diets plus supplements)  - Continue 2000 international unit(s) vitamin D daily  - follow up in 1 month for repeat evenity #8/12     Evenity Injection # 7 of 12    The risks and benefits of the medication were re-reviewed and she agreed to proceed.  Prepackage Evenity was obtained from the [facility].  The skin was prepped with an alcohol swab. Two syringes prefilled with Evenity solution (105 mg per syringe) were injected subcutaneously into the Right Posterior Upper Arm and Left Thigh.  Bandaids were placed.  The patient tolerated the injections well. She did not experience any adverse effects.       Options for treatment and/or follow-up care were reviewed with the patient was actively involved in the decision making process. Patient verbalized understanding and was in agreement with the plan.       The entire procedure was supervised by Dr.Suzanne GARRY Collazo MD, CAQ, CCD    Abigail Lugo MD  Primary Care Sports Medicine Fellow        Attending Note:   I have supervised the injections.   Alley Collazo MD, CAQ, CCD  [facility]  Sports Medicine and Bone Health      Abigail Lugo MD

## 2021-12-17 ENCOUNTER — IMMUNIZATION (OUTPATIENT)
Dept: NURSING | Facility: CLINIC | Age: 81
End: 2021-12-17
Payer: MEDICARE

## 2021-12-17 PROCEDURE — 0004A PR COVID VAC PFIZER DIL RECON 30 MCG/0.3 ML IM: CPT

## 2021-12-17 PROCEDURE — G0008 ADMIN INFLUENZA VIRUS VAC: HCPCS

## 2021-12-17 PROCEDURE — 91300 PR COVID VAC PFIZER DIL RECON 30 MCG/0.3 ML IM: CPT

## 2021-12-17 PROCEDURE — 90662 IIV NO PRSV INCREASED AG IM: CPT

## 2022-01-10 NOTE — PROGRESS NOTES
[facility]  Sports Medicine Clinic  Clinics and Surgery Center             SUBJECTIVE       Kimberly Chau is a 80 year old female with a PMH of osteoporosis presenting to clinic today for repeat evenity injection, #8/12 today.      Tolerated her previous Evenity injections without complication.       -Vitamin D supplementation 2000IU  -Continues calcium supplementation and increased calcium from food sources.     PMH, Medications and Allergies were reviewed and updated as needed.     ROS:  As noted above otherwise negative.           Patient Active Problem List   Diagnosis     Intrinsic atopic dermatitis     Essential hypertension     Rheumatoid arthritis (H)     Retinal artery occlusion     Cervical vertebral fracture (H)     Central retinal artery occlusion of right eye     Bilateral occipital neuralgia     C1-C2 instability     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 factor (H)     Osteoarthritis     Malignant hypertension     Hyponatremia     GERD (gastroesophageal reflux disease)     Eczematous dermatitis     Anxiety state     Anemia     Closed fracture of distal end of left radius with delayed healing, unspecified fracture morphology, subsequent encounter     Closed fracture of distal end of left radius, unspecified fracture morphology, sequela     Osteomyelitis of left leg (H)         Current Outpatient Prescriptions               Current Outpatient Medications   Medication Sig Dispense Refill     acetaminophen 650 MG TABS Take 650 mg by mouth every 4 hours as needed. 100 tablet 0     amLODIPine (NORVASC) 10 MG tablet Take 1 tablet (10 mg) by mouth daily (Patient not taking: Reported on 1/11/2021) 90 tablet 0     COMPRESSION STOCKINGS Please measure and distribute 1 pair of 30mmHg - 40mmHg knee high ULCERCARE stockings (Patient not taking: Reported on 3/17/2021) 2 each 4     COMPRESSION STOCKINGS Please measure and distribute 1 pair of 20mmHg - 30mmHg Thigh high open or  "closed toe compression stockings. Jobst ultrasheer or equivalent. (Patient not taking: Reported on 3/17/2021) 2 each 4     COMPRESSION STOCKINGS Please measure and distribute 1 pair of 20mmHg - 30mmHg knee high open or closed toe compression stockings. Jobst ultrasheer or equivalent. 2 each 4     Diphenhydramine-APAP, sleep, (TYLENOL PM EXTRA STRENGTH PO) Take 2 tablets by mouth At Bedtime          ferrous gluconate (FERGON) 324 (38 Fe) MG tablet Take 324 mg by mouth         Gauze Pads & Dressings (TELFA NON-ADHERENT) 3\"X4\" PADS Cut to size for open areas on the lower extremities and secure with paper tape. Change dressing every other day. 30 each 11     metoprolol tartrate (LOPRESSOR) 100 MG tablet Take 2 tablets (200 mg) by mouth 2 times daily 360 tablet 1     Multiple Vitamin (MULTI-VITAMIN) per tablet Take 1 tablet by mouth every morning          mupirocin (BACTROBAN) 2 % external ointment Apply to open areas with dressing changes (Patient not taking: Reported on 2/22/2021) 60 g 11     Omeprazole (PRILOSEC PO) Take 20 mg by mouth as needed          sodium chloride (OCEAN NASAL SPRAY) 0.65 % nasal spray Spray 1 spray into both nostrils daily as needed for congestion 1 Bottle 3     traMADol (ULTRAM) 50 MG tablet Take 1-2 tablets ( mg) by mouth every 6 hours as needed for severe pain (Patient not taking: Reported on 3/17/2021) 120 tablet 0     triamcinolone (KENALOG) 0.1 % external cream Apply twice daily for 2 weeks then 3 times weekly for 2 week, repeat cycle as needed (Patient not taking: Reported on 3/11/2021) 454 g 0                 OBJECTIVE:       Ht 1.524 m (5')   Wt 45.4 kg (100 lb)   BMI 19.53 kg/m       Gen:  Well nourished and in no acute distress  .  Psych: Euthymic. Appropriately answers questions   Results for INDIA MCNEILL (MRN [id number]) as of 11/5/2021 12:15   Ref. Range 5/6/2021 16:51   Magnesium Latest Ref Range: 1.6 - 2.3 mg/dL 1.8   Phosphorus Latest Ref Range: 2.5 - 4.5 mg/dL 4.5 "   TSH Latest Ref Range: 0.40 - 4.00 mU/L 1.01   Vitamin D Deficiency screening Latest Ref Range: 20 - 75 ug/L 77 (H)             ASSESSMENT and PLAN:      Diagnoses and all orders for this visit:     Age-related osteoporosis with current pathological fracture with routine healing, subsequent encounter  -     romosozumab-aqqg (EVENITY) injection 210 mg  -     Injection given today in bilateral posterior upper arms subcutaneously without complication     - Evenity 8/12 shots given today  - Continue improved calcium intake (diets plus supplements)  - Continue 2000 international unit(s) vitamin D daily  - follow up in 1 month for repeat evenity #9/12     Evenity Injection # 8 of 12    The risks and benefits of the medication were re-reviewed and she agreed to proceed.  Prepackage Evenity was obtained from the [facility].  The skin was prepped with an alcohol swab. Two syringes prefilled with Evenity solution (105 mg per syringe) were injected subcutaneously into the right and left anterior thighs.  Bandaids were placed.  The patient tolerated the injections well. She did not experience any adverse effects.       Options for treatment and/or follow-up care were reviewed with the patient was actively involved in the decision making process. Patient verbalized understanding and was in agreement with the plan.       The entire procedure was supervised by Dr.Suzanne GARRY Collazo MD, CAQ, CCD    Abigail Lugo MD  Primary Care Sports Medicine Fellow

## 2022-01-13 ENCOUNTER — OFFICE VISIT (OUTPATIENT)
Dept: ORTHOPEDICS | Facility: CLINIC | Age: 82
End: 2022-01-13
Payer: MEDICARE

## 2022-01-13 VITALS — BODY MASS INDEX: 22.97 KG/M2 | WEIGHT: 117 LBS | HEIGHT: 60 IN

## 2022-01-13 DIAGNOSIS — M80.00XA OSTEOPOROSIS WITH CURRENT PATHOLOGICAL FRACTURE, UNSPECIFIED OSTEOPOROSIS TYPE, INITIAL ENCOUNTER: Primary | ICD-10-CM

## 2022-01-13 PROCEDURE — 96372 THER/PROPH/DIAG INJ SC/IM: CPT | Mod: 50 | Performed by: FAMILY MEDICINE

## 2022-01-13 PROCEDURE — 99207 PR DROP WITH A PROCEDURE: CPT | Mod: GC | Performed by: FAMILY MEDICINE

## 2022-01-13 ASSESSMENT — MIFFLIN-ST. JEOR: SCORE: 907.21

## 2022-01-13 NOTE — LETTER
1/13/2022      RE: Kimberly Chau  [address] [address] [facility] [address]       [facility]  [facility] Clinic  Clinics [facility] Surgery Center             SUBJECTIVE       Kimberly Chau is a 80 year old female with a PMH of osteoporosis presenting to clinic today for repeat evenity injection, #8/12 today.      Tolerated her previous Evenity injections without complication.       -Vitamin D supplementation 2000IU  -Continues calcium supplementation and increased calcium from food sources.     PMH, Medications and Allergies were reviewed and updated as needed.     ROS:  As noted above otherwise negative.           Patient Active Problem List   Diagnosis     Intrinsic atopic dermatitis     Essential hypertension     Rheumatoid arthritis (H)     Retinal artery occlusion     Cervical vertebral fracture (H)     Central retinal artery occlusion of right eye     Bilateral occipital neuralgia     C1-C2 instability     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 factor (H)     Osteoarthritis     Malignant hypertension     Hyponatremia     GERD (gastroesophageal reflux disease)     Eczematous dermatitis     Anxiety state     Anemia     Closed fracture of distal end of left radius with delayed healing, unspecified fracture morphology, subsequent encounter     Closed fracture of distal end of left radius, unspecified fracture morphology, sequela     Osteomyelitis of left leg (H)         Current Outpatient Prescriptions               Current Outpatient Medications   Medication Sig Dispense Refill     acetaminophen 650 MG TABS Take 650 mg by mouth every 4 hours as needed. 100 tablet 0     amLODIPine (NORVASC) 10 MG tablet Take 1 tablet (10 mg) by mouth daily (Patient not taking: Reported on 1/11/2021) 90 tablet 0     COMPRESSION STOCKINGS Please measure and distribute 1 pair of 30mmHg - 40mmHg knee high ULCERCARE stockings (Patient not taking: Reported on 3/17/2021) 2 each 4     COMPRESSION  "STOCKINGS Please measure and distribute 1 pair of 20mmHg - 30mmHg Thigh high open or closed toe compression stockings. Jobst ultrasheer or equivalent. (Patient not taking: Reported on 3/17/2021) 2 each 4     COMPRESSION STOCKINGS Please measure and distribute 1 pair of 20mmHg - 30mmHg knee high open or closed toe compression stockings. Jobst ultrasheer or equivalent. 2 each 4     Diphenhydramine-APAP, sleep, (TYLENOL PM EXTRA STRENGTH PO) Take 2 tablets by mouth At Bedtime          ferrous gluconate (FERGON) 324 (38 Fe) MG tablet Take 324 mg by mouth         Gauze Pads & Dressings (TELFA NON-ADHERENT) 3\"X4\" PADS Cut to size for open areas on the lower extremities and secure with paper tape. Change dressing every other day. 30 each 11     metoprolol tartrate (LOPRESSOR) 100 MG tablet Take 2 tablets (200 mg) by mouth 2 times daily 360 tablet 1     Multiple Vitamin (MULTI-VITAMIN) per tablet Take 1 tablet by mouth every morning          mupirocin (BACTROBAN) 2 % external ointment Apply to open areas with dressing changes (Patient not taking: Reported on 2/22/2021) 60 g 11     Omeprazole (PRILOSEC PO) Take 20 mg by mouth as needed          sodium chloride (OCEAN NASAL SPRAY) 0.65 % nasal spray Spray 1 spray into both nostrils daily as needed for congestion 1 Bottle 3     traMADol (ULTRAM) 50 MG tablet Take 1-2 tablets ( mg) by mouth every 6 hours as needed for severe pain (Patient not taking: Reported on 3/17/2021) 120 tablet 0     triamcinolone (KENALOG) 0.1 % external cream Apply twice daily for 2 weeks then 3 times weekly for 2 week, repeat cycle as needed (Patient not taking: Reported on 3/11/2021) 454 g 0                 OBJECTIVE:       Ht 1.524 m (5')   Wt 45.4 kg (100 lb)   BMI 19.53 kg/m       Gen:  Well nourished and in no acute distress  .  Psych: Euthymic. Appropriately answers questions   Results for INDIA MCNEILL (MRN [id number]) as of 11/5/2021 12:15   Ref. Range 5/6/2021 16:51   Magnesium Latest " Ref Range: 1.6 - 2.3 mg/dL 1.8   Phosphorus Latest Ref Range: 2.5 - 4.5 mg/dL 4.5   TSH Latest Ref Range: 0.40 - 4.00 mU/L 1.01   Vitamin D Deficiency screening Latest Ref Range: 20 - 75 ug/L 77 (H)             ASSESSMENT and PLAN:      Diagnoses and all orders for this visit:     Age-related osteoporosis with current pathological fracture with routine healing, subsequent encounter  -     romosozumab-aqqg (EVENITY) injection 210 mg  -     Injection given today in bilateral posterior upper arms subcutaneously without complication     - Evenity 8/12 shots given today  - Continue improved calcium intake (diets plus supplements)  - Continue 2000 international unit(s) vitamin D daily  - follow up in 1 month for repeat evenity #9/12     Evenity Injection # 8 of 12    The risks and benefits of the medication were re-reviewed and she agreed to proceed.  Prepackage Evenity was obtained from the [facility].  The skin was prepped with an alcohol swab. Two syringes prefilled with Evenity solution (105 mg per syringe) were injected subcutaneously into the right and left anterior thighs.  Bandaids were placed.  The patient tolerated the injections well. She did not experience any adverse effects.       Options for treatment and/or follow-up care were reviewed with the patient was actively involved in the decision making process. Patient verbalized understanding and was in agreement with the plan.       The entire procedure was supervised by Dr.Suzanne GARRY Collazo MD, CAQ, CCD    Abigail Lugo MD  Primary Care Sports Medicine Fellow        Attending Note:   I have directly supervised the injections.     Alley Collazo MD, CAQ, CCD  [facility]  Sports Medicine and Bone Health      Abigail Lugo MD

## 2022-01-13 NOTE — PROGRESS NOTES
Attending Note:   I have directly supervised the injections.     Alley Collazo MD, CAQ, CCD  [facility]  Sports Medicine and Bone Health

## 2022-01-13 NOTE — NURSING NOTE
[address] [address]  Dept: [phone]  ______________________________________________________________________________    Patient: Kimberly Chau   : 1940   MRN: [id number]   2022    INVASIVE PROCEDURE SAFETY CHECKLIST    Date: 22   Procedure:Subcutaneous Evenity   Patient Name: Kimberly Chau  MRN: [id number]  YOB: 1940    Action: Complete sections as appropriate. Any discrepancy results in a HARD COPY until resolved.     PRE PROCEDURE:  Patient ID verified with 2 identifiers (name and  or MRN): Yes  Procedure and site verified with patient/designee (when able): Yes  Accurate consent documentation in medical record: Yes  H&P (or appropriate assessment) documented in medical record: Yes  H&P must be up to 20 days prior to procedure and updates within 24 hours of procedure as applicable: NA  Relevant diagnostic and radiology test results appropriately labeled and displayed as applicable: Yes  Procedure site(s) marked with provider initials: NA    TIMEOUT:  Time-Out performed immediately prior to starting procedure, including verbal and active participation of all team members addressing the following:Yes  * Correct patient identify  * Confirmed that the correct side and site are marked  * An accurate procedure consent form  * Agreement on the procedure to be done  * Correct patient position  * Relevant images and results are properly labeled and appropriately displayed  * The need to administer antibiotics or fluids for irrigation purposes during the procedure as applicable   * Safety precautions based on patient history or medication use    DURING PROCEDURE: Verification of correct person, site, and procedures any time the responsibility for care of the patient is transferred to another member of the care team.       Prior to injection, verified patient identity using patient's name and date of birth.  Due  to injection administration, patient instructed to remain in clinic for 15 minutes  afterwards, and to report any adverse reaction to me immediately.    Subcutaneous    Drug Amount Wasted:  None.  Vial/Syringe: Syringe  Expiration Date:  10/22 - evenity      Kostas Maximilian, ATC  January 13, 2022

## 2022-01-17 ENCOUNTER — TELEPHONE (OUTPATIENT)
Dept: INTERNAL MEDICINE | Facility: CLINIC | Age: 82
End: 2022-01-17
Payer: MEDICARE

## 2022-01-17 DIAGNOSIS — I10 ESSENTIAL HYPERTENSION: ICD-10-CM

## 2022-01-19 ENCOUNTER — TELEPHONE (OUTPATIENT)
Dept: INTERNAL MEDICINE | Facility: CLINIC | Age: 82
End: 2022-01-19
Payer: MEDICARE

## 2022-01-19 RX ORDER — METOPROLOL TARTRATE 100 MG
200 TABLET ORAL 2 TIMES DAILY
Qty: 360 TABLET | Refills: 0 | Status: SHIPPED | OUTPATIENT
Start: 2022-01-19 | End: 2022-04-18

## 2022-01-19 NOTE — TELEPHONE ENCOUNTER
----- Message from Mareitta Abarca RN sent at 1/19/2022  7:36 AM CST -----  Regarding: BP check [facility]/ annual due March  Please call to schedule.    Thanks    I do not need any follow up on the scheduling of this appointment unless the patient will no longer be receiving care in our clinic.

## 2022-01-19 NOTE — TELEPHONE ENCOUNTER
Patient schedule annual wellness check with PCP for 3/16/22 at 1230 PM in clinic prior to another appointment (Sports) same day. Patient confirmed date and time.

## 2022-01-19 NOTE — TELEPHONE ENCOUNTER
Metoprolol Tartrate Oral Tablet 100 MG  Take 2 tablets (200 mg) by mouth 2 times daily   Last Written Prescription Date:  10/18/21  Last Fill Quantity: 360,   # refills: 0  Last Office Visit : 3/11/21  Future Office visit:  Not scheduled    Routing refill request to provider for review  because:  Blood pressure out of range   03/11/21 (!) 178/77   02/16/21 (!) 169/80   01/06/21 (!) 189/82     3/11/21 Dr Castellano, excerpted:  HTN; she remains on Amlodipine and Metoprolol (large dose). Pain Related? Will follow and clarify if she is taking her medications   Per protocol,  refill x 3 months if BP greater than or equal to 140/90, and refer to PCP for follow up.

## 2022-02-12 ENCOUNTER — OFFICE VISIT (OUTPATIENT)
Dept: ORTHOPEDICS | Facility: CLINIC | Age: 82
End: 2022-02-12
Payer: MEDICARE

## 2022-02-12 VITALS — HEIGHT: 60 IN | BODY MASS INDEX: 23.16 KG/M2 | WEIGHT: 118 LBS

## 2022-02-12 DIAGNOSIS — M80.00XA OSTEOPOROSIS WITH CURRENT PATHOLOGICAL FRACTURE, UNSPECIFIED OSTEOPOROSIS TYPE, INITIAL ENCOUNTER: Primary | ICD-10-CM

## 2022-02-12 PROCEDURE — 99207 PR DROP WITH A PROCEDURE: CPT | Mod: GC | Performed by: FAMILY MEDICINE

## 2022-02-12 PROCEDURE — 96372 THER/PROPH/DIAG INJ SC/IM: CPT | Mod: GC | Performed by: FAMILY MEDICINE

## 2022-02-12 ASSESSMENT — MIFFLIN-ST. JEOR: SCORE: 911.74

## 2022-02-12 NOTE — PROGRESS NOTES
[facility]  Sports Medicine Clinic  Clinics and Surgery Center             SUBJECTIVE       Kimberly Chau is a 80 year old female with a PMH of osteoporosis presenting to clinic today for repeat evenity injection, #9/12 today.      Tolerated her previous Evenity injections without complication.       -Vitamin D supplementation 2000IU  -Continues calcium supplementation and increased calcium from food sources.    She woke up a few days ago with left sided neck/shoulder pain. She doesn't remember doing anything but it has been tight and sore.      PMH, Medications and Allergies were reviewed and updated as needed.     ROS:  As noted above otherwise negative.           Patient Active Problem List   Diagnosis     Intrinsic atopic dermatitis     Essential hypertension     Rheumatoid arthritis (H)     Retinal artery occlusion     Cervical vertebral fracture (H)     Central retinal artery occlusion of right eye     Bilateral occipital neuralgia     C1-C2 instability     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 factor (H)     Osteoarthritis     Malignant hypertension     Hyponatremia     GERD (gastroesophageal reflux disease)     Eczematous dermatitis     Anxiety state     Anemia     Closed fracture of distal end of left radius with delayed healing, unspecified fracture morphology, subsequent encounter     Closed fracture of distal end of left radius, unspecified fracture morphology, sequela     Osteomyelitis of left leg (H)         Current Outpatient Prescriptions               Current Outpatient Medications   Medication Sig Dispense Refill     acetaminophen 650 MG TABS Take 650 mg by mouth every 4 hours as needed. 100 tablet 0     amLODIPine (NORVASC) 10 MG tablet Take 1 tablet (10 mg) by mouth daily (Patient not taking: Reported on 1/11/2021) 90 tablet 0     COMPRESSION STOCKINGS Please measure and distribute 1 pair of 30mmHg - 40mmHg knee high ULCERCARE stockings (Patient not  "taking: Reported on 3/17/2021) 2 each 4     COMPRESSION STOCKINGS Please measure and distribute 1 pair of 20mmHg - 30mmHg Thigh high open or closed toe compression stockings. Jobst ultrasheer or equivalent. (Patient not taking: Reported on 3/17/2021) 2 each 4     COMPRESSION STOCKINGS Please measure and distribute 1 pair of 20mmHg - 30mmHg knee high open or closed toe compression stockings. Jobst ultrasheer or equivalent. 2 each 4     Diphenhydramine-APAP, sleep, (TYLENOL PM EXTRA STRENGTH PO) Take 2 tablets by mouth At Bedtime          ferrous gluconate (FERGON) 324 (38 Fe) MG tablet Take 324 mg by mouth         Gauze Pads & Dressings (TELFA NON-ADHERENT) 3\"X4\" PADS Cut to size for open areas on the lower extremities and secure with paper tape. Change dressing every other day. 30 each 11     metoprolol tartrate (LOPRESSOR) 100 MG tablet Take 2 tablets (200 mg) by mouth 2 times daily 360 tablet 1     Multiple Vitamin (MULTI-VITAMIN) per tablet Take 1 tablet by mouth every morning          mupirocin (BACTROBAN) 2 % external ointment Apply to open areas with dressing changes (Patient not taking: Reported on 2/22/2021) 60 g 11     Omeprazole (PRILOSEC PO) Take 20 mg by mouth as needed          sodium chloride (OCEAN NASAL SPRAY) 0.65 % nasal spray Spray 1 spray into both nostrils daily as needed for congestion 1 Bottle 3     traMADol (ULTRAM) 50 MG tablet Take 1-2 tablets ( mg) by mouth every 6 hours as needed for severe pain (Patient not taking: Reported on 3/17/2021) 120 tablet 0     triamcinolone (KENALOG) 0.1 % external cream Apply twice daily for 2 weeks then 3 times weekly for 2 week, repeat cycle as needed (Patient not taking: Reported on 3/11/2021) 454 g 0                 OBJECTIVE:       Ht 1.524 m (5')   Wt 45.4 kg (100 lb)   BMI 19.53 kg/m       Gen:  Well nourished and in no acute distress  .  Psych: Euthymic. Appropriately answers questions   Results for INDIA MCNEILL (MRN [id number]) as of " 11/5/2021 12:15   Ref. Range 5/6/2021 16:51   Magnesium Latest Ref Range: 1.6 - 2.3 mg/dL 1.8   Phosphorus Latest Ref Range: 2.5 - 4.5 mg/dL 4.5   TSH Latest Ref Range: 0.40 - 4.00 mU/L 1.01   Vitamin D Deficiency screening Latest Ref Range: 20 - 75 ug/L 77 (H)             ASSESSMENT and PLAN:      Diagnoses and all orders for this visit:     Age-related osteoporosis with current pathological fracture with routine healing, subsequent encounter  -     romosozumab-aqqg (EVENITY) injection 210 mg  -     Injection given today in bilateral posterior upper arms subcutaneously without complication  - reviewed dates of previous injections and she is on 9/12 and her last injections will be in May     - Evenity 9/12 shots given today  - Continue improved calcium intake (diets plus supplements)  - Continue 2000 international unit(s) vitamin D daily  - follow up in 1 month for repeat evenity #9/12     Evenity Injection # 9 of 12    The risks and benefits of the medication were re-reviewed and she agreed to proceed.  Prepackage Evenity was obtained from the [facility].  The skin was prepped with an alcohol swab. Two syringes prefilled with Evenity solution (105 mg per syringe) were injected subcutaneously into the right and left anterior thighs.  Bandaids were placed.  The patient tolerated the injections well. She did not experience any adverse effects.        Neck/Shoulder Pain: Briefly assessed and recommended some gentle stretching, massage, heat and lidocaine patches. If continues to be a persistent issue will need to have assessment with cervical xrays given her history of neck surgery.      Options for treatment and/or follow-up care were reviewed with the patient was actively involved in the decision making process. Patient verbalized understanding and was in agreement with the plan.       The entire procedure was supervised by Dr.Suzanne GARRY Collazo MD, CAQ, CCD    Abigail Lugo MD  Primary Care Sports Medicine  Fellow

## 2022-02-12 NOTE — PROGRESS NOTES
Attending Note:   I have directly supervised the injections.   Alley Collazo MD, CAQ, CCD  [facility]  Sports Medicine and Bone Health

## 2022-02-12 NOTE — LETTER
2/12/2022      RE: Kimberly Chau  [address] [address]       [facility]  Sports Medicine Clinic  Clinics and Surgery Center             SUBJECTIVE       Kimberly Chau is a 80 year old female with a PMH of osteoporosis presenting to clinic today for repeat evenity injection, #9/12 today.      Tolerated her previous Evenity injections without complication.       -Vitamin D supplementation 2000IU  -Continues calcium supplementation and increased calcium from food sources.    She woke up a few days ago with left sided neck/shoulder pain. She doesn't remember doing anything but it has been tight and sore.      PMH, Medications and Allergies were reviewed and updated as needed.     ROS:  As noted above otherwise negative.           Patient Active Problem List   Diagnosis     Intrinsic atopic dermatitis     Essential hypertension     Rheumatoid arthritis (H)     Retinal artery occlusion     Cervical vertebral fracture (H)     Central retinal artery occlusion of right eye     Bilateral occipital neuralgia     C1-C2 instability     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 factor (H)     Osteoarthritis     Malignant hypertension     Hyponatremia     GERD (gastroesophageal reflux disease)     Eczematous dermatitis     Anxiety state     Anemia     Closed fracture of distal end of left radius with delayed healing, unspecified fracture morphology, subsequent encounter     Closed fracture of distal end of left radius, unspecified fracture morphology, sequela     Osteomyelitis of left leg (H)         Current Outpatient Prescriptions               Current Outpatient Medications   Medication Sig Dispense Refill     acetaminophen 650 MG TABS Take 650 mg by mouth every 4 hours as needed. 100 tablet 0     amLODIPine (NORVASC) 10 MG tablet Take 1 tablet (10 mg) by mouth daily (Patient not taking: Reported on 1/11/2021) 90 tablet 0     COMPRESSION STOCKINGS Please measure and  "distribute 1 pair of 30mmHg - 40mmHg knee high ULCERCARE stockings (Patient not taking: Reported on 3/17/2021) 2 each 4     COMPRESSION STOCKINGS Please measure and distribute 1 pair of 20mmHg - 30mmHg Thigh high open or closed toe compression stockings. Jobst ultrasheer or equivalent. (Patient not taking: Reported on 3/17/2021) 2 each 4     COMPRESSION STOCKINGS Please measure and distribute 1 pair of 20mmHg - 30mmHg knee high open or closed toe compression stockings. Jobst ultrasheer or equivalent. 2 each 4     Diphenhydramine-APAP, sleep, (TYLENOL PM EXTRA STRENGTH PO) Take 2 tablets by mouth At Bedtime          ferrous gluconate (FERGON) 324 (38 Fe) MG tablet Take 324 mg by mouth         Gauze Pads & Dressings (TELFA NON-ADHERENT) 3\"X4\" PADS Cut to size for open areas on the lower extremities and secure with paper tape. Change dressing every other day. 30 each 11     metoprolol tartrate (LOPRESSOR) 100 MG tablet Take 2 tablets (200 mg) by mouth 2 times daily 360 tablet 1     Multiple Vitamin (MULTI-VITAMIN) per tablet Take 1 tablet by mouth every morning          mupirocin (BACTROBAN) 2 % external ointment Apply to open areas with dressing changes (Patient not taking: Reported on 2/22/2021) 60 g 11     Omeprazole (PRILOSEC PO) Take 20 mg by mouth as needed          sodium chloride (OCEAN NASAL SPRAY) 0.65 % nasal spray Spray 1 spray into both nostrils daily as needed for congestion 1 Bottle 3     traMADol (ULTRAM) 50 MG tablet Take 1-2 tablets ( mg) by mouth every 6 hours as needed for severe pain (Patient not taking: Reported on 3/17/2021) 120 tablet 0     triamcinolone (KENALOG) 0.1 % external cream Apply twice daily for 2 weeks then 3 times weekly for 2 week, repeat cycle as needed (Patient not taking: Reported on 3/11/2021) 454 g 0                 OBJECTIVE:       Ht 1.524 m (5')   Wt 45.4 kg (100 lb)   BMI 19.53 kg/m       Gen:  Well nourished and in no acute distress  .  Psych: Euthymic. " Appropriately answers questions   Results for INDIA MCNEILL (MRN [id number]) as of 11/5/2021 12:15   Ref. Range 5/6/2021 16:51   Magnesium Latest Ref Range: 1.6 - 2.3 mg/dL 1.8   Phosphorus Latest Ref Range: 2.5 - 4.5 mg/dL 4.5   TSH Latest Ref Range: 0.40 - 4.00 mU/L 1.01   Vitamin D Deficiency screening Latest Ref Range: 20 - 75 ug/L 77 (H)             ASSESSMENT and PLAN:      Diagnoses and all orders for this visit:     Age-related osteoporosis with current pathological fracture with routine healing, subsequent encounter  -     romosozumab-aqqg (EVENITY) injection 210 mg  -     Injection given today in bilateral posterior upper arms subcutaneously without complication  - reviewed dates of previous injections and she is on 9/12 and her last injections will be in May     - Evenity 9/12 shots given today  - Continue improved calcium intake (diets plus supplements)  - Continue 2000 international unit(s) vitamin D daily  - follow up in 1 month for repeat evenity #9/12     Evenity Injection # 9 of 12    The risks and benefits of the medication were re-reviewed and she agreed to proceed.  Prepackage Evenity was obtained from the [facility].  The skin was prepped with an alcohol swab. Two syringes prefilled with Evenity solution (105 mg per syringe) were injected subcutaneously into the right and left anterior thighs.  Bandaids were placed.  The patient tolerated the injections well. She did not experience any adverse effects.        Neck/Shoulder Pain: Briefly assessed and recommended some gentle stretching, massage, heat and lidocaine patches. If continues to be a persistent issue will need to have assessment with cervical xrays given her history of neck surgery.      Options for treatment and/or follow-up care were reviewed with the patient was actively involved in the decision making process. Patient verbalized understanding and was in agreement with the plan.       The entire procedure was supervised by  Dr.Suzanne GARRY Collazo MD, CAQ, CCD    Abigail Lugo MD  Primary Care Sports Medicine Fellow        Attending Note:   I have directly supervised the injections.   Alley Collazo MD, CAQ, CCD  [facility]  Sports Medicine and Bone Health

## 2022-03-16 ENCOUNTER — LAB (OUTPATIENT)
Dept: LAB | Facility: CLINIC | Age: 82
End: 2022-03-16
Payer: MEDICARE

## 2022-03-16 ENCOUNTER — OFFICE VISIT (OUTPATIENT)
Dept: ORTHOPEDICS | Facility: CLINIC | Age: 82
End: 2022-03-16
Payer: MEDICARE

## 2022-03-16 ENCOUNTER — OFFICE VISIT (OUTPATIENT)
Dept: INTERNAL MEDICINE | Facility: CLINIC | Age: 82
End: 2022-03-16
Payer: MEDICARE

## 2022-03-16 VITALS
WEIGHT: 118 LBS | DIASTOLIC BLOOD PRESSURE: 101 MMHG | OXYGEN SATURATION: 95 % | SYSTOLIC BLOOD PRESSURE: 176 MMHG | BODY MASS INDEX: 23.16 KG/M2 | HEART RATE: 71 BPM | HEIGHT: 60 IN

## 2022-03-16 VITALS — HEIGHT: 60 IN | BODY MASS INDEX: 23.16 KG/M2 | WEIGHT: 118 LBS

## 2022-03-16 DIAGNOSIS — I10 BENIGN ESSENTIAL HYPERTENSION: ICD-10-CM

## 2022-03-16 DIAGNOSIS — D64.9 ANEMIA, UNSPECIFIED TYPE: ICD-10-CM

## 2022-03-16 DIAGNOSIS — M80.00XA OSTEOPOROSIS WITH CURRENT PATHOLOGICAL FRACTURE, UNSPECIFIED OSTEOPOROSIS TYPE, INITIAL ENCOUNTER: Primary | ICD-10-CM

## 2022-03-16 DIAGNOSIS — R03.0 ELEVATED BLOOD-PRESSURE READING, WITHOUT DIAGNOSIS OF HYPERTENSION: ICD-10-CM

## 2022-03-16 DIAGNOSIS — D64.9 ANEMIA, UNSPECIFIED TYPE: Primary | ICD-10-CM

## 2022-03-16 LAB
ALBUMIN SERPL-MCNC: 3.6 G/DL (ref 3.4–5)
ALP SERPL-CCNC: 132 U/L (ref 40–150)
ALT SERPL W P-5'-P-CCNC: 18 U/L (ref 0–50)
ANION GAP SERPL CALCULATED.3IONS-SCNC: 7 MMOL/L (ref 3–14)
AST SERPL W P-5'-P-CCNC: 20 U/L (ref 0–45)
BASOPHILS # BLD AUTO: 0.1 10E3/UL (ref 0–0.2)
BASOPHILS NFR BLD AUTO: 1 %
BILIRUB SERPL-MCNC: 0.5 MG/DL (ref 0.2–1.3)
BUN SERPL-MCNC: 12 MG/DL (ref 7–30)
CALCIUM SERPL-MCNC: 8.5 MG/DL (ref 8.5–10.1)
CHLORIDE BLD-SCNC: 102 MMOL/L (ref 94–109)
CHOLEST SERPL-MCNC: 180 MG/DL
CO2 SERPL-SCNC: 25 MMOL/L (ref 20–32)
CREAT SERPL-MCNC: 1 MG/DL (ref 0.52–1.04)
EOSINOPHIL # BLD AUTO: 0.2 10E3/UL (ref 0–0.7)
EOSINOPHIL NFR BLD AUTO: 4 %
ERYTHROCYTE [DISTWIDTH] IN BLOOD BY AUTOMATED COUNT: 14.4 % (ref 10–15)
FASTING STATUS PATIENT QL REPORTED: NO
FERRITIN SERPL-MCNC: 289 NG/ML (ref 8–252)
FOLATE SERPL-MCNC: 12.7 NG/ML
GFR SERPL CREATININE-BSD FRML MDRD: 56 ML/MIN/1.73M2
GLUCOSE BLD-MCNC: 94 MG/DL (ref 70–99)
HCT VFR BLD AUTO: 36.8 % (ref 35–47)
HDLC SERPL-MCNC: 106 MG/DL
HGB BLD-MCNC: 11.8 G/DL (ref 11.7–15.7)
IMM GRANULOCYTES # BLD: 0 10E3/UL
IMM GRANULOCYTES NFR BLD: 0 %
IRON SATN MFR SERPL: 37 % (ref 15–46)
IRON SERPL-MCNC: 103 UG/DL (ref 35–180)
LDLC SERPL CALC-MCNC: 43 MG/DL
LYMPHOCYTES # BLD AUTO: 1.1 10E3/UL (ref 0.8–5.3)
LYMPHOCYTES NFR BLD AUTO: 17 %
MCH RBC QN AUTO: 31.8 PG (ref 26.5–33)
MCHC RBC AUTO-ENTMCNC: 32.1 G/DL (ref 31.5–36.5)
MCV RBC AUTO: 99 FL (ref 78–100)
MONOCYTES # BLD AUTO: 0.7 10E3/UL (ref 0–1.3)
MONOCYTES NFR BLD AUTO: 11 %
NEUTROPHILS # BLD AUTO: 4.4 10E3/UL (ref 1.6–8.3)
NEUTROPHILS NFR BLD AUTO: 67 %
NONHDLC SERPL-MCNC: 74 MG/DL
NRBC # BLD AUTO: 0 10E3/UL
NRBC BLD AUTO-RTO: 0 /100
PLATELET # BLD AUTO: 231 10E3/UL (ref 150–450)
POTASSIUM BLD-SCNC: 4.2 MMOL/L (ref 3.4–5.3)
PROT SERPL-MCNC: 7.9 G/DL (ref 6.8–8.8)
RBC # BLD AUTO: 3.71 10E6/UL (ref 3.8–5.2)
SODIUM SERPL-SCNC: 134 MMOL/L (ref 133–144)
TIBC SERPL-MCNC: 280 UG/DL (ref 240–430)
TRIGL SERPL-MCNC: 154 MG/DL
VIT B12 SERPL-MCNC: 369 PG/ML (ref 193–986)
WBC # BLD AUTO: 6.5 10E3/UL (ref 4–11)

## 2022-03-16 PROCEDURE — 80061 LIPID PANEL: CPT | Performed by: PATHOLOGY

## 2022-03-16 PROCEDURE — 96372 THER/PROPH/DIAG INJ SC/IM: CPT | Mod: 50 | Performed by: STUDENT IN AN ORGANIZED HEALTH CARE EDUCATION/TRAINING PROGRAM

## 2022-03-16 PROCEDURE — 99207 PR DROP WITH A PROCEDURE: CPT | Performed by: STUDENT IN AN ORGANIZED HEALTH CARE EDUCATION/TRAINING PROGRAM

## 2022-03-16 PROCEDURE — G0439 PPPS, SUBSEQ VISIT: HCPCS | Performed by: INTERNAL MEDICINE

## 2022-03-16 PROCEDURE — 99000 SPECIMEN HANDLING OFFICE-LAB: CPT | Performed by: PATHOLOGY

## 2022-03-16 PROCEDURE — 82607 VITAMIN B-12: CPT | Performed by: PATHOLOGY

## 2022-03-16 PROCEDURE — 84238 ASSAY NONENDOCRINE RECEPTOR: CPT | Mod: 90 | Performed by: PATHOLOGY

## 2022-03-16 PROCEDURE — 36415 COLL VENOUS BLD VENIPUNCTURE: CPT | Performed by: PATHOLOGY

## 2022-03-16 PROCEDURE — 80053 COMPREHEN METABOLIC PANEL: CPT | Performed by: PATHOLOGY

## 2022-03-16 PROCEDURE — 85025 COMPLETE CBC W/AUTO DIFF WBC: CPT | Performed by: PATHOLOGY

## 2022-03-16 PROCEDURE — 82728 ASSAY OF FERRITIN: CPT | Performed by: PATHOLOGY

## 2022-03-16 PROCEDURE — 82746 ASSAY OF FOLIC ACID SERUM: CPT | Performed by: INTERNAL MEDICINE

## 2022-03-16 PROCEDURE — 83550 IRON BINDING TEST: CPT | Performed by: PATHOLOGY

## 2022-03-16 ASSESSMENT — PAIN SCALES - GENERAL: PAINLEVEL: MODERATE PAIN (5)

## 2022-03-16 NOTE — LETTER
3/16/2022      RE: Kimberly Chau  [address] [address]       [facility]  Sports Medicine Clinic  Clinics and Surgery Center             SUBJECTIVE       Kimberly Chau is a 80 year old female with a PMH of osteoporosis presenting to clinic today for repeat evenity injection, #10/12 today.      Tolerated her previous Evenity injections without complication.       -Vitamin D supplementation 2000IU  -Continues calcium supplementation and increased calcium from food sources.     PMH, Medications and Allergies were reviewed and are unchanged   ROS:  As noted above otherwise negative.           Patient Active Problem List   Diagnosis     Intrinsic atopic dermatitis     Essential hypertension     Rheumatoid arthritis (H)     Retinal artery occlusion     Cervical vertebral fracture (H)     Central retinal artery occlusion of right eye     Bilateral occipital neuralgia     C1-C2 instability     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 factor (H)     Osteoarthritis     Malignant hypertension     Hyponatremia     GERD (gastroesophageal reflux disease)     Eczematous dermatitis     Anxiety state     Anemia     Closed fracture of distal end of left radius with delayed healing, unspecified fracture morphology, subsequent encounter     Closed fracture of distal end of left radius, unspecified fracture morphology, sequela     Osteomyelitis of left leg (H)         Current Outpatient Prescriptions               Current Outpatient Medications   Medication Sig Dispense Refill     acetaminophen 650 MG TABS Take 650 mg by mouth every 4 hours as needed. 100 tablet 0     amLODIPine (NORVASC) 10 MG tablet Take 1 tablet (10 mg) by mouth daily (Patient not taking: Reported on 1/11/2021) 90 tablet 0     COMPRESSION STOCKINGS Please measure and distribute 1 pair of 30mmHg - 40mmHg knee high ULCERCARE stockings (Patient not taking: Reported on 3/17/2021) 2 each 4     COMPRESSION  "STOCKINGS Please measure and distribute 1 pair of 20mmHg - 30mmHg Thigh high open or closed toe compression stockings. Jobst ultrasheer or equivalent. (Patient not taking: Reported on 3/17/2021) 2 each 4     COMPRESSION STOCKINGS Please measure and distribute 1 pair of 20mmHg - 30mmHg knee high open or closed toe compression stockings. Jobst ultrasheer or equivalent. 2 each 4     Diphenhydramine-APAP, sleep, (TYLENOL PM EXTRA STRENGTH PO) Take 2 tablets by mouth At Bedtime          ferrous gluconate (FERGON) 324 (38 Fe) MG tablet Take 324 mg by mouth         Gauze Pads & Dressings (TELFA NON-ADHERENT) 3\"X4\" PADS Cut to size for open areas on the lower extremities and secure with paper tape. Change dressing every other day. 30 each 11     metoprolol tartrate (LOPRESSOR) 100 MG tablet Take 2 tablets (200 mg) by mouth 2 times daily 360 tablet 1     Multiple Vitamin (MULTI-VITAMIN) per tablet Take 1 tablet by mouth every morning          mupirocin (BACTROBAN) 2 % external ointment Apply to open areas with dressing changes (Patient not taking: Reported on 2/22/2021) 60 g 11     Omeprazole (PRILOSEC PO) Take 20 mg by mouth as needed          sodium chloride (OCEAN NASAL SPRAY) 0.65 % nasal spray Spray 1 spray into both nostrils daily as needed for congestion 1 Bottle 3     traMADol (ULTRAM) 50 MG tablet Take 1-2 tablets ( mg) by mouth every 6 hours as needed for severe pain (Patient not taking: Reported on 3/17/2021) 120 tablet 0     triamcinolone (KENALOG) 0.1 % external cream Apply twice daily for 2 weeks then 3 times weekly for 2 week, repeat cycle as needed (Patient not taking: Reported on 3/11/2021) 454 g 0                 OBJECTIVE:       Gen:  Well nourished and in no acute distress  .  Psych: Euthymic.                 ASSESSMENT and PLAN:      Diagnoses and all orders for this visit:     Age-related osteoporosis with current pathological fracture with routine healing, subsequent encounter  - "     romosozumab-aqqg (EVENITY) injection 210 mg  -     Injection given today in bilateral anterior thighs subcutaneously without complication  - reviewed dates of previous injections and she is on 10/12 and her last injections will be in May     - Evenity 10/12 shots given today  - Continue improved calcium intake (diets plus supplements)  - Continue 2000 international unit(s) vitamin D daily  - follow up in 1 month for repeat evenity #11/12     Evenity Injection # 10 of 12     The risks and benefits of the medication were re-reviewed and she agreed to proceed.  Prepackage Evenity was obtained from the [facility].  The skin was prepped with an alcohol swab. Two syringes prefilled with Evenity solution (105 mg per syringe) were injected subcutaneously into the right and left anterior thighs.  Bandaids were placed.  The patient tolerated the injections well. She did not experience any adverse effects.         Alley Collazo MD, CAQ, FACSM, FAMSSM, CCD  [facility]  Sports Medicine and Bone Health  Team Physician;  Athletics

## 2022-03-16 NOTE — PATIENT INSTRUCTIONS
To schedule a 24 hour BP monitor, please call [phone].       Thank you for visiting the Primary Care Center today at the [facility]! The following is some information about our clinic:     Primary Care Center Frequently-Asked Questions    (1) How do I schedule appointments at the [facility]?     Primary Care--to schedule or make changes to an existing appointment, please call our primary care line at [phone].    Labs--to schedule a lab appointment at the [facility] you can use [employer] or call [phone]. If you have a [place] location that is closer to home, you can reach out to that location for scheduling options.     Imaging--if you need to schedule a CT, X-ray, MRI, ultrasound, or other imaging study you can call [phone] to schedule at the [facility] or any other [facility] imaging location.     Referrals--if a referral to another specialty was ordered you can expect a phone call from their scheduling team. If you have not heard from them in a week, please call us or send us a [employer] message to check the status or get a scheduling number. Please note that this only applies to internal [facility] referrals. If the referral is external you would need to contact their office for scheduling.     (2) I have a question about my visit, who do I contact?     You can call us at the primary care line at [phone] to ask questions about your visit. You can also send a secure message through [employer], which is reviewed by clinic staff. Please note that [employer] messages have a twenty-four to forty-eight business hour turnaround time and should not be used for urgent concerns.    (3) How will I get the results of my tests?    If you are signed up for [facility] all tests will be released to you within twenty-four hours of resulting. Please allow three to five days for your doctor to review your results and place a note interpreting  the results. If you do not have [employer] you will receive your results through mail seven to ten business days following the return of the tests. Please note that if there should be any urgent or concerning results that your doctor or their registered nurse will reach out to you the same day as the tests come back. If you have follow up questions about your results or would like to discuss the results in detail please schedule a follow up with your provider either in person or virtually.     (4) How do I get refills of my prescriptions?     You should always first contact your pharmacy for refills of your medications. If submitting a refill request on [employer], please be sure to submit the request only once--repeat requests can cause delays in refill. If you are requesting a NEW medication or a medication related to new symptoms you will need to schedule an appointment with a provider prior to approval. Please note: Routine medication refills have up to one to three business day turnaround whereas controlled substances refills have up to five to seven business day turnaround.    (5) I have new symptoms, what do I do?     If you are having an immediate medical emergency, you should dial 911 for assistance.   For anything urgent that needs to be seen within a few hours to one day you should visit a local urgent care for assistance.  For non-urgent symptoms that need to be seen within a few days to a week you can schedule with an available provider in primary care by going to [employer] or calling [phone].   If you are not sure how serious your symptoms are or you would like to receive medical advice you can always call [phone] to speak with a triage nurse.

## 2022-03-16 NOTE — PROGRESS NOTES
Medicare Annual Wellness Visit Previsit note    This 81 year old year old female presents for a  Medicare Wellness Exam.    What is your marital status:    Who lives in your household? My  and me and 2 dogs    Does your home have loose rugs in the hallway?   No    Does your home have grab bars in the bathroom?   No    Does your home have handrails on the stairs?   Yes     Does your home have poorly lit areas?  No    How many times have you fallen in the past year? 0    How many times have you been in the Emergency Room in the last year? 0     How many times have you been hospitalized in the last year? 0    Do you feel threatened or controlled by a partner, ex-partner or anyone in your life? No    Has anyone hurt you physically, for example by pushing, hitting, slapping or kicking you   or forcing you to have sex? No    Are you sexually active? No     If yes, with men, women, or both? n/a    If yes, do you have more than one current partner? N/A    If yes, are you using condoms? N/A     Have you had any sexually transmitted infections in the last year? No    Do you have any sexual concerns? No       FOR WOMEN ONLY:  1. What year did you stop having periods (approximate age)? 37    2.   Any vaginal bleeding in the last year? No    3.   Have you ever had an abnormal Pap smear? No        Do you need help with the phone, transportation, shopping, preparing meals, housework, laundry, medications or managing money? No     Have you noticed any hearing difficulties? No    Do you wear hearing aids? No    Have you seen a hearing professional such as an audiologist in the past year? No    Do you have vision difficulties? Yes    Do you wear glasses or contacts? Yes    Have you seen an eye doctor in the past year? Yes     How many servings of fruits and vegetables do you eat a day (1 serving is 1 cup)? 1 cup    How often do you exercise in a week? Climbing stairs    What kind of exercise do you do and how long? All  day    Do you wear a seatbelt when driving or riding in a vehicle? Yes    Do you use tobacco?  No    Do you use e-cigarettes?  No    Do you use any other drugs? No         Do you drink alcohol? Yes    If you drink alcohol, how many drinks per week? 1 wine with dinner      PHQ-2: Over the past 2 weeks, how often have you been bothered by any of the following problems?  1) Little interest or pleasure in doing things: 0    2) Feeling down, depressed, or hopeless: 0    Total = 0    Have you completed an Advance Directives document? Yes     If yes, have you given a copy to the clinic? Yes     Would you like information on Advance Directives today? No      JOSELO Whipple at 1:10 PM on 3/16/2022.

## 2022-03-16 NOTE — NURSING NOTE
[address] [address]  Dept: [phone]  ______________________________________________________________________________    Patient: Kimberly Chau   : 1940   MRN: [id number]   2022    INVASIVE PROCEDURE SAFETY CHECKLIST    Date: 3/16/22   Procedure:Bilateral subcutaneous Evenity injections  Patient Name: Kimberly Chau  MRN: [id number]  YOB: 1940    Action: Complete sections as appropriate. Any discrepancy results in a HARD COPY until resolved.     PRE PROCEDURE:  Patient ID verified with 2 identifiers (name and  or MRN): Yes  Procedure and site verified with patient/designee (when able): Yes  Accurate consent documentation in medical record: Yes  H&P (or appropriate assessment) documented in medical record: Yes  H&P must be up to 20 days prior to procedure and updates within 24 hours of procedure as applicable: NA  Relevant diagnostic and radiology test results appropriately labeled and displayed as applicable: Yes  Procedure site(s) marked with provider initials: NA    TIMEOUT:  Time-Out performed immediately prior to starting procedure, including verbal and active participation of all team members addressing the following:Yes  * Correct patient identify  * Confirmed that the correct side and site are marked  * An accurate procedure consent form  * Agreement on the procedure to be done  * Correct patient position  * Relevant images and results are properly labeled and appropriately displayed  * The need to administer antibiotics or fluids for irrigation purposes during the procedure as applicable   * Safety precautions based on patient history or medication use    DURING PROCEDURE: Verification of correct person, site, and procedures any time the responsibility for care of the patient is transferred to another member of the care team.       Prior to injection, verified patient identity using patient's name and date  of birth.  Due to injection administration, patient instructed to remain in clinic for 15 minutes  afterwards, and to report any adverse reaction to me immediately.    Bilateral subcutaneous evenity injectinos    Drug Amount Wasted:  None.  Vial/Syringe: Syringe  Expiration Date:  12/01/2023      Ela Amador, ATC  March 16, 2022

## 2022-03-16 NOTE — PROGRESS NOTES
HPI;    Ms. Chau comes in for a physical today. Overall doing well. She does not need to see Rheumatology because she has no sxs. No joint pain or swelling She had bone graft surgery L wrist in the past. No sinus complaints. No skin complaints. She does not smoke nor abuse EtOH. No other HEENT, cardiopulmonary, abdominal, , GYN, neurological, systemic, psychiatric, lymphatic, endocrine, vascular complaints.     Past Medical History:   Diagnosis Date     Anemia      Arthritis      Cataracts      Central retinal artery occlusion      Cervical spine fracture (H)     After a fall from orthostatic sx     Chronic pain     Back of head     Gastro-oesophageal reflux disease      Head injury      Hypertension      Hyponatremia     Resolved, 2/2 diuretics     Migraines      Osteoporosis      Pneumonia 2018     Rheumatoid arthritis(714.0)      Past Surgical History:   Procedure Laterality Date     COLONOSCOPY       EYE SURGERY Left 02/2019    Hole in macula     FUSION CERVICAL POSTERIOR THREE+ LEVELS  1/22/2013    Procedure: FUSION CERVICAL POSTERIOR THREE+ LEVELS;  Cervical 1-6 Posterior Instrumentation and Fusion, Cervical 3-4 Laminectomy  .Instrumentation and fusion to Cervical 6 *Latex Allergy*;  Surgeon: Ra Bar MD;  Location: [facility] OR     GYN SURGERY       HEAD & NECK SURGERY       HYSTERECTOMY       IR ENDOVENOUS ABLATION VARICOSE VEINS  2/16/2021     KNEE SURGERY       NECK SURGERY       OPEN REDUCTION INTERNAL FIXATION WRIST Left 4/30/2019    Procedure: Open Reduction Internal Fixation Left Distal Radius Fracture;  Surgeon: Dinh Strong MD;  [facility] OR     OPEN REDUCTION INTERNAL FIXATION WRIST Left 6/12/2020    Procedure: OPEN REDUCTION INTERNAL FIXATION Left Distal Radius Nonunion, Distal Ulna Resection;  Surgeon: Dinh Strong MD;  Location: [facility] OR     OPTICAL TRACKING SYSTEM ENDOSCOPIC SINUS SURGERY Right 4/6/2018    Procedure: OPTICAL TRACKING SYSTEM ENDOSCOPIC SINUS SURGERY;   Stealth Assisted Right Maxillary Antrostomy, Anterior Ethmoidectomy And Frontal Sinusotomy;  Surgeon: Elyse Eisenberg MD;  Location:  OR     OPTICAL TRACKING SYSTEM ENDOSCOPIC SINUS SURGERY Right 8/3/2018    Procedure: OPTICAL TRACKING SYSTEM ENDOSCOPIC SINUS SURGERY;  Stealth Assisted Revision Right Frontal Sinusotomy, Right Stent Placement  **Latex Allergy** ;  Surgeon: Elyse Eisenberg MD;  Location: [facility] OR     ORTHOPEDIC SURGERY       REMOVE HARDWARE WRIST Left 11/19/2019    Procedure: Left Distal Radius Hardware Removal, Flexor Pollicus Longus Repair, Deep Cultures;  Surgeon: Dinh Strong MD;  Location:  OR     [facility] HAND/FINGER SURGERY UNLISTED       [facility] PELVIS/HIP JOINT SURGERY UNLISTED       PE:    Vitals noted, gen, nad, cooperative, alert, neck supple nl rom, lungs with good air movement, RRR, S1, S2, no MRG, abdomen, positive bowel sounds, no acute findings. Grossly normal neurological exam. She has B hand arthritic changes.     A/P:    1. Immunizations; COVID Pfizer vaccine x 3. She has completed the Shingrix vaccine series. Influenza vaccine 12/17/2021. Pneumococcal 23 done 6/25/2011.  2. Orthopedic appt. With Dr. Collazo today 3/16/2022. Last visit 2/12/2022. Osteoporosis   3. HTN; on Amlodipine and Metoprolol. Quite elevated today She is intolerant to HcTz and ACE-I. Ordered 24 hour BP monitor today 3/16/2022. If elevated would switch from Metoprolol to Coreg.   4. Previous anemia (anemia of chronic disease?). Ordered labs today   5. Ordered lipids today  6. RA; she has followed with outside Rheumatology in the past; on Medication.  She was on Methotrexate in the past.   7. See IR note from Dr. Jain regarding venous stasis ulcers/venous insufficiency treatment 3/17/2021  8. Dermatology visit with Dr. Orourke 2/22/2021

## 2022-03-17 NOTE — PROGRESS NOTES
[facility]  [facility]             SUBJECTIVE       Kimberly Chau is a 80 year old female with a PMH of osteoporosis presenting to clinic today for repeat evenity injection, #10/12 today.      Tolerated her previous Evenity injections without complication.       -Vitamin D supplementation 2000IU  -Continues calcium supplementation and increased calcium from food sources.     PMH, Medications and Allergies were reviewed and are unchanged   ROS:  As noted above otherwise negative.           Patient Active Problem List   Diagnosis     Intrinsic atopic dermatitis     Essential hypertension     Rheumatoid arthritis (H)     Retinal artery occlusion     Cervical vertebral fracture (H)     Central retinal artery occlusion of right eye     Bilateral occipital neuralgia     C1-C2 instability     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 factor (H)     Osteoarthritis     Malignant hypertension     Hyponatremia     GERD (gastroesophageal reflux disease)     Eczematous dermatitis     Anxiety state     Anemia     Closed fracture of distal end of left radius with delayed healing, unspecified fracture morphology, subsequent encounter     Closed fracture of distal end of left radius, unspecified fracture morphology, sequela     Osteomyelitis of left leg (H)         Current Outpatient Prescriptions               Current Outpatient Medications   Medication Sig Dispense Refill     acetaminophen 650 MG TABS Take 650 mg by mouth every 4 hours as needed. 100 tablet 0     amLODIPine (NORVASC) 10 MG tablet Take 1 tablet (10 mg) by mouth daily (Patient not taking: Reported on 1/11/2021) 90 tablet 0     COMPRESSION STOCKINGS Please measure and distribute 1 pair of 30mmHg - 40mmHg knee high ULCERCARE stockings (Patient not taking: Reported on 3/17/2021) 2 each 4     COMPRESSION STOCKINGS Please measure and distribute 1 pair of 20mmHg - 30mmHg Thigh high open or closed  "toe compression stockings. Jobst ultrasheer or equivalent. (Patient not taking: Reported on 3/17/2021) 2 each 4     COMPRESSION STOCKINGS Please measure and distribute 1 pair of 20mmHg - 30mmHg knee high open or closed toe compression stockings. Jobst ultrasheer or equivalent. 2 each 4     Diphenhydramine-APAP, sleep, (TYLENOL PM EXTRA STRENGTH PO) Take 2 tablets by mouth At Bedtime          ferrous gluconate (FERGON) 324 (38 Fe) MG tablet Take 324 mg by mouth         Gauze Pads & Dressings (TELFA NON-ADHERENT) 3\"X4\" PADS Cut to size for open areas on the lower extremities and secure with paper tape. Change dressing every other day. 30 each 11     metoprolol tartrate (LOPRESSOR) 100 MG tablet Take 2 tablets (200 mg) by mouth 2 times daily 360 tablet 1     Multiple Vitamin (MULTI-VITAMIN) per tablet Take 1 tablet by mouth every morning          mupirocin (BACTROBAN) 2 % external ointment Apply to open areas with dressing changes (Patient not taking: Reported on 2/22/2021) 60 g 11     Omeprazole (PRILOSEC PO) Take 20 mg by mouth as needed          sodium chloride (OCEAN NASAL SPRAY) 0.65 % nasal spray Spray 1 spray into both nostrils daily as needed for congestion 1 Bottle 3     traMADol (ULTRAM) 50 MG tablet Take 1-2 tablets ( mg) by mouth every 6 hours as needed for severe pain (Patient not taking: Reported on 3/17/2021) 120 tablet 0     triamcinolone (KENALOG) 0.1 % external cream Apply twice daily for 2 weeks then 3 times weekly for 2 week, repeat cycle as needed (Patient not taking: Reported on 3/11/2021) 454 g 0                 OBJECTIVE:       Gen:  Well nourished and in no acute distress  .  Psych: Euthymic.                 ASSESSMENT and PLAN:      Diagnoses and all orders for this visit:     Age-related osteoporosis with current pathological fracture with routine healing, subsequent encounter  -     romosozumab-aqqg (EVENITY) injection 210 mg  -     Injection given today in bilateral anterior thighs " subcutaneously without complication  - reviewed dates of previous injections and she is on 10/12 and her last injections will be in May     - Evenity 10/12 shots given today  - Continue improved calcium intake (diets plus supplements)  - Continue 2000 international unit(s) vitamin D daily  - follow up in 1 month for repeat evenity #11/12     Evenity Injection # 10 of 12     The risks and benefits of the medication were re-reviewed and she agreed to proceed.  Prepackage Evenity was obtained from the [facility].  The skin was prepped with an alcohol swab. Two syringes prefilled with Evenity solution (105 mg per syringe) were injected subcutaneously into the right and left anterior thighs.  Bandaids were placed.  The patient tolerated the injections well. She did not experience any adverse effects.         Alley Collazo MD, CAQ, FACSM, FAMSSM, CCD  [facility]  Sports Medicine and Bone Health  Team Physician;  Athletics

## 2022-03-19 LAB — STFR SERPL-MCNC: 2.8 MG/L

## 2022-03-25 ENCOUNTER — ANCILLARY PROCEDURE (OUTPATIENT)
Dept: CARDIOLOGY | Facility: CLINIC | Age: 82
End: 2022-03-25
Attending: INTERNAL MEDICINE
Payer: MEDICARE

## 2022-03-25 DIAGNOSIS — R03.0 ELEVATED BLOOD-PRESSURE READING, WITHOUT DIAGNOSIS OF HYPERTENSION: ICD-10-CM

## 2022-03-25 DIAGNOSIS — D64.9 ANEMIA, UNSPECIFIED TYPE: ICD-10-CM

## 2022-03-25 DIAGNOSIS — I10 BENIGN ESSENTIAL HYPERTENSION: ICD-10-CM

## 2022-03-25 PROCEDURE — 93784 AMBL BP MNTR W/SOFTWARE: CPT | Performed by: INTERNAL MEDICINE

## 2022-04-15 ENCOUNTER — TELEPHONE (OUTPATIENT)
Dept: INTERNAL MEDICINE | Facility: CLINIC | Age: 82
End: 2022-04-15
Payer: MEDICARE

## 2022-04-15 DIAGNOSIS — I10 BENIGN ESSENTIAL HYPERTENSION: Primary | ICD-10-CM

## 2022-04-15 NOTE — TELEPHONE ENCOUNTER
Dear Guillermo;    Please see the results note I sent to Ms. Chau. Her 24 hour BP is elevated with overall /89. She is on 10 mg Amlodipine and 200 mg Metoprolol (for a long period). Her last EKG 7/8/2020 with HR 78. What about switching her Coreg? And what might be an appropriate conversion?    Thanks, Good Olivaresr Kimberly;    Your overall blood pressure is elevated 167/89 on the 24 hour blood pressure monitor. I am going to confer with our clinical pharmacist Guillermo Monique to change your medications and follow up. We will contact you.      RADHA Castellano MD

## 2022-04-16 RX ORDER — CARVEDILOL 12.5 MG/1
12.5 TABLET ORAL 2 TIMES DAILY WITH MEALS
Qty: 60 TABLET | Refills: 1 | COMMUNITY
Start: 2022-04-16 | End: 2022-04-18

## 2022-04-16 NOTE — TELEPHONE ENCOUNTER
Dear Guillermo;    Thanks for the follow up.    I'll switch her over to Coreg 12.5 BID and follow up.    RADHA Castellano        Dear Coretta;    Please see note from Guillermo Monique    (1) Discontinue Metoprolol    (2) Start Coreg 12.5 mg BID (ordered historically this encounter)    (3) I would like to see her in person in about 3 weeks    Thanks, RADHA Castellano    Pt called and discussed and gave her her 24hr B/P results. Discussed changing her medications. RTC May 17th fot f/u with Dr Castellano.  Coretta Simeon RN 11:24 AM on 4/18/2022.

## 2022-04-17 DIAGNOSIS — I10 ESSENTIAL HYPERTENSION: ICD-10-CM

## 2022-04-18 RX ORDER — CARVEDILOL 12.5 MG/1
12.5 TABLET ORAL 2 TIMES DAILY WITH MEALS
Qty: 60 TABLET | Refills: 1 | Status: SHIPPED | OUTPATIENT
Start: 2022-04-18 | End: 2022-06-13

## 2022-04-18 NOTE — TELEPHONE ENCOUNTER
Dear Guillermo;     Thanks for the follow up.     I'll switch her over to Coreg 12.5 BID and follow up.     RADHA Castellano           Dear Coretta;     Please see note from Guillermo Monique     (1) Discontinue Metoprolol     (2) Start Coreg 12.5 mg BID (ordered historically this encounter)    (3) I would like to see her in person in about 3 weeks     Thanks, RADHA Castellano

## 2022-04-20 RX ORDER — METOPROLOL TARTRATE 100 MG
200 TABLET ORAL 2 TIMES DAILY
Qty: 360 TABLET | Refills: 0 | OUTPATIENT
Start: 2022-04-20

## 2022-04-23 ENCOUNTER — OFFICE VISIT (OUTPATIENT)
Dept: ORTHOPEDICS | Facility: CLINIC | Age: 82
End: 2022-04-23
Payer: MEDICARE

## 2022-04-23 VITALS — BODY MASS INDEX: 23.16 KG/M2 | HEIGHT: 60 IN | WEIGHT: 118 LBS

## 2022-04-23 DIAGNOSIS — S22.000A COMPRESSION FRACTURE OF BODY OF THORACIC VERTEBRA (H): ICD-10-CM

## 2022-04-23 DIAGNOSIS — M80.00XD AGE-RELATED OSTEOPOROSIS WITH CURRENT PATHOLOGICAL FRACTURE WITH ROUTINE HEALING, SUBSEQUENT ENCOUNTER: ICD-10-CM

## 2022-04-23 DIAGNOSIS — M80.00XA OSTEOPOROSIS WITH CURRENT PATHOLOGICAL FRACTURE, UNSPECIFIED OSTEOPOROSIS TYPE, INITIAL ENCOUNTER: Primary | ICD-10-CM

## 2022-04-23 PROCEDURE — 99207 PR DROP WITH A PROCEDURE: CPT | Performed by: FAMILY MEDICINE

## 2022-04-23 PROCEDURE — 96372 THER/PROPH/DIAG INJ SC/IM: CPT | Mod: RT | Performed by: FAMILY MEDICINE

## 2022-04-23 NOTE — LETTER
4/23/2022      RE: Kimberly Chau  [id number]   [place] [address]     Dear Colleague,    Thank you for referring your patient, Kimberly Chau, to the [facility]. Please see a copy of my visit note below.      Assessment & Plan     Osteoporosis with current pathological fracture, unspecified osteoporosis type, initial encounter  Evenity No. 11 today  Follow-up with Dr. Collazo at the end of May for her 12th injection and I put in orders for repeat DEXA at the end of the month of June    Radiology number was given  - romosozumab-aqqg (EVENITY) injection 210 mg  - Dexa hip/pelvis/spine*; Future    Age-related osteoporosis with current pathological fracture with routine healing, subsequent encounter    - romosozumab-aqqg (EVENITY) injection 210 mg  - Dexa hip/pelvis/spine*; Future    Compression fracture of body of thoracic vertebra (H)    - romosozumab-aqqg (EVENITY) injection 210 mg  - Dexa hip/pelvis/spine*; Future                 Return in about 4 weeks (around 5/21/2022), or if symptoms worsen or fail to improve.    Kerri Lux MD  [facility]    Subjective   Kimberly is a 81 year old who presents for the following health issues  accompanied by her self.    HPI     Pt is a 82 yo white female with PMHx of osteoporosis presenting for Evenity #11/12 today.  - taking vit D supplements  - calcium from food, checked labs and Ok recently  -3/16/22- calcium 8.5, Cr 1.00    Review of Systems   Constitutional, HEENT, cardiovascular, pulmonary, gi and gu systems are negative, except as otherwise noted.      Objective    Ht 1.524 m (5')   Wt 53.5 kg (118 lb)   BMI 23.05 kg/m    Body mass index is 23.05 kg/m .  Physical Exam   NAD  nonlabored breathing        Risk and benefit of Evenity were discussed and the patient is on her 11th injection without significant side effect.  Consent was signed.  Prepackaged Evenity was  obtained from the [facility] pharmacy.  ChloraPrep was used to clean the area.  Ethyl chloride trial on the right thigh.  Evenity solution 0.5 mg/syringe were injected subcutaneously into the right and left anterior thighs.  Band-Aids were placed and the patient tolerated the injections.      Again, thank you for allowing me to participate in the care of your patient.      Sincerely,    Kerri Lux MD

## 2022-04-23 NOTE — NURSING NOTE
[address] [address]  Dept: [phone]  ______________________________________________________________________________    Patient: Kimberly Chau   : 1940   MRN: [id number]   2022    INVASIVE PROCEDURE SAFETY CHECKLIST    Date: 22   Procedure:Bilateral subcutaneous Evenity injections  Patient Name: Kimberly Chau  MRN: [id number]  YOB: 1940    Action: Complete sections as appropriate. Any discrepancy results in a HARD COPY until resolved.     PRE PROCEDURE:  Patient ID verified with 2 identifiers (name and  or MRN): Yes  Procedure and site verified with patient/designee (when able): Yes  Accurate consent documentation in medical record: Yes  H&P (or appropriate assessment) documented in medical record: Yes  H&P must be up to 20 days prior to procedure and updates within 24 hours of procedure as applicable: NA  Relevant diagnostic and radiology test results appropriately labeled and displayed as applicable: Yes  Procedure site(s) marked with provider initials: NA    TIMEOUT:  Time-Out performed immediately prior to starting procedure, including verbal and active participation of all team members addressing the following:Yes  * Correct patient identify  * Confirmed that the correct side and site are marked  * An accurate procedure consent form  * Agreement on the procedure to be done  * Correct patient position  * Relevant images and results are properly labeled and appropriately displayed  * The need to administer antibiotics or fluids for irrigation purposes during the procedure as applicable   * Safety precautions based on patient history or medication use    DURING PROCEDURE: Verification of correct person, site, and procedures any time the responsibility for care of the patient is transferred to another member of the care team.       Prior to injection, verified patient identity using patient's name and date  of birth.  Due to injection administration, patient instructed to remain in clinic for 15 minutes  afterwards, and to report any adverse reaction to me immediately.    evenity    Drug Amount Wasted:  None.  Vial/Syringe: Syringe  Expiration Date:  04/01/2024      Ela Amador, ATC  April 23, 2022

## 2022-04-23 NOTE — PROGRESS NOTES
Assessment & Plan     Osteoporosis with current pathological fracture, unspecified osteoporosis type, initial encounter  Evenity No. 11 today  Follow-up with Dr. Collazo at the end of May for her 12th injection and I put in orders for repeat DEXA at the end of the month of June    Radiology number was given  - romosozumab-aqqg (EVENITY) injection 210 mg  - Dexa hip/pelvis/spine*; Future    Age-related osteoporosis with current pathological fracture with routine healing, subsequent encounter    - romosozumab-aqqg (EVENITY) injection 210 mg  - Dexa hip/pelvis/spine*; Future    Compression fracture of body of thoracic vertebra (H)    - romosozumab-aqqg (EVENITY) injection 210 mg  - Dexa hip/pelvis/spine*; Future                 Return in about 4 weeks (around 5/21/2022), or if symptoms worsen or fail to improve.    Kerri Lux MD  [facility] [place]    Rola Harris is a 81 year old who presents for the following health issues  accompanied by her self.    HPI     Pt is a 80 yo white female with PMHx of osteoporosis presenting for Evenity #11/12 today.  - taking vit D supplements  - calcium from food, checked labs and Ok recently  -3/16/22- calcium 8.5, Cr 1.00    Review of Systems   Constitutional, HEENT, cardiovascular, pulmonary, gi and gu systems are negative, except as otherwise noted.      Objective    Ht 1.524 m (5')   Wt 53.5 kg (118 lb)   BMI 23.05 kg/m    Body mass index is 23.05 kg/m .  Physical Exam   NAD  nonlabored breathing              Risk and benefit of Evenity were discussed and the patient is on her 11th injection without significant side effect.  Consent was signed.  [facility]  ChloraPrep was used to clean the area.  Ethyl chloride trial on the right thigh.  Evenity solution 0.5 mg/syringe were injected subcutaneously into the right and left anterior thighs.  Band-Aids were placed and the patient  tolerated the injections.

## 2022-05-17 ENCOUNTER — OFFICE VISIT (OUTPATIENT)
Dept: INTERNAL MEDICINE | Facility: CLINIC | Age: 82
End: 2022-05-17
Payer: MEDICARE

## 2022-05-17 VITALS
BODY MASS INDEX: 23.16 KG/M2 | HEIGHT: 60 IN | RESPIRATION RATE: 12 BRPM | SYSTOLIC BLOOD PRESSURE: 175 MMHG | DIASTOLIC BLOOD PRESSURE: 96 MMHG | TEMPERATURE: 97.6 F | HEART RATE: 87 BPM | WEIGHT: 118 LBS | OXYGEN SATURATION: 98 %

## 2022-05-17 DIAGNOSIS — I10 BENIGN ESSENTIAL HYPERTENSION: Primary | ICD-10-CM

## 2022-05-17 DIAGNOSIS — Z23 ENCOUNTER FOR IMMUNIZATION: ICD-10-CM

## 2022-05-17 LAB
ATRIAL RATE - MUSE: 147 BPM
DIASTOLIC BLOOD PRESSURE - MUSE: NORMAL MMHG
INTERPRETATION ECG - MUSE: NORMAL
P AXIS - MUSE: NORMAL DEGREES
PR INTERVAL - MUSE: NORMAL MS
QRS DURATION - MUSE: 68 MS
QT - MUSE: 338 MS
QTC - MUSE: 463 MS
R AXIS - MUSE: -32 DEGREES
SYSTOLIC BLOOD PRESSURE - MUSE: NORMAL MMHG
T AXIS - MUSE: 6 DEGREES
VENTRICULAR RATE- MUSE: 113 BPM

## 2022-05-17 PROCEDURE — 93010 ELECTROCARDIOGRAM REPORT: CPT | Performed by: INTERNAL MEDICINE

## 2022-05-17 PROCEDURE — 99214 OFFICE O/P EST MOD 30 MIN: CPT | Mod: 25 | Performed by: INTERNAL MEDICINE

## 2022-05-17 PROCEDURE — G0009 ADMIN PNEUMOCOCCAL VACCINE: HCPCS | Performed by: INTERNAL MEDICINE

## 2022-05-17 PROCEDURE — 90677 PCV20 VACCINE IM: CPT | Performed by: INTERNAL MEDICINE

## 2022-05-17 RX ORDER — AMLODIPINE BESYLATE 5 MG/1
5 TABLET ORAL DAILY
Qty: 90 TABLET | Refills: 1 | Status: SHIPPED | OUTPATIENT
Start: 2022-05-17 | End: 2022-05-17

## 2022-05-17 ASSESSMENT — PAIN SCALES - GENERAL: PAINLEVEL: NO PAIN (0)

## 2022-05-17 NOTE — NURSING NOTE
Chief Complaint   Patient presents with     Hypertension     Patient comes in to discuss hypertension.         Derrick Costello MA on 5/17/2022 at 10:26 AM

## 2022-05-17 NOTE — PROGRESS NOTES
Kimberly Chau received the Prevnar 20 immunization today in clinic at the request of Dr. Guillermo Castellano. The immunization was given under the supervision of Dr. Guillermo Castellano if assistance was needed. The immunization site was cleaned with an alcohol prep wipe. The immunization was given without incident--see immunization list for administration details. No swelling or redness was observed at the site of injection after the immunization was given. The patient was advised to remain in the exam room while her ECG was being evaluated in the cardiology department.     JOSELO Blankenship at 11:16 AM on 5/17/2022

## 2022-05-17 NOTE — PROGRESS NOTES
HPI:    Last visit with us 3/16/2022. Overall doing well. She does feels she does not need to see Rheumatology for joint complaints. She is not   Past Medical History:   Diagnosis Date     Anemia      Arthritis      Cataracts      Central retinal artery occlusion      Cervical spine fracture (H)     After a fall from orthostatic sx     Chronic pain     Back of head     Gastro-oesophageal reflux disease      Head injury      Hypertension      Hyponatremia     Resolved, 2/2 diuretics     Migraines      Osteoporosis      Pneumonia 2018     Rheumatoid arthritis(714.0)      Past Surgical History:   Procedure Laterality Date     COLONOSCOPY       EYE SURGERY Left 02/2019    Hole in macula     FUSION CERVICAL POSTERIOR THREE+ LEVELS  1/22/2013    Procedure: FUSION CERVICAL POSTERIOR THREE+ LEVELS;  Cervical 1-6 Posterior Instrumentation and Fusion, Cervical 3-4 Laminectomy  .Instrumentation and fusion to Cervical 6 *Latex Allergy*;  Surgeon: Ra Bar MD;  Location: [facility] OR     GYN SURGERY       HEAD & NECK SURGERY       HYSTERECTOMY       IR ENDOVENOUS ABLATION VARICOSE VEINS  2/16/2021     KNEE SURGERY       NECK SURGERY       OPEN REDUCTION INTERNAL FIXATION WRIST Left 4/30/2019    Procedure: Open Reduction Internal Fixation Left Distal Radius Fracture;  Surgeon: Dinh Strong MD;  Location:  OR     OPEN REDUCTION INTERNAL FIXATION WRIST Left 6/12/2020    Procedure: OPEN REDUCTION INTERNAL FIXATION Left Distal Radius Nonunion, Distal Ulna Resection;  Surgeon: Dinh Strong MD;  Location:  OR     OPTICAL TRACKING SYSTEM ENDOSCOPIC SINUS SURGERY Right 4/6/2018    Procedure: OPTICAL TRACKING SYSTEM ENDOSCOPIC SINUS SURGERY;  Stealth Assisted Right Maxillary Antrostomy, Anterior Ethmoidectomy And Frontal Sinusotomy;  Surgeon: Elyse Eisenberg MD;  Location:  OR     OPTICAL TRACKING SYSTEM ENDOSCOPIC SINUS SURGERY Right 8/3/2018    Procedure: OPTICAL TRACKING SYSTEM ENDOSCOPIC SINUS SURGERY;   Stealth Assisted Revision Right Frontal Sinusotomy, Right Stent Placement  **Latex Allergy** ;  Surgeon: Elyse Eisenberg MD;  Location: [facility] OR     ORTHOPEDIC SURGERY       REMOVE HARDWARE WRIST Left 11/19/2019    Procedure: Left Distal Radius Hardware Removal, Flexor Pollicus Longus Repair, Deep Cultures;  Surgeon: Dinh Strong MD;  Location:  OR     [facility] HAND/FINGER SURGERY UNLISTED       [facility] PELVIS/HIP JOINT SURGERY UNLISTED       PE:    Vitals noted, gen, nad, cooperative, alert, neck supple nl rom, lungs with good air movement, RRR to irregular and/or extra systole, no acute abdominal findings. Grossly normal neurological exam.     EKG: SR with PAC's and possibly 2nd AV block (type 1)? Reviewed with Cardiology. Compared to previous 6/8/2020    A/P:    1. Immunizations; COVID Pfizer vaccine x 3. She has completed the shingrix vaccine series. Tdap 3/10/2014. Prevnar 20 today, 5/17/2022  2. Lipids; 3/16/2022 TG's 154, LDL 43, and   3. HTN; on Carvedilol 12.5 BID, Off Amlodipine for unclear reasons? B LE swelling. Plan to restart lower dose (5 mg) and I will see her next week 5/25/2022 to follow up on BP and see if she has any side effects from amlodipine.  She is intolerant to HcTz and ACE-I. 24 hour BP monitor done 3/25/2022 elevated at 167/89  4. RA; she used to follow outside Rheumatology. She does not feel she has any current sxs. And is not on medication.   5. Chronic mild anemia; normal 3/16/2022 and normal iron studies, B12 and folate. Elevated ferritin (inflammatory?)  6. Seen Dr. Lux, Orthopedics 4/23/2022 for low bone density. See Dr. Collazo's orthopedic note from 3/16/2022. She has follow up 5/25/2022  7. She declines further mammograms   8. EKG findings. Reviewed with Cardiology and ordered resting echo and Ziopatch monitor and will see her back to review results.     30 minutes spent on the date of the encounter doing chart review, history and exam, documentation and further  "activities as noted above \"exclusive of procedures and other billable interpretations  "

## 2022-05-25 ENCOUNTER — OFFICE VISIT (OUTPATIENT)
Dept: ORTHOPEDICS | Facility: CLINIC | Age: 82
End: 2022-05-25
Payer: MEDICARE

## 2022-05-25 VITALS — BODY MASS INDEX: 23.16 KG/M2 | HEIGHT: 60 IN | WEIGHT: 118 LBS

## 2022-05-25 DIAGNOSIS — M06.9 RHEUMATOID ARTHRITIS OF WRIST, UNSPECIFIED LATERALITY, UNSPECIFIED WHETHER RHEUMATOID FACTOR PRESENT (H): ICD-10-CM

## 2022-05-25 DIAGNOSIS — M81.0 AGE-RELATED OSTEOPOROSIS WITHOUT CURRENT PATHOLOGICAL FRACTURE: ICD-10-CM

## 2022-05-25 DIAGNOSIS — M80.00XA OSTEOPOROSIS WITH CURRENT PATHOLOGICAL FRACTURE, UNSPECIFIED OSTEOPOROSIS TYPE, INITIAL ENCOUNTER: Primary | ICD-10-CM

## 2022-05-25 DIAGNOSIS — M80.00XD AGE-RELATED OSTEOPOROSIS WITH CURRENT PATHOLOGICAL FRACTURE WITH ROUTINE HEALING, SUBSEQUENT ENCOUNTER: ICD-10-CM

## 2022-05-25 PROCEDURE — 96372 THER/PROPH/DIAG INJ SC/IM: CPT | Mod: 50 | Performed by: FAMILY MEDICINE

## 2022-05-25 PROCEDURE — 99207 PR DROP WITH A PROCEDURE: CPT | Performed by: FAMILY MEDICINE

## 2022-05-25 NOTE — NURSING NOTE
[address] [address]  Dept: [phone]  ______________________________________________________________________________    Patient: Kimberly Chau   : 1940   MRN: [id number]   May 25, 2022    INVASIVE PROCEDURE SAFETY CHECKLIST    Date: 22   Procedure:Bilateral subcutaneous evenity injections   Patient Name: Kimberly Chau  MRN: [id number]  YOB: 1940    Action: Complete sections as appropriate. Any discrepancy results in a HARD COPY until resolved.     PRE PROCEDURE:  Patient ID verified with 2 identifiers (name and  or MRN): Yes  Procedure and site verified with patient/designee (when able): Yes  Accurate consent documentation in medical record: Yes  H&P (or appropriate assessment) documented in medical record: Yes  H&P must be up to 20 days prior to procedure and updates within 24 hours of procedure as applicable: NA  Relevant diagnostic and radiology test results appropriately labeled and displayed as applicable: Yes  Procedure site(s) marked with provider initials: NA    TIMEOUT:  Time-Out performed immediately prior to starting procedure, including verbal and active participation of all team members addressing the following:Yes  * Correct patient identify  * Confirmed that the correct side and site are marked  * An accurate procedure consent form  * Agreement on the procedure to be done  * Correct patient position  * Relevant images and results are properly labeled and appropriately displayed  * The need to administer antibiotics or fluids for irrigation purposes during the procedure as applicable   * Safety precautions based on patient history or medication use    DURING PROCEDURE: Verification of correct person, site, and procedures any time the responsibility for care of the patient is transferred to another member of the care team.       Prior to injection, verified patient identity using patient's name and  date of birth.  Due to injection administration, patient instructed to remain in clinic for 15 minutes  afterwards, and to report any adverse reaction to me immediately.    Evenity injections    Drug Amount Wasted:  None.  Vial/Syringe: Syringe  Expiration Date:  04/01/2024        Ela Amador, ATC  May 25, 2022

## 2022-05-25 NOTE — LETTER
5/25/2022      RE: Kimberly Chau  [address]   [place] [address]     Dear Colleague,    Thank you for referring your patient, Kimberly Chau, to the [facility] [place]. Please see a copy of my visit note below.    Kimberly Chau is a 80 year old female with a PMH of osteoporosis presenting to clinic today for repeat evenity injection, #12/12 today.      Tolerated her previous Evenity injections without complication.       -Vitamin D supplementation 2000IU  -Continues calcium supplementation and increased calcium from food sources.     PMH, Medications and Allergies were reviewed and are unchanged       ROS:  As noted above otherwise negative.           Patient Active Problem List   Diagnosis     Intrinsic atopic dermatitis     Essential hypertension     Rheumatoid arthritis (H)     Retinal artery occlusion     Cervical vertebral fracture (H)     Central retinal artery occlusion of right eye     Bilateral occipital neuralgia     C1-C2 instability     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 factor (H)     Osteoarthritis     Malignant hypertension     Hyponatremia     GERD (gastroesophageal reflux disease)     Eczematous dermatitis     Anxiety state     Anemia     Closed fracture of distal end of left radius with delayed healing, unspecified fracture morphology, subsequent encounter     Closed fracture of distal end of left radius, unspecified fracture morphology, sequela     Osteomyelitis of left leg (H)         Current Outpatient Prescriptions               Current Outpatient Medications   Medication Sig Dispense Refill     acetaminophen 650 MG TABS Take 650 mg by mouth every 4 hours as needed. 100 tablet 0     amLODIPine (NORVASC) 10 MG tablet Take 1 tablet (10 mg) by mouth daily (Patient not taking: Reported on 1/11/2021) 90 tablet 0     COMPRESSION STOCKINGS Please measure and distribute 1 pair of 30mmHg - 40mmHg knee high ULCERCARE stockings  "(Patient not taking: Reported on 3/17/2021) 2 each 4     COMPRESSION STOCKINGS Please measure and distribute 1 pair of 20mmHg - 30mmHg Thigh high open or closed toe compression stockings. Jobst ultrasheer or equivalent. (Patient not taking: Reported on 3/17/2021) 2 each 4     COMPRESSION STOCKINGS Please measure and distribute 1 pair of 20mmHg - 30mmHg knee high open or closed toe compression stockings. Jobst ultrasheer or equivalent. 2 each 4     Diphenhydramine-APAP, sleep, (TYLENOL PM EXTRA STRENGTH PO) Take 2 tablets by mouth At Bedtime          ferrous gluconate (FERGON) 324 (38 Fe) MG tablet Take 324 mg by mouth         Gauze Pads & Dressings (TELFA NON-ADHERENT) 3\"X4\" PADS Cut to size for open areas on the lower extremities and secure with paper tape. Change dressing every other day. 30 each 11     metoprolol tartrate (LOPRESSOR) 100 MG tablet Take 2 tablets (200 mg) by mouth 2 times daily 360 tablet 1     Multiple Vitamin (MULTI-VITAMIN) per tablet Take 1 tablet by mouth every morning          mupirocin (BACTROBAN) 2 % external ointment Apply to open areas with dressing changes (Patient not taking: Reported on 2/22/2021) 60 g 11     Omeprazole (PRILOSEC PO) Take 20 mg by mouth as needed          sodium chloride (OCEAN NASAL SPRAY) 0.65 % nasal spray Spray 1 spray into both nostrils daily as needed for congestion 1 Bottle 3     traMADol (ULTRAM) 50 MG tablet Take 1-2 tablets ( mg) by mouth every 6 hours as needed for severe pain (Patient not taking: Reported on 3/17/2021) 120 tablet 0     triamcinolone (KENALOG) 0.1 % external cream Apply twice daily for 2 weeks then 3 times weekly for 2 week, repeat cycle as needed (Patient not taking: Reported on 3/11/2021) 454 g 0                 OBJECTIVE:       Ht 1.524 m (5')   Wt 53.5 kg (118 lb)   BMI 23.05 kg/m      Gen:  Well nourished and in no acute distress  .  Psych: Euthymic.                 ASSESSMENT and PLAN:      Diagnoses and all orders for this " visit:     Age-related osteoporosis with current pathological fracture with routine healing, subsequent encounter  -     romosozumab-aqqg (EVENITY) injection 210 mg  -     Injection given today in bilateral anterior thighs subcutaneously without complication  - reviewed dates of previous injections and she is on 12/12 and has completed the treatment. -   -Updated DXA in one month  -RTC to review results and determine next treatment which will likely be Reclast.   - Continue improved calcium intake (diets plus supplements)  - Continue 2000 international unit(s) vitamin D daily       Evenity Injection # 12 of 12     The risks and benefits of the medication were re-reviewed and she agreed to proceed.  Prepackage Evenity was obtained from the [facility].  The skin was prepped with an alcohol swab. Two syringes prefilled with Evenity solution (105 mg per syringe) were injected subcutaneously into the right and left anterior thighs.  Bandaids were placed.  The patient tolerated the injections well. She did not experience any adverse effects.          Alley Collazo MD, CAQ, FACSM, FAMSSM, CCD  [facility]  Sports Medicine and Bone Health  Team Physician;  Athletics

## 2022-05-25 NOTE — PROGRESS NOTES
Kimberly Chau is a 80 year old female with a PMH of osteoporosis presenting to clinic today for repeat evenity injection, #12/12 today.      Tolerated her previous Evenity injections without complication.       -Vitamin D supplementation 2000IU  -Continues calcium supplementation and increased calcium from food sources.     PMH, Medications and Allergies were reviewed and are unchanged       ROS:  As noted above otherwise negative.           Patient Active Problem List   Diagnosis     Intrinsic atopic dermatitis     Essential hypertension     Rheumatoid arthritis (H)     Retinal artery occlusion     Cervical vertebral fracture (H)     Central retinal artery occlusion of right eye     Bilateral occipital neuralgia     C1-C2 instability     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 factor (H)     Osteoarthritis     Malignant hypertension     Hyponatremia     GERD (gastroesophageal reflux disease)     Eczematous dermatitis     Anxiety state     Anemia     Closed fracture of distal end of left radius with delayed healing, unspecified fracture morphology, subsequent encounter     Closed fracture of distal end of left radius, unspecified fracture morphology, sequela     Osteomyelitis of left leg (H)         Current Outpatient Prescriptions               Current Outpatient Medications   Medication Sig Dispense Refill     acetaminophen 650 MG TABS Take 650 mg by mouth every 4 hours as needed. 100 tablet 0     amLODIPine (NORVASC) 10 MG tablet Take 1 tablet (10 mg) by mouth daily (Patient not taking: Reported on 1/11/2021) 90 tablet 0     COMPRESSION STOCKINGS Please measure and distribute 1 pair of 30mmHg - 40mmHg knee high ULCERCARE stockings (Patient not taking: Reported on 3/17/2021) 2 each 4     COMPRESSION STOCKINGS Please measure and distribute 1 pair of 20mmHg - 30mmHg Thigh high open or closed toe compression stockings. Jobst ultrasheer or equivalent. (Patient not taking: Reported on  "3/17/2021) 2 each 4     COMPRESSION STOCKINGS Please measure and distribute 1 pair of 20mmHg - 30mmHg knee high open or closed toe compression stockings. Jobst ultrasheer or equivalent. 2 each 4     Diphenhydramine-APAP, sleep, (TYLENOL PM EXTRA STRENGTH PO) Take 2 tablets by mouth At Bedtime          ferrous gluconate (FERGON) 324 (38 Fe) MG tablet Take 324 mg by mouth         Gauze Pads & Dressings (TELFA NON-ADHERENT) 3\"X4\" PADS Cut to size for open areas on the lower extremities and secure with paper tape. Change dressing every other day. 30 each 11     metoprolol tartrate (LOPRESSOR) 100 MG tablet Take 2 tablets (200 mg) by mouth 2 times daily 360 tablet 1     Multiple Vitamin (MULTI-VITAMIN) per tablet Take 1 tablet by mouth every morning          mupirocin (BACTROBAN) 2 % external ointment Apply to open areas with dressing changes (Patient not taking: Reported on 2/22/2021) 60 g 11     Omeprazole (PRILOSEC PO) Take 20 mg by mouth as needed          sodium chloride (OCEAN NASAL SPRAY) 0.65 % nasal spray Spray 1 spray into both nostrils daily as needed for congestion 1 Bottle 3     traMADol (ULTRAM) 50 MG tablet Take 1-2 tablets ( mg) by mouth every 6 hours as needed for severe pain (Patient not taking: Reported on 3/17/2021) 120 tablet 0     triamcinolone (KENALOG) 0.1 % external cream Apply twice daily for 2 weeks then 3 times weekly for 2 week, repeat cycle as needed (Patient not taking: Reported on 3/11/2021) 454 g 0                 OBJECTIVE:       Ht 1.524 m (5')   Wt 53.5 kg (118 lb)   BMI 23.05 kg/m      Gen:  Well nourished and in no acute distress  .  Psych: Euthymic.                 ASSESSMENT and PLAN:      Diagnoses and all orders for this visit:     Age-related osteoporosis with current pathological fracture with routine healing, subsequent encounter  -     romosozumab-aqqg (EVENITY) injection 210 mg  -     Injection given today in bilateral anterior thighs subcutaneously without " complication  - reviewed dates of previous injections and she is on 12/12 and has completed the treatment. -   -Updated DXA in one month  -RTC to review results and determine next treatment which will likely be Reclast.   - Continue improved calcium intake (diets plus supplements)  - Continue 2000 international unit(s) vitamin D daily       Evenity Injection # 12 of 12     The risks and benefits of the medication were re-reviewed and she agreed to proceed.  Prepackage Evenity was obtained from the [facility].  The skin was prepped with an alcohol swab. Two syringes prefilled with Evenity solution (105 mg per syringe) were injected subcutaneously into the right and left anterior thighs.  Bandaids were placed.  The patient tolerated the injections well. She did not experience any adverse effects.          Alley Collazo MD, CAQ, FACSM, FAMSSM, CCD  [facility]  Sports Medicine and Bone Health  Team Physician;  Athletics

## 2022-05-26 DIAGNOSIS — I10 ESSENTIAL HYPERTENSION: Primary | ICD-10-CM

## 2022-05-26 NOTE — TELEPHONE ENCOUNTER
[facility] Call Center    Phone Message    May a detailed message be left on voicemail: yes     Reason for Call: Medication Question or concern regarding medication   Prescription Clarification  Name of Medication: blood pressure medication  Prescribing Provider: Dr. Castellano   Pharmacy: [facility]   What on the order needs clarification? Pt called and stated that Dr. Castellano put the pt on a blood pressure medication, but it was not at the pharmacy when the pt went to pick it up          Action Taken: Message routed to:  [facility] ([facility]): kayla

## 2022-05-27 RX ORDER — AMLODIPINE BESYLATE 5 MG/1
5 TABLET ORAL DAILY
Qty: 90 TABLET | Refills: 1 | Status: SHIPPED | OUTPATIENT
Start: 2022-05-27 | End: 2023-04-10

## 2022-05-27 RX ORDER — AMLODIPINE BESYLATE 5 MG/1
5 TABLET ORAL DAILY
Qty: 90 TABLET | Refills: 3 | Status: SHIPPED | OUTPATIENT
Start: 2022-05-27 | End: 2023-12-19

## 2022-06-10 DIAGNOSIS — I10 BENIGN ESSENTIAL HYPERTENSION: ICD-10-CM

## 2022-06-13 RX ORDER — CARVEDILOL 12.5 MG/1
12.5 TABLET ORAL 2 TIMES DAILY WITH MEALS
Qty: 60 TABLET | Refills: 0 | Status: SHIPPED | OUTPATIENT
Start: 2022-06-13 | End: 2022-07-13

## 2022-06-13 NOTE — TELEPHONE ENCOUNTER
Carvedilol Oral Tablet 12.5 MG  Last Written Prescription Date:   4/18/2022  Last Fill Quantity: 60,   # refills: 1  Last Office Visit :  5/17/2022  Future Office visit:  7/11/2022  60 Tabs sent to pharm for Pt care until visit in July.     Cristal Farooq RN  Central Triage Red Flags/Med Refills

## 2022-07-01 ENCOUNTER — ANCILLARY PROCEDURE (OUTPATIENT)
Dept: CARDIOLOGY | Facility: CLINIC | Age: 82
End: 2022-07-01
Attending: INTERNAL MEDICINE
Payer: MEDICARE

## 2022-07-01 DIAGNOSIS — Z23 ENCOUNTER FOR IMMUNIZATION: ICD-10-CM

## 2022-07-01 DIAGNOSIS — I10 BENIGN ESSENTIAL HYPERTENSION: ICD-10-CM

## 2022-07-01 LAB — LVEF ECHO: NORMAL

## 2022-07-01 PROCEDURE — 93244 EXT ECG>48HR<7D REV&INTERPJ: CPT | Performed by: INTERNAL MEDICINE

## 2022-07-01 PROCEDURE — 93306 TTE W/DOPPLER COMPLETE: CPT | Performed by: INTERNAL MEDICINE

## 2022-07-01 PROCEDURE — 93225 XTRNL ECG REC<48 HRS REC: CPT

## 2022-07-01 NOTE — PROGRESS NOTES
"Per Dr. Castellano, patient to have 2 day Zio monitor placed.  Diagnosis: benign essential hypertension  Monitor placed: {YES / NO:774378::\"Yes\"}  Patient Instructed: {YES / NO:878782::\"Yes\"}  Patient verbalized understanding: {YES / NO:786585::\"Yes\"}  Holter # [id number]  Jm Veloz"

## 2022-07-08 ENCOUNTER — ANCILLARY PROCEDURE (OUTPATIENT)
Dept: BONE DENSITY | Facility: CLINIC | Age: 82
End: 2022-07-08
Attending: FAMILY MEDICINE
Payer: MEDICARE

## 2022-07-08 ENCOUNTER — LAB (OUTPATIENT)
Dept: LAB | Facility: CLINIC | Age: 82
End: 2022-07-08
Payer: MEDICARE

## 2022-07-08 ENCOUNTER — OFFICE VISIT (OUTPATIENT)
Dept: ORTHOPEDICS | Facility: CLINIC | Age: 82
End: 2022-07-08
Payer: MEDICARE

## 2022-07-08 VITALS — WEIGHT: 118 LBS | HEIGHT: 60 IN | BODY MASS INDEX: 23.16 KG/M2

## 2022-07-08 DIAGNOSIS — M81.0 AGE-RELATED OSTEOPOROSIS WITHOUT CURRENT PATHOLOGICAL FRACTURE: ICD-10-CM

## 2022-07-08 DIAGNOSIS — M06.9 RHEUMATOID ARTHRITIS OF WRIST, UNSPECIFIED LATERALITY, UNSPECIFIED WHETHER RHEUMATOID FACTOR PRESENT (H): ICD-10-CM

## 2022-07-08 DIAGNOSIS — M80.00XA OSTEOPOROSIS WITH PATHOLOGICAL FRACTURE: ICD-10-CM

## 2022-07-08 DIAGNOSIS — S22.000A COMPRESSION FRACTURE OF BODY OF THORACIC VERTEBRA (H): ICD-10-CM

## 2022-07-08 DIAGNOSIS — M81.0 AGE-RELATED OSTEOPOROSIS WITHOUT CURRENT PATHOLOGICAL FRACTURE: Primary | ICD-10-CM

## 2022-07-08 LAB
ANION GAP SERPL CALCULATED.3IONS-SCNC: 10 MMOL/L (ref 3–14)
BUN SERPL-MCNC: 11 MG/DL (ref 7–30)
CALCIUM SERPL-MCNC: 9.5 MG/DL (ref 8.5–10.1)
CHLORIDE BLD-SCNC: 104 MMOL/L (ref 94–109)
CO2 SERPL-SCNC: 26 MMOL/L (ref 20–32)
CREAT SERPL-MCNC: 1.22 MG/DL (ref 0.52–1.04)
DEPRECATED CALCIDIOL+CALCIFEROL SERPL-MC: 64 UG/L (ref 20–75)
GFR SERPL CREATININE-BSD FRML MDRD: 44 ML/MIN/1.73M2
GLUCOSE BLD-MCNC: 97 MG/DL (ref 70–99)
MAGNESIUM SERPL-MCNC: 2 MG/DL (ref 1.6–2.3)
PHOSPHATE SERPL-MCNC: 3.4 MG/DL (ref 2.5–4.5)
POTASSIUM BLD-SCNC: 4.3 MMOL/L (ref 3.4–5.3)
SODIUM SERPL-SCNC: 140 MMOL/L (ref 133–144)

## 2022-07-08 PROCEDURE — 83735 ASSAY OF MAGNESIUM: CPT | Performed by: PATHOLOGY

## 2022-07-08 PROCEDURE — 77080 DXA BONE DENSITY AXIAL: CPT

## 2022-07-08 PROCEDURE — 99214 OFFICE O/P EST MOD 30 MIN: CPT | Performed by: FAMILY MEDICINE

## 2022-07-08 PROCEDURE — 82306 VITAMIN D 25 HYDROXY: CPT | Performed by: FAMILY MEDICINE

## 2022-07-08 PROCEDURE — 80048 BASIC METABOLIC PNL TOTAL CA: CPT | Performed by: PATHOLOGY

## 2022-07-08 PROCEDURE — 36415 COLL VENOUS BLD VENIPUNCTURE: CPT | Performed by: PATHOLOGY

## 2022-07-08 PROCEDURE — 84100 ASSAY OF PHOSPHORUS: CPT | Performed by: PATHOLOGY

## 2022-07-08 NOTE — PROGRESS NOTES
SUBJECTIVE:    Kimberly Chau is a 81 year old female here today to disuss osteoporosis treatment following the completion of 1 year of Evenity. She had a f/u DXA this morning.      Past Medical History:   Diagnosis Date     Anemia      Arthritis      Cataracts      Central retinal artery occlusion      Cervical spine fracture (H)     After a fall from orthostatic sx     Chronic pain     Back of head     Gastro-oesophageal reflux disease      Head injury      Hypertension      Hyponatremia     Resolved, 2/2 diuretics     Migraines      Osteoporosis      Pneumonia 2018     Rheumatoid arthritis(714.0)        Past Surgical History:   Procedure Laterality Date     COLONOSCOPY       EYE SURGERY Left 02/2019    Hole in macula     FUSION CERVICAL POSTERIOR THREE+ LEVELS  1/22/2013    Procedure: FUSION CERVICAL POSTERIOR THREE+ LEVELS;  Cervical 1-6 Posterior Instrumentation and Fusion, Cervical 3-4 Laminectomy  .Instrumentation and fusion to Cervical 6 *Latex Allergy*;  Surgeon: Ra Bar MD;  Location: [facility] OR     GYN SURGERY       HEAD & NECK SURGERY       HYSTERECTOMY       IR ENDOVENOUS ABLATION VARICOSE VEINS  2/16/2021     KNEE SURGERY       NECK SURGERY       OPEN REDUCTION INTERNAL FIXATION WRIST Left 4/30/2019    Procedure: Open Reduction Internal Fixation Left Distal Radius Fracture;  Surgeon: Dinh Strong MD;  Location:  OR     OPEN REDUCTION INTERNAL FIXATION WRIST Left 6/12/2020    Procedure: OPEN REDUCTION INTERNAL FIXATION Left Distal Radius Nonunion, Distal Ulna Resection;  Surgeon: Dinh Strong MD;  Location:  OR     OPTICAL TRACKING SYSTEM ENDOSCOPIC SINUS SURGERY Right 4/6/2018    Procedure: OPTICAL TRACKING SYSTEM ENDOSCOPIC SINUS SURGERY;  Stealth Assisted Right Maxillary Antrostomy, Anterior Ethmoidectomy And Frontal Sinusotomy;  Surgeon: Elyse Eisenberg MD;  Location:  OR     OPTICAL TRACKING SYSTEM ENDOSCOPIC SINUS SURGERY Right 8/3/2018    Procedure: OPTICAL TRACKING  "SYSTEM ENDOSCOPIC SINUS SURGERY;  Stealth Assisted Revision Right Frontal Sinusotomy, Right Stent Placement  **Latex Allergy** ;  Surgeon: Elyse Eisenberg MD;  Location: [facility] OR     ORTHOPEDIC SURGERY       REMOVE HARDWARE WRIST Left 11/19/2019    Procedure: Left Distal Radius Hardware Removal, Flexor Pollicus Longus Repair, Deep Cultures;  Surgeon: Dinh Strong MD;  Location:  OR     [facility] HAND/FINGER SURGERY UNLISTED       [facility] PELVIS/HIP JOINT SURGERY UNLISTED         Current Outpatient Medications   Medication Sig Dispense Refill     acetaminophen 650 MG TABS Take 650 mg by mouth every 4 hours as needed. 100 tablet 0     amLODIPine (NORVASC) 5 MG tablet Take 1 tablet (5 mg) by mouth daily 90 tablet 3     amLODIPine (NORVASC) 5 MG tablet Take 1 tablet (5 mg) by mouth daily 90 tablet 1     carvedilol (COREG) 12.5 MG tablet Take 1 tablet (12.5 mg) by mouth 2 times daily (with meals) 60 tablet 0     COMPRESSION STOCKINGS Please measure and distribute 1 pair of 20mmHg - 30mmHg knee high open or closed toe compression stockings. Jobst ultrasheer or equivalent. 2 each 4     Diphenhydramine-APAP, sleep, (TYLENOL PM EXTRA STRENGTH PO) Take 2 tablets by mouth At Bedtime        ferrous gluconate (FERGON) 324 (38 Fe) MG tablet Take 324 mg by mouth       Gauze Pads & Dressings (TELFA NON-ADHERENT) 3\"X4\" PADS Cut to size for open areas on the lower extremities and secure with paper tape. Change dressing every other day. 30 each 11     Multiple Vitamin (MULTI-VITAMIN) per tablet Take 1 tablet by mouth every morning        Omeprazole (PRILOSEC PO) Take 20 mg by mouth as needed        sodium chloride (OCEAN NASAL SPRAY) 0.65 % nasal spray Spray 1 spray into both nostrils daily as needed for congestion 1 Bottle 3     triamcinolone (KENALOG) 0.1 % external cream Apply twice daily for 2 weeks then 3 times weekly for 2 week, repeat cycle as needed 454 g 0       Family History   Problem Relation Age of Onset     Arthritis " Mother      Respiratory Mother         pulmonary fibrosis     Hypertension Mother      Anemia Mother      Liver Disease Father         from EtOH     Alcoholism Father      Multiple Sclerosis Sister      No Known Problems Maternal Grandmother      Heart Disease Maternal Grandfather      Breast Cancer Sister      Glaucoma Paternal Grandfather      Heart Disease Paternal Grandfather        DXA:  L1-4 increase 5.3% since 2021 DXA and -0.8 since baseline 2016  T score L1-4 -0.8    Total hip:  Non stat significant increase of 1.0% since 2021 DXA and -10.0 decrease since 2016  T score = -2.4    Lowest T score = -2.9 RIGHT FEMORAL NECK    OBJECTIVE:  Ht 1.524 m (5')   Wt 53.5 kg (118 lb)   BMI 23.05 kg/m        ASSESSMENT:  Severe Osteoporosis (Osteoporosis by DXA and T11 compression insufficiency fracture) s/p 1 year of Evenity treatment with a 5% improvement in her lumbar spine BMD and 1.0 improvement in the total hip since her DXA in May 2021.      PLAN:  1.I have reviewed her DXA with her today.  I am happy that she is showing an improvement in her BMD.  I have explained that some of the lumbar spine improvement can be from increased arthritis in addition to the Evenity.  Her hips are stable.   2.  We have discussed next steps and I have recommended either Prolia or Reclast.  She is a candidate for either.  We discussed these medications.  She would like to try Prolia.  We will put in a PA for this medication.  She is to let us know if she hasn't heard anything in 2 weeks.  I would like her to have some labs today:  BMP, mag, phos and Vit D.  We will review those at her first Prolia injection.  We will need to observe her 30 min following the injection for adverse reactions.      TOTAL TIME= 35 min (chart review 5 min + appt 20 min +documentation 10 min)    Alley Collazo MD, CAQ, CCD  [facility]  Sports Medicine and Bone Health  Team Physician;  Athletics

## 2022-07-08 NOTE — LETTER
7/8/2022      RE: Kimberly Chau  [address] [facility] [address]     Dear Colleague,    Thank you for referring your patient, Kimberly Chau, to the [facility]. Please see a copy of my visit note below.    SUBJECTIVE:    Kimberly Chau is a 81 year old female here today to disuss osteoporosis treatment following the completion of 1 year of Evenity. She had a f/u DXA this morning.      Past Medical History:   Diagnosis Date     Anemia      Arthritis      Cataracts      Central retinal artery occlusion      Cervical spine fracture (H)     After a fall from orthostatic sx     Chronic pain     Back of head     Gastro-oesophageal reflux disease      Head injury      Hypertension      Hyponatremia     Resolved, 2/2 diuretics     Migraines      Osteoporosis      Pneumonia 2018     Rheumatoid arthritis(714.0)        Past Surgical History:   Procedure Laterality Date     COLONOSCOPY       EYE SURGERY Left 02/2019    Hole in macula     FUSION CERVICAL POSTERIOR THREE+ LEVELS  1/22/2013    Procedure: FUSION CERVICAL POSTERIOR THREE+ LEVELS;  Cervical 1-6 Posterior Instrumentation and Fusion, Cervical 3-4 Laminectomy  .Instrumentation and fusion to Cervical 6 *Latex Allergy*;  Surgeon: Ra Bar MD;  Location: [facility]     GYN SURGERY       HEAD & NECK SURGERY       HYSTERECTOMY       IR ENDOVENOUS ABLATION VARICOSE VEINS  2/16/2021     KNEE SURGERY       NECK SURGERY       OPEN REDUCTION INTERNAL FIXATION WRIST Left 4/30/2019    Procedure: Open Reduction Internal Fixation Left Distal Radius Fracture;  Surgeon: Dinh Strong MD;  Location:  OR     OPEN REDUCTION INTERNAL FIXATION WRIST Left 6/12/2020    Procedure: OPEN REDUCTION INTERNAL FIXATION Left Distal Radius Nonunion, Distal Ulna Resection;  Surgeon: Dinh Strong MD;  Location:  OR     OPTICAL TRACKING SYSTEM ENDOSCOPIC SINUS SURGERY Right 4/6/2018    Procedure: OPTICAL TRACKING SYSTEM  "ENDOSCOPIC SINUS SURGERY;  Stealth Assisted Right Maxillary Antrostomy, Anterior Ethmoidectomy And Frontal Sinusotomy;  Surgeon: Elyse Eisenberg MD;  Location:  OR     OPTICAL TRACKING SYSTEM ENDOSCOPIC SINUS SURGERY Right 8/3/2018    Procedure: OPTICAL TRACKING SYSTEM ENDOSCOPIC SINUS SURGERY;  Stealth Assisted Revision Right Frontal Sinusotomy, Right Stent Placement  **Latex Allergy** ;  Surgeon: Elyse Eisenberg MD;  [facility]     ORTHOPEDIC SURGERY       REMOVE HARDWARE WRIST Left 11/19/2019    Procedure: Left Distal Radius Hardware Removal, Flexor Pollicus Longus Repair, Deep Cultures;  Surgeon: Dinh Strong MD;  Location:  OR     [facility] HAND/FINGER SURGERY UNLISTED       [facility] PELVIS/HIP JOINT SURGERY UNLISTED         Current Outpatient Medications   Medication Sig Dispense Refill     acetaminophen 650 MG TABS Take 650 mg by mouth every 4 hours as needed. 100 tablet 0     amLODIPine (NORVASC) 5 MG tablet Take 1 tablet (5 mg) by mouth daily 90 tablet 3     amLODIPine (NORVASC) 5 MG tablet Take 1 tablet (5 mg) by mouth daily 90 tablet 1     carvedilol (COREG) 12.5 MG tablet Take 1 tablet (12.5 mg) by mouth 2 times daily (with meals) 60 tablet 0     COMPRESSION STOCKINGS Please measure and distribute 1 pair of 20mmHg - 30mmHg knee high open or closed toe compression stockings. Jobst ultrasheer or equivalent. 2 each 4     Diphenhydramine-APAP, sleep, (TYLENOL PM EXTRA STRENGTH PO) Take 2 tablets by mouth At Bedtime        ferrous gluconate (FERGON) 324 (38 Fe) MG tablet Take 324 mg by mouth       Gauze Pads & Dressings (TELFA NON-ADHERENT) 3\"X4\" PADS Cut to size for open areas on the lower extremities and secure with paper tape. Change dressing every other day. 30 each 11     Multiple Vitamin (MULTI-VITAMIN) per tablet Take 1 tablet by mouth every morning        Omeprazole (PRILOSEC PO) Take 20 mg by mouth as needed        sodium chloride (OCEAN NASAL SPRAY) 0.65 % nasal spray Spray 1 spray into both " nostrils daily as needed for congestion 1 Bottle 3     triamcinolone (KENALOG) 0.1 % external cream Apply twice daily for 2 weeks then 3 times weekly for 2 week, repeat cycle as needed 454 g 0       Family History   Problem Relation Age of Onset     Arthritis Mother      Respiratory Mother         pulmonary fibrosis     Hypertension Mother      Anemia Mother      Liver Disease Father         from EtOH     Alcoholism Father      Multiple Sclerosis Sister      No Known Problems Maternal Grandmother      Heart Disease Maternal Grandfather      Breast Cancer Sister      Glaucoma Paternal Grandfather      Heart Disease Paternal Grandfather        DXA:  L1-4 increase 5.3% since 2021 DXA and -0.8 since baseline 2016  T score L1-4 -0.8    Total hip:  Non stat significant increase of 1.0% since 2021 DXA and -10.0 decrease since 2016  T score = -2.4    Lowest T score = -2.9 RIGHT FEMORAL NECK    OBJECTIVE:  Ht 1.524 m (5')   Wt 53.5 kg (118 lb)   BMI 23.05 kg/m        ASSESSMENT:  Severe Osteoporosis (Osteoporosis by DXA and T11 compression insufficiency fracture) s/p 1 year of Evenity treatment with a 5% improvement in her lumbar spine BMD and 1.0 improvement in the total hip since her DXA in May 2021.      PLAN:  1.I have reviewed her DXA with her today.  I am happy that she is showing an improvement in her BMD.  I have explained that some of the lumbar spine improvement can be from increased arthritis in addition to the Evenity.  Her hips are stable.   2.  We have discussed next steps and I have recommended either Prolia or Reclast.  She is a candidate for either.  We discussed these medications.  She would like to try Prolia.  We will put in a PA for this medication.  She is to let us know if she hasn't heard anything in 2 weeks.  I would like her to have some labs today:  BMP, mag, phos and Vit D.  We will review those at her first Prolia injection.  We will need to observe her 30 min following the injection for  adverse reactions.      TOTAL TIME= 35 min (chart review 5 min + appt 20 min +documentation 10 min)    Alley Collazo MD, CAQ, CCD  [facility]  Sports Medicine and Bone Health  Team Physician;  Athletics

## 2022-07-10 DIAGNOSIS — I10 BENIGN ESSENTIAL HYPERTENSION: ICD-10-CM

## 2022-07-10 NOTE — PROGRESS NOTES
HPI:    She is with her  today. Overall doing quite well. She denies any HEENT, cardiopulmonary, abdominal, , GYN, neurological, systemic, psychiatric, lymphatic endocrine, vascular complaints. She has not had any palpitations. No CP/SOB. Very infrequent palpitations currently.     Past Medical History:   Diagnosis Date     Anemia      Arthritis      Cataracts      Central retinal artery occlusion      Cervical spine fracture (H)     After a fall from orthostatic sx     Chronic pain     Back of head     Gastro-oesophageal reflux disease      Head injury      Hypertension      Hyponatremia     Resolved, 2/2 diuretics     Migraines      Osteoporosis      Pneumonia 2018     Rheumatoid arthritis(714.0)      Past Surgical History:   Procedure Laterality Date     COLONOSCOPY       EYE SURGERY Left 02/2019    Hole in macula     FUSION CERVICAL POSTERIOR THREE+ LEVELS  1/22/2013    Procedure: FUSION CERVICAL POSTERIOR THREE+ LEVELS;  Cervical 1-6 Posterior Instrumentation and Fusion, Cervical 3-4 Laminectomy  .Instrumentation and fusion to Cervical 6 *Latex Allergy*;  Surgeon: Ra Bar MD;  [facility]     GYN SURGERY       HEAD & NECK SURGERY       HYSTERECTOMY       IR ENDOVENOUS ABLATION VARICOSE VEINS  2/16/2021     KNEE SURGERY       NECK SURGERY       OPEN REDUCTION INTERNAL FIXATION WRIST Left 4/30/2019    Procedure: Open Reduction Internal Fixation Left Distal Radius Fracture;  Surgeon: Dinh Strong MD;  Location:  OR     OPEN REDUCTION INTERNAL FIXATION WRIST Left 6/12/2020    Procedure: OPEN REDUCTION INTERNAL FIXATION Left Distal Radius Nonunion, Distal Ulna Resection;  Surgeon: Dinh Strong MD;  Location: [facility] OR     OPTICAL TRACKING SYSTEM ENDOSCOPIC SINUS SURGERY Right 4/6/2018    Procedure: OPTICAL TRACKING SYSTEM ENDOSCOPIC SINUS SURGERY;  Stealth Assisted Right Maxillary Antrostomy, Anterior Ethmoidectomy And Frontal Sinusotomy;  Surgeon: Elyse Eisenberg MD;   [facility]     OPTICAL TRACKING SYSTEM ENDOSCOPIC SINUS SURGERY Right 8/3/2018    Procedure: OPTICAL TRACKING SYSTEM ENDOSCOPIC SINUS SURGERY;  Stealth Assisted Revision Right Frontal Sinusotomy, Right Stent Placement  **Latex Allergy** ;  Surgeon: Elyse Eisenberg MD;  Location[facility]     ORTHOPEDIC SURGERY       REMOVE HARDWARE WRIST Left 11/19/2019    Procedure: Left Distal Radius Hardware Removal, Flexor Pollicus Longus Repair, Deep Cultures;  Surgeon: Dinh Strong MD;  Location:  OR     [facility] HAND/FINGER SURGERY UNLISTED       [facility] PELVIS/HIP JOINT SURGERY UNLISTED       PE:    Vitals noted, gen, nad, cooperative, alert, neck supple nl rom, lungs with good air movement, RRR, S1, S2, no MRG, abdomen, no acute findings. Grossly normal neurological exam.     Resting cardiac echo 7/1/2022:  Procedure  Echocardiogram with two-dimensional, color and spectral Doppler performed.  Good quality two-dimensional was performed and interpreted. The patient's  rhythm is normal sinus.    Interpretation Summary  Global and regional left ventricular function is normal with an EF of 60-65%.  Right ventricular function, chamber size, wall motion, and thickness are  normal.  No significant valvular abnormalities were noted.  This study was compared with the study from 2/7/18: No significant changes  noted.  Left Ventricle  Global and regional left ventricular function is normal with an EF of 60-65%.  Left ventricular size is normal. Left ventricular wall thickness is normal.  Left ventricular diastolic function is indeterminate.     Right Ventricle  Right ventricular function, chamber size, wall motion, and thickness are  normal.     Atria  Both atria appear normal. The atrial septum is intact as assessed by color  Doppler .     Mitral Valve  Mild mitral annular calcification is present.     Aortic Valve  Trileaflet aortic sclerosis without stenosis. On Doppler interrogation, there  is no significant stenosis or  regurgitation.     Tricuspid Valve  The tricuspid valve is normal.     Pulmonic Valve  The valve leaflets are not well visualized. On Doppler interrogation, there is  no significant stenosis or regurgitation.     Vessels  The aorta root is normal. The thoracic aorta is normal. The pulmonary artery  cannot be assessed. The inferior vena cava was normal in size with preserved  respiratory variability. IVC diameter <2.1 cm collapsing >50% with sniff  suggests a normal RA pressure of 3 mmHg.     Pericardium  No pericardial effusion is present.     Compared to Previous Study  This study was compared with the study from 2/7/18 . No significant changes  noted.    A/P:    1. Immunizations; COVID Pfizer vaccine x 3. She has completed the shingrix vaccine series. Tdap 3/10/2014. Prevnar 20  5/17/2022  2. Lipids; 3/16/2022 TG's 154, LDL 43, and   3. HTN; on Carvedilol 12.5 BID. Plan to restart lower dose (5 mg) She is intolerant to HcTz and ACE-I. 24 hour BP monitor done 3/25/2022 elevated at 167/89  4. RA; she used to follow outside Rheumatology. She does not feel she has any current sxs. And is not on medication.   5. Chronic mild anemia; normal 3/16/2022 and normal iron studies, B12 and folate. Elevated ferritin (inflammatory?)  6. Seen Dr. Lux, Orthopedics 4/23/2022 for low bone density. See Dr. Collazo's orthopedic note from 3/16/2022. She had follow up 5/25/2022 and 7/8/2022. Vitamin D level 64 on 7/8/2022. DEXA scan 7/8/2022 most negative T -2.8  7. She declines further mammograms   8. EKG findings from 5/17/2022. Reviewed with Cardiology; Resting echo done 7/1/2022 and Zio patch 7/1/2022 with runs of nonsustained SVT; no significant sxs. Recommended if worse or more persistent, recommend she visit with Cardiology.   9. No dermatology concerns and does not to be seen.        30 minutes spent on the date of the encounter doing chart review, history and exam, documentation and further activities as noted above  exclusive of procedures and other billable interpretations

## 2022-07-11 ENCOUNTER — OFFICE VISIT (OUTPATIENT)
Dept: INTERNAL MEDICINE | Facility: CLINIC | Age: 82
End: 2022-07-11
Payer: MEDICARE

## 2022-07-11 VITALS
SYSTOLIC BLOOD PRESSURE: 146 MMHG | DIASTOLIC BLOOD PRESSURE: 78 MMHG | OXYGEN SATURATION: 97 % | HEIGHT: 60 IN | RESPIRATION RATE: 16 BRPM | HEART RATE: 94 BPM | BODY MASS INDEX: 23.05 KG/M2

## 2022-07-11 DIAGNOSIS — R00.2 PALPITATIONS: Primary | ICD-10-CM

## 2022-07-11 PROCEDURE — 99214 OFFICE O/P EST MOD 30 MIN: CPT | Performed by: INTERNAL MEDICINE

## 2022-07-11 NOTE — NURSING NOTE
Chief Complaint   Patient presents with     Recheck Medication     Hypertension       JOSELO Whipple at 2:19 PM on 7/11/2022.

## 2022-07-13 RX ORDER — CARVEDILOL 12.5 MG/1
12.5 TABLET ORAL 2 TIMES DAILY WITH MEALS
Qty: 180 TABLET | Refills: 3 | Status: SHIPPED | OUTPATIENT
Start: 2022-07-13 | End: 2023-07-13

## 2022-07-13 NOTE — TELEPHONE ENCOUNTER
Carvedilol Oral Tablet 12.5 MG      Take 1 tablet (12.5 mg) by mouth 2 times daily (with meals)  Last Written Prescription Date:  6-13-22  Last Fill Quantity: 60,   # refills: 0  Last Office Visit : 7-11-22  Future Office visit:  none      Last clinic note Plan:  3. HTN; on Carvedilol 12.5 BID. Plan to restart lower dose (5 mg) She is intolerant to HcTz and ACE-I.   24 hour BP monitor done 3/25/2022 elevated at 167/89      BP Readings from Last 3 Encounters:   07/11/22 (!) 146/78   05/17/22 (!) 175/96   03/16/22 (!) 176/101     Routing refill request to provider for review/approval because:  Last clinic note does not match current directions  BP above protocol parameters

## 2022-08-01 ENCOUNTER — TELEPHONE (OUTPATIENT)
Dept: ORTHOPEDICS | Facility: CLINIC | Age: 82
End: 2022-08-01

## 2022-08-01 NOTE — TELEPHONE ENCOUNTER
I called the patient to inform her that Prolia injections were approved by her insurance company and we can schedule her now. She was scheduled for a return bone health visit on 8/26/22 at 10:20AM with Dr. Collazo to receive 1st Prolia injection and review last lab results. Ela Amador, MAYRA on 8/1/2022 at 8:17 AM

## 2022-08-01 NOTE — TELEPHONE ENCOUNTER
----- Message from Rama Negron sent at 7/27/2022  2:49 PM CDT -----  Regarding: RE: Check Prolia PA  I will update the chart in a moment but she is all set to schedule when convenient for her and you guys    Thanks!  Rama   ----- Message -----  From: Ela Amador, ATC  Sent: 7/27/2022   8:58 AM CDT  To: Cam   Subject: FW: Check Prolia PA                              Hi,     Looking for an update on this PA request for Prolia requested on 7/13/22.     Thank you,   Ela   ----- Message -----  From: Ela Amador ATC  Sent: 7/27/2022   8:00 AM CDT  To: Ela Amador ATC  Subject: FW: Check Prolia PA                                ----- Message -----  From: Ela Amador ATC  Sent: 7/21/2022   8:00 AM CDT  To: Ela Amador ATC  Subject: FW: Check Prolia PA                                ----- Message -----  From: Ela Amador ATC  Sent: 7/18/2022   8:00 AM CDT  To: Ela Amador ATC  Subject: FW: Check Prolia PA                                ----- Message -----  From: Ela Amador ATC  Sent: 7/13/2022   8:00 AM CDT  To: Ela Amador ATC  Subject: Check Prolia PA                                  Check prolia PA

## 2022-08-26 ENCOUNTER — OFFICE VISIT (OUTPATIENT)
Dept: ORTHOPEDICS | Facility: CLINIC | Age: 82
End: 2022-08-26
Payer: MEDICARE

## 2022-08-26 VITALS — BODY MASS INDEX: 23.16 KG/M2 | WEIGHT: 118 LBS | HEIGHT: 60 IN

## 2022-08-26 DIAGNOSIS — S22.000A COMPRESSION FRACTURE OF BODY OF THORACIC VERTEBRA (H): ICD-10-CM

## 2022-08-26 DIAGNOSIS — M81.0 AGE-RELATED OSTEOPOROSIS WITHOUT CURRENT PATHOLOGICAL FRACTURE: Primary | ICD-10-CM

## 2022-08-26 PROCEDURE — 99207 PR DROP WITH A PROCEDURE: CPT | Performed by: FAMILY MEDICINE

## 2022-08-26 PROCEDURE — 96372 THER/PROPH/DIAG INJ SC/IM: CPT | Performed by: FAMILY MEDICINE

## 2022-08-26 NOTE — LETTER
"  8/26/2022      RE: Kimberly Chau  [address] [facility] [address]     Dear Colleague,    Thank you for referring your patient, Kimberly Chau, to the [facility] [place]. Please see a copy of my visit note below.    S: 80 yo female with osteoporosis s/p 1 yr of Evenity treatment here to review labs and start Prolia for her continuing osteoporosis treatment.       Current Outpatient Medications   Medication     acetaminophen 650 MG TABS     amLODIPine (NORVASC) 5 MG tablet     amLODIPine (NORVASC) 5 MG tablet     carvedilol (COREG) 12.5 MG tablet     COMPRESSION STOCKINGS     Diphenhydramine-APAP, sleep, (TYLENOL PM EXTRA STRENGTH PO)     ferrous gluconate (FERGON) 324 (38 Fe) MG tablet     Gauze Pads & Dressings (TELFA NON-ADHERENT) 3\"X4\" PADS     Multiple Vitamin (MULTI-VITAMIN) per tablet     Omeprazole (PRILOSEC PO)     sodium chloride (OCEAN NASAL SPRAY) 0.65 % nasal spray     triamcinolone (KENALOG) 0.1 % external cream     Current Facility-Administered Medications   Medication     denosumab (PROLIA) injection 60 mg     fluocinonide (LIDEX) 0.05 % ointment     fluocinonide (LIDEX) 0.05 % ointment     mupirocin (BACTROBAN) 2 % ointment     mupirocin (BACTROBAN) 2 % ointment     romosozumab-aqqg (EVENITY) injection 210 mg     romosozumab-aqqg (EVENITY) injection 210 mg     romosozumab-aqqg (EVENITY) injection 210 mg     romosozumab-aqqg (EVENITY) injection 210 mg     romosozumab-aqqg (EVENITY) injection 210 mg     romosozumab-aqqg (EVENITY) injection 210 mg     romosozumab-aqqg (EVENITY) injection 210 mg     No change in PMH since our last visit.       O: NAD     Latest Reference Range & Units 07/08/22 10:44   Sodium 133 - 144 mmol/L 140   Potassium 3.4 - 5.3 mmol/L 4.3   Chloride 94 - 109 mmol/L 104   Carbon Dioxide 20 - 32 mmol/L 26   Urea Nitrogen 7 - 30 mg/dL 11   Creatinine 0.52 - 1.04 mg/dL 1.22 (H)   GFR Estimate >60 mL/min/1.73m2 44 (L)   Calcium 8.5 - 10.1 mg/dL " 9.5   Anion Gap 3 - 14 mmol/L 10   Magnesium 1.6 - 2.3 mg/dL 2.0   Phosphorus 2.5 - 4.5 mg/dL 3.4   Vitamin D Deficiency screening 20 - 75 ug/L 64   (H): Data is abnormally high  (L): Data is abnormally low      A:  Severe Osteoporosis (Osteoporosis by DXA and T11 compression insufficiency fracture) s/p 1 year of Evenity treatment with a 5% improvement in her lumbar spine BMD and 1.0 improvement in the total hip since her DXA in May 2021.      P:  Prolia Injection #1 given today.    Continue improved calcium intake and supplemental Vit D  RTC in 6 months for Prolia #2.     Procedure Note:     Prolia Injection # 1     The risks and benefits of the medication were reviewed and she agreed to proceed.  Prepackage Prolia was obtained from the [facility].  The skin was prepped with an alcohol swab. Prolia solution (60mg) was injected subcutaneously into the R posterior upper arm.  Bandaid was placed.  The patient tolerated the injection well. She did not experience any adverse effects.  She was monitored for 30 minutes since this was her first injection of Prolia.      Alley Collazo MD, CAQ, FACSM, FAMSSAsc[facility]  Sports Medicine and Bone Health  Team Physician;  Athletics

## 2022-08-26 NOTE — PROGRESS NOTES
"S: 80 yo female with osteoporosis s/p 1 yr of Evenity treatment here to review labs and start Prolia for her continuing osteoporosis treatment.       Current Outpatient Medications   Medication     acetaminophen 650 MG TABS     amLODIPine (NORVASC) 5 MG tablet     amLODIPine (NORVASC) 5 MG tablet     carvedilol (COREG) 12.5 MG tablet     COMPRESSION STOCKINGS     Diphenhydramine-APAP, sleep, (TYLENOL PM EXTRA STRENGTH PO)     ferrous gluconate (FERGON) 324 (38 Fe) MG tablet     Gauze Pads & Dressings (TELFA NON-ADHERENT) 3\"X4\" PADS     Multiple Vitamin (MULTI-VITAMIN) per tablet     Omeprazole (PRILOSEC PO)     sodium chloride (OCEAN NASAL SPRAY) 0.65 % nasal spray     triamcinolone (KENALOG) 0.1 % external cream     Current Facility-Administered Medications   Medication     denosumab (PROLIA) injection 60 mg     fluocinonide (LIDEX) 0.05 % ointment     fluocinonide (LIDEX) 0.05 % ointment     mupirocin (BACTROBAN) 2 % ointment     mupirocin (BACTROBAN) 2 % ointment     romosozumab-aqqg (EVENITY) injection 210 mg     romosozumab-aqqg (EVENITY) injection 210 mg     romosozumab-aqqg (EVENITY) injection 210 mg     romosozumab-aqqg (EVENITY) injection 210 mg     romosozumab-aqqg (EVENITY) injection 210 mg     romosozumab-aqqg (EVENITY) injection 210 mg     romosozumab-aqqg (EVENITY) injection 210 mg     No change in PMH since our last visit.       O: NAD     Latest Reference Range & Units 07/08/22 10:44   Sodium 133 - 144 mmol/L 140   Potassium 3.4 - 5.3 mmol/L 4.3   Chloride 94 - 109 mmol/L 104   Carbon Dioxide 20 - 32 mmol/L 26   Urea Nitrogen 7 - 30 mg/dL 11   Creatinine 0.52 - 1.04 mg/dL 1.22 (H)   GFR Estimate >60 mL/min/1.73m2 44 (L)   Calcium 8.5 - 10.1 mg/dL 9.5   Anion Gap 3 - 14 mmol/L 10   Magnesium 1.6 - 2.3 mg/dL 2.0   Phosphorus 2.5 - 4.5 mg/dL 3.4   Vitamin D Deficiency screening 20 - 75 ug/L 64   (H): Data is abnormally high  (L): Data is abnormally low      A:  Severe Osteoporosis (Osteoporosis by DXA " and T11 compression insufficiency fracture) s/p 1 year of Evenity treatment with a 5% improvement in her lumbar spine BMD and 1.0 improvement in the total hip since her DXA in May 2021.      P:  Prolia Injection #1 given today.    Continue improved calcium intake and supplemental Vit D  RTC in 6 months for Prolia #2.     Procedure Note:     Prolia Injection # 1     The risks and benefits of the medication were reviewed and she agreed to proceed.  Prepackage Prolia was obtained from the [facility].  The skin was prepped with an alcohol swab. Prolia solution (60mg) was injected subcutaneously into the R posterior upper arm.  Bandaid was placed.  The patient tolerated the injection well. She did not experience any adverse effects.  She was monitored for 30 minutes since this was her first injection of Prolia.      Alley Collazo MD, CAQ, FACSM, [facility]  Sports Medicine and Bone Health  Team Physician;  Athletics

## 2022-10-05 ENCOUNTER — OFFICE VISIT (OUTPATIENT)
Dept: ORTHOPEDICS | Facility: CLINIC | Age: 82
End: 2022-10-05
Payer: MEDICARE

## 2022-10-05 VITALS — HEIGHT: 60 IN | BODY MASS INDEX: 23.16 KG/M2 | WEIGHT: 118 LBS

## 2022-10-05 DIAGNOSIS — M79.645 PAIN OF LEFT THUMB: Primary | ICD-10-CM

## 2022-10-05 DIAGNOSIS — M79.642 LEFT HAND PAIN: Primary | ICD-10-CM

## 2022-10-05 PROCEDURE — 99213 OFFICE O/P EST LOW 20 MIN: CPT | Performed by: FAMILY MEDICINE

## 2022-10-05 NOTE — PROGRESS NOTES
[facility]  SPORTS & ORTHOPEDIC CLINIC VISIT     Oct 5, 2022        SUBJECTIVE  Kimberly Chau is a 81 year old female who is seen as a self referral for evaluation of left thumb/wrist after a fall.     The patient is seen with their .  The patient is Right handed    Onset: 1 day(s) ago. Patient describes injury as she was getting out of her bed this morning to use the restroom,she tripped over her dog and fell.   Location of Pain: left wrist.   Worsened by: wrist ROM & thumb ROM   Better with: brace and rest.   Treatments tried: rest/activity avoidance, elevation, ice, Tylenol and casting/splinting/bracing  Associated symptoms: swelling and pain    Orthopedic/Surgical history: YES - Date: patient reports she has had three surgeries on her left wrist. She fractured her left wrist in 2019 and was treated with Dr. Strong. Patient has a history of osteoporosis.   Social History/Occupation: None.      REVIEW OF SYSTEMS:    Do you have fever, chills, weight loss? No    Do you have any vision problems? No    Do you have any chest pain or edema? No    Do you have any shortness of breath or wheezing?  No    Do you have stomach problems? No    Do you have any numbness or focal weakness? No    Do you have diabetes? No    Do you have problems with bleeding or clotting? No    Do you have an rashes or other skin lesions? No    OBJECTIVE:  General  - normal appearance, in no obvious distress  Musculoskeletal - left thumb  - inspection: Deviation of digits secondary to arthritis  - palpation: Markedly tender base of left thumb  - ROM: Globally restricted  - strength: 5/5  strength  Neuro  - no numbness, no motor deficit, grossly normal coordination, normal muscle tone  Skin  - no ecchymosis, erythema, warmth, or induration, no obvious rash  Psych  - interactive, appropriate, normal mood and affect          ASSESSMENT & PLAN  Kimberly is an 81-year-old woman here with pain in her left thumb after a  fall.    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  However, there is no obvious fracture fragment where her pain is, as the base of the thumb.    I did discuss with Kimberly that we may wait for formal radiology review, although I think that she would do well in a thumb spica for immobilization.  Given her significant arthritis this will have to be a custom thumb spica that will be done by our hand therapy team.    It was a pleasure seeing Kimberly.      Guillermo Tejeda [facility] [place]

## 2022-10-05 NOTE — LETTER
10/5/2022      RE: Kimberly Chau  [address] [place] [address]     Dear Colleague,    Thank you for referring your patient, Kimberly Chau, to the [facility] [place]. Please see a copy of my visit note below.      [facility]  SPORTS & ORTHOPEDIC CLINIC VISIT     Oct 5, 2022      SUBJECTIVE  Kimberly Chau is a 81 year old female who is seen as a self referral for evaluation of left thumb/wrist after a fall.     The patient is seen with their .  The patient is Right handed    Onset: 1 day(s) ago. Patient describes injury as she was getting out of her bed this morning to use the restroom,she tripped over her dog and fell.   Location of Pain: left wrist.   Worsened by: wrist ROM & thumb ROM   Better with: brace and rest.   Treatments tried: rest/activity avoidance, elevation, ice, Tylenol and casting/splinting/bracing  Associated symptoms: swelling and pain    Orthopedic/Surgical history: YES - Date: patient reports she has had three surgeries on her left wrist. She fractured her left wrist in 2019 and was treated with Dr. Strong. Patient has a history of osteoporosis.   Social History/Occupation: None.      REVIEW OF SYSTEMS:    Do you have fever, chills, weight loss? No    Do you have any vision problems? No    Do you have any chest pain or edema? No    Do you have any shortness of breath or wheezing?  No    Do you have stomach problems? No    Do you have any numbness or focal weakness? No    Do you have diabetes? No    Do you have problems with bleeding or clotting? No    Do you have an rashes or other skin lesions? No    OBJECTIVE:  General  - normal appearance, in no obvious distress  Musculoskeletal - left thumb  - inspection: Deviation of digits secondary to arthritis  - palpation: Markedly tender base of left thumb  - ROM: Globally restricted  - strength: 5/5  strength  Neuro  - no numbness, no motor deficit, grossly normal  coordination, normal muscle tone  Skin  - no ecchymosis, erythema, warmth, or induration, no obvious rash  Psych  - interactive, appropriate, normal mood and affect          ASSESSMENT & PLAN  Kimberly is an 81-year-old woman here with pain in her left thumb after a fall.    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  However, there is no obvious fracture fragment where her pain is, as the base of the thumb.    I did discuss with Kimberly that we may wait for formal radiology review, although I think that she would do well in a thumb spica for immobilization.  Given her significant arthritis this will have to be a custom thumb spica that will be done by our hand therapy team.    It was a pleasure seeing Kimberly.      Guillermo Tejeda [facility] [place]

## 2022-10-05 NOTE — NURSING NOTE
Kimberly Chau's goals for this visit include:   Chief Complaint   Patient presents with     Derm Problem     She requests these members of her care team be copied on today's visit information: Yes    PCP: Guillermo Castellano    Referring Provider:  No referring provider defined for this encounter.    There were no vitals taken for this visit.    Do you need any medication refills at today's visit? No    Anai Ferrer LPN       no

## 2022-10-17 ENCOUNTER — ALLIED HEALTH/NURSE VISIT (OUTPATIENT)
Dept: INTERNAL MEDICINE | Facility: CLINIC | Age: 82
End: 2022-10-17
Payer: MEDICARE

## 2022-10-17 DIAGNOSIS — Z23 ENCOUNTER FOR VACCINATION: Primary | ICD-10-CM

## 2022-10-17 PROCEDURE — 91312 COVID-19,PF,PFIZER BOOSTER BIVALENT: CPT

## 2022-10-17 PROCEDURE — 0124A COVID-19,PF,PFIZER BOOSTER BIVALENT: CPT

## 2022-10-17 NOTE — PROGRESS NOTES
Kimberly Chau received the covid Pfizer Bivalent vaccine today in clinic at the request of Dr. Castellano. The immunization was given under the supervision of Dr. Guillermo Castellano if assistance was needed. The immunization site was cleaned with an alcohol prep wipe. The immunization was given without incident--see immunization list for administration details. No swelling or redness was observed at the site of injection after the immunization was given. The patient was advised to remain in [facility] lobby for 15 minutes after the injection in case of an averse reaction.     Camden Mccain, EMT at 3:53 PM on 10/17/2022.

## 2022-10-19 ENCOUNTER — TELEPHONE (OUTPATIENT)
Dept: DERMATOLOGY | Facility: CLINIC | Age: 82
End: 2022-10-19

## 2022-10-19 NOTE — TELEPHONE ENCOUNTER
Health Call Center    Phone Message    May a detailed message be left on voicemail: yes     Reason for Call: Other: Patient called to say Dr. Guillermo Castellano from the  is going to contact Dr. Orourke about getting patient in earlier than March 2023 as patient has sores on her legs that Dr. Orourke treated about 1 year ago and they are back and painful. Patient would like to know if she can be seen ASAP   Please call back   Thank you    Action Taken: Message routed to:  [facility] Derm    Travel Screening: Not Applicable

## 2022-10-22 ENCOUNTER — HEALTH MAINTENANCE LETTER (OUTPATIENT)
Age: 82
End: 2022-10-22

## 2022-10-25 NOTE — TELEPHONE ENCOUNTER
[facility] Call Center    Phone Message    May a detailed message be left on voicemail: yes     Reason for Call: Symptoms or Concerns     If patient has red-flag symptoms, warm transfer to triage line    Current symptom or concern: sores on left leg and oozing   With pain   Pt is trying an ointment on non stick bandage.     Symptoms have been present for:  2-3 week(s)    Has patient previously been seen for this? No    By : NA    Date: NA    Are there any new or worsening symptoms? Yes: Off and on getting worse and staying the same, not improving at all.   Pt had similar Sx 2021 and Dr. Orourke treated pt.  Please call pt back to discuss. Thanks        Action Taken: Message routed to:  [facility] Derm    Travel Screening: Not Applicable

## 2022-11-02 NOTE — PROGRESS NOTES
Hand Therapy Initial Evaluation    Current Date:  11/3/2022  Referring Provider: Guillermo Tejeda DO  Order Date: 10/5/2022  Order Note: please make custom thumb spica brace    Diagnosis: Pain of left thumb   DOI: 2 weeks ago    Subjective:  Kimberly Chau is a 81 year old female.    Patient reports symptoms of the left thumb which occurred due to tripping over dog. Since onset symptoms are Gradually getting better.  General health as reported by patient is good.  Pertinent medical history includes:Anemia, High Blood Pressure, Implated Device, Osteoporosis, Rheumatoid Arthritis, Non-Healing Wounds  Medical allergies:latex.  Surgical history: orthopedic: hip & wrist.  Medication history: High Blood Pressure.    Current occupation is retired  Job Tasks: Repetitive Tasks    Occupational Profile Information:  Right hand dominant  Prior functional level:  independent-shared household chores  Patient reports symptoms of stiffness/loss of motion  Special tests:  x-ray.    Previous treatment: wrist brace, OTC medication  Transportation: relies on  for transportation  Leisure activities/hobbies: taking care of dog - brushing and bathing     Functional Outcome Measure:   Upper Extremity Functional Index Score:  SCORE:   Column Totals: /80: 73   (A lower score indicates greater disability.)    Objective:  Pain Level (Scale 0-10)   11/3/2022   At Rest 0/10   With Use 0/10     ROM  Thumb 11/3/2022 11/3/2022   AROM  (PROM) R L   MP /32 /0   IP /60 /0   RABD 22 21   PABD 39 31   Kapandji Opposition Scale (0-10/10) 10 6     Thumb Observation/Appearance   - none  + mild    ++ moderate    +++ severe     11/3/2022   Shoulder deformity present over CMC L: +   Edema over the CMC joint L: -   Noted collapse of MP into hyperextension during pinch L: +      Provocative Tests  Pain Report:  - none    + mild    ++ moderate    +++ severe     11/3/2022   Crepitus present L: -   CMC Adduction Stress Test L: +   CMC Extension Stress Test L: -    Finkelstein's L: NT   WHAT Test L: NT       Strength   (Measured in pounds)  Pain Report: - none  + mild    ++ moderate    +++ severe    11/3/2022 11/3/2022   Trials R L   1  2  3 37 16   Average 37 16     Lat Pinch 11/3/2022 11/3/2022   Trials R L   1  2  3 6 6   Average 6 6     3 Pt Pinch 11/3/2022 11/3/2022   Trials R L   1  2  3 6 5   Average 6 5     Palpation   Pain Report:  - none    + mild    ++ moderate    +++ severe    11/3/2022   CMC Joint Line L: +   Thenar Eminence L: -   Web Space L: -   1st DC L: -   Radial Styloid L: +   FCR L: +     Assessment:  Patient presents with symptoms consistent with diagnosis of left thumb pain, with conservative intervention.    Patient's limitations or Problem List includes:  Decreased ROM/motion of the left thumb which interferes with the patient's ability to perform Self Care Tasks (dressing) and Household Chores as compared to previous level of function.    Rehab Potential:  Good - Return to full activity, some limitations    Patient will benefit from skilled Occupational Therapy to increase ROM and stability of thumb and decrease pain to return to previous activity level and resume normal daily tasks and to reach their rehab potential.    Barriers to Learning:  No barrier    Communication Issues:  Patient appears to be able to clearly communicate and understand verbal and written communication and follow directions correctly.    Chart Review: Chart Review and Simple history review with patient    Identified Performance Deficits: dressing, home establishment and management and meal preparation and cleanup    Assessment of Occupational Performance:  1-3 Performance Deficits    Clinical Decision Making (Complexity): Low complexity    Treatment Explanation:  The following has been discussed with the patient:    RX ordered/plan of care  Anticipated outcomes  Possible risks and side effects    Plan:  Frequency:  2 X a month, once daily  Duration:  for 1  month    Treatment Plan:    Orthotic Fabrication:    Static    Discharge Plan:  Achieve all LTG  [place] in home treatment program.  Reach maximal therapeutic benefit.    Home Program:  Wear orthosis at night and throughout the day, as needed.     Next Visit:  Adjust orthosis

## 2022-11-03 ENCOUNTER — THERAPY VISIT (OUTPATIENT)
Dept: OCCUPATIONAL THERAPY | Facility: CLINIC | Age: 82
End: 2022-11-03
Attending: FAMILY MEDICINE
Payer: MEDICARE

## 2022-11-03 DIAGNOSIS — M79.645 PAIN OF LEFT THUMB: ICD-10-CM

## 2022-11-03 PROCEDURE — 97760 ORTHOTIC MGMT&TRAING 1ST ENC: CPT | Mod: GO

## 2022-11-03 PROCEDURE — 97165 OT EVAL LOW COMPLEX 30 MIN: CPT | Mod: GO

## 2022-11-03 NOTE — PROGRESS NOTES
[facility]    OUTPATIENT Occupational Therapy ORTHOPEDIC EVALUATION  PLAN OF TREATMENT FOR OUTPATIENT REHABILITATION  (COMPLETE FOR INITIAL CLAIMS ONLY)  Patient's Last Name, First Name, M.I.  YOB: 1940  KandiKimberly       Provider s Name:  [facility]   Medical Record No.  [id number]   Start of Care Date:  11/03/22   Onset Date:   10/05/22 (MD Order Date)   Treatment Diagnosis:  Pain of left thumb Medical Diagnosis:  Pain of left thumb       Goals:     11/03/22 0500   Goal #1   Goal #1 dressing   Previous Performance Level Independent   Current Functional Task Don   Current Performance Level Unable   STG Target Performance Other - on additional line   Other Dressing Splint   STG Target Perform Level Able   Due Date  11/03/22   Date Goal Met 11/03/22   LTG Target Task/Performance No Difficulty   Due date 11/03/22   Date Goal Met 11/03/22       Therapy Frequency:  2x monthly  Predicted Duration of Therapy Intervention:  1 month    Kerri Aguilar OT                 I CERTIFY THE NEED FOR THESE SERVICES FURNISHED UNDER        THIS PLAN OF TREATMENT AND WHILE UNDER MY CARE     (Physician attestation of this document indicates review and certification of the therapy plan).                     Certification Date From:  11/03/22   Certification Date To:  12/03/22    Referring Provider:  Guillermo Tejeda    Initial Assessment        See Epic Evaluation SOC Date: 11/03/22

## 2022-11-08 ENCOUNTER — OFFICE VISIT (OUTPATIENT)
Dept: DERMATOLOGY | Facility: CLINIC | Age: 82
End: 2022-11-08
Payer: MEDICARE

## 2022-11-08 DIAGNOSIS — L97.909 VENOUS ULCER (H): Primary | ICD-10-CM

## 2022-11-08 DIAGNOSIS — I83.009 VENOUS ULCER (H): Primary | ICD-10-CM

## 2022-11-08 PROCEDURE — 99214 OFFICE O/P EST MOD 30 MIN: CPT | Performed by: STUDENT IN AN ORGANIZED HEALTH CARE EDUCATION/TRAINING PROGRAM

## 2022-11-08 RX ORDER — PENTOXIFYLLINE 400 MG/1
400 TABLET, EXTENDED RELEASE ORAL
Qty: 90 TABLET | Refills: 2 | Status: SHIPPED | OUTPATIENT
Start: 2022-11-08 | End: 2023-04-27

## 2022-11-08 ASSESSMENT — PAIN SCALES - GENERAL: PAINLEVEL: MODERATE PAIN (4)

## 2022-11-08 NOTE — LETTER
11/8/2022       RE: Kimberly Chau  [address]   [place] [address]     Dear Colleague,    Thank you for referring your patient, Kimberly Chau, to the [facility] [place] [facility]. Please see a copy of my visit note below.    [facility] Dermatology Note    Encounter Date: Nov 8, 2022    Dermatology Problem List:  #Venous stasis ulcers  - 08/18/21 LE US  - 02/16/21 laser ablation of Great saphenous vein of R &L  leg   - previously improved w/ compression stockings, mupirocin    Major PMHx  - Hx RA; not on medication  ______________________________________    Impression/Plan:  Kimberly was seen today for derm problem.    Diagnoses and all orders for this visit:    Venous ulcer (H)  -     pentoxifylline ER (TRENTAL) 400 MG CR tablet; Take 1 tablet (400 mg) by mouth 3 times daily (with meals)  -     Compression Sleeve/Stocking Order for DME - ONLY FOR DME  -     Wound Care Referral; Future    -Patient with history of likely venous stasis ulcers that previously resolved with compression, patient is status post endovascular venous ablation of the great saphenous vein bilaterally  - M returns today with 1 week history of recurrent painful small 1 to 1.5 cm shallow ulcers with a fibrinous base, patient states that they were painful and hurt worse at the end of the day after she has been on her feet for longer  - 1-2+ pitting edema detectable on exam, patient currently not using compression stockings has some at home previously described by Dr. Quintanilla  - New prescription for 15 to 20 mmHg compression stockings wear daily, keep legs elevated when at rest  - Patient has a history of falls defer aspirin for now, patient has history of high blood pressure defer options like horse Willcox and other Vino constrictors  - Start pentoxifylline 400 mg 3 times a day  - Daily vinegar soaks with 1-2 4 1-4 vinegar mix as tolerated  soak a damp cloth in it and leave on for at least 15 minutes then apply Vaseline and a bandage  - Referral placed to wound care to help with instruction on dressing changes and wound care supplies  - Follow-up in 1 month        Follow-up in 1 mo .       Staff Involved:  Staff Only    Hamilton Cummins MD   of Dermatology  Department of Dermatology  [facility]      CC:   Chief Complaint   Patient presents with     Derm Problem     Kimberly is here today for a recheck of the ulcers on her left leg. She states there has been no change.       History of Present Illness:  Ms. Kimberly Chau is a 81 year old female who presents as a return patient.    Prior hx of venous ulcers on legs w/ venous reflux s/p GSV ablation b/l.     Returning w/ 1 week hx of ulcers     Labs:      Physical exam:  Vitals: There were no vitals taken for this visit.  GEN: well developed, well-nourished, in no acute distress, in a pleasant mood.     SKIN: Carreon phototype 1  - Focused examination of the LLE was performed.  - L leg w/ 2 grouped ~1cm ulcers adjacent  - ~1cm ulcer below those grouped   - 2+ pitting edema  - No other lesions of concern on areas examined.             Past Medical History:   Past Medical History:   Diagnosis Date     Anemia      Arthritis      Cataracts      Central retinal artery occlusion      Cervical spine fracture (H)     After a fall from orthostatic sx     Chronic pain     Back of head     Gastro-oesophageal reflux disease      Head injury      Hypertension      Hyponatremia     Resolved, 2/2 diuretics     Migraines      Osteoporosis      Pneumonia 2018     Rheumatoid arthritis(714.0)      Past Surgical History:   Procedure Laterality Date     COLONOSCOPY       EYE SURGERY Left 02/2019    Hole in macula     FUSION CERVICAL POSTERIOR THREE+ LEVELS  1/22/2013    Procedure: FUSION CERVICAL POSTERIOR THREE+ LEVELS;  Cervical 1-6 Posterior Instrumentation and Fusion,  Cervical 3-4 Laminectomy  .Instrumentation and fusion to Cervical 6 *Latex Allergy*;  Surgeon: Ra Bar MD;  [facility]: [facility]     GYN SURGERY       HEAD & NECK SURGERY       HYSTERECTOMY       IR ENDOVENOUS ABLATION VARICOSE VEINS  2/16/2021     KNEE SURGERY       NECK SURGERY       OPEN REDUCTION INTERNAL FIXATION WRIST Left 4/30/2019    Procedure: Open Reduction Internal Fixation Left Distal Radius Fracture;  Surgeon: Dinh Strong MD;  Location:  OR     OPEN REDUCTION INTERNAL FIXATION WRIST Left 6/12/2020    Procedure: OPEN REDUCTION INTERNAL FIXATION Left Distal Radius Nonunion, Distal Ulna Resection;  Surgeon: Dinh Strong MD;  Location:  OR     OPTICAL TRACKING SYSTEM ENDOSCOPIC SINUS SURGERY Right 4/6/2018    Procedure: OPTICAL TRACKING SYSTEM ENDOSCOPIC SINUS SURGERY;  Stealth Assisted Right Maxillary Antrostomy, Anterior Ethmoidectomy And Frontal Sinusotomy;  Surgeon: Elyse Eisenberg MD;  Location:  OR     OPTICAL TRACKING SYSTEM ENDOSCOPIC SINUS SURGERY Right 8/3/2018    Procedure: OPTICAL TRACKING SYSTEM ENDOSCOPIC SINUS SURGERY;  Stealth Assisted Revision Right Frontal Sinusotomy, Right Stent Placement  **Latex Allergy** ;  Surgeon: Elyse Eisenberg MD;  Location:  OR     ORTHOPEDIC SURGERY       REMOVE HARDWARE WRIST Left 11/19/2019    Procedure: Left Distal Radius Hardware Removal, Flexor Pollicus Longus Repair, Deep Cultures;  Surgeon: Dinh Strong MD;  Location:  OR     [facility] HAND/FINGER SURGERY UNLISTED       [facility] PELVIS/HIP JOINT SURGERY UNLISTED         Social History:   reports that she has never smoked. She has never used smokeless tobacco. She reports current alcohol use. She reports that she does not use drugs.    Family History:  Family History   Problem Relation Age of Onset     Arthritis Mother      Respiratory Mother         pulmonary fibrosis     Hypertension Mother      Anemia Mother      Liver Disease Father         from EtOH     Alcoholism  "Father      Multiple Sclerosis Sister      No Known Problems Maternal Grandmother      Heart Disease Maternal Grandfather      Breast Cancer Sister      Glaucoma Paternal Grandfather      Heart Disease Paternal Grandfather        Medications:  Current Outpatient Medications   Medication Sig Dispense Refill     acetaminophen 650 MG TABS Take 650 mg by mouth every 4 hours as needed. 100 tablet 0     amLODIPine (NORVASC) 5 MG tablet Take 1 tablet (5 mg) by mouth daily 90 tablet 3     amLODIPine (NORVASC) 5 MG tablet Take 1 tablet (5 mg) by mouth daily 90 tablet 1     carvedilol (COREG) 12.5 MG tablet Take 1 tablet (12.5 mg) by mouth 2 times daily (with meals). 180 tablet 3     COMPRESSION STOCKINGS Please measure and distribute 1 pair of 20mmHg - 30mmHg knee high open or closed toe compression stockings. Jobst ultrasheer or equivalent. 2 each 4     Diphenhydramine-APAP, sleep, (TYLENOL PM EXTRA STRENGTH PO) Take 2 tablets by mouth At Bedtime        ferrous gluconate (FERGON) 324 (38 Fe) MG tablet Take 324 mg by mouth       Gauze Pads & Dressings (TELFA NON-ADHERENT) 3\"X4\" PADS Cut to size for open areas on the lower extremities and secure with paper tape. Change dressing every other day. 30 each 11     Multiple Vitamin (MULTI-VITAMIN) per tablet Take 1 tablet by mouth every morning        Omeprazole (PRILOSEC PO) Take 20 mg by mouth as needed        pentoxifylline ER (TRENTAL) 400 MG CR tablet Take 1 tablet (400 mg) by mouth 3 times daily (with meals) 90 tablet 2     sodium chloride (OCEAN NASAL SPRAY) 0.65 % nasal spray Spray 1 spray into both nostrils daily as needed for congestion 1 Bottle 3     triamcinolone (KENALOG) 0.1 % external cream Apply twice daily for 2 weeks then 3 times weekly for 2 week, repeat cycle as needed (Patient not taking: Reported on 11/8/2022) 454 g 0     Allergies   Allergen Reactions     Hctz Other (See Comments)     Pt develops symptomatic and significant hyponatremia with HCTZ exposure     " Hydrochlorothiazide      Other reaction(s): Hyponatremia  Hyponatremia     Latex      Benzocaine Rash     carba mix,thrium-sunscreen     Resorcinol-Alcohol Rash     carba mix,thrium-sunscreen     Ace Inhibitors Unknown     Dizziness and orthostatic changes and electrolyte problems.     Latex Unknown

## 2022-11-08 NOTE — PROGRESS NOTES
[facility] Dermatology Note    Encounter Date: Nov 8, 2022    Dermatology Problem List:  #Venous stasis ulcers  - 08/18/21 LE US  - 02/16/21 laser ablation of Great saphenous vein of R &L  leg   - previously improved w/ compression stockings, mupirocin    Major PMHx  - Hx RA; not on medication  ______________________________________    Impression/Plan:  Kimberly was seen today for derm problem.    Diagnoses and all orders for this visit:    Venous ulcer (H)  -     pentoxifylline ER (TRENTAL) 400 MG CR tablet; Take 1 tablet (400 mg) by mouth 3 times daily (with meals)  -     Compression Sleeve/Stocking Order for DME - ONLY FOR DME  -     Wound Care Referral; Future    -Patient with history of likely venous stasis ulcers that previously resolved with compression, patient is status post endovascular venous ablation of the great saphenous vein bilaterally  - M returns today with 1 week history of recurrent painful small 1 to 1.5 cm shallow ulcers with a fibrinous base, patient states that they were painful and hurt worse at the end of the day after she has been on her feet for longer  - 1-2+ pitting edema detectable on exam, patient currently not using compression stockings has some at home previously described by Dr. Quintanilla  - New prescription for 15 to 20 mmHg compression stockings wear daily, keep legs elevated when at rest  - Patient has a history of falls defer aspirin for now, patient has history of high blood pressure defer options like horse [place] and other Vino constrictors  - Start pentoxifylline 400 mg 3 times a day  - Daily vinegar soaks with 1-2 4 1-4 vinegar mix as tolerated soak a damp cloth in it and leave on for at least 15 minutes then apply Vaseline and a bandage  - Referral placed to wound care to help with instruction on dressing changes and wound care supplies  - Follow-up in 1 month        Follow-up in 1 mo .       Staff Involved:  Staff Only    Hamilton Cummins MD  Assistant  Professor of Dermatology  Department of Dermatology  [employer]      CC:   Chief Complaint   Patient presents with     Derm Problem     Kimberly is here today for a recheck of the ulcers on her left leg. She states there has been no change.       History of Present Illness:  Ms. Kimberly Chau is a 81 year old female who presents as a return patient.    Prior hx of venous ulcers on legs w/ venous reflux s/p GSV ablation b/l.     Returning w/ 1 week hx of ulcers     Labs:      Physical exam:  Vitals: There were no vitals taken for this visit.  GEN: well developed, well-nourished, in no acute distress, in a pleasant mood.     SKIN: Carreon phototype 1  - Focused examination of the LLE was performed.  - L leg w/ 2 grouped ~1cm ulcers adjacent  - ~1cm ulcer below those grouped   - 2+ pitting edema  - No other lesions of concern on areas examined.             Past Medical History:   Past Medical History:   Diagnosis Date     Anemia      Arthritis      Cataracts      Central retinal artery occlusion      Cervical spine fracture (H)     After a fall from orthostatic sx     Chronic pain     Back of head     Gastro-oesophageal reflux disease      Head injury      Hypertension      Hyponatremia     Resolved, 2/2 diuretics     Migraines      Osteoporosis      Pneumonia 2018     Rheumatoid arthritis(714.0)      Past Surgical History:   Procedure Laterality Date     COLONOSCOPY       EYE SURGERY Left 02/2019    Hole in macula     FUSION CERVICAL POSTERIOR THREE+ LEVELS  1/22/2013    Procedure: FUSION CERVICAL POSTERIOR THREE+ LEVELS;  Cervical 1-6 Posterior Instrumentation and Fusion, Cervical 3-4 Laminectomy  .Instrumentation and fusion to Cervical 6 *Latex Allergy*;  Surgeon: Ra Bar MD;  Location: [facility] OR     GYN SURGERY       HEAD & NECK SURGERY       HYSTERECTOMY       IR ENDOVENOUS ABLATION VARICOSE VEINS  2/16/2021     KNEE SURGERY       NECK SURGERY       OPEN REDUCTION INTERNAL  FIXATION WRIST Left 4/30/2019    Procedure: Open Reduction Internal Fixation Left Distal Radius Fracture;  Surgeon: Dinh Strong MD;  Location:  OR     OPEN REDUCTION INTERNAL FIXATION WRIST Left 6/12/2020    Procedure: OPEN REDUCTION INTERNAL FIXATION Left Distal Radius Nonunion, Distal Ulna Resection;  Surgeon: Dinh Strong MD;  Location:  OR     OPTICAL TRACKING SYSTEM ENDOSCOPIC SINUS SURGERY Right 4/6/2018    Procedure: OPTICAL TRACKING SYSTEM ENDOSCOPIC SINUS SURGERY;  Stealth Assisted Right Maxillary Antrostomy, Anterior Ethmoidectomy And Frontal Sinusotomy;  Surgeon: Elyse Eisenberg MD;  Location:  OR     OPTICAL TRACKING SYSTEM ENDOSCOPIC SINUS SURGERY Right 8/3/2018    Procedure: OPTICAL TRACKING SYSTEM ENDOSCOPIC SINUS SURGERY;  Stealth Assisted Revision Right Frontal Sinusotomy, Right Stent Placement  **Latex Allergy** ;  Surgeon: Elyse Eisenberg MD;  Location:  OR     ORTHOPEDIC SURGERY       REMOVE HARDWARE WRIST Left 11/19/2019    Procedure: Left Distal Radius Hardware Removal, Flexor Pollicus Longus Repair, Deep Cultures;  Surgeon: Dinh Strong MD;  Location:  OR     ZZC HAND/FINGER SURGERY UNLISTED       [facility] PELVIS/HIP JOINT SURGERY UNLISTED         Social History:   reports that she has never smoked. She has never used smokeless tobacco. She reports current alcohol use. She reports that she does not use drugs.    Family History:  Family History   Problem Relation Age of Onset     Arthritis Mother      Respiratory Mother         pulmonary fibrosis     Hypertension Mother      Anemia Mother      Liver Disease Father         from EtOH     Alcoholism Father      Multiple Sclerosis Sister      No Known Problems Maternal Grandmother      Heart Disease Maternal Grandfather      Breast Cancer Sister      Glaucoma Paternal Grandfather      Heart Disease Paternal Grandfather        Medications:  Current Outpatient Medications   Medication Sig Dispense Refill      "acetaminophen 650 MG TABS Take 650 mg by mouth every 4 hours as needed. 100 tablet 0     amLODIPine (NORVASC) 5 MG tablet Take 1 tablet (5 mg) by mouth daily 90 tablet 3     amLODIPine (NORVASC) 5 MG tablet Take 1 tablet (5 mg) by mouth daily 90 tablet 1     carvedilol (COREG) 12.5 MG tablet Take 1 tablet (12.5 mg) by mouth 2 times daily (with meals). 180 tablet 3     COMPRESSION STOCKINGS Please measure and distribute 1 pair of 20mmHg - 30mmHg knee high open or closed toe compression stockings. Jobst ultrasheer or equivalent. 2 each 4     Diphenhydramine-APAP, sleep, (TYLENOL PM EXTRA STRENGTH PO) Take 2 tablets by mouth At Bedtime        ferrous gluconate (FERGON) 324 (38 Fe) MG tablet Take 324 mg by mouth       Gauze Pads & Dressings (TELFA NON-ADHERENT) 3\"X4\" PADS Cut to size for open areas on the lower extremities and secure with paper tape. Change dressing every other day. 30 each 11     Multiple Vitamin (MULTI-VITAMIN) per tablet Take 1 tablet by mouth every morning        Omeprazole (PRILOSEC PO) Take 20 mg by mouth as needed        pentoxifylline ER (TRENTAL) 400 MG CR tablet Take 1 tablet (400 mg) by mouth 3 times daily (with meals) 90 tablet 2     sodium chloride (OCEAN NASAL SPRAY) 0.65 % nasal spray Spray 1 spray into both nostrils daily as needed for congestion 1 Bottle 3     triamcinolone (KENALOG) 0.1 % external cream Apply twice daily for 2 weeks then 3 times weekly for 2 week, repeat cycle as needed (Patient not taking: Reported on 11/8/2022) 454 g 0     Allergies   Allergen Reactions     Hctz Other (See Comments)     Pt develops symptomatic and significant hyponatremia with HCTZ exposure     Hydrochlorothiazide      Other reaction(s): Hyponatremia  Hyponatremia     Latex      Benzocaine Rash     carba mix,thrium-sunscreen     Resorcinol-Alcohol Rash     carba mix,thrium-sunscreen     Ace Inhibitors Unknown     Dizziness and orthostatic changes and electrolyte problems.     Latex Unknown "

## 2022-11-08 NOTE — NURSING NOTE
Dermatology Rooming Note    Kimberly Chau's goals for this visit include:   Chief Complaint   Patient presents with     Derm Problem     Kimberly is here today for a recheck of the ulcers on her left leg. She states there has been no change.       Lyndsay Prince, CMA

## 2022-11-08 NOTE — PATIENT INSTRUCTIONS
compression stockings, wear daily    Start pentoxifylline 3 times daily. You may get some GI upset, this is normal and often improves with continued use.     Wash wound daily with vinegar solution, see below. Then reapply vaseline and a bandage. I have placed a referral to wound care to have them help you with dressing changes.       Vinegar Soaks (acetic acid) There are certain times when a wound is healing that it will benefit from a change in the kind of wound care. Vinegar soaks are often very helpful in finishing the wound healing.  Other times when vinegar soaks are helpful are when there are recurrent infections (including bacterial or fungal) or inflammatory rashes of the feet or hands.     Instructions   - Mix 1 tablespoon of white or yellow vinegar to 8oz. of water.   - Soak gauze in the solution and apply to area 2-3 times a day for 15-20 minutes if possible.   - Rinse off  - Pat dry with gauze.   - Store solution in refrigerator, discard after one week.  -  Continue soaks until follow-up appointment, unless otherwise instructed.     - Alternatively you can mix up a 4:1 water to vinegar solution in a foot bath and soak the affected body part in that solution     If time does not allow, it is better to do it less frequent than this than not at all. This will assist in the healing of the wound and/or decreasing bacterial loads.

## 2022-11-25 ENCOUNTER — OFFICE VISIT (OUTPATIENT)
Dept: PODIATRY | Facility: CLINIC | Age: 82
End: 2022-11-25
Payer: MEDICARE

## 2022-11-25 DIAGNOSIS — L97.909 VENOUS ULCER (H): ICD-10-CM

## 2022-11-25 DIAGNOSIS — I83.009 VENOUS ULCER (H): ICD-10-CM

## 2022-11-25 PROCEDURE — 99204 OFFICE O/P NEW MOD 45 MIN: CPT | Performed by: PODIATRIST

## 2022-11-25 NOTE — LETTER
11/25/2022         RE: Kimberly Chau  [id number]   [place] [address]        Dear Colleague,    Thank you for referring your patient, Kimberly Chau, to the [facility]. Please see a copy of my visit note below.    Past Medical History:   Diagnosis Date     Anemia      Arthritis      Cataracts      Central retinal artery occlusion      Cervical spine fracture (H)     After a fall from orthostatic sx     Chronic pain     Back of head     Gastro-oesophageal reflux disease      Head injury      Hypertension      Hyponatremia     Resolved, 2/2 diuretics     Migraines      Osteoporosis      Pneumonia 2018     Rheumatoid arthritis(714.0)      Patient Active Problem List   Diagnosis     Intrinsic atopic dermatitis     Essential hypertension     Rheumatoid arthritis (H)     Retinal artery occlusion     Cervical vertebral fracture (H)     Central retinal artery occlusion of right eye     Bilateral occipital neuralgia     C1-C2 instability     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 factor (H)     Osteoarthritis     Malignant hypertension     Hyponatremia     GERD (gastroesophageal reflux disease)     Eczematous dermatitis     Anxiety state     Anemia     Closed fracture of distal end of left radius with delayed healing, unspecified fracture morphology, subsequent encounter     Closed fracture of distal end of left radius, unspecified fracture morphology, sequela     Osteomyelitis of left leg (H)     Pain of left thumb     Past Surgical History:   Procedure Laterality Date     COLONOSCOPY       EYE SURGERY Left 02/2019    Hole in macula     FUSION CERVICAL POSTERIOR THREE+ LEVELS  1/22/2013    Procedure: FUSION CERVICAL POSTERIOR THREE+ LEVELS;  Cervical 1-6 Posterior Instrumentation and Fusion, Cervical 3-4 Laminectomy  .Instrumentation and fusion to Cervical 6 *Latex Allergy*;  Surgeon: Ra Bar MD;  Location: [facility] OR     GYN SURGERY       HEAD & NECK  SURGERY       HYSTERECTOMY       IR ENDOVENOUS ABLATION VARICOSE VEINS  2/16/2021     KNEE SURGERY       NECK SURGERY       OPEN REDUCTION INTERNAL FIXATION WRIST Left 4/30/2019    Procedure: Open Reduction Internal Fixation Left Distal Radius Fracture;  Surgeon: Dinh Strong MD;  [facility] OR     OPEN REDUCTION INTERNAL FIXATION WRIST Left 6/12/2020    Procedure: OPEN REDUCTION INTERNAL FIXATION Left Distal Radius Nonunion, Distal Ulna Resection;  Surgeon: Dinh Strong MD;  Location: [facility] OR     OPTICAL TRACKING SYSTEM ENDOSCOPIC SINUS SURGERY Right 4/6/2018    Procedure: OPTICAL TRACKING SYSTEM ENDOSCOPIC SINUS SURGERY;  Stealth Assisted Right Maxillary Antrostomy, Anterior Ethmoidectomy And Frontal Sinusotomy;  Surgeon: Elyse Eisenberg MD;  Location: [facility] OR     OPTICAL TRACKING SYSTEM ENDOSCOPIC SINUS SURGERY Right 8/3/2018    Procedure: OPTICAL TRACKING SYSTEM ENDOSCOPIC SINUS SURGERY;  Stealth Assisted Revision Right Frontal Sinusotomy, Right Stent Placement  **Latex Allergy** ;  Surgeon: Elyse Eisenberg MD;  Location: [facility] OR     ORTHOPEDIC SURGERY       REMOVE HARDWARE WRIST Left 11/19/2019    Procedure: Left Distal Radius Hardware Removal, Flexor Pollicus Longus Repair, Deep Cultures;  Surgeon: Dinh Strong MD;  Location: [facility] OR     ZZC HAND/FINGER SURGERY UNLISTED       [facility] PELVIS/HIP JOINT SURGERY UNLISTED       Social History     Socioeconomic History     Marital status:      Spouse name: Not on file     Number of children: Not on file     Years of education: Not on file     Highest education level: Not on file   Occupational History     Not on file   Tobacco Use     Smoking status: Never     Smokeless tobacco: Never   Substance and Sexual Activity     Alcohol use: Yes     Comment: 2 glasses of wine a day     Drug use: Never     Sexual activity: Not Currently   Other Topics Concern     Parent/sibling w/ CABG, MI or angioplasty before 65F 55M? Not Asked   Social History  Narrative    Ms. Chau is , has two grown children and two grandchildren. She does not work. She was never a smoker, and drinks a glass of wine with dinner each night.         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      Social Determinants of Health     Financial Resource Strain: Not on file   Food Insecurity: Not on file   Transportation Needs: Not on file   Physical Activity: Not on file   Stress: Not on file   Social Connections: Not on file   Intimate Partner Violence: Not on file   Housing Stability: Not on file     Family History   Problem Relation Age of Onset     Arthritis Mother      Respiratory Mother         pulmonary fibrosis     Hypertension Mother      Anemia Mother      Liver Disease Father         from EtOH     Alcoholism Father      Multiple Sclerosis Sister      No Known Problems Maternal Grandmother      Heart Disease Maternal Grandfather      Breast Cancer Sister      Glaucoma Paternal Grandfather      Heart Disease Paternal Grandfather                    SUBJECTIVE FINDINGS:  An 82-year-old presents for left medial leg ulcer.  She relates it has been present for a couple of weeks.  She relates a couple years ago, she had a procedure done in her veins and use compression socks and they healed up.  She relates no injuries.  She has been using a nonstick fat pad and antibiotic ointments.  She feels this has maybe gotten a little better.  The distal one is kind of scabbed over.  She relates it hurts.  She is not on any antibiotics.  Denies any nausea, vomiting, fever or chills.  She presents from Dr. Cummins in Dermatology.  I reviewed Dr. Cummins's 11/08/2022 note.    OBJECTIVE FINDINGS:  DP and PT are 2/4, left.  She has peripheral edema with venous stasis, left leg.  She has a left medial leg ulcer eschar and distal ulcerations and some more proximal ulcerations that are partially eschared with serosanguineous drainage.  There is some venous congestion, erythema,  no odor, no calor.  She relates there is some pain with them at times.    ASSESSMENT AND PLAN:  Ulcer, left medial ankle and leg.  She has venous stasis with venous hypertension.  It looks like she was started on pentoxifylline from Dermatology.  She opted for no Unna boot today.  I am going to have her clean these daily with Wound Vashe, apply Mepilex border and a compression socks.  She relates she just got her compression socks and she feels she can get those on over dressing, so she will start wearing those.  Return to clinic and see me in 2 weeks.  Previous notes reviewed.          Moderate level of medical decision making.        Again, thank you for allowing me to participate in the care of your patient.        Sincerely,        Camden Salazar DPM

## 2022-11-25 NOTE — NURSING NOTE
Kimberly Chau's chief complaint for this visit includes:  Chief Complaint   Patient presents with     New Patient     referral from Dr. Cummins for Venous leg/foot ulcer     PCP: Guillermo Castellano    Referring Provider:  Hamilton Cummins MD  [address] [place] [id number]    There were no vitals taken for this visit.  Data Unavailable        Allergies   Allergen Reactions     Hctz Other (See Comments)     Pt develops symptomatic and significant hyponatremia with HCTZ exposure     Hydrochlorothiazide      Other reaction(s): Hyponatremia  Hyponatremia     Latex      Benzocaine Rash     carba mix,thrium-sunscreen     Resorcinol-Alcohol Rash     carba mix,thrium-sunscreen     Ace Inhibitors Unknown     Dizziness and orthostatic changes and electrolyte problems.     Latex Unknown         Do you need any medication refills at today's visit?

## 2022-11-25 NOTE — PROGRESS NOTES
Past Medical History:   Diagnosis Date     Anemia      Arthritis      Cataracts      Central retinal artery occlusion      Cervical spine fracture (H)     After a fall from orthostatic sx     Chronic pain     Back of head     Gastro-oesophageal reflux disease      Head injury      Hypertension      Hyponatremia     Resolved, 2/2 diuretics     Migraines      Osteoporosis      Pneumonia 2018     Rheumatoid arthritis(714.0)      Patient Active Problem List   Diagnosis     Intrinsic atopic dermatitis     Essential hypertension     Rheumatoid arthritis (H)     Retinal artery occlusion     Cervical vertebral fracture (H)     Central retinal artery occlusion of right eye     Bilateral occipital neuralgia     C1-C2 instability     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 factor (H)     Osteoarthritis     Malignant hypertension     Hyponatremia     GERD (gastroesophageal reflux disease)     Eczematous dermatitis     Anxiety state     Anemia     Closed fracture of distal end of left radius with delayed healing, unspecified fracture morphology, subsequent encounter     Closed fracture of distal end of left radius, unspecified fracture morphology, sequela     Osteomyelitis of left leg (H)     Pain of left thumb     Past Surgical History:   Procedure Laterality Date     COLONOSCOPY       EYE SURGERY Left 02/2019    Hole in macula     FUSION CERVICAL POSTERIOR THREE+ LEVELS  1/22/2013    Procedure: FUSION CERVICAL POSTERIOR THREE+ LEVELS;  Cervical 1-6 Posterior Instrumentation and Fusion, Cervical 3-4 Laminectomy  .Instrumentation and fusion to Cervical 6 *Latex Allergy*;  Surgeon: Ra Bar MD;  Location: [facility] OR     GYN SURGERY       HEAD & NECK SURGERY       HYSTERECTOMY       IR ENDOVENOUS ABLATION VARICOSE VEINS  2/16/2021     KNEE SURGERY       NECK SURGERY       OPEN REDUCTION INTERNAL FIXATION WRIST Left 4/30/2019    Procedure: Open Reduction Internal Fixation Left Distal Radius  Fracture;  Surgeon: Dinh Strong MD;  [facility] OR     OPEN REDUCTION INTERNAL FIXATION WRIST Left 6/12/2020    Procedure: OPEN REDUCTION INTERNAL FIXATION Left Distal Radius Nonunion, Distal Ulna Resection;  Surgeon: Dinh Strong MD;  [facility]: [facility] OR     OPTICAL TRACKING SYSTEM ENDOSCOPIC SINUS SURGERY Right 4/6/2018    Procedure: OPTICAL TRACKING SYSTEM ENDOSCOPIC SINUS SURGERY;  Stealth Assisted Right Maxillary Antrostomy, Anterior Ethmoidectomy And Frontal Sinusotomy;  Surgeon: Elyse Eisenberg MD;  Location: [facility] OR     OPTICAL TRACKING SYSTEM ENDOSCOPIC SINUS SURGERY Right 8/3/2018    Procedure: OPTICAL TRACKING SYSTEM ENDOSCOPIC SINUS SURGERY;  Stealth Assisted Revision Right Frontal Sinusotomy, Right Stent Placement  **Latex Allergy** ;  Surgeon: Elyse Eisenberg MD;  Location: [facility] OR     ORTHOPEDIC SURGERY       REMOVE HARDWARE WRIST Left 11/19/2019    Procedure: Left Distal Radius Hardware Removal, Flexor Pollicus Longus Repair, Deep Cultures;  Surgeon: Dinh Strong MD;  [facility]: [facility] OR     ZZC HAND/FINGER SURGERY UNLISTED       [facility] PELVIS/HIP JOINT SURGERY UNLISTED       Social History     Socioeconomic History     Marital status:      Spouse name: Not on file     Number of children: Not on file     Years of education: Not on file     Highest education level: Not on file   Occupational History     Not on file   Tobacco Use     Smoking status: Never     Smokeless tobacco: Never   Substance and Sexual Activity     Alcohol use: Yes     Comment: 2 glasses of wine a day     Drug use: Never     Sexual activity: Not Currently   Other Topics Concern     Parent/sibling w/ CABG, MI or angioplasty before 65F 55M? Not Asked   Social History Narrative    Ms. Chau is , has two grown children and two grandchildren. She does not work. She was never a smoker, and drinks a glass of wine with dinner each night.         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      Social Determinants of Health     Financial Resource Strain: Not on file   Food Insecurity: Not on file   Transportation Needs: Not on file   Physical Activity: Not on file   Stress: Not on file   Social Connections: Not on file   Intimate Partner Violence: Not on file   Housing Stability: Not on file     Family History   Problem Relation Age of Onset     Arthritis Mother      Respiratory Mother         pulmonary fibrosis     Hypertension Mother      Anemia Mother      Liver Disease Father         from EtOH     Alcoholism Father      Multiple Sclerosis Sister      No Known Problems Maternal Grandmother      Heart Disease Maternal Grandfather      Breast Cancer Sister      Glaucoma Paternal Grandfather      Heart Disease Paternal Grandfather                    SUBJECTIVE FINDINGS:  An 82-year-old presents for left medial leg ulcer.  She relates it has been present for a couple of weeks.  She relates a couple years ago, she had a procedure done in her veins and use compression socks and they healed up.  She relates no injuries.  She has been using a nonstick fat pad and antibiotic ointments.  She feels this has maybe gotten a little better.  The distal one is kind of scabbed over.  She relates it hurts.  She is not on any antibiotics.  Denies any nausea, vomiting, fever or chills.  She presents from Dr. Cummins in Dermatology.  I reviewed Dr. Cummins's 11/08/2022 note.    OBJECTIVE FINDINGS:  DP and PT are 2/4, left.  She has peripheral edema with venous stasis, left leg.  She has a left medial leg ulcer eschar and distal ulcerations and some more proximal ulcerations that are partially eschared with serosanguineous drainage.  There is some venous congestion, erythema, no odor, no calor.  She relates there is some pain with them at times.    ASSESSMENT AND PLAN:  Ulcer, left medial ankle and leg.  She has venous stasis with venous hypertension.  It looks like she was started on pentoxifylline from  Dermatology.  She opted for no Unna boot today.  I am going to have her clean these daily with Wound Vashe, apply Mepilex border and a compression socks.  She relates she just got her compression socks and she feels she can get those on over dressing, so she will start wearing those.  Return to clinic and see me in 2 weeks.  Previous notes reviewed.          Moderate level of medical decision making.

## 2022-12-13 ENCOUNTER — OFFICE VISIT (OUTPATIENT)
Dept: DERMATOLOGY | Facility: CLINIC | Age: 82
End: 2022-12-13
Payer: MEDICARE

## 2022-12-13 DIAGNOSIS — L90.8 ATROPHIE BLANCHE: ICD-10-CM

## 2022-12-13 DIAGNOSIS — R60.0 LOWER EXTREMITY EDEMA: Primary | ICD-10-CM

## 2022-12-13 PROCEDURE — 99214 OFFICE O/P EST MOD 30 MIN: CPT | Performed by: STUDENT IN AN ORGANIZED HEALTH CARE EDUCATION/TRAINING PROGRAM

## 2022-12-13 ASSESSMENT — PAIN SCALES - GENERAL: PAINLEVEL: MODERATE PAIN (4)

## 2022-12-13 NOTE — PROGRESS NOTES
[facility] Dermatology Note    Encounter Date: Dec 13, 2022    Dermatology Problem List:  #Venous stasis ulcers  - 08/18/21 LE US  - 02/16/21 laser ablation of Great saphenous vein of R &L  leg   - previously improved w/ compression stockings, mupirocin  11/08/22 pitting edema, new rx for 15-20mm Hg compression stockings. Defer aspirin due to hx falls. High BP defer horse chestnut and venoconstrictors. Start pentoxyfylline 400mg TID. Vinegar soaks. Ref wound care         12/13/22 slightly improved. Pain resolved w/ pentoxifylline 400mg TID (taking BID). Not using compression stockings. 2-3+ pitting edmea          Major PMHx  -   ______________________________________    Impression/Plan:  Kimberly was seen today for derm problem.    Diagnoses and all orders for this visit:    Lower extremity edema w/ ulceration  -2-3+ pitting edema from the mid shin down  - Received 15 to 20 mmHg compression stockings although is not wearing them because her pain is improved  - Discussed that this is likely the most important factor influencing the trajectory of her improvement  -Following with wound care saw November 25 and has another appointment next week December 20 being advised on dressing changes, feels that ulcerations are improving photo taken today  - rec stopping topical antibiotic unless signs of active infection present. Apply vaseline to all wounds and cover daily to expedite healing     Atrophie idalia  Changes consistent with  - Significantly reduced to absent pain after starting pentoxifylline 400 mg 3 times daily.  On average patient is taking twice daily 7 out of 7 days of the week discussed that this is preferable to taking 3 times a day intermittently throughout the week.  - aspirin and venoconstrictors like daflon/horse chestnut remain future options       Follow-up in 2 mo.       Staff Involved:  Staff Only    Hamilton Cummins MD   of Dermatology  Department of Dermatology   [facility]      CC:   Chief Complaint   Patient presents with     Derm Problem     Kimberly presents for ulcers on left lower leg       History of Present Illness:  Ms. Kimberly Chau is a 82 year old female who presents as a return patient.    Doing better less pain, not wearing compression stockings. Following w/ podiatry for wound care recs    Labs:      Physical exam:  Vitals: There were no vitals taken for this visit.  GEN: well developed, well-nourished, in no acute distress, in a pleasant mood.     SKIN: Carreon phototype 1  - Focused examination of the L lower leg was performed.  - 2-3+ pitting edma L lower leg to mid shin. Persistent ulcerations relatively unchanged from last visit   - No other lesions of concern on areas examined.     Past Medical History:   Past Medical History:   Diagnosis Date     Anemia      Arthritis      Cataracts      Central retinal artery occlusion      Cervical spine fracture (H)     After a fall from orthostatic sx     Chronic pain     Back of head     Gastro-oesophageal reflux disease      Head injury      Hypertension      Hyponatremia     Resolved, 2/2 diuretics     Migraines      Osteoporosis      Pneumonia 2018     Rheumatoid arthritis(714.0)      Past Surgical History:   Procedure Laterality Date     COLONOSCOPY       EYE SURGERY Left 02/2019    Hole in macula     FUSION CERVICAL POSTERIOR THREE+ LEVELS  1/22/2013    Procedure: FUSION CERVICAL POSTERIOR THREE+ LEVELS;  Cervical 1-6 Posterior Instrumentation and Fusion, Cervical 3-4 Laminectomy  .Instrumentation and fusion to Cervical 6 *Latex Allergy*;  Surgeon: Ra Bar MD;  Location: [facility] OR     GYN SURGERY       HEAD & NECK SURGERY       HYSTERECTOMY       IR ENDOVENOUS ABLATION VARICOSE VEINS  2/16/2021     KNEE SURGERY       NECK SURGERY       OPEN REDUCTION INTERNAL FIXATION WRIST Left 4/30/2019    Procedure: Open Reduction Internal Fixation Left Distal Radius Fracture;   Surgeon: Dinh Strong MD;  Location: [facility] OR     OPEN REDUCTION INTERNAL FIXATION WRIST Left 6/12/2020    Procedure: OPEN REDUCTION INTERNAL FIXATION Left Distal Radius Nonunion, Distal Ulna Resection;  Surgeon: Dinh Strong MD;  Location: [facility] OR     OPTICAL TRACKING SYSTEM ENDOSCOPIC SINUS SURGERY Right 4/6/2018    Procedure: OPTICAL TRACKING SYSTEM ENDOSCOPIC SINUS SURGERY;  Stealth Assisted Right Maxillary Antrostomy, Anterior Ethmoidectomy And Frontal Sinusotomy;  Surgeon: Elyse Eisenberg MD;  Location: [facility] OR     OPTICAL TRACKING SYSTEM ENDOSCOPIC SINUS SURGERY Right 8/3/2018    Procedure: OPTICAL TRACKING SYSTEM ENDOSCOPIC SINUS SURGERY;  Stealth Assisted Revision Right Frontal Sinusotomy, Right Stent Placement  **Latex Allergy** ;  Surgeon: Elyse Eisenberg MD;  Location:  OR     ORTHOPEDIC SURGERY       REMOVE HARDWARE WRIST Left 11/19/2019    Procedure: Left Distal Radius Hardware Removal, Flexor Pollicus Longus Repair, Deep Cultures;  Surgeon: Dinh Strong MD;  Location:  OR     ZZC HAND/FINGER SURGERY UNLISTED       Z PELVIS/HIP JOINT SURGERY UNLISTED         Social History:   reports that she has never smoked. She has never used smokeless tobacco. She reports current alcohol use. She reports that she does not use drugs.    Family History:  Family History   Problem Relation Age of Onset     Arthritis Mother      Respiratory Mother         pulmonary fibrosis     Hypertension Mother      Anemia Mother      Liver Disease Father         from EtOH     Alcoholism Father      No Known Problems Maternal Grandmother      Heart Disease Maternal Grandfather      Glaucoma Paternal Grandfather      Heart Disease Paternal Grandfather      Multiple Sclerosis Sister      Breast Cancer Sister      Skin Cancer No family hx of      Melanoma No family hx of        Medications:  Current Outpatient Medications   Medication Sig Dispense Refill     acetaminophen 650 MG TABS Take 650 mg by mouth every  "4 hours as needed. 100 tablet 0     amLODIPine (NORVASC) 5 MG tablet Take 1 tablet (5 mg) by mouth daily 90 tablet 3     amLODIPine (NORVASC) 5 MG tablet Take 1 tablet (5 mg) by mouth daily 90 tablet 1     carvedilol (COREG) 12.5 MG tablet Take 1 tablet (12.5 mg) by mouth 2 times daily (with meals). 180 tablet 3     COMPRESSION STOCKINGS Please measure and distribute 1 pair of 20mmHg - 30mmHg knee high open or closed toe compression stockings. Jobst ultrasheer or equivalent. 2 each 4     Diphenhydramine-APAP, sleep, (TYLENOL PM EXTRA STRENGTH PO) Take 2 tablets by mouth At Bedtime        ferrous gluconate (FERGON) 324 (38 Fe) MG tablet Take 324 mg by mouth       Gauze Pads & Dressings (TELFA NON-ADHERENT) 3\"X4\" PADS Cut to size for open areas on the lower extremities and secure with paper tape. Change dressing every other day. 30 each 11     Multiple Vitamin (MULTI-VITAMIN) per tablet Take 1 tablet by mouth every morning        Omeprazole (PRILOSEC PO) Take 20 mg by mouth as needed        pentoxifylline ER (TRENTAL) 400 MG CR tablet Take 1 tablet (400 mg) by mouth 3 times daily (with meals) 90 tablet 2     sodium chloride (OCEAN NASAL SPRAY) 0.65 % nasal spray Spray 1 spray into both nostrils daily as needed for congestion 1 Bottle 3     triamcinolone (KENALOG) 0.1 % external cream Apply twice daily for 2 weeks then 3 times weekly for 2 week, repeat cycle as needed (Patient not taking: Reported on 12/13/2022) 454 g 0     Allergies   Allergen Reactions     Hctz Other (See Comments)     Pt develops symptomatic and significant hyponatremia with HCTZ exposure     Hydrochlorothiazide      Other reaction(s): Hyponatremia  Hyponatremia     Latex      Benzocaine Rash     carba mix,thrium-sunscreen     Resorcinol-Alcohol Rash     carba mix,thrium-sunscreen     Ace Inhibitors Unknown     Dizziness and orthostatic changes and electrolyte problems.     Latex Unknown               "

## 2022-12-13 NOTE — LETTER
12/13/2022       RE: Kimberly Chau  [id number]   [place] [address]     Dear Colleague,    Thank you for referring your patient, Kimberly Chau, to the [facility] [place] at [facility]. Please see a copy of my visit note below.    [facility] Dermatology Note    Encounter Date: Dec 13, 2022    Dermatology Problem List:  #Venous stasis ulcers  - 08/18/21 LE US  - 02/16/21 laser ablation of Great saphenous vein of R &L  leg   - previously improved w/ compression stockings, mupirocin  11/08/22 pitting edema, new rx for 15-20mm Hg compression stockings. Defer aspirin due to hx falls. High BP defer horse chestnut and venoconstrictors. Start pentoxyfylline 400mg TID. Vinegar soaks. Ref wound care         12/13/22 slightly improved. Pain resolved w/ pentoxifylline 400mg TID (taking BID). Not using compression stockings. 2-3+ pitting edmea          Major PMHx  -   ______________________________________    Impression/Plan:  Kimberly was seen today for derm problem.    Diagnoses and all orders for this visit:    Lower extremity edema w/ ulceration  -2-3+ pitting edema from the mid shin down  - Received 15 to 20 mmHg compression stockings although is not wearing them because her pain is improved  - Discussed that this is likely the most important factor influencing the trajectory of her improvement  -Following with wound care saw November 25 and has another appointment next week December 20 being advised on dressing changes, feels that ulcerations are improving photo taken today  - rec stopping topical antibiotic unless signs of active infection present. Apply vaseline to all wounds and cover daily to expedite healing     Atrophie idalia  Changes consistent with  - Significantly reduced to absent pain after starting pentoxifylline 400 mg 3 times daily.  On average patient is taking twice daily 7 out of 7 days of the week  discussed that this is preferable to taking 3 times a day intermittently throughout the week.  - aspirin and venoconstrictors like daflon/horse chestnut remain future options       Follow-up in 2 mo.       Staff Involved:  Staff Only    Hamilton Cummins MD   of Dermatology  Department of Dermatology  [facility]      CC:   Chief Complaint   Patient presents with     Derm Problem     Kimberly presents for ulcers on left lower leg       History of Present Illness:  Ms. Kimberly Chau is a 82 year old female who presents as a return patient.    Doing better less pain, not wearing compression stockings. Following w/ podiatry for wound care recs    Labs:      Physical exam:  Vitals: There were no vitals taken for this visit.  GEN: well developed, well-nourished, in no acute distress, in a pleasant mood.     SKIN: Carreon phototype 1  - Focused examination of the L lower leg was performed.  - 2-3+ pitting edma L lower leg to mid shin. Persistent ulcerations relatively unchanged from last visit   - No other lesions of concern on areas examined.     Past Medical History:   Past Medical History:   Diagnosis Date     Anemia      Arthritis      Cataracts      Central retinal artery occlusion      Cervical spine fracture (H)     After a fall from orthostatic sx     Chronic pain     Back of head     Gastro-oesophageal reflux disease      Head injury      Hypertension      Hyponatremia     Resolved, 2/2 diuretics     Migraines      Osteoporosis      Pneumonia 2018     Rheumatoid arthritis(714.0)      Past Surgical History:   Procedure Laterality Date     COLONOSCOPY       EYE SURGERY Left 02/2019    Hole in macula     FUSION CERVICAL POSTERIOR THREE+ LEVELS  1/22/2013    Procedure: FUSION CERVICAL POSTERIOR THREE+ LEVELS;  Cervical 1-6 Posterior Instrumentation and Fusion, Cervical 3-4 Laminectomy  .Instrumentation and fusion to Cervical 6 *Latex Allergy*;  Surgeon: Ra Bar  MD Seth;  Location:  OR     GYN SURGERY       HEAD & NECK SURGERY       HYSTERECTOMY       IR ENDOVENOUS ABLATION VARICOSE VEINS  2/16/2021     KNEE SURGERY       NECK SURGERY       OPEN REDUCTION INTERNAL FIXATION WRIST Left 4/30/2019    Procedure: Open Reduction Internal Fixation Left Distal Radius Fracture;  Surgeon: Dinh Strong MD;  Location:  OR     OPEN REDUCTION INTERNAL FIXATION WRIST Left 6/12/2020    Procedure: OPEN REDUCTION INTERNAL FIXATION Left Distal Radius Nonunion, Distal Ulna Resection;  Surgeon: Dinh Strong MD;  Location:  OR     OPTICAL TRACKING SYSTEM ENDOSCOPIC SINUS SURGERY Right 4/6/2018    Procedure: OPTICAL TRACKING SYSTEM ENDOSCOPIC SINUS SURGERY;  Stealth Assisted Right Maxillary Antrostomy, Anterior Ethmoidectomy And Frontal Sinusotomy;  Surgeon: Elyse Eisenberg MD;  Location:  OR     OPTICAL TRACKING SYSTEM ENDOSCOPIC SINUS SURGERY Right 8/3/2018    Procedure: OPTICAL TRACKING SYSTEM ENDOSCOPIC SINUS SURGERY;  Stealth Assisted Revision Right Frontal Sinusotomy, Right Stent Placement  **Latex Allergy** ;  Surgeon: Elyse Eisenberg MD;  Location:  OR     ORTHOPEDIC SURGERY       REMOVE HARDWARE WRIST Left 11/19/2019    Procedure: Left Distal Radius Hardware Removal, Flexor Pollicus Longus Repair, Deep Cultures;  Surgeon: Dinh Strong MD;  Location:  OR     ZZC HAND/FINGER SURGERY UNLISTED       [facility] PELVIS/HIP JOINT SURGERY UNLISTED         Social History:   reports that she has never smoked. She has never used smokeless tobacco. She reports current alcohol use. She reports that she does not use drugs.    Family History:  Family History   Problem Relation Age of Onset     Arthritis Mother      Respiratory Mother         pulmonary fibrosis     Hypertension Mother      Anemia Mother      Liver Disease Father         from EtOH     Alcoholism Father      No Known Problems Maternal Grandmother      Heart Disease Maternal Grandfather      Glaucoma  "Paternal Grandfather      Heart Disease Paternal Grandfather      Multiple Sclerosis Sister      Breast Cancer Sister      Skin Cancer No family hx of      Melanoma No family hx of        Medications:  Current Outpatient Medications   Medication Sig Dispense Refill     acetaminophen 650 MG TABS Take 650 mg by mouth every 4 hours as needed. 100 tablet 0     amLODIPine (NORVASC) 5 MG tablet Take 1 tablet (5 mg) by mouth daily 90 tablet 3     amLODIPine (NORVASC) 5 MG tablet Take 1 tablet (5 mg) by mouth daily 90 tablet 1     carvedilol (COREG) 12.5 MG tablet Take 1 tablet (12.5 mg) by mouth 2 times daily (with meals). 180 tablet 3     COMPRESSION STOCKINGS Please measure and distribute 1 pair of 20mmHg - 30mmHg knee high open or closed toe compression stockings. Jobst ultrasheer or equivalent. 2 each 4     Diphenhydramine-APAP, sleep, (TYLENOL PM EXTRA STRENGTH PO) Take 2 tablets by mouth At Bedtime        ferrous gluconate (FERGON) 324 (38 Fe) MG tablet Take 324 mg by mouth       Gauze Pads & Dressings (TELFA NON-ADHERENT) 3\"X4\" PADS Cut to size for open areas on the lower extremities and secure with paper tape. Change dressing every other day. 30 each 11     Multiple Vitamin (MULTI-VITAMIN) per tablet Take 1 tablet by mouth every morning        Omeprazole (PRILOSEC PO) Take 20 mg by mouth as needed        pentoxifylline ER (TRENTAL) 400 MG CR tablet Take 1 tablet (400 mg) by mouth 3 times daily (with meals) 90 tablet 2     sodium chloride (OCEAN NASAL SPRAY) 0.65 % nasal spray Spray 1 spray into both nostrils daily as needed for congestion 1 Bottle 3     triamcinolone (KENALOG) 0.1 % external cream Apply twice daily for 2 weeks then 3 times weekly for 2 week, repeat cycle as needed (Patient not taking: Reported on 12/13/2022) 454 g 0     Allergies   Allergen Reactions     Hctz Other (See Comments)     Pt develops symptomatic and significant hyponatremia with HCTZ exposure     Hydrochlorothiazide      Other " reaction(s): Hyponatremia  Hyponatremia     Latex      Benzocaine Rash     carba mix,thrium-sunscreen     Resorcinol-Alcohol Rash     carba mix,thrium-sunscreen     Ace Inhibitors Unknown     Dizziness and orthostatic changes and electrolyte problems.     Latex Unknown       Sincerely,    Hamilton Cummins MD

## 2022-12-13 NOTE — NURSING NOTE
Dermatology Rooming Note    Kimberly Chau's goals for this visit include:   Chief Complaint   Patient presents with     Derm Problem     Kimberly presents for ulcers on left lower leg     Myrtle Boland LPN

## 2022-12-13 NOTE — PATIENT INSTRUCTIONS
Apply vaseline to all open areas, switch to antibiotic ointment if you start having increasing pain in the area    As best you can, wear compression stockings everyday. At night time elevate your feet as best you can     Continue pentoxifylline 400mg three times a day as able, otherwise continue twice daily

## 2022-12-20 ENCOUNTER — OFFICE VISIT (OUTPATIENT)
Dept: PODIATRY | Facility: CLINIC | Age: 82
End: 2022-12-20
Payer: MEDICARE

## 2022-12-20 DIAGNOSIS — L97.922 SKIN ULCER OF LEFT LOWER LEG WITH FAT LAYER EXPOSED (H): Primary | ICD-10-CM

## 2022-12-20 DIAGNOSIS — L03.116 CELLULITIS OF LEFT LEG: ICD-10-CM

## 2022-12-20 DIAGNOSIS — I87.8 VENOUS STASIS: ICD-10-CM

## 2022-12-20 PROCEDURE — 99214 OFFICE O/P EST MOD 30 MIN: CPT | Performed by: PODIATRIST

## 2022-12-20 RX ORDER — CEPHALEXIN 500 MG/1
500 CAPSULE ORAL 2 TIMES DAILY
Qty: 28 CAPSULE | Refills: 0 | Status: SHIPPED | OUTPATIENT
Start: 2022-12-20 | End: 2024-03-26

## 2022-12-20 NOTE — NURSING NOTE
Kimberly Chau's chief complaint for this visit includes:  Chief Complaint   Patient presents with     Follow Up     Venous ulcer     PCP: Guillermo Castellano    Referring Provider:  No referring provider defined for this encounter.    There were no vitals taken for this visit.  Data Unavailable        Allergies   Allergen Reactions     Hctz Other (See Comments)     Pt develops symptomatic and significant hyponatremia with HCTZ exposure     Hydrochlorothiazide      Other reaction(s): Hyponatremia  Hyponatremia     Latex      Benzocaine Rash     carba mix,thrium-sunscreen     Resorcinol-Alcohol Rash     carba mix,thrium-sunscreen     Ace Inhibitors Unknown     Dizziness and orthostatic changes and electrolyte problems.     Latex Unknown         Do you need any medication refills at today's visit?

## 2022-12-20 NOTE — LETTER
12/20/2022         RE: Kimberly Chau  [id number]   [place] [address]        Dear Colleague,    Thank you for referring your patient, Kimberly Chau, to the [facility]. Please see a copy of my visit note below.    Past Medical History:   Diagnosis Date     Anemia      Arthritis      Cataracts      Central retinal artery occlusion      Cervical spine fracture (H)     After a fall from orthostatic sx     Chronic pain     Back of head     Gastro-oesophageal reflux disease      Head injury      Hypertension      Hyponatremia     Resolved, 2/2 diuretics     Migraines      Osteoporosis      Pneumonia 2018     Rheumatoid arthritis(714.0)      Patient Active Problem List   Diagnosis     Intrinsic atopic dermatitis     Essential hypertension     Rheumatoid arthritis (H)     Retinal artery occlusion     Cervical vertebral fracture (H)     Central retinal artery occlusion of right eye     Bilateral occipital neuralgia     C1-C2 instability     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 factor (H)     Osteoarthritis     Malignant hypertension     Hyponatremia     GERD (gastroesophageal reflux disease)     Eczematous dermatitis     Anxiety state     Anemia     Closed fracture of distal end of left radius with delayed healing, unspecified fracture morphology, subsequent encounter     Closed fracture of distal end of left radius, unspecified fracture morphology, sequela     Osteomyelitis of left leg (H)     Pain of left thumb     Past Surgical History:   Procedure Laterality Date     COLONOSCOPY       EYE SURGERY Left 02/2019    Hole in macula     FUSION CERVICAL POSTERIOR THREE+ LEVELS  1/22/2013    Procedure: FUSION CERVICAL POSTERIOR THREE+ LEVELS;  Cervical 1-6 Posterior Instrumentation and Fusion, Cervical 3-4 Laminectomy  .Instrumentation and fusion to Cervical 6 *Latex Allergy*;  Surgeon: Ra Bar MD;  Location: [facility] OR     GYN SURGERY       HEAD & NECK  SURGERY       HYSTERECTOMY       IR ENDOVENOUS ABLATION VARICOSE VEINS  2/16/2021     KNEE SURGERY       NECK SURGERY       OPEN REDUCTION INTERNAL FIXATION WRIST Left 4/30/2019    Procedure: Open Reduction Internal Fixation Left Distal Radius Fracture;  Surgeon: Dinh Strong MD;  [facility]     OPEN REDUCTION INTERNAL FIXATION WRIST Left 6/12/2020    Procedure: OPEN REDUCTION INTERNAL FIXATION Left Distal Radius Nonunion, Distal Ulna Resection;  Surgeon: Dinh Strong MD;  [facility]: [facility]     OPTICAL TRACKING SYSTEM ENDOSCOPIC SINUS SURGERY Right 4/6/2018    Procedure: OPTICAL TRACKING SYSTEM ENDOSCOPIC SINUS SURGERY;  Stealth Assisted Right Maxillary Antrostomy, Anterior Ethmoidectomy And Frontal Sinusotomy;  Surgeon: Elyse Eisenberg MD;  Location: [facility]     OPTICAL TRACKING SYSTEM ENDOSCOPIC SINUS SURGERY Right 8/3/2018    Procedure: OPTICAL TRACKING SYSTEM ENDOSCOPIC SINUS SURGERY;  Stealth Assisted Revision Right Frontal Sinusotomy, Right Stent Placement  **Latex Allergy** ;  Surgeon: Elyse Eisenberg MD;  Location: [facility]     ORTHOPEDIC SURGERY       REMOVE HARDWARE WRIST Left 11/19/2019    Procedure: Left Distal Radius Hardware Removal, Flexor Pollicus Longus Repair, Deep Cultures;  Surgeon: Dinh Strong MD;  [facility]     ZZC HAND/FINGER SURGERY UNLISTED       ZZC PELVIS/HIP JOINT SURGERY UNLISTED       Social History     Socioeconomic History     Marital status:      Spouse name: Not on file     Number of children: Not on file     Years of education: Not on file     Highest education level: Not on file   Occupational History     Not on file   Tobacco Use     Smoking status: Never     Smokeless tobacco: Never   Substance and Sexual Activity     Alcohol use: Yes     Comment: 2 glasses of wine a day     Drug use: Never     Sexual activity: Not Currently   Other Topics Concern     Parent/sibling w/ CABG, MI or angioplasty before 65F 55M? Not Asked   Social History  Narrative    Ms. Chau is , has two grown children and two grandchildren. She does not work. She was never a smoker, and drinks a glass of wine with dinner each night.         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      Social Determinants of Health     Financial Resource Strain: Not on file   Food Insecurity: Not on file   Transportation Needs: Not on file   Physical Activity: Not on file   Stress: Not on file   Social Connections: Not on file   Intimate Partner Violence: Not on file   Housing Stability: Not on file     Family History   Problem Relation Age of Onset     Arthritis Mother      Respiratory Mother         pulmonary fibrosis     Hypertension Mother      Anemia Mother      Liver Disease Father         from EtOH     Alcoholism Father      No Known Problems Maternal Grandmother      Heart Disease Maternal Grandfather      Glaucoma Paternal Grandfather      Heart Disease Paternal Grandfather      Multiple Sclerosis Sister      Breast Cancer Sister      Skin Cancer No family hx of      Melanoma No family hx of      Last Comprehensive Metabolic Panel:  Sodium   Date Value Ref Range Status   07/08/2022 140 133 - 144 mmol/L Final   01/06/2021 134 133 - 144 mmol/L Final     Potassium   Date Value Ref Range Status   07/08/2022 4.3 3.4 - 5.3 mmol/L Final   01/06/2021 4.0 3.4 - 5.3 mmol/L Final     Chloride   Date Value Ref Range Status   07/08/2022 104 94 - 109 mmol/L Final   01/06/2021 102 94 - 109 mmol/L Final     Carbon Dioxide   Date Value Ref Range Status   01/06/2021 28 20 - 32 mmol/L Final     Carbon Dioxide (CO2)   Date Value Ref Range Status   07/08/2022 26 20 - 32 mmol/L Final     Anion Gap   Date Value Ref Range Status   07/08/2022 10 3 - 14 mmol/L Final   01/06/2021 4 3 - 14 mmol/L Final     Glucose   Date Value Ref Range Status   07/08/2022 97 70 - 99 mg/dL Final   01/06/2021 87 70 - 99 mg/dL Final     Urea Nitrogen   Date Value Ref Range Status   07/08/2022 11 7 -  30 mg/dL Final   01/06/2021 14 7 - 30 mg/dL Final     Creatinine   Date Value Ref Range Status   07/08/2022 1.22 (H) 0.52 - 1.04 mg/dL Final   01/06/2021 0.90 0.52 - 1.04 mg/dL Final     GFR Estimate   Date Value Ref Range Status   07/08/2022 44 (L) >60 mL/min/1.73m2 Final     Comment:     Effective December 21, 2021 eGFRcr in adults is calculated using the 2021 CKD-EPI creatinine equation which includes age and gender (Anne et al., NEJ, DOI: 10.1056/PFBJwa7602854)   01/06/2021 59 (L) >60 mL/min/[1.73_m2] Final     Comment:     Non  GFR Calc  Starting 12/18/2018, serum creatinine based estimated GFR (eGFR) will be   calculated using the Chronic Kidney Disease Epidemiology Collaboration   (CKD-EPI) equation.       Calcium   Date Value Ref Range Status   07/08/2022 9.5 8.5 - 10.1 mg/dL Final   01/06/2021 8.8 8.5 - 10.1 mg/dL Final               SUBJECTIVE FINDINGS:  An 82-year-old returns to clinic for left medial leg ulcers with venous stasis and venous hypertension.  She relates she is not really sure how it is doing and she has been using the Telfa dressing and cleaning with the wound cleanser.  She has been wearing her compression socks some of the time and she did see Dermatology.  I reviewed Dermatology's 12/13/2022 note.  No systemic signs of infection.  She is not currently on any antibiotics.  She relates to no problems with those in the past.  She relates she is taking the Trental and she feels that has maybe helped a little bit.    OBJECTIVE FINDINGS:  DP and PT are 2/4, left.  She has left medial leg ulcers that are through the dermis into the subcutaneous tissues that are mostly eschared.  There is dried serosanguineous drainage, positive erythema and edema, some tenderness to palpation distally.  No odor, no calor.  She has increased peripheral edema with venous stasis.    ASSESSMENT AND PLAN:  Ulcers, left medial ankle and leg.  She has venous stasis, venous hypertension and some  lymphedema component to this.  She has cellulitis present.  Diagnosis and treatment options discussed with her.  I cleaned the ulcer sites and leg with Wound Vashe, applied Hydrofera Blue to the ulcer sites and a multilayered compression system with an Unna boot with light compression applied and use discussed with her.  Prescription for Keflex given and use discussed with her.  Return to clinic and see me in 1 week.  Previous notes reviewed.          High level of medical decision making.        Again, thank you for allowing me to participate in the care of your patient.        Sincerely,        Camden Salazar DPM

## 2022-12-20 NOTE — PROGRESS NOTES
Past Medical History:   Diagnosis Date     Anemia      Arthritis      Cataracts      Central retinal artery occlusion      Cervical spine fracture (H)     After a fall from orthostatic sx     Chronic pain     Back of head     Gastro-oesophageal reflux disease      Head injury      Hypertension      Hyponatremia     Resolved, 2/2 diuretics     Migraines      Osteoporosis      Pneumonia 2018     Rheumatoid arthritis(714.0)      Patient Active Problem List   Diagnosis     Intrinsic atopic dermatitis     Essential hypertension     Rheumatoid arthritis (H)     Retinal artery occlusion     Cervical vertebral fracture (H)     Central retinal artery occlusion of right eye     Bilateral occipital neuralgia     C1-C2 instability     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 factor (H)     Osteoarthritis     Malignant hypertension     Hyponatremia     GERD (gastroesophageal reflux disease)     Eczematous dermatitis     Anxiety state     Anemia     Closed fracture of distal end of left radius with delayed healing, unspecified fracture morphology, subsequent encounter     Closed fracture of distal end of left radius, unspecified fracture morphology, sequela     Osteomyelitis of left leg (H)     Pain of left thumb     Past Surgical History:   Procedure Laterality Date     COLONOSCOPY       EYE SURGERY Left 02/2019    Hole in macula     FUSION CERVICAL POSTERIOR THREE+ LEVELS  1/22/2013    Procedure: FUSION CERVICAL POSTERIOR THREE+ LEVELS;  Cervical 1-6 Posterior Instrumentation and Fusion, Cervical 3-4 Laminectomy  .Instrumentation and fusion to Cervical 6 *Latex Allergy*;  Surgeon: Ra Bar MD;  Location: [facility]     GYN SURGERY       HEAD & NECK SURGERY       HYSTERECTOMY       IR ENDOVENOUS ABLATION VARICOSE VEINS  2/16/2021     KNEE SURGERY       NECK SURGERY       OPEN REDUCTION INTERNAL FIXATION WRIST Left 4/30/2019    Procedure: Open Reduction Internal Fixation Left Distal Radius  Fracture;  Surgeon: Dinh Strong MD;  [facility]     OPEN REDUCTION INTERNAL FIXATION WRIST Left 6/12/2020    Procedure: OPEN REDUCTION INTERNAL FIXATION Left Distal Radius Nonunion, Distal Ulna Resection;  Surgeon: Dinh Strong MD;  [facility]     OPTICAL TRACKING SYSTEM ENDOSCOPIC SINUS SURGERY Right 4/6/2018    Procedure: OPTICAL TRACKING SYSTEM ENDOSCOPIC SINUS SURGERY;  Stealth Assisted Right Maxillary Antrostomy, Anterior Ethmoidectomy And Frontal Sinusotomy;  Surgeon: Elyse Eisenberg MD;  Location: [facility]     OPTICAL TRACKING SYSTEM ENDOSCOPIC SINUS SURGERY Right 8/3/2018    Procedure: OPTICAL TRACKING SYSTEM ENDOSCOPIC SINUS SURGERY;  Stealth Assisted Revision Right Frontal Sinusotomy, Right Stent Placement  **Latex Allergy** ;  Surgeon: Elyse Eisenberg MD;  Location: [facility]     ORTHOPEDIC SURGERY       REMOVE HARDWARE WRIST Left 11/19/2019    Procedure: Left Distal Radius Hardware Removal, Flexor Pollicus Longus Repair, Deep Cultures;  Surgeon: Dinh Strong MD;  [facility]     ZZC HAND/FINGER SURGERY UNLISTED       ZZC PELVIS/HIP JOINT SURGERY UNLISTED       Social History     Socioeconomic History     Marital status:      Spouse name: Not on file     Number of children: Not on file     Years of education: Not on file     Highest education level: Not on file   Occupational History     Not on file   Tobacco Use     Smoking status: Never     Smokeless tobacco: Never   Substance and Sexual Activity     Alcohol use: Yes     Comment: 2 glasses of wine a day     Drug use: Never     Sexual activity: Not Currently   Other Topics Concern     Parent/sibling w/ CABG, MI or angioplasty before 65F 55M? Not Asked   Social History Narrative    Ms. Chau is , has two grown children and two grandchildren. She does not work. She was never a smoker, and drinks a glass of wine with dinner each night.         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      Social Determinants of Health     Financial Resource Strain: Not on file   Food Insecurity: Not on file   Transportation Needs: Not on file   Physical Activity: Not on file   Stress: Not on file   Social Connections: Not on file   Intimate Partner Violence: Not on file   Housing Stability: Not on file     Family History   Problem Relation Age of Onset     Arthritis Mother      Respiratory Mother         pulmonary fibrosis     Hypertension Mother      Anemia Mother      Liver Disease Father         from EtOH     Alcoholism Father      No Known Problems Maternal Grandmother      Heart Disease Maternal Grandfather      Glaucoma Paternal Grandfather      Heart Disease Paternal Grandfather      Multiple Sclerosis Sister      Breast Cancer Sister      Skin Cancer No family hx of      Melanoma No family hx of      Last Comprehensive Metabolic Panel:  Sodium   Date Value Ref Range Status   07/08/2022 140 133 - 144 mmol/L Final   01/06/2021 134 133 - 144 mmol/L Final     Potassium   Date Value Ref Range Status   07/08/2022 4.3 3.4 - 5.3 mmol/L Final   01/06/2021 4.0 3.4 - 5.3 mmol/L Final     Chloride   Date Value Ref Range Status   07/08/2022 104 94 - 109 mmol/L Final   01/06/2021 102 94 - 109 mmol/L Final     Carbon Dioxide   Date Value Ref Range Status   01/06/2021 28 20 - 32 mmol/L Final     Carbon Dioxide (CO2)   Date Value Ref Range Status   07/08/2022 26 20 - 32 mmol/L Final     Anion Gap   Date Value Ref Range Status   07/08/2022 10 3 - 14 mmol/L Final   01/06/2021 4 3 - 14 mmol/L Final     Glucose   Date Value Ref Range Status   07/08/2022 97 70 - 99 mg/dL Final   01/06/2021 87 70 - 99 mg/dL Final     Urea Nitrogen   Date Value Ref Range Status   07/08/2022 11 7 - 30 mg/dL Final   01/06/2021 14 7 - 30 mg/dL Final     Creatinine   Date Value Ref Range Status   07/08/2022 1.22 (H) 0.52 - 1.04 mg/dL Final   01/06/2021 0.90 0.52 - 1.04 mg/dL Final     GFR Estimate   Date Value Ref Range Status   07/08/2022  44 (L) >60 mL/min/1.73m2 Final     Comment:     Effective December 21, 2021 eGFRcr in adults is calculated using the 2021 CKD-EPI creatinine equation which includes age and gender (Anne et al., NE, DOI: 10.1056/ZECRto7418389)   01/06/2021 59 (L) >60 mL/min/[1.73_m2] Final     Comment:     Non  GFR Calc  Starting 12/18/2018, serum creatinine based estimated GFR (eGFR) will be   calculated using the Chronic Kidney Disease Epidemiology Collaboration   (CKD-EPI) equation.       Calcium   Date Value Ref Range Status   07/08/2022 9.5 8.5 - 10.1 mg/dL Final   01/06/2021 8.8 8.5 - 10.1 mg/dL Final               SUBJECTIVE FINDINGS:  An 82-year-old returns to clinic for left medial leg ulcers with venous stasis and venous hypertension.  She relates she is not really sure how it is doing and she has been using the Telfa dressing and cleaning with the wound cleanser.  She has been wearing her compression socks some of the time and she did see Dermatology.  I reviewed Dermatology's 12/13/2022 note.  No systemic signs of infection.  She is not currently on any antibiotics.  She relates to no problems with those in the past.  She relates she is taking the Trental and she feels that has maybe helped a little bit.    OBJECTIVE FINDINGS:  DP and PT are 2/4, left.  She has left medial leg ulcers that are through the dermis into the subcutaneous tissues that are mostly eschared.  There is dried serosanguineous drainage, positive erythema and edema, some tenderness to palpation distally.  No odor, no calor.  She has increased peripheral edema with venous stasis.    ASSESSMENT AND PLAN:  Ulcers, left medial ankle and leg.  She has venous stasis, venous hypertension and some lymphedema component to this.  She has cellulitis present.  Diagnosis and treatment options discussed with her.  I cleaned the ulcer sites and leg with Wound Vashe, applied Hydrofera Blue to the ulcer sites and a multilayered compression system  with an Unna boot with light compression applied and use discussed with her.  Prescription for Keflex given and use discussed with her.  Return to clinic and see me in 1 week.  Previous notes reviewed.          High level of medical decision making.

## 2022-12-28 ENCOUNTER — OFFICE VISIT (OUTPATIENT)
Dept: PODIATRY | Facility: CLINIC | Age: 82
End: 2022-12-28
Payer: MEDICARE

## 2022-12-28 DIAGNOSIS — L03.116 CELLULITIS OF LEFT LEG: ICD-10-CM

## 2022-12-28 DIAGNOSIS — L97.922 SKIN ULCER OF LEFT LOWER LEG WITH FAT LAYER EXPOSED (H): Primary | ICD-10-CM

## 2022-12-28 DIAGNOSIS — I87.8 VENOUS STASIS: ICD-10-CM

## 2022-12-28 PROCEDURE — 99214 OFFICE O/P EST MOD 30 MIN: CPT | Performed by: PODIATRIST

## 2022-12-28 NOTE — PROGRESS NOTES
Past Medical History:   Diagnosis Date     Anemia      Arthritis      Cataracts      Central retinal artery occlusion      Cervical spine fracture (H)     After a fall from orthostatic sx     Chronic pain     Back of head     Gastro-oesophageal reflux disease      Head injury      Hypertension      Hyponatremia     Resolved, 2/2 diuretics     Migraines      Osteoporosis      Pneumonia 2018     Rheumatoid arthritis(714.0)      Patient Active Problem List   Diagnosis     Intrinsic atopic dermatitis     Essential hypertension     Rheumatoid arthritis (H)     Retinal artery occlusion     Cervical vertebral fracture (H)     Central retinal artery occlusion of right eye     Bilateral occipital neuralgia     C1-C2 instability     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 factor (H)     Osteoarthritis     Malignant hypertension     Hyponatremia     GERD (gastroesophageal reflux disease)     Eczematous dermatitis     Anxiety state     Anemia     Closed fracture of distal end of left radius with delayed healing, unspecified fracture morphology, subsequent encounter     Closed fracture of distal end of left radius, unspecified fracture morphology, sequela     Osteomyelitis of left leg (H)     Pain of left thumb     Past Surgical History:   Procedure Laterality Date     COLONOSCOPY       EYE SURGERY Left 02/2019    Hole in macula     FUSION CERVICAL POSTERIOR THREE+ LEVELS  1/22/2013    Procedure: FUSION CERVICAL POSTERIOR THREE+ LEVELS;  Cervical 1-6 Posterior Instrumentation and Fusion, Cervical 3-4 Laminectomy  .Instrumentation and fusion to Cervical 6 *Latex Allergy*;  Surgeon: Ra Bar MD;  Location: [facility]     GYN SURGERY       HEAD & NECK SURGERY       HYSTERECTOMY       IR ENDOVENOUS ABLATION VARICOSE VEINS  2/16/2021     KNEE SURGERY       NECK SURGERY       OPEN REDUCTION INTERNAL FIXATION WRIST Left 4/30/2019    Procedure: Open Reduction Internal Fixation Left Distal Radius  Fracture;  Surgeon: Dinh Strong MD;  [facility]     OPEN REDUCTION INTERNAL FIXATION WRIST Left 6/12/2020    Procedure: OPEN REDUCTION INTERNAL FIXATION Left Distal Radius Nonunion, Distal Ulna Resection;  Surgeon: Dinh Strong MD;  [facility]     OPTICAL TRACKING SYSTEM ENDOSCOPIC SINUS SURGERY Right 4/6/2018    Procedure: OPTICAL TRACKING SYSTEM ENDOSCOPIC SINUS SURGERY;  Stealth Assisted Right Maxillary Antrostomy, Anterior Ethmoidectomy And Frontal Sinusotomy;  Surgeon: Elyse Eisenberg MD;  Location: [facility]     OPTICAL TRACKING SYSTEM ENDOSCOPIC SINUS SURGERY Right 8/3/2018    Procedure: OPTICAL TRACKING SYSTEM ENDOSCOPIC SINUS SURGERY;  Stealth Assisted Revision Right Frontal Sinusotomy, Right Stent Placement  **Latex Allergy** ;  Surgeon: Elyse Eisenberg MD;  Location: [facility]     ORTHOPEDIC SURGERY       REMOVE HARDWARE WRIST Left 11/19/2019    Procedure: Left Distal Radius Hardware Removal, Flexor Pollicus Longus Repair, Deep Cultures;  Surgeon: Dinh Strong MD;  Location: [facility]     ZZC HAND/FINGER SURGERY UNLISTED       ZZC PELVIS/HIP JOINT SURGERY UNLISTED       Social History     Socioeconomic History     Marital status:      Spouse name: Not on file     Number of children: Not on file     Years of education: Not on file     Highest education level: Not on file   Occupational History     Not on file   Tobacco Use     Smoking status: Never     Smokeless tobacco: Never   Substance and Sexual Activity     Alcohol use: Yes     Comment: 2 glasses of wine a day     Drug use: Never     Sexual activity: Not Currently   Other Topics Concern     Parent/sibling w/ CABG, MI or angioplasty before 65F 55M? Not Asked   Social History Narrative    Ms. Chau is , has two grown children and two grandchildren. She does not work. She was never a smoker, and drinks a glass of wine with dinner each night.         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      Social Determinants of Health     Financial Resource Strain: Not on file   Food Insecurity: Not on file   Transportation Needs: Not on file   Physical Activity: Not on file   Stress: Not on file   Social Connections: Not on file   Intimate Partner Violence: Not on file   Housing Stability: Not on file     Family History   Problem Relation Age of Onset     Arthritis Mother      Respiratory Mother         pulmonary fibrosis     Hypertension Mother      Anemia Mother      Liver Disease Father         from EtOH     Alcoholism Father      No Known Problems Maternal Grandmother      Heart Disease Maternal Grandfather      Glaucoma Paternal Grandfather      Heart Disease Paternal Grandfather      Multiple Sclerosis Sister      Breast Cancer Sister      Skin Cancer No family hx of      Melanoma No family hx of                SUBJECTIVE FINDINGS:  An 82-year-old returns to clinic for ulcers on the left medial leg with cellulitis, venous stasis and venous hypertension with some lymphedema.  She relates she is doing well.  She had no problems with Unna boot.  She has taken the Keflex with no problems.  She would prefer not to do the Unna boot again because she would like to shower and she does not mind changing it every day.    OBJECTIVE FINDINGS:  DP and PT are 2/4, left.  She has decreased edema, decreased erythema, no odor, no calor.  No pain on palpation.  Wound margins are autumn.    ASSESSMENT AND PLAN:  Ulcer, left medial ankle and leg.  She has venous stasis, venous hypertension, lymphedema and cellulitis present.  This is improved.  Diagnosis and treatment options discussed with her.  I cleaned the leg and the ulcer sites with Wound Vashe, applied Hydrofera Blue and wrapped with Kerlix and Ace wrap.  I instructed her on how to do this daily and return to clinic and see me in 1 week.  Finish the Keflex.  Previous notes reviewed.    I advised her on cast protector use for showering.          Moderate  level of medical decision making.

## 2022-12-28 NOTE — LETTER
12/28/2022         RE: Kimberly Chau  [id number]   [place] [address]        Dear Colleague,    Thank you for referring your patient, Kimberly Chau, to the [facility]. Please see a copy of my visit note below.    Past Medical History:   Diagnosis Date     Anemia      Arthritis      Cataracts      Central retinal artery occlusion      Cervical spine fracture (H)     After a fall from orthostatic sx     Chronic pain     Back of head     Gastro-oesophageal reflux disease      Head injury      Hypertension      Hyponatremia     Resolved, 2/2 diuretics     Migraines      Osteoporosis      Pneumonia 2018     Rheumatoid arthritis(714.0)      Patient Active Problem List   Diagnosis     Intrinsic atopic dermatitis     Essential hypertension     Rheumatoid arthritis (H)     Retinal artery occlusion     Cervical vertebral fracture (H)     Central retinal artery occlusion of right eye     Bilateral occipital neuralgia     C1-C2 instability     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 factor (H)     Osteoarthritis     Malignant hypertension     Hyponatremia     GERD (gastroesophageal reflux disease)     Eczematous dermatitis     Anxiety state     Anemia     Closed fracture of distal end of left radius with delayed healing, unspecified fracture morphology, subsequent encounter     Closed fracture of distal end of left radius, unspecified fracture morphology, sequela     Osteomyelitis of left leg (H)     Pain of left thumb     Past Surgical History:   Procedure Laterality Date     COLONOSCOPY       EYE SURGERY Left 02/2019    Hole in macula     FUSION CERVICAL POSTERIOR THREE+ LEVELS  1/22/2013    Procedure: FUSION CERVICAL POSTERIOR THREE+ LEVELS;  Cervical 1-6 Posterior Instrumentation and Fusion, Cervical 3-4 Laminectomy  .Instrumentation and fusion to Cervical 6 *Latex Allergy*;  Surgeon: Ra Bar MD;  Location: [facility] OR     GYN SURGERY       HEAD & NECK  SURGERY       HYSTERECTOMY       IR ENDOVENOUS ABLATION VARICOSE VEINS  2/16/2021     KNEE SURGERY       NECK SURGERY       OPEN REDUCTION INTERNAL FIXATION WRIST Left 4/30/2019    Procedure: Open Reduction Internal Fixation Left Distal Radius Fracture;  Surgeon: Dinh Strong MD;  [facility]     OPEN REDUCTION INTERNAL FIXATION WRIST Left 6/12/2020    Procedure: OPEN REDUCTION INTERNAL FIXATION Left Distal Radius Nonunion, Distal Ulna Resection;  Surgeon: Dinh Strong MD;  Location: [facility]     OPTICAL TRACKING SYSTEM ENDOSCOPIC SINUS SURGERY Right 4/6/2018    Procedure: OPTICAL TRACKING SYSTEM ENDOSCOPIC SINUS SURGERY;  Stealth Assisted Right Maxillary Antrostomy, Anterior Ethmoidectomy And Frontal Sinusotomy;  Surgeon: Elyse Eisenberg MD;  Location: [facility] OR     OPTICAL TRACKING SYSTEM ENDOSCOPIC SINUS SURGERY Right 8/3/2018    Procedure: OPTICAL TRACKING SYSTEM ENDOSCOPIC SINUS SURGERY;  Stealth Assisted Revision Right Frontal Sinusotomy, Right Stent Placement  **Latex Allergy** ;  Surgeon: Elyse Eisenberg MD;  Location: [facility] OR     ORTHOPEDIC SURGERY       REMOVE HARDWARE WRIST Left 11/19/2019    Procedure: Left Distal Radius Hardware Removal, Flexor Pollicus Longus Repair, Deep Cultures;  Surgeon: Dinh Strong MD;  [facility]: [facility]     ZZC HAND/FINGER SURGERY UNLISTED       ZZC PELVIS/HIP JOINT SURGERY UNLISTED       Social History     Socioeconomic History     Marital status:      Spouse name: Not on file     Number of children: Not on file     Years of education: Not on file     Highest education level: Not on file   Occupational History     Not on file   Tobacco Use     Smoking status: Never     Smokeless tobacco: Never   Substance and Sexual Activity     Alcohol use: Yes     Comment: 2 glasses of wine a day     Drug use: Never     Sexual activity: Not Currently   Other Topics Concern     Parent/sibling w/ CABG, MI or angioplasty before 65F 55M? Not Asked   Social History  Narrative    Ms. Chau is , has two grown children and two grandchildren. She does not work. She was never a smoker, and drinks a glass of wine with dinner each night.         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      Social Determinants of Health     Financial Resource Strain: Not on file   Food Insecurity: Not on file   Transportation Needs: Not on file   Physical Activity: Not on file   Stress: Not on file   Social Connections: Not on file   Intimate Partner Violence: Not on file   Housing Stability: Not on file     Family History   Problem Relation Age of Onset     Arthritis Mother      Respiratory Mother         pulmonary fibrosis     Hypertension Mother      Anemia Mother      Liver Disease Father         from EtOH     Alcoholism Father      No Known Problems Maternal Grandmother      Heart Disease Maternal Grandfather      Glaucoma Paternal Grandfather      Heart Disease Paternal Grandfather      Multiple Sclerosis Sister      Breast Cancer Sister      Skin Cancer No family hx of      Melanoma No family hx of                SUBJECTIVE FINDINGS:  An 82-year-old returns to clinic for ulcers on the left medial leg with cellulitis, venous stasis and venous hypertension with some lymphedema.  She relates she is doing well.  She had no problems with Unna boot.  She has taken the Keflex with no problems.  She would prefer not to do the Unna boot again because she would like to shower and she does not mind changing it every day.    OBJECTIVE FINDINGS:  DP and PT are 2/4, left.  She has decreased edema, decreased erythema, no odor, no calor.  No pain on palpation.  Wound margins are autumn.    ASSESSMENT AND PLAN:  Ulcer, left medial ankle and leg.  She has venous stasis, venous hypertension, lymphedema and cellulitis present.  This is improved.  Diagnosis and treatment options discussed with her.  I cleaned the leg and the ulcer sites with Wound Vashe, applied Hydrofera Blue  and wrapped with Kerlix and Ace wrap.  I instructed her on how to do this daily and return to clinic and see me in 1 week.  Finish the Keflex.  Previous notes reviewed.    I advised her on cast protector use for showering.          Moderate level of medical decision making.        Again, thank you for allowing me to participate in the care of your patient.        Sincerely,        Camden Salazar DPM

## 2022-12-28 NOTE — NURSING NOTE
Kimberly Chau's chief complaint for this visit includes:  Chief Complaint   Patient presents with     Follow Up     Left leg ulcers     PCP: Guillermo Castellano    Referring Provider:  No referring provider defined for this encounter.    There were no vitals taken for this visit.  Data Unavailable        Allergies   Allergen Reactions     Hctz Other (See Comments)     Pt develops symptomatic and significant hyponatremia with HCTZ exposure     Hydrochlorothiazide      Other reaction(s): Hyponatremia  Hyponatremia     Latex      Benzocaine Rash     carba mix,thrium-sunscreen     Resorcinol-Alcohol Rash     carba mix,thrium-sunscreen     Ace Inhibitors Unknown     Dizziness and orthostatic changes and electrolyte problems.     Latex Unknown         Do you need any medication refills at today's visit?

## 2023-01-04 ENCOUNTER — OFFICE VISIT (OUTPATIENT)
Dept: PODIATRY | Facility: CLINIC | Age: 83
End: 2023-01-04
Payer: MEDICARE

## 2023-01-04 DIAGNOSIS — L97.922 SKIN ULCER OF LEFT LOWER LEG WITH FAT LAYER EXPOSED (H): Primary | ICD-10-CM

## 2023-01-04 DIAGNOSIS — I87.8 VENOUS STASIS: ICD-10-CM

## 2023-01-04 PROCEDURE — 99214 OFFICE O/P EST MOD 30 MIN: CPT | Performed by: PODIATRIST

## 2023-01-04 NOTE — LETTER
1/4/2023         RE: Kimberly Chau  [id number]   [place] [address]        Dear Colleague,    Thank you for referring your patient, Kimberly Chau, to the [facility]. Please see a copy of my visit note below.    Past Medical History:   Diagnosis Date     Anemia      Arthritis      Cataracts      Central retinal artery occlusion      Cervical spine fracture (H)     After a fall from orthostatic sx     Chronic pain     Back of head     Gastro-oesophageal reflux disease      Head injury      Hypertension      Hyponatremia     Resolved, 2/2 diuretics     Migraines      Osteoporosis      Pneumonia 2018     Rheumatoid arthritis(714.0)      Patient Active Problem List   Diagnosis     Intrinsic atopic dermatitis     Essential hypertension     Rheumatoid arthritis (H)     Retinal artery occlusion     Cervical vertebral fracture (H)     Central retinal artery occlusion of right eye     Bilateral occipital neuralgia     C1-C2 instability     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 factor (H)     Osteoarthritis     Malignant hypertension     Hyponatremia     GERD (gastroesophageal reflux disease)     Eczematous dermatitis     Anxiety state     Anemia     Closed fracture of distal end of left radius with delayed healing, unspecified fracture morphology, subsequent encounter     Closed fracture of distal end of left radius, unspecified fracture morphology, sequela     Osteomyelitis of left leg (H)     Pain of left thumb     Past Surgical History:   Procedure Laterality Date     COLONOSCOPY       EYE SURGERY Left 02/2019    Hole in macula     FUSION CERVICAL POSTERIOR THREE+ LEVELS  1/22/2013    Procedure: FUSION CERVICAL POSTERIOR THREE+ LEVELS;  Cervical 1-6 Posterior Instrumentation and Fusion, Cervical 3-4 Laminectomy  .Instrumentation and fusion to Cervical 6 *Latex Allergy*;  Surgeon: Ra Bar MD;  Location: [facility] OR     GYN SURGERY       HEAD & NECK  SURGERY       HYSTERECTOMY       IR ENDOVENOUS ABLATION VARICOSE VEINS  2/16/2021     KNEE SURGERY       NECK SURGERY       OPEN REDUCTION INTERNAL FIXATION WRIST Left 4/30/2019    Procedure: Open Reduction Internal Fixation Left Distal Radius Fracture;  Surgeon: Dinh Strong MD;  [facility]     OPEN REDUCTION INTERNAL FIXATION WRIST Left 6/12/2020    Procedure: OPEN REDUCTION INTERNAL FIXATION Left Distal Radius Nonunion, Distal Ulna Resection;  Surgeon: Dinh Strong MD;  [facility]     OPTICAL TRACKING SYSTEM ENDOSCOPIC SINUS SURGERY Right 4/6/2018    Procedure: OPTICAL TRACKING SYSTEM ENDOSCOPIC SINUS SURGERY;  Stealth Assisted Right Maxillary Antrostomy, Anterior Ethmoidectomy And Frontal Sinusotomy;  Surgeon: Elyse Eisenberg MD;  Location: [facility] OR     OPTICAL TRACKING SYSTEM ENDOSCOPIC SINUS SURGERY Right 8/3/2018    Procedure: OPTICAL TRACKING SYSTEM ENDOSCOPIC SINUS SURGERY;  Stealth Assisted Revision Right Frontal Sinusotomy, Right Stent Placement  **Latex Allergy** ;  Surgeon: Elyse Eisenberg MD;  Location: [facility]     ORTHOPEDIC SURGERY       REMOVE HARDWARE WRIST Left 11/19/2019    Procedure: Left Distal Radius Hardware Removal, Flexor Pollicus Longus Repair, Deep Cultures;  Surgeon: Dinh Strong MD;  [facility]     ZZC HAND/FINGER SURGERY UNLISTED       ZZC PELVIS/HIP JOINT SURGERY UNLISTED       Social History     Socioeconomic History     Marital status:      Spouse name: Not on file     Number of children: Not on file     Years of education: Not on file     Highest education level: Not on file   Occupational History     Not on file   Tobacco Use     Smoking status: Never     Smokeless tobacco: Never   Substance and Sexual Activity     Alcohol use: Yes     Comment: 2 glasses of wine a day     Drug use: Never     Sexual activity: Not Currently   Other Topics Concern     Parent/sibling w/ CABG, MI or angioplasty before 65F 55M? Not Asked   Social History  Narrative    Ms. Chau is , has two grown children and two grandchildren. She does not work. She was never a smoker, and drinks a glass of wine with dinner each night.         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      Social Determinants of Health     Financial Resource Strain: Not on file   Food Insecurity: Not on file   Transportation Needs: Not on file   Physical Activity: Not on file   Stress: Not on file   Social Connections: Not on file   Intimate Partner Violence: Not on file   Housing Stability: Not on file     Family History   Problem Relation Age of Onset     Arthritis Mother      Respiratory Mother         pulmonary fibrosis     Hypertension Mother      Anemia Mother      Liver Disease Father         from EtOH     Alcoholism Father      No Known Problems Maternal Grandmother      Heart Disease Maternal Grandfather      Glaucoma Paternal Grandfather      Heart Disease Paternal Grandfather      Multiple Sclerosis Sister      Breast Cancer Sister      Skin Cancer No family hx of      Melanoma No family hx of            SUBJECTIVE FINDINGS:  An 82-year-old returns to clinic for ulcers on the left medial leg with cellulitis, venous stasis and venous hypertension with some lymphedema.  She relates she is doing well.  She relates to no problems with the dressings, she is taking the Keflex with no problems.     OBJECTIVE FINDINGS:  DP and PT are 2/4, left.  She has medial leg ulcers that are autumn and mostly eschar.  She has increased edema, no erythema, no odor, no calor.  No pain on palpation.      ASSESSMENT AND PLAN:  Ulcer, left medial ankle and leg.  She has venous stasis, venous hypertension, lymphedema and cellulitis present.  Cellulitis appears resolved.  Diagnosis and treatment options discussed with her.  I cleaned the leg and the ulcer sites with Wound Vashe, applied Hydrofera Blue and wrapped with Kerlix and Ace wrap.  Return to clinic and see me in 2  weeks.  She is not sure how many Keflex she has left, finish the Keflex.  Previous notes reviewed.                  Moderate level of medical decision making.             Again, thank you for allowing me to participate in the care of your patient.        Sincerely,        Camden Salazar DPM

## 2023-01-04 NOTE — NURSING NOTE
Kimberly Chau's chief complaint for this visit includes:  Chief Complaint   Patient presents with     Follow Up     Left leg ulcer     PCP: Guillermo Castellano    Referring Provider:  No referring provider defined for this encounter.    There were no vitals taken for this visit.  Data Unavailable        Allergies   Allergen Reactions     Hctz Other (See Comments)     Pt develops symptomatic and significant hyponatremia with HCTZ exposure     Hydrochlorothiazide      Other reaction(s): Hyponatremia  Hyponatremia     Latex      Benzocaine Rash     carba mix,thrium-sunscreen     Resorcinol-Alcohol Rash     carba mix,thrium-sunscreen     Ace Inhibitors Unknown     Dizziness and orthostatic changes and electrolyte problems.     Latex Unknown         Do you need any medication refills at today's visit?

## 2023-01-04 NOTE — PROGRESS NOTES
Past Medical History:   Diagnosis Date     Anemia      Arthritis      Cataracts      Central retinal artery occlusion      Cervical spine fracture (H)     After a fall from orthostatic sx     Chronic pain     Back of head     Gastro-oesophageal reflux disease      Head injury      Hypertension      Hyponatremia     Resolved, 2/2 diuretics     Migraines      Osteoporosis      Pneumonia 2018     Rheumatoid arthritis(714.0)      Patient Active Problem List   Diagnosis     Intrinsic atopic dermatitis     Essential hypertension     Rheumatoid arthritis (H)     Retinal artery occlusion     Cervical vertebral fracture (H)     Central retinal artery occlusion of right eye     Bilateral occipital neuralgia     C1-C2 instability     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 factor (H)     Osteoarthritis     Malignant hypertension     Hyponatremia     GERD (gastroesophageal reflux disease)     Eczematous dermatitis     Anxiety state     Anemia     Closed fracture of distal end of left radius with delayed healing, unspecified fracture morphology, subsequent encounter     Closed fracture of distal end of left radius, unspecified fracture morphology, sequela     Osteomyelitis of left leg (H)     Pain of left thumb     Past Surgical History:   Procedure Laterality Date     COLONOSCOPY       EYE SURGERY Left 02/2019    Hole in macula     FUSION CERVICAL POSTERIOR THREE+ LEVELS  1/22/2013    Procedure: FUSION CERVICAL POSTERIOR THREE+ LEVELS;  Cervical 1-6 Posterior Instrumentation and Fusion, Cervical 3-4 Laminectomy  .Instrumentation and fusion to Cervical 6 *Latex Allergy*;  Surgeon: Ra Bar MD;  Location: [facility] OR     GYN SURGERY       HEAD & NECK SURGERY       HYSTERECTOMY       IR ENDOVENOUS ABLATION VARICOSE VEINS  2/16/2021     KNEE SURGERY       NECK SURGERY       OPEN REDUCTION INTERNAL FIXATION WRIST Left 4/30/2019    Procedure: Open Reduction Internal Fixation Left Distal Radius  Fracture;  Surgeon: Dinh Strong MD;  [facility]     OPEN REDUCTION INTERNAL FIXATION WRIST Left 6/12/2020    Procedure: OPEN REDUCTION INTERNAL FIXATION Left Distal Radius Nonunion, Distal Ulna Resection;  Surgeon: Dinh Strong MD;  [facility]     OPTICAL TRACKING SYSTEM ENDOSCOPIC SINUS SURGERY Right 4/6/2018    Procedure: OPTICAL TRACKING SYSTEM ENDOSCOPIC SINUS SURGERY;  Stealth Assisted Right Maxillary Antrostomy, Anterior Ethmoidectomy And Frontal Sinusotomy;  Surgeon: Elyse Eisenberg MD;  Location: [facility]     OPTICAL TRACKING SYSTEM ENDOSCOPIC SINUS SURGERY Right 8/3/2018    Procedure: OPTICAL TRACKING SYSTEM ENDOSCOPIC SINUS SURGERY;  Stealth Assisted Revision Right Frontal Sinusotomy, Right Stent Placement  **Latex Allergy** ;  Surgeon: Elyse Eisenberg MD;  Location: [facility]     ORTHOPEDIC SURGERY       REMOVE HARDWARE WRIST Left 11/19/2019    Procedure: Left Distal Radius Hardware Removal, Flexor Pollicus Longus Repair, Deep Cultures;  Surgeon: Dinh Strong MD;  [facility]     ZZC HAND/FINGER SURGERY UNLISTED       ZZC PELVIS/HIP JOINT SURGERY UNLISTED       Social History     Socioeconomic History     Marital status:      Spouse name: Not on file     Number of children: Not on file     Years of education: Not on file     Highest education level: Not on file   Occupational History     Not on file   Tobacco Use     Smoking status: Never     Smokeless tobacco: Never   Substance and Sexual Activity     Alcohol use: Yes     Comment: 2 glasses of wine a day     Drug use: Never     Sexual activity: Not Currently   Other Topics Concern     Parent/sibling w/ CABG, MI or angioplasty before 65F 55M? Not Asked   Social History Narrative    Ms. Chau is , has two grown children and two grandchildren. She does not work. She was never a smoker, and drinks a glass of wine with dinner each night.         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      Social Determinants of Health     Financial Resource Strain: Not on file   Food Insecurity: Not on file   Transportation Needs: Not on file   Physical Activity: Not on file   Stress: Not on file   Social Connections: Not on file   Intimate Partner Violence: Not on file   Housing Stability: Not on file     Family History   Problem Relation Age of Onset     Arthritis Mother      Respiratory Mother         pulmonary fibrosis     Hypertension Mother      Anemia Mother      Liver Disease Father         from EtOH     Alcoholism Father      No Known Problems Maternal Grandmother      Heart Disease Maternal Grandfather      Glaucoma Paternal Grandfather      Heart Disease Paternal Grandfather      Multiple Sclerosis Sister      Breast Cancer Sister      Skin Cancer No family hx of      Melanoma No family hx of            SUBJECTIVE FINDINGS:  An 82-year-old returns to clinic for ulcers on the left medial leg with cellulitis, venous stasis and venous hypertension with some lymphedema.  She relates she is doing well.  She relates to no problems with the dressings, she is taking the Keflex with no problems.     OBJECTIVE FINDINGS:  DP and PT are 2/4, left.  She has medial leg ulcers that are autumn and mostly eschar.  She has increased edema, no erythema, no odor, no calor.  No pain on palpation.      ASSESSMENT AND PLAN:  Ulcer, left medial ankle and leg.  She has venous stasis, venous hypertension, lymphedema and cellulitis present.  Cellulitis appears resolved.  Diagnosis and treatment options discussed with her.  I cleaned the leg and the ulcer sites with Wound Vashe, applied Hydrofera Blue and wrapped with Kerlix and Ace wrap.  Return to clinic and see me in 2 weeks.  She is not sure how many Keflex she has left, finish the Keflex.  Previous notes reviewed.                  Moderate level of medical decision making.

## 2023-01-20 ENCOUNTER — OFFICE VISIT (OUTPATIENT)
Dept: PODIATRY | Facility: CLINIC | Age: 83
End: 2023-01-20
Payer: MEDICARE

## 2023-01-20 DIAGNOSIS — L97.922 SKIN ULCER OF LEFT LOWER LEG WITH FAT LAYER EXPOSED (H): Primary | ICD-10-CM

## 2023-01-20 DIAGNOSIS — I87.8 VENOUS STASIS: ICD-10-CM

## 2023-01-20 PROCEDURE — 99214 OFFICE O/P EST MOD 30 MIN: CPT | Performed by: PODIATRIST

## 2023-01-20 NOTE — LETTER
1/20/2023         RE: Kimberly Chau  [id number]   [place] [address]        Dear Colleague,    Thank you for referring your patient, Kimberly Chau, to the [facility]. Please see a copy of my visit note below.    Past Medical History:   Diagnosis Date     Anemia      Arthritis      Cataracts      Central retinal artery occlusion      Cervical spine fracture (H)     After a fall from orthostatic sx     Chronic pain     Back of head     Gastro-oesophageal reflux disease      Head injury      Hypertension      Hyponatremia     Resolved, 2/2 diuretics     Migraines      Osteoporosis      Pneumonia 2018     Rheumatoid arthritis(714.0)      Patient Active Problem List   Diagnosis     Intrinsic atopic dermatitis     Essential hypertension     Rheumatoid arthritis (H)     Retinal artery occlusion     Cervical vertebral fracture (H)     Central retinal artery occlusion of right eye     Bilateral occipital neuralgia     C1-C2 instability     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 factor (H)     Osteoarthritis     Malignant hypertension     Hyponatremia     GERD (gastroesophageal reflux disease)     Eczematous dermatitis     Anxiety state     Anemia     Closed fracture of distal end of left radius with delayed healing, unspecified fracture morphology, subsequent encounter     Closed fracture of distal end of left radius, unspecified fracture morphology, sequela     Osteomyelitis of left leg (H)     Pain of left thumb     Past Surgical History:   Procedure Laterality Date     COLONOSCOPY       EYE SURGERY Left 02/2019    Hole in macula     FUSION CERVICAL POSTERIOR THREE+ LEVELS  1/22/2013    Procedure: FUSION CERVICAL POSTERIOR THREE+ LEVELS;  Cervical 1-6 Posterior Instrumentation and Fusion, Cervical 3-4 Laminectomy  .Instrumentation and fusion to Cervical 6 *Latex Allergy*;  Surgeon: Ra Bar MD;  Location: [facility] OR     GYN SURGERY       HEAD & NECK  SURGERY       HYSTERECTOMY       IR ENDOVENOUS ABLATION VARICOSE VEINS  2/16/2021     KNEE SURGERY       NECK SURGERY       OPEN REDUCTION INTERNAL FIXATION WRIST Left 4/30/2019    Procedure: Open Reduction Internal Fixation Left Distal Radius Fracture;  Surgeon: Dinh Strong MD;  [facility]     OPEN REDUCTION INTERNAL FIXATION WRIST Left 6/12/2020    Procedure: OPEN REDUCTION INTERNAL FIXATION Left Distal Radius Nonunion, Distal Ulna Resection;  Surgeon: Dinh Strong MD;  [facility]     OPTICAL TRACKING SYSTEM ENDOSCOPIC SINUS SURGERY Right 4/6/2018    Procedure: OPTICAL TRACKING SYSTEM ENDOSCOPIC SINUS SURGERY;  Stealth Assisted Right Maxillary Antrostomy, Anterior Ethmoidectomy And Frontal Sinusotomy;  Surgeon: Elyse Eisenberg MD;  Location: [facility]     OPTICAL TRACKING SYSTEM ENDOSCOPIC SINUS SURGERY Right 8/3/2018    Procedure: OPTICAL TRACKING SYSTEM ENDOSCOPIC SINUS SURGERY;  Stealth Assisted Revision Right Frontal Sinusotomy, Right Stent Placement  **Latex Allergy** ;  Surgeon: Elyse Eisenberg MD;  Location: [facility]     ORTHOPEDIC SURGERY       REMOVE HARDWARE WRIST Left 11/19/2019    Procedure: Left Distal Radius Hardware Removal, Flexor Pollicus Longus Repair, Deep Cultures;  Surgeon: Dinh Strong MD;  [facility]     ZZC HAND/FINGER SURGERY UNLISTED       ZZC PELVIS/HIP JOINT SURGERY UNLISTED       Social History     Socioeconomic History     Marital status:      Spouse name: Not on file     Number of children: Not on file     Years of education: Not on file     Highest education level: Not on file   Occupational History     Not on file   Tobacco Use     Smoking status: Never     Smokeless tobacco: Never   Substance and Sexual Activity     Alcohol use: Yes     Comment: 2 glasses of wine a day     Drug use: Never     Sexual activity: Not Currently   Other Topics Concern     Parent/sibling w/ CABG, MI or angioplasty before 65F 55M? Not Asked   Social History  Narrative    Ms. Chau is , has two grown children and two grandchildren. She does not work. She was never a smoker, and drinks a glass of wine with dinner each night.         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      Social Determinants of Health     Financial Resource Strain: Not on file   Food Insecurity: Not on file   Transportation Needs: Not on file   Physical Activity: Not on file   Stress: Not on file   Social Connections: Not on file   Intimate Partner Violence: Not on file   Housing Stability: Not on file     Family History   Problem Relation Age of Onset     Arthritis Mother      Respiratory Mother         pulmonary fibrosis     Hypertension Mother      Anemia Mother      Liver Disease Father         from EtOH     Alcoholism Father      No Known Problems Maternal Grandmother      Heart Disease Maternal Grandfather      Glaucoma Paternal Grandfather      Heart Disease Paternal Grandfather      Multiple Sclerosis Sister      Breast Cancer Sister      Skin Cancer No family hx of      Melanoma No family hx of                SUBJECTIVE FINDINGS:  An 82-year-old returns to clinic for left medial leg ulcers with venous stasis and venous hypertension.  She relates she is doing the wound cares with Hydrofera blue and has finished the Keflex with no problems.     OBJECTIVE FINDINGS:  DP and PT are 2/4, left.  She has left medial leg ulcers that are through the Dermis into the subcutaneous tissues that are mostly eschared.  There is dried serosanguineous drainage, Minimal erythema, positive edema, no tenderness to palpation.  No odor, no calor.  She has increased peripheral edema with venous stasis.     ASSESSMENT AND PLAN:  Ulcers, left medial ankle and leg.  She has venous stasis, venous hypertension and some lymphedema component to this.  She has cellulitis that appears resolved.  Diagnosis and treatment options discussed with her.  She relates she has a cast protector and  would be ok with doing a multi layered unna boot compression wrap again.  I cleaned the ulcer sites and leg with Wound Vashe, applied Hydrofera Blue to the ulcer sites and a multilayered compression system with an Unna boot with light compression applied and use discussed with her.  Return to clinic and see me in 1 week.  Previous notes reviewed.         Moderate level of medical decision making.        Again, thank you for allowing me to participate in the care of your patient.        Sincerely,        Camden Salazar DPM

## 2023-01-20 NOTE — PROGRESS NOTES
Past Medical History:   Diagnosis Date     Anemia      Arthritis      Cataracts      Central retinal artery occlusion      Cervical spine fracture (H)     After a fall from orthostatic sx     Chronic pain     Back of head     Gastro-oesophageal reflux disease      Head injury      Hypertension      Hyponatremia     Resolved, 2/2 diuretics     Migraines      Osteoporosis      Pneumonia 2018     Rheumatoid arthritis(714.0)      Patient Active Problem List   Diagnosis     Intrinsic atopic dermatitis     Essential hypertension     Rheumatoid arthritis (H)     Retinal artery occlusion     Cervical vertebral fracture (H)     Central retinal artery occlusion of right eye     Bilateral occipital neuralgia     C1-C2 instability     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 factor (H)     Osteoarthritis     Malignant hypertension     Hyponatremia     GERD (gastroesophageal reflux disease)     Eczematous dermatitis     Anxiety state     Anemia     Closed fracture of distal end of left radius with delayed healing, unspecified fracture morphology, subsequent encounter     Closed fracture of distal end of left radius, unspecified fracture morphology, sequela     Osteomyelitis of left leg (H)     Pain of left thumb     Past Surgical History:   Procedure Laterality Date     COLONOSCOPY       EYE SURGERY Left 02/2019    Hole in macula     FUSION CERVICAL POSTERIOR THREE+ LEVELS  1/22/2013    Procedure: FUSION CERVICAL POSTERIOR THREE+ LEVELS;  Cervical 1-6 Posterior Instrumentation and Fusion, Cervical 3-4 Laminectomy  .Instrumentation and fusion to Cervical 6 *Latex Allergy*;  Surgeon: Ra Bar MD;  Location: [facility]     GYN SURGERY       HEAD & NECK SURGERY       HYSTERECTOMY       IR ENDOVENOUS ABLATION VARICOSE VEINS  2/16/2021     KNEE SURGERY       NECK SURGERY       OPEN REDUCTION INTERNAL FIXATION WRIST Left 4/30/2019    Procedure: Open Reduction Internal Fixation Left Distal Radius  Fracture;  Surgeon: Dinh Strong MD;  [facility]     OPEN REDUCTION INTERNAL FIXATION WRIST Left 6/12/2020    Procedure: OPEN REDUCTION INTERNAL FIXATION Left Distal Radius Nonunion, Distal Ulna Resection;  Surgeon: Dinh Strong MD;  [facility]     OPTICAL TRACKING SYSTEM ENDOSCOPIC SINUS SURGERY Right 4/6/2018    Procedure: OPTICAL TRACKING SYSTEM ENDOSCOPIC SINUS SURGERY;  Stealth Assisted Right Maxillary Antrostomy, Anterior Ethmoidectomy And Frontal Sinusotomy;  Surgeon: Elyse Eisenberg MD;  Location: [facility]     OPTICAL TRACKING SYSTEM ENDOSCOPIC SINUS SURGERY Right 8/3/2018    Procedure: OPTICAL TRACKING SYSTEM ENDOSCOPIC SINUS SURGERY;  Stealth Assisted Revision Right Frontal Sinusotomy, Right Stent Placement  **Latex Allergy** ;  Surgeon: Elyse Eisenberg MD;  Location: [facility]     ORTHOPEDIC SURGERY       REMOVE HARDWARE WRIST Left 11/19/2019    Procedure: Left Distal Radius Hardware Removal, Flexor Pollicus Longus Repair, Deep Cultures;  Surgeon: Dinh Strong MD;  [facility]     ZZC HAND/FINGER SURGERY UNLISTED       ZZC PELVIS/HIP JOINT SURGERY UNLISTED       Social History     Socioeconomic History     Marital status:      Spouse name: Not on file     Number of children: Not on file     Years of education: Not on file     Highest education level: Not on file   Occupational History     Not on file   Tobacco Use     Smoking status: Never     Smokeless tobacco: Never   Substance and Sexual Activity     Alcohol use: Yes     Comment: 2 glasses of wine a day     Drug use: Never     Sexual activity: Not Currently   Other Topics Concern     Parent/sibling w/ CABG, MI or angioplasty before 65F 55M? Not Asked   Social History Narrative    Ms. Chau is , has two grown children and two grandchildren. She does not work. She was never a smoker, and drinks a glass of wine with dinner each night.         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      Social Determinants of Health     Financial Resource Strain: Not on file   Food Insecurity: Not on file   Transportation Needs: Not on file   Physical Activity: Not on file   Stress: Not on file   Social Connections: Not on file   Intimate Partner Violence: Not on file   Housing Stability: Not on file     Family History   Problem Relation Age of Onset     Arthritis Mother      Respiratory Mother         pulmonary fibrosis     Hypertension Mother      Anemia Mother      Liver Disease Father         from EtOH     Alcoholism Father      No Known Problems Maternal Grandmother      Heart Disease Maternal Grandfather      Glaucoma Paternal Grandfather      Heart Disease Paternal Grandfather      Multiple Sclerosis Sister      Breast Cancer Sister      Skin Cancer No family hx of      Melanoma No family hx of                SUBJECTIVE FINDINGS:  An 82-year-old returns to clinic for left medial leg ulcers with venous stasis and venous hypertension.  She relates she is doing the wound cares with Hydrofera blue and has finished the Keflex with no problems.     OBJECTIVE FINDINGS:  DP and PT are 2/4, left.  She has left medial leg ulcers that are through the Dermis into the subcutaneous tissues that are mostly eschared.  There is dried serosanguineous drainage, Minimal erythema, positive edema, no tenderness to palpation.  No odor, no calor.  She has increased peripheral edema with venous stasis.     ASSESSMENT AND PLAN:  Ulcers, left medial ankle and leg.  She has venous stasis, venous hypertension and some lymphedema component to this.  She has cellulitis that appears resolved.  Diagnosis and treatment options discussed with her.  She relates she has a cast protector and would be ok with doing a multi layered unna boot compression wrap again.  I cleaned the ulcer sites and leg with Wound Vashe, applied Hydrofera Blue to the ulcer sites and a multilayered compression system with an Unna boot with light  compression applied and use discussed with her.  Return to clinic and see me in 1 week.  Previous notes reviewed.         Moderate level of medical decision making.

## 2023-01-25 ENCOUNTER — OFFICE VISIT (OUTPATIENT)
Dept: PODIATRY | Facility: CLINIC | Age: 83
End: 2023-01-25
Payer: MEDICARE

## 2023-01-25 DIAGNOSIS — L97.922 SKIN ULCER OF LEFT LOWER LEG WITH FAT LAYER EXPOSED (H): Primary | ICD-10-CM

## 2023-01-25 DIAGNOSIS — I87.8 VENOUS STASIS: ICD-10-CM

## 2023-01-25 PROCEDURE — 29581 APPL MULTLAYER CMPRN SYS LEG: CPT | Mod: LT | Performed by: PODIATRIST

## 2023-01-25 PROCEDURE — 99213 OFFICE O/P EST LOW 20 MIN: CPT | Mod: 25 | Performed by: PODIATRIST

## 2023-01-25 NOTE — LETTER
1/25/2023         RE: Kimberly Chau  [id number]   [place] [address]        Dear Colleague,    Thank you for referring your patient, Kimberly Chau, to the [facility]. Please see a copy of my visit note below.    Past Medical History:   Diagnosis Date     Anemia      Arthritis      Cataracts      Central retinal artery occlusion      Cervical spine fracture (H)     After a fall from orthostatic sx     Chronic pain     Back of head     Gastro-oesophageal reflux disease      Head injury      Hypertension      Hyponatremia     Resolved, 2/2 diuretics     Migraines      Osteoporosis      Pneumonia 2018     Rheumatoid arthritis(714.0)      Patient Active Problem List   Diagnosis     Intrinsic atopic dermatitis     Essential hypertension     Rheumatoid arthritis (H)     Retinal artery occlusion     Cervical vertebral fracture (H)     Central retinal artery occlusion of right eye     Bilateral occipital neuralgia     C1-C2 instability     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 factor (H)     Osteoarthritis     Malignant hypertension     Hyponatremia     GERD (gastroesophageal reflux disease)     Eczematous dermatitis     Anxiety state     Anemia     Closed fracture of distal end of left radius with delayed healing, unspecified fracture morphology, subsequent encounter     Closed fracture of distal end of left radius, unspecified fracture morphology, sequela     Osteomyelitis of left leg (H)     Pain of left thumb     Past Surgical History:   Procedure Laterality Date     COLONOSCOPY       EYE SURGERY Left 02/2019    Hole in macula     FUSION CERVICAL POSTERIOR THREE+ LEVELS  1/22/2013    Procedure: FUSION CERVICAL POSTERIOR THREE+ LEVELS;  Cervical 1-6 Posterior Instrumentation and Fusion, Cervical 3-4 Laminectomy  .Instrumentation and fusion to Cervical 6 *Latex Allergy*;  Surgeon: Ra Bar MD;  Location: [facility] OR     GYN SURGERY       HEAD & NECK  SURGERY       HYSTERECTOMY       IR ENDOVENOUS ABLATION VARICOSE VEINS  2/16/2021     KNEE SURGERY       NECK SURGERY       OPEN REDUCTION INTERNAL FIXATION WRIST Left 4/30/2019    Procedure: Open Reduction Internal Fixation Left Distal Radius Fracture;  Surgeon: Dinh Strong MD;  [facility]     OPEN REDUCTION INTERNAL FIXATION WRIST Left 6/12/2020    Procedure: OPEN REDUCTION INTERNAL FIXATION Left Distal Radius Nonunion, Distal Ulna Resection;  Surgeon: Dinh Strong MD;  [facility]     OPTICAL TRACKING SYSTEM ENDOSCOPIC SINUS SURGERY Right 4/6/2018    Procedure: OPTICAL TRACKING SYSTEM ENDOSCOPIC SINUS SURGERY;  Stealth Assisted Right Maxillary Antrostomy, Anterior Ethmoidectomy And Frontal Sinusotomy;  Surgeon: Elyse Eisenberg MD;  Location: [facility]     OPTICAL TRACKING SYSTEM ENDOSCOPIC SINUS SURGERY Right 8/3/2018    Procedure: OPTICAL TRACKING SYSTEM ENDOSCOPIC SINUS SURGERY;  Stealth Assisted Revision Right Frontal Sinusotomy, Right Stent Placement  **Latex Allergy** ;  Surgeon: Elyse Eisenberg MD;  Location: [facility]     ORTHOPEDIC SURGERY       REMOVE HARDWARE WRIST Left 11/19/2019    Procedure: Left Distal Radius Hardware Removal, Flexor Pollicus Longus Repair, Deep Cultures;  Surgeon: Dinh Strong MD;  Location: [facility]     ZZC HAND/FINGER SURGERY UNLISTED       ZZC PELVIS/HIP JOINT SURGERY UNLISTED       Social History     Socioeconomic History     Marital status:      Spouse name: Not on file     Number of children: Not on file     Years of education: Not on file     Highest education level: Not on file   Occupational History     Not on file   Tobacco Use     Smoking status: Never     Smokeless tobacco: Never   Substance and Sexual Activity     Alcohol use: Yes     Comment: 2 glasses of wine a day     Drug use: Never     Sexual activity: Not Currently   Other Topics Concern     Parent/sibling w/ CABG, MI or angioplasty before 65F 55M? Not Asked   Social History  Narrative    Ms. Chau is , has two grown children and two grandchildren. She does not work. She was never a smoker, and drinks a glass of wine with dinner each night.         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      Social Determinants of Health     Financial Resource Strain: Not on file   Food Insecurity: Not on file   Transportation Needs: Not on file   Physical Activity: Not on file   Stress: Not on file   Social Connections: Not on file   Intimate Partner Violence: Not on file   Housing Stability: Not on file     Family History   Problem Relation Age of Onset     Arthritis Mother      Respiratory Mother         pulmonary fibrosis     Hypertension Mother      Anemia Mother      Liver Disease Father         from EtOH     Alcoholism Father      No Known Problems Maternal Grandmother      Heart Disease Maternal Grandfather      Glaucoma Paternal Grandfather      Heart Disease Paternal Grandfather      Multiple Sclerosis Sister      Breast Cancer Sister      Skin Cancer No family hx of      Melanoma No family hx of              SUBJECTIVE FINDINGS:  An 82-year-old returns to clinic for left medial leg ulcers with venous stasis and venous hypertension.  She relates to no problems with unna boot compression system.     OBJECTIVE FINDINGS:  DP and PT are 2/4, left.  She has left medial leg ulcers that are through the Dermis into the subcutaneous tissues that are autumn.  There is dried serosanguineous drainage, Minimal erythema, positive edema, no tenderness to palpation.  No odor, no calor.  She has decreased peripheral edema with venous stasis.     ASSESSMENT AND PLAN:  Ulcers, left medial ankle and leg.  She has venous stasis, venous hypertension and some lymphedema component to this.  She has Cellulitis that appears resolved.  Diagnosis and treatment options discussed with her.  I cleaned the ulcer sites and leg with Wound Vashe, applied Hydrofera Blue to the ulcer sites  and a multilayered compression system with an Unna boot with light compression applied and use discussed with her.  Return to clinic and see me in 1 week.  Previous notes reviewed.           Moderate level of medical decision making.        Again, thank you for allowing me to participate in the care of your patient.        Sincerely,        Camden Salazar DPM

## 2023-01-25 NOTE — PROGRESS NOTES
Past Medical History:   Diagnosis Date     Anemia      Arthritis      Cataracts      Central retinal artery occlusion      Cervical spine fracture (H)     After a fall from orthostatic sx     Chronic pain     Back of head     Gastro-oesophageal reflux disease      Head injury      Hypertension      Hyponatremia     Resolved, 2/2 diuretics     Migraines      Osteoporosis      Pneumonia 2018     Rheumatoid arthritis(714.0)      Patient Active Problem List   Diagnosis     Intrinsic atopic dermatitis     Essential hypertension     Rheumatoid arthritis (H)     Retinal artery occlusion     Cervical vertebral fracture (H)     Central retinal artery occlusion of right eye     Bilateral occipital neuralgia     C1-C2 instability     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 factor (H)     Osteoarthritis     Malignant hypertension     Hyponatremia     GERD (gastroesophageal reflux disease)     Eczematous dermatitis     Anxiety state     Anemia     Closed fracture of distal end of left radius with delayed healing, unspecified fracture morphology, subsequent encounter     Closed fracture of distal end of left radius, unspecified fracture morphology, sequela     Osteomyelitis of left leg (H)     Pain of left thumb     Past Surgical History:   Procedure Laterality Date     COLONOSCOPY       EYE SURGERY Left 02/2019    Hole in macula     FUSION CERVICAL POSTERIOR THREE+ LEVELS  1/22/2013    Procedure: FUSION CERVICAL POSTERIOR THREE+ LEVELS;  Cervical 1-6 Posterior Instrumentation and Fusion, Cervical 3-4 Laminectomy  .Instrumentation and fusion to Cervical 6 *Latex Allergy*;  Surgeon: Ra Bar MD;  Location: [facility]     GYN SURGERY       HEAD & NECK SURGERY       HYSTERECTOMY       IR ENDOVENOUS ABLATION VARICOSE VEINS  2/16/2021     KNEE SURGERY       NECK SURGERY       OPEN REDUCTION INTERNAL FIXATION WRIST Left 4/30/2019    Procedure: Open Reduction Internal Fixation Left Distal Radius  Fracture;  Surgeon: Dihn Strong MD;  [facility]     OPEN REDUCTION INTERNAL FIXATION WRIST Left 6/12/2020    Procedure: OPEN REDUCTION INTERNAL FIXATION Left Distal Radius Nonunion, Distal Ulna Resection;  Surgeon: Dinh Strong MD;  Location: [facility]     OPTICAL TRACKING SYSTEM ENDOSCOPIC SINUS SURGERY Right 4/6/2018    Procedure: OPTICAL TRACKING SYSTEM ENDOSCOPIC SINUS SURGERY;  Stealth Assisted Right Maxillary Antrostomy, Anterior Ethmoidectomy And Frontal Sinusotomy;  Surgeon: Elyse Eisenberg MD;  Location: [facility]     OPTICAL TRACKING SYSTEM ENDOSCOPIC SINUS SURGERY Right 8/3/2018    Procedure: OPTICAL TRACKING SYSTEM ENDOSCOPIC SINUS SURGERY;  Stealth Assisted Revision Right Frontal Sinusotomy, Right Stent Placement  **Latex Allergy** ;  Surgeon: Elyse Eisenberg MD;  Location: [facility]     ORTHOPEDIC SURGERY       REMOVE HARDWARE WRIST Left 11/19/2019    Procedure: Left Distal Radius Hardware Removal, Flexor Pollicus Longus Repair, Deep Cultures;  Surgeon: Dinh Strong MD;  [facility]     ZZC HAND/FINGER SURGERY UNLISTED       ZZC PELVIS/HIP JOINT SURGERY UNLISTED       Social History     Socioeconomic History     Marital status:      Spouse name: Not on file     Number of children: Not on file     Years of education: Not on file     Highest education level: Not on file   Occupational History     Not on file   Tobacco Use     Smoking status: Never     Smokeless tobacco: Never   Substance and Sexual Activity     Alcohol use: Yes     Comment: 2 glasses of wine a day     Drug use: Never     Sexual activity: Not Currently   Other Topics Concern     Parent/sibling w/ CABG, MI or angioplasty before 65F 55M? Not Asked   Social History Narrative    Ms. Chau is , has two grown children and two grandchildren. She does not work. She was never a smoker, and drinks a glass of wine with dinner each night.         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      Social Determinants of Health     Financial Resource Strain: Not on file   Food Insecurity: Not on file   Transportation Needs: Not on file   Physical Activity: Not on file   Stress: Not on file   Social Connections: Not on file   Intimate Partner Violence: Not on file   Housing Stability: Not on file     Family History   Problem Relation Age of Onset     Arthritis Mother      Respiratory Mother         pulmonary fibrosis     Hypertension Mother      Anemia Mother      Liver Disease Father         from EtOH     Alcoholism Father      No Known Problems Maternal Grandmother      Heart Disease Maternal Grandfather      Glaucoma Paternal Grandfather      Heart Disease Paternal Grandfather      Multiple Sclerosis Sister      Breast Cancer Sister      Skin Cancer No family hx of      Melanoma No family hx of              SUBJECTIVE FINDINGS:  An 82-year-old returns to clinic for left medial leg ulcers with venous stasis and venous hypertension.  She relates to no problems with unna boot compression system.     OBJECTIVE FINDINGS:  DP and PT are 2/4, left.  She has left medial leg ulcers that are through the Dermis into the subcutaneous tissues that are autumn.  There is dried serosanguineous drainage, Minimal erythema, positive edema, no tenderness to palpation.  No odor, no calor.  She has decreased peripheral edema with venous stasis.     ASSESSMENT AND PLAN:  Ulcers, left medial ankle and leg.  She has venous stasis, venous hypertension and some lymphedema component to this.  She has Cellulitis that appears resolved.  Diagnosis and treatment options discussed with her.  I cleaned the ulcer sites and leg with Wound Vashe, applied Hydrofera Blue to the ulcer sites and a multilayered compression system with an Unna boot with light compression applied and use discussed with her.  Return to clinic and see me in 1 week.  Previous notes reviewed.           Moderate level of medical decision making.

## 2023-02-03 ENCOUNTER — OFFICE VISIT (OUTPATIENT)
Dept: PODIATRY | Facility: CLINIC | Age: 83
End: 2023-02-03
Payer: MEDICARE

## 2023-02-03 DIAGNOSIS — L97.922 SKIN ULCER OF LEFT LOWER LEG WITH FAT LAYER EXPOSED (H): Primary | ICD-10-CM

## 2023-02-03 DIAGNOSIS — I87.8 VENOUS STASIS: ICD-10-CM

## 2023-02-03 PROCEDURE — 99214 OFFICE O/P EST MOD 30 MIN: CPT | Performed by: PODIATRIST

## 2023-02-03 NOTE — PROGRESS NOTES
Past Medical History:   Diagnosis Date     Anemia      Arthritis      Cataracts      Central retinal artery occlusion      Cervical spine fracture (H)     After a fall from orthostatic sx     Chronic pain     Back of head     Gastro-oesophageal reflux disease      Head injury      Hypertension      Hyponatremia     Resolved, 2/2 diuretics     Migraines      Osteoporosis      Pneumonia 2018     Rheumatoid arthritis(714.0)      Patient Active Problem List   Diagnosis     Intrinsic atopic dermatitis     Essential hypertension     Rheumatoid arthritis (H)     Retinal artery occlusion     Cervical vertebral fracture (H)     Central retinal artery occlusion of right eye     Bilateral occipital neuralgia     C1-C2 instability     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 factor (H)     Osteoarthritis     Malignant hypertension     Hyponatremia     GERD (gastroesophageal reflux disease)     Eczematous dermatitis     Anxiety state     Anemia     Closed fracture of distal end of left radius with delayed healing, unspecified fracture morphology, subsequent encounter     Closed fracture of distal end of left radius, unspecified fracture morphology, sequela     Osteomyelitis of left leg (H)     Pain of left thumb     Past Surgical History:   Procedure Laterality Date     COLONOSCOPY       EYE SURGERY Left 02/2019    Hole in macula     FUSION CERVICAL POSTERIOR THREE+ LEVELS  1/22/2013    Procedure: FUSION CERVICAL POSTERIOR THREE+ LEVELS;  Cervical 1-6 Posterior Instrumentation and Fusion, Cervical 3-4 Laminectomy  .Instrumentation and fusion to Cervical 6 *Latex Allergy*;  Surgeon: Ra Bar MD;  Location: [facility]     GYN SURGERY       HEAD & NECK SURGERY       HYSTERECTOMY       IR ENDOVENOUS ABLATION VARICOSE VEINS  2/16/2021     KNEE SURGERY       NECK SURGERY       OPEN REDUCTION INTERNAL FIXATION WRIST Left 4/30/2019    Procedure: Open Reduction Internal Fixation Left Distal Radius  Fracture;  Surgeon: Dinh Strong MD;  [facility]     OPEN REDUCTION INTERNAL FIXATION WRIST Left 6/12/2020    Procedure: OPEN REDUCTION INTERNAL FIXATION Left Distal Radius Nonunion, Distal Ulna Resection;  Surgeon: Dinh Strong MD;  [facility]     OPTICAL TRACKING SYSTEM ENDOSCOPIC SINUS SURGERY Right 4/6/2018    Procedure: OPTICAL TRACKING SYSTEM ENDOSCOPIC SINUS SURGERY;  Stealth Assisted Right Maxillary Antrostomy, Anterior Ethmoidectomy And Frontal Sinusotomy;  Surgeon: Elyse Eisenberg MD;  [facility]: [facility]     OPTICAL TRACKING SYSTEM ENDOSCOPIC SINUS SURGERY Right 8/3/2018    Procedure: OPTICAL TRACKING SYSTEM ENDOSCOPIC SINUS SURGERY;  Stealth Assisted Revision Right Frontal Sinusotomy, Right Stent Placement  **Latex Allergy** ;  Surgeon: Elyes Eisenberg MD;  [facility]: [facility]     ORTHOPEDIC SURGERY       REMOVE HARDWARE WRIST Left 11/19/2019    Procedure: Left Distal Radius Hardware Removal, Flexor Pollicus Longus Repair, Deep Cultures;  Surgeon: Dinh Strong MD;  [facility]     ZZC HAND/FINGER SURGERY UNLISTED       ZZC PELVIS/HIP JOINT SURGERY UNLISTED       Social History     Socioeconomic History     Marital status:      Spouse name: Not on file     Number of children: Not on file     Years of education: Not on file     Highest education level: Not on file   Occupational History     Not on file   Tobacco Use     Smoking status: Never     Smokeless tobacco: Never   Substance and Sexual Activity     Alcohol use: Yes     Comment: 2 glasses of wine a day     Drug use: Never     Sexual activity: Not Currently   Other Topics Concern     Parent/sibling w/ CABG, MI or angioplasty before 65F 55M? Not Asked   Social History Narrative    Ms. Chau is , has two grown children and two grandchildren. She does not work. She was never a smoker, and drinks a glass of wine with dinner each night.         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      Social Determinants of Health     Financial Resource Strain: Not on file   Food Insecurity: Not on file   Transportation Needs: Not on file   Physical Activity: Not on file   Stress: Not on file   Social Connections: Not on file   Intimate Partner Violence: Not on file   Housing Stability: Not on file     Family History   Problem Relation Age of Onset     Arthritis Mother      Respiratory Mother         pulmonary fibrosis     Hypertension Mother      Anemia Mother      Liver Disease Father         from EtOH     Alcoholism Father      No Known Problems Maternal Grandmother      Heart Disease Maternal Grandfather      Glaucoma Paternal Grandfather      Heart Disease Paternal Grandfather      Multiple Sclerosis Sister      Breast Cancer Sister      Skin Cancer No family hx of      Melanoma No family hx of                SUBJECTIVE FINDINGS:  An 82-year-old returns to clinic for left medial leg ulcers with venous stasis and venous hypertension.  She relates to no problems with unna boot compression system.     OBJECTIVE FINDINGS:  DP and PT are 2/4, left.  She has left medial proximal leg ulcer that is through the Dermis into the subcutaneous tissues that is autumn.  There is dried serosanguineous drainage, Minimal erythema, positive edema, no tenderness to palpation.  No odor, no calor.  Distal ulcer is closed with eschar.  She has decreased peripheral edema with venous stasis.     ASSESSMENT AND PLAN:  Ulcers, left medial ankle and leg.  She has venous stasis, venous hypertension and some lymphedema component to this.  She has Cellulitis that appears resolved.  Diagnosis and treatment options discussed with her.  I cleaned the ulcer sites and leg with Wound Vashe, applied Hydrofera Blue to the ulcer sites and a multilayered compression system with an Unna boot with light compression applied and use discussed with her.  Return to clinic and see me in 1 week.  Previous notes  reviewed.           Moderate level of medical decision making.

## 2023-02-03 NOTE — LETTER
2/3/2023         RE: Kimberly Chau  [id number]   [place] [address]        Dear Colleague,    Thank you for referring your patient, Kimberly Chau, to the [facility]. Please see a copy of my visit note below.    Past Medical History:   Diagnosis Date     Anemia      Arthritis      Cataracts      Central retinal artery occlusion      Cervical spine fracture (H)     After a fall from orthostatic sx     Chronic pain     Back of head     Gastro-oesophageal reflux disease      Head injury      Hypertension      Hyponatremia     Resolved, 2/2 diuretics     Migraines      Osteoporosis      Pneumonia 2018     Rheumatoid arthritis(714.0)      Patient Active Problem List   Diagnosis     Intrinsic atopic dermatitis     Essential hypertension     Rheumatoid arthritis (H)     Retinal artery occlusion     Cervical vertebral fracture (H)     Central retinal artery occlusion of right eye     Bilateral occipital neuralgia     C1-C2 instability     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 factor (H)     Osteoarthritis     Malignant hypertension     Hyponatremia     GERD (gastroesophageal reflux disease)     Eczematous dermatitis     Anxiety state     Anemia     Closed fracture of distal end of left radius with delayed healing, unspecified fracture morphology, subsequent encounter     Closed fracture of distal end of left radius, unspecified fracture morphology, sequela     Osteomyelitis of left leg (H)     Pain of left thumb     Past Surgical History:   Procedure Laterality Date     COLONOSCOPY       EYE SURGERY Left 02/2019    Hole in macula     FUSION CERVICAL POSTERIOR THREE+ LEVELS  1/22/2013    Procedure: FUSION CERVICAL POSTERIOR THREE+ LEVELS;  Cervical 1-6 Posterior Instrumentation and Fusion, Cervical 3-4 Laminectomy  .Instrumentation and fusion to Cervical 6 *Latex Allergy*;  Surgeon: Ra Bar MD;  Location: [facility]     GYN SURGERY       HEAD & NECK  SURGERY       HYSTERECTOMY       IR ENDOVENOUS ABLATION VARICOSE VEINS  2/16/2021     KNEE SURGERY       NECK SURGERY       OPEN REDUCTION INTERNAL FIXATION WRIST Left 4/30/2019    Procedure: Open Reduction Internal Fixation Left Distal Radius Fracture;  Surgeon: Dinh Strong MD;  [facility]     OPEN REDUCTION INTERNAL FIXATION WRIST Left 6/12/2020    Procedure: OPEN REDUCTION INTERNAL FIXATION Left Distal Radius Nonunion, Distal Ulna Resection;  Surgeon: Dinh Strong MD;  [facility]     OPTICAL TRACKING SYSTEM ENDOSCOPIC SINUS SURGERY Right 4/6/2018    Procedure: OPTICAL TRACKING SYSTEM ENDOSCOPIC SINUS SURGERY;  Stealth Assisted Right Maxillary Antrostomy, Anterior Ethmoidectomy And Frontal Sinusotomy;  Surgeon: Elyse Eisenberg MD;  Location: [facility]     OPTICAL TRACKING SYSTEM ENDOSCOPIC SINUS SURGERY Right 8/3/2018    Procedure: OPTICAL TRACKING SYSTEM ENDOSCOPIC SINUS SURGERY;  Stealth Assisted Revision Right Frontal Sinusotomy, Right Stent Placement  **Latex Allergy** ;  Surgeon: Elyse Eisenberg MD;  Location: [facility]     ORTHOPEDIC SURGERY       REMOVE HARDWARE WRIST Left 11/19/2019    Procedure: Left Distal Radius Hardware Removal, Flexor Pollicus Longus Repair, Deep Cultures;  Surgeon: Dinh Strong MD;  [facility]     ZZC HAND/FINGER SURGERY UNLISTED       ZZC PELVIS/HIP JOINT SURGERY UNLISTED       Social History     Socioeconomic History     Marital status:      Spouse name: Not on file     Number of children: Not on file     Years of education: Not on file     Highest education level: Not on file   Occupational History     Not on file   Tobacco Use     Smoking status: Never     Smokeless tobacco: Never   Substance and Sexual Activity     Alcohol use: Yes     Comment: 2 glasses of wine a day     Drug use: Never     Sexual activity: Not Currently   Other Topics Concern     Parent/sibling w/ CABG, MI or angioplasty before 65F 55M? Not Asked   Social History  Narrative    Ms. Chau is , has two grown children and two grandchildren. She does not work. She was never a smoker, and drinks a glass of wine with dinner each night.         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      Social Determinants of Health     Financial Resource Strain: Not on file   Food Insecurity: Not on file   Transportation Needs: Not on file   Physical Activity: Not on file   Stress: Not on file   Social Connections: Not on file   Intimate Partner Violence: Not on file   Housing Stability: Not on file     Family History   Problem Relation Age of Onset     Arthritis Mother      Respiratory Mother         pulmonary fibrosis     Hypertension Mother      Anemia Mother      Liver Disease Father         from EtOH     Alcoholism Father      No Known Problems Maternal Grandmother      Heart Disease Maternal Grandfather      Glaucoma Paternal Grandfather      Heart Disease Paternal Grandfather      Multiple Sclerosis Sister      Breast Cancer Sister      Skin Cancer No family hx of      Melanoma No family hx of                SUBJECTIVE FINDINGS:  An 82-year-old returns to clinic for left medial leg ulcers with venous stasis and venous hypertension.  She relates to no problems with unna boot compression system.     OBJECTIVE FINDINGS:  DP and PT are 2/4, left.  She has left medial proximal leg ulcer that is through the Dermis into the subcutaneous tissues that is autumn.  There is dried serosanguineous drainage, Minimal erythema, positive edema, no tenderness to palpation.  No odor, no calor.  Distal ulcer is closed with eschar.  She has decreased peripheral edema with venous stasis.     ASSESSMENT AND PLAN:  Ulcers, left medial ankle and leg.  She has venous stasis, venous hypertension and some lymphedema component to this.  She has Cellulitis that appears resolved.  Diagnosis and treatment options discussed with her.  I cleaned the ulcer sites and leg with Wound  Vashe, applied Hydrofera Blue to the ulcer sites and a multilayered compression system with an Unna boot with light compression applied and use discussed with her.  Return to clinic and see me in 1 week.  Previous notes reviewed.           Moderate level of medical decision making.      Again, thank you for allowing me to participate in the care of your patient.        Sincerely,        Camden Salazar DPM

## 2023-02-10 ENCOUNTER — OFFICE VISIT (OUTPATIENT)
Dept: PODIATRY | Facility: CLINIC | Age: 83
End: 2023-02-10
Payer: MEDICARE

## 2023-02-10 DIAGNOSIS — L97.922 SKIN ULCER OF LEFT LOWER LEG WITH FAT LAYER EXPOSED (H): Primary | ICD-10-CM

## 2023-02-10 DIAGNOSIS — I87.8 VENOUS STASIS: ICD-10-CM

## 2023-02-10 PROCEDURE — 99214 OFFICE O/P EST MOD 30 MIN: CPT | Performed by: PODIATRIST

## 2023-02-10 NOTE — PROGRESS NOTES
Past Medical History:   Diagnosis Date     Anemia      Arthritis      Cataracts      Central retinal artery occlusion      Cervical spine fracture (H)     After a fall from orthostatic sx     Chronic pain     Back of head     Gastro-oesophageal reflux disease      Head injury      Hypertension      Hyponatremia     Resolved, 2/2 diuretics     Migraines      Osteoporosis      Pneumonia 2018     Rheumatoid arthritis(714.0)      Patient Active Problem List   Diagnosis     Intrinsic atopic dermatitis     Essential hypertension     Rheumatoid arthritis (H)     Retinal artery occlusion     Cervical vertebral fracture (H)     Central retinal artery occlusion of right eye     Bilateral occipital neuralgia     C1-C2 instability     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 factor (H)     Osteoarthritis     Malignant hypertension     Hyponatremia     GERD (gastroesophageal reflux disease)     Eczematous dermatitis     Anxiety state     Anemia     Closed fracture of distal end of left radius with delayed healing, unspecified fracture morphology, subsequent encounter     Closed fracture of distal end of left radius, unspecified fracture morphology, sequela     Osteomyelitis of left leg (H)     Pain of left thumb     Past Surgical History:   Procedure Laterality Date     COLONOSCOPY       EYE SURGERY Left 02/2019    Hole in macula     FUSION CERVICAL POSTERIOR THREE+ LEVELS  1/22/2013    Procedure: FUSION CERVICAL POSTERIOR THREE+ LEVELS;  Cervical 1-6 Posterior Instrumentation and Fusion, Cervical 3-4 Laminectomy  .Instrumentation and fusion to Cervical 6 *Latex Allergy*;  Surgeon: Ra Bar MD;  Location: [facility]     GYN SURGERY       HEAD & NECK SURGERY       HYSTERECTOMY       IR ENDOVENOUS ABLATION VARICOSE VEINS  2/16/2021     KNEE SURGERY       NECK SURGERY       OPEN REDUCTION INTERNAL FIXATION WRIST Left 4/30/2019    Procedure: Open Reduction Internal Fixation Left Distal Radius  Fracture;  Surgeon: Dinh Strong MD;  [facility]     OPEN REDUCTION INTERNAL FIXATION WRIST Left 6/12/2020    Procedure: OPEN REDUCTION INTERNAL FIXATION Left Distal Radius Nonunion, Distal Ulna Resection;  Surgeon: Dinh Strong MD;  [facility]     OPTICAL TRACKING SYSTEM ENDOSCOPIC SINUS SURGERY Right 4/6/2018    Procedure: OPTICAL TRACKING SYSTEM ENDOSCOPIC SINUS SURGERY;  Stealth Assisted Right Maxillary Antrostomy, Anterior Ethmoidectomy And Frontal Sinusotomy;  Surgeon: Elyse Eisenberg MD;  Location: [facility]     OPTICAL TRACKING SYSTEM ENDOSCOPIC SINUS SURGERY Right 8/3/2018    Procedure: OPTICAL TRACKING SYSTEM ENDOSCOPIC SINUS SURGERY;  Stealth Assisted Revision Right Frontal Sinusotomy, Right Stent Placement  **Latex Allergy** ;  Surgeon: Elyse Eisenberg MD;  Location: [facility]     ORTHOPEDIC SURGERY       REMOVE HARDWARE WRIST Left 11/19/2019    Procedure: Left Distal Radius Hardware Removal, Flexor Pollicus Longus Repair, Deep Cultures;  Surgeon: Dinh Strong MD;  [facility]: [facility]     ZZC HAND/FINGER SURGERY UNLISTED       ZZC PELVIS/HIP JOINT SURGERY UNLISTED       Social History     Socioeconomic History     Marital status:      Spouse name: Not on file     Number of children: Not on file     Years of education: Not on file     Highest education level: Not on file   Occupational History     Not on file   Tobacco Use     Smoking status: Never     Smokeless tobacco: Never   Substance and Sexual Activity     Alcohol use: Yes     Comment: 2 glasses of wine a day     Drug use: Never     Sexual activity: Not Currently   Other Topics Concern     Parent/sibling w/ CABG, MI or angioplasty before 65F 55M? Not Asked   Social History Narrative    Ms. Chau is , has two grown children and two grandchildren. She does not work. She was never a smoker, and drinks a glass of wine with dinner each night.         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      Social Determinants of Health     Financial Resource Strain: Not on file   Food Insecurity: Not on file   Transportation Needs: Not on file   Physical Activity: Not on file   Stress: Not on file   Social Connections: Not on file   Intimate Partner Violence: Not on file   Housing Stability: Not on file     Family History   Problem Relation Age of Onset     Arthritis Mother      Respiratory Mother         pulmonary fibrosis     Hypertension Mother      Anemia Mother      Liver Disease Father         from EtOH     Alcoholism Father      No Known Problems Maternal Grandmother      Heart Disease Maternal Grandfather      Glaucoma Paternal Grandfather      Heart Disease Paternal Grandfather      Multiple Sclerosis Sister      Breast Cancer Sister      Skin Cancer No family hx of      Melanoma No family hx of              SUBJECTIVE FINDINGS:  An 82-year-old returns to clinic for left medial leg ulcers with venous stasis and venous hypertension.  She relates to no problems with unna boot compression system.     OBJECTIVE FINDINGS:  DP and PT are 2/4, left.  She has left medial proximal leg ulcer that is through the Dermis into the subcutaneous tissues that is autumn.  There is dried serosanguineous drainage, Minimal erythema, positive edema, no tenderness to palpation.  No odor, no calor.  Distal ulcer is closed with eschar.  She has decreased peripheral edema with venous stasis.     ASSESSMENT AND PLAN:  Ulcers, left medial ankle and leg.  She has venous stasis, venous hypertension and some lymphedema component to this.  Cellulitis appears resolved.  Diagnosis and treatment options discussed with her.  I cleaned the ulcer sites and leg with Wound Vashe, applied Hydrofera Blue to the ulcer sites and a multilayered compression system with an Unna boot with light compression applied and use discussed with her.  Return to clinic and see me in 1 week.  Previous notes reviewed.           Moderate level of  medical decision making.

## 2023-02-10 NOTE — LETTER
2/10/2023         RE: Kimberly Chau  [id number]   [place] [address]        Dear Colleague,    Thank you for referring your patient, Kimberly Chau, to the [facility]. Please see a copy of my visit note below.    Past Medical History:   Diagnosis Date     Anemia      Arthritis      Cataracts      Central retinal artery occlusion      Cervical spine fracture (H)     After a fall from orthostatic sx     Chronic pain     Back of head     Gastro-oesophageal reflux disease      Head injury      Hypertension      Hyponatremia     Resolved, 2/2 diuretics     Migraines      Osteoporosis      Pneumonia 2018     Rheumatoid arthritis(714.0)      Patient Active Problem List   Diagnosis     Intrinsic atopic dermatitis     Essential hypertension     Rheumatoid arthritis (H)     Retinal artery occlusion     Cervical vertebral fracture (H)     Central retinal artery occlusion of right eye     Bilateral occipital neuralgia     C1-C2 instability     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 factor (H)     Osteoarthritis     Malignant hypertension     Hyponatremia     GERD (gastroesophageal reflux disease)     Eczematous dermatitis     Anxiety state     Anemia     Closed fracture of distal end of left radius with delayed healing, unspecified fracture morphology, subsequent encounter     Closed fracture of distal end of left radius, unspecified fracture morphology, sequela     Osteomyelitis of left leg (H)     Pain of left thumb     Past Surgical History:   Procedure Laterality Date     COLONOSCOPY       EYE SURGERY Left 02/2019    Hole in macula     FUSION CERVICAL POSTERIOR THREE+ LEVELS  1/22/2013    Procedure: FUSION CERVICAL POSTERIOR THREE+ LEVELS;  Cervical 1-6 Posterior Instrumentation and Fusion, Cervical 3-4 Laminectomy  .Instrumentation and fusion to Cervical 6 *Latex Allergy*;  Surgeon: Ra Bar MD;  Location: [facility] OR     GYN SURGERY       HEAD & NECK  SURGERY       HYSTERECTOMY       IR ENDOVENOUS ABLATION VARICOSE VEINS  2/16/2021     KNEE SURGERY       NECK SURGERY       OPEN REDUCTION INTERNAL FIXATION WRIST Left 4/30/2019    Procedure: Open Reduction Internal Fixation Left Distal Radius Fracture;  Surgeon: Dinh Strong MD;  [facility]     OPEN REDUCTION INTERNAL FIXATION WRIST Left 6/12/2020    Procedure: OPEN REDUCTION INTERNAL FIXATION Left Distal Radius Nonunion, Distal Ulna Resection;  Surgeon: Dinh Strong MD;  [facility]     OPTICAL TRACKING SYSTEM ENDOSCOPIC SINUS SURGERY Right 4/6/2018    Procedure: OPTICAL TRACKING SYSTEM ENDOSCOPIC SINUS SURGERY;  Stealth Assisted Right Maxillary Antrostomy, Anterior Ethmoidectomy And Frontal Sinusotomy;  Surgeon: Elyse Eisenberg MD;  Location: [facility] OR     OPTICAL TRACKING SYSTEM ENDOSCOPIC SINUS SURGERY Right 8/3/2018    Procedure: OPTICAL TRACKING SYSTEM ENDOSCOPIC SINUS SURGERY;  Stealth Assisted Revision Right Frontal Sinusotomy, Right Stent Placement  **Latex Allergy** ;  Surgeon: Elyse Eisenberg MD;  Location: [facility]     ORTHOPEDIC SURGERY       REMOVE HARDWARE WRIST Left 11/19/2019    Procedure: Left Distal Radius Hardware Removal, Flexor Pollicus Longus Repair, Deep Cultures;  Surgeon: Dinh Strong MD;  [facility]     ZZC HAND/FINGER SURGERY UNLISTED       ZZC PELVIS/HIP JOINT SURGERY UNLISTED       Social History     Socioeconomic History     Marital status:      Spouse name: Not on file     Number of children: Not on file     Years of education: Not on file     Highest education level: Not on file   Occupational History     Not on file   Tobacco Use     Smoking status: Never     Smokeless tobacco: Never   Substance and Sexual Activity     Alcohol use: Yes     Comment: 2 glasses of wine a day     Drug use: Never     Sexual activity: Not Currently   Other Topics Concern     Parent/sibling w/ CABG, MI or angioplasty before 65F 55M? Not Asked   Social History  Narrative    Ms. Chau is , has two grown children and two grandchildren. She does not work. She was never a smoker, and drinks a glass of wine with dinner each night.         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      Social Determinants of Health     Financial Resource Strain: Not on file   Food Insecurity: Not on file   Transportation Needs: Not on file   Physical Activity: Not on file   Stress: Not on file   Social Connections: Not on file   Intimate Partner Violence: Not on file   Housing Stability: Not on file     Family History   Problem Relation Age of Onset     Arthritis Mother      Respiratory Mother         pulmonary fibrosis     Hypertension Mother      Anemia Mother      Liver Disease Father         from EtOH     Alcoholism Father      No Known Problems Maternal Grandmother      Heart Disease Maternal Grandfather      Glaucoma Paternal Grandfather      Heart Disease Paternal Grandfather      Multiple Sclerosis Sister      Breast Cancer Sister      Skin Cancer No family hx of      Melanoma No family hx of              SUBJECTIVE FINDINGS:  An 82-year-old returns to clinic for left medial leg ulcers with venous stasis and venous hypertension.  She relates to no problems with unna boot compression system.     OBJECTIVE FINDINGS:  DP and PT are 2/4, left.  She has left medial proximal leg ulcer that is through the Dermis into the subcutaneous tissues that is autumn.  There is dried serosanguineous drainage, Minimal erythema, positive edema, no tenderness to palpation.  No odor, no calor.  Distal ulcer is closed with eschar.  She has decreased peripheral edema with venous stasis.     ASSESSMENT AND PLAN:  Ulcers, left medial ankle and leg.  She has venous stasis, venous hypertension and some lymphedema component to this.  Cellulitis appears resolved.  Diagnosis and treatment options discussed with her.  I cleaned the ulcer sites and leg with Wound Vashe, applied  Hydrofera Blue to the ulcer sites and a multilayered compression system with an Unna boot with light compression applied and use discussed with her.  Return to clinic and see me in 1 week.  Previous notes reviewed.           Moderate level of medical decision making.        Again, thank you for allowing me to participate in the care of your patient.        Sincerely,        Camden Salazar DPM

## 2023-02-10 NOTE — NURSING NOTE
Kimberly Chau's chief complaint for this visit includes:  Chief Complaint   Patient presents with     Right Lower Leg - WOUND CARE     PCP: Guillermo Castellano    Referring Provider:  No referring provider defined for this encounter.    There were no vitals taken for this visit.  Data Unavailable     Do you need any medication refills at today's visit? No    Allergies   Allergen Reactions     Hctz Other (See Comments)     Pt develops symptomatic and significant hyponatremia with HCTZ exposure     Hydrochlorothiazide      Other reaction(s): Hyponatremia  Hyponatremia     Latex      Benzocaine Rash     carba mix,thrium-sunscreen     Resorcinol-Alcohol Rash     carba mix,thrium-sunscreen     Ace Inhibitors Unknown     Dizziness and orthostatic changes and electrolyte problems.     Latex Unknown       Dereck Henry, EMT

## 2023-02-17 ENCOUNTER — OFFICE VISIT (OUTPATIENT)
Dept: PODIATRY | Facility: CLINIC | Age: 83
End: 2023-02-17
Payer: MEDICARE

## 2023-02-17 DIAGNOSIS — L97.922 SKIN ULCER OF LEFT LOWER LEG WITH FAT LAYER EXPOSED (H): Primary | ICD-10-CM

## 2023-02-17 DIAGNOSIS — I87.8 VENOUS STASIS: ICD-10-CM

## 2023-02-17 PROCEDURE — 99214 OFFICE O/P EST MOD 30 MIN: CPT | Mod: 25 | Performed by: PODIATRIST

## 2023-02-17 PROCEDURE — 29581 APPL MULTLAYER CMPRN SYS LEG: CPT | Mod: LT | Performed by: PODIATRIST

## 2023-02-17 NOTE — LETTER
2/17/2023         RE: Kimberly Chau  [id number]   [place] [address]        Dear Colleague,    Thank you for referring your patient, Kimberly Chau, to the [facility]. Please see a copy of my visit note below.    Past Medical History:   Diagnosis Date     Anemia      Arthritis      Cataracts      Central retinal artery occlusion      Cervical spine fracture (H)     After a fall from orthostatic sx     Chronic pain     Back of head     Gastro-oesophageal reflux disease      Head injury      Hypertension      Hyponatremia     Resolved, 2/2 diuretics     Migraines      Osteoporosis      Pneumonia 2018     Rheumatoid arthritis(714.0)      Patient Active Problem List   Diagnosis     Intrinsic atopic dermatitis     Essential hypertension     Rheumatoid arthritis (H)     Retinal artery occlusion     Cervical vertebral fracture (H)     Central retinal artery occlusion of right eye     Bilateral occipital neuralgia     C1-C2 instability     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 factor (H)     Osteoarthritis     Malignant hypertension     Hyponatremia     GERD (gastroesophageal reflux disease)     Eczematous dermatitis     Anxiety state     Anemia     Closed fracture of distal end of left radius with delayed healing, unspecified fracture morphology, subsequent encounter     Closed fracture of distal end of left radius, unspecified fracture morphology, sequela     Osteomyelitis of left leg (H)     Pain of left thumb     Past Surgical History:   Procedure Laterality Date     COLONOSCOPY       EYE SURGERY Left 02/2019    Hole in macula     FUSION CERVICAL POSTERIOR THREE+ LEVELS  1/22/2013    Procedure: FUSION CERVICAL POSTERIOR THREE+ LEVELS;  Cervical 1-6 Posterior Instrumentation and Fusion, Cervical 3-4 Laminectomy  .Instrumentation and fusion to Cervical 6 *Latex Allergy*;  Surgeon: Ra Bar MD;  Location: [facility]     GYN SURGERY       HEAD & NECK  SURGERY       HYSTERECTOMY       IR ENDOVENOUS ABLATION VARICOSE VEINS  2/16/2021     KNEE SURGERY       NECK SURGERY       OPEN REDUCTION INTERNAL FIXATION WRIST Left 4/30/2019    Procedure: Open Reduction Internal Fixation Left Distal Radius Fracture;  Surgeon: Dinh Strong MD;  [facility]     OPEN REDUCTION INTERNAL FIXATION WRIST Left 6/12/2020    Procedure: OPEN REDUCTION INTERNAL FIXATION Left Distal Radius Nonunion, Distal Ulna Resection;  Surgeon: Dinh Strong MD;  [facility]     OPTICAL TRACKING SYSTEM ENDOSCOPIC SINUS SURGERY Right 4/6/2018    Procedure: OPTICAL TRACKING SYSTEM ENDOSCOPIC SINUS SURGERY;  Stealth Assisted Right Maxillary Antrostomy, Anterior Ethmoidectomy And Frontal Sinusotomy;  Surgeon: Elyse Eisenberg MD;  Location: [facility]     OPTICAL TRACKING SYSTEM ENDOSCOPIC SINUS SURGERY Right 8/3/2018    Procedure: OPTICAL TRACKING SYSTEM ENDOSCOPIC SINUS SURGERY;  Stealth Assisted Revision Right Frontal Sinusotomy, Right Stent Placement  **Latex Allergy** ;  Surgeon: Elyse Eisenberg MD;  [facility]: [facility] OR     ORTHOPEDIC SURGERY       REMOVE HARDWARE WRIST Left 11/19/2019    Procedure: Left Distal Radius Hardware Removal, Flexor Pollicus Longus Repair, Deep Cultures;  Surgeon: Dinh Strong MD;  [facility]     ZZC HAND/FINGER SURGERY UNLISTED       ZZC PELVIS/HIP JOINT SURGERY UNLISTED       Social History     Socioeconomic History     Marital status:      Spouse name: Not on file     Number of children: Not on file     Years of education: Not on file     Highest education level: Not on file   Occupational History     Not on file   Tobacco Use     Smoking status: Never     Smokeless tobacco: Never   Substance and Sexual Activity     Alcohol use: Yes     Comment: 2 glasses of wine a day     Drug use: Never     Sexual activity: Not Currently   Other Topics Concern     Parent/sibling w/ CABG, MI or angioplasty before 65F 55M? Not Asked   Social History  Narrative    Ms. Chau is , has two grown children and two grandchildren. She does not work. She was never a smoker, and drinks a glass of wine with dinner each night.         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      Social Determinants of Health     Financial Resource Strain: Not on file   Food Insecurity: Not on file   Transportation Needs: Not on file   Physical Activity: Not on file   Stress: Not on file   Social Connections: Not on file   Intimate Partner Violence: Not on file   Housing Stability: Not on file     Family History   Problem Relation Age of Onset     Arthritis Mother      Respiratory Mother         pulmonary fibrosis     Hypertension Mother      Anemia Mother      Liver Disease Father         from EtOH     Alcoholism Father      No Known Problems Maternal Grandmother      Heart Disease Maternal Grandfather      Glaucoma Paternal Grandfather      Heart Disease Paternal Grandfather      Multiple Sclerosis Sister      Breast Cancer Sister      Skin Cancer No family hx of      Melanoma No family hx of                SUBJECTIVE FINDINGS:  An 82-year-old returns to clinic for left medial leg ulcers with venous stasis and venous hypertension.  She relates to no problems with unna boot compression system.     OBJECTIVE FINDINGS:  DP and PT are 2/4, left.  She has left medial proximal leg ulcer that is through the Dermis into the subcutaneous tissues that is autumn.  There is dried serosanguineous drainage, Minimal erythema, positive edema, no tenderness to palpation.  No odor, no calor.  Distal ulcer is closed with eschar.  She has decreased peripheral edema with venous stasis.     ASSESSMENT AND PLAN:  Ulcers, left medial ankle and leg.  She has venous stasis, venous hypertension and some lymphedema component to this.  Cellulitis appears resolved.  Diagnosis and treatment options discussed with her.  I cleaned the ulcer sites and leg with Wound Vashe, applied  Hydrofera Blue to the ulcer sites and a multilayered compression system with an Unna boot with light compression applied and use discussed with her.  Return to clinic and see me in 1 week.  Previous notes reviewed.           Moderate level of medical decision making.      Again, thank you for allowing me to participate in the care of your patient.        Sincerely,        Camden Salazar DPM

## 2023-02-21 ENCOUNTER — OFFICE VISIT (OUTPATIENT)
Dept: DERMATOLOGY | Facility: CLINIC | Age: 83
End: 2023-02-21
Payer: MEDICARE

## 2023-02-21 DIAGNOSIS — L30.9 DERMATITIS: ICD-10-CM

## 2023-02-21 PROCEDURE — 99214 OFFICE O/P EST MOD 30 MIN: CPT | Mod: GC | Performed by: STUDENT IN AN ORGANIZED HEALTH CARE EDUCATION/TRAINING PROGRAM

## 2023-02-21 RX ORDER — TRIAMCINOLONE ACETONIDE 1 MG/G
CREAM TOPICAL
Qty: 454 G | Refills: 0 | Status: SHIPPED | OUTPATIENT
Start: 2023-02-21 | End: 2023-06-20

## 2023-02-21 NOTE — LETTER
2/21/2023       RE: Kimberly Chau  [id number]   [place] [address]     Dear Colleague,    Thank you for referring your patient, Kimberly Chau, to the [facility] [place] at [facility]. Please see a copy of my visit note below.    [facility] Dermatology Note    Encounter Date: Feb 21, 2023    Dermatology Problem List:  #Venous stasis ulcers   - 08/18/21 LE US  - 02/16/21 laser ablation of Great saphenous vein of R &L  leg   - previously improved w/ compression stockings, mupirocin  -11/08/22 pitting edema, new rx for 15-20mm Hg compression stockings. Defer aspirin due to hx falls. High BP defer horse chestnut and venoconstrictors. Start pentoxyfylline 400mg TID. Vinegar soaks. Ref wound care  -12/13/22 slightly improved. Pain resolved w/ pentoxifylline 400mg TID (taking BID). Not using compression stockings. 2-3+ pitting edmea    2/21/23 - Unna boot placed by podiatry, much improved with conservative treatment with wound      Major PMHx  -   ______________________________________    Impression/Plan:  Kimberly was seen today for derm problem.    Diagnoses and all orders for this visit:    Lower extremity edema w/ ulceration  Since last visit, started following with podiatry/wound. Seeing significant improvement. Currently has Unna boot present -- did not remove today as it was placed less than a week ago. Next f/u with wound care is 3/2. Images reviewed from podiatry visit on 2/17. Tolerating pentoxifylline, taking BID.   - continue management with podiatry  - continue pentoxifylline     Eczema  Long standing history of eczema. No current lesions today. Using Vanicream daily and triamcinolone PRN. Needing refill of steroid today.  - refill triamcinolone      Follow-up in June      Staff and resident Involved:  Meaghan Melissa MD  [facility] Medicine Resident. PGY-2    Hamilton Cummins MD   of  Dermatology  Department of Dermatology  [facility] [employer]      CC:   Chief Complaint   Patient presents with     Derm Problem     Patient states that ulcers have been progressing well with one healing.       History of Present Illness:  Ms. Kimberly Chau is a 82 year old female who presents as a return patient.    Ulcers much better. Lower ulcer healed completeled. Upper one improving significantly per patient. Follows with podiatry and had an Unna boot placed a week ago. Has follow-up on 3/2 with them. No questions or concerns about current management.     Has eczema and hoping for refill of triamcinolone. Only using PRN. Also using Vanicream daily. Has no lesions of concern today.     Labs:  N/A    Physical exam:  Vitals: There were no vitals taken for this visit.  GEN: well developed, well-nourished, in no acute distress, in a pleasant mood.     SKIN: Carreon phototype 1  - left LE with Unna boot present  - images from 2/17 podiatry visit reviewed  - No other lesions of concern on areas examined.     Past Medical History:   Past Medical History:   Diagnosis Date     Anemia      Arthritis      Cataracts      Central retinal artery occlusion      Cervical spine fracture (H)     After a fall from orthostatic sx     Chronic pain     Back of head     Gastro-oesophageal reflux disease      Head injury      Hypertension      Hyponatremia     Resolved, 2/2 diuretics     Migraines      Osteoporosis      Pneumonia 2018     Rheumatoid arthritis(714.0)      Past Surgical History:   Procedure Laterality Date     COLONOSCOPY       EYE SURGERY Left 02/2019    Hole in macula     FUSION CERVICAL POSTERIOR THREE+ LEVELS  1/22/2013    Procedure: FUSION CERVICAL POSTERIOR THREE+ LEVELS;  Cervical 1-6 Posterior Instrumentation and Fusion, Cervical 3-4 Laminectomy  .Instrumentation and fusion to Cervical 6 *Latex Allergy*;  Surgeon: Ra Bar MD;  Location: [facility] OR     GYN SURGERY       HEAD &  NECK SURGERY       HYSTERECTOMY       IR ENDOVENOUS ABLATION VARICOSE VEINS  2/16/2021     KNEE SURGERY       NECK SURGERY       OPEN REDUCTION INTERNAL FIXATION WRIST Left 4/30/2019    Procedure: Open Reduction Internal Fixation Left Distal Radius Fracture;  Surgeon: Dinh Strong MD;  [facility]     OPEN REDUCTION INTERNAL FIXATION WRIST Left 6/12/2020    Procedure: OPEN REDUCTION INTERNAL FIXATION Left Distal Radius Nonunion, Distal Ulna Resection;  Surgeon: Dinh Strong MD;  [facility]     OPTICAL TRACKING SYSTEM ENDOSCOPIC SINUS SURGERY Right 4/6/2018    Procedure: OPTICAL TRACKING SYSTEM ENDOSCOPIC SINUS SURGERY;  Stealth Assisted Right Maxillary Antrostomy, Anterior Ethmoidectomy And Frontal Sinusotomy;  Surgeon: Elyse Eisenberg MD;  Location: [facility] OR     OPTICAL TRACKING SYSTEM ENDOSCOPIC SINUS SURGERY Right 8/3/2018    Procedure: OPTICAL TRACKING SYSTEM ENDOSCOPIC SINUS SURGERY;  Stealth Assisted Revision Right Frontal Sinusotomy, Right Stent Placement  **Latex Allergy** ;  Surgeon: Elyse Eisenberg MD;  Location: [facility]     ORTHOPEDIC SURGERY       REMOVE HARDWARE WRIST Left 11/19/2019    Procedure: Left Distal Radius Hardware Removal, Flexor Pollicus Longus Repair, Deep Cultures;  Surgeon: Dinh Strong MD;  [facility]     ZZC HAND/FINGER SURGERY UNLISTED       ZZ PELVIS/HIP JOINT SURGERY UNLISTED         Social History:   reports that she has never smoked. She has never used smokeless tobacco. She reports current alcohol use. She reports that she does not use drugs.    Family History:  Family History   Problem Relation Age of Onset     Arthritis Mother      Respiratory Mother         pulmonary fibrosis     Hypertension Mother      Anemia Mother      Liver Disease Father         from EtOH     Alcoholism Father      No Known Problems Maternal Grandmother      Heart Disease Maternal Grandfather      Glaucoma Paternal Grandfather      Heart Disease Paternal Grandfather   "    Multiple Sclerosis Sister      Breast Cancer Sister      Skin Cancer No family hx of      Melanoma No family hx of        Medications:  Current Outpatient Medications   Medication Sig Dispense Refill     acetaminophen 650 MG TABS Take 650 mg by mouth every 4 hours as needed. 100 tablet 0     amLODIPine (NORVASC) 5 MG tablet Take 1 tablet (5 mg) by mouth daily 90 tablet 3     amLODIPine (NORVASC) 5 MG tablet Take 1 tablet (5 mg) by mouth daily 90 tablet 1     carvedilol (COREG) 12.5 MG tablet Take 1 tablet (12.5 mg) by mouth 2 times daily (with meals). 180 tablet 3     cephALEXin (KEFLEX) 500 MG capsule Take 1 capsule (500 mg) by mouth 2 times daily 28 capsule 0     COMPRESSION STOCKINGS Please measure and distribute 1 pair of 20mmHg - 30mmHg knee high open or closed toe compression stockings. Jobst ultrasheer or equivalent. 2 each 4     Diphenhydramine-APAP, sleep, (TYLENOL PM EXTRA STRENGTH PO) Take 2 tablets by mouth At Bedtime        ferrous gluconate (FERGON) 324 (38 Fe) MG tablet Take 324 mg by mouth       Gauze Pads & Dressings (TELFA NON-ADHERENT) 3\"X4\" PADS Cut to size for open areas on the lower extremities and secure with paper tape. Change dressing every other day. 30 each 11     Multiple Vitamin (MULTI-VITAMIN) per tablet Take 1 tablet by mouth every morning        Omeprazole (PRILOSEC PO) Take 20 mg by mouth as needed        pentoxifylline ER (TRENTAL) 400 MG CR tablet Take 1 tablet (400 mg) by mouth 3 times daily (with meals) 90 tablet 2     sodium chloride (OCEAN NASAL SPRAY) 0.65 % nasal spray Spray 1 spray into both nostrils daily as needed for congestion 1 Bottle 3     triamcinolone (KENALOG) 0.1 % external cream Apply twice daily for 2 weeks then 3 times weekly for 2 week, repeat cycle as needed (Patient not taking: Reported on 2/21/2023) 454 g 0     Allergies   Allergen Reactions     Hctz Other (See Comments)     Pt develops symptomatic and significant hyponatremia with HCTZ exposure     " Hydrochlorothiazide      Other reaction(s): Hyponatremia  Hyponatremia     Latex      Benzocaine Rash     carba mix,thrium-sunscreen     Resorcinol-Alcohol Rash     carba mix,thrium-sunscreen     Ace Inhibitors Unknown     Dizziness and orthostatic changes and electrolyte problems.     Latex Unknown

## 2023-02-21 NOTE — NURSING NOTE
Dermatology Rooming Note    Kimberly Chau's goals for this visit include:   Chief Complaint   Patient presents with     Derm Problem     Patient states that ulcers have been progressing well with one healing.     Dieter Proctor, EMT-B

## 2023-02-21 NOTE — PROGRESS NOTES
[facility] Dermatology Note    Encounter Date: Feb 21, 2023    Dermatology Problem List:  #Venous stasis ulcers   - 08/18/21 LE US  - 02/16/21 laser ablation of Great saphenous vein of R &L  leg   - previously improved w/ compression stockings, mupirocin  -11/08/22 pitting edema, new rx for 15-20mm Hg compression stockings. Defer aspirin due to hx falls. High BP defer horse chestnut and venoconstrictors. Start pentoxyfylline 400mg TID. Vinegar soaks. Ref wound care  -12/13/22 slightly improved. Pain resolved w/ pentoxifylline 400mg TID (taking BID). Not using compression stockings. 2-3+ pitting edmea    2/21/23 - Unna boot placed by podiatry, much improved with conservative treatment with wound      Major PMHx  -   ______________________________________    Impression/Plan:  Kimberly was seen today for derm problem.    Diagnoses and all orders for this visit:    Lower extremity edema w/ ulceration  Since last visit, started following with podiatry/wound. Seeing significant improvement. Currently has Unna boot present -- did not remove today as it was placed less than a week ago. Next f/u with wound care is 3/2. Images reviewed from podiatry visit on 2/17. Tolerating pentoxifylline, taking BID.   - continue management with podiatry  - continue pentoxifylline     Eczema  Long standing history of eczema. No current lesions today. Using Vanicream daily and triamcinolone PRN. Needing refill of steroid today.  - refill triamcinolone      Follow-up in June      Staff and resident Involved:  Meaghan Melissa MD  [facility] Medicine Resident. PGY-2    Hamilton Cummins MD   of Dermatology  Department of Dermatology  [facility]      CC:   Chief Complaint   Patient presents with     Derm Problem     Patient states that ulcers have been progressing well with one healing.       History of Present Illness:  Ms. Kimberly Chau is a 82 year old female who presents  as a return patient.    Ulcers much better. Lower ulcer healed completeled. Upper one improving significantly per patient. Follows with podiatry and had an Unna boot placed a week ago. Has follow-up on 3/2 with them. No questions or concerns about current management.     Has eczema and hoping for refill of triamcinolone. Only using PRN. Also using Vanicream daily. Has no lesions of concern today.     Labs:  N/A    Physical exam:  Vitals: There were no vitals taken for this visit.  GEN: well developed, well-nourished, in no acute distress, in a pleasant mood.     SKIN: Carreon phototype 1  - left LE with Unna boot present  - images from 2/17 podiatry visit reviewed  - No other lesions of concern on areas examined.     Past Medical History:   Past Medical History:   Diagnosis Date     Anemia      Arthritis      Cataracts      Central retinal artery occlusion      Cervical spine fracture (H)     After a fall from orthostatic sx     Chronic pain     Back of head     Gastro-oesophageal reflux disease      Head injury      Hypertension      Hyponatremia     Resolved, 2/2 diuretics     Migraines      Osteoporosis      Pneumonia 2018     Rheumatoid arthritis(714.0)      Past Surgical History:   Procedure Laterality Date     COLONOSCOPY       EYE SURGERY Left 02/2019    Hole in macula     FUSION CERVICAL POSTERIOR THREE+ LEVELS  1/22/2013    Procedure: FUSION CERVICAL POSTERIOR THREE+ LEVELS;  Cervical 1-6 Posterior Instrumentation and Fusion, Cervical 3-4 Laminectomy  .Instrumentation and fusion to Cervical 6 *Latex Allergy*;  Surgeon: Ra Bar MD;  Location: [facility] OR     GYN SURGERY       HEAD & NECK SURGERY       HYSTERECTOMY       IR ENDOVENOUS ABLATION VARICOSE VEINS  2/16/2021     KNEE SURGERY       NECK SURGERY       OPEN REDUCTION INTERNAL FIXATION WRIST Left 4/30/2019    Procedure: Open Reduction Internal Fixation Left Distal Radius Fracture;  Surgeon: Dinh Strong MD;  Location:  OR     OPEN  REDUCTION INTERNAL FIXATION WRIST Left 6/12/2020    Procedure: OPEN REDUCTION INTERNAL FIXATION Left Distal Radius Nonunion, Distal Ulna Resection;  Surgeon: Dinh Strong MD;  [facility]: [facility] OR     OPTICAL TRACKING SYSTEM ENDOSCOPIC SINUS SURGERY Right 4/6/2018    Procedure: OPTICAL TRACKING SYSTEM ENDOSCOPIC SINUS SURGERY;  Stealth Assisted Right Maxillary Antrostomy, Anterior Ethmoidectomy And Frontal Sinusotomy;  Surgeon: Elyse Eisenberg MD;  Location: [facility] OR     OPTICAL TRACKING SYSTEM ENDOSCOPIC SINUS SURGERY Right 8/3/2018    Procedure: OPTICAL TRACKING SYSTEM ENDOSCOPIC SINUS SURGERY;  Stealth Assisted Revision Right Frontal Sinusotomy, Right Stent Placement  **Latex Allergy** ;  Surgeon: Elyse Eisenberg MD;  [facility]: [facility] OR     ORTHOPEDIC SURGERY       REMOVE HARDWARE WRIST Left 11/19/2019    Procedure: Left Distal Radius Hardware Removal, Flexor Pollicus Longus Repair, Deep Cultures;  Surgeon: Dinh Strong MD;  [facility] OR     ZC HAND/FINGER SURGERY UNLISTED       Z PELVIS/HIP JOINT SURGERY UNLISTED         Social History:   reports that she has never smoked. She has never used smokeless tobacco. She reports current alcohol use. She reports that she does not use drugs.    Family History:  Family History   Problem Relation Age of Onset     Arthritis Mother      Respiratory Mother         pulmonary fibrosis     Hypertension Mother      Anemia Mother      Liver Disease Father         from EtOH     Alcoholism Father      No Known Problems Maternal Grandmother      Heart Disease Maternal Grandfather      Glaucoma Paternal Grandfather      Heart Disease Paternal Grandfather      Multiple Sclerosis Sister      Breast Cancer Sister      Skin Cancer No family hx of      Melanoma No family hx of        Medications:  Current Outpatient Medications   Medication Sig Dispense Refill     acetaminophen 650 MG TABS Take 650 mg by mouth every 4 hours as needed. 100 tablet 0     amLODIPine (NORVASC) 5 MG  "tablet Take 1 tablet (5 mg) by mouth daily 90 tablet 3     amLODIPine (NORVASC) 5 MG tablet Take 1 tablet (5 mg) by mouth daily 90 tablet 1     carvedilol (COREG) 12.5 MG tablet Take 1 tablet (12.5 mg) by mouth 2 times daily (with meals). 180 tablet 3     cephALEXin (KEFLEX) 500 MG capsule Take 1 capsule (500 mg) by mouth 2 times daily 28 capsule 0     COMPRESSION STOCKINGS Please measure and distribute 1 pair of 20mmHg - 30mmHg knee high open or closed toe compression stockings. Jobst ultrasheer or equivalent. 2 each 4     Diphenhydramine-APAP, sleep, (TYLENOL PM EXTRA STRENGTH PO) Take 2 tablets by mouth At Bedtime        ferrous gluconate (FERGON) 324 (38 Fe) MG tablet Take 324 mg by mouth       Gauze Pads & Dressings (TELFA NON-ADHERENT) 3\"X4\" PADS Cut to size for open areas on the lower extremities and secure with paper tape. Change dressing every other day. 30 each 11     Multiple Vitamin (MULTI-VITAMIN) per tablet Take 1 tablet by mouth every morning        Omeprazole (PRILOSEC PO) Take 20 mg by mouth as needed        pentoxifylline ER (TRENTAL) 400 MG CR tablet Take 1 tablet (400 mg) by mouth 3 times daily (with meals) 90 tablet 2     sodium chloride (OCEAN NASAL SPRAY) 0.65 % nasal spray Spray 1 spray into both nostrils daily as needed for congestion 1 Bottle 3     triamcinolone (KENALOG) 0.1 % external cream Apply twice daily for 2 weeks then 3 times weekly for 2 week, repeat cycle as needed (Patient not taking: Reported on 2/21/2023) 454 g 0     Allergies   Allergen Reactions     Hctz Other (See Comments)     Pt develops symptomatic and significant hyponatremia with HCTZ exposure     Hydrochlorothiazide      Other reaction(s): Hyponatremia  Hyponatremia     Latex      Benzocaine Rash     carba mix,thrium-sunscreen     Resorcinol-Alcohol Rash     carba mix,thrium-sunscreen     Ace Inhibitors Unknown     Dizziness and orthostatic changes and electrolyte problems.     Latex Unknown               "

## 2023-02-24 ENCOUNTER — OFFICE VISIT (OUTPATIENT)
Dept: ORTHOPEDICS | Facility: CLINIC | Age: 83
End: 2023-02-24
Payer: MEDICARE

## 2023-02-24 VITALS — BODY MASS INDEX: 23.16 KG/M2 | WEIGHT: 118 LBS | HEIGHT: 60 IN

## 2023-02-24 DIAGNOSIS — M80.00XA OSTEOPOROSIS WITH PATHOLOGICAL FRACTURE, INITIAL ENCOUNTER: ICD-10-CM

## 2023-02-24 DIAGNOSIS — M81.0 AGE-RELATED OSTEOPOROSIS WITHOUT CURRENT PATHOLOGICAL FRACTURE: Primary | ICD-10-CM

## 2023-02-24 PROCEDURE — 96372 THER/PROPH/DIAG INJ SC/IM: CPT | Mod: RT | Performed by: FAMILY MEDICINE

## 2023-02-24 PROCEDURE — 99207 PR DROP WITH A PROCEDURE: CPT | Performed by: FAMILY MEDICINE

## 2023-02-24 RX ORDER — DENOSUMAB 60 MG/ML
60 INJECTION SUBCUTANEOUS ONCE
Qty: 1 ML | Refills: 0 | Status: SHIPPED | OUTPATIENT
Start: 2023-02-24 | End: 2023-02-24

## 2023-02-24 NOTE — PROGRESS NOTES
"S: 80 yo female with osteoporosis s/p 1 yr of Evenity treatment and Prolia #1 in Aug 2022 her for Prolia #2 for her continuing osteoporosis treatment. No side effects from the Prolia.              Current Outpatient Medications   Medication     acetaminophen 650 MG TABS     amLODIPine (NORVASC) 5 MG tablet     amLODIPine (NORVASC) 5 MG tablet     carvedilol (COREG) 12.5 MG tablet     COMPRESSION STOCKINGS     Diphenhydramine-APAP, sleep, (TYLENOL PM EXTRA STRENGTH PO)     ferrous gluconate (FERGON) 324 (38 Fe) MG tablet     Gauze Pads & Dressings (TELFA NON-ADHERENT) 3\"X4\" PADS     Multiple Vitamin (MULTI-VITAMIN) per tablet     Omeprazole (PRILOSEC PO)     sodium chloride (OCEAN NASAL SPRAY) 0.65 % nasal spray     triamcinolone (KENALOG) 0.1 % external cream          Current Facility-Administered Medications   Medication     denosumab (PROLIA) injection 60 mg     fluocinonide (LIDEX) 0.05 % ointment     fluocinonide (LIDEX) 0.05 % ointment     mupirocin (BACTROBAN) 2 % ointment     mupirocin (BACTROBAN) 2 % ointment     romosozumab-aqqg (EVENITY) injection 210 mg     romosozumab-aqqg (EVENITY) injection 210 mg     romosozumab-aqqg (EVENITY) injection 210 mg     romosozumab-aqqg (EVENITY) injection 210 mg     romosozumab-aqqg (EVENITY) injection 210 mg     romosozumab-aqqg (EVENITY) injection 210 mg     romosozumab-aqqg (EVENITY) injection 210 mg      No change in PMH since our last visit.         O: NAD   Ht 1.524 m (5')   Wt 53.5 kg (118 lb)   BMI 23.05 kg/m          A:  Severe Osteoporosis (Osteoporosis by DXA and T11 compression insufficiency fracture) s/p 1 year of Evenity treatment with a 5% improvement in her lumbar spine BMD and 1.0 improvement in the total hip since her DXA in May 2021.  Prolia #1 injection s/p in Aug 2022 and now s/p Prolia #2.       P:  Prolia Injection #2 given today.    Continue improved calcium intake and supplemental Vit D  RTC in 6 months for Prolia #3. Have a repeat DXA after 1 yr " of Prolia treatment.  I will place the order today for 6 months from now and she can schedule it closer to that time.  Repeat labs: calcium, Vit D, mag and phos prior to next Prolia injection in 6 months.     Procedure Note:      Prolia Injection # 2     The risks and benefits of the medication were reviewed and she agreed to proceed.  Prepackage Prolia was obtained from the [facility].  The skin was prepped with an alcohol swab. Prolia solution (60mg) was injected subcutaneously into the R posterior upper arm.  Bandaid was placed.  The patient tolerated the injection well. She did not experience any adverse effects.      The injection was performed by Dr. Addison Chen, PGY 2 from [facility].         Alley Collazo MD, CAQ, FACSM, FAMSSM  [facility]  Sports Medicine and Bone Health  Team Physician;  Athletics

## 2023-02-24 NOTE — LETTER
"  2/24/2023    RE: Kimberly Chau  [address] [facility] [place] [address]     Dear Colleague,    Thank you for referring your patient, Kimberly Chau, to the [facility] [place]. Please see a copy of my visit note below.    S: 80 yo female with osteoporosis s/p 1 yr of Evenity treatment and Prolia #1 in Aug 2022 her for Prolia #2 for her continuing osteoporosis treatment. No side effects from the Prolia.              Current Outpatient Medications   Medication     acetaminophen 650 MG TABS     amLODIPine (NORVASC) 5 MG tablet     amLODIPine (NORVASC) 5 MG tablet     carvedilol (COREG) 12.5 MG tablet     COMPRESSION STOCKINGS     Diphenhydramine-APAP, sleep, (TYLENOL PM EXTRA STRENGTH PO)     ferrous gluconate (FERGON) 324 (38 Fe) MG tablet     Gauze Pads & Dressings (TELFA NON-ADHERENT) 3\"X4\" PADS     Multiple Vitamin (MULTI-VITAMIN) per tablet     Omeprazole (PRILOSEC PO)     sodium chloride (OCEAN NASAL SPRAY) 0.65 % nasal spray     triamcinolone (KENALOG) 0.1 % external cream          Current Facility-Administered Medications   Medication     denosumab (PROLIA) injection 60 mg     fluocinonide (LIDEX) 0.05 % ointment     fluocinonide (LIDEX) 0.05 % ointment     mupirocin (BACTROBAN) 2 % ointment     mupirocin (BACTROBAN) 2 % ointment     romosozumab-aqqg (EVENITY) injection 210 mg     romosozumab-aqqg (EVENITY) injection 210 mg     romosozumab-aqqg (EVENITY) injection 210 mg     romosozumab-aqqg (EVENITY) injection 210 mg     romosozumab-aqqg (EVENITY) injection 210 mg     romosozumab-aqqg (EVENITY) injection 210 mg     romosozumab-aqqg (EVENITY) injection 210 mg      No change in PMH since our last visit.         O: NAD   Ht 1.524 m (5')   Wt 53.5 kg (118 lb)   BMI 23.05 kg/m          A:  Severe Osteoporosis (Osteoporosis by DXA and T11 compression insufficiency fracture) s/p 1 year of Evenity treatment with a 5% improvement in her lumbar spine BMD and 1.0 improvement in the " total hip since her DXA in May 2021.  Prolia #1 injection s/p in Aug 2022 and now s/p Prolia #2.       P:  Prolia Injection #2 given today.    Continue improved calcium intake and supplemental Vit D  RTC in 6 months for Prolia #3. Have a repeat DXA after 1 yr of Prolia treatment.  I will place the order today for 6 months from now and she can schedule it closer to that time.  Repeat labs: calcium, Vit D, mag and phos prior to next Prolia injection in 6 months.     Procedure Note:      Prolia Injection # 2     The risks and benefits of the medication were reviewed and she agreed to proceed.  Prepackage Prolia was obtained from the [facility].  The skin was prepped with an alcohol swab. Prolia solution (60mg) was injected subcutaneously into the R posterior upper arm.  Bandaid was placed.  The patient tolerated the injection well. She did not experience any adverse effects.      The injection was performed by Dr. Addison Chen, PGY 2 from [facility].         Alley Collazo MD, CAQ, FACSM, FAMPalm [facility]  Sports Medicine and Bone Health  Team Physician;  Athletics

## 2023-02-24 NOTE — NURSING NOTE
[address] [address]  Dept: [phone]  ______________________________________________________________________________    Patient: Kimberly Chau   : 1940   MRN: [id number]   2023    INVASIVE PROCEDURE SAFETY CHECKLIST    Date: 2023   Procedure: Prolia Injection #2  Patient Name: Kimberly Chau  MRN: [id number]  YOB: 1940    Action: Complete sections as appropriate. Any discrepancy results in a HARD COPY until resolved.     PRE PROCEDURE:  Patient ID verified with 2 identifiers (name and  or MRN): Yes  Procedure and site verified with patient/designee (when able): Yes  Accurate consent documentation in medical record: Yes  H&P (or appropriate assessment) documented in medical record: Yes  H&P must be up to 20 days prior to procedure and updates within 24 hours of procedure as applicable: NA  Relevant diagnostic and radiology test results appropriately labeled and displayed as applicable: NA  Procedure site(s) marked with provider initials: NA    TIMEOUT:  Time-Out performed immediately prior to starting procedure, including verbal and active participation of all team members addressing the following:Yes  * Correct patient identify  * Confirmed that the correct side and site are marked  * An accurate procedure consent form  * Agreement on the procedure to be done  * Correct patient position  * Relevant images and results are properly labeled and appropriately displayed  * The need to administer antibiotics or fluids for irrigation purposes during the procedure as applicable   * Safety precautions based on patient history or medication use    DURING PROCEDURE: Verification of correct person, site, and procedures any time the responsibility for care of the patient is transferred to another member of the care team.       Prior to injection, verified patient identity using patient's name and date of  birth.  Due to injection administration, patient instructed to remain in clinic for 15 minutes  afterwards, and to report any adverse reaction to me immediately.    Indication: Prolia  (denosumab) is a prescription medicine used to treat osteoporosis in patients who:   Are at high risk for fracture, meaning patients who have had a fracture related to osteoporosis, or who have multiple risk factors for fracture   Cannot use another osteoporosis medicine or other osteoporosis medicines did not work well   The timeline for early/late injections would be 4 weeks early and any time after the 6 month genaro. If a patient receives their injection late, then the subsequent injection would be 6 months from the date that they actually received the injection    1.  When was the last injection?  8/26/2023  2.  Did they check with their insurance for this calendar year?  Yes  3.  Is there an order in the chart?  yes  4.  Has the patient had dental work involving the bone in the past month or will have work in the next 6 months?  Unknown     The following steps were completed to comply with the REMS program for Prolia:  Reviewed information in the Medication Guide and Patient Counseling Chart, including the serious risks of Prolia  and the symptoms of each risk.  Advised patient to seek prompt medical attention if they have signs or symptoms of any of the serious risks.  Provided each patient a copy of the Medication Guide and Patient Brochure.    Drug Amount Wasted:  None.  Vial/Syringe: Syringe  Expiration Date:  04/30/2025      Tanika Haley, ATC  February 24, 2023

## 2023-03-02 ENCOUNTER — OFFICE VISIT (OUTPATIENT)
Dept: PODIATRY | Facility: CLINIC | Age: 83
End: 2023-03-02
Payer: MEDICARE

## 2023-03-02 DIAGNOSIS — L97.922 SKIN ULCER OF LEFT LOWER LEG WITH FAT LAYER EXPOSED (H): Primary | ICD-10-CM

## 2023-03-02 DIAGNOSIS — I87.8 VENOUS STASIS: ICD-10-CM

## 2023-03-02 PROCEDURE — 99214 OFFICE O/P EST MOD 30 MIN: CPT | Performed by: PODIATRIST

## 2023-03-02 NOTE — LETTER
3/2/2023         RE: Kimberly Chau  [address]   [place] [address]        Dear Colleague,    Thank you for referring your patient, Kimberly Chau, to the [facility]. Please see a copy of my visit note below.    Past Medical History:   Diagnosis Date     Anemia      Arthritis      Cataracts      Central retinal artery occlusion      Cervical spine fracture (H)     After a fall from orthostatic sx     Chronic pain     Back of head     Gastro-oesophageal reflux disease      Head injury      Hypertension      Hyponatremia     Resolved, 2/2 diuretics     Migraines      Osteoporosis      Pneumonia 2018     Rheumatoid arthritis(714.0)      Patient Active Problem List   Diagnosis     Intrinsic atopic dermatitis     Essential hypertension     Rheumatoid arthritis (H)     Retinal artery occlusion     Cervical vertebral fracture (H)     Central retinal artery occlusion of right eye     Bilateral occipital neuralgia     C1-C2 instability     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 factor (H)     Osteoarthritis     Malignant hypertension     Hyponatremia     GERD (gastroesophageal reflux disease)     Eczematous dermatitis     Anxiety state     Anemia     Closed fracture of distal end of left radius with delayed healing, unspecified fracture morphology, subsequent encounter     Closed fracture of distal end of left radius, unspecified fracture morphology, sequela     Osteomyelitis of left leg (H)     Pain of left thumb     Past Surgical History:   Procedure Laterality Date     COLONOSCOPY       EYE SURGERY Left 02/2019    Hole in macula     FUSION CERVICAL POSTERIOR THREE+ LEVELS  1/22/2013    Procedure: FUSION CERVICAL POSTERIOR THREE+ LEVELS;  Cervical 1-6 Posterior Instrumentation and Fusion, Cervical 3-4 Laminectomy  .Instrumentation and fusion to Cervical 6 *Latex Allergy*;  Surgeon: Ra Bar MD;  Location: [facility] OR     GYN SURGERY       HEAD & NECK  SURGERY       HYSTERECTOMY       IR ENDOVENOUS ABLATION VARICOSE VEINS  2/16/2021     KNEE SURGERY       NECK SURGERY       OPEN REDUCTION INTERNAL FIXATION WRIST Left 4/30/2019    Procedure: Open Reduction Internal Fixation Left Distal Radius Fracture;  Surgeon: Dinh Strong MD;  [facility]     OPEN REDUCTION INTERNAL FIXATION WRIST Left 6/12/2020    Procedure: OPEN REDUCTION INTERNAL FIXATION Left Distal Radius Nonunion, Distal Ulna Resection;  Surgeon: Dinh Strong MD;  [facility] OR     OPTICAL TRACKING SYSTEM ENDOSCOPIC SINUS SURGERY Right 4/6/2018    Procedure: OPTICAL TRACKING SYSTEM ENDOSCOPIC SINUS SURGERY;  Stealth Assisted Right Maxillary Antrostomy, Anterior Ethmoidectomy And Frontal Sinusotomy;  Surgeon: Elyse Eisenberg MD;  Location: [facility] OR     OPTICAL TRACKING SYSTEM ENDOSCOPIC SINUS SURGERY Right 8/3/2018    Procedure: OPTICAL TRACKING SYSTEM ENDOSCOPIC SINUS SURGERY;  Stealth Assisted Revision Right Frontal Sinusotomy, Right Stent Placement  **Latex Allergy** ;  Surgeon: Elyse Eisenberg MD;  [facility]: [facility] OR     ORTHOPEDIC SURGERY       REMOVE HARDWARE WRIST Left 11/19/2019    Procedure: Left Distal Radius Hardware Removal, Flexor Pollicus Longus Repair, Deep Cultures;  Surgeon: Dinh Strong MD;  [facility] OR     ZZC HAND/FINGER SURGERY UNLISTED       ZZC PELVIS/HIP JOINT SURGERY UNLISTED       Social History     Socioeconomic History     Marital status:      Spouse name: Not on file     Number of children: Not on file     Years of education: Not on file     Highest education level: Not on file   Occupational History     Not on file   Tobacco Use     Smoking status: Never     Smokeless tobacco: Never   Substance and Sexual Activity     Alcohol use: Yes     Comment: 2 glasses of wine a day     Drug use: Never     Sexual activity: Not Currently   Other Topics Concern     Parent/sibling w/ CABG, MI or angioplasty before 65F 55M? Not Asked   Social History  Narrative    Ms. Chau is , has two grown children and two grandchildren. She does not work. She was never a smoker, and drinks a glass of wine with dinner each night.         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      Social Determinants of Health     Financial Resource Strain: Not on file   Food Insecurity: Not on file   Transportation Needs: Not on file   Physical Activity: Not on file   Stress: Not on file   Social Connections: Not on file   Intimate Partner Violence: Not on file   Housing Stability: Not on file     Family History   Problem Relation Age of Onset     Arthritis Mother      Respiratory Mother         pulmonary fibrosis     Hypertension Mother      Anemia Mother      Liver Disease Father         from EtOH     Alcoholism Father      No Known Problems Maternal Grandmother      Heart Disease Maternal Grandfather      Glaucoma Paternal Grandfather      Heart Disease Paternal Grandfather      Multiple Sclerosis Sister      Breast Cancer Sister      Skin Cancer No family hx of      Melanoma No family hx of                SUBJECTIVE FINDINGS:  An 82-year-old returns to clinic for ulcer, left medial leg, with venous stasis, venous hypertension and lymphedema.  She relates she is doing well.  She is ready to take a break from the Unna boot.  She did see Dermatology.  I reviewed Dermatology's 02/21/2023 note.    OBJECTIVE FINDINGS:  DP and PT are 2/4, left.  She has a left medial leg ulcer that is through the dermis.  It is mostly eschared.  There is minimal drainage, no erythema, no odor, no calor.  Her edema is under good control.    ASSESSMENT AND PLAN:  Ulcer, left medial ankle and leg.  She has venous stasis, venous hypertension, lymphedema.  Cellulitis appears resolved.  Diagnosis and treatment options discussed with her.  I cleaned the leg and ulcer with Wound Vashe, applied Biatain Silver, wrapped with Kerlix and Ace wrap.  I instructed her on how to do this.  She  relates she feels she can do this.  She will do it every 2-3 days and as needed for drainage and return to clinic and see me in 1 week.  Previous notes reviewed.  Dressings dispensed.            Moderate level of medical decision making.        Again, thank you for allowing me to participate in the care of your patient.        Sincerely,        Camden Salazar DPM

## 2023-03-02 NOTE — PROGRESS NOTES
Past Medical History:   Diagnosis Date     Anemia      Arthritis      Cataracts      Central retinal artery occlusion      Cervical spine fracture (H)     After a fall from orthostatic sx     Chronic pain     Back of head     Gastro-oesophageal reflux disease      Head injury      Hypertension      Hyponatremia     Resolved, 2/2 diuretics     Migraines      Osteoporosis      Pneumonia 2018     Rheumatoid arthritis(714.0)      Patient Active Problem List   Diagnosis     Intrinsic atopic dermatitis     Essential hypertension     Rheumatoid arthritis (H)     Retinal artery occlusion     Cervical vertebral fracture (H)     Central retinal artery occlusion of right eye     Bilateral occipital neuralgia     C1-C2 instability     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 factor (H)     Osteoarthritis     Malignant hypertension     Hyponatremia     GERD (gastroesophageal reflux disease)     Eczematous dermatitis     Anxiety state     Anemia     Closed fracture of distal end of left radius with delayed healing, unspecified fracture morphology, subsequent encounter     Closed fracture of distal end of left radius, unspecified fracture morphology, sequela     Osteomyelitis of left leg (H)     Pain of left thumb     Past Surgical History:   Procedure Laterality Date     COLONOSCOPY       EYE SURGERY Left 02/2019    Hole in macula     FUSION CERVICAL POSTERIOR THREE+ LEVELS  1/22/2013    Procedure: FUSION CERVICAL POSTERIOR THREE+ LEVELS;  Cervical 1-6 Posterior Instrumentation and Fusion, Cervical 3-4 Laminectomy  .Instrumentation and fusion to Cervical 6 *Latex Allergy*;  Surgeon: Ra Bar MD;  Location: [facility] OR     GYN SURGERY       HEAD & NECK SURGERY       HYSTERECTOMY       IR ENDOVENOUS ABLATION VARICOSE VEINS  2/16/2021     KNEE SURGERY       NECK SURGERY       OPEN REDUCTION INTERNAL FIXATION WRIST Left 4/30/2019    Procedure: Open Reduction Internal Fixation Left Distal Radius  Fracture;  Surgeon: Dinh Strong MD;  [facility]     OPEN REDUCTION INTERNAL FIXATION WRIST Left 6/12/2020    Procedure: OPEN REDUCTION INTERNAL FIXATION Left Distal Radius Nonunion, Distal Ulna Resection;  Surgeon: Dinh Strong MD;  [facility]: [facility]     OPTICAL TRACKING SYSTEM ENDOSCOPIC SINUS SURGERY Right 4/6/2018    Procedure: OPTICAL TRACKING SYSTEM ENDOSCOPIC SINUS SURGERY;  Stealth Assisted Right Maxillary Antrostomy, Anterior Ethmoidectomy And Frontal Sinusotomy;  Surgeon: Elyse Eisenberg MD;  Location: [facility]     OPTICAL TRACKING SYSTEM ENDOSCOPIC SINUS SURGERY Right 8/3/2018    Procedure: OPTICAL TRACKING SYSTEM ENDOSCOPIC SINUS SURGERY;  Stealth Assisted Revision Right Frontal Sinusotomy, Right Stent Placement  **Latex Allergy** ;  Surgeon: Elyse Eisenberg MD;  Location: [facility]     ORTHOPEDIC SURGERY       REMOVE HARDWARE WRIST Left 11/19/2019    Procedure: Left Distal Radius Hardware Removal, Flexor Pollicus Longus Repair, Deep Cultures;  Surgeon: Dinh Strong MD;  [facility]     ZZC HAND/FINGER SURGERY UNLISTED       ZZC PELVIS/HIP JOINT SURGERY UNLISTED       Social History     Socioeconomic History     Marital status:      Spouse name: Not on file     Number of children: Not on file     Years of education: Not on file     Highest education level: Not on file   Occupational History     Not on file   Tobacco Use     Smoking status: Never     Smokeless tobacco: Never   Substance and Sexual Activity     Alcohol use: Yes     Comment: 2 glasses of wine a day     Drug use: Never     Sexual activity: Not Currently   Other Topics Concern     Parent/sibling w/ CABG, MI or angioplasty before 65F 55M? Not Asked   Social History Narrative    Ms. Chau is , has two grown children and two grandchildren. She does not work. She was never a smoker, and drinks a glass of wine with dinner each night.         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      Social Determinants of Health     Financial Resource Strain: Not on file   Food Insecurity: Not on file   Transportation Needs: Not on file   Physical Activity: Not on file   Stress: Not on file   Social Connections: Not on file   Intimate Partner Violence: Not on file   Housing Stability: Not on file     Family History   Problem Relation Age of Onset     Arthritis Mother      Respiratory Mother         pulmonary fibrosis     Hypertension Mother      Anemia Mother      Liver Disease Father         from EtOH     Alcoholism Father      No Known Problems Maternal Grandmother      Heart Disease Maternal Grandfather      Glaucoma Paternal Grandfather      Heart Disease Paternal Grandfather      Multiple Sclerosis Sister      Breast Cancer Sister      Skin Cancer No family hx of      Melanoma No family hx of                SUBJECTIVE FINDINGS:  An 82-year-old returns to clinic for ulcer, left medial leg, with venous stasis, venous hypertension and lymphedema.  She relates she is doing well.  She is ready to take a break from the [employer] boot.  She did see Dermatology.  I reviewed Dermatology's 02/21/2023 note.    OBJECTIVE FINDINGS:  DP and PT are 2/4, left.  She has a left medial leg ulcer that is through the dermis.  It is mostly eschared.  There is minimal drainage, no erythema, no odor, no calor.  Her edema is under good control.    ASSESSMENT AND PLAN:  Ulcer, left medial ankle and leg.  She has venous stasis, venous hypertension, lymphedema.  Cellulitis appears resolved.  Diagnosis and treatment options discussed with her.  I cleaned the leg and ulcer with Wound Vashe, applied Biatain Silver, wrapped with Kerlix and Ace wrap.  I instructed her on how to do this.  She relates she feels she can do this.  She will do it every 2-3 days and as needed for drainage and return to clinic and see me in 1 week.  Previous notes reviewed.  Dressings dispensed.            Moderate level of medical decision making.

## 2023-03-02 NOTE — NURSING NOTE
Kimberly Chau's chief complaint for this visit includes:  Chief Complaint   Patient presents with     Follow Up     Left leg wound     PCP: Guillermo Castellano    Referring Provider:  No referring provider defined for this encounter.    There were no vitals taken for this visit.  Data Unavailable        Allergies   Allergen Reactions     Hctz Other (See Comments)     Pt develops symptomatic and significant hyponatremia with HCTZ exposure     Hydrochlorothiazide      Other reaction(s): Hyponatremia  Hyponatremia     Latex      Benzocaine Rash     carba mix,thrium-sunscreen     Resorcinol-Alcohol Rash     carba mix,thrium-sunscreen     Ace Inhibitors Unknown     Dizziness and orthostatic changes and electrolyte problems.     Latex Unknown         Do you need any medication refills at today's visit? No    Allison S. EMT

## 2023-03-17 ENCOUNTER — OFFICE VISIT (OUTPATIENT)
Dept: PODIATRY | Facility: CLINIC | Age: 83
End: 2023-03-17
Payer: MEDICARE

## 2023-03-17 DIAGNOSIS — L97.922 SKIN ULCER OF LEFT LOWER LEG WITH FAT LAYER EXPOSED (H): Primary | ICD-10-CM

## 2023-03-17 DIAGNOSIS — I87.8 VENOUS STASIS: ICD-10-CM

## 2023-03-17 PROCEDURE — 99213 OFFICE O/P EST LOW 20 MIN: CPT | Performed by: PODIATRIST

## 2023-03-17 NOTE — LETTER
3/17/2023         RE: Kimberly Chau  [id number]   [place] [address]        Dear Colleague,    Thank you for referring your patient, Kimberly Chau, to the [facility]. Please see a copy of my visit note below.    Past Medical History:   Diagnosis Date     Anemia      Arthritis      Cataracts      Central retinal artery occlusion      Cervical spine fracture (H)     After a fall from orthostatic sx     Chronic pain     Back of head     Gastro-oesophageal reflux disease      Head injury      Hypertension      Hyponatremia     Resolved, 2/2 diuretics     Migraines      Osteoporosis      Pneumonia 2018     Rheumatoid arthritis(714.0)      Patient Active Problem List   Diagnosis     Intrinsic atopic dermatitis     Essential hypertension     Rheumatoid arthritis (H)     Retinal artery occlusion     Cervical vertebral fracture (H)     Central retinal artery occlusion of right eye     Bilateral occipital neuralgia     C1-C2 instability     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 factor (H)     Osteoarthritis     Malignant hypertension     Hyponatremia     GERD (gastroesophageal reflux disease)     Eczematous dermatitis     Anxiety state     Anemia     Closed fracture of distal end of left radius with delayed healing, unspecified fracture morphology, subsequent encounter     Closed fracture of distal end of left radius, unspecified fracture morphology, sequela     Osteomyelitis of left leg (H)     Pain of left thumb     Past Surgical History:   Procedure Laterality Date     COLONOSCOPY       EYE SURGERY Left 02/2019    Hole in macula     FUSION CERVICAL POSTERIOR THREE+ LEVELS  1/22/2013    Procedure: FUSION CERVICAL POSTERIOR THREE+ LEVELS;  Cervical 1-6 Posterior Instrumentation and Fusion, Cervical 3-4 Laminectomy  .Instrumentation and fusion to Cervical 6 *Latex Allergy*;  Surgeon: Ra Bar MD;  Location: [facility]     GYN SURGERY       HEAD & NECK  SURGERY       HYSTERECTOMY       IR ENDOVENOUS ABLATION VARICOSE VEINS  2/16/2021     KNEE SURGERY       NECK SURGERY       OPEN REDUCTION INTERNAL FIXATION WRIST Left 4/30/2019    Procedure: Open Reduction Internal Fixation Left Distal Radius Fracture;  Surgeon: Dinh Strong MD;  [facility]     OPEN REDUCTION INTERNAL FIXATION WRIST Left 6/12/2020    Procedure: OPEN REDUCTION INTERNAL FIXATION Left Distal Radius Nonunion, Distal Ulna Resection;  Surgeon: Dinh Strong MD;  [facility]     OPTICAL TRACKING SYSTEM ENDOSCOPIC SINUS SURGERY Right 4/6/2018    Procedure: OPTICAL TRACKING SYSTEM ENDOSCOPIC SINUS SURGERY;  Stealth Assisted Right Maxillary Antrostomy, Anterior Ethmoidectomy And Frontal Sinusotomy;  Surgeon: Elyse Eisenberg MD;  Location: [facility]     OPTICAL TRACKING SYSTEM ENDOSCOPIC SINUS SURGERY Right 8/3/2018    Procedure: OPTICAL TRACKING SYSTEM ENDOSCOPIC SINUS SURGERY;  Stealth Assisted Revision Right Frontal Sinusotomy, Right Stent Placement  **Latex Allergy** ;  Surgeon: Elyse Eisenberg MD;  Location: [facility]     ORTHOPEDIC SURGERY       REMOVE HARDWARE WRIST Left 11/19/2019    Procedure: Left Distal Radius Hardware Removal, Flexor Pollicus Longus Repair, Deep Cultures;  Surgeon: Dinh Strong MD;  [facility]     ZZC HAND/FINGER SURGERY UNLISTED       ZZC PELVIS/HIP JOINT SURGERY UNLISTED       Social History     Socioeconomic History     Marital status:      Spouse name: Not on file     Number of children: Not on file     Years of education: Not on file     Highest education level: Not on file   Occupational History     Not on file   Tobacco Use     Smoking status: Never     Smokeless tobacco: Never   Substance and Sexual Activity     Alcohol use: Yes     Comment: 2 glasses of wine a day     Drug use: Never     Sexual activity: Not Currently   Other Topics Concern     Parent/sibling w/ CABG, MI or angioplasty before 65F 55M? Not Asked   Social History  Narrative    Ms. Chau is , has two grown children and two grandchildren. She does not work. She was never a smoker, and drinks a glass of wine with dinner each night.         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      Social Determinants of Health     Financial Resource Strain: Not on file   Food Insecurity: Not on file   Transportation Needs: Not on file   Physical Activity: Not on file   Stress: Not on file   Social Connections: Not on file   Intimate Partner Violence: Not on file   Housing Stability: Not on file     Family History   Problem Relation Age of Onset     Arthritis Mother      Respiratory Mother         pulmonary fibrosis     Hypertension Mother      Anemia Mother      Liver Disease Father         from EtOH     Alcoholism Father      No Known Problems Maternal Grandmother      Heart Disease Maternal Grandfather      Glaucoma Paternal Grandfather      Heart Disease Paternal Grandfather      Multiple Sclerosis Sister      Breast Cancer Sister      Skin Cancer No family hx of      Melanoma No family hx of              SUBJECTIVE FINDINGS:  82-year-old returns to clinic for ulcer, left medial leg.  She relates it is doing well.  Relates last couple of days she has not put a dressing on it because it has not been draining.    OBJECTIVE FINDINGS:  Vascular status intact, left.  She has left medial leg eschar that is intact.  There is no erythema, no drainage, no odor, no calor.  Minimal peripheral edema.    ASSESSMENT AND PLAN:  Ulcer, left medial ankle and leg.  There is still some eschar present.  She has venous stasis, venous hypertension, lymphedema.  Diagnosis and treatment options discussed with them.  I am going to have her rinse the eschars with Wound Vashe daily and she will go back to her compression socks.  Return to clinic and see me in 2 months.  Previous notes reviewed.          Low level of medical decision making.        Again, thank you for allowing me  to participate in the care of your patient.        Sincerely,        Camden Salazar DPM

## 2023-03-17 NOTE — PROGRESS NOTES
Past Medical History:   Diagnosis Date     Anemia      Arthritis      Cataracts      Central retinal artery occlusion      Cervical spine fracture (H)     After a fall from orthostatic sx     Chronic pain     Back of head     Gastro-oesophageal reflux disease      Head injury      Hypertension      Hyponatremia     Resolved, 2/2 diuretics     Migraines      Osteoporosis      Pneumonia 2018     Rheumatoid arthritis(714.0)      Patient Active Problem List   Diagnosis     Intrinsic atopic dermatitis     Essential hypertension     Rheumatoid arthritis (H)     Retinal artery occlusion     Cervical vertebral fracture (H)     Central retinal artery occlusion of right eye     Bilateral occipital neuralgia     C1-C2 instability     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 factor (H)     Osteoarthritis     Malignant hypertension     Hyponatremia     GERD (gastroesophageal reflux disease)     Eczematous dermatitis     Anxiety state     Anemia     Closed fracture of distal end of left radius with delayed healing, unspecified fracture morphology, subsequent encounter     Closed fracture of distal end of left radius, unspecified fracture morphology, sequela     Osteomyelitis of left leg (H)     Pain of left thumb     Past Surgical History:   Procedure Laterality Date     COLONOSCOPY       EYE SURGERY Left 02/2019    Hole in macula     FUSION CERVICAL POSTERIOR THREE+ LEVELS  1/22/2013    Procedure: FUSION CERVICAL POSTERIOR THREE+ LEVELS;  Cervical 1-6 Posterior Instrumentation and Fusion, Cervical 3-4 Laminectomy  .Instrumentation and fusion to Cervical 6 *Latex Allergy*;  Surgeon: Ra Bar MD;  Location: [facility]     GYN SURGERY       HEAD & NECK SURGERY       HYSTERECTOMY       IR ENDOVENOUS ABLATION VARICOSE VEINS  2/16/2021     KNEE SURGERY       NECK SURGERY       OPEN REDUCTION INTERNAL FIXATION WRIST Left 4/30/2019    Procedure: Open Reduction Internal Fixation Left Distal Radius  Fracture;  Surgeon: Dinh Strong MD;  [facility]     OPEN REDUCTION INTERNAL FIXATION WRIST Left 6/12/2020    Procedure: OPEN REDUCTION INTERNAL FIXATION Left Distal Radius Nonunion, Distal Ulna Resection;  Surgeon: Dinh Strong MD;  [facility]     OPTICAL TRACKING SYSTEM ENDOSCOPIC SINUS SURGERY Right 4/6/2018    Procedure: OPTICAL TRACKING SYSTEM ENDOSCOPIC SINUS SURGERY;  Stealth Assisted Right Maxillary Antrostomy, Anterior Ethmoidectomy And Frontal Sinusotomy;  Surgeon: Elyse Eisenberg MD;  Location: [facility]     OPTICAL TRACKING SYSTEM ENDOSCOPIC SINUS SURGERY Right 8/3/2018    Procedure: OPTICAL TRACKING SYSTEM ENDOSCOPIC SINUS SURGERY;  Stealth Assisted Revision Right Frontal Sinusotomy, Right Stent Placement  **Latex Allergy** ;  Surgeon: Elyse Eisenberg MD;  Location: [facility]     ORTHOPEDIC SURGERY       REMOVE HARDWARE WRIST Left 11/19/2019    Procedure: Left Distal Radius Hardware Removal, Flexor Pollicus Longus Repair, Deep Cultures;  Surgeon: Dinh Strong MD;  [facility]     ZZC HAND/FINGER SURGERY UNLISTED       ZZC PELVIS/HIP JOINT SURGERY UNLISTED       Social History     Socioeconomic History     Marital status:      Spouse name: Not on file     Number of children: Not on file     Years of education: Not on file     Highest education level: Not on file   Occupational History     Not on file   Tobacco Use     Smoking status: Never     Smokeless tobacco: Never   Substance and Sexual Activity     Alcohol use: Yes     Comment: 2 glasses of wine a day     Drug use: Never     Sexual activity: Not Currently   Other Topics Concern     Parent/sibling w/ CABG, MI or angioplasty before 65F 55M? Not Asked   Social History Narrative    Ms. Chau is , has two grown children and two grandchildren. She does not work. She was never a smoker, and drinks a glass of wine with dinner each night.         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      Social Determinants of Health     Financial Resource Strain: Not on file   Food Insecurity: Not on file   Transportation Needs: Not on file   Physical Activity: Not on file   Stress: Not on file   Social Connections: Not on file   Intimate Partner Violence: Not on file   Housing Stability: Not on file     Family History   Problem Relation Age of Onset     Arthritis Mother      Respiratory Mother         pulmonary fibrosis     Hypertension Mother      Anemia Mother      Liver Disease Father         from EtOH     Alcoholism Father      No Known Problems Maternal Grandmother      Heart Disease Maternal Grandfather      Glaucoma Paternal Grandfather      Heart Disease Paternal Grandfather      Multiple Sclerosis Sister      Breast Cancer Sister      Skin Cancer No family hx of      Melanoma No family hx of              SUBJECTIVE FINDINGS:  82-year-old returns to clinic for ulcer, left medial leg.  She relates it is doing well.  Relates last couple of days she has not put a dressing on it because it has not been draining.    OBJECTIVE FINDINGS:  Vascular status intact, left.  She has left medial leg eschar that is intact.  There is no erythema, no drainage, no odor, no calor.  Minimal peripheral edema.    ASSESSMENT AND PLAN:  Ulcer, left medial ankle and leg.  There is still some eschar present.  She has venous stasis, venous hypertension, lymphedema.  Diagnosis and treatment options discussed with them.  I am going to have her rinse the eschars with Wound Vashe daily and she will go back to her compression socks.  Return to clinic and see me in 2 months.  Previous notes reviewed.          Low level of medical decision making.

## 2023-04-03 NOTE — TELEPHONE ENCOUNTER
Sent in Rx. For Amlodipine    RADHA Castellano   Render In Strict Bullet Format?: No Detail Level: Zone Continue Regimen: Tretinoin, clindamycin gel and BP wash as directed Continue Regimen: Finesteride, clobetasol scalp sol, prenatal gummies

## 2023-04-06 DIAGNOSIS — I10 ESSENTIAL HYPERTENSION: ICD-10-CM

## 2023-04-10 DIAGNOSIS — L97.909 VENOUS ULCER (H): ICD-10-CM

## 2023-04-10 DIAGNOSIS — I83.009 VENOUS ULCER (H): ICD-10-CM

## 2023-04-10 RX ORDER — AMLODIPINE BESYLATE 5 MG/1
5 TABLET ORAL DAILY
Qty: 90 TABLET | Refills: 0 | Status: SHIPPED | OUTPATIENT
Start: 2023-04-10 | End: 2023-04-22

## 2023-04-10 NOTE — TELEPHONE ENCOUNTER
amLODIPine (NORVASC) 5 MG tablet 90 tablet 3 5/27/2022         Last Written Prescription Date:  5-  Last Fill Quantity: 90,   # refills: 3  Last Office Visit : 7-  Future Office visit:  6-    Routing refill request to provider for review/approval because:  ELEV[employer]AT.      Kathleen M Doege RN

## 2023-04-25 DIAGNOSIS — L97.909 VENOUS ULCER (H): ICD-10-CM

## 2023-04-25 DIAGNOSIS — I83.009 VENOUS ULCER (H): ICD-10-CM

## 2023-04-25 NOTE — TELEPHONE ENCOUNTER
Fax from [facility] pharmacy for refill authorization for Pentoxifylline ER Oral Tablet Extended Release 400mg. Sent to refill team. Medication pending.

## 2023-04-27 RX ORDER — PENTOXIFYLLINE 400 MG/1
400 TABLET, EXTENDED RELEASE ORAL
Qty: 90 TABLET | Refills: 2 | Status: SHIPPED | OUTPATIENT
Start: 2023-04-27 | End: 2023-06-20

## 2023-04-27 NOTE — TELEPHONE ENCOUNTER
pentoxifylline ER (TRENTAL) 400 MG CR tablet      Last Written Prescription Date:  11-8-22  Last Fill Quantity: 90,   # refills: 2  Last Office Visit : 11-8-22  Future Office visit:  -6-20-23    Routing refill request to provider for review/approval because:  Med not on Derm Protocol  Overdue labs: AST,ALT, CR, HGB, PLT

## 2023-05-10 ENCOUNTER — OFFICE VISIT (OUTPATIENT)
Dept: AUDIOLOGY | Facility: CLINIC | Age: 83
End: 2023-05-10
Attending: INTERNAL MEDICINE
Payer: MEDICARE

## 2023-05-10 DIAGNOSIS — H91.91 DIMINISHED HEARING, RIGHT: ICD-10-CM

## 2023-05-10 DIAGNOSIS — H90.3 SENSORINEURAL HEARING LOSS (SNHL) OF BOTH EARS: Primary | ICD-10-CM

## 2023-05-10 PROCEDURE — 92550 TYMPANOMETRY & REFLEX THRESH: CPT | Performed by: AUDIOLOGIST

## 2023-05-10 PROCEDURE — 92557 COMPREHENSIVE HEARING TEST: CPT | Performed by: AUDIOLOGIST

## 2023-05-10 NOTE — PROGRESS NOTES
AUDIOLOGY REPORT    SUBJECTIVE:  Kimberly Chau is a 82 year old female who was seen in the Audiology Clinic at the [facility] for audiologic evaluation, referred by Guillermo Castellano M.D. The patient suspects a gradual hearing loss bilaterally. The patient denies otalgia, aural fullness, otorrhea, tinnitus, and dizziness..  The patient notes difficulty with communication in background noise.       OBJECTIVE:  Abuse Screening:  Do you feel unsafe at home or work/school? No  Do you feel threatened by someone? No  Does anyone try to keep you from having contact with others, or doing things outside of your home? No  Physical signs of abuse present? No     Fall Risk Screen:  1. Have you fallen two or more times in the past year? No  2. Have you fallen and had an injury in the past year? No    Otoscopic exam indicates ears are clear of cerumen bilaterally     Pure Tone Thresholds assessed using conventional audiometry with good  reliability from 250-8000 Hz bilaterally using insert earphones     RIGHT:  normal sloping to moderate-severe sensorineural hearing loss    LEFT:    normal sloping to moderate-severe sensorineural hearing loss    Tympanogram:    RIGHT: normal eardrum mobility    LEFT:   normal eardrum mobility    Reflexes (reported by stimulus ear):  RIGHT: Ipsilateral is present at normal levels  RIGHT: Contralateral is present at normal levels  LEFT:   Ipsilateral is absent at frequencies tested  LEFT:   Contralateral is absent at frequencies tested      Speech Reception Threshold:    RIGHT: 40 dB HL    LEFT:   35 dB HL  Word Recognition Score:     RIGHT: 100% at 75 dB HL using NU-6 recorded word list.    LEFT:   96% at 75 dB HL using NU-6 recorded word list.      ASSESSMENT:     ICD-10-CM    1. Sensorineural hearing loss (SNHL) of both ears  H90.3 Cmpn Audiometry Thrshld Eval & Speech Recog (35183)     Tymps / Reflex   (62091)      2. Diminished hearing, right  H91.91 Adult Audiology   Referral          Today s results were discussed with the patient in detail.     PLAN:  Patient was counseled regarding hearing loss and impact on communication.  Patient is a good candidate for amplification at this time. It is recommended that the patient be retested in approximately 1 year or sooner if new concerns arise.  Please call this clinic with questions regarding these results or recommendations.        Jhon Wagner.  Doctor of Audiology  [place] License # [id number]

## 2023-05-17 ENCOUNTER — OFFICE VISIT (OUTPATIENT)
Dept: PODIATRY | Facility: CLINIC | Age: 83
End: 2023-05-17
Payer: MEDICARE

## 2023-05-17 DIAGNOSIS — I87.8 VENOUS STASIS: Primary | ICD-10-CM

## 2023-05-17 PROCEDURE — 99212 OFFICE O/P EST SF 10 MIN: CPT | Performed by: PODIATRIST

## 2023-05-17 NOTE — PROGRESS NOTES
Past Medical History:   Diagnosis Date     Anemia      Arthritis      Cataracts      Central retinal artery occlusion      Cervical spine fracture (H)     After a fall from orthostatic sx     Chronic pain     Back of head     Gastro-oesophageal reflux disease      Head injury      Hypertension      Hyponatremia     Resolved, 2/2 diuretics     Migraines      Osteoporosis      Pneumonia 2018     Rheumatoid arthritis(714.0)      Patient Active Problem List   Diagnosis     Intrinsic atopic dermatitis     Essential hypertension     Rheumatoid arthritis (H)     Retinal artery occlusion     Cervical vertebral fracture (H)     Central retinal artery occlusion of right eye     Bilateral occipital neuralgia     C1-C2 instability     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 factor (H)     Osteoarthritis     Malignant hypertension     Hyponatremia     GERD (gastroesophageal reflux disease)     Eczematous dermatitis     Anxiety state     Anemia     Closed fracture of distal end of left radius with delayed healing, unspecified fracture morphology, subsequent encounter     Closed fracture of distal end of left radius, unspecified fracture morphology, sequela     Osteomyelitis of left leg (H)     Pain of left thumb     Past Surgical History:   Procedure Laterality Date     COLONOSCOPY       EYE SURGERY Left 02/2019    Hole in macula     FUSION CERVICAL POSTERIOR THREE+ LEVELS  1/22/2013    Procedure: FUSION CERVICAL POSTERIOR THREE+ LEVELS;  Cervical 1-6 Posterior Instrumentation and Fusion, Cervical 3-4 Laminectomy  .Instrumentation and fusion to Cervical 6 *Latex Allergy*;  Surgeon: Ra Bar MD;  Location: [facility] OR     GYN SURGERY       HEAD & NECK SURGERY       HYSTERECTOMY       IR ENDOVENOUS ABLATION VARICOSE VEINS  2/16/2021     KNEE SURGERY       NECK SURGERY       OPEN REDUCTION INTERNAL FIXATION WRIST Left 4/30/2019    Procedure: Open Reduction Internal Fixation Left Distal Radius  Fracture;  Surgeon: Dinh Strong MD;  [facility]: [facility]     OPEN REDUCTION INTERNAL FIXATION WRIST Left 6/12/2020    Procedure: OPEN REDUCTION INTERNAL FIXATION Left Distal Radius Nonunion, Distal Ulna Resection;  Surgeon: Dinh Strong MD;  Location: [facility]     OPTICAL TRACKING SYSTEM ENDOSCOPIC SINUS SURGERY Right 4/6/2018    Procedure: OPTICAL TRACKING SYSTEM ENDOSCOPIC SINUS SURGERY;  Stealth Assisted Right Maxillary Antrostomy, Anterior Ethmoidectomy And Frontal Sinusotomy;  Surgeon: Elyse Eisenberg MD;  Location: [facility] OR     OPTICAL TRACKING SYSTEM ENDOSCOPIC SINUS SURGERY Right 8/3/2018    Procedure: OPTICAL TRACKING SYSTEM ENDOSCOPIC SINUS SURGERY;  Stealth Assisted Revision Right Frontal Sinusotomy, Right Stent Placement  **Latex Allergy** ;  Surgeon: Elyse Eisenberg MD;  Location: [facility]     ORTHOPEDIC SURGERY       REMOVE HARDWARE WRIST Left 11/19/2019    Procedure: Left Distal Radius Hardware Removal, Flexor Pollicus Longus Repair, Deep Cultures;  Surgeon: Dinh Strong MD;  Location: [facility]     ZZC HAND/FINGER SURGERY UNLISTED       ZZC PELVIS/HIP JOINT SURGERY UNLISTED       Social History     Socioeconomic History     Marital status:      Spouse name: Not on file     Number of children: Not on file     Years of education: Not on file     Highest education level: Not on file   Occupational History     Not on file   Tobacco Use     Smoking status: Never     Smokeless tobacco: Never   Vaping Use     Vaping status: Not on file   Substance and Sexual Activity     Alcohol use: Yes     Comment: 2 glasses of wine a day     Drug use: Never     Sexual activity: Not Currently   Other Topics Concern     Parent/sibling w/ CABG, MI or angioplasty before 65F 55M? Not Asked   Social History Narrative    Ms. Chau is , has two grown children and two grandchildren. She does not work. She was never a smoker, and drinks a glass of wine with dinner each night.         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      Social Determinants of Health     Financial Resource Strain: Not on file   Food Insecurity: Not on file   Transportation Needs: Not on file   Physical Activity: Not on file   Stress: Not on file   Social Connections: Not on file   Intimate Partner Violence: Not on file   Housing Stability: Not on file     Family History   Problem Relation Age of Onset     Arthritis Mother      Respiratory Mother         pulmonary fibrosis     Hypertension Mother      Anemia Mother      Liver Disease Father         from EtOH     Alcoholism Father      No Known Problems Maternal Grandmother      Heart Disease Maternal Grandfather      Glaucoma Paternal Grandfather      Heart Disease Paternal Grandfather      Multiple Sclerosis Sister      Breast Cancer Sister      Skin Cancer No family hx of      Melanoma No family hx of          Subjective findings- 82-year-old returns clinic for ulcer left medial leg with venous stasis.  She relates it is doing well and there is no drainage, and she has been leaving it open and cleaning it with soap and water.    Objective findings- vascular status intact left, she has a left medial leg small eschar present, there is no erythema, no drainage, no odor, no calor, mild peripheral edema with venous stasis.    Assessment and plan- Ulcer left medial leg, this is doing well, she has Venous stasis with Venous hypertension and Lymphedema present, diagnosis and treatment options discussed with her, advised her to continue to keep this clean, she can either use soap and water or she has the wound Vashe at home she relates as well, she can use that as well, advised her to go back to her compression socks, she relates she will start using those and advised her on exercise and elevation, return to clinic and see me as needed.                  Straightforward medical decision making.

## 2023-05-17 NOTE — LETTER
5/17/2023         RE: Kimberly Chau  [id number]   [place] [address]        Dear Colleague,    Thank you for referring your patient, Kimberly Chau, to the [facility]. Please see a copy of my visit note below.    Past Medical History:   Diagnosis Date     Anemia      Arthritis      Cataracts      Central retinal artery occlusion      Cervical spine fracture (H)     After a fall from orthostatic sx     Chronic pain     Back of head     Gastro-oesophageal reflux disease      Head injury      Hypertension      Hyponatremia     Resolved, 2/2 diuretics     Migraines      Osteoporosis      Pneumonia 2018     Rheumatoid arthritis(714.0)      Patient Active Problem List   Diagnosis     Intrinsic atopic dermatitis     Essential hypertension     Rheumatoid arthritis (H)     Retinal artery occlusion     Cervical vertebral fracture (H)     Central retinal artery occlusion of right eye     Bilateral occipital neuralgia     C1-C2 instability     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 factor (H)     Osteoarthritis     Malignant hypertension     Hyponatremia     GERD (gastroesophageal reflux disease)     Eczematous dermatitis     Anxiety state     Anemia     Closed fracture of distal end of left radius with delayed healing, unspecified fracture morphology, subsequent encounter     Closed fracture of distal end of left radius, unspecified fracture morphology, sequela     Osteomyelitis of left leg (H)     Pain of left thumb     Past Surgical History:   Procedure Laterality Date     COLONOSCOPY       EYE SURGERY Left 02/2019    Hole in macula     FUSION CERVICAL POSTERIOR THREE+ LEVELS  1/22/2013    Procedure: FUSION CERVICAL POSTERIOR THREE+ LEVELS;  Cervical 1-6 Posterior Instrumentation and Fusion, Cervical 3-4 Laminectomy  .Instrumentation and fusion to Cervical 6 *Latex Allergy*;  Surgeon: Ra Bar MD;  Location: [facility]     GYN SURGERY       HEAD & NECK  SURGERY       HYSTERECTOMY       IR ENDOVENOUS ABLATION VARICOSE VEINS  2/16/2021     KNEE SURGERY       NECK SURGERY       OPEN REDUCTION INTERNAL FIXATION WRIST Left 4/30/2019    Procedure: Open Reduction Internal Fixation Left Distal Radius Fracture;  Surgeon: Dinh Strong MD;  [facility]     OPEN REDUCTION INTERNAL FIXATION WRIST Left 6/12/2020    Procedure: OPEN REDUCTION INTERNAL FIXATION Left Distal Radius Nonunion, Distal Ulna Resection;  Surgeon: Dinh Strong MD;  [facility]     OPTICAL TRACKING SYSTEM ENDOSCOPIC SINUS SURGERY Right 4/6/2018    Procedure: OPTICAL TRACKING SYSTEM ENDOSCOPIC SINUS SURGERY;  Stealth Assisted Right Maxillary Antrostomy, Anterior Ethmoidectomy And Frontal Sinusotomy;  Surgeon: Elyse Eisenberg MD;  Location: [facility]     OPTICAL TRACKING SYSTEM ENDOSCOPIC SINUS SURGERY Right 8/3/2018    Procedure: OPTICAL TRACKING SYSTEM ENDOSCOPIC SINUS SURGERY;  Stealth Assisted Revision Right Frontal Sinusotomy, Right Stent Placement  **Latex Allergy** ;  Surgeon: Elyse Eisenberg MD;  Location: [facility]     ORTHOPEDIC SURGERY       REMOVE HARDWARE WRIST Left 11/19/2019    Procedure: Left Distal Radius Hardware Removal, Flexor Pollicus Longus Repair, Deep Cultures;  Surgeon: Dinh Strong MD;  [facility]     ZZC HAND/FINGER SURGERY UNLISTED       ZZC PELVIS/HIP JOINT SURGERY UNLISTED       Social History     Socioeconomic History     Marital status:      Spouse name: Not on file     Number of children: Not on file     Years of education: Not on file     Highest education level: Not on file   Occupational History     Not on file   Tobacco Use     Smoking status: Never     Smokeless tobacco: Never   Vaping Use     Vaping status: Not on file   Substance and Sexual Activity     Alcohol use: Yes     Comment: 2 glasses of wine a day     Drug use: Never     Sexual activity: Not Currently   Other Topics Concern     Parent/sibling w/ CABG, MI or angioplasty  before 65F 55M? Not Asked   Social History Narrative    Ms. Chau is , has two grown children and two grandchildren. She does not work. She was never a smoker, and drinks a glass of wine with dinner each night.         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      Social Determinants of Health     Financial Resource Strain: Not on file   Food Insecurity: Not on file   Transportation Needs: Not on file   Physical Activity: Not on file   Stress: Not on file   Social Connections: Not on file   Intimate Partner Violence: Not on file   Housing Stability: Not on file     Family History   Problem Relation Age of Onset     Arthritis Mother      Respiratory Mother         pulmonary fibrosis     Hypertension Mother      Anemia Mother      Liver Disease Father         from EtOH     Alcoholism Father      No Known Problems Maternal Grandmother      Heart Disease Maternal Grandfather      Glaucoma Paternal Grandfather      Heart Disease Paternal Grandfather      Multiple Sclerosis Sister      Breast Cancer Sister      Skin Cancer No family hx of      Melanoma No family hx of          Subjective findings- 82-year-old returns clinic for ulcer left medial leg with venous stasis.  She relates it is doing well and there is no drainage, and she has been leaving it open and cleaning it with soap and water.    Objective findings- vascular status intact left, she has a left medial leg small eschar present, there is no erythema, no drainage, no odor, no calor, mild peripheral edema with venous stasis.    Assessment and plan- Ulcer left medial leg, this is doing well, she has Venous stasis with Venous hypertension and Lymphedema present, diagnosis and treatment options discussed with her, advised her to continue to keep this clean, she can either use soap and water or she has the wound Vashe at home she relates as well, she can use that as well, advised her to go back to her compression socks, she relates  she will start using those and advised her on exercise and elevation, return to clinic and see me as needed.                  Straightforward medical decision making.    Again, thank you for allowing me to participate in the care of your patient.        Sincerely,        Camden Salazar DPM

## 2023-05-22 NOTE — TELEPHONE ENCOUNTER
Patient calmer, resting in bed awake. No longer yelling.    Placed dermatology referral this encounter    RADHA Castellano

## 2023-06-01 ENCOUNTER — HEALTH MAINTENANCE LETTER (OUTPATIENT)
Age: 83
End: 2023-06-01

## 2023-06-20 ENCOUNTER — OFFICE VISIT (OUTPATIENT)
Dept: DERMATOLOGY | Facility: CLINIC | Age: 83
End: 2023-06-20
Payer: MEDICARE

## 2023-06-20 DIAGNOSIS — L30.9 DERMATITIS: ICD-10-CM

## 2023-06-20 DIAGNOSIS — L97.909 VENOUS ULCER (H): ICD-10-CM

## 2023-06-20 DIAGNOSIS — L90.8 ATROPHIE BLANCHE: Primary | ICD-10-CM

## 2023-06-20 DIAGNOSIS — I83.009 VENOUS ULCER (H): ICD-10-CM

## 2023-06-20 PROCEDURE — 99214 OFFICE O/P EST MOD 30 MIN: CPT | Performed by: STUDENT IN AN ORGANIZED HEALTH CARE EDUCATION/TRAINING PROGRAM

## 2023-06-20 RX ORDER — PENTOXIFYLLINE 400 MG/1
400 TABLET, EXTENDED RELEASE ORAL
Qty: 90 TABLET | Refills: 4 | Status: SHIPPED | OUTPATIENT
Start: 2023-06-20

## 2023-06-20 RX ORDER — TRIAMCINOLONE ACETONIDE 1 MG/G
CREAM TOPICAL
Qty: 454 G | Refills: 0 | Status: SHIPPED | OUTPATIENT
Start: 2023-06-20 | End: 2024-04-04

## 2023-06-20 ASSESSMENT — PAIN SCALES - GENERAL: PAINLEVEL: NO PAIN (0)

## 2023-06-20 NOTE — PROGRESS NOTES
[facility] Dermatology Note  Encounter Date: Jun 20, 2023  Office Visit     Dermatology Problem List:  1. Venous stasis ulcers   - 08/18/21 LE US  - 02/16/21 laser ablation of Great saphenous vein of R &L  leg   - previously improved w/ compression stockings, mupirocin  -11/08/22 pitting edema, new rx for 15-20mm Hg compression stockings. Defer aspirin due to hx falls. High BP defer horse chestnut and venoconstrictors. Start pentoxifylline 400mg TID. Vinegar soaks. Ref wound care  -12/13/22 slightly improved. Atrophie idalia and pain resolved w/ pentoxifylline 400mg TID (taking BID). Not using compression stockings. 2-3+ pitting edmea  -2/21/23 - Unna boot placed by podiatry, much improved with conservative treatment with wound    ____________________________________________    Assessment & Plan:    #Atrophie idalia  # Venous stasis ulcer  Patient continues trending towards resolution with no signs of infection on physical exam today. When compared to the previous photo taken 3/17/2023, the lesion appears more shallow and moist which will will promote continued re-epithelialization. She still has some changes consistent with atrophic idalia and microvascularization, however, she continues to have no pain while taking the pentoxifylline. There is small amount of edema immediately surrounding her ulcer.  -Patient encouraged to use compression stockings and continue extremity elevation when able until the ulcer is healed.  -Continue pentoxifylline 400 mg BID until the wound is fully resolved.  -Continue triamcinolone 1% cream as needed for pruritic symptoms.      Procedures Performed:   None      Follow-up: 3 month(s) in-person, or earlier for new or changing lesions    Staff and Medical Student:     Zoila Gutierrez, MS4    Hamilton Cummins MD  ____________________________________________    CC: No chief complaint on file.    HPI:  Ms. Kimberly Chau is a(n) 82 year old female who presents today as a  "return patient for a venous stasis ulcer. Overall she is happy with her progress and is happy that her symptoms are almost resolved. She has been discharged by wound care. She is still taking the pentoxifylline BID daily. She isn't using her compression stockings regularly. She has been itchier than usual and attributes this to allergy season. She uses triamcinolone 0.1% cream and zyrtec PRN for symptoms.     Patient is otherwise feeling well, without additional skin concerns.    Labs:  None reviewed.    Physical Exam:  Vitals: There were no vitals taken for this visit.  SKIN: Focused examination of left lower leg was performed.  - Shallow red 2 cm linear ulceration with 1+ pitting bordering edema. Pinpoint blanched macules with intermittent hypervascular macules also present surround the ulcer. There is serosanguinous clear fluid weeping from the ulcer. There is no purulent drainage or erythema. There are linear excoriations along the anterior tibia.   - No other lesions of concern on areas examined.             Medications:  Current Outpatient Medications   Medication    acetaminophen 650 MG TABS    amLODIPine (NORVASC) 5 MG tablet    amLODIPine (NORVASC) 5 MG tablet    carvedilol (COREG) 12.5 MG tablet    cephALEXin (KEFLEX) 500 MG capsule    COMPRESSION STOCKINGS    denosumab (PROLIA) 60 MG/ML SOSY injection    Diphenhydramine-APAP, sleep, (TYLENOL PM EXTRA STRENGTH PO)    ferrous gluconate (FERGON) 324 (38 Fe) MG tablet    Gauze Pads & Dressings (TELFA NON-ADHERENT) 3\"X4\" PADS    Multiple Vitamin (MULTI-VITAMIN) per tablet    Omeprazole (PRILOSEC PO)    pentoxifylline ER (TRENTAL) 400 MG CR tablet    sodium chloride (OCEAN NASAL SPRAY) 0.65 % nasal spray    triamcinolone (KENALOG) 0.1 % external cream     Current Facility-Administered Medications   Medication    denosumab (PROLIA) injection 60 mg    fluocinonide (LIDEX) 0.05 % ointment    fluocinonide (LIDEX) 0.05 % ointment    mupirocin (BACTROBAN) 2 % " ointment    mupirocin (BACTROBAN) 2 % ointment    romosozumab-aqqg (EVENITY) injection 210 mg    romosozumab-aqqg (EVENITY) injection 210 mg    romosozumab-aqqg (EVENITY) injection 210 mg    romosozumab-aqqg (EVENITY) injection 210 mg    romosozumab-aqqg (EVENITY) injection 210 mg    romosozumab-aqqg (EVENITY) injection 210 mg    romosozumab-aqqg (EVENITY) injection 210 mg      Past Medical History:   Patient Active Problem List   Diagnosis    Intrinsic atopic dermatitis    Essential hypertension    Rheumatoid arthritis (H)    Retinal artery occlusion    Cervical vertebral fracture (H)    Central retinal artery occlusion of right eye    Bilateral occipital neuralgia    C1-C2 instability    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 factor (H)    Osteoarthritis    Malignant hypertension    Hyponatremia    GERD (gastroesophageal reflux disease)    Eczematous dermatitis    Anxiety state    Anemia    Closed fracture of distal end of left radius with delayed healing, unspecified fracture morphology, subsequent encounter    Closed fracture of distal end of left radius, unspecified fracture morphology, sequela    Osteomyelitis of left leg (H)    Pain of left thumb     Past Medical History:   Diagnosis Date    Anemia     Arthritis     Cataracts     Central retinal artery occlusion     Cervical spine fracture (H)     After a fall from orthostatic sx    Chronic pain     Back of head    Gastro-oesophageal reflux disease     Head injury     Hypertension     Hyponatremia     Resolved, 2/2 diuretics    Migraines     Osteoporosis     Pneumonia 2018    Rheumatoid arthritis(714.0)         CC Guillermo Castellano MD  [address] [address] on close of this encounter.

## 2023-06-20 NOTE — NURSING NOTE
Dermatology Rooming Note    Kimberly Chau's goals for this visit include:   Chief Complaint   Patient presents with     Derm Problem     Follow up on ulcers on the left lower leg.  Doing better, scabbed over.      Francoise Wilson CMA

## 2023-06-20 NOTE — LETTER
6/20/2023       RE: Kimberly Chau  [id number]   [place] [address]     Dear Colleague,    Thank you for referring your patient, Kimberly Chau, to the [facility] [place] at [facility]. Please see a copy of my visit note below.    [facility] Dermatology Note  Encounter Date: Jun 20, 2023  Office Visit     Dermatology Problem List:  1. Venous stasis ulcers   - 08/18/21 LE US  - 02/16/21 laser ablation of Great saphenous vein of R &L  leg   - previously improved w/ compression stockings, mupirocin  -11/08/22 pitting edema, new rx for 15-20mm Hg compression stockings. Defer aspirin due to hx falls. High BP defer horse chestnut and venoconstrictors. Start pentoxifylline 400mg TID. Vinegar soaks. Ref wound care  -12/13/22 slightly improved. Atrophie idalia and pain resolved w/ pentoxifylline 400mg TID (taking BID). Not using compression stockings. 2-3+ pitting edmea  -2/21/23 - Unna boot placed by podiatry, much improved with conservative treatment with wound    ____________________________________________    Assessment & Plan:    #Atrophie idalia  # Venous stasis ulcer  Patient continues trending towards resolution with no signs of infection on physical exam today. When compared to the previous photo taken 3/17/2023, the lesion appears more shallow and moist which will will promote continued re-epithelialization. She still has some changes consistent with atrophic idalia and microvascularization, however, she continues to have no pain while taking the pentoxifylline. There is small amount of edema immediately surrounding her ulcer.  -Patient encouraged to use compression stockings and continue extremity elevation when able until the ulcer is healed.  -Continue pentoxifylline 400 mg BID until the wound is fully resolved.  -Continue triamcinolone 1% cream as needed for pruritic symptoms.      Procedures Performed:  "  None      Follow-up: 3 month(s) in-person, or earlier for new or changing lesions    Staff and Medical Student:     Zoila Gutierrez, MS4    Hamilton Cummins MD  ____________________________________________    CC: No chief complaint on file.    HPI:  Ms. Kimberly Chau is a(n) 82 year old female who presents today as a return patient for a venous stasis ulcer. Overall she is happy with her progress and is happy that her symptoms are almost resolved. She has been discharged by wound care. She is still taking the pentoxifylline BID daily. She isn't using her compression stockings regularly. She has been itchier than usual and attributes this to allergy season. She uses triamcinolone 0.1% cream and zyrtec PRN for symptoms.     Patient is otherwise feeling well, without additional skin concerns.    Labs:  None reviewed.    Physical Exam:  Vitals: There were no vitals taken for this visit.  SKIN: Focused examination of left lower leg was performed.  - Shallow red 2 cm linear ulceration with 1+ pitting bordering edema. Pinpoint blanched macules with intermittent hypervascular macules also present surround the ulcer. There is serosanguinous clear fluid weeping from the ulcer. There is no purulent drainage or erythema. There are linear excoriations along the anterior tibia.   - No other lesions of concern on areas examined.             Medications:  Current Outpatient Medications   Medication    acetaminophen 650 MG TABS    amLODIPine (NORVASC) 5 MG tablet    amLODIPine (NORVASC) 5 MG tablet    carvedilol (COREG) 12.5 MG tablet    cephALEXin (KEFLEX) 500 MG capsule    COMPRESSION STOCKINGS    denosumab (PROLIA) 60 MG/ML SOSY injection    Diphenhydramine-APAP, sleep, (TYLENOL PM EXTRA STRENGTH PO)    ferrous gluconate (FERGON) 324 (38 Fe) MG tablet    Gauze Pads & Dressings (TELFA NON-ADHERENT) 3\"X4\" PADS    Multiple Vitamin (MULTI-VITAMIN) per tablet    Omeprazole (PRILOSEC PO)    pentoxifylline ER (TRENTAL) 400 MG CR tablet    " sodium chloride (OCEAN NASAL SPRAY) 0.65 % nasal spray    triamcinolone (KENALOG) 0.1 % external cream     Current Facility-Administered Medications   Medication    denosumab (PROLIA) injection 60 mg    fluocinonide (LIDEX) 0.05 % ointment    fluocinonide (LIDEX) 0.05 % ointment    mupirocin (BACTROBAN) 2 % ointment    mupirocin (BACTROBAN) 2 % ointment    romosozumab-aqqg (EVENITY) injection 210 mg    romosozumab-aqqg (EVENITY) injection 210 mg    romosozumab-aqqg (EVENITY) injection 210 mg    romosozumab-aqqg (EVENITY) injection 210 mg    romosozumab-aqqg (EVENITY) injection 210 mg    romosozumab-aqqg (EVENITY) injection 210 mg    romosozumab-aqqg (EVENITY) injection 210 mg      Past Medical History:   Patient Active Problem List   Diagnosis    Intrinsic atopic dermatitis    Essential hypertension    Rheumatoid arthritis (H)    Retinal artery occlusion    Cervical vertebral fracture (H)    Central retinal artery occlusion of right eye    Bilateral occipital neuralgia    C1-C2 instability    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 factor (H)    Osteoarthritis    Malignant hypertension    Hyponatremia    GERD (gastroesophageal reflux disease)    Eczematous dermatitis    Anxiety state    Anemia    Closed fracture of distal end of left radius with delayed healing, unspecified fracture morphology, subsequent encounter    Closed fracture of distal end of left radius, unspecified fracture morphology, sequela    Osteomyelitis of left leg (H)    Pain of left thumb     Past Medical History:   Diagnosis Date    Anemia     Arthritis     Cataracts     Central retinal artery occlusion     Cervical spine fracture (H)     After a fall from orthostatic sx    Chronic pain     Back of head    Gastro-oesophageal reflux disease     Head injury     Hypertension     Hyponatremia     Resolved, 2/2 diuretics    Migraines     Osteoporosis     Pneumonia 2018    Rheumatoid arthritis(714.0)         CHRISTINE SOLIZ  MD Gray  [address] [address] on close of this encounter.      Attestation with edits by Hamilton Cummins MD at 6/20/2023  2:22 PM:  I have personally examined this patient and agree with the medical student's documentation and plan of care. I have reviewed and amended the medical student's note as necessary.The documentation accurately reflects my clinical observations, diagnoses, treatment and follow-up plans.     Hamilton Cummins MD  Dermatology Staff

## 2023-06-20 NOTE — PATIENT INSTRUCTIONS
Your ulcer is healing well. There is some residual edema. Please restart compression stockings and wear until the area is completely healed.     Once the area is healed, you can stop taking the penotoxifylline

## 2023-06-21 ENCOUNTER — OFFICE VISIT (OUTPATIENT)
Dept: INTERNAL MEDICINE | Facility: CLINIC | Age: 83
End: 2023-06-21
Payer: MEDICARE

## 2023-06-21 VITALS
WEIGHT: 117.6 LBS | BODY MASS INDEX: 23.09 KG/M2 | DIASTOLIC BLOOD PRESSURE: 80 MMHG | OXYGEN SATURATION: 97 % | HEIGHT: 60 IN | SYSTOLIC BLOOD PRESSURE: 162 MMHG | HEART RATE: 87 BPM

## 2023-06-21 DIAGNOSIS — C61 PROSTATE CANCER (H): ICD-10-CM

## 2023-06-21 DIAGNOSIS — Z12.5 ENCOUNTER FOR SCREENING FOR MALIGNANT NEOPLASM OF PROSTATE: ICD-10-CM

## 2023-06-21 DIAGNOSIS — E78.00 HIGH BLOOD CHOLESTEROL: ICD-10-CM

## 2023-06-21 DIAGNOSIS — D64.9 ANEMIA, UNSPECIFIED TYPE: Primary | ICD-10-CM

## 2023-06-21 DIAGNOSIS — I10 BENIGN ESSENTIAL HYPERTENSION: ICD-10-CM

## 2023-06-21 PROCEDURE — 99214 OFFICE O/P EST MOD 30 MIN: CPT | Performed by: INTERNAL MEDICINE

## 2023-06-21 NOTE — PROGRESS NOTES
HPI:    Overall stable. She states no joint or rheumatological complaints. Otherwise, no additional HEENT, cardiopulmonary, abdominal, , GYN, neurological, systemic, psychiatric, lymphatic, endocrine, vascular complaints.     Past Medical History:   Diagnosis Date     Anemia      Arthritis      Cataracts      Central retinal artery occlusion      Cervical spine fracture (H)     After a fall from orthostatic sx     Chronic pain     Back of head     Gastro-oesophageal reflux disease      Head injury      Hypertension      Hyponatremia     Resolved, 2/2 diuretics     Migraines      Osteoporosis      Pneumonia 2018     Rheumatoid arthritis(714.0)      Past Surgical History:   Procedure Laterality Date     COLONOSCOPY       EYE SURGERY Left 02/2019    Hole in macula     FUSION CERVICAL POSTERIOR THREE+ LEVELS  1/22/2013    Procedure: FUSION CERVICAL POSTERIOR THREE+ LEVELS;  Cervical 1-6 Posterior Instrumentation and Fusion, Cervical 3-4 Laminectomy  .Instrumentation and fusion to Cervical 6 *Latex Allergy*;  Surgeon: Ra Bar MD;  Location: [facility] OR     GYN SURGERY       HEAD & NECK SURGERY       HYSTERECTOMY       IR ENDOVENOUS ABLATION VARICOSE VEINS  2/16/2021     KNEE SURGERY       NECK SURGERY       OPEN REDUCTION INTERNAL FIXATION WRIST Left 4/30/2019    Procedure: Open Reduction Internal Fixation Left Distal Radius Fracture;  Surgeon: Dinh Strong MD;  Location:  OR     OPEN REDUCTION INTERNAL FIXATION WRIST Left 6/12/2020    Procedure: OPEN REDUCTION INTERNAL FIXATION Left Distal Radius Nonunion, Distal Ulna Resection;  Surgeon: Dinh Strong MD;  Location:  OR     OPTICAL TRACKING SYSTEM ENDOSCOPIC SINUS SURGERY Right 4/6/2018    Procedure: OPTICAL TRACKING SYSTEM ENDOSCOPIC SINUS SURGERY;  Stealth Assisted Right Maxillary Antrostomy, Anterior Ethmoidectomy And Frontal Sinusotomy;  Surgeon: Elyse Eisenberg MD;  Location:  OR     OPTICAL TRACKING SYSTEM ENDOSCOPIC SINUS SURGERY  Right 8/3/2018    Procedure: OPTICAL TRACKING SYSTEM ENDOSCOPIC SINUS SURGERY;  Stealth Assisted Revision Right Frontal Sinusotomy, Right Stent Placement  **Latex Allergy** ;  Surgeon: Elyse Eisenberg MD;  Location: [facility] OR     ORTHOPEDIC SURGERY       REMOVE HARDWARE WRIST Left 11/19/2019    Procedure: Left Distal Radius Hardware Removal, Flexor Pollicus Longus Repair, Deep Cultures;  Surgeon: Dinh Strong MD;  Location:  OR     [facility] HAND/FINGER SURGERY UNLISTED       [facility] PELVIS/HIP JOINT SURGERY UNLISTED       PE:    Vitals noted, gen, nad, cooperative, alert, neck supple nl rom, lungs with good air movement, RRR, S1, S2, no MRG, abdomen, no acute findings. Grossly normal neurological exam. No significant joint findings.     A/P:    1. Immunizations; COVID Pfizer vaccine x 4 (Bi-valent 10/17/2022). She has completed the shingrix vaccine series. Tdap 3/10/2014. Prevnar 20  5/17/2022  2. Lipids; 3/16/2022 TG's 154, LDL 43, and   3. HTN; on Carvedilol 12.5 BID and Amlodipine.  She is intolerant to HcTz and ACE-I. 24 hour BP monitor done 3/25/2022 elevated at 167/89. BP elevated today (162/80) and can increase Amlodipine to 10 mg   4. RA; she used to follow outside Rheumatology. She does not feel she has any current sxs. And is not on medication.   5. Chronic mild anemia; normal 3/16/2022 and normal iron studies, B12 and folate. Elevated ferritin (inflammatory?)  6. Seen Dr. Lux, Orthopedics 4/23/2022 for low bone density. See Dr. Collazo's orthopedic note from 3/16/2022. She had follow up 5/25/2022 and 7/8/2022. Vitamin D level 64 on 7/8/2022. DEXA scan 7/8/2022 most negative T -2.8. She is on Prolia. She has 8/16/2023 DEXA scan scheduled and follow up 8/30/2023 with Dr Collazo. Vitamin D 64 on 7/8/2022.   7. She declines further mammograms   8. EKG findings from 5/17/2022. Reviewed with Cardiology; Resting echo done 7/1/2022 and Zio patch 7/1/2022 with runs of nonsustained SVT; no significant sxs.  Recommended if worse or more persistent, recommend she visit with Cardiology.   9. Dermatology; seen by Dr. Cummins 6/20/2023 and follow up 9/26/2023.   10. Seen in Podiatry by Dr. Salazar 5/17/2023 for venous statis L leg ulcer  11. H/o mild anemia; last Hgb 11.8 (MCV 99) on 3/16/2022 and reordered today 6/21/2023.         30 minutes spent on the date of the encounter doing chart review, history and exam, documentation and further activities as noted above exclusive of procedures and other billable interpretations

## 2023-06-21 NOTE — NURSING NOTE
Kimberly Chau is a 82 year old female that presents in clinic today for the following:     Chief Complaint   Patient presents with     Follow Up     Pt here for follow up        The patient's allergies and medications were reviewed. The patient's vitals were obtained without incident. The patient does not have any other questions for the provider.     Shweta Mayorga, EMT at 12:52 PM on 6/21/2023.  Primary Care Clinic: [phone]

## 2023-06-27 ENCOUNTER — LAB (OUTPATIENT)
Dept: LAB | Facility: CLINIC | Age: 83
End: 2023-06-27
Payer: MEDICARE

## 2023-06-27 DIAGNOSIS — D64.9 ANEMIA, UNSPECIFIED TYPE: ICD-10-CM

## 2023-06-27 DIAGNOSIS — I10 BENIGN ESSENTIAL HYPERTENSION: ICD-10-CM

## 2023-06-27 DIAGNOSIS — E78.00 HIGH BLOOD CHOLESTEROL: ICD-10-CM

## 2023-06-27 LAB
ALBUMIN SERPL BCG-MCNC: 4.3 G/DL (ref 3.5–5.2)
ALP SERPL-CCNC: 78 U/L (ref 35–104)
ALT SERPL W P-5'-P-CCNC: 11 U/L (ref 0–50)
ANION GAP SERPL CALCULATED.3IONS-SCNC: 10 MMOL/L (ref 7–15)
AST SERPL W P-5'-P-CCNC: 22 U/L (ref 0–45)
BASOPHILS # BLD AUTO: 0.1 10E3/UL (ref 0–0.2)
BASOPHILS NFR BLD AUTO: 1 %
BILIRUB SERPL-MCNC: 0.3 MG/DL
BUN SERPL-MCNC: 18.5 MG/DL (ref 8–23)
CALCIUM SERPL-MCNC: 9.6 MG/DL (ref 8.8–10.2)
CHLORIDE SERPL-SCNC: 101 MMOL/L (ref 98–107)
CHOLEST SERPL-MCNC: 186 MG/DL
CREAT SERPL-MCNC: 1.41 MG/DL (ref 0.51–0.95)
DEPRECATED HCO3 PLAS-SCNC: 27 MMOL/L (ref 22–29)
EOSINOPHIL # BLD AUTO: 0.5 10E3/UL (ref 0–0.7)
EOSINOPHIL NFR BLD AUTO: 9 %
ERYTHROCYTE [DISTWIDTH] IN BLOOD BY AUTOMATED COUNT: 13.1 % (ref 10–15)
GFR SERPL CREATININE-BSD FRML MDRD: 37 ML/MIN/1.73M2
GLUCOSE SERPL-MCNC: 94 MG/DL (ref 70–99)
HCT VFR BLD AUTO: 36.1 % (ref 35–47)
HDLC SERPL-MCNC: 65 MG/DL
HGB BLD-MCNC: 11.7 G/DL (ref 11.7–15.7)
IMM GRANULOCYTES # BLD: 0 10E3/UL
IMM GRANULOCYTES NFR BLD: 0 %
LDLC SERPL CALC-MCNC: 61 MG/DL
LYMPHOCYTES # BLD AUTO: 1.2 10E3/UL (ref 0.8–5.3)
LYMPHOCYTES NFR BLD AUTO: 25 %
MCH RBC QN AUTO: 31.4 PG (ref 26.5–33)
MCHC RBC AUTO-ENTMCNC: 32.4 G/DL (ref 31.5–36.5)
MCV RBC AUTO: 97 FL (ref 78–100)
MONOCYTES # BLD AUTO: 0.6 10E3/UL (ref 0–1.3)
MONOCYTES NFR BLD AUTO: 12 %
NEUTROPHILS # BLD AUTO: 2.6 10E3/UL (ref 1.6–8.3)
NEUTROPHILS NFR BLD AUTO: 53 %
NONHDLC SERPL-MCNC: 121 MG/DL
NRBC # BLD AUTO: 0 10E3/UL
NRBC BLD AUTO-RTO: 0 /100
PLATELET # BLD AUTO: 279 10E3/UL (ref 150–450)
POTASSIUM SERPL-SCNC: 4.4 MMOL/L (ref 3.4–5.3)
PROT SERPL-MCNC: 7.7 G/DL (ref 6.4–8.3)
RBC # BLD AUTO: 3.73 10E6/UL (ref 3.8–5.2)
SODIUM SERPL-SCNC: 138 MMOL/L (ref 136–145)
TRIGL SERPL-MCNC: 301 MG/DL
WBC # BLD AUTO: 5 10E3/UL (ref 4–11)

## 2023-06-27 PROCEDURE — 85025 COMPLETE CBC W/AUTO DIFF WBC: CPT

## 2023-06-27 PROCEDURE — 80053 COMPREHEN METABOLIC PANEL: CPT

## 2023-06-27 PROCEDURE — 80061 LIPID PANEL: CPT

## 2023-06-27 PROCEDURE — 36415 COLL VENOUS BLD VENIPUNCTURE: CPT

## 2023-07-09 RX ORDER — PENTOXIFYLLINE 400 MG/1
400 TABLET, EXTENDED RELEASE ORAL
Qty: 90 TABLET | Refills: 0 | OUTPATIENT
Start: 2023-07-09

## 2023-07-10 DIAGNOSIS — I10 BENIGN ESSENTIAL HYPERTENSION: ICD-10-CM

## 2023-07-13 RX ORDER — CARVEDILOL 12.5 MG/1
12.5 TABLET ORAL 2 TIMES DAILY WITH MEALS
Qty: 180 TABLET | Refills: 0 | Status: SHIPPED | OUTPATIENT
Start: 2023-07-13 | End: 2023-10-10

## 2023-07-13 NOTE — TELEPHONE ENCOUNTER
LCV:6/21/2023  [facility] Internal Medicine [place]  BP above protocol- in last clinic note  RF 90 day

## 2023-07-31 DIAGNOSIS — I10 BENIGN ESSENTIAL HYPERTENSION: ICD-10-CM

## 2023-08-03 RX ORDER — CARVEDILOL 12.5 MG/1
12.5 TABLET ORAL 2 TIMES DAILY WITH MEALS
Qty: 180 TABLET | OUTPATIENT
Start: 2023-08-03

## 2023-08-03 NOTE — TELEPHONE ENCOUNTER
carvedilol (COREG) 12.5 MG tablet     Last Written Prescription Date:  07-  Last Fill Quantity: 180,   # refills: 0  Last Office Visit : 6-  Future Office visit:  10-      Beta-Blockers Protocol Failed 07/31/2023 10:02 AM   Protocol Details  Blood pressure under 140/90 in past 12 months     BP Readings from Last 3 Encounters:   06/21/23 (!) 162/80   07/11/22 (!) 146/78   05/17/22 (!) 175/96

## 2023-08-16 ENCOUNTER — ANCILLARY PROCEDURE (OUTPATIENT)
Dept: BONE DENSITY | Facility: CLINIC | Age: 83
End: 2023-08-16
Attending: FAMILY MEDICINE
Payer: MEDICARE

## 2023-08-16 ENCOUNTER — LAB (OUTPATIENT)
Dept: LAB | Facility: CLINIC | Age: 83
End: 2023-08-16
Payer: MEDICARE

## 2023-08-16 DIAGNOSIS — M80.00XA OSTEOPOROSIS WITH PATHOLOGICAL FRACTURE, INITIAL ENCOUNTER: ICD-10-CM

## 2023-08-16 DIAGNOSIS — M81.0 AGE-RELATED OSTEOPOROSIS WITHOUT CURRENT PATHOLOGICAL FRACTURE: ICD-10-CM

## 2023-08-16 DIAGNOSIS — I10 BENIGN ESSENTIAL HYPERTENSION: ICD-10-CM

## 2023-08-16 LAB
ANION GAP SERPL CALCULATED.3IONS-SCNC: 10 MMOL/L (ref 7–15)
BUN SERPL-MCNC: 23.9 MG/DL (ref 8–23)
CALCIUM SERPL-MCNC: 9.7 MG/DL (ref 8.8–10.2)
CALCIUM SERPL-MCNC: 9.7 MG/DL (ref 8.8–10.2)
CHLORIDE SERPL-SCNC: 100 MMOL/L (ref 98–107)
CREAT SERPL-MCNC: 1.79 MG/DL (ref 0.51–0.95)
DEPRECATED CALCIDIOL+CALCIFEROL SERPL-MC: 63 UG/L (ref 20–75)
DEPRECATED HCO3 PLAS-SCNC: 25 MMOL/L (ref 22–29)
GFR SERPL CREATININE-BSD FRML MDRD: 27 ML/MIN/1.73M2
GLUCOSE SERPL-MCNC: 103 MG/DL (ref 70–99)
MAGNESIUM SERPL-MCNC: 2 MG/DL (ref 1.7–2.3)
PHOSPHATE SERPL-MCNC: 3.6 MG/DL (ref 2.5–4.5)
POTASSIUM SERPL-SCNC: 5.1 MMOL/L (ref 3.4–5.3)
SODIUM SERPL-SCNC: 135 MMOL/L (ref 136–145)

## 2023-08-16 PROCEDURE — 77080 DXA BONE DENSITY AXIAL: CPT | Performed by: INTERNAL MEDICINE

## 2023-08-16 PROCEDURE — 82306 VITAMIN D 25 HYDROXY: CPT | Performed by: FAMILY MEDICINE

## 2023-08-16 PROCEDURE — 84100 ASSAY OF PHOSPHORUS: CPT | Performed by: PATHOLOGY

## 2023-08-16 PROCEDURE — 36415 COLL VENOUS BLD VENIPUNCTURE: CPT | Performed by: PATHOLOGY

## 2023-08-16 PROCEDURE — 83735 ASSAY OF MAGNESIUM: CPT | Performed by: PATHOLOGY

## 2023-08-16 PROCEDURE — 80048 BASIC METABOLIC PNL TOTAL CA: CPT | Performed by: PATHOLOGY

## 2023-08-16 PROCEDURE — 99000 SPECIMEN HANDLING OFFICE-LAB: CPT | Performed by: PATHOLOGY

## 2023-08-25 ENCOUNTER — OFFICE VISIT (OUTPATIENT)
Dept: ORTHOPEDICS | Facility: CLINIC | Age: 83
End: 2023-08-25
Payer: MEDICARE

## 2023-08-25 ENCOUNTER — TELEPHONE (OUTPATIENT)
Dept: INTERNAL MEDICINE | Facility: CLINIC | Age: 83
End: 2023-08-25

## 2023-08-25 VITALS
DIASTOLIC BLOOD PRESSURE: 75 MMHG | HEIGHT: 60 IN | HEART RATE: 112 BPM | BODY MASS INDEX: 22.97 KG/M2 | SYSTOLIC BLOOD PRESSURE: 131 MMHG

## 2023-08-25 DIAGNOSIS — N18.1 CKD (CHRONIC KIDNEY DISEASE) STAGE 1, GFR 90 ML/MIN OR GREATER: Primary | ICD-10-CM

## 2023-08-25 DIAGNOSIS — M79.89 LEFT LEG SWELLING: Primary | ICD-10-CM

## 2023-08-25 DIAGNOSIS — I10 ESSENTIAL (PRIMARY) HYPERTENSION: ICD-10-CM

## 2023-08-25 PROCEDURE — 99213 OFFICE O/P EST LOW 20 MIN: CPT | Performed by: FAMILY MEDICINE

## 2023-08-25 NOTE — PATIENT INSTRUCTIONS
# Left Lower Leg Swelling: Kimberly Chau is an 82-year-old female with a history of venous stasis over the left leg with notable ulcer last treated on 3/23 presenting with several month history of gradual worsening in a few day history of acute worsening left lower leg swelling and weeping of the wounds.  She does have increased redness and pain associated with it as well.  She has tried placing a lidocaine patch over the area to help with the pain.  On examination there are positive findings of severe swelling over the calf with significant swelling and weeping of the wound onto the floor.  Given severity of condition there is concern for underlying infection, DVT, deeper infection.  She advised to go to the emergency department immediately.  Patient and her  will plan to present to [place] ER from clinic today.  Signout given to ER provider in anticipation of this.      Please call [phone]   Ask for my team if you have any questions or concerns    If you have not yet received the influenza vaccine but would like to get one, please call  [phone] or you can schedule via [employer]    It was great seeing you today!    Angel Sheldon MD, CAM

## 2023-08-25 NOTE — PROGRESS NOTES
ASSESSMENT & PLAN    Kimberly was seen today for pain.    Diagnoses and all orders for this visit:    Left leg swelling      This issue is acute and Worsening.    # Left Lower Leg Swelling: Kimberly Chau is an 82-year-old female with a history of venous stasis over the left leg with notable ulcer last treated on 3/23 presenting with several month history of gradual worsening in a few day history of acute worsening left lower leg swelling and weeping of the wounds.  She does have increased redness and pain associated with it as well.  She has tried placing a lidocaine patch over the area to help with the pain.  On examination there are positive findings of severe swelling over the calf with significant swelling and weeping of the wound onto the floor.  Given severity of condition there is concern for underlying infection, DVT, deeper infection.  She advised to go to the emergency department immediately.  Patient and her  will plan to present to [facility] from clinic today.  Signout given to ER provider in anticipation of this.        Angel Sheldon MD  [facility] SP[facility] MEDIC[facility]    -----  Chief Complaint   Patient presents with    Left Lower Leg - Pain       SUBJECTIVE  Kimberly Chau is a/an 82 year old female who is self referred WALK IN for evaluation of left lower leg.     The patient is seen with their .      Onset: several month(s) ago. Reports insidious onset without acute precipitating event. worsening over the last few days.   Location of Pain: left lower leg  Worsened by: increased activity   Better with: rest   Treatments tried: lidocaine patch    Associated symptoms: swelling and redness     Orthopedic/Surgical history: YES - Chronic   Social History/Occupation: lives with     No family history pertinent to patient's problem today.     REVIEW OF SYSTEMS:  Review of Systems  Constitutional, HEENT, cardiovascular, pulmonary, gi and gu systems are negative,  except as otherwise noted.    OBJECTIVE:  /75   Pulse 112   Ht 1.524 m (5')   BMI 22.97 kg/m     General: healthy, alert and in no distress  HEENT: no scleral icterus or conjunctival erythema  Skin: no suspicious lesions or rash. No jaundice.  CV: distal perfusion intact    Resp: normal respiratory effort without conversational dyspnea   Psych: normal mood and affect  Gait: in wheelchair  Neuro: Normal light sensory exam of left lower extremity      Ortho Exam   LEFT ANKLE  Inspection: Significant left calf swelling with erythema  Palpation:  Significant TTP over the calf   Range of Motion:   Limited 2/2 pain  Strength:     RADIOLOGY:   No new imaging    Reviewed previous podiatry notes    Disclaimer: This note consists of symbols derived from keyboarding, dictation and/or voice recognition software. As a result, there may be errors in the script that have gone undetected. Please consider this when interpreting information found in this chart.

## 2023-08-25 NOTE — LETTER
8/25/2023         RE: Kimberly Chau  [address] [place] [address]        Dear Colleague,    Thank you for referring your patient, Kimberly Chau, to the [facility] SPORTS MEDICINE [facility]. Please see a copy of my visit note below.    ASSESSMENT & PLAN    Kimberly was seen today for pain.    Diagnoses and all orders for this visit:    Left leg swelling      This issue is acute and Worsening.    # Left Lower Leg Swelling: Kimberly Chau is an 82-year-old female with a history of venous stasis over the left leg with notable ulcer last treated on 3/23 presenting with several month history of gradual worsening in a few day history of acute worsening left lower leg swelling and weeping of the wounds.  She does have increased redness and pain associated with it as well.  She has tried placing a lidocaine patch over the area to help with the pain.  On examination there are positive findings of severe swelling over the calf with significant swelling and weeping of the wound onto the floor.  Given severity of condition there is concern for underlying infection, DVT, deeper infection.  She advised to go to the emergency department immediately.  Patient and her  will plan to present to [place] ER from clinic today.  Signout given to ER provider in anticipation of this.        Angel Sheldon MD  [facility] SPORTS [facility]    -----  Chief Complaint   Patient presents with     Left Lower Leg - Pain       SUBJECTIVE  Kimberly Chau is a/an 82 year old female who is self referred WALK IN for evaluation of left lower leg.     The patient is seen with their .      Onset: several month(s) ago. Reports insidious onset without acute precipitating event. worsening over the last few days.   Location of Pain: left lower leg  Worsened by: increased activity   Better with: rest   Treatments tried: lidocaine patch    Associated symptoms: swelling and redness      Orthopedic/Surgical history: YES - Chronic   Social History/Occupation: lives with     No family history pertinent to patient's problem today.     REVIEW OF SYSTEMS:  Review of Systems  Constitutional, HEENT, cardiovascular, pulmonary, gi and gu systems are negative, except as otherwise noted.    OBJECTIVE:  /75   Pulse 112   Ht 1.524 m (5')   BMI 22.97 kg/m     General: healthy, alert and in no distress  HEENT: no scleral icterus or conjunctival erythema  Skin: no suspicious lesions or rash. No jaundice.  CV: distal perfusion intact    Resp: normal respiratory effort without conversational dyspnea   Psych: normal mood and affect  Gait: in wheelchair  Neuro: Normal light sensory exam of left lower extremity      Ortho Exam   LEFT ANKLE  Inspection: Significant left calf swelling with erythema  Palpation:  Significant TTP over the calf   Range of Motion:   Limited 2/2 pain  Strength:     RADIOLOGY:   No new imaging    Reviewed previous podiatry notes    Disclaimer: This note consists of symbols derived from keyboarding, dictation and/or voice recognition software. As a result, there may be errors in the script that have gone undetected. Please consider this when interpreting information found in this chart.      Again, thank you for allowing me to participate in the care of your patient.        Sincerely,        Angel Sheldon MD

## 2023-08-25 NOTE — TELEPHONE ENCOUNTER
[facility] Call Center    Phone Message    May a detailed message be left on voicemail: yes     Reason for Call: Other: imaging calling stating ultrasound renal orders are placed incorrectly to schedule at the [facility]. Orders needs to be placed for a Renal Arterial Ultrasound Duplex Bilateral. Please place corrected orders as patient wants to schedule as soon as possible and will be called to confirm. Thank you.      Action Taken: Message routed to:  Cl[facility]ics [facility] Surgery Center ([facility]): [facility]    Travel Screening: Not Applicable

## 2023-09-06 NOTE — TELEPHONE ENCOUNTER
[facility] Call Center    Phone Message    May a detailed message be left on voicemail: yes     Reason for Call: The patient called to reschedule appointment for today. Next available with Dr. Orourke is 3/15/21. The patient is wondering if he has anything sooner? Please advise. Thank you.    Action Taken: Message routed to:  Adult Clinics: Dermatology p [id number]    Travel Screening: Not Applicable                                                                       No

## 2023-09-19 ENCOUNTER — TELEPHONE (OUTPATIENT)
Dept: DERMATOLOGY | Facility: CLINIC | Age: 83
End: 2023-09-19
Payer: MEDICARE

## 2023-09-19 NOTE — TELEPHONE ENCOUNTER
Left msg on home phone for a call back. Did not see consent to communicate on file. Asked for a call back.

## 2023-09-19 NOTE — TELEPHONE ENCOUNTER
[facility] Call Center    Phone Message    May a detailed message be left on voicemail: yes     Reason for Call: Other: Pt's  Wilder rescheduled the 9/26/23 visit to 4/4/24 - next first available - because Pt was hospitalized for the foot infection that Dr. Cummins was seeing her for.     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     Wilder would like a call back to discuss if she should be seen sooner. Please call Wilder at [phone] first and if no answer please call his cell at [phone]. Thank you.     Action Taken: Message routed to:  [facility] ([facility]): Derm    Travel Screening: Not Applicable

## 2023-10-08 DIAGNOSIS — I10 BENIGN ESSENTIAL HYPERTENSION: ICD-10-CM

## 2023-10-10 RX ORDER — CARVEDILOL 12.5 MG/1
12.5 TABLET ORAL 2 TIMES DAILY WITH MEALS
Qty: 180 TABLET | Refills: 0 | Status: SHIPPED | OUTPATIENT
Start: 2023-10-10

## 2023-10-10 NOTE — TELEPHONE ENCOUNTER
Name from pharmacy: Carvedilol Oral Tablet 12.5 MG          Will file in chart as: carvedilol (COREG) 12.5 MG tablet    Sig: Take 1 tablet (12.5 mg) by mouth 2 times daily (with meals).     Last Written Prescription Date:  7/13/23  Last Fill Quantity: 180,   # refills: 0  Last Office Visit : 6/21/2023   Future Office visit:  none    Routing refill request to provider for review/approval because:      BP Readings from Last 3 Encounters:   08/25/23 131/75   06/21/23 (!) 162/80   07/11/22 (!) 146/78

## 2023-11-07 ENCOUNTER — TELEPHONE (OUTPATIENT)
Dept: INTERNAL MEDICINE | Facility: CLINIC | Age: 83
End: 2023-11-07
Payer: MEDICARE

## 2023-11-07 ENCOUNTER — MEDICAL CORRESPONDENCE (OUTPATIENT)
Dept: HEALTH INFORMATION MANAGEMENT | Facility: CLINIC | Age: 83
End: 2023-11-07

## 2023-11-07 NOTE — TELEPHONE ENCOUNTER
[facility] Call Center    Phone Message    May a detailed message be left on voicemail: yes     Reason for Call: Order(s): Home Care Orders: Skilled Nursing: Orders needed skilled nursing home care 2x a week for 6 weeks, for wound care for ulcers to right lower leg.     Action Taken: Message routed to:  [facility] ([facility]): pcc    Travel Screening: Not Applicable

## 2023-11-07 NOTE — TELEPHONE ENCOUNTER
Left  for Ela for verbal orders for the following: Home Care Orders: Skilled Nursing: Orders needed skilled nursing home care 2x a week for 6 weeks, for wound care for ulcers to right lower leg.  Darling CASTAÑEDA LPN  [facility] Primary Care [facility]

## 2023-11-10 ENCOUNTER — TELEPHONE (OUTPATIENT)
Dept: INTERNAL MEDICINE | Facility: CLINIC | Age: 83
End: 2023-11-10
Payer: MEDICARE

## 2023-11-10 NOTE — TELEPHONE ENCOUNTER
Called Mary. Gave verbal orders per Dr. Castellano for  PT start date of 11/10/23:   1x week for 9 weeks. For strengthening and balance.       Kody Roberts CMA ([facility]) at 2:40 PM on 11/10/2023

## 2023-11-10 NOTE — TELEPHONE ENCOUNTER
[facility] Call Center    Phone Message    May a detailed message be left on voicemail: yes     Reason for Call: PT start date of 11/10/23:   1x week for 9 weeks. For strengthening and balance.    Action Taken: Message routed to:  [facility] ([facility]): [facility]    Travel Screening: Not Applicable

## 2023-11-13 ENCOUNTER — TELEPHONE (OUTPATIENT)
Dept: ORTHOPEDICS | Facility: CLINIC | Age: 83
End: 2023-11-13
Payer: MEDICARE

## 2023-11-16 ENCOUNTER — DOCUMENTATION ONLY (OUTPATIENT)
Dept: INTERNAL MEDICINE | Facility: CLINIC | Age: 83
End: 2023-11-16
Payer: MEDICARE

## 2023-11-16 NOTE — PROGRESS NOTES
Type of Form Received:     Form Received (Date) 11/16/23   Form Filled out Yes 11/20/23   Placed in provider folder Yes

## 2023-11-17 ENCOUNTER — TELEPHONE (OUTPATIENT)
Dept: INTERNAL MEDICINE | Facility: CLINIC | Age: 83
End: 2023-11-17
Payer: MEDICARE

## 2023-11-17 DIAGNOSIS — Z53.9 DIAGNOSIS NOT YET DEFINED: Primary | ICD-10-CM

## 2023-11-17 PROCEDURE — G0180 MD CERTIFICATION HHA PATIENT: HCPCS | Performed by: INTERNAL MEDICINE

## 2023-11-17 NOTE — TELEPHONE ENCOUNTER
[facility]    Phone Message    May a detailed message be left on voicemail: yes     Reason for Call: Order(s): Home Care Orders: Occupational Therapy (OT): Needs verbal orders for for 1 week for 4 weeks for ADL retraining, strengthening and falls prevention.    Action Taken: Message routed to:  [facility]    Travel Screening: Not Applicable

## 2023-11-17 NOTE — TELEPHONE ENCOUNTER
[facility] Call [facility]    Phone Message    May a detailed message be left on voicemail: yes     Reason for Call: Medication Question or concern regarding medication   Prescription Clarification  Name of Medication: Surosemide 40 mg 1Xdaily  Prescribing Provider: Dr Jeff Duncan (in-patient [facility])   Pharmacy: [facility][id number] - [facility] [address]  P: [phone]  F: [phone]   What on the order needs clarification? Would like to know if patient should continue on medication .  Patient has no increase weight gain , swelling or SOB      Action Taken: Other: pcc    Travel Screening: Not Applicable

## 2023-11-17 NOTE — TELEPHONE ENCOUNTER
Dear Lee;    (1) Please see if we can get her current list of medications. Really have to review what's been going on, labs, notes, etc. To decide on medications.     (2) She should keep her 11/27/2023 with RADHA Infante   (1) assistive equipment (3) assistive equipment and person

## 2023-11-17 NOTE — TELEPHONE ENCOUNTER
Kena from [facility] called and stated the patient was discharged from a TCU with that medication and does not have any more, she has been without for 2 weeks, can a new order be placed for her to have more until her appt with you?  Please call if questions.  The patient wasn't having any symptoms - no weight gain, swelling or SOB.  Shall the patient still take this medication or stay off until her next appt with you?      [facility][id number] - [place], [place] - [address]

## 2023-11-17 NOTE — TELEPHONE ENCOUNTER
Called Kena prieto and lvm- please continue on medications until we see patient for follow up end of the month.     Lee Dash RN on 11/17/2023 at 1:12 PM

## 2023-11-20 NOTE — PROGRESS NOTES
[facility]  Sports Medicine Clinic  [facility]           SUBJECTIVE       Kimberly Chau is a 80 year old female with a PMH of osteoporosis presenting to clinic today for repeat evenity injection.     Last DEXA 4/5/2021: T score of -2.7 at right femoral neck.  Lab work significant for slight anemia at 10.8 on iron supplementation through PCP.   Tolerated her first 2 Evenity injections well.    Continues to take vitamin D supplementation 2000IU. Not taking calcium supplementation. Eats cheese daily in her eggs and sometimes some additional, not sure on the quantity. Is planning to start incorporating sardines/anchovies into her diet.     PMH, Medications and Allergies were reviewed and updated as needed.    ROS:  As noted above otherwise negative.    Patient Active Problem List   Diagnosis     Intrinsic atopic dermatitis     Essential hypertension     Rheumatoid arthritis (H)     Retinal artery occlusion     Cervical vertebral fracture (H)     Central retinal artery occlusion of right eye     Bilateral occipital neuralgia     C1-C2 instability     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 factor (H)     Osteoarthritis     Malignant hypertension     Hyponatremia     GERD (gastroesophageal reflux disease)     Eczematous dermatitis     Anxiety state     Anemia     Closed fracture of distal end of left radius with delayed healing, unspecified fracture morphology, subsequent encounter     Closed fracture of distal end of left radius, unspecified fracture morphology, sequela     Osteomyelitis of left leg (H)       Current Outpatient Medications   Medication Sig Dispense Refill     acetaminophen 650 MG TABS Take 650 mg by mouth every 4 hours as needed. 100 tablet 0     amLODIPine (NORVASC) 10 MG tablet Take 1 tablet (10 mg) by mouth daily (Patient not taking: Reported on 1/11/2021) 90 tablet 0     COMPRESSION STOCKINGS Please measure and distribute 1 pair of 30mmHg -  "40mmHg knee high ULCERCARE stockings (Patient not taking: Reported on 3/17/2021) 2 each 4     COMPRESSION STOCKINGS Please measure and distribute 1 pair of 20mmHg - 30mmHg Thigh high open or closed toe compression stockings. Jobst ultrasheer or equivalent. (Patient not taking: Reported on 3/17/2021) 2 each 4     COMPRESSION STOCKINGS Please measure and distribute 1 pair of 20mmHg - 30mmHg knee high open or closed toe compression stockings. Jobst ultrasheer or equivalent. 2 each 4     Diphenhydramine-APAP, sleep, (TYLENOL PM EXTRA STRENGTH PO) Take 2 tablets by mouth At Bedtime        ferrous gluconate (FERGON) 324 (38 Fe) MG tablet Take 324 mg by mouth       Gauze Pads & Dressings (TELFA NON-ADHERENT) 3\"X4\" PADS Cut to size for open areas on the lower extremities and secure with paper tape. Change dressing every other day. 30 each 11     metoprolol tartrate (LOPRESSOR) 100 MG tablet Take 2 tablets (200 mg) by mouth 2 times daily 360 tablet 1     Multiple Vitamin (MULTI-VITAMIN) per tablet Take 1 tablet by mouth every morning        mupirocin (BACTROBAN) 2 % external ointment Apply to open areas with dressing changes (Patient not taking: Reported on 2/22/2021) 60 g 11     Omeprazole (PRILOSEC PO) Take 20 mg by mouth as needed        sodium chloride (OCEAN NASAL SPRAY) 0.65 % nasal spray Spray 1 spray into both nostrils daily as needed for congestion 1 Bottle 3     traMADol (ULTRAM) 50 MG tablet Take 1-2 tablets ( mg) by mouth every 6 hours as needed for severe pain (Patient not taking: Reported on 3/17/2021) 120 tablet 0     triamcinolone (KENALOG) 0.1 % external cream Apply twice daily for 2 weeks then 3 times weekly for 2 week, repeat cycle as needed (Patient not taking: Reported on 3/11/2021) 454 g 0            OBJECTIVE:       Vitals: There were no vitals filed for this visit.  BMI: There is no height or weight on file to calculate BMI.    Gen:  Well nourished and in no acute distress  HEENT: Extraocular " movement intact  Neck: Supple  Pulm:  Breathing Comfortably. No increased respiratory effort.  Psych: Euthymic. Appropriately answers questions            ASSESSMENT and PLAN:     Diagnoses and all orders for this visit:    Age-related osteoporosis with current pathological fracture with routine healing, subsequent encounter    Compression fracture of body of thoracic vertebra (H)    Tolerated previous injections well, will plan to continue with monthly injections.  - Evenity 3/12 shots given today  - discussed importance of adequate calcium intake for evenity to be effective, reviewed sources of calcium which were put into patient instructions  - continue 2000 international unit(s) vitamin D daily  - follow up in 1 month or repeat evenity    Evenity Injection # 3 of 12    The risks and benefits of the medication were re-reviewed and she agreed to proceed.  Prepackage Evenity was obtained from the [facility].  The skin was prepped with an alcohol swab. Two syringes prefilled with Evenity solution (105 mg per syringe) were injected subcutaneously into the Right and Left Thigh.  Bandaids were placed.  The patient tolerated the injections well. She did not experience any adverse effects.        Options for treatment and/or follow-up care were reviewed with the patient was actively involved in the decision making process. Patient verbalized understanding and was in agreement with the plan.    The patient was seen by and discussed with Dr.Suzanne GARRY Collazo MD, CAQ, CCD    Abigail Lugo MD  Primary Care Sports Medicine Fellow       Ruchi Mak(Attending)

## 2023-11-20 NOTE — TELEPHONE ENCOUNTER
Called Kerri and left a secure VM. Gave verbal orders per Dr. Castellano for Order(s): Home Care Orders: Occupational Therapy (OT): 1 times per week for 4 weeks for ADL retraining, strengthening and falls prevention.         Kody Roberts CMA ([facility]) at 8:59 AM on 11/20/2023

## 2023-11-21 ENCOUNTER — DOCUMENTATION ONLY (OUTPATIENT)
Dept: INTERNAL MEDICINE | Facility: CLINIC | Age: 83
End: 2023-11-21
Payer: MEDICARE

## 2023-11-21 DIAGNOSIS — N17.9 ACUTE RENAL INJURY (H): Primary | ICD-10-CM

## 2023-11-21 NOTE — PROGRESS NOTES
Type of Form Received:     Form Received (Date) 11/21/23   Form Filled out Yes, faxed 11/27/23   Placed in provider folder Yes

## 2023-11-22 ENCOUNTER — DOCUMENTATION ONLY (OUTPATIENT)
Dept: INTERNAL MEDICINE | Facility: CLINIC | Age: 83
End: 2023-11-22
Payer: MEDICARE

## 2023-11-22 DIAGNOSIS — T86.19 ACUTE TUBULAR INJURY OF TRANSPLANTED KIDNEY (H): Primary | ICD-10-CM

## 2023-11-22 DIAGNOSIS — N17.9 ACUTE KIDNEY INJURY (NONTRAUMATIC) (H): ICD-10-CM

## 2023-11-22 DIAGNOSIS — N17.9 ACUTE TUBULAR INJURY OF TRANSPLANTED KIDNEY (H): Primary | ICD-10-CM

## 2023-11-22 NOTE — PROGRESS NOTES
Type of Form Received:     Form Received (Date) 11/22/23   Form Filled out Yes, faxed 11/27/23   Placed in provider folder Yes

## 2023-11-23 ENCOUNTER — MEDICAL CORRESPONDENCE (OUTPATIENT)
Dept: HEALTH INFORMATION MANAGEMENT | Facility: CLINIC | Age: 83
End: 2023-11-23
Payer: MEDICARE

## 2023-11-27 ENCOUNTER — OFFICE VISIT (OUTPATIENT)
Dept: INTERNAL MEDICINE | Facility: CLINIC | Age: 83
End: 2023-11-27
Payer: MEDICARE

## 2023-11-27 VITALS
OXYGEN SATURATION: 99 % | HEART RATE: 88 BPM | BODY MASS INDEX: 19.73 KG/M2 | SYSTOLIC BLOOD PRESSURE: 151 MMHG | WEIGHT: 101 LBS | DIASTOLIC BLOOD PRESSURE: 88 MMHG

## 2023-11-27 DIAGNOSIS — I10 BENIGN ESSENTIAL HYPERTENSION: Primary | ICD-10-CM

## 2023-11-27 PROCEDURE — 99214 OFFICE O/P EST MOD 30 MIN: CPT | Performed by: INTERNAL MEDICINE

## 2023-11-27 RX ORDER — LOSARTAN POTASSIUM 25 MG/1
25 TABLET ORAL AT BEDTIME
Qty: 90 TABLET | Refills: 3 | Status: SHIPPED | OUTPATIENT
Start: 2023-11-27

## 2023-11-27 RX ORDER — MULTIPLE VITAMINS W/ MINERALS TAB 9MG-400MCG
1 TAB ORAL EVERY MORNING
Qty: 90 TABLET | Refills: 11 | Status: SHIPPED | OUTPATIENT
Start: 2023-11-27

## 2023-11-27 RX ORDER — CHOLECALCIFEROL (VITAMIN D3) 50 MCG
1 TABLET ORAL DAILY
COMMUNITY

## 2023-11-27 NOTE — PROGRESS NOTES
Kimberly is a 83 year old that presents in clinic today for the following:     Chief Complaint   Patient presents with    Hospital F/U           11/27/2023     2:55 PM   Additional Questions   Roomed by YW   Accompanied by , Wilder       Screenings from encounters over the past 10 days    No data recorded       JOSELO Juan at 3:06 PM on 11/27/2023

## 2023-11-27 NOTE — PROGRESS NOTES
HPI  83-year-old sensei with her  for hospital follow-up.  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  She is now off the dialysis kidney function is being followed by nephrology every 2 weeks.  She presents today because of blood pressure elevation.  He has been monitored by a home nurse and her blood pressure is running high at home.  Similar to what we see today gets systolics in the 140s to 150s diastolics in the 90s.  She is taking amlodipine 5 mg daily she is apparently intolerant of hydrochlorothiazide related hyponatremia and lisinopril as well.  She has not been tried on losartan we discussed adding this at bedtime.  She also continues to have some fluid oozing from a sore on her left leg.  She has 1+ pretibial edema here and is apparently consuming a fair amount of salt.  She has not been using any counterpressure.  Past Medical History:   Diagnosis Date    Anemia     Arthritis     Cataracts     Central retinal artery occlusion     Cervical spine fracture (H)     After a fall from orthostatic sx    Chronic pain     Back of head    Gastro-oesophageal reflux disease     Head injury     Hypertension     Hyponatremia     Resolved, 2/2 diuretics    Migraines     Osteoporosis     Pneumonia 2018    Rheumatoid arthritis(714.0)      Past Surgical History:   Procedure Laterality Date    COLONOSCOPY      EYE SURGERY Left 02/2019    Hole in macula    FUSION CERVICAL POSTERIOR THREE+ LEVELS  1/22/2013    Procedure: FUSION CERVICAL POSTERIOR THREE+ LEVELS;  Cervical 1-6 Posterior Instrumentation and Fusion, Cervical 3-4 Laminectomy  .Instrumentation and fusion to Cervical 6 *Latex Allergy*;  Surgeon: Ra Bar MD;  Location: [facility] OR    GYN SURGERY      HEAD & NECK SURGERY      HYSTERECTOMY      IR ENDOVENOUS ABLATION VARICOSE VEINS  2/16/2021    KNEE SURGERY      NECK  SURGERY      OPEN REDUCTION INTERNAL FIXATION WRIST Left 4/30/2019    Procedure: Open Reduction Internal Fixation Left Distal Radius Fracture;  Surgeon: Dinh Strong MD;  Location: [facility] OR    OPEN REDUCTION INTERNAL FIXATION WRIST Left 6/12/2020    Procedure: OPEN REDUCTION INTERNAL FIXATION Left Distal Radius Nonunion, Distal Ulna Resection;  Surgeon: Dinh Strong MD;  Location: [facility] OR    OPTICAL TRACKING SYSTEM ENDOSCOPIC SINUS SURGERY Right 4/6/2018    Procedure: OPTICAL TRACKING SYSTEM ENDOSCOPIC SINUS SURGERY;  Stealth Assisted Right Maxillary Antrostomy, Anterior Ethmoidectomy And Frontal Sinusotomy;  Surgeon: Elyse Eisenberg MD;  Location:  OR    OPTICAL TRACKING SYSTEM ENDOSCOPIC SINUS SURGERY Right 8/3/2018    Procedure: OPTICAL TRACKING SYSTEM ENDOSCOPIC SINUS SURGERY;  Stealth Assisted Revision Right Frontal Sinusotomy, Right Stent Placement  **Latex Allergy** ;  Surgeon: Elyse Eisenberg MD;  Location:  OR    ORTHOPEDIC SURGERY      REMOVE HARDWARE WRIST Left 11/19/2019    Procedure: Left Distal Radius Hardware Removal, Flexor Pollicus Longus Repair, Deep Cultures;  Surgeon: Dinh Strong MD;  Location: [facility] OR    ZZC HAND/FINGER SURGERY UNLISTED      ZZ PELVIS/HIP JOINT SURGERY UNLISTED       Family History   Problem Relation Age of Onset    Arthritis Mother     Respiratory Mother         pulmonary fibrosis    Hypertension Mother     Anemia Mother     Liver Disease Father         from EtOH    Alcoholism Father     No Known Problems Maternal Grandmother     Heart Disease Maternal Grandfather     Glaucoma Paternal Grandfather     Heart Disease Paternal Grandfather     Multiple Sclerosis Sister     Breast Cancer Sister     Skin Cancer No family hx of     Melanoma No family hx of          Exam:  BP (!) 152/91 (BP Location: Right arm, Patient Position: Sitting, Cuff Size: Adult Small)   Pulse 88   Wt 45.8 kg (101 lb)   SpO2 99%   BMI 19.73 kg/m    101 lbs 0 oz  The patient is  alert, oriented with a clear sensorium.   Skin shows no lesions or rashes and good turgor.   Head is normocephalic and atraumatic.    Lungs are clear.   Heart shows normal S1 and S2 without murmur or gallop.    Extremities show 1+ bilateral pretibial edema.  Sterile bandages been applied to the lower left leg at the site of the ulceration.      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  She will try and keep the feet elevated when she is off of them and use support stockings which she has at home.  Will start losartan 25 mg at bedtime and she will continue to monitor the renal function every 2 weeks.  Have her follow-up as previously scheduled      Over 30 minutes spent on the day of service in chart review, patient contact, record completion and review and notification of lab reports    This note was completed using Dragon voice recognition software.      Musa Roe MD  General Internal Medicine  Primary Care Center  [phone]

## 2023-11-30 ENCOUNTER — DOCUMENTATION ONLY (OUTPATIENT)
Dept: INTERNAL MEDICINE | Facility: CLINIC | Age: 83
End: 2023-11-30
Payer: MEDICARE

## 2023-11-30 NOTE — PROGRESS NOTES
Type of Form Received:     Form Received (Date) 11/29/23   Form Filled out Yes, faxed 12/5   Placed in provider folder Yes

## 2023-12-01 ENCOUNTER — MEDICAL CORRESPONDENCE (OUTPATIENT)
Dept: HEALTH INFORMATION MANAGEMENT | Facility: CLINIC | Age: 83
End: 2023-12-01
Payer: MEDICARE

## 2023-12-05 ENCOUNTER — TELEPHONE (OUTPATIENT)
Dept: NEPHROLOGY | Facility: CLINIC | Age: 83
End: 2023-12-05
Payer: MEDICARE

## 2023-12-05 DIAGNOSIS — N18.9 CHRONIC KIDNEY DISEASE: Primary | ICD-10-CM

## 2023-12-05 NOTE — TELEPHONE ENCOUNTER
Returned call and spoke with pt. Discussed need for a non fasting lab appointment, both blood and urine collection prior to seeing Dr. Aguirre on 12/19/23 at 1:00pm In Clinic.  Pt does have a lab appt at 12:10pm.  Pt stated she is transferring care from [facility] and will follow with them until appt with Dr. Aguirre.    Records in [facility].    Pt had no further questions.    Silvia Franklin LPN

## 2023-12-05 NOTE — TELEPHONE ENCOUNTER
[facility]    Phone Message    May a detailed message be left on voicemail: yes     Reason for Call: Other: The patient's spouse called to see if the patient could do all lab orders in just one visit.  Right now there are multiple lab visits scheduled for orders from the same Provider.  The patient's spouse also stated the patient should have lab orders from Dr. Aguirre.  Requesting lab orders be placed if they are needed for the Dr. Aguirre visit. This writer tried to explain that the lab could most likely satisfy all lab order from multiple Providers in one visit but there still seemed to be a little confusion.  Requesting the Care Team call the patient's spouse to help clarify further.  Thank you.      Action Taken: Message routed to:  Adult Clinics: Nephrology p [id number]    Travel Screening: Not Applicable

## 2023-12-06 ENCOUNTER — LAB (OUTPATIENT)
Dept: LAB | Facility: CLINIC | Age: 83
End: 2023-12-06
Payer: MEDICARE

## 2023-12-06 ENCOUNTER — MEDICAL CORRESPONDENCE (OUTPATIENT)
Dept: HEALTH INFORMATION MANAGEMENT | Facility: CLINIC | Age: 83
End: 2023-12-06

## 2023-12-06 DIAGNOSIS — N18.9 CHRONIC KIDNEY DISEASE: ICD-10-CM

## 2023-12-06 LAB
ALBUMIN MFR UR ELPH: 17 MG/DL
ALBUMIN SERPL BCG-MCNC: 4.1 G/DL (ref 3.5–5.2)
ALBUMIN UR-MCNC: 10 MG/DL
ANION GAP SERPL CALCULATED.3IONS-SCNC: 13 MMOL/L (ref 7–15)
APPEARANCE UR: CLEAR
BACTERIA #/AREA URNS HPF: ABNORMAL /HPF
BILIRUB UR QL STRIP: NEGATIVE
BUN SERPL-MCNC: 38.1 MG/DL (ref 8–23)
CALCIUM SERPL-MCNC: 9.9 MG/DL (ref 8.8–10.2)
CHLORIDE SERPL-SCNC: 98 MMOL/L (ref 98–107)
COLOR UR AUTO: ABNORMAL
CREAT SERPL-MCNC: 2.17 MG/DL (ref 0.51–0.95)
CREAT UR-MCNC: 43.7 MG/DL
CREAT UR-MCNC: 45 MG/DL
DEPRECATED HCO3 PLAS-SCNC: 22 MMOL/L (ref 22–29)
EGFRCR SERPLBLD CKD-EPI 2021: 22 ML/MIN/1.73M2
ERYTHROCYTE [DISTWIDTH] IN BLOOD BY AUTOMATED COUNT: 15.7 % (ref 10–15)
GLUCOSE SERPL-MCNC: 99 MG/DL (ref 70–99)
GLUCOSE UR STRIP-MCNC: NEGATIVE MG/DL
HCT VFR BLD AUTO: 30.5 % (ref 35–47)
HGB BLD-MCNC: 9.4 G/DL (ref 11.7–15.7)
HGB UR QL STRIP: ABNORMAL
KETONES UR STRIP-MCNC: NEGATIVE MG/DL
LEUKOCYTE ESTERASE UR QL STRIP: ABNORMAL
MCH RBC QN AUTO: 29.6 PG (ref 26.5–33)
MCHC RBC AUTO-ENTMCNC: 30.8 G/DL (ref 31.5–36.5)
MCV RBC AUTO: 96 FL (ref 78–100)
MICROALBUMIN UR-MCNC: 34.4 MG/L
MICROALBUMIN/CREAT UR: 78.72 MG/G CR (ref 0–25)
NITRATE UR QL: NEGATIVE
PH UR STRIP: 6.5 [PH] (ref 5–7)
PHOSPHATE SERPL-MCNC: 4.2 MG/DL (ref 2.5–4.5)
PLATELET # BLD AUTO: 417 10E3/UL (ref 150–450)
POTASSIUM SERPL-SCNC: 4.3 MMOL/L (ref 3.4–5.3)
PROT/CREAT 24H UR: 0.38 MG/MG CR (ref 0–0.2)
PTH-INTACT SERPL-MCNC: 20 PG/ML (ref 15–65)
RBC # BLD AUTO: 3.18 10E6/UL (ref 3.8–5.2)
RBC #/AREA URNS AUTO: ABNORMAL /HPF
SKIP: ABNORMAL
SODIUM SERPL-SCNC: 133 MMOL/L (ref 135–145)
SP GR UR STRIP: 1.01 (ref 1–1.03)
SQUAMOUS #/AREA URNS AUTO: ABNORMAL /LPF
UROBILINOGEN UR STRIP-MCNC: NORMAL MG/DL
VIT D+METAB SERPL-MCNC: 75 NG/ML (ref 20–50)
WBC # BLD AUTO: 6.7 10E3/UL (ref 4–11)
WBC #/AREA URNS AUTO: ABNORMAL /HPF
WBC CLUMPS #/AREA URNS HPF: PRESENT /HPF

## 2023-12-06 PROCEDURE — 85027 COMPLETE CBC AUTOMATED: CPT

## 2023-12-06 PROCEDURE — 81001 URINALYSIS AUTO W/SCOPE: CPT

## 2023-12-06 PROCEDURE — 80069 RENAL FUNCTION PANEL: CPT

## 2023-12-06 PROCEDURE — 36415 COLL VENOUS BLD VENIPUNCTURE: CPT

## 2023-12-06 PROCEDURE — 82306 VITAMIN D 25 HYDROXY: CPT

## 2023-12-06 PROCEDURE — 84156 ASSAY OF PROTEIN URINE: CPT

## 2023-12-06 PROCEDURE — 82570 ASSAY OF URINE CREATININE: CPT

## 2023-12-06 PROCEDURE — 82043 UR ALBUMIN QUANTITATIVE: CPT

## 2023-12-06 PROCEDURE — 83970 ASSAY OF PARATHORMONE: CPT

## 2023-12-08 ENCOUNTER — TELEPHONE (OUTPATIENT)
Dept: INTERNAL MEDICINE | Facility: CLINIC | Age: 83
End: 2023-12-08
Payer: MEDICARE

## 2023-12-08 ENCOUNTER — DOCUMENTATION ONLY (OUTPATIENT)
Dept: INTERNAL MEDICINE | Facility: CLINIC | Age: 83
End: 2023-12-08
Payer: MEDICARE

## 2023-12-08 NOTE — PROGRESS NOTES
Type of Form Received:     Form Received (Date) 12/8/23   Form Filled out Yes 12/11/23   Placed in provider folder Yes

## 2023-12-08 NOTE — TELEPHONE ENCOUNTER
[facility] Call Center    Phone Message    May a detailed message be left on voicemail: yes     Reason for Call: Order(s): Home Care Orders: Other: starting next week starting 12/10/23 , 1x a week for the next 2 weeks for balance and strengthen rachelle Sterling from [facility]      Action Taken: Message routed to:  [facility]    Travel Screening: Not Applicable

## 2023-12-11 NOTE — TELEPHONE ENCOUNTER
Spoke with Hallie and gave the following verbal order(s): Home Care Orders: Other: starting next week starting 12/10/23 , 1x a week for the next 2 weeks for balance and strengthen continues.  Darling CASTAÑEDA LPN  [facility]

## 2023-12-15 ENCOUNTER — MEDICAL CORRESPONDENCE (OUTPATIENT)
Dept: HEALTH INFORMATION MANAGEMENT | Facility: CLINIC | Age: 83
End: 2023-12-15
Payer: MEDICARE

## 2023-12-19 ENCOUNTER — OFFICE VISIT (OUTPATIENT)
Dept: NEPHROLOGY | Facility: CLINIC | Age: 83
End: 2023-12-19
Attending: INTERNAL MEDICINE
Payer: MEDICARE

## 2023-12-19 ENCOUNTER — LAB (OUTPATIENT)
Dept: LAB | Facility: CLINIC | Age: 83
End: 2023-12-19
Payer: MEDICARE

## 2023-12-19 VITALS
BODY MASS INDEX: 20.17 KG/M2 | SYSTOLIC BLOOD PRESSURE: 152 MMHG | OXYGEN SATURATION: 98 % | DIASTOLIC BLOOD PRESSURE: 85 MMHG | WEIGHT: 103.3 LBS | HEART RATE: 91 BPM

## 2023-12-19 DIAGNOSIS — D63.1 ANEMIA IN STAGE 4 CHRONIC KIDNEY DISEASE (H): Primary | ICD-10-CM

## 2023-12-19 DIAGNOSIS — N18.1 CKD (CHRONIC KIDNEY DISEASE) STAGE 1, GFR 90 ML/MIN OR GREATER: ICD-10-CM

## 2023-12-19 DIAGNOSIS — D63.1 ANEMIA IN STAGE 4 CHRONIC KIDNEY DISEASE (H): ICD-10-CM

## 2023-12-19 DIAGNOSIS — N17.9 ACUTE RENAL INJURY (H): ICD-10-CM

## 2023-12-19 DIAGNOSIS — N18.4 ANEMIA IN STAGE 4 CHRONIC KIDNEY DISEASE (H): Primary | ICD-10-CM

## 2023-12-19 DIAGNOSIS — N18.4 ANEMIA IN STAGE 4 CHRONIC KIDNEY DISEASE (H): ICD-10-CM

## 2023-12-19 DIAGNOSIS — N18.4 CKD (CHRONIC KIDNEY DISEASE) STAGE 4, GFR 15-29 ML/MIN (H): ICD-10-CM

## 2023-12-19 LAB
ALBUMIN SERPL BCG-MCNC: 4.3 G/DL (ref 3.5–5.2)
ANION GAP SERPL CALCULATED.3IONS-SCNC: 13 MMOL/L (ref 7–15)
BUN SERPL-MCNC: 45.7 MG/DL (ref 8–23)
CALCIUM SERPL-MCNC: 9.6 MG/DL (ref 8.8–10.2)
CHLORIDE SERPL-SCNC: 96 MMOL/L (ref 98–107)
CREAT SERPL-MCNC: 2.06 MG/DL (ref 0.51–0.95)
CYSTATIN C (ROCHE): 2.9 MG/L (ref 0.6–1)
DEPRECATED HCO3 PLAS-SCNC: 23 MMOL/L (ref 22–29)
EGFRCR SERPLBLD CKD-EPI 2021: 23 ML/MIN/1.73M2
FERRITIN SERPL-MCNC: 1312 NG/ML (ref 11–328)
GFR SERPL CREATININE-BSD FRML MDRD: 16 ML/MIN/1.73M2
GLUCOSE SERPL-MCNC: 94 MG/DL (ref 70–99)
IRON BINDING CAPACITY (ROCHE): 258 UG/DL (ref 240–430)
IRON SATN MFR SERPL: 43 % (ref 15–46)
IRON SERPL-MCNC: 111 UG/DL (ref 37–145)
PHOSPHATE SERPL-MCNC: 4.6 MG/DL (ref 2.5–4.5)
POTASSIUM SERPL-SCNC: 5 MMOL/L (ref 3.4–5.3)
SODIUM SERPL-SCNC: 132 MMOL/L (ref 135–145)

## 2023-12-19 PROCEDURE — 36415 COLL VENOUS BLD VENIPUNCTURE: CPT

## 2023-12-19 PROCEDURE — 83540 ASSAY OF IRON: CPT

## 2023-12-19 PROCEDURE — 83550 IRON BINDING TEST: CPT

## 2023-12-19 PROCEDURE — 80069 RENAL FUNCTION PANEL: CPT

## 2023-12-19 PROCEDURE — 99215 OFFICE O/P EST HI 40 MIN: CPT | Performed by: INTERNAL MEDICINE

## 2023-12-19 PROCEDURE — 82728 ASSAY OF FERRITIN: CPT

## 2023-12-19 PROCEDURE — 82610 CYSTATIN C: CPT

## 2023-12-19 NOTE — PATIENT INSTRUCTIONS
It was a pleasure taking care of you today.  I've included a brief summary of our discussion and care plan from today's visit below.  Please review this information with your primary care provider.     My recommendations are summarized as follows:  -Kidney numbers continue to improve  -Please repeat blood work in 1 month and I will see you in clinic in 3 months    Who do I call with any questions after my visit?  Please be in touch if there are any further questions that arise following today's visit.  There are multiple ways to contact your nephrology care team.       During business hours, you may reach your Nephrology Care Team or schedule or reschedule an appointment or lab at [phone].       If you need to schedule imaging, please call [phone].   To schedule a COVID test, please call [phone].     You can always send a secure message through [employer]. [employer] messages are answered by your nurse or doctor typically within 24-48 hours. Please allow extra time on weekends and holidays.       For urgent/emergent questions after business hours, you may reach the on-call Nephrology Fellow by contacting the [facility]  at [phone].     How will I get the results of any tests ordered?    You will receive all of your results.  If you have signed up for [employer], any tests ordered at your visit will be available to you once resulted on [employer]. Typically the physician reviews them and may or may not make further recommendations. If there are urgent results that require a change in your care plan, your physician or nurse will call you to discuss the next steps. If you are not on [employer], a letter may be generated and mailed to you with your results.

## 2023-12-19 NOTE — NURSING NOTE
Kimberly Chau's goals for this visit include:   Chief Complaint   Patient presents with    Consult     Referred by Dr. Castellano  CKD (chronic kidney disease) stage 1, GFR 90 ml/min or greater       She requests these members of her care team be copied on today's visit information: yes     PCP: Guillermo Castellano    Referring Provider:  Guillermo Castellano MD  [address] [address]    BP (!) 152/85 (BP Location: Left arm, Patient Position: Chair, Cuff Size: Adult Regular)   Pulse 91   Wt 46.9 kg (103 lb 4.8 oz)   SpO2 98%   BMI 20.17 kg/m      Do you need any medication refills at today's visit? No       CRISTAL Hull   Neph/Pulm [facility]

## 2023-12-19 NOTE — PROGRESS NOTES
I was asked to see this patient by Guillermo Juarez    CC: CKD    HPI:   Thank you for the referral. I had the pleasure today of seeing Kimberly Chau. She is a 83 year old female  who presents for evaluation of CKD. She is here today with her  of 63 years.    Past medical history is significant for longstanding rheumatoid arthritis [currently on no treatment], osteoporosis complicated by T11 compression fracture, anemia, Hpertension, GERD.     She presented to the emergency department in August 26, 2023 with left lower leg ulcer and cellulitis.  She was found to be in acute renal failure (creatinine 8 mg/dl).  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  The degree of fibrosis on the biopsy was 10 to 20%.  She was urgently started on dialysis and received thrice weekly dialysis for 11 weeks.  She has been off dialysis since November and that she has been feeling well.  She has regained her baseline strength.  Her appetite is good.  Her weight has been stable.  The ulcer in her leg has almost healed.  She is off antibiotics.    She is not checking her BP at home. She has some swelling on the left leg close to the wound but none on the right. No SOB or gross hematuria or other urinary symptoms.    Social history: she is  for 65 years. She has 2 sons and 2 granddaughters and one great granddaughter.     Allergies   Allergen Reactions    Hctz Other (See Comments)     Pt develops symptomatic and significant hyponatremia with HCTZ exposure    Hydrochlorothiazide      Other reaction(s): Hyponatremia  Hyponatremia    Latex     Benzocaine Rash     carba mix,thrium-sunscreen    Ra Lotion Rash     carba mix,thrium-sunscreen    Ace Inhibitors Unknown     Dizziness and orthostatic changes and electrolyte problems.     "Latex Unknown       amLODIPine (NORVASC) 5 MG tablet, Take 1 tablet (5 mg) by mouth daily  amLODIPine (NORVASC) 5 MG tablet, Take 1 tablet (5 mg) by mouth daily  carvedilol (COREG) 12.5 MG tablet, Take 1 tablet (12.5 mg) by mouth 2 times daily (with meals)  cephALEXin (KEFLEX) 500 MG capsule, Take 1 capsule (500 mg) by mouth 2 times daily  COMPRESSION STOCKINGS, Please measure and distribute 1 pair of 20mmHg - 30mmHg knee high open or closed toe compression stockings. Jobst ultrasheer or equivalent.  Diphenhydramine-APAP, sleep, (TYLENOL PM EXTRA STRENGTH PO), Take 2 tablets by mouth At Bedtime   ferrous gluconate (FERGON) 324 (38 Fe) MG tablet, Take 324 mg by mouth  Gauze Pads & Dressings (TELFA NON-ADHERENT) 3\"X4\" PADS, Cut to size for open areas on the lower extremities and secure with paper tape. Change dressing every other day.  losartan (COZAAR) 25 MG tablet, Take 1 tablet (25 mg) by mouth at bedtime  multivitamin w/minerals (MULTI-VITAMIN) tablet, Take 1 tablet by mouth every morning  Omeprazole (PRILOSEC PO), Take 20 mg by mouth as needed   pentoxifylline ER (TRENTAL) 400 MG CR tablet, Take 1 tablet (400 mg) by mouth 3 times daily (with meals)  sodium chloride (OCEAN NASAL SPRAY) 0.65 % nasal spray, Spray 1 spray into both nostrils daily as needed for congestion  triamcinolone (KENALOG) 0.1 % external cream, Apply twice daily for 2 weeks then 3 times weekly for 2 week, repeat cycle as needed. Apply to affected area using an amount sufficient to cover the affected rishi  vitamin D3 (CHOLECALCIFEROL) 50 mcg (2000 units) tablet, Take 1 tablet by mouth daily  acetaminophen 650 MG TABS, Take 650 mg by mouth every 4 hours as needed. (Patient not taking: Reported on 11/27/2023)  denosumab (PROLIA) 60 MG/ML SOSY injection, Inject 1 mL (60 mg) Subcutaneous once for 1 dose    denosumab (PROLIA) injection 60 mg  fluocinonide (LIDEX) 0.05 % ointment  fluocinonide (LIDEX) 0.05 % ointment  mupirocin (BACTROBAN) 2 % " ointment  mupirocin (BACTROBAN) 2 % ointment  romosozumab-aqqg (EVENITY) injection 210 mg  romosozumab-aqqg (EVENITY) injection 210 mg  romosozumab-aqqg (EVENITY) injection 210 mg  romosozumab-aqqg (EVENITY) injection 210 mg  romosozumab-aqqg (EVENITY) injection 210 mg  romosozumab-aqqg (EVENITY) injection 210 mg  romosozumab-aqqg (EVENITY) injection 210 mg      Past Medical History:   Diagnosis Date    Anemia     Arthritis     Cataracts     Central retinal artery occlusion     Cervical spine fracture (H)     After a fall from orthostatic sx    Chronic pain     Back of head    Gastro-oesophageal reflux disease     Head injury     Hypertension     Hyponatremia     Resolved, 2/2 diuretics    Migraines     Osteoporosis     Pneumonia 2018    Rheumatoid arthritis(714.0)        Past Surgical History:   Procedure Laterality Date    COLONOSCOPY      EYE SURGERY Left 02/2019    Hole in macula    FUSION CERVICAL POSTERIOR THREE+ LEVELS  1/22/2013    Procedure: FUSION CERVICAL POSTERIOR THREE+ LEVELS;  Cervical 1-6 Posterior Instrumentation and Fusion, Cervical 3-4 Laminectomy  .Instrumentation and fusion to Cervical 6 *Latex Allergy*;  Surgeon: Ra Bar MD;  Location: [facility] OR    GYN SURGERY      HEAD & NECK SURGERY      HYSTERECTOMY      IR ENDOVENOUS ABLATION VARICOSE VEINS  2/16/2021    KNEE SURGERY      NECK SURGERY      OPEN REDUCTION INTERNAL FIXATION WRIST Left 4/30/2019    Procedure: Open Reduction Internal Fixation Left Distal Radius Fracture;  Surgeon: Dinh Strong MD;  Location:  OR    OPEN REDUCTION INTERNAL FIXATION WRIST Left 6/12/2020    Procedure: OPEN REDUCTION INTERNAL FIXATION Left Distal Radius Nonunion, Distal Ulna Resection;  Surgeon: Dinh Strong MD;  Location: [facility] OR    OPTICAL TRACKING SYSTEM ENDOSCOPIC SINUS SURGERY Right 4/6/2018    Procedure: OPTICAL TRACKING SYSTEM ENDOSCOPIC SINUS SURGERY;  Stealth Assisted Right Maxillary Antrostomy, Anterior Ethmoidectomy And Frontal  Sinusotomy;  Surgeon: Elyse Eisenberg MD;  Location:  OR    OPTICAL TRACKING SYSTEM ENDOSCOPIC SINUS SURGERY Right 8/3/2018    Procedure: OPTICAL TRACKING SYSTEM ENDOSCOPIC SINUS SURGERY;  Stealth Assisted Revision Right Frontal Sinusotomy, Right Stent Placement  **Latex Allergy** ;  Surgeon: Elyse Eisenberg MD;  Location:  OR    ORTHOPEDIC SURGERY      REMOVE HARDWARE WRIST Left 11/19/2019    Procedure: Left Distal Radius Hardware Removal, Flexor Pollicus Longus Repair, Deep Cultures;  Surgeon: Dinh Strong MD;  Location:  OR    [facility] HAND/FINGER SURGERY UNLISTED      [facility] PELVIS/HIP JOINT SURGERY UNLISTED         Social History     Tobacco Use    Smoking status: Never    Smokeless tobacco: Never   Substance Use Topics    Alcohol use: Yes     Comment: 2 glasses of wine a day    Drug use: Never       Family History   Problem Relation Age of Onset    Arthritis Mother     Respiratory Mother         pulmonary fibrosis    Hypertension Mother     Anemia Mother     Liver Disease Father         from EtOH    Alcoholism Father     No Known Problems Maternal Grandmother     Heart Disease Maternal Grandfather     Glaucoma Paternal Grandfather     Heart Disease Paternal Grandfather     Multiple Sclerosis Sister     Breast Cancer Sister     Skin Cancer No family hx of     Melanoma No family hx of        ROS: A 12 system review of systems was negative other than noted here or above.     Exam:  BP (!) 152/85 (BP Location: Left arm, Patient Position: Chair, Cuff Size: Adult Regular)   Pulse 91   Wt 46.9 kg (103 lb 4.8 oz)   SpO2 98%   BMI 20.17 kg/m    BP Readings from Last 6 Encounters:   12/19/23 (!) 152/85   11/27/23 (!) 151/88   08/25/23 131/75   06/21/23 (!) 162/80   07/11/22 (!) 146/78   05/17/22 (!) 175/96     GENERAL APPEARANCE: alert and no distress  EYES: PERRL  HENT: mouth without ulcers or lesions  NECK: supple, no adenopathy  RESP: lungs clear to auscultation - no rales, rhonchi or wheezes  CV: regular  rhythm, normal rate, no rub   ABDOMEN:  soft, nontender, no HSM or masses and bowel sounds normal  MS: extremities normal- no gross deformities noted, no evidence of inflammation in joints, no muscle tenderness  SKIN: no rash  NEURO: Normal strength and tone, sensory exam grossly normal, mentation intact and speech normal  PSYCH: mentation appears normal. and affect normal/bright  She has some swelling on the left leg close to the wound but none on the right.     Results: Reviewed in details with the patient    Assessment/Plan:  Problem #1 acute kidney injury on top of chronic kidney disease: She sustained ANAI secondary to infection associated glomerulonephritis secondary to cellulitis back in August 2023.  She required dialysis for few months and she is currently off for dialysis for more than 1 month.  She received Antibiotics to treat her infection but no immunosuppressants .She was following with Dr. Cota.  Her most recent creatinine today is around 2 mg/dL.  Her biopsy shows 10 to 20% interstitial fibrosis so there is some chance that her creatinine will improve further.  -She and her  were not sure today while she is being seen by 2 kidney doctors [thinking that Dr Cota is the dialysis doctor].  I emphasized to her that she will need to follow-up with nephrology and to choose whatever doctor she wants to follow with.  If she wants to follow-up in my clinic, then she will need lab test done in 1 month with an in person clinic follow-up in 3 months.  --We discussed today the importance of blood pressure control and halting the progression of chronic kidney disease  --She is currently on losartan 25 mg daily.  --She is not on SGLT2 inhibitor and given her advanced age and GFR at this stage, I will hold that for now to be discussed later on if her GFR improves further.  --Given her small muscle mass, I will check a Cystatin C for better assessment of her actual GFR at this stage  -- She is currently  euvolemic on exam and there is no need for diuretics.  She does have localized edema around the wound on her left tibia.    Problem #2 anemia normocytic: She did receive iron and blood transfusion during her hospitalization.  Her hemoglobin today is 9.4 g/dL.  It is below goal of 10g/dl.  - We will check a CBC in 1 month.  If her hemoglobin is still low then we will refer her to the anemia clinic.    Problem #3 CKD MBD: Her calcium and phosphorus are rather within normal limits.  Will check her PTH with next set of labs.    Problem #4 hypertension: Her blood pressure is rather within normal acceptable range given her age.  No changes to her medications were done at this point.  She was instructed to measure her blood pressure at home.    The total time of this encounter amounted to 60 minutes on the day of the encounter 12/19/2023. This time included face to face time spent with the patient, preparatory work and chart review, ordering tests, and performing post visit documentation.    *This dictation was prepared in part using Dragon recognition software.  As a result errors may occur. When identified these transcription errors have been corrected.  While every attempt is made to correct errors during dictation, errors may still exist.     Osman Aguirre MD   [facility]   Department of Medicine   Division of Renal Disease and Hypertension

## 2023-12-21 ENCOUNTER — DOCUMENTATION ONLY (OUTPATIENT)
Dept: INTERNAL MEDICINE | Facility: CLINIC | Age: 83
End: 2023-12-21
Payer: MEDICARE

## 2023-12-21 NOTE — PROGRESS NOTES
Type of Form Received:     Form Received (Date) 12/21/23   Form Filled out Yes 12/26/23   Placed in provider folder Yes

## 2023-12-25 ENCOUNTER — MEDICAL CORRESPONDENCE (OUTPATIENT)
Dept: HEALTH INFORMATION MANAGEMENT | Facility: CLINIC | Age: 83
End: 2023-12-25
Payer: MEDICARE

## 2024-01-03 ENCOUNTER — LAB (OUTPATIENT)
Dept: LAB | Facility: CLINIC | Age: 84
End: 2024-01-03
Payer: MEDICARE

## 2024-01-03 DIAGNOSIS — N17.9 ACUTE KIDNEY INJURY (NONTRAUMATIC) (H): ICD-10-CM

## 2024-01-03 LAB
ALBUMIN SERPL BCG-MCNC: 4.7 G/DL (ref 3.5–5.2)
ANION GAP SERPL CALCULATED.3IONS-SCNC: 11 MMOL/L (ref 7–15)
BUN SERPL-MCNC: 41.3 MG/DL (ref 8–23)
CALCIUM SERPL-MCNC: 10.5 MG/DL (ref 8.8–10.2)
CHLORIDE SERPL-SCNC: 102 MMOL/L (ref 98–107)
CREAT SERPL-MCNC: 1.95 MG/DL (ref 0.51–0.95)
DEPRECATED HCO3 PLAS-SCNC: 19 MMOL/L (ref 22–29)
EGFRCR SERPLBLD CKD-EPI 2021: 25 ML/MIN/1.73M2
GLUCOSE SERPL-MCNC: 94 MG/DL (ref 70–99)
PHOSPHATE SERPL-MCNC: 4.2 MG/DL (ref 2.5–4.5)
POTASSIUM SERPL-SCNC: 4.5 MMOL/L (ref 3.4–5.3)
SODIUM SERPL-SCNC: 132 MMOL/L (ref 135–145)

## 2024-01-03 PROCEDURE — 36415 COLL VENOUS BLD VENIPUNCTURE: CPT

## 2024-01-03 PROCEDURE — 80069 RENAL FUNCTION PANEL: CPT

## 2024-01-04 ENCOUNTER — TELEPHONE (OUTPATIENT)
Dept: INTERNAL MEDICINE | Facility: CLINIC | Age: 84
End: 2024-01-04
Payer: MEDICARE

## 2024-01-04 NOTE — TELEPHONE ENCOUNTER
[facility] Call Center    Phone Message    May a detailed message be left on voicemail: yes     Reason for Call: Order(s): Home Care Orders: Skilled Nursing:     Kena was calling to get verbal order for a visits that's needed for today     SN: 1x a week for 1 week, starting as of today please call thank you.    Action Taken: Message routed to:  [facility] pcc    Travel Screening: Not Applicable

## 2024-01-04 NOTE — TELEPHONE ENCOUNTER
Left detailed VM on confidential voicemail box for Kena ABEBE with the following verbal order(s): Home Care Orders: Skilled Nursing: SN: 1x a week for 1 week, starting as of today.  Darling CASTAÑEDA LPN  [facility]

## 2024-01-05 ENCOUNTER — DOCUMENTATION ONLY (OUTPATIENT)
Dept: INTERNAL MEDICINE | Facility: CLINIC | Age: 84
End: 2024-01-05
Payer: MEDICARE

## 2024-01-05 NOTE — PROGRESS NOTES
Type of Form Received:     Form Received (Date) 01/05/24   Form Filled out Yes, faxed 1/8   Placed in provider folder Yes

## 2024-01-07 ENCOUNTER — MEDICAL CORRESPONDENCE (OUTPATIENT)
Dept: HEALTH INFORMATION MANAGEMENT | Facility: CLINIC | Age: 84
End: 2024-01-07
Payer: MEDICARE

## 2024-01-16 ENCOUNTER — LAB (OUTPATIENT)
Dept: LAB | Facility: CLINIC | Age: 84
End: 2024-01-16
Payer: MEDICARE

## 2024-01-16 DIAGNOSIS — N18.1 CKD (CHRONIC KIDNEY DISEASE) STAGE 1, GFR 90 ML/MIN OR GREATER: ICD-10-CM

## 2024-01-16 LAB
ALBUMIN SERPL BCG-MCNC: 4.2 G/DL (ref 3.5–5.2)
ANION GAP SERPL CALCULATED.3IONS-SCNC: 12 MMOL/L (ref 7–15)
BASOPHILS # BLD AUTO: 0.1 10E3/UL (ref 0–0.2)
BASOPHILS NFR BLD AUTO: 1 %
BUN SERPL-MCNC: 36 MG/DL (ref 8–23)
CALCIUM SERPL-MCNC: 10 MG/DL (ref 8.8–10.2)
CHLORIDE SERPL-SCNC: 101 MMOL/L (ref 98–107)
CREAT SERPL-MCNC: 1.94 MG/DL (ref 0.51–0.95)
DEPRECATED HCO3 PLAS-SCNC: 20 MMOL/L (ref 22–29)
EGFRCR SERPLBLD CKD-EPI 2021: 25 ML/MIN/1.73M2
EOSINOPHIL # BLD AUTO: 1.1 10E3/UL (ref 0–0.7)
EOSINOPHIL NFR BLD AUTO: 16 %
ERYTHROCYTE [DISTWIDTH] IN BLOOD BY AUTOMATED COUNT: 15 % (ref 10–15)
GLUCOSE SERPL-MCNC: 99 MG/DL (ref 70–99)
HCT VFR BLD AUTO: 30.3 % (ref 35–47)
HGB BLD-MCNC: 9.8 G/DL (ref 11.7–15.7)
IMM GRANULOCYTES # BLD: 0 10E3/UL
IMM GRANULOCYTES NFR BLD: 0 %
LYMPHOCYTES # BLD AUTO: 1.1 10E3/UL (ref 0.8–5.3)
LYMPHOCYTES NFR BLD AUTO: 16 %
MCH RBC QN AUTO: 30.3 PG (ref 26.5–33)
MCHC RBC AUTO-ENTMCNC: 32.3 G/DL (ref 31.5–36.5)
MCV RBC AUTO: 94 FL (ref 78–100)
MONOCYTES # BLD AUTO: 0.9 10E3/UL (ref 0–1.3)
MONOCYTES NFR BLD AUTO: 13 %
NEUTROPHILS # BLD AUTO: 3.6 10E3/UL (ref 1.6–8.3)
NEUTROPHILS NFR BLD AUTO: 54 %
NRBC # BLD AUTO: 0 10E3/UL
NRBC BLD AUTO-RTO: 0 /100
PHOSPHATE SERPL-MCNC: 4.4 MG/DL (ref 2.5–4.5)
PLATELET # BLD AUTO: 315 10E3/UL (ref 150–450)
POTASSIUM SERPL-SCNC: 4.3 MMOL/L (ref 3.4–5.3)
RBC # BLD AUTO: 3.23 10E6/UL (ref 3.8–5.2)
SODIUM SERPL-SCNC: 133 MMOL/L (ref 135–145)
WBC # BLD AUTO: 6.7 10E3/UL (ref 4–11)

## 2024-01-16 PROCEDURE — 36415 COLL VENOUS BLD VENIPUNCTURE: CPT

## 2024-01-16 PROCEDURE — 85025 COMPLETE CBC W/AUTO DIFF WBC: CPT

## 2024-01-16 PROCEDURE — 80069 RENAL FUNCTION PANEL: CPT

## 2024-01-16 PROCEDURE — 83970 ASSAY OF PARATHORMONE: CPT

## 2024-01-17 LAB — PTH-INTACT SERPL-MCNC: 13 PG/ML (ref 15–65)

## 2024-01-31 ENCOUNTER — OFFICE VISIT (OUTPATIENT)
Dept: ORTHOPEDICS | Facility: CLINIC | Age: 84
End: 2024-01-31
Payer: MEDICARE

## 2024-01-31 VITALS — BODY MASS INDEX: 21.15 KG/M2 | WEIGHT: 104.9 LBS | HEIGHT: 59 IN

## 2024-01-31 DIAGNOSIS — M81.0 AGE-RELATED OSTEOPOROSIS WITHOUT CURRENT PATHOLOGICAL FRACTURE: Primary | ICD-10-CM

## 2024-01-31 PROCEDURE — 96372 THER/PROPH/DIAG INJ SC/IM: CPT | Performed by: FAMILY MEDICINE

## 2024-01-31 PROCEDURE — 99213 OFFICE O/P EST LOW 20 MIN: CPT | Mod: 25 | Performed by: FAMILY MEDICINE

## 2024-01-31 NOTE — NURSING NOTE
[address] [address]  Dept: [phone]  ______________________________________________________________________________    Patient: Kimberly Chau   : 1940   MRN: [id number]   2024    INVASIVE PROCEDURE SAFETY CHECKLIST    Date: 24   Procedure: Subcutaneous Prolia injection #3  Patient Name: Kimberly Chau  MRN: [id number]  YOB: 1940    Action: Complete sections as appropriate. Any discrepancy results in a HARD COPY until resolved.     PRE PROCEDURE:  Patient ID verified with 2 identifiers (name and  or MRN): Yes  Procedure and site verified with patient/designee (when able): Yes  Accurate consent documentation in medical record: Yes  H&P (or appropriate assessment) documented in medical record: Yes  H&P must be up to 20 days prior to procedure and updates within 24 hours of procedure as applicable: NA  Relevant diagnostic and radiology test results appropriately labeled and displayed as applicable: Yes  Procedure site(s) marked with provider initials: NA    TIMEOUT:  Time-Out performed immediately prior to starting procedure, including verbal and active participation of all team members addressing the following:Yes  * Correct patient identify  * Confirmed that the correct side and site are marked  * An accurate procedure consent form  * Agreement on the procedure to be done  * Correct patient position  * Relevant images and results are properly labeled and appropriately displayed  * The need to administer antibiotics or fluids for irrigation purposes during the procedure as applicable   * Safety precautions based on patient history or medication use    DURING PROCEDURE: Verification of correct person, site, and procedures any time the responsibility for care of the patient is transferred to another member of the care team.       Prior to injection, verified patient identity using patient's name and date of  birth.  Due to injection administration, patient instructed to remain in clinic for 15 minutes  afterwards, and to report any adverse reaction to me immediately.    Subcutaneous injection was performed    Drug Amount Wasted:  None.  Vial/Syringe: Single dose vial  Expiration Date:  2/28/26      Cristian Carlos, ATC  January 31, 2024

## 2024-01-31 NOTE — LETTER
"  1/31/2024      RE: Kimberly Chau  [id number]   [place] [address]     Dear Colleague,    Thank you for referring your patient, Kimberly Chau, to the [facility] [place]. Please see a copy of my visit note below.    S: S: 80 yo female with osteoporosis s/p 1 yr of Evenity treatment and started Prolia in Aug 2022. Here for Prolia #3 for her continuing osteoporosis treatment. No side effects from the Prolia.   follow-up OP treatment.  Had DXA in 6/2023.  Update to [facility]:  Aug 2023:  Infection in LE which ended up causing renal failure and dialysis. Off dialysis in Dec 2023.   No fractures      Current Outpatient Medications   Medication     acetaminophen 650 MG TABS     amLODIPine (NORVASC) 5 MG tablet     carvedilol (COREG) 12.5 MG tablet     cephALEXin (KEFLEX) 500 MG capsule     COMPRESSION STOCKINGS     denosumab (PROLIA) 60 MG/ML SOSY injection     Diphenhydramine-APAP, sleep, (TYLENOL PM EXTRA STRENGTH PO)     ferrous gluconate (FERGON) 324 (38 Fe) MG tablet     Gauze Pads & Dressings (TELFA NON-ADHERENT) 3\"X4\" PADS     losartan (COZAAR) 25 MG tablet     multivitamin w/minerals (MULTI-VITAMIN) tablet     Omeprazole (PRILOSEC PO)     pentoxifylline ER (TRENTAL) 400 MG CR tablet     sodium chloride (OCEAN NASAL SPRAY) 0.65 % nasal spray     triamcinolone (KENALOG) 0.1 % external cream     vitamin D3 (CHOLECALCIFEROL) 50 mcg (2000 units) tablet     Current Facility-Administered Medications   Medication     denosumab (PROLIA) injection 60 mg     fluocinonide (LIDEX) 0.05 % ointment     fluocinonide (LIDEX) 0.05 % ointment     mupirocin (BACTROBAN) 2 % ointment     mupirocin (BACTROBAN) 2 % ointment     romosozumab-aqqg (EVENITY) injection 210 mg     romosozumab-aqqg (EVENITY) injection 210 mg     romosozumab-aqqg (EVENITY) injection 210 mg     romosozumab-aqqg (EVENITY) injection 210 mg     romosozumab-aqqg (EVENITY) injection 210 mg     romosozumab-aqqg (EVENITY) injection " 210 mg     romosozumab-aqqg (EVENITY) injection 210 mg     Past Medical History:   Diagnosis Date     Anemia      Arthritis      Cataracts      Central retinal artery occlusion      Cervical spine fracture (H)     After a fall from orthostatic sx     Chronic pain     Back of head     Gastro-oesophageal reflux disease      Head injury      Hypertension      Hyponatremia     Resolved, 2/2 diuretics     Migraines      Osteoporosis      Pneumonia 2018     Rheumatoid arthritis(714.0)      Past Surgical History:   Procedure Laterality Date     COLONOSCOPY       EYE SURGERY Left 02/2019    Hole in macula     FUSION CERVICAL POSTERIOR THREE+ LEVELS  1/22/2013    Procedure: FUSION CERVICAL POSTERIOR THREE+ LEVELS;  Cervical 1-6 Posterior Instrumentation and Fusion, Cervical 3-4 Laminectomy  .Instrumentation and fusion to Cervical 6 *Latex Allergy*;  Surgeon: Ra Bar MD;  Location: [facility] OR     GYN SURGERY       HEAD & NECK SURGERY       HYSTERECTOMY       IR ENDOVENOUS ABLATION VARICOSE VEINS  2/16/2021     KNEE SURGERY       NECK SURGERY       OPEN REDUCTION INTERNAL FIXATION WRIST Left 4/30/2019    Procedure: Open Reduction Internal Fixation Left Distal Radius Fracture;  Surgeon: Dinh Strong MD;  Location:  OR     OPEN REDUCTION INTERNAL FIXATION WRIST Left 6/12/2020    Procedure: OPEN REDUCTION INTERNAL FIXATION Left Distal Radius Nonunion, Distal Ulna Resection;  Surgeon: Dinh Strong MD;  Location:  OR     OPTICAL TRACKING SYSTEM ENDOSCOPIC SINUS SURGERY Right 4/6/2018    Procedure: OPTICAL TRACKING SYSTEM ENDOSCOPIC SINUS SURGERY;  Stealth Assisted Right Maxillary Antrostomy, Anterior Ethmoidectomy And Frontal Sinusotomy;  Surgeon: Elyse Eisenberg MD;  Location:  OR     OPTICAL TRACKING SYSTEM ENDOSCOPIC SINUS SURGERY Right 8/3/2018    Procedure: OPTICAL TRACKING SYSTEM ENDOSCOPIC SINUS SURGERY;  Stealth Assisted Revision Right Frontal Sinusotomy, Right Stent Placement  **Latex  Allergy** ;  Surgeon: Elyse Eisenberg MD;  Location: [facility] OR     ORTHOPEDIC SURGERY       REMOVE HARDWARE WRIST Left 11/19/2019    Procedure: Left Distal Radius Hardware Removal, Flexor Pollicus Longus Repair, Deep Cultures;  Surgeon: Dinh Strong MD;  Location:  OR     [facility] HAND/FINGER SURGERY UNLISTED       [facility] PELVIS/HIP JOINT SURGERY UNLISTED       O: NAD  There were no vitals taken for this visit.       Latest Reference Range & Units 01/16/24 11:27   Sodium 135 - 145 mmol/L 133 (L)   Potassium 3.4 - 5.3 mmol/L 4.3   Chloride 98 - 107 mmol/L 101   Carbon Dioxide (CO2) 22 - 29 mmol/L 20 (L)   Urea Nitrogen 8.0 - 23.0 mg/dL 36.0 (H)   Creatinine 0.51 - 0.95 mg/dL 1.94 (H)   GFR Estimate >60 mL/min/1.73m2 25 (L)   Calcium 8.8 - 10.2 mg/dL 10.0   Anion Gap 7 - 15 mmol/L 12   Phosphorus 2.5 - 4.5 mg/dL 4.4   Albumin 3.5 - 5.2 g/dL 4.2   (L): Data is abnormally low  (H): [place] [address]  Phone: [phone]   Fax: [phone]                   Impression  Based on BMD diagnosis is consistent with osteoporosis based on WHO criteria Ref. 1     Results   Lumbar spine   T-score -0.5 ., BMD is 1.119 g/cm2.      Right femoral neck  T-score -2.8     Right total femur  T-score -2.5, BMD is 0.697 g/cm2.     .     Interval change  Bone density compared to the prior study has increased at the lumbar spine by +3.5%. Please note that the differential diagnosis of increase in bone density at the lumbar spine includes improvement due to pharmacotherapy vs inter-current progression of spine degeneration or fracture.        Fracture risk   Fracture risk calculation is not indicated. Ref.4     Repeat  Recommend repeat DXA in 2 years.     Principal result :  Ramona Mcguire MD, CCD MD  Division of Endocrinology and Diabetes    Department of Medicine     Technical quality  Satisfactory.   Abnormal vertebrae may be  excluded from analysis if there is more than a 1.0 T-score difference between the vertebrae in question and adjacent vertebrae. Note the wide range in BMD values and T-scores within the lumbar spine. In the circumstances, the lumbar spine is represented by L1-L4        References:  Ref. 1. WHO categories:          T-score > -1.0  = normal             .                                T-score -1.0 to -2.5 = low bone density  T-score < -2.5 = osteoporosis        .     Ref. 2. 2015 ISCD official position statements:  www.iscd.org.     Ref. 3.  Today's examination is compared to the technically similar prior study of the total hip and femur if available. Only changes deemed likely to be significant based on historical data are reported.  According to the ISCD position statements, total hip rather than femoral neck regions are to be compared because larger areas give better precision.  LSC = least significant changes at the [facility] (historical data)                          AP spine =  0.032 g/cm2  (6/26/2007)                          Left hip = 0.029 g/cm2  (6/15/2007)                          Right hip = 0.018 g/cm2  (6/15/2007)                          Left mid radius = 0.043 g/cm2  (6/26/2007)  Please note that the differential diagnosis of increase in bone density at the lumbar spine includes improvement due to pharmacotherapy vs inter-current progression of spine degeneration or fracture.     Ref. 4 Fracture risk is calculated in patients aged 40 to 90 years old with low bone density not on osteoporosis treatment. A 10 year fracture risk of 3% and higher for hip fracture and 20% and higher for major osteoporotic fracture is considered higher than acceptable risk and might be an indication for medical treatment.     Ref. 5.  By definition, osteoporosis may be diagnosed in the presence or with the history of a low trauma or fragility fracture.  Fragility and low trauma fracture is defined as a fracture  resulting from the force of a fall from a standing height or less or a bone that breaks under conditions that would not cause a normal bone to break.       Ref. 6. NOF Physician's Guideline Website address:  www.nof.org.          Component  Ref Range & Units 1 mo ago   Vitamin D, Total (25-Hydroxy)  20 - 50 ng/mL 75   Comment: indicates supplementation, with increased risk of hypercalciuria   Resulting Agency [facility]     A:  Severe Osteoporosis (Osteoporosis by DXA and T11 compression insufficiency fracture) s/p 1 year of Evenity treatment with a 5% improvement in her lumbar spine BMD and 1.0 improvement in the total hip since her DXA in May 2021.  Prolia #1 injection s/p in Aug 2022 and now s/p Prolia #2.     Elevated Vit D level:   P:  Prolia Injection #3 given today.    Reviewed DXA results with the patient and her , Wilder.   Continue improved calcium intake and supplemental Vit D  RTC in 6 months for Prolia #4. Have a repeat DXA in 2 years.  I have placed orders for repeat labs: calcium, Vit D, mag and phos, creatinine prior to next Prolia injection in 6 months and she will also have labs per Renal in March.  Will monitor labs more often due to renal insufficiency.     Decrease Vit D to 5000 unit(s) 3x/ week due to elevated level.        Procedure Note:      Prolia Injection # 3     The risks and benefits of the medication were reviewed and she agreed to proceed.  Prepackage Prolia was obtained from the [facility].  The skin was prepped with an alcohol swab. Prolia solution (60mg) was injected subcutaneously into the R posterior upper arm.  Bandaid was placed.  The patient tolerated the injection well. She did not experience any adverse effects.  Topical ethyl chloride was used prior to the injection.               Alley Collazo MD, CAQ, FACSM, FAMSSM  [facility]  Sports Medicine and Bone Health  Team Physician;  Athletics         Again, thank you for allowing me to participate in the  care of your patient.      Sincerely,    Alley Collazo MD

## 2024-01-31 NOTE — PROGRESS NOTES
"S: S: 82 yo female with osteoporosis s/p 1 yr of Evenity treatment and started Prolia in Aug 2022. Here for Prolia #3 for her continuing osteoporosis treatment. No side effects from the Prolia.   follow-up OP treatment.  Had DXA in 6/2023.  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 Off dialysis in Dec 2023.   No fractures      Current Outpatient Medications   Medication    acetaminophen 650 MG TABS    amLODIPine (NORVASC) 5 MG tablet    carvedilol (COREG) 12.5 MG tablet    cephALEXin (KEFLEX) 500 MG capsule    COMPRESSION STOCKINGS    denosumab (PROLIA) 60 MG/ML SOSY injection    Diphenhydramine-APAP, sleep, (TYLENOL PM EXTRA STRENGTH PO)    ferrous gluconate (FERGON) 324 (38 Fe) MG tablet    Gauze Pads & Dressings (TELFA NON-ADHERENT) 3\"X4\" PADS    losartan (COZAAR) 25 MG tablet    multivitamin w/minerals (MULTI-VITAMIN) tablet    Omeprazole (PRILOSEC PO)    pentoxifylline ER (TRENTAL) 400 MG CR tablet    sodium chloride (OCEAN NASAL SPRAY) 0.65 % nasal spray    triamcinolone (KENALOG) 0.1 % external cream    vitamin D3 (CHOLECALCIFEROL) 50 mcg (2000 units) tablet     Current Facility-Administered Medications   Medication    denosumab (PROLIA) injection 60 mg    fluocinonide (LIDEX) 0.05 % ointment    fluocinonide (LIDEX) 0.05 % ointment    mupirocin (BACTROBAN) 2 % ointment    mupirocin (BACTROBAN) 2 % ointment    romosozumab-aqqg (EVENITY) injection 210 mg    romosozumab-aqqg (EVENITY) injection 210 mg    romosozumab-aqqg (EVENITY) injection 210 mg    romosozumab-aqqg (EVENITY) injection 210 mg    romosozumab-aqqg (EVENITY) injection 210 mg    romosozumab-aqqg (EVENITY) injection 210 mg    romosozumab-aqqg (EVENITY) injection 210 mg     Past Medical History:   Diagnosis Date    Anemia     Arthritis     Cataracts     Central retinal artery occlusion     Cervical spine fracture (H)     After a fall from orthostatic sx    Chronic pain     Back of head    " Gastro-oesophageal reflux disease     Head injury     Hypertension     Hyponatremia     Resolved, 2/2 diuretics    Migraines     Osteoporosis     Pneumonia 2018    Rheumatoid arthritis(714.0)      Past Surgical History:   Procedure Laterality Date    COLONOSCOPY      EYE SURGERY Left 02/2019    Hole in macula    FUSION CERVICAL POSTERIOR THREE+ LEVELS  1/22/2013    Procedure: FUSION CERVICAL POSTERIOR THREE+ LEVELS;  Cervical 1-6 Posterior Instrumentation and Fusion, Cervical 3-4 Laminectomy  .Instrumentation and fusion to Cervical 6 *Latex Allergy*;  Surgeon: Ra Bar MD;  Location: [facility] OR    GYN SURGERY      HEAD & NECK SURGERY      HYSTERECTOMY      IR ENDOVENOUS ABLATION VARICOSE VEINS  2/16/2021    KNEE SURGERY      NECK SURGERY      OPEN REDUCTION INTERNAL FIXATION WRIST Left 4/30/2019    Procedure: Open Reduction Internal Fixation Left Distal Radius Fracture;  Surgeon: Dinh Strong MD;  Location:  OR    OPEN REDUCTION INTERNAL FIXATION WRIST Left 6/12/2020    Procedure: OPEN REDUCTION INTERNAL FIXATION Left Distal Radius Nonunion, Distal Ulna Resection;  Surgeon: Dinh Strong MD;  Location:  OR    OPTICAL TRACKING SYSTEM ENDOSCOPIC SINUS SURGERY Right 4/6/2018    Procedure: OPTICAL TRACKING SYSTEM ENDOSCOPIC SINUS SURGERY;  Stealth Assisted Right Maxillary Antrostomy, Anterior Ethmoidectomy And Frontal Sinusotomy;  Surgeon: Elyse Eisenberg MD;  Location:  OR    OPTICAL TRACKING SYSTEM ENDOSCOPIC SINUS SURGERY Right 8/3/2018    Procedure: OPTICAL TRACKING SYSTEM ENDOSCOPIC SINUS SURGERY;  Stealth Assisted Revision Right Frontal Sinusotomy, Right Stent Placement  **Latex Allergy** ;  Surgeon: Elyse Eisenberg MD;  Location:  OR    ORTHOPEDIC SURGERY      REMOVE HARDWARE WRIST Left 11/19/2019    Procedure: Left Distal Radius Hardware Removal, Flexor Pollicus Longus Repair, Deep Cultures;  Surgeon: Dinh Strong MD;  Location:  OR    [facility] HAND/FINGER SURGERY UNLISTED       [facility] PELVIS/HIP JOINT SURGERY UNLISTED       O: NAD  There were no vitals taken for this visit.       Latest Reference Range & Units 01/16/24 11:27   Sodium 135 - 145 mmol/L 133 (L)   Potassium 3.4 - 5.3 mmol/L 4.3   Chloride 98 - 107 mmol/L 101   Carbon Dioxide (CO2) 22 - 29 mmol/L 20 (L)   Urea Nitrogen 8.0 - 23.0 mg/dL 36.0 (H)   Creatinine 0.51 - 0.95 mg/dL 1.94 (H)   GFR Estimate >60 mL/min/1.73m2 25 (L)   Calcium 8.8 - 10.2 mg/dL 10.0   Anion Gap 7 - 15 mmol/L 12   Phosphorus 2.5 - 4.5 mg/dL 4.4   Albumin 3.5 - 5.2 g/dL 4.2   (L): Data is abnormally low  (H): Data is abnormally high      [address] [address]  Phone: [phone]   Fax: [phone]                   Impression  Based on BMD diagnosis is consistent with osteoporosis based on WHO criteria Ref. 1     Results   Lumbar spine   T-score -0.5 ., BMD is 1.119 g/cm2.      Right femoral neck  T-score -2.8     Right total femur  T-score -2.5, BMD is 0.697 g/cm2.     .     Interval change  Bone density compared to the prior study has increased at the lumbar spine by +3.5%. Please note that the differential diagnosis of increase in bone density at the lumbar spine includes improvement due to pharmacotherapy vs inter-current progression of spine degeneration or fracture.        Fracture risk   Fracture risk calculation is not indicated. Ref.4     Repeat  Recommend repeat DXA in 2 years.     Principal result :  Ramona Mcguire MD, MINAL MASSEY  Division of Endocrinology and Diabetes    Department of Medicine     Technical quality  Satisfactory.   Abnormal vertebrae may be excluded from analysis if there is more than a 1.0 T-score difference between the vertebrae in question and adjacent vertebrae. Note the wide range in BMD values and T-scores within the lumbar spine. In the circumstances, the lumbar spine is represented by L1-L4        References:  Ref. 1. WHO categories:           T-score > -1.0  = normal             .                                T-score -1.0 to -2.5 = low bone density  T-score < -2.5 = osteoporosis        .     Ref. 2. 2015 ISCD official position statements:  www.iscd.org.     Ref. 3.  Today's examination is compared to the technically similar prior study of the total hip and femur if available. Only changes deemed likely to be significant based on historical data are reported.  According to the ISCD position statements, total hip rather than femoral neck regions are to be compared because larger areas give better precision.  LSC = least significant changes at the [facility] (historical data)                          AP spine =  0.032 g/cm2  (6/26/2007)                          Left hip = 0.029 g/cm2  (6/15/2007)                          Right hip = 0.018 g/cm2  (6/15/2007)                          Left mid radius = 0.043 g/cm2  (6/26/2007)  Please note that the differential diagnosis of increase in bone density at the lumbar spine includes improvement due to pharmacotherapy vs inter-current progression of spine degeneration or fracture.     Ref. 4 Fracture risk is calculated in patients aged 40 to 90 years old with low bone density not on osteoporosis treatment. A 10 year fracture risk of 3% and higher for hip fracture and 20% and higher for major osteoporotic fracture is considered higher than acceptable risk and might be an indication for medical treatment.     Ref. 5.  By definition, osteoporosis may be diagnosed in the presence or with the history of a low trauma or fragility fracture.  Fragility and low trauma fracture is defined as a fracture resulting from the force of a fall from a standing height or less or a bone that breaks under conditions that would not cause a normal bone to break.       Ref. 6. NOF Physician's Guideline Website address:  www.nof.org.          Component  Ref Range & Units 1 mo ago   Vitamin D, Total (25-Hydroxy)  20 - 50 ng/mL 75    Comment: indicates supplementation, with increased risk of hypercalciuria   Resulting Agency [facility]     A:  Severe Osteoporosis (Osteoporosis by DXA and T11 compression insufficiency fracture) s/p 1 year of Evenity treatment with a 5% improvement in her lumbar spine BMD and 1.0 improvement in the total hip since her DXA in May 2021.  Prolia #1 injection s/p in Aug 2022 and now s/p Prolia #2.     Elevated Vit D level:   P:  Prolia Injection #3 given today.    Reviewed DXA results with the patient and her , Wilder.   Continue improved calcium intake and supplemental Vit D  RTC in 6 months for Prolia #4. Have a repeat DXA in 2 years.  I have placed orders for repeat labs: calcium, Vit D, mag and phos, creatinine prior to next Prolia injection in 6 months and she will also have labs per Renal in March.  Will monitor labs more often due to renal insufficiency.     Decrease Vit D to 5000 unit(s) 3x/ week due to elevated level.        Procedure Note:      Prolia Injection # 3     The risks and benefits of the medication were reviewed and she agreed to proceed.  Prepackage Prolia was obtained from the [facility].  The skin was prepped with an alcohol swab. Prolia solution (60mg) was injected subcutaneously into the R posterior upper arm.  Bandaid was placed.  The patient tolerated the injection well. She did not experience any adverse effects.  Topical ethyl chloride was used prior to the injection.               Alley Collazo MD, CAQ, FACSM, [facility]  Sports Medicine and Bone Health  Team Physician;  Athletics

## 2024-02-10 ENCOUNTER — MYC MEDICAL ADVICE (OUTPATIENT)
Dept: NEPHROLOGY | Facility: CLINIC | Age: 84
End: 2024-02-10
Payer: MEDICARE

## 2024-02-15 ENCOUNTER — TELEPHONE (OUTPATIENT)
Dept: INTERNAL MEDICINE | Facility: CLINIC | Age: 84
End: 2024-02-15
Payer: MEDICARE

## 2024-02-15 NOTE — TELEPHONE ENCOUNTER
Called Arthur and left a secure VM.  Gave verbal orders per Dr. Castellano for delay of wound care to 02/22/24.        Kody Roberts CMA ([facility]) at 3:50 PM on 2/15/2024

## 2024-02-15 NOTE — TELEPHONE ENCOUNTER
[facility] Call Center    Phone Message    May a detailed message be left on voicemail: yes     Reason for Call: Order(s): Home Care Orders: Other: RN only for wound patient wanted to delay care until 2/22/24 [facility] from [facility] calling in today to let Dr Castellano know as she will need a verbal. Contact number [phone]    Action Taken: Message routed to:  [facility]    Travel Screening: Not Applicable

## 2024-02-22 ENCOUNTER — MEDICAL CORRESPONDENCE (OUTPATIENT)
Dept: HEALTH INFORMATION MANAGEMENT | Facility: CLINIC | Age: 84
End: 2024-02-22

## 2024-02-22 ENCOUNTER — TELEPHONE (OUTPATIENT)
Dept: INTERNAL MEDICINE | Facility: CLINIC | Age: 84
End: 2024-02-22
Payer: MEDICARE

## 2024-02-22 NOTE — TELEPHONE ENCOUNTER
[facility] Call Center    Phone Message    May a detailed message be left on voicemail: yes     Reason for Call: Order(s): Home Care Orders: Skilled Nursing: Milana from [facility] calling requesting skilled nursing home one time a week for one week and two times a week for eight weeks with three prn visits, care orders from provider. For traumatic wound on left leg.     Action Taken: Message routed to:  [facility]    Travel Screening: Not Applicable

## 2024-02-22 NOTE — TELEPHONE ENCOUNTER
Called Milana and gave verbal orders per Dr. Castellano for skilled nursing home one time a week for one week and two times a week for eight weeks with three prn visits, care orders from provider. For traumatic wound on left leg.          Kody Roberts CMA ([facility]) at 2:07 PM on 2/22/2024

## 2024-02-29 ENCOUNTER — TELEPHONE (OUTPATIENT)
Dept: INTERNAL MEDICINE | Facility: CLINIC | Age: 84
End: 2024-02-29
Payer: MEDICARE

## 2024-02-29 ENCOUNTER — DOCUMENTATION ONLY (OUTPATIENT)
Dept: INTERNAL MEDICINE | Facility: CLINIC | Age: 84
End: 2024-02-29
Payer: MEDICARE

## 2024-02-29 NOTE — TELEPHONE ENCOUNTER
[facility] Call Center    Phone Message    May a detailed message be left on voicemail: yes     Reason for Call: Order(s): Home Care Orders: Skilled Nursing:     Action Taken: Order for wound care:  apply collagen to the partial thickness wound, cover with super absorbent dressing and secure with roll gauze. Cover with compression.     Travel Screening: Not Applicable

## 2024-02-29 NOTE — TELEPHONE ENCOUNTER
Spoke with Kena ABEBE and gave the following verbal order(s): Home Care Orders: Skilled Nursing: wound care: apply collagen to the partial thickness wound, cover with super absorbent dressing and secure with roll gauze and cover with compression.  Darling CASTAÑEDA LPN  [facility]

## 2024-03-01 ENCOUNTER — DOCUMENTATION ONLY (OUTPATIENT)
Dept: INTERNAL MEDICINE | Facility: CLINIC | Age: 84
End: 2024-03-01
Payer: MEDICARE

## 2024-03-02 ENCOUNTER — MEDICAL CORRESPONDENCE (OUTPATIENT)
Dept: HEALTH INFORMATION MANAGEMENT | Facility: CLINIC | Age: 84
End: 2024-03-02
Payer: MEDICARE

## 2024-03-02 NOTE — PROGRESS NOTES
Type of Form Received:     Form Received (Date) 2/28/24   Form Filled out Yes 3/4/24   Placed in provider folder Yes

## 2024-03-02 NOTE — PROGRESS NOTES
Type of Form Received:     Form Received (Date) 03/01/24   Form Filled out Yes 3/4/24   Placed in provider folder Yes

## 2024-03-04 ENCOUNTER — TELEPHONE (OUTPATIENT)
Dept: INTERNAL MEDICINE | Facility: CLINIC | Age: 84
End: 2024-03-04

## 2024-03-04 DIAGNOSIS — Z53.9 DIAGNOSIS NOT YET DEFINED: Primary | ICD-10-CM

## 2024-03-04 PROCEDURE — G0179 MD RECERTIFICATION HHA PT: HCPCS | Performed by: INTERNAL MEDICINE

## 2024-03-04 NOTE — TELEPHONE ENCOUNTER
[facility] Call Center    Phone Message    May a detailed message be left on voicemail: yes     Reason for Call: Other: Patient has appt with Meaghan Vigil tomorrow. Kena calling stating she would like Meaghan Vigil to look at lower left extremity. There could be possible cellulitis as patient has a history of it. It's swollen and redness but there is no warmth or odor. There has been lack of compression but patient states with compression the pain does improve. Please call and advise.

## 2024-03-05 ENCOUNTER — LAB (OUTPATIENT)
Dept: LAB | Facility: CLINIC | Age: 84
End: 2024-03-05
Payer: MEDICARE

## 2024-03-05 ENCOUNTER — OFFICE VISIT (OUTPATIENT)
Dept: INTERNAL MEDICINE | Facility: CLINIC | Age: 84
End: 2024-03-05
Payer: MEDICARE

## 2024-03-05 VITALS
OXYGEN SATURATION: 99 % | DIASTOLIC BLOOD PRESSURE: 80 MMHG | SYSTOLIC BLOOD PRESSURE: 136 MMHG | BODY MASS INDEX: 22.19 KG/M2 | WEIGHT: 108.6 LBS | HEART RATE: 91 BPM

## 2024-03-05 DIAGNOSIS — Z23 NEED FOR TDAP VACCINATION: ICD-10-CM

## 2024-03-05 DIAGNOSIS — L03.116 CELLULITIS OF LEFT LOWER EXTREMITY: ICD-10-CM

## 2024-03-05 DIAGNOSIS — Z74.09 MOBILITY IMPAIRED: ICD-10-CM

## 2024-03-05 DIAGNOSIS — Z29.11 NEED FOR VACCINATION AGAINST RESPIRATORY SYNCYTIAL VIRUS: ICD-10-CM

## 2024-03-05 DIAGNOSIS — N18.4 CKD (CHRONIC KIDNEY DISEASE) STAGE 4, GFR 15-29 ML/MIN (H): ICD-10-CM

## 2024-03-05 DIAGNOSIS — N17.9 ACUTE KIDNEY INJURY (NONTRAUMATIC) (H): ICD-10-CM

## 2024-03-05 DIAGNOSIS — L03.116 CELLULITIS OF LEFT LOWER EXTREMITY: Primary | ICD-10-CM

## 2024-03-05 LAB
ALBUMIN SERPL BCG-MCNC: 4 G/DL (ref 3.5–5.2)
ALP SERPL-CCNC: 104 U/L (ref 40–150)
ALT SERPL W P-5'-P-CCNC: 14 U/L (ref 0–50)
ANION GAP SERPL CALCULATED.3IONS-SCNC: 13 MMOL/L (ref 7–15)
AST SERPL W P-5'-P-CCNC: 18 U/L (ref 0–45)
BILIRUB SERPL-MCNC: 0.3 MG/DL
BUN SERPL-MCNC: 39.5 MG/DL (ref 8–23)
CALCIUM SERPL-MCNC: 8.8 MG/DL (ref 8.8–10.2)
CHLORIDE SERPL-SCNC: 101 MMOL/L (ref 98–107)
CREAT SERPL-MCNC: 2.19 MG/DL (ref 0.51–0.95)
DEPRECATED HCO3 PLAS-SCNC: 20 MMOL/L (ref 22–29)
EGFRCR SERPLBLD CKD-EPI 2021: 21 ML/MIN/1.73M2
ERYTHROCYTE [DISTWIDTH] IN BLOOD BY AUTOMATED COUNT: 12.8 % (ref 10–15)
GLUCOSE SERPL-MCNC: 102 MG/DL (ref 70–99)
HCT VFR BLD AUTO: 28.7 % (ref 35–47)
HGB BLD-MCNC: 9.4 G/DL (ref 11.7–15.7)
MCH RBC QN AUTO: 30.8 PG (ref 26.5–33)
MCHC RBC AUTO-ENTMCNC: 32.8 G/DL (ref 31.5–36.5)
MCV RBC AUTO: 94 FL (ref 78–100)
PHOSPHATE SERPL-MCNC: 4 MG/DL (ref 2.5–4.5)
PLATELET # BLD AUTO: 354 10E3/UL (ref 150–450)
POTASSIUM SERPL-SCNC: 4.7 MMOL/L (ref 3.4–5.3)
PROT SERPL-MCNC: 7.8 G/DL (ref 6.4–8.3)
RBC # BLD AUTO: 3.05 10E6/UL (ref 3.8–5.2)
SODIUM SERPL-SCNC: 134 MMOL/L (ref 135–145)
WBC # BLD AUTO: 9.3 10E3/UL (ref 4–11)

## 2024-03-05 PROCEDURE — 36415 COLL VENOUS BLD VENIPUNCTURE: CPT | Performed by: PATHOLOGY

## 2024-03-05 PROCEDURE — 84100 ASSAY OF PHOSPHORUS: CPT | Performed by: PATHOLOGY

## 2024-03-05 PROCEDURE — 80053 COMPREHEN METABOLIC PANEL: CPT | Performed by: PATHOLOGY

## 2024-03-05 PROCEDURE — 85027 COMPLETE CBC AUTOMATED: CPT | Performed by: PATHOLOGY

## 2024-03-05 PROCEDURE — 99214 OFFICE O/P EST MOD 30 MIN: CPT

## 2024-03-05 RX ORDER — RESPIRATORY SYNCYTIAL VIRUS VACCINE 120MCG/0.5
0.5 KIT INTRAMUSCULAR ONCE
Qty: 1 EACH | Refills: 0 | Status: CANCELLED | OUTPATIENT
Start: 2024-03-05 | End: 2024-03-05

## 2024-03-05 RX ORDER — AMOXICILLIN AND CLAVULANATE POTASSIUM 500; 125 MG/1; MG/1
1 TABLET, FILM COATED ORAL 2 TIMES DAILY
Qty: 14 TABLET | Refills: 0 | Status: SHIPPED | OUTPATIENT
Start: 2024-03-05

## 2024-03-05 RX ORDER — DOXYCYCLINE HYCLATE 100 MG
100 TABLET ORAL 2 TIMES DAILY
Qty: 14 TABLET | Refills: 0 | Status: SHIPPED | OUTPATIENT
Start: 2024-03-05

## 2024-03-05 NOTE — PROGRESS NOTES
Assessment & Plan     Cellulitis of left lower extremity  Persistent wound to LLE with marked surrounding erythema, worsened from previous per Kimberly and Wilder. Will start antibiotics at this time for cellulitis, dosed for renal impairment. She previously followed with Dr. Salazar and had improvement in wound healing at that time. Reviewed notes/pictures and wound has worsened from previous. Plan to refer back for ongoing wound care recommendations. Plan to follow up with me in one week if she is unable to see Dr. Salazar prior to that time, may need extended antibiotic course. She would benefit from continued home care for wound management/monitoring.   - CBC with platelets  - Comprehensive metabolic panel (BMP + Alb, Alk Phos, ALT, AST, Total. Bili, TP)  - amoxicillin-clavulanate (AUGMENTIN) 500-125 MG tablet  Dispense: 14 tablet; Refill: 0  - doxycycline hyclate (VIBRA-TABS) 100 MG tablet  Dispense: 14 tablet; Refill: 0  - Orthopedic  Referral  - Home Care Referral    CKD (chronic kidney disease) stage 4, GFR 15-29 ml/min (H)  Will repeat CMP today to monitor renal function, will plan to further adjust antibiotic dose if needed.   - Comprehensive metabolic panel (BMP + Alb, Alk Phos, ALT, AST, Total. Bili, TP)  - Home Care Referral    Mobility impaired  Kimberly has been gaining strength, though still has difficulty leaving her home without Wilder's assistance. Home care referral placed.  - Home Care Referral    Need for Tdap vaccination  Need for vaccination against respiratory syncytial virus  Due for RSV and Tdap vaccinations. Discussed obtaining these at her local pharmacy.         Return in about 1 week (around 3/12/2024).    Subjective   Kimberly is a 83 year old, presenting for the following health issues:  Follow Up (Pt needing to recertify order for at home nursing. ) and Wound Check (Pt has wound from 1.5 years ago on left ankle/calf that became an infection, caused an issue with kidney. Wound tends  to heal, then comes back. Healing now slowly, pt would like it checked on. )        3/5/2024     1:02 PM   Additional Questions   Roomed by OMAR   Accompanied by Wilder,      History of Present Illness       Reason for visit:  Knee check    She eats 2-3 servings of fruits and vegetables daily.She consumes 0 sweetened beverage(s) daily.She exercises with enough effort to increase her heart rate 9 or less minutes per day.  She exercises with enough effort to increase her heart rate 3 or less days per week.   She is taking medications regularly.       Kimberly presents to the clinic with her  (Wilder) today for follow-up.    She notes that home care nurse came out on Sunday afternoon, voiced some concern about her LLE wound. Chronic wound that had been improving. She reports the wound on her LLE started to worsen over the past week, worsening redness, pain worsened over the past week. The wound is larger than previous, open per their report. No drainage noted. She states wound care told her to have it evaluated. She notes chronic swelling of her leg since the wound initially developed months ago. She has a history of cellulitis in this region, previously requiring hospitalization from sepsis. She currently denies systemic symptoms.      Home care comes out to the home twice weekly to change her dressing, uses medi-honey and compression stockings per Kimberly and Wilder. Kimberly notes that they will need a new home care orders as services are ending.       Review of Systems  Constitutional, HEENT, cardiovascular, pulmonary, gi and gu systems are negative, except as otherwise noted.      Objective    /80 (BP Location: Right arm, Patient Position: Sitting, Cuff Size: Adult Small)   Pulse 91   Wt 49.3 kg (108 lb 9.6 oz)   SpO2 99%   BMI 22.19 kg/m    Body mass index is 22.19 kg/m .  Physical Exam   GENERAL: alert and no distress  RESP: lungs clear to auscultation - no rales, rhonchi or wheezes  CV: regular rate  and rhythm, normal S1 S2, no S3 or S4, no murmur, click or rub  SKIN: LLE with anterior open wound without drainage, surrounding erythema, pain with palpation.    PSYCH: mentation appears normal, affect normal/bright          Signed Electronically by: Meaghan Vigil NP

## 2024-03-05 NOTE — PATIENT INSTRUCTIONS
1) obtain RSV, tetanus vaccines at local pharmacy  2) schedule with Dr. Salazar  3) follow up in 1 week, sooner for new or worsening symptoms

## 2024-03-07 ENCOUNTER — MYC MEDICAL ADVICE (OUTPATIENT)
Dept: INTERNAL MEDICINE | Facility: CLINIC | Age: 84
End: 2024-03-07
Payer: MEDICARE

## 2024-03-07 ENCOUNTER — TELEPHONE (OUTPATIENT)
Dept: INTERNAL MEDICINE | Facility: CLINIC | Age: 84
End: 2024-03-07
Payer: MEDICARE

## 2024-03-12 ENCOUNTER — OFFICE VISIT (OUTPATIENT)
Dept: PODIATRY | Facility: CLINIC | Age: 84
End: 2024-03-12
Payer: MEDICARE

## 2024-03-12 DIAGNOSIS — I73.9 PERIPHERAL ARTERIAL DISEASE (H): ICD-10-CM

## 2024-03-12 DIAGNOSIS — L03.116 CELLULITIS OF LEFT LOWER EXTREMITY: ICD-10-CM

## 2024-03-12 DIAGNOSIS — I87.8 VENOUS STASIS: ICD-10-CM

## 2024-03-12 DIAGNOSIS — L97.922 SKIN ULCER OF LEFT LOWER LEG WITH FAT LAYER EXPOSED (H): Primary | ICD-10-CM

## 2024-03-12 PROCEDURE — 29581 APPL MULTLAYER CMPRN SYS LEG: CPT | Mod: LT | Performed by: PODIATRIST

## 2024-03-12 PROCEDURE — 99214 OFFICE O/P EST MOD 30 MIN: CPT | Mod: 25 | Performed by: PODIATRIST

## 2024-03-12 RX ORDER — CLINDAMYCIN HCL 300 MG
300 CAPSULE ORAL 2 TIMES DAILY WITH MEALS
Qty: 20 CAPSULE | Refills: 0 | Status: SHIPPED | OUTPATIENT
Start: 2024-03-12

## 2024-03-12 NOTE — PROGRESS NOTES
Past Medical History:   Diagnosis Date    Anemia     Arthritis     Cataracts     Central retinal artery occlusion     Cervical spine fracture (H)     After a fall from orthostatic sx    Chronic pain     Back of head    Gastro-oesophageal reflux disease     Head injury     Hypertension     Hyponatremia     Resolved, 2/2 diuretics    Migraines     Osteoporosis     Pneumonia 2018    Rheumatoid arthritis(714.0)      Patient Active Problem List   Diagnosis    Intrinsic atopic dermatitis    Essential hypertension    Rheumatoid arthritis (H)    Retinal artery occlusion    Cervical vertebral fracture (H)    Central retinal artery occlusion of right eye    Bilateral occipital neuralgia    C1-C2 instability    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 factor (H)    Osteoarthritis    Malignant hypertension    Hyponatremia    GERD (gastroesophageal reflux disease)    Eczematous dermatitis    Anxiety state    Anemia    Closed fracture of distal end of left radius with delayed healing, unspecified fracture morphology, subsequent encounter    Closed fracture of distal end of left radius, unspecified fracture morphology, sequela    Osteomyelitis of left leg (H)    Pain of left thumb    CKD (chronic kidney disease) stage 4, GFR 15-29 ml/min (H)     Past Surgical History:   Procedure Laterality Date    COLONOSCOPY      EYE SURGERY Left 02/2019    Hole in macula    FUSION CERVICAL POSTERIOR THREE+ LEVELS  1/22/2013    Procedure: FUSION CERVICAL POSTERIOR THREE+ LEVELS;  Cervical 1-6 Posterior Instrumentation and Fusion, Cervical 3-4 Laminectomy  .Instrumentation and fusion to Cervical 6 *Latex Allergy*;  Surgeon: Ra Bar MD;  Location: [facility] OR    GYN SURGERY      HEAD & NECK SURGERY      HYSTERECTOMY      IR ENDOVENOUS ABLATION VARICOSE VEINS  2/16/2021    KNEE SURGERY      NECK SURGERY      OPEN REDUCTION INTERNAL FIXATION WRIST Left 4/30/2019    Procedure: Open Reduction Internal Fixation Left  Distal Radius Fracture;  Surgeon: Dinh Strong MD;  [facility]: [facility]    OPEN REDUCTION INTERNAL FIXATION WRIST Left 6/12/2020    Procedure: OPEN REDUCTION INTERNAL FIXATION Left Distal Radius Nonunion, Distal Ulna Resection;  Surgeon: Dinh Strong MD;  Location: [facility] OR    OPTICAL TRACKING SYSTEM ENDOSCOPIC SINUS SURGERY Right 4/6/2018    Procedure: OPTICAL TRACKING SYSTEM ENDOSCOPIC SINUS SURGERY;  Stealth Assisted Right Maxillary Antrostomy, Anterior Ethmoidectomy And Frontal Sinusotomy;  Surgeon: Elyse Eisenberg MD;  Location: [facility] OR    OPTICAL TRACKING SYSTEM ENDOSCOPIC SINUS SURGERY Right 8/3/2018    Procedure: OPTICAL TRACKING SYSTEM ENDOSCOPIC SINUS SURGERY;  Stealth Assisted Revision Right Frontal Sinusotomy, Right Stent Placement  **Latex Allergy** ;  Surgeon: Elyse Eisenberg MD;  Location: [facility] OR    ORTHOPEDIC SURGERY      REMOVE HARDWARE WRIST Left 11/19/2019    Procedure: Left Distal Radius Hardware Removal, Flexor Pollicus Longus Repair, Deep Cultures;  Surgeon: Dinh Strong MD;  Location: [facility] OR    ZZC HAND/FINGER SURGERY UNLISTED      Z PELVIS/HIP JOINT SURGERY UNLISTED       Social History     Socioeconomic History    Marital status:      Spouse name: Not on file    Number of children: Not on file    Years of education: Not on file    Highest education level: Not on file   Occupational History    Not on file   Tobacco Use    Smoking status: Never    Smokeless tobacco: Never   Substance and Sexual Activity    Alcohol use: Yes     Comment: 2 glasses of wine a day    Drug use: Never    Sexual activity: Not Currently   Other Topics Concern    Parent/sibling w/ CABG, MI or angioplasty before 65F 55M? Not Asked   Social History Narrative    Ms. Chau is , has two grown children and two grandchildren. She does not work. She was never a smoker, and drinks a glass of wine with dinner each night.         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   "    Social Determinants of Health     Financial Resource Strain: Not on file   Food Insecurity: Not on file   Transportation Needs: Not on file   Physical Activity: Not on file   Stress: Not on file   Social Connections: Not on file   Interpersonal Safety: Not on file   Housing Stability: Not on file     Family History   Problem Relation Age of Onset    Arthritis Mother     Respiratory Mother         pulmonary fibrosis    Hypertension Mother     Anemia Mother     Liver Disease Father         from EtOH    Alcoholism Father     No Known Problems Maternal Grandmother     Heart Disease Maternal Grandfather     Glaucoma Paternal Grandfather     Heart Disease Paternal Grandfather     Multiple Sclerosis Sister     Breast Cancer Sister     Skin Cancer No family hx of     Melanoma No family hx of      Reviewed the reports from 8/31/2023 MRI of the left foot, 8/28/2023 x-rays of the left foot, 8/26/2003 ultrasound of the arterials of the extremities, and 8/25/2023 ultrasound venous lower extremity from [facility] as noted in the EMR.      Lab Results   Component Value Date    WBC 9.3 03/05/2024    WBC 7.6 05/06/2021     Lab Results   Component Value Date    RBC 3.05 03/05/2024    RBC 3.28 05/06/2021     Lab Results   Component Value Date    HGB 9.4 03/05/2024    HGB 10.8 05/06/2021     Lab Results   Component Value Date    HCT 28.7 03/05/2024    HCT 33.5 05/06/2021     No components found for: \"MCT\"  Lab Results   Component Value Date    MCV 94 03/05/2024     05/06/2021     Lab Results   Component Value Date    MCH 30.8 03/05/2024    MCH 32.9 05/06/2021     Lab Results   Component Value Date    MCHC 32.8 03/05/2024    MCHC 32.2 05/06/2021     Lab Results   Component Value Date    RDW 12.8 03/05/2024    RDW 15.7 05/06/2021     Lab Results   Component Value Date     03/05/2024     05/06/2021     Last Comprehensive Metabolic Panel:  Lab Results   Component Value Date     (L) 03/05/2024    " POTASSIUM 4.7 03/05/2024    CHLORIDE 101 03/05/2024    CO2 20 (L) 03/05/2024    ANIONGAP 13 03/05/2024     (H) 03/05/2024    BUN 39.5 (H) 03/05/2024    CR 2.19 (H) 03/05/2024    GFRESTIMATED 21 (L) 03/05/2024    KINA 8.8 03/05/2024     Lab Results   Component Value Date    ALBUMIN 4.0 03/05/2024    ALBUMIN 3.6 03/16/2022    ALBUMIN 2.8 01/06/2021                             Subjective findings- 83-year-old returns clinic with her  for ulcers left leg.  Relates in August of last year had leg ulcers with infection and was in the hospital and transitional care for 71 days, relates the ulcers have not healed since, was put on antibiotics by her primary physician, I reviewed internal medicine 3/5/2024 note, reviewed 8/25/2023 [facility] emergency and hospital notes, looks like she was on Augmentin and Doxycycline, she relates she finished these with no problems, relates to no fevers, relates no injuries, relates leg ulcers hurt, relates she has a nursing coming out twice a week and wrapping this with gauze and using a nonstick dressing.    Objective findings- DP and PT are 1 out of 4 left.  Has decreased hair growth left.  Has peripheral edema with venous stasis left lower extremity.  Has a left medial leg ulcer that is through the dermis into the subcutaneous tissue with serosanguineous drainage, mild venous congestion and erythema, no odor, no calor, positive pain on palpation.  Has multiple eschared abrasion type lesions left leg with erythema, no drainage, no odor, no calor, no pain on palpation.    Assessment and plan- Chronic ulcers left leg, Cellulitis left leg, peripheral arterial and venous disease.  Kidney disease present.  Diagnosis and treatment options discussed with them.  The left leg and ulcer sites were cleaned with wound Vashe.  Hydrofera Blue and a multilayer compression system with a light compression applied to the left lower extremity upon consent and use discussed with them.   Surgical shoe dispensed use discussed with them.  Prescription for clindamycin given use discussed with them.  Previous notes reviewed.  Return to clinic and see me Friday as scheduled.                    High level of medical decision making.

## 2024-03-12 NOTE — NURSING NOTE
Kimberly Chau's chief complaint for this visit includes:  Chief Complaint   Patient presents with    Consult     Cellulitis of the lower extremity.     PCP: Guillermo Castellano    Referring Provider:  Meaghan Vigil NP  [id number]  [place] [id number]    There were no vitals taken for this visit.  Data Unavailable     Do you need any medication refills at today's visit? Rickey Chau's chief complaint for this visit includes:  Chief Complaint   Patient presents with    Consult     Cellulitis of the lower extremity.     PCP: Guillermo Castellano    Referring Provider:  Meaghan Vigil NP  [id number]  [place] [id number]    There were no vitals taken for this visit.  Data Unavailable     Do you need any medication refills at today's visit? NO    Allergies   Allergen Reactions    Hctz Other (See Comments)     Pt develops symptomatic and significant hyponatremia with HCTZ exposure    Hydrochlorothiazide      Other reaction(s): Hyponatremia  Hyponatremia    Latex     Benzocaine Rash     carba mix,thrium-sunscreen    Ra Lotion Rash     carba mix,thrium-sunscreen    Ace Inhibitors Unknown     Dizziness and orthostatic changes and electrolyte problems.    Latex Unknown       Humera Hairston LPN    Allergies   Allergen Reactions    Hctz Other (See Comments)     Pt develops symptomatic and significant hyponatremia with HCTZ exposure    Hydrochlorothiazide      Other reaction(s): Hyponatremia  Hyponatremia    Latex     Benzocaine Rash     carba mix,thrium-sunscreen    Ra Lotion Rash     carba mix,thrium-sunscreen    Ace Inhibitors Unknown     Dizziness and orthostatic changes and electrolyte problems.    Latex Unknown       Humera Hairston LPN

## 2024-03-12 NOTE — LETTER
March 12, 2024      Kimberly Chau  [address] [place] [address]        To Whom It May Concern,       The patient was seen in Clinic on 3/12/24. Patient has cellulitis left leg would not advise any elective dental  work at this time.    Sincerely,        Camden Salazar DPM

## 2024-03-12 NOTE — NURSING NOTE
DME FITTING    Relevant Diagnosis: Skin ulcer of left lower leg with fat layer exposed  Post-op shoe was fit on patient's Left foot.     Person(s) involved in teaching:   Patient    Brace was applied in standard Manner:  Yes  Brace fit well:  Yes  Patient reports brace to fit comfortably:  Yes    Education:   Patient shown self application and removal of brace: Yes  Patient shown how to adjust brace fit, if necessary: Yes  Patient educated on billing and return policy: Yes  Patient confirmed understanding when and how to contact clinic with concerns: Yes

## 2024-03-12 NOTE — LETTER
3/12/2024         RE: Kimberly Chau  [address] [facility] [address]        Dear Colleague,    Thank you for referring your patient, Kimberly Chau, to the [facility]. Please see a copy of my visit note below.    Past Medical History:   Diagnosis Date     Anemia      Arthritis      Cataracts      Central retinal artery occlusion      Cervical spine fracture (H)     After a fall from orthostatic sx     Chronic pain     Back of head     Gastro-oesophageal reflux disease      Head injury      Hypertension      Hyponatremia     Resolved, 2/2 diuretics     Migraines      Osteoporosis      Pneumonia 2018     Rheumatoid arthritis(714.0)      Patient Active Problem List   Diagnosis     Intrinsic atopic dermatitis     Essential hypertension     Rheumatoid arthritis (H)     Retinal artery occlusion     Cervical vertebral fracture (H)     Central retinal artery occlusion of right eye     Bilateral occipital neuralgia     C1-C2 instability     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 factor (H)     Osteoarthritis     Malignant hypertension     Hyponatremia     GERD (gastroesophageal reflux disease)     Eczematous dermatitis     Anxiety state     Anemia     Closed fracture of distal end of left radius with delayed healing, unspecified fracture morphology, subsequent encounter     Closed fracture of distal end of left radius, unspecified fracture morphology, sequela     Osteomyelitis of left leg (H)     Pain of left thumb     CKD (chronic kidney disease) stage 4, GFR 15-29 ml/min (H)     Past Surgical History:   Procedure Laterality Date     COLONOSCOPY       EYE SURGERY Left 02/2019    Hole in macula     FUSION CERVICAL POSTERIOR THREE+ LEVELS  1/22/2013    Procedure: FUSION CERVICAL POSTERIOR THREE+ LEVELS;  Cervical 1-6 Posterior Instrumentation and Fusion, Cervical 3-4 Laminectomy  .Instrumentation and fusion to Cervical 6 *Latex Allergy*;  Surgeon: Ra Bar  MD Seth;  Location: [facility] OR     GYN SURGERY       HEAD & NECK SURGERY       HYSTERECTOMY       IR ENDOVENOUS ABLATION VARICOSE VEINS  2/16/2021     KNEE SURGERY       NECK SURGERY       OPEN REDUCTION INTERNAL FIXATION WRIST Left 4/30/2019    Procedure: Open Reduction Internal Fixation Left Distal Radius Fracture;  Surgeon: Dinh Strong MD;  Location:  OR     OPEN REDUCTION INTERNAL FIXATION WRIST Left 6/12/2020    Procedure: OPEN REDUCTION INTERNAL FIXATION Left Distal Radius Nonunion, Distal Ulna Resection;  Surgeon: Dinh Strong MD;  Location:  OR     OPTICAL TRACKING SYSTEM ENDOSCOPIC SINUS SURGERY Right 4/6/2018    Procedure: OPTICAL TRACKING SYSTEM ENDOSCOPIC SINUS SURGERY;  Stealth Assisted Right Maxillary Antrostomy, Anterior Ethmoidectomy And Frontal Sinusotomy;  Surgeon: Elyse Eisenberg MD;  Location:  OR     OPTICAL TRACKING SYSTEM ENDOSCOPIC SINUS SURGERY Right 8/3/2018    Procedure: OPTICAL TRACKING SYSTEM ENDOSCOPIC SINUS SURGERY;  Stealth Assisted Revision Right Frontal Sinusotomy, Right Stent Placement  **Latex Allergy** ;  Surgeon: Elyse Eisenberg MD;  Location:  OR     ORTHOPEDIC SURGERY       REMOVE HARDWARE WRIST Left 11/19/2019    Procedure: Left Distal Radius Hardware Removal, Flexor Pollicus Longus Repair, Deep Cultures;  Surgeon: Dinh Strong MD;  Location:  OR     ZZC HAND/FINGER SURGERY UNLISTED       ZZC PELVIS/HIP JOINT SURGERY UNLISTED       Social History     Socioeconomic History     Marital status:      Spouse name: Not on file     Number of children: Not on file     Years of education: Not on file     Highest education level: Not on file   Occupational History     Not on file   Tobacco Use     Smoking status: Never     Smokeless tobacco: Never   Substance and Sexual Activity     Alcohol use: Yes     Comment: 2 glasses of wine a day     Drug use: Never     Sexual activity: Not Currently   Other Topics Concern     Parent/sibling w/ CABG, MI  "or angioplasty before 65F 55M? Not Asked   Social History Narrative    Ms. Chau is , has two grown children and two grandchildren. She does not work. She was never a smoker, and drinks a glass of wine with dinner each night.         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      Social Determinants of Health     Financial Resource Strain: Not on file   Food Insecurity: Not on file   Transportation Needs: Not on file   Physical Activity: Not on file   Stress: Not on file   Social Connections: Not on file   Interpersonal Safety: Not on file   Housing Stability: Not on file     Family History   Problem Relation Age of Onset     Arthritis Mother      Respiratory Mother         pulmonary fibrosis     Hypertension Mother      Anemia Mother      Liver Disease Father         from EtOH     Alcoholism Father      No Known Problems Maternal Grandmother      Heart Disease Maternal Grandfather      Glaucoma Paternal Grandfather      Heart Disease Paternal Grandfather      Multiple Sclerosis Sister      Breast Cancer Sister      Skin Cancer No family hx of      Melanoma No family hx of      Reviewed the reports from 8/31/2023 MRI of the left foot, 8/28/2023 x-rays of the left foot, 8/26/2003 ultrasound of the arterials of the extremities, and 8/25/2023 ultrasound venous lower extremity from [facility] as noted in the EMR.      Lab Results   Component Value Date    WBC 9.3 03/05/2024    WBC 7.6 05/06/2021     Lab Results   Component Value Date    RBC 3.05 03/05/2024    RBC 3.28 05/06/2021     Lab Results   Component Value Date    HGB 9.4 03/05/2024    HGB 10.8 05/06/2021     Lab Results   Component Value Date    HCT 28.7 03/05/2024    HCT 33.5 05/06/2021     No components found for: \"MCT\"  Lab Results   Component Value Date    MCV 94 03/05/2024     05/06/2021     Lab Results   Component Value Date    MCH 30.8 03/05/2024    MCH 32.9 05/06/2021     Lab Results   Component Value " Date    MCHC 32.8 03/05/2024    MCHC 32.2 05/06/2021     Lab Results   Component Value Date    RDW 12.8 03/05/2024    RDW 15.7 05/06/2021     Lab Results   Component Value Date     03/05/2024     05/06/2021     Last Comprehensive Metabolic Panel:  Lab Results   Component Value Date     (L) 03/05/2024    POTASSIUM 4.7 03/05/2024    CHLORIDE 101 03/05/2024    CO2 20 (L) 03/05/2024    ANIONGAP 13 03/05/2024     (H) 03/05/2024    BUN 39.5 (H) 03/05/2024    CR 2.19 (H) 03/05/2024    GFRESTIMATED 21 (L) 03/05/2024    KINA 8.8 03/05/2024     Lab Results   Component Value Date    ALBUMIN 4.0 03/05/2024    ALBUMIN 3.6 03/16/2022    ALBUMIN 2.8 01/06/2021                             Subjective findings- 83-year-old returns clinic with her  for ulcers left leg.  Relates in August of last year had leg ulcers with infection and was in the hospital and transitional care for 71 days, relates the ulcers have not healed since, was put on antibiotics by her primary physician, I reviewed internal medicine 3/5/2024 note, reviewed 8/25/2023 [facility] emergency and hospital notes, looks like she was on Augmentin and Doxycycline, she relates she finished these with no problems, relates to no fevers, relates no injuries, relates leg ulcers hurt, relates she has a nursing coming out twice a week and wrapping this with gauze and using a nonstick dressing.    Objective findings- DP and PT are 1 out of 4 left.  Has decreased hair growth left.  Has peripheral edema with venous stasis left lower extremity.  Has a left medial leg ulcer that is through the dermis into the subcutaneous tissue with serosanguineous drainage, mild venous congestion and erythema, no odor, no calor, positive pain on palpation.  Has multiple eschared abrasion type lesions left leg with erythema, no drainage, no odor, no calor, no pain on palpation.    Assessment and plan- Chronic ulcers left leg, Cellulitis left leg, peripheral  arterial and venous disease.  Kidney disease present.  Diagnosis and treatment options discussed with them.  The left leg and ulcer sites were cleaned with wound Vashe.  Hydrofera Blue and a multilayer compression system with a light compression applied to the left lower extremity upon consent and use discussed with them.  Surgical shoe dispensed use discussed with them.  Prescription for clindamycin given use discussed with them.  Previous notes reviewed.  Return to clinic and see me Friday as scheduled.                    High level of medical decision making.      Again, thank you for allowing me to participate in the care of your patient.        Sincerely,        Camden Salazar DPM

## 2024-03-14 ENCOUNTER — TELEPHONE (OUTPATIENT)
Dept: INTERNAL MEDICINE | Facility: CLINIC | Age: 84
End: 2024-03-14

## 2024-03-14 NOTE — TELEPHONE ENCOUNTER
[facility] Call Center    Phone Message    May a detailed message be left on voicemail: yes     Reason for Call: Other: reporting morning pulse at 102 prior to medication and no sign of tachycardia     Action Taken: Other: pcc    Travel Screening: Not Applicable

## 2024-03-15 ENCOUNTER — OFFICE VISIT (OUTPATIENT)
Dept: PODIATRY | Facility: CLINIC | Age: 84
End: 2024-03-15
Payer: MEDICARE

## 2024-03-15 DIAGNOSIS — I87.8 VENOUS STASIS: ICD-10-CM

## 2024-03-15 DIAGNOSIS — I73.9 PERIPHERAL ARTERIAL DISEASE (H): ICD-10-CM

## 2024-03-15 DIAGNOSIS — L03.116 CELLULITIS OF LEFT LOWER EXTREMITY: Primary | ICD-10-CM

## 2024-03-15 DIAGNOSIS — L97.922 SKIN ULCER OF LEFT LOWER LEG WITH FAT LAYER EXPOSED (H): ICD-10-CM

## 2024-03-15 PROCEDURE — 29581 APPL MULTLAYER CMPRN SYS LEG: CPT | Mod: LT | Performed by: PODIATRIST

## 2024-03-15 PROCEDURE — 99214 OFFICE O/P EST MOD 30 MIN: CPT | Mod: 25 | Performed by: PODIATRIST

## 2024-03-15 NOTE — PATIENT INSTRUCTIONS
Once weekly and as needed for drainage clean the leg with wound Vashe, apply Hydrofera Blue to the ulcer sites, apply multilayer compression system with an Unna boot to the left leg with light compression(compression system layers with Unna boot, sterile Kerlix, sterile Susanne wrap, Coban, Ace wrap).

## 2024-03-15 NOTE — PROGRESS NOTES
Past Medical History:   Diagnosis Date    Anemia     Arthritis     Cataracts     Central retinal artery occlusion     Cervical spine fracture (H)     After a fall from orthostatic sx    Chronic pain     Back of head    Gastro-oesophageal reflux disease     Head injury     Hypertension     Hyponatremia     Resolved, 2/2 diuretics    Migraines     Osteoporosis     Pneumonia 2018    Rheumatoid arthritis(714.0)      Patient Active Problem List   Diagnosis    Intrinsic atopic dermatitis    Essential hypertension    Rheumatoid arthritis (H)    Retinal artery occlusion    Cervical vertebral fracture (H)    Central retinal artery occlusion of right eye    Bilateral occipital neuralgia    C1-C2 instability    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 factor (H)    Osteoarthritis    Malignant hypertension    Hyponatremia    GERD (gastroesophageal reflux disease)    Eczematous dermatitis    Anxiety state    Anemia    Closed fracture of distal end of left radius with delayed healing, unspecified fracture morphology, subsequent encounter    Closed fracture of distal end of left radius, unspecified fracture morphology, sequela    Osteomyelitis of left leg (H)    Pain of left thumb    CKD (chronic kidney disease) stage 4, GFR 15-29 ml/min (H)     Past Surgical History:   Procedure Laterality Date    COLONOSCOPY      EYE SURGERY Left 02/2019    Hole in macula    FUSION CERVICAL POSTERIOR THREE+ LEVELS  1/22/2013    Procedure: FUSION CERVICAL POSTERIOR THREE+ LEVELS;  Cervical 1-6 Posterior Instrumentation and Fusion, Cervical 3-4 Laminectomy  .Instrumentation and fusion to Cervical 6 *Latex Allergy*;  Surgeon: Ra Bar MD;  Location: [facility] OR    GYN SURGERY      HEAD & NECK SURGERY      HYSTERECTOMY      IR ENDOVENOUS ABLATION VARICOSE VEINS  2/16/2021    KNEE SURGERY      NECK SURGERY      OPEN REDUCTION INTERNAL FIXATION WRIST Left 4/30/2019    Procedure: Open Reduction Internal Fixation Left  Distal Radius Fracture;  Surgeon: Dinh Strong MD;  Location: [facility] OR    OPEN REDUCTION INTERNAL FIXATION WRIST Left 6/12/2020    Procedure: OPEN REDUCTION INTERNAL FIXATION Left Distal Radius Nonunion, Distal Ulna Resection;  Surgeon: Dinh Strong MD;  Location: [facility] OR    OPTICAL TRACKING SYSTEM ENDOSCOPIC SINUS SURGERY Right 4/6/2018    Procedure: OPTICAL TRACKING SYSTEM ENDOSCOPIC SINUS SURGERY;  Stealth Assisted Right Maxillary Antrostomy, Anterior Ethmoidectomy And Frontal Sinusotomy;  Surgeon: Elyse Eisenberg MD;  Location: [facility] OR    OPTICAL TRACKING SYSTEM ENDOSCOPIC SINUS SURGERY Right 8/3/2018    Procedure: OPTICAL TRACKING SYSTEM ENDOSCOPIC SINUS SURGERY;  Stealth Assisted Revision Right Frontal Sinusotomy, Right Stent Placement  **Latex Allergy** ;  Surgeon: Elyse Eisenberg MD;  Location: [facility] OR    ORTHOPEDIC SURGERY      REMOVE HARDWARE WRIST Left 11/19/2019    Procedure: Left Distal Radius Hardware Removal, Flexor Pollicus Longus Repair, Deep Cultures;  Surgeon: Dinh Strong MD;  Location: [facility] OR    ZZC HAND/FINGER SURGERY UNLISTED      Z PELVIS/HIP JOINT SURGERY UNLISTED       Social History     Socioeconomic History    Marital status:      Spouse name: Not on file    Number of children: Not on file    Years of education: Not on file    Highest education level: Not on file   Occupational History    Not on file   Tobacco Use    Smoking status: Never    Smokeless tobacco: Never   Substance and Sexual Activity    Alcohol use: Yes     Comment: 2 glasses of wine a day    Drug use: Never    Sexual activity: Not Currently   Other Topics Concern    Parent/sibling w/ CABG, MI or angioplasty before 65F 55M? Not Asked   Social History Narrative    Ms. Chau is , has two grown children and two grandchildren. She does not work. She was never a smoker, and drinks a glass of wine with dinner each night.         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       Social Determinants of Health     Financial Resource Strain: Not on file   Food Insecurity: Not on file   Transportation Needs: Not on file   Physical Activity: Not on file   Stress: Not on file   Social Connections: Not on file   Interpersonal Safety: Not on file   Housing Stability: Not on file     Family History   Problem Relation Age of Onset    Arthritis Mother     Respiratory Mother         pulmonary fibrosis    Hypertension Mother     Anemia Mother     Liver Disease Father         from EtOH    Alcoholism Father     No Known Problems Maternal Grandmother     Heart Disease Maternal Grandfather     Glaucoma Paternal Grandfather     Heart Disease Paternal Grandfather     Multiple Sclerosis Sister     Breast Cancer Sister     Skin Cancer No family hx of     Melanoma No family hx of                      Subjective findings- 83-year-old returns clinic with her  for ulcers left leg.  Relates is doing well, relates to no problems with the multilayer compression boot with an Unna boot, relates to taking the Clindamycin with no problems.  Relates the home nurse comes out twice a week and they related they could do the multilayer compression system with an Unna boot changes.    Objective findings- DP and PT 2 out of 4 left.  Has decreased hair growth.  Has decreased edema with venous stasis.  Has a left medial and anterior leg ulcers with decreased edema, decreased erythema, no odor, no calor positive serosanguineous drainage, no pain on palpation.    Assessment and plan- Chronic ulcers left leg, Cellulitis left leg, peripheral arterial and venous disease, kidney disease present.  Diagnosis and treatment options discussed with them.  The left leg and ulcer sites were cleaned with wound Vashe.  Hydrofera Blue and a multilayer compression system with an Unna boot and with light compression applied to the left lower extremity upon consent.  Continue surgical shoe.  Finish the Clindamycin.  No imaging and no labs  today.  Put instructions for home nursing to change the multilayer compression system with an Unna boot and for wound cares weekly and as needed for drainage or problems.  Previous notes reviewed.  Return to clinic and see me in 1 month.                  Moderate level of medical decision making.

## 2024-03-15 NOTE — NURSING NOTE
Kimberly Chau's chief complaint for this visit includes:  Chief Complaint   Patient presents with    Consult     PCP: Guillermo Castellano    Referring Provider:  No referring provider defined for this encounter.    There were no vitals taken for this visit.  Data Unavailable     Do you need any medication refills at today's visit? NO    Allergies   Allergen Reactions    Hctz Other (See Comments)     Pt develops symptomatic and significant hyponatremia with HCTZ exposure    Hydrochlorothiazide      Other reaction(s): Hyponatremia  Hyponatremia    Latex     Benzocaine Rash     carba mix,thrium-sunscreen    Ra Lotion Rash     carba mix,thrium-sunscreen    Ace Inhibitors Unknown     Dizziness and orthostatic changes and electrolyte problems.    Latex Unknown       Humera Hairston LPN

## 2024-03-15 NOTE — LETTER
3/15/2024         RE: Kimberly Chau  [address] [facility] [address]        Dear Colleague,    Thank you for referring your patient, Kimberly Chau, to the [facility]. Please see a copy of my visit note below.    Past Medical History:   Diagnosis Date     Anemia      Arthritis      Cataracts      Central retinal artery occlusion      Cervical spine fracture (H)     After a fall from orthostatic sx     Chronic pain     Back of head     Gastro-oesophageal reflux disease      Head injury      Hypertension      Hyponatremia     Resolved, 2/2 diuretics     Migraines      Osteoporosis      Pneumonia 2018     Rheumatoid arthritis(714.0)      Patient Active Problem List   Diagnosis     Intrinsic atopic dermatitis     Essential hypertension     Rheumatoid arthritis (H)     Retinal artery occlusion     Cervical vertebral fracture (H)     Central retinal artery occlusion of right eye     Bilateral occipital neuralgia     C1-C2 instability     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 factor (H)     Osteoarthritis     Malignant hypertension     Hyponatremia     GERD (gastroesophageal reflux disease)     Eczematous dermatitis     Anxiety state     Anemia     Closed fracture of distal end of left radius with delayed healing, unspecified fracture morphology, subsequent encounter     Closed fracture of distal end of left radius, unspecified fracture morphology, sequela     Osteomyelitis of left leg (H)     Pain of left thumb     CKD (chronic kidney disease) stage 4, GFR 15-29 ml/min (H)     Past Surgical History:   Procedure Laterality Date     COLONOSCOPY       EYE SURGERY Left 02/2019    Hole in macula     FUSION CERVICAL POSTERIOR THREE+ LEVELS  1/22/2013    Procedure: FUSION CERVICAL POSTERIOR THREE+ LEVELS;  Cervical 1-6 Posterior Instrumentation and Fusion, Cervical 3-4 Laminectomy  .Instrumentation and fusion to Cervical 6 *Latex Allergy*;  Surgeon: Ra Bar  MD Seth;  Location: [facility] OR     GYN SURGERY       HEAD & NECK SURGERY       HYSTERECTOMY       IR ENDOVENOUS ABLATION VARICOSE VEINS  2/16/2021     KNEE SURGERY       NECK SURGERY       OPEN REDUCTION INTERNAL FIXATION WRIST Left 4/30/2019    Procedure: Open Reduction Internal Fixation Left Distal Radius Fracture;  Surgeon: Dinh Strong MD;  Location:  OR     OPEN REDUCTION INTERNAL FIXATION WRIST Left 6/12/2020    Procedure: OPEN REDUCTION INTERNAL FIXATION Left Distal Radius Nonunion, Distal Ulna Resection;  Surgeon: Dinh Strong MD;  Location:  OR     OPTICAL TRACKING SYSTEM ENDOSCOPIC SINUS SURGERY Right 4/6/2018    Procedure: OPTICAL TRACKING SYSTEM ENDOSCOPIC SINUS SURGERY;  Stealth Assisted Right Maxillary Antrostomy, Anterior Ethmoidectomy And Frontal Sinusotomy;  Surgeon: Elyse Eisenberg MD;  Location:  OR     OPTICAL TRACKING SYSTEM ENDOSCOPIC SINUS SURGERY Right 8/3/2018    Procedure: OPTICAL TRACKING SYSTEM ENDOSCOPIC SINUS SURGERY;  Stealth Assisted Revision Right Frontal Sinusotomy, Right Stent Placement  **Latex Allergy** ;  Surgeon: Elyse Eisenberg MD;  Location:  OR     ORTHOPEDIC SURGERY       REMOVE HARDWARE WRIST Left 11/19/2019    Procedure: Left Distal Radius Hardware Removal, Flexor Pollicus Longus Repair, Deep Cultures;  Surgeon: Dinh Strong MD;  Location:  OR     ZZC HAND/FINGER SURGERY UNLISTED       ZZC PELVIS/HIP JOINT SURGERY UNLISTED       Social History     Socioeconomic History     Marital status:      Spouse name: Not on file     Number of children: Not on file     Years of education: Not on file     Highest education level: Not on file   Occupational History     Not on file   Tobacco Use     Smoking status: Never     Smokeless tobacco: Never   Substance and Sexual Activity     Alcohol use: Yes     Comment: 2 glasses of wine a day     Drug use: Never     Sexual activity: Not Currently   Other Topics Concern     Parent/sibling w/ CABG, MI  or angioplasty before 65F 55M? Not Asked   Social History Narrative    Ms. Chau is , has two grown children and two grandchildren. She does not work. She was never a smoker, and drinks a glass of wine with dinner each night.         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      Social Determinants of Health     Financial Resource Strain: Not on file   Food Insecurity: Not on file   Transportation Needs: Not on file   Physical Activity: Not on file   Stress: Not on file   Social Connections: Not on file   Interpersonal Safety: Not on file   Housing Stability: Not on file     Family History   Problem Relation Age of Onset     Arthritis Mother      Respiratory Mother         pulmonary fibrosis     Hypertension Mother      Anemia Mother      Liver Disease Father         from EtOH     Alcoholism Father      No Known Problems Maternal Grandmother      Heart Disease Maternal Grandfather      Glaucoma Paternal Grandfather      Heart Disease Paternal Grandfather      Multiple Sclerosis Sister      Breast Cancer Sister      Skin Cancer No family hx of      Melanoma No family hx of                      Subjective findings- 83-year-old returns clinic with her  for ulcers left leg.  Relates is doing well, relates to no problems with the multilayer compression boot with an Unna boot, relates to taking the Clindamycin with no problems.  Relates the home nurse comes out twice a week and they related they could do the multilayer compression system with an Unna boot changes.    Objective findings- DP and PT 2 out of 4 left.  Has decreased hair growth.  Has decreased edema with venous stasis.  Has a left medial and anterior leg ulcers with decreased edema, decreased erythema, no odor, no calor positive serosanguineous drainage, no pain on palpation.    Assessment and plan- Chronic ulcers left leg, Cellulitis left leg, peripheral arterial and venous disease, kidney disease present.  Diagnosis  and treatment options discussed with them.  The left leg and ulcer sites were cleaned with wound Vashe.  Hydrofera Blue and a multilayer compression system with an Unna boot and with light compression applied to the left lower extremity upon consent.  Continue surgical shoe.  Finish the Clindamycin.  No imaging and no labs today.  Put instructions for home nursing to change the multilayer compression system with an Unna boot and for wound cares weekly and as needed for drainage or problems.  Previous notes reviewed.  Return to clinic and see me in 1 month.                  Moderate level of medical decision making.        Again, thank you for allowing me to participate in the care of your patient.        Sincerely,        Camden aSlazar DPM

## 2024-03-19 ENCOUNTER — DOCUMENTATION ONLY (OUTPATIENT)
Dept: INTERNAL MEDICINE | Facility: CLINIC | Age: 84
End: 2024-03-19
Payer: MEDICARE

## 2024-03-19 NOTE — PROGRESS NOTES
Type of Form Received:     Form Received (Date) 3/19/24   Form Filled out Yes 3/26/24   Placed in provider folder Yes

## 2024-03-20 DIAGNOSIS — N18.4 CKD (CHRONIC KIDNEY DISEASE) STAGE 4, GFR 15-29 ML/MIN (H): Primary | ICD-10-CM

## 2024-03-22 ENCOUNTER — TELEPHONE (OUTPATIENT)
Dept: PODIATRY | Facility: CLINIC | Age: 84
End: 2024-03-22
Payer: MEDICARE

## 2024-03-22 NOTE — LETTER
March 25, 2024      Kimberly Chau  [address] [place] [address]        To Whom It May Concern,     The patient is being seen for the continuing of care a leg ulcer. I am ok with the patient having a dental procedure, but it is ultimately up to the Dentist to decide if the dental procedure is ok, for the patient. If the dentist decides the dental procedure is ok for the patient, they will determine antibiotic prophylaxis for the patient as well.           Sincerely,        Camden Salazar DPM

## 2024-03-22 NOTE — TELEPHONE ENCOUNTER
Other: Patient has a dental procedure on 03/27. Wants to make sure that is ok and if it is ok the dentist needs a written note stating it is ok. She only had the dentist phone number [facility] [phone]     Could we send this information to you in [facility] or would you prefer to receive a phone call?:   Rafia would prefer a phone call   Okay to leave a detailed message?: Yes at Other phone number:  [phone]

## 2024-03-24 ENCOUNTER — MEDICAL CORRESPONDENCE (OUTPATIENT)
Dept: HEALTH INFORMATION MANAGEMENT | Facility: CLINIC | Age: 84
End: 2024-03-24
Payer: MEDICARE

## 2024-03-25 NOTE — TELEPHONE ENCOUNTER
"Called and talked to the patient. Mentioned that we got her message about needing a note saying it is ok for her to have dental procedure. It was mentioned that the message was forwarded to Dr. Salazar and he said, \"It is up to the Dentist to decide if a dental procedure is ok, but from my view she should be able to do this.  We can give her a note stating that she has a leg ulcer and are ok with her having a dental procedure if it is ok with the Dentist and will leave antibiotic prophylaxis up to them.\" Mentioned to the patient that we will work on the note and get into her MyChart. The patient verbally understood.    "

## 2024-03-25 NOTE — TELEPHONE ENCOUNTER
Patient is requesting the letter stating it's OK for her to have dental procedure placed in her MyChart so she can show her dentist.

## 2024-03-26 ENCOUNTER — LAB (OUTPATIENT)
Dept: LAB | Facility: CLINIC | Age: 84
End: 2024-03-26
Payer: MEDICARE

## 2024-03-26 ENCOUNTER — DOCUMENTATION ONLY (OUTPATIENT)
Dept: INTERNAL MEDICINE | Facility: CLINIC | Age: 84
End: 2024-03-26

## 2024-03-26 ENCOUNTER — OFFICE VISIT (OUTPATIENT)
Dept: NEPHROLOGY | Facility: CLINIC | Age: 84
End: 2024-03-26
Payer: MEDICARE

## 2024-03-26 VITALS
OXYGEN SATURATION: 98 % | WEIGHT: 111.4 LBS | BODY MASS INDEX: 22.76 KG/M2 | HEART RATE: 93 BPM | DIASTOLIC BLOOD PRESSURE: 87 MMHG | SYSTOLIC BLOOD PRESSURE: 167 MMHG

## 2024-03-26 DIAGNOSIS — N17.9 ACUTE KIDNEY INJURY (NONTRAUMATIC) (H): ICD-10-CM

## 2024-03-26 DIAGNOSIS — N18.4 CKD (CHRONIC KIDNEY DISEASE) STAGE 4, GFR 15-29 ML/MIN (H): ICD-10-CM

## 2024-03-26 DIAGNOSIS — N18.1 CKD (CHRONIC KIDNEY DISEASE) STAGE 1, GFR 90 ML/MIN OR GREATER: ICD-10-CM

## 2024-03-26 DIAGNOSIS — N18.4 CKD (CHRONIC KIDNEY DISEASE) STAGE 4, GFR 15-29 ML/MIN (H): Primary | ICD-10-CM

## 2024-03-26 LAB
ALBUMIN MFR UR ELPH: 49.8 MG/DL
ALBUMIN SERPL BCG-MCNC: 4.1 G/DL (ref 3.5–5.2)
ALBUMIN UR-MCNC: 50 MG/DL
ANION GAP SERPL CALCULATED.3IONS-SCNC: 10 MMOL/L (ref 7–15)
APPEARANCE UR: CLEAR
BACTERIA #/AREA URNS HPF: ABNORMAL /HPF
BILIRUB UR QL STRIP: NEGATIVE
BUN SERPL-MCNC: 29.9 MG/DL (ref 8–23)
CALCIUM SERPL-MCNC: 9.3 MG/DL (ref 8.8–10.2)
CHLORIDE SERPL-SCNC: 99 MMOL/L (ref 98–107)
COLOR UR AUTO: ABNORMAL
CREAT SERPL-MCNC: 1.75 MG/DL (ref 0.51–0.95)
CREAT UR-MCNC: 73.5 MG/DL
DEPRECATED HCO3 PLAS-SCNC: 26 MMOL/L (ref 22–29)
EGFRCR SERPLBLD CKD-EPI 2021: 28 ML/MIN/1.73M2
ERYTHROCYTE [DISTWIDTH] IN BLOOD BY AUTOMATED COUNT: 12.6 % (ref 10–15)
GLUCOSE SERPL-MCNC: 131 MG/DL (ref 70–99)
GLUCOSE UR STRIP-MCNC: NEGATIVE MG/DL
HCT VFR BLD AUTO: 29.8 % (ref 35–47)
HGB BLD-MCNC: 9.5 G/DL (ref 11.7–15.7)
HGB UR QL STRIP: ABNORMAL
KETONES UR STRIP-MCNC: NEGATIVE MG/DL
LEUKOCYTE ESTERASE UR QL STRIP: ABNORMAL
MCH RBC QN AUTO: 30.7 PG (ref 26.5–33)
MCHC RBC AUTO-ENTMCNC: 31.9 G/DL (ref 31.5–36.5)
MCV RBC AUTO: 96 FL (ref 78–100)
NITRATE UR QL: NEGATIVE
PH UR STRIP: 7 [PH] (ref 5–7)
PHOSPHATE SERPL-MCNC: 3.9 MG/DL (ref 2.5–4.5)
PLATELET # BLD AUTO: 291 10E3/UL (ref 150–450)
POTASSIUM SERPL-SCNC: 4.6 MMOL/L (ref 3.4–5.3)
PROT/CREAT 24H UR: 0.68 MG/MG CR (ref 0–0.2)
PTH-INTACT SERPL-MCNC: 46 PG/ML (ref 15–65)
RBC # BLD AUTO: 3.09 10E6/UL (ref 3.8–5.2)
RBC #/AREA URNS AUTO: ABNORMAL /HPF
SKIP: ABNORMAL
SODIUM SERPL-SCNC: 135 MMOL/L (ref 135–145)
SP GR UR STRIP: 1.01 (ref 1–1.03)
SQUAMOUS #/AREA URNS AUTO: ABNORMAL /LPF
TRANS CELLS #/AREA URNS HPF: ABNORMAL /HPF
UROBILINOGEN UR STRIP-MCNC: NORMAL MG/DL
WBC # BLD AUTO: 5.7 10E3/UL (ref 4–11)
WBC #/AREA URNS AUTO: ABNORMAL /HPF

## 2024-03-26 PROCEDURE — 99215 OFFICE O/P EST HI 40 MIN: CPT | Performed by: INTERNAL MEDICINE

## 2024-03-26 PROCEDURE — 81001 URINALYSIS AUTO W/SCOPE: CPT

## 2024-03-26 PROCEDURE — 99417 PROLNG OP E/M EACH 15 MIN: CPT | Performed by: INTERNAL MEDICINE

## 2024-03-26 PROCEDURE — G2211 COMPLEX E/M VISIT ADD ON: HCPCS | Performed by: INTERNAL MEDICINE

## 2024-03-26 PROCEDURE — 36415 COLL VENOUS BLD VENIPUNCTURE: CPT

## 2024-03-26 PROCEDURE — 85027 COMPLETE CBC AUTOMATED: CPT

## 2024-03-26 PROCEDURE — 84156 ASSAY OF PROTEIN URINE: CPT

## 2024-03-26 PROCEDURE — 80069 RENAL FUNCTION PANEL: CPT

## 2024-03-26 PROCEDURE — 83970 ASSAY OF PARATHORMONE: CPT

## 2024-03-26 NOTE — PATIENT INSTRUCTIONS
It was a pleasure taking care of you today.  I've included a brief summary of our discussion and care plan from today's visit below.  Please review this information with your primary care provider.     My recommendations are summarized as follows:  -Please check if you are taking the losartan at home; if not, I suggest that you take 25 mg daily  -Will refer you to the kidney education class.  -Monitor your BP at home    Who do I call with any questions after my visit?  Please be in touch if there are any further questions that arise following today's visit.  There are multiple ways to contact your nephrology care team.       During business hours, you may reach your Nephrology Care Team or schedule or reschedule an appointment or lab at [phone].       If you need to schedule imaging, please call [phone].   To schedule a COVID test, please call [phone].     You can always send a secure message through [employer]. [employer] messages are answered by your nurse or doctor typically within 24-48 hours. Please allow extra time on weekends and holidays.       For urgent/emergent questions after business hours, you may reach the on-call Nephrology Fellow by contacting the [facility]  at [phone].     How will I get the results of any tests ordered?    You will receive all of your results.  If you have signed up for [employer], any tests ordered at your visit will be available to you once resulted on [employer]. Typically the physician reviews them and may or may not make further recommendations. If there are urgent results that require a change in your care plan, your physician or nurse will call you to discuss the next steps. If you are not on MyChart, a letter may be generated and mailed to you with your results.

## 2024-03-26 NOTE — PROGRESS NOTES
I was asked to see this patient by Guillermo Juarez    CC: CKD    HPI:   I had the pleasure today of seeing Kimberly Chau. She is a 83 year old female  who presents for Follow-up  of CKD. She is here today with her  of 65 years.    Past medical history is significant for longstanding rheumatoid arthritis [currently on no treatment], osteoporosis complicated by T11 compression fracture, anemia, Hpertension, GERD.     She presented to the emergency department in August 26, 2023 with left lower leg ulcer and cellulitis.  She was found to be in acute renal failure (creatinine 8 mg/dl).  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  The degree of fibrosis on the biopsy was 10 to 20%.  She was urgently started on dialysis and received thrice weekly dialysis for 11 weeks.  She has been off dialysis since November 2023.    She has been feeling well.  She has regained her baseline strength.  Her appetite is good.  Her weight has been stable.  Energy is good, she does the cooking and laundry at home.   BP at home 135/70. No SOB or gross hematuria or other urinary symptoms.    She developed cellulitis in the left leg, treated with antibiotics and it got better.He still has a wond but it is getting better. She follows with podiatry.    Social history: she is  for 65 years. She has 2 sons and 2 granddaughters and one great granddaughter.     Allergies   Allergen Reactions    Hctz Other (See Comments)     Pt develops symptomatic and significant hyponatremia with HCTZ exposure    Hydrochlorothiazide      Other reaction(s): Hyponatremia  Hyponatremia    Latex     Benzocaine Rash     carba mix,thrium-sunscreen    Ra Lotion Rash     carba mix,thrium-sunscreen    Ace Inhibitors Unknown     Dizziness and orthostatic changes and  "electrolyte problems.    Latex Unknown       amLODIPine (NORVASC) 5 MG tablet, Take 1 tablet (5 mg) by mouth daily  carvedilol (COREG) 12.5 MG tablet, Take 1 tablet (12.5 mg) by mouth 2 times daily (with meals)  clindamycin (CLEOCIN) 300 MG capsule, Take 1 capsule (300 mg) by mouth 2 times daily (with meals)  COMPRESSION STOCKINGS, Please measure and distribute 1 pair of 20mmHg - 30mmHg knee high open or closed toe compression stockings. Jobst ultrasheer or equivalent.  Diphenhydramine-APAP, sleep, (TYLENOL PM EXTRA STRENGTH PO), Take 2 tablets by mouth At Bedtime   ferrous gluconate (FERGON) 324 (38 Fe) MG tablet, Take 324 mg by mouth  Gauze Pads & Dressings (TELFA NON-ADHERENT) 3\"X4\" PADS, Cut to size for open areas on the lower extremities and secure with paper tape. Change dressing every other day.  losartan (COZAAR) 25 MG tablet, Take 1 tablet (25 mg) by mouth at bedtime  multivitamin w/minerals (MULTI-VITAMIN) tablet, Take 1 tablet by mouth every morning  Omeprazole (PRILOSEC PO), Take 20 mg by mouth as needed   pentoxifylline ER (TRENTAL) 400 MG CR tablet, Take 1 tablet (400 mg) by mouth 3 times daily (with meals)  triamcinolone (KENALOG) 0.1 % external cream, Apply twice daily for 2 weeks then 3 times weekly for 2 week, repeat cycle as needed. Apply to affected area using an amount sufficient to cover the affected rishi  vitamin D3 (CHOLECALCIFEROL) 50 mcg (2000 units) tablet, Take 1 tablet by mouth daily  acetaminophen 650 MG TABS, Take 650 mg by mouth every 4 hours as needed. (Patient not taking: Reported on 11/27/2023)  amoxicillin-clavulanate (AUGMENTIN) 500-125 MG tablet, Take 1 tablet by mouth 2 times daily (Patient not taking: Reported on 3/15/2024)  cephALEXin (KEFLEX) 500 MG capsule, Take 1 capsule (500 mg) by mouth 2 times daily (Patient not taking: Reported on 12/19/2023)  denosumab (PROLIA) 60 MG/ML SOSY injection, Inject 1 mL (60 mg) Subcutaneous once for 1 dose  doxycycline hyclate (VIBRA-TABS) " 100 MG tablet, Take 1 tablet (100 mg) by mouth 2 times daily (Patient not taking: Reported on 3/15/2024)  sodium chloride (OCEAN NASAL SPRAY) 0.65 % nasal spray, Spray 1 spray into both nostrils daily as needed for congestion    fluocinonide (LIDEX) 0.05 % ointment  fluocinonide (LIDEX) 0.05 % ointment  mupirocin (BACTROBAN) 2 % ointment  mupirocin (BACTROBAN) 2 % ointment  romosozumab-aqqg (EVENITY) injection 210 mg  romosozumab-aqqg (EVENITY) injection 210 mg  romosozumab-aqqg (EVENITY) injection 210 mg  romosozumab-aqqg (EVENITY) injection 210 mg  romosozumab-aqqg (EVENITY) injection 210 mg  romosozumab-aqqg (EVENITY) injection 210 mg  romosozumab-aqqg (EVENITY) injection 210 mg      Past Medical History:   Diagnosis Date    Anemia     Arthritis     Cataracts     Central retinal artery occlusion     Cervical spine fracture (H)     After a fall from orthostatic sx    Chronic pain     Back of head    Gastro-oesophageal reflux disease     Head injury     Hypertension     Hyponatremia     Resolved, 2/2 diuretics    Migraines     Osteoporosis     Pneumonia 2018    Rheumatoid arthritis(714.0)        Past Surgical History:   Procedure Laterality Date    COLONOSCOPY      EYE SURGERY Left 02/2019    Hole in macula    FUSION CERVICAL POSTERIOR THREE+ LEVELS  1/22/2013    Procedure: FUSION CERVICAL POSTERIOR THREE+ LEVELS;  Cervical 1-6 Posterior Instrumentation and Fusion, Cervical 3-4 Laminectomy  .Instrumentation and fusion to Cervical 6 *Latex Allergy*;  Surgeon: Ra Bar MD;  Location: [facility] OR    GYN SURGERY      HEAD & NECK SURGERY      HYSTERECTOMY      IR ENDOVENOUS ABLATION VARICOSE VEINS  2/16/2021    KNEE SURGERY      NECK SURGERY      OPEN REDUCTION INTERNAL FIXATION WRIST Left 4/30/2019    Procedure: Open Reduction Internal Fixation Left Distal Radius Fracture;  Surgeon: Dinh Strong MD;  Location:  OR    OPEN REDUCTION INTERNAL FIXATION WRIST Left 6/12/2020    Procedure: OPEN REDUCTION  INTERNAL FIXATION Left Distal Radius Nonunion, Distal Ulna Resection;  Surgeon: Dinh Strong MD;  Location: [facility] OR    OPTICAL TRACKING SYSTEM ENDOSCOPIC SINUS SURGERY Right 4/6/2018    Procedure: OPTICAL TRACKING SYSTEM ENDOSCOPIC SINUS SURGERY;  Stealth Assisted Right Maxillary Antrostomy, Anterior Ethmoidectomy And Frontal Sinusotomy;  Surgeon: Elyse Eisenberg MD;  Location: [facility] OR    OPTICAL TRACKING SYSTEM ENDOSCOPIC SINUS SURGERY Right 8/3/2018    Procedure: OPTICAL TRACKING SYSTEM ENDOSCOPIC SINUS SURGERY;  Stealth Assisted Revision Right Frontal Sinusotomy, Right Stent Placement  **Latex Allergy** ;  Surgeon: Elyse Eisenberg MD;  Location: [facility] OR    ORTHOPEDIC SURGERY      REMOVE HARDWARE WRIST Left 11/19/2019    Procedure: Left Distal Radius Hardware Removal, Flexor Pollicus Longus Repair, Deep Cultures;  Surgeon: Dinh Strong MD;  Location: [facility] OR    ZZC HAND/FINGER SURGERY UNLISTED      ZZC PELVIS/HIP JOINT SURGERY UNLISTED         Social History     Tobacco Use    Smoking status: Never    Smokeless tobacco: Never   Substance Use Topics    Alcohol use: Yes     Comment: 2 glasses of wine a day    Drug use: Never       Family History   Problem Relation Age of Onset    Arthritis Mother     Respiratory Mother         pulmonary fibrosis    Hypertension Mother     Anemia Mother     Liver Disease Father         from EtOH    Alcoholism Father     No Known Problems Maternal Grandmother     Heart Disease Maternal Grandfather     Glaucoma Paternal Grandfather     Heart Disease Paternal Grandfather     Multiple Sclerosis Sister     Breast Cancer Sister     Skin Cancer No family hx of     Melanoma No family hx of        ROS: A 12 system review of systems was negative other than noted here or above.     Exam:  BP (!) 167/87 (BP Location: Right arm, Patient Position: Chair, Cuff Size: Adult Regular)   Pulse 93   Wt 50.5 kg (111 lb 6.4 oz)   SpO2 98%   BMI 22.76 kg/m    BP Readings from Last 6  Encounters:   03/26/24 (!) 167/87   03/05/24 136/80   12/19/23 (!) 152/85   11/27/23 (!) 151/88   08/25/23 131/75   06/21/23 (!) 162/80     GENERAL APPEARANCE: alert and no distress  EYES: PERRL  HENT: mouth without ulcers or lesions  NECK: supple, no adenopathy  RESP: lungs clear to auscultation - no rales, rhonchi or wheezes  CV: regular rhythm, normal rate, no rub   ABDOMEN:  soft, nontender, no HSM or masses and bowel sounds normal  MS: extremities normal- no gross deformities noted, no evidence of inflammation in joints, no muscle tenderness  SKIN: no rash  NEURO: Normal strength and tone, sensory exam grossly normal, mentation intact and speech normal  PSYCH: mentation appears normal. and affect normal/bright  She has some swelling on the left leg close to the wound but none on the right.     Results: Reviewed in details with the patient    Assessment/Plan:  Problem #1 Chronic kidney disease: She sustained ANAI secondary to infection associated glomerulonephritis secondary to cellulitis back in August 2023.  She required dialysis for ~2.5 months and she is currently off for dialysis since November 2023. She received Antibiotics to treat her infection but no immunosuppressants .She was following with Dr. Cota.  Her most recent creatinine today is further down to 1.75 mg/dL.  Her biopsy shows 10 to 20% interstitial fibrosis so there is some chance that her creatinine will improve further.  --We discussed today the importance of blood pressure control and halting the progression of chronic kidney disease.  We also discussed the natural history of chronic kidney disease and the expectations moving forward.  Her current GFR is at 28 mL liters per minute however had Cystatin C previously showed worse GFR which I estimate at this point to be around 18 mL/min.  I mentioned to her today that different modalities of renal placement therapy in case if future need arises.  I explained in details procedures of both  peritoneal and hemodialysis and the implication of a PD catheter versus an AV fistula.  - I will refer her to the CKD journey and CKD education and that her follow-up visit will be further discussion of her treatment modality if she chooses to go for dialysis.  He will also discussed a more conservative kidney management approach given her older age and frailty.  -Her medication list includes the losartan however I am not sure if she is taking it.  I expect the slight improvement in her GFR and her worsening proteinuria is due to the fact that she is not taking losartan.  She will check her medications at home and let me know.  If she is not taking losartan then I advised her to be back on losartan given that her blood pressure is elevated.  --She is not on SGLT2 inhibitor and given her advanced age and GFR at this stage, I will hold that for now to be discussed later on if her GFR improves further.  -- She is currently euvolemic on exam and there is no need for diuretics.  She does have localized edema around the wound on her left tibia.    Problem #2 anemia normocytic: Her hemoglobin today is stable at 9.4 g/dL.  Will hold on any referral to the anemia clinic at this point.  She will need IRASEMA if her hemoglobin is below 9 g/dL.    Problem #3 CKD MBD: Her calcium and phosphorus are rather within normal limits.  Her PTH is low which can suggest dynamic bone disease.  She does have a history of osteoporosis for which she is receiving denosumab.    Problem #4 hypertension: Her blood pressure is slightly high in clinic today but rather rather within normal acceptable range given her age.  No changes to her medications were done at this point.  She was instructed to measure her blood pressure at home.  She was also instructed to check if she is taking losartan and if not she needs to restart taking it at the small dose of 25 mg daily given her worsening proteinuria.    Problem #5 diaper rash: She was instructed to use  the zinc oxide instead of the Vaseline that she is currently using.    The total time of this encounter amounted to 67 minutes on the day of the encounter 3/26/2024. This time included face to face time spent with the patient, preparatory work and chart review, ordering tests, and performing post visit documentation.    The longitudinal plan of care for this patient was addressed during this visit. Due to the added complexity in care, I will continue to support the patient with subsequent management of this condition(s) and with the ongoing continuity of care of this condition(s).     *This dictation was prepared in part using Dragon recognition software.  As a result errors may occur. When identified these transcription errors have been corrected.  While every attempt is made to correct errors during dictation, errors may still exist.     Osman Aguirre MD   [facility]   Department of Medicine   Division of Renal Disease and Hypertension

## 2024-03-26 NOTE — PROGRESS NOTES
Type of Form Received:     Form Received (Date) 3/26/24   Form Filled out Yes 4/1/24   Placed in provider folder Yes

## 2024-03-26 NOTE — NURSING NOTE
Kimberly Chau's goals for this visit include:   Chief Complaint   Patient presents with    RECHECK     3 month follow up CKD        She requests these members of her care team be copied on today's visit information: yes    PCP: Guillermo Castellano    Referring Provider:  Guillermo Castellano MD  [address] [address]    BP (!) 167/87 (BP Location: Right arm, Patient Position: Chair, Cuff Size: Adult Regular)   Pulse 93   Wt 50.5 kg (111 lb 6.4 oz)   SpO2 98%   BMI 22.76 kg/m      Do you need any medication refills at today's visit? No       CRISTAL Hull   Neph/Pulm [facility]

## 2024-03-27 ENCOUNTER — TELEPHONE (OUTPATIENT)
Dept: NEPHROLOGY | Facility: CLINIC | Age: 84
End: 2024-03-27
Payer: MEDICARE

## 2024-03-27 NOTE — TELEPHONE ENCOUNTER
"  Routed message to Dr. Aguirre to advise due to lack of appt availability.  Routing message received:   Osman Aguirre MD  You; [facility] Nephrology Adult [place] minutes ago (1:53 PM)     Overbook for 12:30 pm.  Thanks       Called and spoke with pt and her  \"Wilder\".  Offered to schedule a 3 mo Return appt with Dr. Aguirre 7/02/24 at 12:30pm with a non fasting lab appt at 12:00pm.   They agreed to appt date and time.    Encouraged to call if further questions or concerns.    Silvia Franklin LPN  "

## 2024-03-27 NOTE — TELEPHONE ENCOUNTER
[facility] Call Center    Phone Message    May a detailed message be left on voicemail: yes     Reason for Call: Other: Patient's spouse wants his wife to be seen in 3 months and writer couldn't find any availabilities until August 20th. Please call pt's spouse back. Thank you.      Action Taken: Other: [facility]    Travel Screening: Not Applicable

## 2024-03-27 NOTE — TELEPHONE ENCOUNTER
Left Voicemail (1st Attempt) for the patient to call back and schedule the following:    Appointment type: Return  Provider: Dr. Aguirre  Return date: 6/26/2024 or next available  Specialty phone number: [phone]  Additonal Notes:  Return in about 3 months (around 6/26/2024). Can schedule in [place] or [facility]   Dermatology, Surgery, Urology  [facility] [place]

## 2024-03-29 ENCOUNTER — PATIENT OUTREACH (OUTPATIENT)
Dept: NEPHROLOGY | Facility: CLINIC | Age: 84
End: 2024-03-29
Payer: MEDICARE

## 2024-03-29 NOTE — PROGRESS NOTES
Patient added to CKD Journey. CKD 4 with history of hemodialysis x 11 weeks. Dr. Aguirre has suggested CKD education at this time. Neph tracking updated and will plan to follow up with patient regarding education in the future. Dr. Aguirre follow up is scheduled for July 2024.    Neph Tracking Flowsheet Last Filled Values       CKD Education Status Referred    CKD Education Referral Date 03/29/24    CKD Education Type Kidney Smart CKD Basics    CKD Education Other Patient was on hemodialysis in 2023 x11 weeks. Last diaysis was November 2023    Patient's Referral Dates Auto Populate Patient's Referral Dates    Home Care Referral 3/5/24    Journey Referral 3/26/24

## 2024-04-01 ENCOUNTER — MEDICAL CORRESPONDENCE (OUTPATIENT)
Dept: HEALTH INFORMATION MANAGEMENT | Facility: CLINIC | Age: 84
End: 2024-04-01
Payer: MEDICARE

## 2024-04-04 ENCOUNTER — OFFICE VISIT (OUTPATIENT)
Dept: DERMATOLOGY | Facility: CLINIC | Age: 84
End: 2024-04-04
Payer: MEDICARE

## 2024-04-04 DIAGNOSIS — I87.2 VENOUS STASIS ULCER OF OTHER PART OF LEFT LOWER LEG WITHOUT VARICOSE VEINS, UNSPECIFIED ULCER STAGE (H): ICD-10-CM

## 2024-04-04 DIAGNOSIS — L97.829 VENOUS STASIS ULCER OF OTHER PART OF LEFT LOWER LEG WITHOUT VARICOSE VEINS, UNSPECIFIED ULCER STAGE (H): ICD-10-CM

## 2024-04-04 DIAGNOSIS — I87.2 VENOUS STASIS DERMATITIS: Primary | ICD-10-CM

## 2024-04-04 PROCEDURE — 99214 OFFICE O/P EST MOD 30 MIN: CPT | Mod: GC | Performed by: STUDENT IN AN ORGANIZED HEALTH CARE EDUCATION/TRAINING PROGRAM

## 2024-04-04 RX ORDER — PENTOXIFYLLINE 400 MG/1
400 TABLET, EXTENDED RELEASE ORAL 2 TIMES DAILY
Qty: 120 TABLET | Refills: 3 | Status: SHIPPED | OUTPATIENT
Start: 2024-04-04

## 2024-04-04 RX ORDER — TRIAMCINOLONE ACETONIDE 1 MG/G
OINTMENT TOPICAL
Qty: 80 G | Refills: 4 | Status: SHIPPED | OUTPATIENT
Start: 2024-04-04

## 2024-04-04 ASSESSMENT — PAIN SCALES - GENERAL: PAINLEVEL: NO PAIN (0)

## 2024-04-04 NOTE — PATIENT INSTRUCTIONS
Wash left lower leg with Vashe solution every dressing change. Apply triamcinolone ointment after Vashe solution. Continue Unna boot application with twice weekly dressing changes.     Continue pentoxyfilline.

## 2024-04-04 NOTE — LETTER
4/4/2024       RE: Kimberly Chau  [address] [place] [address]     Dear Colleague,    Thank you for referring your patient, Kimberly Chau, to the [facility] [place] at [facility]. Please see a copy of my visit note below.    [facility] Dermatology Note  Encounter Date: Apr 4, 2024  Office Visit     Dermatology Problem List:  1. Venous stasis ulcers, dermatitis  - 08/18/21 LE US  - 02/16/21 laser ablation of Great saphenous vein of R &L  leg   - current rx (04/04/24): pentoxyfilline 400 mg BID (dosing per patient preference), Vashe soaks with dressing changes, triamcinolone 0.1% ointment with dressing changes, Unna boot with home nurse dressing changes twice weekly  - Comanaged with podiatry    ____________________________________________    Assessment & Plan:   # Venous stasis dermatitis, left lower extremity  # Stasis ulcers.  Chronic, improving with multidisciplinary management with podiatry.  Patient's symptoms have been improving with Unna boot application and twice weekly changes with home nursing care.  She is finishing a course of oral clindamycin currently.  There are no signs of infection today; however a component of stasis dermatitis is present given the red plaques and itching.  We recommended washing the left lower leg with Vashe soaks with twice weekly dressing changes, starting triamcinolone 0.1% ointment, and continuing Unna boot for compression/occlusion.  - Vashe soaks with Unna boot changes  - Triamcinolone 0.1% ointment with dressing changes  - Continue Unna boot application  - Continue pentoxifylline 400 mg twice daily (dosing per patient preference)  - Appreciate podiatry comanagement    Procedures Performed:   - none    Follow-up: 6 months in person    Staff: Dr. DARIUSZ Calzada MD PGY-3  Dermatology Resident    ____________________________________________    CC: Derm  Problem (Ulcers on L lower leg. Unknown if improving)    HPI:  Ms. Kimberly Chau is a(n) 83 year old female who presents today as a new patient for follow-up venous stasis dermatitis and ulcers of the left lower extremity.    Patient states that overall her left leg is doing okay.  She has also been following with Dr. Salazar and podiatry (last visit 3/15/2024).  At this visit, the patient was recommended continue a course of oral clindamycin.  She has been treated with an Unna boot with twice weekly home nursing changes.  Patient believes that at home the nursing staff is washing the leg with Vashe soaks prior to reapplication of Unna boot.  No topical medications are being used.    Patient states that today her left leg under the Unna boot is somewhat itchy.  Otherwise she thinks things are healing well.    Patient is otherwise feeling well, without additional skin concerns.    Labs Reviewed:  N/A    Physical Exam:  Vitals: There were no vitals taken for this visit.  SKIN: Focused examination of the bilateral lower extremities was performed.  -After Unna boot removal, left lower leg with red, confluent focally eroded and hyperkeratotic plaques  - There are 3 shallow, nontender ulcerated papules and plaques on the left medial lower leg  - Right lower leg without appreciable swelling or other abnormalities  - No other lesions of concern on areas examined.     Medications:  Current Outpatient Medications   Medication Sig Dispense Refill    amLODIPine (NORVASC) 5 MG tablet Take 1 tablet (5 mg) by mouth daily 90 tablet 0    carvedilol (COREG) 12.5 MG tablet Take 1 tablet (12.5 mg) by mouth 2 times daily (with meals) 180 tablet 0    clindamycin (CLEOCIN) 300 MG capsule Take 1 capsule (300 mg) by mouth 2 times daily (with meals) 20 capsule 0    COMPRESSION STOCKINGS Please measure and distribute 1 pair of 20mmHg - 30mmHg knee high open or closed toe compression stockings. Jobst ultrasheer or equivalent. 2 each 4     "Diphenhydramine-APAP, sleep, (TYLENOL PM EXTRA STRENGTH PO) Take 2 tablets by mouth At Bedtime       ferrous gluconate (FERGON) 324 (38 Fe) MG tablet Take 324 mg by mouth      Gauze Pads & Dressings (TELFA NON-ADHERENT) 3\"X4\" PADS Cut to size for open areas on the lower extremities and secure with paper tape. Change dressing every other day. 30 each 11    losartan (COZAAR) 25 MG tablet Take 1 tablet (25 mg) by mouth at bedtime 90 tablet 3    multivitamin w/minerals (MULTI-VITAMIN) tablet Take 1 tablet by mouth every morning 90 tablet 11    Omeprazole (PRILOSEC PO) Take 20 mg by mouth as needed       pentoxifylline ER (TRENTAL) 400 MG CR tablet Take 1 tablet (400 mg) by mouth 3 times daily (with meals) 90 tablet 4    triamcinolone (KENALOG) 0.1 % external cream Apply twice daily for 2 weeks then 3 times weekly for 2 week, repeat cycle as needed. Apply to affected area using an amount sufficient to cover the affected rishi 454 g 0    vitamin D3 (CHOLECALCIFEROL) 50 mcg (2000 units) tablet Take 1 tablet by mouth daily      acetaminophen 650 MG TABS Take 650 mg by mouth every 4 hours as needed. (Patient not taking: Reported on 11/27/2023) 100 tablet 0    amoxicillin-clavulanate (AUGMENTIN) 500-125 MG tablet Take 1 tablet by mouth 2 times daily (Patient not taking: Reported on 3/15/2024) 14 tablet 0    denosumab (PROLIA) 60 MG/ML SOSY injection Inject 1 mL (60 mg) Subcutaneous once for 1 dose 1 mL 0    doxycycline hyclate (VIBRA-TABS) 100 MG tablet Take 1 tablet (100 mg) by mouth 2 times daily (Patient not taking: Reported on 3/15/2024) 14 tablet 0    sodium chloride (OCEAN NASAL SPRAY) 0.65 % nasal spray Spray 1 spray into both nostrils daily as needed for congestion 1 Bottle 3     Current Facility-Administered Medications   Medication Dose Route Frequency Provider Last Rate Last Admin    fluocinonide (LIDEX) 0.05 % ointment   Topical Once Ra Orourek MD        fluocinonide (LIDEX) 0.05 % ointment   Topical " Once Ra Orourke MD        mupirocin (BACTROBAN) 2 % ointment   Topical Once Ra Orourke MD        mupirocin (BACTROBAN) 2 % ointment   Topical Once Ra Orourke MD        romosozumab-aqqg (EVENITY) injection 210 mg  210 mg Subcutaneous Q30 Days Vale, Alley Benavides MD   210 mg at 05/25/22 1246    romosozumab-aqqg (EVENITY) injection 210 mg  210 mg Subcutaneous Q30 Days Kerri Lux MD   210 mg at 04/23/22 1152    romosozumab-aqqg (EVENITY) injection 210 mg  210 mg Subcutaneous Q30 Days Abigail Lguo MD   210 mg at 03/16/22 1450    romosozumab-aqqg (EVENITY) injection 210 mg  210 mg Subcutaneous Once Abigail Lugo MD        romosozumab-aqqg (EVENITY) injection 210 mg  210 mg Subcutaneous Q30 Days [facility], Alley Benavides MD   210 mg at 08/18/21 1643    romosozumab-aqqg (EVENITY) injection 210 mg  210 mg Subcutaneous Once Vale, Alley Benavides MD        romosozumab-aqqg (EVENITY) injection 210 mg  210 mg Subcutaneous Once [facility], Alley Benavides MD          Past Medical History:   Patient Active Problem List   Diagnosis    Intrinsic atopic dermatitis    Essential hypertension    Rheumatoid arthritis (H)    Retinal artery occlusion    Cervical vertebral fracture (H)    Central retinal artery occlusion of right eye    Bilateral occipital neuralgia    C1-C2 instability    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 factor (H)    Osteoarthritis    Malignant hypertension    Hyponatremia    GERD (gastroesophageal reflux disease)    Eczematous dermatitis    Anxiety state    Anemia    Closed fracture of distal end of left radius with delayed healing, unspecified fracture morphology, subsequent encounter    Closed fracture of distal end of left radius, unspecified fracture morphology, sequela    Osteomyelitis of left leg (H)    Pain of left thumb    CKD (chronic kidney disease) stage 4, GFR 15-29 ml/min (H)     Past Medical  History:   Diagnosis Date    Anemia     Arthritis     Cataracts     Central retinal artery occlusion     Cervical spine fracture (H)     After a fall from orthostatic sx    Chronic pain     Back of head    Gastro-oesophageal reflux disease     Head injury     Hypertension     Hyponatremia     Resolved, 2/2 diuretics    Migraines     Osteoporosis     Pneumonia 2018    Rheumatoid arthritis(714.0)        CC Guillermo Castellano MD  [address] [address] on close of this encounter.    Attestation signed by Hamilton Cummins MD at 4/4/2024  4:09 PM:  I have personally examined this patient and agree with the resident doctor's documentation and plan of care. I have reviewed and amended the resident's note. The documentation accurately reflects my clinical observations, diagnoses, treatment and follow-up plans.     Hamilton Cummins MD  Dermatology Staff

## 2024-04-04 NOTE — PROGRESS NOTES
[facility] Dermatology Note  Encounter Date: Apr 4, 2024  Office Visit     Dermatology Problem List:  1. Venous stasis ulcers, dermatitis  - 08/18/21 LE US  - 02/16/21 laser ablation of Great saphenous vein of R &L  leg   - current rx (04/04/24): pentoxyfilline 400 mg BID (dosing per patient preference), Vashe soaks with dressing changes, triamcinolone 0.1% ointment with dressing changes, Unna boot with home nurse dressing changes twice weekly  - Comanaged with podiatry    ____________________________________________    Assessment & Plan:   # Venous stasis dermatitis, left lower extremity  # Stasis ulcers.  Chronic, improving with multidisciplinary management with podiatry.  Patient's symptoms have been improving with Unna boot application and twice weekly changes with home nursing care.  She is finishing a course of oral clindamycin currently.  There are no signs of infection today; however a component of stasis dermatitis is present given the red plaques and itching.  We recommended washing the left lower leg with Vashe soaks with twice weekly dressing changes, starting triamcinolone 0.1% ointment, and continuing Unna boot for compression/occlusion.  - Vashe soaks with Unna boot changes  - Triamcinolone 0.1% ointment with dressing changes  - Continue Unna boot application  - Continue pentoxifylline 400 mg twice daily (dosing per patient preference)  - Appreciate podiatry comanagement    Procedures Performed:   - none    Follow-up: 6 months in person    Staff: Dr. DARIUSZ Calzada MD PGY-3  Dermatology Resident    ____________________________________________    CC: Derm Problem (Ulcers on L lower leg. Unknown if improving)    HPI:  Ms. Kimberly Chau is a(n) 83 year old female who presents today as a new patient for follow-up venous stasis dermatitis and ulcers of the left lower extremity.    Patient states that overall her left leg is doing okay.  She has also been following with  "Dr. Salazar and podiatry (last visit 3/15/2024).  At this visit, the patient was recommended continue a course of oral clindamycin.  She has been treated with an Unna boot with twice weekly home nursing changes.  Patient believes that at home the nursing staff is washing the leg with Vashe soaks prior to reapplication of Unna boot.  No topical medications are being used.    Patient states that today her left leg under the Unna boot is somewhat itchy.  Otherwise she thinks things are healing well.    Patient is otherwise feeling well, without additional skin concerns.    Labs Reviewed:  N/A    Physical Exam:  Vitals: There were no vitals taken for this visit.  SKIN: Focused examination of the bilateral lower extremities was performed.  -After Unna boot removal, left lower leg with red, confluent focally eroded and hyperkeratotic plaques  - There are 3 shallow, nontender ulcerated papules and plaques on the left medial lower leg  - Right lower leg without appreciable swelling or other abnormalities  - No other lesions of concern on areas examined.     Medications:  Current Outpatient Medications   Medication Sig Dispense Refill    amLODIPine (NORVASC) 5 MG tablet Take 1 tablet (5 mg) by mouth daily 90 tablet 0    carvedilol (COREG) 12.5 MG tablet Take 1 tablet (12.5 mg) by mouth 2 times daily (with meals) 180 tablet 0    clindamycin (CLEOCIN) 300 MG capsule Take 1 capsule (300 mg) by mouth 2 times daily (with meals) 20 capsule 0    COMPRESSION STOCKINGS Please measure and distribute 1 pair of 20mmHg - 30mmHg knee high open or closed toe compression stockings. Jobst ultrasheer or equivalent. 2 each 4    Diphenhydramine-APAP, sleep, (TYLENOL PM EXTRA STRENGTH PO) Take 2 tablets by mouth At Bedtime       ferrous gluconate (FERGON) 324 (38 Fe) MG tablet Take 324 mg by mouth      Gauze Pads & Dressings (TELFA NON-ADHERENT) 3\"X4\" PADS Cut to size for open areas on the lower extremities and secure with paper tape. Change " dressing every other day. 30 each 11    losartan (COZAAR) 25 MG tablet Take 1 tablet (25 mg) by mouth at bedtime 90 tablet 3    multivitamin w/minerals (MULTI-VITAMIN) tablet Take 1 tablet by mouth every morning 90 tablet 11    Omeprazole (PRILOSEC PO) Take 20 mg by mouth as needed       pentoxifylline ER (TRENTAL) 400 MG CR tablet Take 1 tablet (400 mg) by mouth 3 times daily (with meals) 90 tablet 4    triamcinolone (KENALOG) 0.1 % external cream Apply twice daily for 2 weeks then 3 times weekly for 2 week, repeat cycle as needed. Apply to affected area using an amount sufficient to cover the affected rishi 454 g 0    vitamin D3 (CHOLECALCIFEROL) 50 mcg (2000 units) tablet Take 1 tablet by mouth daily      acetaminophen 650 MG TABS Take 650 mg by mouth every 4 hours as needed. (Patient not taking: Reported on 11/27/2023) 100 tablet 0    amoxicillin-clavulanate (AUGMENTIN) 500-125 MG tablet Take 1 tablet by mouth 2 times daily (Patient not taking: Reported on 3/15/2024) 14 tablet 0    denosumab (PROLIA) 60 MG/ML SOSY injection Inject 1 mL (60 mg) Subcutaneous once for 1 dose 1 mL 0    doxycycline hyclate (VIBRA-TABS) 100 MG tablet Take 1 tablet (100 mg) by mouth 2 times daily (Patient not taking: Reported on 3/15/2024) 14 tablet 0    sodium chloride (OCEAN NASAL SPRAY) 0.65 % nasal spray Spray 1 spray into both nostrils daily as needed for congestion 1 Bottle 3     Current Facility-Administered Medications   Medication Dose Route Frequency Provider Last Rate Last Admin    fluocinonide (LIDEX) 0.05 % ointment   Topical Once Ra Orourke MD        fluocinonide (LIDEX) 0.05 % ointment   Topical Once Ra Orourke MD        mupirocin (BACTROBAN) 2 % ointment   Topical Once Ra Orourke MD        mupirocin (BACTROBAN) 2 % ointment   Topical Once Ra Orourke MD        romosozumab-aqqg (EVENITY) injection 210 mg  210 mg Subcutaneous Q30 Days Alley Collazo MD   210 mg at  05/25/22 1246    romosozumab-aqqg (EVENITY) injection 210 mg  210 mg Subcutaneous Q30 Days Kerri Lux MD   210 mg at 04/23/22 1152    romosozumab-aqqg (EVENITY) injection 210 mg  210 mg Subcutaneous Q30 Days Abigail Lugo MD   210 mg at 03/16/22 1450    romosozumab-aqqg (EVENITY) injection 210 mg  210 mg Subcutaneous Once Abigail Lugo MD        romosozumab-aqqg (EVENITY) injection 210 mg  210 mg Subcutaneous Q30 Days Vale, Alley Benavides MD   210 mg at 08/18/21 1643    romosozumab-aqqg (EVENITY) injection 210 mg  210 mg Subcutaneous Once Vale, Alley Benavides MD        romosozumab-aqqg (EVENITY) injection 210 mg  210 mg Subcutaneous Once Vale, Alley Benavides MD          Past Medical History:   Patient Active Problem List   Diagnosis    Intrinsic atopic dermatitis    Essential hypertension    Rheumatoid arthritis (H)    Retinal artery occlusion    Cervical vertebral fracture (H)    Central retinal artery occlusion of right eye    Bilateral occipital neuralgia    C1-C2 instability    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 factor (H)    Osteoarthritis    Malignant hypertension    Hyponatremia    GERD (gastroesophageal reflux disease)    Eczematous dermatitis    Anxiety state    Anemia    Closed fracture of distal end of left radius with delayed healing, unspecified fracture morphology, subsequent encounter    Closed fracture of distal end of left radius, unspecified fracture morphology, sequela    Osteomyelitis of left leg (H)    Pain of left thumb    CKD (chronic kidney disease) stage 4, GFR 15-29 ml/min (H)     Past Medical History:   Diagnosis Date    Anemia     Arthritis     Cataracts     Central retinal artery occlusion     Cervical spine fracture (H)     After a fall from orthostatic sx    Chronic pain     Back of head    Gastro-oesophageal reflux disease     Head injury     Hypertension     Hyponatremia     Resolved, 2/2 diuretics     Migraines     Osteoporosis     Pneumonia 2018    Rheumatoid arthritis(714.0)        CC Guillermo Castellano MD  [address] [address] on close of this encounter.

## 2024-04-04 NOTE — NURSING NOTE
Dermatology Rooming Note    Kimberly Chau's goals for this visit include:   Chief Complaint   Patient presents with    Derm Problem     Ulcers on L lower leg. Unknown if improving     Juli Kelly, EMT

## 2024-04-10 ENCOUNTER — DOCUMENTATION ONLY (OUTPATIENT)
Dept: INTERNAL MEDICINE | Facility: CLINIC | Age: 84
End: 2024-04-10
Payer: MEDICARE

## 2024-04-10 NOTE — PROGRESS NOTES
Type of Form Received: Order    Form Received (Date) 4/10/24   Form Filled out No   Placed in provider folder Yes

## 2024-04-13 ENCOUNTER — MEDICAL CORRESPONDENCE (OUTPATIENT)
Dept: HEALTH INFORMATION MANAGEMENT | Facility: CLINIC | Age: 84
End: 2024-04-13
Payer: MEDICARE

## 2024-04-18 ENCOUNTER — TELEPHONE (OUTPATIENT)
Dept: INTERNAL MEDICINE | Facility: CLINIC | Age: 84
End: 2024-04-18
Payer: MEDICARE

## 2024-04-18 NOTE — TELEPHONE ENCOUNTER
[facility] Call Center    Phone Message    May a detailed message be left on voicemail: yes     Reason for Call: Order(s): Home Care Orders: Skilled Nursing: continuing 2x a week for 9 weeks and 2 PRN. Please call cyrus back @ [phone]. Thank you!    Action Taken: Message routed to:  [facility] P[facility]    Travel Screening: Not Applicable

## 2024-04-18 NOTE — TELEPHONE ENCOUNTER
Left detailed VM on secure voicemail box for Kena ABEBE case manager with the following verbal order(s): Home Care Orders: Skilled Nursing: continuing 2x a week for 9 weeks and 2 PRN.  Darling CASTAÑEDA LPN  [facility]

## 2024-04-19 ENCOUNTER — OFFICE VISIT (OUTPATIENT)
Dept: PODIATRY | Facility: CLINIC | Age: 84
End: 2024-04-19
Payer: MEDICARE

## 2024-04-19 DIAGNOSIS — I87.8 VENOUS STASIS: ICD-10-CM

## 2024-04-19 DIAGNOSIS — I73.9 PERIPHERAL ARTERIAL DISEASE (H): ICD-10-CM

## 2024-04-19 DIAGNOSIS — L97.922 SKIN ULCER OF LEFT LOWER LEG WITH FAT LAYER EXPOSED (H): Primary | ICD-10-CM

## 2024-04-19 PROCEDURE — 29581 APPL MULTLAYER CMPRN SYS LEG: CPT | Mod: LT | Performed by: PODIATRIST

## 2024-04-19 NOTE — LETTER
4/19/2024         RE: Kimberly Chau  [id number] [facility] [address]        Dear Colleague,    Thank you for referring your patient, Kimberly Chau, to the [facility]. Please see a copy of my visit note below.    Past Medical History:   Diagnosis Date     Anemia      Arthritis      Cataracts      Central retinal artery occlusion      Cervical spine fracture (H)     After a fall from orthostatic sx     Chronic pain     Back of head     Gastro-oesophageal reflux disease      Head injury      Hypertension      Hyponatremia     Resolved, 2/2 diuretics     Migraines      Osteoporosis      Pneumonia 2018     Rheumatoid arthritis(714.0)      Patient Active Problem List   Diagnosis     Intrinsic atopic dermatitis     Essential hypertension     Rheumatoid arthritis (H)     Retinal artery occlusion     Cervical vertebral fracture (H)     Central retinal artery occlusion of right eye     Bilateral occipital neuralgia     C1-C2 instability     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 factor (H)     Osteoarthritis     Malignant hypertension     Hyponatremia     GERD (gastroesophageal reflux disease)     Eczematous dermatitis     Anxiety state     Anemia     Closed fracture of distal end of left radius with delayed healing, unspecified fracture morphology, subsequent encounter     Closed fracture of distal end of left radius, unspecified fracture morphology, sequela     Osteomyelitis of left leg (H)     Pain of left thumb     CKD (chronic kidney disease) stage 4, GFR 15-29 ml/min (H)     Past Surgical History:   Procedure Laterality Date     COLONOSCOPY       EYE SURGERY Left 02/2019    Hole in macula     FUSION CERVICAL POSTERIOR THREE+ LEVELS  1/22/2013    Procedure: FUSION CERVICAL POSTERIOR THREE+ LEVELS;  Cervical 1-6 Posterior Instrumentation and Fusion, Cervical 3-4 Laminectomy  .Instrumentation and fusion to Cervical 6 *Latex Allergy*;  Surgeon: Ra Bar  MD Seth;  Location: [facility] OR     GYN SURGERY       HEAD & NECK SURGERY       HYSTERECTOMY       IR ENDOVENOUS ABLATION VARICOSE VEINS  2/16/2021     KNEE SURGERY       NECK SURGERY       OPEN REDUCTION INTERNAL FIXATION WRIST Left 4/30/2019    Procedure: Open Reduction Internal Fixation Left Distal Radius Fracture;  Surgeon: Dinh Strong MD;  Location:  OR     OPEN REDUCTION INTERNAL FIXATION WRIST Left 6/12/2020    Procedure: OPEN REDUCTION INTERNAL FIXATION Left Distal Radius Nonunion, Distal Ulna Resection;  Surgeon: Dinh Strong MD;  Location:  OR     OPTICAL TRACKING SYSTEM ENDOSCOPIC SINUS SURGERY Right 4/6/2018    Procedure: OPTICAL TRACKING SYSTEM ENDOSCOPIC SINUS SURGERY;  Stealth Assisted Right Maxillary Antrostomy, Anterior Ethmoidectomy And Frontal Sinusotomy;  Surgeon: Elyse Eisenberg MD;  Location:  OR     OPTICAL TRACKING SYSTEM ENDOSCOPIC SINUS SURGERY Right 8/3/2018    Procedure: OPTICAL TRACKING SYSTEM ENDOSCOPIC SINUS SURGERY;  Stealth Assisted Revision Right Frontal Sinusotomy, Right Stent Placement  **Latex Allergy** ;  Surgeon: Elyse Eisenberg MD;  Location:  OR     ORTHOPEDIC SURGERY       REMOVE HARDWARE WRIST Left 11/19/2019    Procedure: Left Distal Radius Hardware Removal, Flexor Pollicus Longus Repair, Deep Cultures;  Surgeon: Dinh Strong MD;  Location:  OR     ZZC HAND/FINGER SURGERY UNLISTED       ZZC PELVIS/HIP JOINT SURGERY UNLISTED       Social History     Socioeconomic History     Marital status:      Spouse name: Not on file     Number of children: Not on file     Years of education: Not on file     Highest education level: Not on file   Occupational History     Not on file   Tobacco Use     Smoking status: Never     Smokeless tobacco: Never   Substance and Sexual Activity     Alcohol use: Yes     Comment: 2 glasses of wine a day     Drug use: Never     Sexual activity: Not Currently   Other Topics Concern     Parent/sibling w/ CABG, MI  "or angioplasty before 65F 55M? Not Asked   Social History Narrative    Ms. Chau is , has two grown children and two grandchildren. She does not work. She was never a smoker, and drinks a glass of wine with dinner each night.         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      Social Determinants of Health     Financial Resource Strain: Not on file   Food Insecurity: Not on file   Transportation Needs: Not on file   Physical Activity: Not on file   Stress: Not on file   Social Connections: Not on file   Interpersonal Safety: Not on file   Housing Stability: Not on file     Family History   Problem Relation Age of Onset     Arthritis Mother      Respiratory Mother         pulmonary fibrosis     Hypertension Mother      Anemia Mother      Liver Disease Father         from EtOH     Alcoholism Father      No Known Problems Maternal Grandmother      Heart Disease Maternal Grandfather      Glaucoma Paternal Grandfather      Heart Disease Paternal Grandfather      Multiple Sclerosis Sister      Breast Cancer Sister      Skin Cancer No family hx of      Melanoma No family hx of        Lab Results   Component Value Date    WBC 5.7 03/26/2024    WBC 7.6 05/06/2021     Lab Results   Component Value Date    RBC 3.09 03/26/2024    RBC 3.28 05/06/2021     Lab Results   Component Value Date    HGB 9.5 03/26/2024    HGB 10.8 05/06/2021     Lab Results   Component Value Date    HCT 29.8 03/26/2024    HCT 33.5 05/06/2021     No components found for: \"MCT\"  Lab Results   Component Value Date    MCV 96 03/26/2024     05/06/2021     Lab Results   Component Value Date    MCH 30.7 03/26/2024    MCH 32.9 05/06/2021     Lab Results   Component Value Date    MCHC 31.9 03/26/2024    MCHC 32.2 05/06/2021     Lab Results   Component Value Date    RDW 12.6 03/26/2024    RDW 15.7 05/06/2021     Lab Results   Component Value Date     03/26/2024     05/06/2021     Last Renal Panel:  Sodium "   Date Value Ref Range Status   03/26/2024 135 135 - 145 mmol/L Final     Comment:     Reference intervals for this test were updated on 09/26/2023 to more accurately reflect our healthy population. There may be differences in the flagging of prior results with similar values performed with this method. Interpretation of those prior results can be made in the context of the updated reference intervals.    01/06/2021 134 133 - 144 mmol/L Final     Potassium   Date Value Ref Range Status   03/26/2024 4.6 3.4 - 5.3 mmol/L Final   07/08/2022 4.3 3.4 - 5.3 mmol/L Final   01/06/2021 4.0 3.4 - 5.3 mmol/L Final     Chloride   Date Value Ref Range Status   03/26/2024 99 98 - 107 mmol/L Final   07/08/2022 104 94 - 109 mmol/L Final   01/06/2021 102 94 - 109 mmol/L Final     Carbon Dioxide   Date Value Ref Range Status   01/06/2021 28 20 - 32 mmol/L Final     Carbon Dioxide (CO2)   Date Value Ref Range Status   03/26/2024 26 22 - 29 mmol/L Final   07/08/2022 26 20 - 32 mmol/L Final     Anion Gap   Date Value Ref Range Status   03/26/2024 10 7 - 15 mmol/L Final   07/08/2022 10 3 - 14 mmol/L Final   01/06/2021 4 3 - 14 mmol/L Final     Glucose   Date Value Ref Range Status   03/26/2024 131 (H) 70 - 99 mg/dL Final   07/08/2022 97 70 - 99 mg/dL Final   01/06/2021 87 70 - 99 mg/dL Final     Urea Nitrogen   Date Value Ref Range Status   03/26/2024 29.9 (H) 8.0 - 23.0 mg/dL Final   07/08/2022 11 7 - 30 mg/dL Final   01/06/2021 14 7 - 30 mg/dL Final     Creatinine   Date Value Ref Range Status   03/26/2024 1.75 (H) 0.51 - 0.95 mg/dL Final   01/06/2021 0.90 0.52 - 1.04 mg/dL Final     GFR Estimate   Date Value Ref Range Status   03/26/2024 28 (L) >60 mL/min/1.73m2 Final   01/06/2021 59 (L) >60 mL/min/[1.73_m2] Final     Comment:     Non  GFR Calc  Starting 12/18/2018, serum creatinine based estimated GFR (eGFR) will be   calculated using the Chronic Kidney Disease Epidemiology Collaboration   (CKD-EPI) equation.        Calcium   Date Value Ref Range Status   03/26/2024 9.3 8.8 - 10.2 mg/dL Final   01/06/2021 8.8 8.5 - 10.1 mg/dL Final     Phosphorus   Date Value Ref Range Status   03/26/2024 3.9 2.5 - 4.5 mg/dL Final   05/06/2021 4.5 2.5 - 4.5 mg/dL Final     Albumin   Date Value Ref Range Status   03/26/2024 4.1 3.5 - 5.2 g/dL Final   03/16/2022 3.6 3.4 - 5.0 g/dL Final   01/06/2021 2.8 (L) 3.4 - 5.0 g/dL Final                         Subjective findings- 83-year-old returns clinic with  for ulcers left leg.  Relates is doing well, relates nursing is coming out weekly and changing the multilayer pression system with an Unna boot.  They relate they need another note for the dentist.    Objective findings- DP and PT are 2 out of 4 left.  Has a left medial leg ulcer that is autumn with pinpoint areas of ulceration that is through the epidermis, minimal serosanguineous drainage on the dressing, no odor, no calor, no erythema, no pain on palpation, minimal peripheral edema.    Assessment and plan- Chronic ulcers left leg, cellulitis is resolved, peripheral arterial and venous disease and kidney disease present.  Diagnosis and treatment options discussed with them.  The left leg and ulcer sites were cleaned with wound Vashe.  Hydrofera Blue and a multilayer compression system with an Unna boot with light compression applied to left lower extremity upon consent.  Continue the surgical shoe.  Okay to discontinue the multilayer compression system with an Unna boot when ulcer is closed with no drainage.  No antibiotics, no imaging, no labs today.  Note given for nursing and dentist.  Previous notes reviewed.  Return to clinic and see me in 1 month.                              Moderate level of medical decision making.      Again, thank you for allowing me to participate in the care of your patient.        Sincerely,        Camden Salazar DPM

## 2024-04-19 NOTE — NURSING NOTE
Kimberly Chau's chief complaint for this visit includes:  Chief Complaint   Patient presents with    Consult     Unna boot change     PCP: Guillermo Castellano    Referring Provider:  Guillermo Castellano MD  [address] [place] [id number]    There were no vitals taken for this visit.  Data Unavailable     Do you need any medication refills at today's visit? NO    Allergies   Allergen Reactions    Hctz Other (See Comments)     Pt develops symptomatic and significant hyponatremia with HCTZ exposure    Hydrochlorothiazide      Other reaction(s): Hyponatremia  Hyponatremia    Latex     Benzocaine Rash     carba mix,thrium-sunscreen    Ra Lotion Rash     carba mix,thrium-sunscreen    Ace Inhibitors Unknown     Dizziness and orthostatic changes and electrolyte problems.    Latex Unknown       Humera Hairston LPN

## 2024-04-19 NOTE — PROGRESS NOTES
Past Medical History:   Diagnosis Date    Anemia     Arthritis     Cataracts     Central retinal artery occlusion     Cervical spine fracture (H)     After a fall from orthostatic sx    Chronic pain     Back of head    Gastro-oesophageal reflux disease     Head injury     Hypertension     Hyponatremia     Resolved, 2/2 diuretics    Migraines     Osteoporosis     Pneumonia 2018    Rheumatoid arthritis(714.0)      Patient Active Problem List   Diagnosis    Intrinsic atopic dermatitis    Essential hypertension    Rheumatoid arthritis (H)    Retinal artery occlusion    Cervical vertebral fracture (H)    Central retinal artery occlusion of right eye    Bilateral occipital neuralgia    C1-C2 instability    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 factor (H)    Osteoarthritis    Malignant hypertension    Hyponatremia    GERD (gastroesophageal reflux disease)    Eczematous dermatitis    Anxiety state    Anemia    Closed fracture of distal end of left radius with delayed healing, unspecified fracture morphology, subsequent encounter    Closed fracture of distal end of left radius, unspecified fracture morphology, sequela    Osteomyelitis of left leg (H)    Pain of left thumb    CKD (chronic kidney disease) stage 4, GFR 15-29 ml/min (H)     Past Surgical History:   Procedure Laterality Date    COLONOSCOPY      EYE SURGERY Left 02/2019    Hole in macula    FUSION CERVICAL POSTERIOR THREE+ LEVELS  1/22/2013    Procedure: FUSION CERVICAL POSTERIOR THREE+ LEVELS;  Cervical 1-6 Posterior Instrumentation and Fusion, Cervical 3-4 Laminectomy  .Instrumentation and fusion to Cervical 6 *Latex Allergy*;  Surgeon: Ra Bar MD;  Location: [facility] OR    GYN SURGERY      HEAD & NECK SURGERY      HYSTERECTOMY      IR ENDOVENOUS ABLATION VARICOSE VEINS  2/16/2021    KNEE SURGERY      NECK SURGERY      OPEN REDUCTION INTERNAL FIXATION WRIST Left 4/30/2019    Procedure: Open Reduction Internal Fixation Left  Distal Radius Fracture;  Surgeon: Dinh Strong MD;  Location: [facility] OR    OPEN REDUCTION INTERNAL FIXATION WRIST Left 6/12/2020    Procedure: OPEN REDUCTION INTERNAL FIXATION Left Distal Radius Nonunion, Distal Ulna Resection;  Surgeon: Dinh Strong MD;  Location: [facility] OR    OPTICAL TRACKING SYSTEM ENDOSCOPIC SINUS SURGERY Right 4/6/2018    Procedure: OPTICAL TRACKING SYSTEM ENDOSCOPIC SINUS SURGERY;  Stealth Assisted Right Maxillary Antrostomy, Anterior Ethmoidectomy And Frontal Sinusotomy;  Surgeon: Elyse Eisenberg MD;  Location: [facility] OR    OPTICAL TRACKING SYSTEM ENDOSCOPIC SINUS SURGERY Right 8/3/2018    Procedure: OPTICAL TRACKING SYSTEM ENDOSCOPIC SINUS SURGERY;  Stealth Assisted Revision Right Frontal Sinusotomy, Right Stent Placement  **Latex Allergy** ;  Surgeon: Elyse Eisenberg MD;  Location: [facility] OR    ORTHOPEDIC SURGERY      REMOVE HARDWARE WRIST Left 11/19/2019    Procedure: Left Distal Radius Hardware Removal, Flexor Pollicus Longus Repair, Deep Cultures;  Surgeon: Dinh Strong MD;  Location: [facility] OR    ZZC HAND/FINGER SURGERY UNLISTED      Z PELVIS/HIP JOINT SURGERY UNLISTED       Social History     Socioeconomic History    Marital status:      Spouse name: Not on file    Number of children: Not on file    Years of education: Not on file    Highest education level: Not on file   Occupational History    Not on file   Tobacco Use    Smoking status: Never    Smokeless tobacco: Never   Substance and Sexual Activity    Alcohol use: Yes     Comment: 2 glasses of wine a day    Drug use: Never    Sexual activity: Not Currently   Other Topics Concern    Parent/sibling w/ CABG, MI or angioplasty before 65F 55M? Not Asked   Social History Narrative    Ms. Chau is , has two grown children and two grandchildren. She does not work. She was never a smoker, and drinks a glass of wine with dinner each night.         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   "    Social Determinants of Health     Financial Resource Strain: Not on file   Food Insecurity: Not on file   Transportation Needs: Not on file   Physical Activity: Not on file   Stress: Not on file   Social Connections: Not on file   Interpersonal Safety: Not on file   Housing Stability: Not on file     Family History   Problem Relation Age of Onset    Arthritis Mother     Respiratory Mother         pulmonary fibrosis    Hypertension Mother     Anemia Mother     Liver Disease Father         from EtOH    Alcoholism Father     No Known Problems Maternal Grandmother     Heart Disease Maternal Grandfather     Glaucoma Paternal Grandfather     Heart Disease Paternal Grandfather     Multiple Sclerosis Sister     Breast Cancer Sister     Skin Cancer No family hx of     Melanoma No family hx of        Lab Results   Component Value Date    WBC 5.7 03/26/2024    WBC 7.6 05/06/2021     Lab Results   Component Value Date    RBC 3.09 03/26/2024    RBC 3.28 05/06/2021     Lab Results   Component Value Date    HGB 9.5 03/26/2024    HGB 10.8 05/06/2021     Lab Results   Component Value Date    HCT 29.8 03/26/2024    HCT 33.5 05/06/2021     No components found for: \"MCT\"  Lab Results   Component Value Date    MCV 96 03/26/2024     05/06/2021     Lab Results   Component Value Date    MCH 30.7 03/26/2024    MCH 32.9 05/06/2021     Lab Results   Component Value Date    MCHC 31.9 03/26/2024    MCHC 32.2 05/06/2021     Lab Results   Component Value Date    RDW 12.6 03/26/2024    RDW 15.7 05/06/2021     Lab Results   Component Value Date     03/26/2024     05/06/2021     Last Renal Panel:  Sodium   Date Value Ref Range Status   03/26/2024 135 135 - 145 mmol/L Final     Comment:     Reference intervals for this test were updated on 09/26/2023 to more accurately reflect our healthy population. There may be differences in the flagging of prior results with similar values performed with this method. Interpretation of " those prior results can be made in the context of the updated reference intervals.    01/06/2021 134 133 - 144 mmol/L Final     Potassium   Date Value Ref Range Status   03/26/2024 4.6 3.4 - 5.3 mmol/L Final   07/08/2022 4.3 3.4 - 5.3 mmol/L Final   01/06/2021 4.0 3.4 - 5.3 mmol/L Final     Chloride   Date Value Ref Range Status   03/26/2024 99 98 - 107 mmol/L Final   07/08/2022 104 94 - 109 mmol/L Final   01/06/2021 102 94 - 109 mmol/L Final     Carbon Dioxide   Date Value Ref Range Status   01/06/2021 28 20 - 32 mmol/L Final     Carbon Dioxide (CO2)   Date Value Ref Range Status   03/26/2024 26 22 - 29 mmol/L Final   07/08/2022 26 20 - 32 mmol/L Final     Anion Gap   Date Value Ref Range Status   03/26/2024 10 7 - 15 mmol/L Final   07/08/2022 10 3 - 14 mmol/L Final   01/06/2021 4 3 - 14 mmol/L Final     Glucose   Date Value Ref Range Status   03/26/2024 131 (H) 70 - 99 mg/dL Final   07/08/2022 97 70 - 99 mg/dL Final   01/06/2021 87 70 - 99 mg/dL Final     Urea Nitrogen   Date Value Ref Range Status   03/26/2024 29.9 (H) 8.0 - 23.0 mg/dL Final   07/08/2022 11 7 - 30 mg/dL Final   01/06/2021 14 7 - 30 mg/dL Final     Creatinine   Date Value Ref Range Status   03/26/2024 1.75 (H) 0.51 - 0.95 mg/dL Final   01/06/2021 0.90 0.52 - 1.04 mg/dL Final     GFR Estimate   Date Value Ref Range Status   03/26/2024 28 (L) >60 mL/min/1.73m2 Final   01/06/2021 59 (L) >60 mL/min/[1.73_m2] Final     Comment:     Non  GFR Calc  Starting 12/18/2018, serum creatinine based estimated GFR (eGFR) will be   calculated using the Chronic Kidney Disease Epidemiology Collaboration   (CKD-EPI) equation.       Calcium   Date Value Ref Range Status   03/26/2024 9.3 8.8 - 10.2 mg/dL Final   01/06/2021 8.8 8.5 - 10.1 mg/dL Final     Phosphorus   Date Value Ref Range Status   03/26/2024 3.9 2.5 - 4.5 mg/dL Final   05/06/2021 4.5 2.5 - 4.5 mg/dL Final     Albumin   Date Value Ref Range Status   03/26/2024 4.1 3.5 - 5.2 g/dL Final    03/16/2022 3.6 3.4 - 5.0 g/dL Final   01/06/2021 2.8 (L) 3.4 - 5.0 g/dL Final                         Subjective findings- 83-year-old returns clinic with  for ulcers left leg.  Relates is doing well, relates nursing is coming out weekly and changing the multilayer pression system with an Unna boot.  They relate they need another note for the dentist.    Objective findings- DP and PT are 2 out of 4 left.  Has a left medial leg ulcer that is autumn with pinpoint areas of ulceration that is through the epidermis, minimal serosanguineous drainage on the dressing, no odor, no calor, no erythema, no pain on palpation, minimal peripheral edema.    Assessment and plan- Chronic ulcers left leg, cellulitis is resolved, peripheral arterial and venous disease and kidney disease present.  Diagnosis and treatment options discussed with them.  The left leg and ulcer sites were cleaned with wound Vashe.  Hydrofera Blue and a multilayer compression system with an Unna boot with light compression applied to left lower extremity upon consent.  Continue the surgical shoe.  Okay to discontinue the multilayer compression system with an Unna boot when ulcer is closed with no drainage.  No antibiotics, no imaging, no labs today.  Note given for nursing and dentist.  Previous notes reviewed.  Return to clinic and see me in 1 month.                              Moderate level of medical decision making.

## 2024-04-19 NOTE — PATIENT INSTRUCTIONS
Left leg ulcer weekly and as needed for drainage clean with wound Vashe apply Hydrofera Blue and a multilayer compression system with an Unna boot with light compression.  Can discontinue the multilayer compression system and wound cares when ulcer is closed with no drainage and then return to using compression socks.

## 2024-04-19 NOTE — LETTER
April 19, 2024      Kimberly Chau  [address] [place] [address]           To Whom It May Concern,      The patient is being seen for the continuing of care a leg ulcer. I am ok with the patient having a dental procedure, but it is ultimately up to the Dentist to decide if the dental procedure is ok, for the patient. If the dentist decides the dental procedure is ok for the patient, they should determine antibiotic prophylaxis for the patient as well.               Sincerely,           Camden Salazar DPM

## 2024-04-25 ENCOUNTER — DOCUMENTATION ONLY (OUTPATIENT)
Dept: INTERNAL MEDICINE | Facility: CLINIC | Age: 84
End: 2024-04-25
Payer: MEDICARE

## 2024-04-26 NOTE — PROGRESS NOTES
Type of Form Received:     Form Received (Date) 4/23/24   Form Filled out Yes, faxed 5/4/24   Placed in provider folder Yes

## 2024-05-03 ENCOUNTER — DOCUMENTATION ONLY (OUTPATIENT)
Dept: INTERNAL MEDICINE | Facility: CLINIC | Age: 84
End: 2024-05-03
Payer: MEDICARE

## 2024-05-03 DIAGNOSIS — Z53.9 DIAGNOSIS NOT YET DEFINED: Primary | ICD-10-CM

## 2024-05-03 PROCEDURE — G0179 MD RECERTIFICATION HHA PT: HCPCS | Performed by: INTERNAL MEDICINE

## 2024-05-03 NOTE — PROGRESS NOTES
Type of Form Received:     Form Received (Date) 5/3/24   Form Filled out Yes, faxed 5/20/24   Placed in provider folder Yes

## 2024-05-17 ENCOUNTER — MEDICAL CORRESPONDENCE (OUTPATIENT)
Dept: HEALTH INFORMATION MANAGEMENT | Facility: CLINIC | Age: 84
End: 2024-05-17
Payer: MEDICARE

## 2024-05-17 ENCOUNTER — MYC MEDICAL ADVICE (OUTPATIENT)
Dept: PODIATRY | Facility: CLINIC | Age: 84
End: 2024-05-17
Payer: MEDICARE

## 2024-05-24 ENCOUNTER — OFFICE VISIT (OUTPATIENT)
Dept: PODIATRY | Facility: CLINIC | Age: 84
End: 2024-05-24
Payer: MEDICARE

## 2024-05-24 VITALS — WEIGHT: 111 LBS | BODY MASS INDEX: 22.68 KG/M2

## 2024-05-24 DIAGNOSIS — L97.921 SKIN ULCER OF LEFT LOWER LEG, LIMITED TO BREAKDOWN OF SKIN (H): Primary | ICD-10-CM

## 2024-05-24 DIAGNOSIS — I73.9 PERIPHERAL ARTERIAL DISEASE (H): ICD-10-CM

## 2024-05-24 DIAGNOSIS — I87.8 VENOUS STASIS: ICD-10-CM

## 2024-05-24 PROCEDURE — 99213 OFFICE O/P EST LOW 20 MIN: CPT | Mod: 25 | Performed by: PODIATRIST

## 2024-05-24 PROCEDURE — 29580 STRAPPING UNNA BOOT: CPT | Mod: LT | Performed by: PODIATRIST

## 2024-05-24 NOTE — LETTER
5/24/2024         RE: Kimberly Chau  [address] [facility] [id number]        Dear Colleague,    Thank you for referring your patient, Kimberly Chau, to the [facility]. Please see a copy of my visit note below.    Past Medical History:   Diagnosis Date     Anemia      Arthritis      Cataracts      Central retinal artery occlusion      Cervical spine fracture (H)     After a fall from orthostatic sx     Chronic pain     Back of head     Gastro-oesophageal reflux disease      Head injury      Hypertension      Hyponatremia     Resolved, 2/2 diuretics     Migraines      Osteoporosis      Pneumonia 2018     Rheumatoid arthritis(714.0)      Patient Active Problem List   Diagnosis     Intrinsic atopic dermatitis     Essential hypertension     Rheumatoid arthritis (H)     Retinal artery occlusion     Cervical vertebral fracture (H)     Central retinal artery occlusion of right eye     Bilateral occipital neuralgia     C1-C2 instability     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 factor (H)     Osteoarthritis     Malignant hypertension     Hyponatremia     GERD (gastroesophageal reflux disease)     Eczematous dermatitis     Anxiety state     Anemia     Closed fracture of distal end of left radius with delayed healing, unspecified fracture morphology, subsequent encounter     Closed fracture of distal end of left radius, unspecified fracture morphology, sequela     Osteomyelitis of left leg (H)     Pain of left thumb     CKD (chronic kidney disease) stage 4, GFR 15-29 ml/min (H)     Past Surgical History:   Procedure Laterality Date     COLONOSCOPY       EYE SURGERY Left 02/2019    Hole in macula     FUSION CERVICAL POSTERIOR THREE+ LEVELS  1/22/2013    Procedure: FUSION CERVICAL POSTERIOR THREE+ LEVELS;  Cervical 1-6 Posterior Instrumentation and Fusion, Cervical 3-4 Laminectomy  .Instrumentation and fusion to Cervical 6 *Latex Allergy*;  Surgeon: Ra Bar  MD Seth;  Location: [facility] OR     GYN SURGERY       HEAD & NECK SURGERY       HYSTERECTOMY       IR ENDOVENOUS ABLATION VARICOSE VEINS  2/16/2021     KNEE SURGERY       NECK SURGERY       OPEN REDUCTION INTERNAL FIXATION WRIST Left 4/30/2019    Procedure: Open Reduction Internal Fixation Left Distal Radius Fracture;  Surgeon: Dinh Strong MD;  Location:  OR     OPEN REDUCTION INTERNAL FIXATION WRIST Left 6/12/2020    Procedure: OPEN REDUCTION INTERNAL FIXATION Left Distal Radius Nonunion, Distal Ulna Resection;  Surgeon: Dinh Strong MD;  Location:  OR     OPTICAL TRACKING SYSTEM ENDOSCOPIC SINUS SURGERY Right 4/6/2018    Procedure: OPTICAL TRACKING SYSTEM ENDOSCOPIC SINUS SURGERY;  Stealth Assisted Right Maxillary Antrostomy, Anterior Ethmoidectomy And Frontal Sinusotomy;  Surgeon: Elyse Eisenberg MD;  Location:  OR     OPTICAL TRACKING SYSTEM ENDOSCOPIC SINUS SURGERY Right 8/3/2018    Procedure: OPTICAL TRACKING SYSTEM ENDOSCOPIC SINUS SURGERY;  Stealth Assisted Revision Right Frontal Sinusotomy, Right Stent Placement  **Latex Allergy** ;  Surgeon: Elyse Eisenberg MD;  Location:  OR     ORTHOPEDIC SURGERY       REMOVE HARDWARE WRIST Left 11/19/2019    Procedure: Left Distal Radius Hardware Removal, Flexor Pollicus Longus Repair, Deep Cultures;  Surgeon: Dinh Strong MD;  Location:  OR     ZZC HAND/FINGER SURGERY UNLISTED       ZZC PELVIS/HIP JOINT SURGERY UNLISTED       Social History     Socioeconomic History     Marital status:      Spouse name: Not on file     Number of children: Not on file     Years of education: Not on file     Highest education level: Not on file   Occupational History     Not on file   Tobacco Use     Smoking status: Never     Smokeless tobacco: Never   Substance and Sexual Activity     Alcohol use: Yes     Comment: 2 glasses of wine a day     Drug use: Never     Sexual activity: Not Currently   Other Topics Concern     Parent/sibling w/ CABG, MI  or angioplasty before 65F 55M? Not Asked   Social History Narrative    Ms. Chau is , has two grown children and two grandchildren. She does not work. She was never a smoker, and drinks a glass of wine with dinner each night.         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      Social Determinants of Health     Financial Resource Strain: Not on file   Food Insecurity: Not on file   Transportation Needs: Not on file   Physical Activity: Not on file   Stress: Not on file   Social Connections: Not on file   Interpersonal Safety: Not on file   Housing Stability: Not on file     Family History   Problem Relation Age of Onset     Arthritis Mother      Respiratory Mother         pulmonary fibrosis     Hypertension Mother      Anemia Mother      Liver Disease Father         from EtOH     Alcoholism Father      No Known Problems Maternal Grandmother      Heart Disease Maternal Grandfather      Glaucoma Paternal Grandfather      Heart Disease Paternal Grandfather      Multiple Sclerosis Sister      Breast Cancer Sister      Skin Cancer No family hx of      Melanoma No family hx of        Last Renal Panel:  Sodium   Date Value Ref Range Status   03/26/2024 135 135 - 145 mmol/L Final     Comment:     Reference intervals for this test were updated on 09/26/2023 to more accurately reflect our healthy population. There may be differences in the flagging of prior results with similar values performed with this method. Interpretation of those prior results can be made in the context of the updated reference intervals.    01/06/2021 134 133 - 144 mmol/L Final     Potassium   Date Value Ref Range Status   03/26/2024 4.6 3.4 - 5.3 mmol/L Final   07/08/2022 4.3 3.4 - 5.3 mmol/L Final   01/06/2021 4.0 3.4 - 5.3 mmol/L Final     Chloride   Date Value Ref Range Status   03/26/2024 99 98 - 107 mmol/L Final   07/08/2022 104 94 - 109 mmol/L Final   01/06/2021 102 94 - 109 mmol/L Final     Carbon Dioxide    Date Value Ref Range Status   01/06/2021 28 20 - 32 mmol/L Final     Carbon Dioxide (CO2)   Date Value Ref Range Status   03/26/2024 26 22 - 29 mmol/L Final   07/08/2022 26 20 - 32 mmol/L Final     Anion Gap   Date Value Ref Range Status   03/26/2024 10 7 - 15 mmol/L Final   07/08/2022 10 3 - 14 mmol/L Final   01/06/2021 4 3 - 14 mmol/L Final     Glucose   Date Value Ref Range Status   03/26/2024 131 (H) 70 - 99 mg/dL Final   07/08/2022 97 70 - 99 mg/dL Final   01/06/2021 87 70 - 99 mg/dL Final     Urea Nitrogen   Date Value Ref Range Status   03/26/2024 29.9 (H) 8.0 - 23.0 mg/dL Final   07/08/2022 11 7 - 30 mg/dL Final   01/06/2021 14 7 - 30 mg/dL Final     Creatinine   Date Value Ref Range Status   03/26/2024 1.75 (H) 0.51 - 0.95 mg/dL Final   01/06/2021 0.90 0.52 - 1.04 mg/dL Final     GFR Estimate   Date Value Ref Range Status   03/26/2024 28 (L) >60 mL/min/1.73m2 Final   01/06/2021 59 (L) >60 mL/min/[1.73_m2] Final     Comment:     Non  GFR Calc  Starting 12/18/2018, serum creatinine based estimated GFR (eGFR) will be   calculated using the Chronic Kidney Disease Epidemiology Collaboration   (CKD-EPI) equation.       Calcium   Date Value Ref Range Status   03/26/2024 9.3 8.8 - 10.2 mg/dL Final   01/06/2021 8.8 8.5 - 10.1 mg/dL Final     Phosphorus   Date Value Ref Range Status   03/26/2024 3.9 2.5 - 4.5 mg/dL Final   05/06/2021 4.5 2.5 - 4.5 mg/dL Final     Albumin   Date Value Ref Range Status   03/26/2024 4.1 3.5 - 5.2 g/dL Final   03/16/2022 3.6 3.4 - 5.0 g/dL Final   01/06/2021 2.8 (L) 3.4 - 5.0 g/dL Final                             Subjective findings- 83-year-old returns clinic with  for ulcers left leg.  They relate home care discharged her from therapy because her ulcer was closed, relates she feels she is doing well, relates she did scratch her legs a little bit because they are itching, is using Vanicream lotion intermittently, no systemic signs of infection, relates to no  injuries, is homebound and does not leave home without assistance and cannot walk more than 100 feet.    Objective findings- DP PT are 2 out of 4 left.  Is a left medial and anterior leg and ankle eschars that are intact with venous stasis, mild erythema, mild edema, no odor, no calor, no drainage, no pain on palpation, decreased hair growth.    Assessment and plan- Ulcers left leg with scratch abrasions, peripheral arterial and venous disease, Kidney disease.  Diagnosis and treatment options discussed with them.  The left leg and ulcer sites were cleaned with wound Vashe and we applied Aquacel Ag to the ulcer sites and a multilayer compression system with an Unna boot with light compression applied upon consent and use discussed with them.  They relate they have a surgical shoe at home she can use this as needed.  No antibiotics, no imaging today.  Order for home therapy given again for wound cares.  They will schedule to see me in 1 week if wound care sees them then I will see them in 1 month.  Previous notes reviewed.                                Moderate level of medical decision making.      Again, thank you for allowing me to participate in the care of your patient.        Sincerely,        Camden Salazar DPM

## 2024-05-24 NOTE — PROGRESS NOTES
Past Medical History:   Diagnosis Date    Anemia     Arthritis     Cataracts     Central retinal artery occlusion     Cervical spine fracture (H)     After a fall from orthostatic sx    Chronic pain     Back of head    Gastro-oesophageal reflux disease     Head injury     Hypertension     Hyponatremia     Resolved, 2/2 diuretics    Migraines     Osteoporosis     Pneumonia 2018    Rheumatoid arthritis(714.0)      Patient Active Problem List   Diagnosis    Intrinsic atopic dermatitis    Essential hypertension    Rheumatoid arthritis (H)    Retinal artery occlusion    Cervical vertebral fracture (H)    Central retinal artery occlusion of right eye    Bilateral occipital neuralgia    C1-C2 instability    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 factor (H)    Osteoarthritis    Malignant hypertension    Hyponatremia    GERD (gastroesophageal reflux disease)    Eczematous dermatitis    Anxiety state    Anemia    Closed fracture of distal end of left radius with delayed healing, unspecified fracture morphology, subsequent encounter    Closed fracture of distal end of left radius, unspecified fracture morphology, sequela    Osteomyelitis of left leg (H)    Pain of left thumb    CKD (chronic kidney disease) stage 4, GFR 15-29 ml/min (H)     Past Surgical History:   Procedure Laterality Date    COLONOSCOPY      EYE SURGERY Left 02/2019    Hole in macula    FUSION CERVICAL POSTERIOR THREE+ LEVELS  1/22/2013    Procedure: FUSION CERVICAL POSTERIOR THREE+ LEVELS;  Cervical 1-6 Posterior Instrumentation and Fusion, Cervical 3-4 Laminectomy  .Instrumentation and fusion to Cervical 6 *Latex Allergy*;  Surgeon: Ra Bar MD;  Location: [facility] OR    GYN SURGERY      HEAD & NECK SURGERY      HYSTERECTOMY      IR ENDOVENOUS ABLATION VARICOSE VEINS  2/16/2021    KNEE SURGERY      NECK SURGERY      OPEN REDUCTION INTERNAL FIXATION WRIST Left 4/30/2019    Procedure: Open Reduction Internal Fixation Left  Distal Radius Fracture;  Surgeon: Dinh Strong MD;  Location: [facility] OR    OPEN REDUCTION INTERNAL FIXATION WRIST Left 6/12/2020    Procedure: OPEN REDUCTION INTERNAL FIXATION Left Distal Radius Nonunion, Distal Ulna Resection;  Surgeon: Dinh Strong MD;  Location: [facility] OR    OPTICAL TRACKING SYSTEM ENDOSCOPIC SINUS SURGERY Right 4/6/2018    Procedure: OPTICAL TRACKING SYSTEM ENDOSCOPIC SINUS SURGERY;  Stealth Assisted Right Maxillary Antrostomy, Anterior Ethmoidectomy And Frontal Sinusotomy;  Surgeon: Elyse Eisenberg MD;  Location: [facility] OR    OPTICAL TRACKING SYSTEM ENDOSCOPIC SINUS SURGERY Right 8/3/2018    Procedure: OPTICAL TRACKING SYSTEM ENDOSCOPIC SINUS SURGERY;  Stealth Assisted Revision Right Frontal Sinusotomy, Right Stent Placement  **Latex Allergy** ;  Surgeon: Elyse Eisenberg MD;  Location: [facility] OR    ORTHOPEDIC SURGERY      REMOVE HARDWARE WRIST Left 11/19/2019    Procedure: Left Distal Radius Hardware Removal, Flexor Pollicus Longus Repair, Deep Cultures;  Surgeon: Dinh Strong MD;  Location: [facility] OR    ZZC HAND/FINGER SURGERY UNLISTED      Z PELVIS/HIP JOINT SURGERY UNLISTED       Social History     Socioeconomic History    Marital status:      Spouse name: Not on file    Number of children: Not on file    Years of education: Not on file    Highest education level: Not on file   Occupational History    Not on file   Tobacco Use    Smoking status: Never    Smokeless tobacco: Never   Substance and Sexual Activity    Alcohol use: Yes     Comment: 2 glasses of wine a day    Drug use: Never    Sexual activity: Not Currently   Other Topics Concern    Parent/sibling w/ CABG, MI or angioplasty before 65F 55M? Not Asked   Social History Narrative    Ms. Chau is , has two grown children and two grandchildren. She does not work. She was never a smoker, and drinks a glass of wine with dinner each night.         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       Social Determinants of Health     Financial Resource Strain: Not on file   Food Insecurity: Not on file   Transportation Needs: Not on file   Physical Activity: Not on file   Stress: Not on file   Social Connections: Not on file   Interpersonal Safety: Not on file   Housing Stability: Not on file     Family History   Problem Relation Age of Onset    Arthritis Mother     Respiratory Mother         pulmonary fibrosis    Hypertension Mother     Anemia Mother     Liver Disease Father         from EtOH    Alcoholism Father     No Known Problems Maternal Grandmother     Heart Disease Maternal Grandfather     Glaucoma Paternal Grandfather     Heart Disease Paternal Grandfather     Multiple Sclerosis Sister     Breast Cancer Sister     Skin Cancer No family hx of     Melanoma No family hx of        Last Renal Panel:  Sodium   Date Value Ref Range Status   03/26/2024 135 135 - 145 mmol/L Final     Comment:     Reference intervals for this test were updated on 09/26/2023 to more accurately reflect our healthy population. There may be differences in the flagging of prior results with similar values performed with this method. Interpretation of those prior results can be made in the context of the updated reference intervals.    01/06/2021 134 133 - 144 mmol/L Final     Potassium   Date Value Ref Range Status   03/26/2024 4.6 3.4 - 5.3 mmol/L Final   07/08/2022 4.3 3.4 - 5.3 mmol/L Final   01/06/2021 4.0 3.4 - 5.3 mmol/L Final     Chloride   Date Value Ref Range Status   03/26/2024 99 98 - 107 mmol/L Final   07/08/2022 104 94 - 109 mmol/L Final   01/06/2021 102 94 - 109 mmol/L Final     Carbon Dioxide   Date Value Ref Range Status   01/06/2021 28 20 - 32 mmol/L Final     Carbon Dioxide (CO2)   Date Value Ref Range Status   03/26/2024 26 22 - 29 mmol/L Final   07/08/2022 26 20 - 32 mmol/L Final     Anion Gap   Date Value Ref Range Status   03/26/2024 10 7 - 15 mmol/L Final   07/08/2022 10 3 - 14 mmol/L Final   01/06/2021 4 3  - 14 mmol/L Final     Glucose   Date Value Ref Range Status   03/26/2024 131 (H) 70 - 99 mg/dL Final   07/08/2022 97 70 - 99 mg/dL Final   01/06/2021 87 70 - 99 mg/dL Final     Urea Nitrogen   Date Value Ref Range Status   03/26/2024 29.9 (H) 8.0 - 23.0 mg/dL Final   07/08/2022 11 7 - 30 mg/dL Final   01/06/2021 14 7 - 30 mg/dL Final     Creatinine   Date Value Ref Range Status   03/26/2024 1.75 (H) 0.51 - 0.95 mg/dL Final   01/06/2021 0.90 0.52 - 1.04 mg/dL Final     GFR Estimate   Date Value Ref Range Status   03/26/2024 28 (L) >60 mL/min/1.73m2 Final   01/06/2021 59 (L) >60 mL/min/[1.73_m2] Final     Comment:     Non  GFR Calc  Starting 12/18/2018, serum creatinine based estimated GFR (eGFR) will be   calculated using the Chronic Kidney Disease Epidemiology Collaboration   (CKD-EPI) equation.       Calcium   Date Value Ref Range Status   03/26/2024 9.3 8.8 - 10.2 mg/dL Final   01/06/2021 8.8 8.5 - 10.1 mg/dL Final     Phosphorus   Date Value Ref Range Status   03/26/2024 3.9 2.5 - 4.5 mg/dL Final   05/06/2021 4.5 2.5 - 4.5 mg/dL Final     Albumin   Date Value Ref Range Status   03/26/2024 4.1 3.5 - 5.2 g/dL Final   03/16/2022 3.6 3.4 - 5.0 g/dL Final   01/06/2021 2.8 (L) 3.4 - 5.0 g/dL Final                             Subjective findings- 83-year-old returns clinic with  for ulcers left leg.  They relate home care discharged her from therapy because her ulcer was closed, relates she feels she is doing well, relates she did scratch her legs a little bit because they are itching, is using Vanicream lotion intermittently, no systemic signs of infection, relates to no injuries, is homebound and does not leave home without assistance and cannot walk more than 100 feet.    Objective findings- DP PT are 2 out of 4 left.  Is a left medial and anterior leg and ankle eschars that are intact with venous stasis, mild erythema, mild edema, no odor, no calor, no drainage, no pain on palpation, decreased  hair growth.    Assessment and plan- Ulcers left leg with scratch abrasions, peripheral arterial and venous disease, Kidney disease.  Diagnosis and treatment options discussed with them.  The left leg and ulcer sites were cleaned with wound Vashe and we applied Aquacel Ag to the ulcer sites and a multilayer compression system with an Unna boot with light compression applied upon consent and use discussed with them.  They relate they have a surgical shoe at home she can use this as needed.  No antibiotics, no imaging today.  Order for home therapy given again for wound cares.  They will schedule to see me in 1 week if wound care sees them then I will see them in 1 month.  Previous notes reviewed.                                Moderate level of medical decision making.

## 2024-05-24 NOTE — PATIENT INSTRUCTIONS
Left leg-weekly and as needed cleaned with wound Vashe, apply Aquacel Ag to the ulcer sites and wrapped with a multilayer compression system with an Unna boot with light compression.

## 2024-05-27 ENCOUNTER — MYC MEDICAL ADVICE (OUTPATIENT)
Dept: NEPHROLOGY | Facility: CLINIC | Age: 84
End: 2024-05-27
Payer: MEDICARE

## 2024-05-27 DIAGNOSIS — N18.4 CKD (CHRONIC KIDNEY DISEASE) STAGE 4, GFR 15-29 ML/MIN (H): Primary | ICD-10-CM

## 2024-05-28 NOTE — TELEPHONE ENCOUNTER
[facility]    Phone Message    May a detailed message be left on voicemail: yes    Reason for Call: Symptoms or Concerns     If patient has red-flag symptoms, warm transfer to triage line    Current symptom or concern:  pain in the buttocks    Symptoms have been present for:  2 week(s)    Has patient previously been seen for this? Yes    By : Dr Castellano    Date: 7/24/19    Are there any new or worsening symptoms? Yes: the pain is getting worse please call to give patient medical guidance       Action Taken: Message routed to:  [facility] ([facility]): kayla   no WB restrictions

## 2024-05-29 NOTE — TELEPHONE ENCOUNTER
Future orders placed for July.  Per Dr. Aguirre:  Yes lets do cbc, renal panel, cystatin C for July.  Thanks

## 2024-05-30 ENCOUNTER — OFFICE VISIT (OUTPATIENT)
Dept: PODIATRY | Facility: CLINIC | Age: 84
End: 2024-05-30
Payer: MEDICARE

## 2024-05-30 DIAGNOSIS — I87.8 VENOUS STASIS: ICD-10-CM

## 2024-05-30 DIAGNOSIS — L97.921 SKIN ULCER OF LEFT LOWER LEG, LIMITED TO BREAKDOWN OF SKIN (H): Primary | ICD-10-CM

## 2024-05-30 DIAGNOSIS — I73.9 PERIPHERAL ARTERIAL DISEASE (H): ICD-10-CM

## 2024-05-30 PROCEDURE — 29581 APPL MULTLAYER CMPRN SYS LEG: CPT | Mod: LT | Performed by: PODIATRIST

## 2024-05-30 NOTE — PROGRESS NOTES
Past Medical History:   Diagnosis Date    Anemia     Arthritis     Cataracts     Central retinal artery occlusion     Cervical spine fracture (H)     After a fall from orthostatic sx    Chronic pain     Back of head    Gastro-oesophageal reflux disease     Head injury     Hypertension     Hyponatremia     Resolved, 2/2 diuretics    Migraines     Osteoporosis     Pneumonia 2018    Rheumatoid arthritis(714.0)      Patient Active Problem List   Diagnosis    Intrinsic atopic dermatitis    Essential hypertension    Rheumatoid arthritis (H)    Retinal artery occlusion    Cervical vertebral fracture (H)    Central retinal artery occlusion of right eye    Bilateral occipital neuralgia    C1-C2 instability    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 factor (H)    Osteoarthritis    Malignant hypertension    Hyponatremia    GERD (gastroesophageal reflux disease)    Eczematous dermatitis    Anxiety state    Anemia    Closed fracture of distal end of left radius with delayed healing, unspecified fracture morphology, subsequent encounter    Closed fracture of distal end of left radius, unspecified fracture morphology, sequela    Osteomyelitis of left leg (H)    Pain of left thumb    CKD (chronic kidney disease) stage 4, GFR 15-29 ml/min (H)     Past Surgical History:   Procedure Laterality Date    COLONOSCOPY      EYE SURGERY Left 02/2019    Hole in macula    FUSION CERVICAL POSTERIOR THREE+ LEVELS  1/22/2013    Procedure: FUSION CERVICAL POSTERIOR THREE+ LEVELS;  Cervical 1-6 Posterior Instrumentation and Fusion, Cervical 3-4 Laminectomy  .Instrumentation and fusion to Cervical 6 *Latex Allergy*;  Surgeon: Ra Bar MD;  Location: [facility]    GYN SURGERY      HEAD & NECK SURGERY      HYSTERECTOMY      IR ENDOVENOUS ABLATION VARICOSE VEINS  2/16/2021    KNEE SURGERY      NECK SURGERY      OPEN REDUCTION INTERNAL FIXATION WRIST Left 4/30/2019    Procedure: Open Reduction Internal Fixation Left  Distal Radius Fracture;  Surgeon: Dinh Strong MD;  [facility]    OPEN REDUCTION INTERNAL FIXATION WRIST Left 6/12/2020    Procedure: OPEN REDUCTION INTERNAL FIXATION Left Distal Radius Nonunion, Distal Ulna Resection;  Surgeon: Dinh Strong MD;  Location: [facility]    OPTICAL TRACKING SYSTEM ENDOSCOPIC SINUS SURGERY Right 4/6/2018    Procedure: OPTICAL TRACKING SYSTEM ENDOSCOPIC SINUS SURGERY;  Stealth Assisted Right Maxillary Antrostomy, Anterior Ethmoidectomy And Frontal Sinusotomy;  Surgeon: Elyse Eisenberg MD;  Location: [facility]    OPTICAL TRACKING SYSTEM ENDOSCOPIC SINUS SURGERY Right 8/3/2018    Procedure: OPTICAL TRACKING SYSTEM ENDOSCOPIC SINUS SURGERY;  Stealth Assisted Revision Right Frontal Sinusotomy, Right Stent Placement  **Latex Allergy** ;  Surgeon: Elyse Eisenberg MD;  Location: [facility]    ORTHOPEDIC SURGERY      REMOVE HARDWARE WRIST Left 11/19/2019    Procedure: Left Distal Radius Hardware Removal, Flexor Pollicus Longus Repair, Deep Cultures;  Surgeon: Dinh Strong MD;  Location: [facility]    ZZC HAND/FINGER SURGERY UNLISTED      Z PELVIS/HIP JOINT SURGERY UNLISTED       Social History     Socioeconomic History    Marital status:      Spouse name: Not on file    Number of children: Not on file    Years of education: Not on file    Highest education level: Not on file   Occupational History    Not on file   Tobacco Use    Smoking status: Never    Smokeless tobacco: Never   Substance and Sexual Activity    Alcohol use: Yes     Comment: 2 glasses of wine a day    Drug use: Never    Sexual activity: Not Currently   Other Topics Concern    Parent/sibling w/ CABG, MI or angioplasty before 65F 55M? Not Asked   Social History Narrative    Ms. Chau is , has two grown children and two grandchildren. She does not work. She was never a smoker, and drinks a glass of wine with dinner each night.         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       Social Determinants of Health     Financial Resource Strain: Not on file   Food Insecurity: Not on file   Transportation Needs: Not on file   Physical Activity: Not on file   Stress: Not on file   Social Connections: Not on file   Interpersonal Safety: Not on file   Housing Stability: Not on file     Family History   Problem Relation Age of Onset    Arthritis Mother     Respiratory Mother         pulmonary fibrosis    Hypertension Mother     Anemia Mother     Liver Disease Father         from EtOH    Alcoholism Father     No Known Problems Maternal Grandmother     Heart Disease Maternal Grandfather     Glaucoma Paternal Grandfather     Heart Disease Paternal Grandfather     Multiple Sclerosis Sister     Breast Cancer Sister     Skin Cancer No family hx of     Melanoma No family hx of                Subjective findings- 83-year-old returns clinic with  for ulcers left leg.  Relates to no problems with the Unna boot compression system, relates [facility] home care did contact them but they wanted to use [facility] care and they called them and they stated they did not have a referral, relates to no new problems.    Objective findings- DP and PT are 2 out of 4 left.  Has left medial leg ulcer that is through the Dermis with serosanguineous drainage, decreased edema, no erythema, no odor, no calor, no pain on palpation, decreased hair growth.    Assessment and plan- Ulcers left leg with scratch abrasions, peripheral arterial and venous disease, kidney disease.  Diagnosis and treatment options discussed with them.  The left leg and ulcer sites were cleaned with wound wash and we applied Hydrofera Blue and a multilayer compression system with an Unna boot with light compression upon consent.  Will have the order for home care sent to [facility] to see if they can do weekly Unna boot compression system changes and wound cares.  No antibiotics and no imaging today.  Previous notes reviewed.  Return to  clinic and see me in 1 week if home nursing is coming out then I will see her in 1 month.                    Moderate level of medical decision making.

## 2024-05-30 NOTE — NURSING NOTE
Kimberly Chau's chief complaint for this visit includes:  Chief Complaint   Patient presents with    Consult     Unna boot change     PCP: Guillermo Castellano    Referring Provider:  No referring provider defined for this encounter.    There were no vitals taken for this visit.  Data Unavailable     Do you need any medication refills at today's visit? NO    Allergies   Allergen Reactions    Hctz Other (See Comments)     Pt develops symptomatic and significant hyponatremia with HCTZ exposure    Hydrochlorothiazide      Other reaction(s): Hyponatremia  Hyponatremia    Latex     Benzocaine Rash     carba mix,thrium-sunscreen    Ra Lotion Rash     carba mix,thrium-sunscreen    Ace Inhibitors Unknown     Dizziness and orthostatic changes and electrolyte problems.    Latex Unknown       Humera Hairston LPN

## 2024-05-30 NOTE — LETTER
5/30/2024         RE: Kimberly Chau  [address] [facility] [address]        Dear Colleague,    Thank you for referring your patient, Kimberly Chau, to the [facility]. Please see a copy of my visit note below.    Past Medical History:   Diagnosis Date     Anemia      Arthritis      Cataracts      Central retinal artery occlusion      Cervical spine fracture (H)     After a fall from orthostatic sx     Chronic pain     Back of head     Gastro-oesophageal reflux disease      Head injury      Hypertension      Hyponatremia     Resolved, 2/2 diuretics     Migraines      Osteoporosis      Pneumonia 2018     Rheumatoid arthritis(714.0)      Patient Active Problem List   Diagnosis     Intrinsic atopic dermatitis     Essential hypertension     Rheumatoid arthritis (H)     Retinal artery occlusion     Cervical vertebral fracture (H)     Central retinal artery occlusion of right eye     Bilateral occipital neuralgia     C1-C2 instability     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 factor (H)     Osteoarthritis     Malignant hypertension     Hyponatremia     GERD (gastroesophageal reflux disease)     Eczematous dermatitis     Anxiety state     Anemia     Closed fracture of distal end of left radius with delayed healing, unspecified fracture morphology, subsequent encounter     Closed fracture of distal end of left radius, unspecified fracture morphology, sequela     Osteomyelitis of left leg (H)     Pain of left thumb     CKD (chronic kidney disease) stage 4, GFR 15-29 ml/min (H)     Past Surgical History:   Procedure Laterality Date     COLONOSCOPY       EYE SURGERY Left 02/2019    Hole in macula     FUSION CERVICAL POSTERIOR THREE+ LEVELS  1/22/2013    Procedure: FUSION CERVICAL POSTERIOR THREE+ LEVELS;  Cervical 1-6 Posterior Instrumentation and Fusion, Cervical 3-4 Laminectomy  .Instrumentation and fusion to Cervical 6 *Latex Allergy*;  Surgeon: Ra Bar  MD Seth;  Location: [facility] OR     GYN SURGERY       HEAD & NECK SURGERY       HYSTERECTOMY       IR ENDOVENOUS ABLATION VARICOSE VEINS  2/16/2021     KNEE SURGERY       NECK SURGERY       OPEN REDUCTION INTERNAL FIXATION WRIST Left 4/30/2019    Procedure: Open Reduction Internal Fixation Left Distal Radius Fracture;  Surgeon: Dinh Strong MD;  Location:  OR     OPEN REDUCTION INTERNAL FIXATION WRIST Left 6/12/2020    Procedure: OPEN REDUCTION INTERNAL FIXATION Left Distal Radius Nonunion, Distal Ulna Resection;  Surgeon: Dinh Strong MD;  Location:  OR     OPTICAL TRACKING SYSTEM ENDOSCOPIC SINUS SURGERY Right 4/6/2018    Procedure: OPTICAL TRACKING SYSTEM ENDOSCOPIC SINUS SURGERY;  Stealth Assisted Right Maxillary Antrostomy, Anterior Ethmoidectomy And Frontal Sinusotomy;  Surgeon: Elyse Eisenberg MD;  Location:  OR     OPTICAL TRACKING SYSTEM ENDOSCOPIC SINUS SURGERY Right 8/3/2018    Procedure: OPTICAL TRACKING SYSTEM ENDOSCOPIC SINUS SURGERY;  Stealth Assisted Revision Right Frontal Sinusotomy, Right Stent Placement  **Latex Allergy** ;  Surgeon: Elyse Eisenberg MD;  Location:  OR     ORTHOPEDIC SURGERY       REMOVE HARDWARE WRIST Left 11/19/2019    Procedure: Left Distal Radius Hardware Removal, Flexor Pollicus Longus Repair, Deep Cultures;  Surgeon: Dinh Strong MD;  Location:  OR     ZZC HAND/FINGER SURGERY UNLISTED       ZZC PELVIS/HIP JOINT SURGERY UNLISTED       Social History     Socioeconomic History     Marital status:      Spouse name: Not on file     Number of children: Not on file     Years of education: Not on file     Highest education level: Not on file   Occupational History     Not on file   Tobacco Use     Smoking status: Never     Smokeless tobacco: Never   Substance and Sexual Activity     Alcohol use: Yes     Comment: 2 glasses of wine a day     Drug use: Never     Sexual activity: Not Currently   Other Topics Concern     Parent/sibling w/ CABG, MI  or angioplasty before 65F 55M? Not Asked   Social History Narrative    Ms. Chau is , has two grown children and two grandchildren. She does not work. She was never a smoker, and drinks a glass of wine with dinner each night.         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      Social Determinants of Health     Financial Resource Strain: Not on file   Food Insecurity: Not on file   Transportation Needs: Not on file   Physical Activity: Not on file   Stress: Not on file   Social Connections: Not on file   Interpersonal Safety: Not on file   Housing Stability: Not on file     Family History   Problem Relation Age of Onset     Arthritis Mother      Respiratory Mother         pulmonary fibrosis     Hypertension Mother      Anemia Mother      Liver Disease Father         from EtOH     Alcoholism Father      No Known Problems Maternal Grandmother      Heart Disease Maternal Grandfather      Glaucoma Paternal Grandfather      Heart Disease Paternal Grandfather      Multiple Sclerosis Sister      Breast Cancer Sister      Skin Cancer No family hx of      Melanoma No family hx of                Subjective findings- 83-year-old returns clinic with  for ulcers left leg.  Relates to no problems with the Unna boot compression system, relates [facility] home care did contact them but they wanted to use [facility] home care and they called them and they stated they did not have a referral, relates to no new problems.    Objective findings- DP and PT are 2 out of 4 left.  Has left medial leg ulcer that is through the Dermis with serosanguineous drainage, decreased edema, no erythema, no odor, no calor, no pain on palpation, decreased hair growth.    Assessment and plan- Ulcers left leg with scratch abrasions, peripheral arterial and venous disease, kidney disease.  Diagnosis and treatment options discussed with them.  The left leg and ulcer sites were cleaned with wound wash and we applied  Hydrofera Blue and a multilayer compression system with an Unna boot with light compression upon consent.  Will have the order for home care sent to [facility] to see if they can do weekly Unna boot compression system changes and wound cares.  No antibiotics and no imaging today.  Previous notes reviewed.  Return to clinic and see me in 1 week if home nursing is coming out then I will see her in 1 month.                    Moderate level of medical decision making.      Again, thank you for allowing me to participate in the care of your patient.        Sincerely,        Camden Salazar DPM

## 2024-05-31 ENCOUNTER — TELEPHONE (OUTPATIENT)
Dept: INTERNAL MEDICINE | Facility: CLINIC | Age: 84
End: 2024-05-31
Payer: MEDICARE

## 2024-05-31 NOTE — TELEPHONE ENCOUNTER
[facility] Call Center    Phone Message    May a detailed message be left on voicemail: yes     Reason for Call: Other: Patient decided to go with [facility] instead of them, please call if questions.       Action Taken: Message routed to:  [facility]    Travel Screening: Not Applicable     Date of Service:

## 2024-06-02 ENCOUNTER — HEALTH MAINTENANCE LETTER (OUTPATIENT)
Age: 84
End: 2024-06-02

## 2024-06-06 ENCOUNTER — TELEPHONE (OUTPATIENT)
Dept: INTERNAL MEDICINE | Facility: CLINIC | Age: 84
End: 2024-06-06

## 2024-06-06 ENCOUNTER — OFFICE VISIT (OUTPATIENT)
Dept: PODIATRY | Facility: CLINIC | Age: 84
End: 2024-06-06
Payer: MEDICARE

## 2024-06-06 DIAGNOSIS — L97.921 SKIN ULCER OF LEFT LOWER LEG, LIMITED TO BREAKDOWN OF SKIN (H): Primary | ICD-10-CM

## 2024-06-06 DIAGNOSIS — I73.9 PERIPHERAL ARTERIAL DISEASE (H): ICD-10-CM

## 2024-06-06 DIAGNOSIS — I87.8 VENOUS STASIS: ICD-10-CM

## 2024-06-06 PROCEDURE — 29581 APPL MULTLAYER CMPRN SYS LEG: CPT | Mod: LT | Performed by: PODIATRIST

## 2024-06-06 NOTE — NURSING NOTE
Kimberly Chau's chief complaint for this visit includes:  Chief Complaint   Patient presents with    Consult     Unna boot change     PCP: Guillermo Castellano    Referring Provider:  Guillermo Castellano MD  [address] [place] [id number]    There were no vitals taken for this visit.  Data Unavailable     Do you need any medication refills at today's visit? NO    Allergies   Allergen Reactions    Hctz Other (See Comments)     Pt develops symptomatic and significant hyponatremia with HCTZ exposure    Hydrochlorothiazide      Other reaction(s): Hyponatremia  Hyponatremia    Latex     Benzocaine Rash     carba mix,thrium-sunscreen    Ra Lotion Rash     carba mix,thrium-sunscreen    Ace Inhibitors Unknown     Dizziness and orthostatic changes and electrolyte problems.    Latex Unknown       Humera Hairtson LPN

## 2024-06-06 NOTE — LETTER
6/6/2024      Kimberly Chau  [address]   [place] [address]      Dear Colleague,    Thank you for referring your patient, Kimberly Chau, to the [facility]. Please see a copy of my visit note below.    Past Medical History:   Diagnosis Date     Anemia      Arthritis      Cataracts      Central retinal artery occlusion      Cervical spine fracture (H)     After a fall from orthostatic sx     Chronic pain     Back of head     Gastro-oesophageal reflux disease      Head injury      Hypertension      Hyponatremia     Resolved, 2/2 diuretics     Migraines      Osteoporosis      Pneumonia 2018     Rheumatoid arthritis(714.0)      Patient Active Problem List   Diagnosis     Intrinsic atopic dermatitis     Essential hypertension     Rheumatoid arthritis (H)     Retinal artery occlusion     Cervical vertebral fracture (H)     Central retinal artery occlusion of right eye     Bilateral occipital neuralgia     C1-C2 instability     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 factor (H)     Osteoarthritis     Malignant hypertension     Hyponatremia     GERD (gastroesophageal reflux disease)     Eczematous dermatitis     Anxiety state     Anemia     Closed fracture of distal end of left radius with delayed healing, unspecified fracture morphology, subsequent encounter     Closed fracture of distal end of left radius, unspecified fracture morphology, sequela     Osteomyelitis of left leg (H)     Pain of left thumb     CKD (chronic kidney disease) stage 4, GFR 15-29 ml/min (H)     Past Surgical History:   Procedure Laterality Date     COLONOSCOPY       EYE SURGERY Left 02/2019    Hole in macula     FUSION CERVICAL POSTERIOR THREE+ LEVELS  1/22/2013    Procedure: FUSION CERVICAL POSTERIOR THREE+ LEVELS;  Cervical 1-6 Posterior Instrumentation and Fusion, Cervical 3-4 Laminectomy  .Instrumentation and fusion to Cervical 6 *Latex Allergy*;  Surgeon: Ra Bar MD;   Location: [facility] OR     GYN SURGERY       HEAD & NECK SURGERY       HYSTERECTOMY       IR ENDOVENOUS ABLATION VARICOSE VEINS  2/16/2021     KNEE SURGERY       NECK SURGERY       OPEN REDUCTION INTERNAL FIXATION WRIST Left 4/30/2019    Procedure: Open Reduction Internal Fixation Left Distal Radius Fracture;  Surgeon: Dinh Strong MD;  [facility]     OPEN REDUCTION INTERNAL FIXATION WRIST Left 6/12/2020    Procedure: OPEN REDUCTION INTERNAL FIXATION Left Distal Radius Nonunion, Distal Ulna Resection;  Surgeon: Dinh Strong MD;  [facility]     OPTICAL TRACKING SYSTEM ENDOSCOPIC SINUS SURGERY Right 4/6/2018    Procedure: OPTICAL TRACKING SYSTEM ENDOSCOPIC SINUS SURGERY;  Stealth Assisted Right Maxillary Antrostomy, Anterior Ethmoidectomy And Frontal Sinusotomy;  Surgeon: Elyse Eisenberg MD;  Location:  OR     OPTICAL TRACKING SYSTEM ENDOSCOPIC SINUS SURGERY Right 8/3/2018    Procedure: OPTICAL TRACKING SYSTEM ENDOSCOPIC SINUS SURGERY;  Stealth Assisted Revision Right Frontal Sinusotomy, Right Stent Placement  **Latex Allergy** ;  Surgeon: Elyse Eisenberg MD;  Location:  OR     ORTHOPEDIC SURGERY       REMOVE HARDWARE WRIST Left 11/19/2019    Procedure: Left Distal Radius Hardware Removal, Flexor Pollicus Longus Repair, Deep Cultures;  Surgeon: Dinh Strong MD;  Location:  OR     ZZC HAND/FINGER SURGERY UNLISTED       ZZC PELVIS/HIP JOINT SURGERY UNLISTED       Social History     Socioeconomic History     Marital status:      Spouse name: Not on file     Number of children: Not on file     Years of education: Not on file     Highest education level: Not on file   Occupational History     Not on file   Tobacco Use     Smoking status: Never     Smokeless tobacco: Never   Substance and Sexual Activity     Alcohol use: Yes     Comment: 2 glasses of wine a day     Drug use: Never     Sexual activity: Not Currently   Other Topics Concern     Parent/sibling w/ CABG, MI or  angioplasty before 65F 55M? Not Asked   Social History Narrative    Ms. Chau is , has two grown children and two grandchildren. She does not work. She was never a smoker, and drinks a glass of wine with dinner each night.         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      Social Determinants of Health     Financial Resource Strain: Not on file   Food Insecurity: Not on file   Transportation Needs: Not on file   Physical Activity: Not on file   Stress: Not on file   Social Connections: Not on file   Interpersonal Safety: Not on file   Housing Stability: Not on file     Family History   Problem Relation Age of Onset     Arthritis Mother      Respiratory Mother         pulmonary fibrosis     Hypertension Mother      Anemia Mother      Liver Disease Father         from EtOH     Alcoholism Father      No Known Problems Maternal Grandmother      Heart Disease Maternal Grandfather      Glaucoma Paternal Grandfather      Heart Disease Paternal Grandfather      Multiple Sclerosis Sister      Breast Cancer Sister      Skin Cancer No family hx of      Melanoma No family hx of                    Subjective findings- 83-year-old returns clinic with  for ulcers left leg.  Relates to no problems with the Unna boot compression system, relates they did contact [facility] home care and nursing is scheduled to come out this week and next.     Objective findings- DP and PT are 2 out of 4 left.  Has autumn left medial leg ulcer that is through the Dermis with serosanguineous drainage, decreased edema, no erythema, no odor, no calor, no pain on palpation, decreased hair growth.     Assessment and plan- Ulcers left leg with scratch abrasions, peripheral arterial and venous disease, kidney disease.  Diagnosis and treatment options discussed with them.  The left leg and ulcer sites were cleaned with wound wash and we applied Hydrofera Blue and a multilayer compression system with an Unna boot  with light compression upon consent.  No antibiotics and no imaging today.  Previous notes reviewed.  Return to clinic 3 to 4 weeks as scheduled.                                       Moderate level of medical decision making.      Again, thank you for allowing me to participate in the care of your patient.        Sincerely,        Camden Salazar DPM

## 2024-06-06 NOTE — TELEPHONE ENCOUNTER
[facility] Call [facility]    Phone Message    May a detailed message be left on voicemail: yes     Reason for Call: Other: Arthur, RN at [facility] calling with home care orders that were placed by below Camden Hernandez, DPM   Arthur, stated that she was told to reach out to Dr. Castellano to see if you would follow patient. [facility] planning to [facility] patient 6/7/24. Please call Arthur to discuss further at [phone] [facility] for verbal order if Dr. Castellano will follow. thank you    Action Taken: Message routed to:  [facility]      Travel Screening: Not Applicable     Date of Service:

## 2024-06-06 NOTE — TELEPHONE ENCOUNTER
Last office visit with Dr. Castellano on 06/21/2023.   Future appt with Dr. Castellano on 08/23/2024.      Called Arthur.  Gave verbal confirmation Dr. Castellano will follow patient through home care orders.      Kody Roberts CMA ([facility]) at 1:42 PM on 6/6/2024

## 2024-06-06 NOTE — PROGRESS NOTES
Past Medical History:   Diagnosis Date    Anemia     Arthritis     Cataracts     Central retinal artery occlusion     Cervical spine fracture (H)     After a fall from orthostatic sx    Chronic pain     Back of head    Gastro-oesophageal reflux disease     Head injury     Hypertension     Hyponatremia     Resolved, 2/2 diuretics    Migraines     Osteoporosis     Pneumonia 2018    Rheumatoid arthritis(714.0)      Patient Active Problem List   Diagnosis    Intrinsic atopic dermatitis    Essential hypertension    Rheumatoid arthritis (H)    Retinal artery occlusion    Cervical vertebral fracture (H)    Central retinal artery occlusion of right eye    Bilateral occipital neuralgia    C1-C2 instability    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 factor (H)    Osteoarthritis    Malignant hypertension    Hyponatremia    GERD (gastroesophageal reflux disease)    Eczematous dermatitis    Anxiety state    Anemia    Closed fracture of distal end of left radius with delayed healing, unspecified fracture morphology, subsequent encounter    Closed fracture of distal end of left radius, unspecified fracture morphology, sequela    Osteomyelitis of left leg (H)    Pain of left thumb    CKD (chronic kidney disease) stage 4, GFR 15-29 ml/min (H)     Past Surgical History:   Procedure Laterality Date    COLONOSCOPY      EYE SURGERY Left 02/2019    Hole in macula    FUSION CERVICAL POSTERIOR THREE+ LEVELS  1/22/2013    Procedure: FUSION CERVICAL POSTERIOR THREE+ LEVELS;  Cervical 1-6 Posterior Instrumentation and Fusion, Cervical 3-4 Laminectomy  .Instrumentation and fusion to Cervical 6 *Latex Allergy*;  Surgeon: Ra Bar MD;  Location: [facility]    GYN SURGERY      HEAD & NECK SURGERY      HYSTERECTOMY      IR ENDOVENOUS ABLATION VARICOSE VEINS  2/16/2021    KNEE SURGERY      NECK SURGERY      OPEN REDUCTION INTERNAL FIXATION WRIST Left 4/30/2019    Procedure: Open Reduction Internal Fixation Left  Distal Radius Fracture;  Surgeon: Dinh Strong MD;  [facility]    OPEN REDUCTION INTERNAL FIXATION WRIST Left 6/12/2020    Procedure: OPEN REDUCTION INTERNAL FIXATION Left Distal Radius Nonunion, Distal Ulna Resection;  Surgeon: Dinh Strong MD;  Location: [facility]    OPTICAL TRACKING SYSTEM ENDOSCOPIC SINUS SURGERY Right 4/6/2018    Procedure: OPTICAL TRACKING SYSTEM ENDOSCOPIC SINUS SURGERY;  Stealth Assisted Right Maxillary Antrostomy, Anterior Ethmoidectomy And Frontal Sinusotomy;  Surgeon: Elyse Eisenberg MD;  Location: [facility]    OPTICAL TRACKING SYSTEM ENDOSCOPIC SINUS SURGERY Right 8/3/2018    Procedure: OPTICAL TRACKING SYSTEM ENDOSCOPIC SINUS SURGERY;  Stealth Assisted Revision Right Frontal Sinusotomy, Right Stent Placement  **Latex Allergy** ;  Surgeon: Elyse Eisenberg MD;  Location: [facility]    ORTHOPEDIC SURGERY      REMOVE HARDWARE WRIST Left 11/19/2019    Procedure: Left Distal Radius Hardware Removal, Flexor Pollicus Longus Repair, Deep Cultures;  Surgeon: Dinh Strong MD;  [facility]    ZZC HAND/FINGER SURGERY UNLISTED      Z PELVIS/HIP JOINT SURGERY UNLISTED       Social History     Socioeconomic History    Marital status:      Spouse name: Not on file    Number of children: Not on file    Years of education: Not on file    Highest education level: Not on file   Occupational History    Not on file   Tobacco Use    Smoking status: Never    Smokeless tobacco: Never   Substance and Sexual Activity    Alcohol use: Yes     Comment: 2 glasses of wine a day    Drug use: Never    Sexual activity: Not Currently   Other Topics Concern    Parent/sibling w/ CABG, MI or angioplasty before 65F 55M? Not Asked   Social History Narrative    Ms. Chau is , has two grown children and two grandchildren. She does not work. She was never a smoker, and drinks a glass of wine with dinner each night.         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       Social Determinants of Health     Financial Resource Strain: Not on file   Food Insecurity: Not on file   Transportation Needs: Not on file   Physical Activity: Not on file   Stress: Not on file   Social Connections: Not on file   Interpersonal Safety: Not on file   Housing Stability: Not on file     Family History   Problem Relation Age of Onset    Arthritis Mother     Respiratory Mother         pulmonary fibrosis    Hypertension Mother     Anemia Mother     Liver Disease Father         from EtOH    Alcoholism Father     No Known Problems Maternal Grandmother     Heart Disease Maternal Grandfather     Glaucoma Paternal Grandfather     Heart Disease Paternal Grandfather     Multiple Sclerosis Sister     Breast Cancer Sister     Skin Cancer No family hx of     Melanoma No family hx of                    Subjective findings- 83-year-old returns clinic with  for ulcers left leg.  Relates to no problems with the Unna boot compression system, relates they did contact [facility] and nursing is scheduled to come out this week and next.     Objective findings- DP and PT are 2 out of 4 left.  Has autumn left medial leg ulcer that is through the Dermis with serosanguineous drainage, decreased edema, no erythema, no odor, no calor, no pain on palpation, decreased hair growth.     Assessment and plan- Ulcers left leg with scratch abrasions, peripheral arterial and venous disease, kidney disease.  Diagnosis and treatment options discussed with them.  The left leg and ulcer sites were cleaned with wound wash and we applied Hydrofera Blue and a multilayer compression system with an Unna boot with light compression upon consent.  No antibiotics and no imaging today.  Previous notes reviewed.  Return to clinic 3 to 4 weeks as scheduled.                                       Moderate level of medical decision making.

## 2024-06-07 ENCOUNTER — DOCUMENTATION ONLY (OUTPATIENT)
Dept: INTERNAL MEDICINE | Facility: CLINIC | Age: 84
End: 2024-06-07
Payer: MEDICARE

## 2024-06-07 ENCOUNTER — MEDICAL CORRESPONDENCE (OUTPATIENT)
Dept: HEALTH INFORMATION MANAGEMENT | Facility: CLINIC | Age: 84
End: 2024-06-07

## 2024-06-11 NOTE — PROGRESS NOTES
Type of Form Received: POC 6/7/24-8/5/24     Form Received (Date) 6/10/24   Form Filled out Yes, faxed 7/18   Placed in provider folder Yes

## 2024-06-28 ENCOUNTER — LAB (OUTPATIENT)
Dept: LAB | Facility: CLINIC | Age: 84
End: 2024-06-28
Payer: MEDICARE

## 2024-06-28 ENCOUNTER — OFFICE VISIT (OUTPATIENT)
Dept: PODIATRY | Facility: CLINIC | Age: 84
End: 2024-06-28
Payer: MEDICARE

## 2024-06-28 DIAGNOSIS — M81.0 AGE-RELATED OSTEOPOROSIS WITHOUT CURRENT PATHOLOGICAL FRACTURE: ICD-10-CM

## 2024-06-28 DIAGNOSIS — L97.921 SKIN ULCER OF LEFT LOWER LEG, LIMITED TO BREAKDOWN OF SKIN (H): Primary | ICD-10-CM

## 2024-06-28 DIAGNOSIS — I73.9 PERIPHERAL ARTERIAL DISEASE (H): ICD-10-CM

## 2024-06-28 DIAGNOSIS — N18.4 CKD (CHRONIC KIDNEY DISEASE) STAGE 4, GFR 15-29 ML/MIN (H): ICD-10-CM

## 2024-06-28 DIAGNOSIS — I87.8 VENOUS STASIS: ICD-10-CM

## 2024-06-28 LAB
ALBUMIN SERPL BCG-MCNC: 4.4 G/DL (ref 3.5–5.2)
ANION GAP SERPL CALCULATED.3IONS-SCNC: 11 MMOL/L (ref 7–15)
BUN SERPL-MCNC: 32.5 MG/DL (ref 8–23)
CALCIUM SERPL-MCNC: 9.8 MG/DL (ref 8.8–10.2)
CHLORIDE SERPL-SCNC: 104 MMOL/L (ref 98–107)
CREAT SERPL-MCNC: 1.87 MG/DL (ref 0.51–0.95)
CYSTATIN C (ROCHE): 2.5 MG/L (ref 0.6–1)
DEPRECATED HCO3 PLAS-SCNC: 24 MMOL/L (ref 22–29)
EGFRCR SERPLBLD CKD-EPI 2021: 26 ML/MIN/1.73M2
ERYTHROCYTE [DISTWIDTH] IN BLOOD BY AUTOMATED COUNT: 13.1 % (ref 10–15)
GFR SERPL CREATININE-BSD FRML MDRD: 19 ML/MIN/1.73M2
GLUCOSE SERPL-MCNC: 102 MG/DL (ref 70–99)
HCT VFR BLD AUTO: 32.3 % (ref 35–47)
HGB BLD-MCNC: 10.5 G/DL (ref 11.7–15.7)
MAGNESIUM SERPL-MCNC: 2 MG/DL (ref 1.7–2.3)
MCH RBC QN AUTO: 30.1 PG (ref 26.5–33)
MCHC RBC AUTO-ENTMCNC: 32.5 G/DL (ref 31.5–36.5)
MCV RBC AUTO: 93 FL (ref 78–100)
PHOSPHATE SERPL-MCNC: 3.9 MG/DL (ref 2.5–4.5)
PLATELET # BLD AUTO: 272 10E3/UL (ref 150–450)
POTASSIUM SERPL-SCNC: 4.9 MMOL/L (ref 3.4–5.3)
RBC # BLD AUTO: 3.49 10E6/UL (ref 3.8–5.2)
SODIUM SERPL-SCNC: 139 MMOL/L (ref 135–145)
VIT D+METAB SERPL-MCNC: 49 NG/ML (ref 20–50)
WBC # BLD AUTO: 5.6 10E3/UL (ref 4–11)

## 2024-06-28 PROCEDURE — 83735 ASSAY OF MAGNESIUM: CPT

## 2024-06-28 PROCEDURE — 85027 COMPLETE CBC AUTOMATED: CPT

## 2024-06-28 PROCEDURE — 80069 RENAL FUNCTION PANEL: CPT

## 2024-06-28 PROCEDURE — 82610 CYSTATIN C: CPT

## 2024-06-28 PROCEDURE — 82306 VITAMIN D 25 HYDROXY: CPT

## 2024-06-28 PROCEDURE — 99213 OFFICE O/P EST LOW 20 MIN: CPT | Performed by: PODIATRIST

## 2024-06-28 PROCEDURE — 36415 COLL VENOUS BLD VENIPUNCTURE: CPT

## 2024-06-28 NOTE — NURSING NOTE
Kimberly Chau's chief complaint for this visit includes:  Chief Complaint   Patient presents with    Consult     Foot recheck      PCP: Guillermo Castellano    Referring Provider:  Referred Self, MD  No address on file    There were no vitals taken for this visit.  Data Unavailable     Do you need any medication refills at today's visit? NO    Allergies   Allergen Reactions    Hctz Other (See Comments)     Pt develops symptomatic and significant hyponatremia with HCTZ exposure    Hydrochlorothiazide      Other reaction(s): Hyponatremia  Hyponatremia    Latex     Benzocaine Rash     carba mix,thrium-sunscreen    Ra Lotion Rash     carba mix,thrium-sunscreen    Ace Inhibitors Unknown     Dizziness and orthostatic changes and electrolyte problems.    Latex Unknown       Humera Hairston LPN  
3

## 2024-06-28 NOTE — PROGRESS NOTES
Past Medical History:   Diagnosis Date    Anemia     Arthritis     Cataracts     Central retinal artery occlusion     Cervical spine fracture (H)     After a fall from orthostatic sx    Chronic pain     Back of head    Gastro-oesophageal reflux disease     Head injury     Hypertension     Hyponatremia     Resolved, 2/2 diuretics    Migraines     Osteoporosis     Pneumonia 2018    Rheumatoid arthritis(714.0)      Patient Active Problem List   Diagnosis    Intrinsic atopic dermatitis    Essential hypertension    Rheumatoid arthritis (H)    Retinal artery occlusion    Cervical vertebral fracture (H)    Central retinal artery occlusion of right eye    Bilateral occipital neuralgia    C1-C2 instability    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 factor (H)    Osteoarthritis    Malignant hypertension    Hyponatremia    GERD (gastroesophageal reflux disease)    Eczematous dermatitis    Anxiety state    Anemia    Closed fracture of distal end of left radius with delayed healing, unspecified fracture morphology, subsequent encounter    Closed fracture of distal end of left radius, unspecified fracture morphology, sequela    Osteomyelitis of left leg (H)    Pain of left thumb    CKD (chronic kidney disease) stage 4, GFR 15-29 ml/min (H)     Past Surgical History:   Procedure Laterality Date    COLONOSCOPY      EYE SURGERY Left 02/2019    Hole in macula    FUSION CERVICAL POSTERIOR THREE+ LEVELS  1/22/2013    Procedure: FUSION CERVICAL POSTERIOR THREE+ LEVELS;  Cervical 1-6 Posterior Instrumentation and Fusion, Cervical 3-4 Laminectomy  .Instrumentation and fusion to Cervical 6 *Latex Allergy*;  Surgeon: Ra Bar MD;  Location: [facility]    GYN SURGERY      HEAD & NECK SURGERY      HYSTERECTOMY      IR ENDOVENOUS ABLATION VARICOSE VEINS  2/16/2021    KNEE SURGERY      NECK SURGERY      OPEN REDUCTION INTERNAL FIXATION WRIST Left 4/30/2019    Procedure: Open Reduction Internal Fixation Left  Distal Radius Fracture;  Surgeon: Dinh Strong MD;  [facility]    OPEN REDUCTION INTERNAL FIXATION WRIST Left 6/12/2020    Procedure: OPEN REDUCTION INTERNAL FIXATION Left Distal Radius Nonunion, Distal Ulna Resection;  Surgeon: Dinh Strong MD;  [facility]    OPTICAL TRACKING SYSTEM ENDOSCOPIC SINUS SURGERY Right 4/6/2018    Procedure: OPTICAL TRACKING SYSTEM ENDOSCOPIC SINUS SURGERY;  Stealth Assisted Right Maxillary Antrostomy, Anterior Ethmoidectomy And Frontal Sinusotomy;  Surgeon: Elyse Eisenberg MD;  Location: [facility]    OPTICAL TRACKING SYSTEM ENDOSCOPIC SINUS SURGERY Right 8/3/2018    Procedure: OPTICAL TRACKING SYSTEM ENDOSCOPIC SINUS SURGERY;  Stealth Assisted Revision Right Frontal Sinusotomy, Right Stent Placement  **Latex Allergy** ;  Surgeon: Elyse Eisenberg MD;  Location: [facility]    ORTHOPEDIC SURGERY      REMOVE HARDWARE WRIST Left 11/19/2019    Procedure: Left Distal Radius Hardware Removal, Flexor Pollicus Longus Repair, Deep Cultures;  Surgeon: Dinh Strong MD;  [facility]    ZZC HAND/FINGER SURGERY UNLISTED      Z PELVIS/HIP JOINT SURGERY UNLISTED       Social History     Socioeconomic History    Marital status:      Spouse name: Not on file    Number of children: Not on file    Years of education: Not on file    Highest education level: Not on file   Occupational History    Not on file   Tobacco Use    Smoking status: Never    Smokeless tobacco: Never   Substance and Sexual Activity    Alcohol use: Yes     Comment: 2 glasses of wine a day    Drug use: Never    Sexual activity: Not Currently   Other Topics Concern    Parent/sibling w/ CABG, MI or angioplasty before 65F 55M? Not Asked   Social History Narrative    Ms. Chau is , has two grown children and two grandchildren. She does not work. She was never a smoker, and drinks a glass of wine with dinner each night.         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       Social Determinants of Health     Financial Resource Strain: Not on file   Food Insecurity: Not on file   Transportation Needs: Not on file   Physical Activity: Not on file   Stress: Not on file   Social Connections: Not on file   Interpersonal Safety: Not on file   Housing Stability: Not on file     Family History   Problem Relation Age of Onset    Arthritis Mother     Respiratory Mother         pulmonary fibrosis    Hypertension Mother     Anemia Mother     Liver Disease Father         from EtOH    Alcoholism Father     No Known Problems Maternal Grandmother     Heart Disease Maternal Grandfather     Glaucoma Paternal Grandfather     Heart Disease Paternal Grandfather     Multiple Sclerosis Sister     Breast Cancer Sister     Skin Cancer No family hx of     Melanoma No family hx of                    Subjective findings- 83-year-old returns clinic with  for ulcers left leg.  Relates to no problems with the Unna boot compression system, relates nursing is changing Unna boot weekly.     Objective findings- DP and PT are 2 out of 4 left.  Has autumn left medial leg ulcer that is eschared with no drainage, decreased edema, no erythema, no odor, no calor, no pain on palpation, decreased hair growth.     Assessment and plan- Ulcers left leg with scratch abrasions, peripheral arterial and venous disease, kidney disease.  Diagnosis and treatment options discussed with them.  The left leg and ulcer sites were cleaned with wound wash upon consent.  Dispensed Tubigrip today until she gets home and start using her compression socks again.  Previous notes reviewed.  Return to clinic in 4 weeks.                            Low level of medical decision making.

## 2024-06-28 NOTE — LETTER
6/28/2024      Kimberly Chau  [id number]   [place] [address]      Dear Colleague,    Thank you for referring your patient, Kimberly Chau, to the [facility]. Please see a copy of my visit note below.    Past Medical History:   Diagnosis Date     Anemia      Arthritis      Cataracts      Central retinal artery occlusion      Cervical spine fracture (H)     After a fall from orthostatic sx     Chronic pain     Back of head     Gastro-oesophageal reflux disease      Head injury      Hypertension      Hyponatremia     Resolved, 2/2 diuretics     Migraines      Osteoporosis      Pneumonia 2018     Rheumatoid arthritis(714.0)      Patient Active Problem List   Diagnosis     Intrinsic atopic dermatitis     Essential hypertension     Rheumatoid arthritis (H)     Retinal artery occlusion     Cervical vertebral fracture (H)     Central retinal artery occlusion of right eye     Bilateral occipital neuralgia     C1-C2 instability     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 factor (H)     Osteoarthritis     Malignant hypertension     Hyponatremia     GERD (gastroesophageal reflux disease)     Eczematous dermatitis     Anxiety state     Anemia     Closed fracture of distal end of left radius with delayed healing, unspecified fracture morphology, subsequent encounter     Closed fracture of distal end of left radius, unspecified fracture morphology, sequela     Osteomyelitis of left leg (H)     Pain of left thumb     CKD (chronic kidney disease) stage 4, GFR 15-29 ml/min (H)     Past Surgical History:   Procedure Laterality Date     COLONOSCOPY       EYE SURGERY Left 02/2019    Hole in macula     FUSION CERVICAL POSTERIOR THREE+ LEVELS  1/22/2013    Procedure: FUSION CERVICAL POSTERIOR THREE+ LEVELS;  Cervical 1-6 Posterior Instrumentation and Fusion, Cervical 3-4 Laminectomy  .Instrumentation and fusion to Cervical 6 *Latex Allergy*;  Surgeon: Ra Bar MD;   Location: [facility]     GYN SURGERY       HEAD & NECK SURGERY       HYSTERECTOMY       IR ENDOVENOUS ABLATION VARICOSE VEINS  2/16/2021     KNEE SURGERY       NECK SURGERY       OPEN REDUCTION INTERNAL FIXATION WRIST Left 4/30/2019    Procedure: Open Reduction Internal Fixation Left Distal Radius Fracture;  Surgeon: Dinh Strong MD;  [facility]     OPEN REDUCTION INTERNAL FIXATION WRIST Left 6/12/2020    Procedure: OPEN REDUCTION INTERNAL FIXATION Left Distal Radius Nonunion, Distal Ulna Resection;  Surgeon: Dinh Strong MD;  [facility]     OPTICAL TRACKING SYSTEM ENDOSCOPIC SINUS SURGERY Right 4/6/2018    Procedure: OPTICAL TRACKING SYSTEM ENDOSCOPIC SINUS SURGERY;  Stealth Assisted Right Maxillary Antrostomy, Anterior Ethmoidectomy And Frontal Sinusotomy;  Surgeon: Elyse Eisenberg MD;  Location:  OR     OPTICAL TRACKING SYSTEM ENDOSCOPIC SINUS SURGERY Right 8/3/2018    Procedure: OPTICAL TRACKING SYSTEM ENDOSCOPIC SINUS SURGERY;  Stealth Assisted Revision Right Frontal Sinusotomy, Right Stent Placement  **Latex Allergy** ;  Surgeon: Elyse Eisenberg MD;  Location:  OR     ORTHOPEDIC SURGERY       REMOVE HARDWARE WRIST Left 11/19/2019    Procedure: Left Distal Radius Hardware Removal, Flexor Pollicus Longus Repair, Deep Cultures;  Surgeon: Dinh Strong MD;  Location:  OR     ZZC HAND/FINGER SURGERY UNLISTED       ZZC PELVIS/HIP JOINT SURGERY UNLISTED       Social History     Socioeconomic History     Marital status:      Spouse name: Not on file     Number of children: Not on file     Years of education: Not on file     Highest education level: Not on file   Occupational History     Not on file   Tobacco Use     Smoking status: Never     Smokeless tobacco: Never   Substance and Sexual Activity     Alcohol use: Yes     Comment: 2 glasses of wine a day     Drug use: Never     Sexual activity: Not Currently   Other Topics Concern     Parent/sibling w/ CABG, MI or  angioplasty before 65F 55M? Not Asked   Social History Narrative    Ms. Chau is , has two grown children and two grandchildren. She does not work. She was never a smoker, and drinks a glass of wine with dinner each night.         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      Social Determinants of Health     Financial Resource Strain: Not on file   Food Insecurity: Not on file   Transportation Needs: Not on file   Physical Activity: Not on file   Stress: Not on file   Social Connections: Not on file   Interpersonal Safety: Not on file   Housing Stability: Not on file     Family History   Problem Relation Age of Onset     Arthritis Mother      Respiratory Mother         pulmonary fibrosis     Hypertension Mother      Anemia Mother      Liver Disease Father         from EtOH     Alcoholism Father      No Known Problems Maternal Grandmother      Heart Disease Maternal Grandfather      Glaucoma Paternal Grandfather      Heart Disease Paternal Grandfather      Multiple Sclerosis Sister      Breast Cancer Sister      Skin Cancer No family hx of      Melanoma No family hx of                    Subjective findings- 83-year-old returns clinic with  for ulcers left leg.  Relates to no problems with the Unna boot compression system, relates nursing is changing Unna boot weekly.     Objective findings- DP and PT are 2 out of 4 left.  Has autumn left medial leg ulcer that is eschared with no drainage, decreased edema, no erythema, no odor, no calor, no pain on palpation, decreased hair growth.     Assessment and plan- Ulcers left leg with scratch abrasions, peripheral arterial and venous disease, kidney disease.  Diagnosis and treatment options discussed with them.  The left leg and ulcer sites were cleaned with wound wash upon consent.  Dispensed Tubigrip today until she gets home and start using her compression socks again.  Previous notes reviewed.  Return to clinic in 4  weeks.                            Low level of medical decision making.      Again, thank you for allowing me to participate in the care of your patient.        Sincerely,        Camden Salazar DPM

## 2024-07-15 DIAGNOSIS — Z53.9 DIAGNOSIS NOT YET DEFINED: Primary | ICD-10-CM

## 2024-07-18 ENCOUNTER — TELEPHONE (OUTPATIENT)
Dept: PODIATRY | Facility: CLINIC | Age: 84
End: 2024-07-18
Payer: MEDICARE

## 2024-07-18 NOTE — TELEPHONE ENCOUNTER
Call in regard to:    Chronic wound  REOPENED  Left medial lower leg    Verbal Order REQUEST:    Beginning week of 7.22.24 >>    Treat x2 a week, for 3 weeks    Cleanse wound with Vashe  Apply Aquacel AG to wound bed  Apply Calamine to Unna boot  Roll guaze  Coban  Ace wrap to secure    Measurements of open wound are 2 x 0.7    Verbal order to Rafia    [phone]     SECURE LINE    Please CALL Rafia with any questions. Thank you.

## 2024-07-22 NOTE — TELEPHONE ENCOUNTER
Called with no answer. Left a message stating that I am calling from Dr. Salazar's clinic and that we got a Verbal Order Request. If she can give us a call back at [phone].

## 2024-07-22 NOTE — TELEPHONE ENCOUNTER
Called with no answer. Left a message that we got a call for a verbal order request and for her to call us back at [phone].

## 2024-07-23 ENCOUNTER — MEDICAL CORRESPONDENCE (OUTPATIENT)
Dept: HEALTH INFORMATION MANAGEMENT | Facility: CLINIC | Age: 84
End: 2024-07-23
Payer: MEDICARE

## 2024-07-23 ENCOUNTER — DOCUMENTATION ONLY (OUTPATIENT)
Dept: INTERNAL MEDICINE | Facility: CLINIC | Age: 84
End: 2024-07-23
Payer: MEDICARE

## 2024-07-23 NOTE — PROGRESS NOTES
Type of Form Received: [facility] Order [id number]    Form Received (Date) 7/23/24   Form Filled out Yes, faxed 8/5   Placed in provider folder Yes

## 2024-07-23 NOTE — TELEPHONE ENCOUNTER
[facility] Call Center    Phone Message    May a detailed message be left on voicemail: yes     Reason for Call: Other: Returning [facility] call for verbal orders.     Action Taken: Other: [facility]    Travel Screening: Not Applicable     Date of Service:

## 2024-07-23 NOTE — TELEPHONE ENCOUNTER
"Called and talked to Rafia, Home Care Nurse. It was relayed what Dr. Salazar said, \"Yes, this is ok for wound care orders, follow up in clinic in 1-3 weeks.\" She said that they will start with the orders and have the patient reach out to make an appointment in 1-3 weeks.   "

## 2024-07-26 ENCOUNTER — DOCUMENTATION ONLY (OUTPATIENT)
Dept: INTERNAL MEDICINE | Facility: CLINIC | Age: 84
End: 2024-07-26
Payer: MEDICARE

## 2024-07-26 NOTE — PROGRESS NOTES
Type of Form Received: [facility] Order [id number]    Form Received (Date) 7/26/24   Form Filled out Yes, faxed 8/5   Placed in provider folder Yes

## 2024-07-30 ENCOUNTER — OFFICE VISIT (OUTPATIENT)
Dept: PODIATRY | Facility: CLINIC | Age: 84
End: 2024-07-30
Payer: MEDICARE

## 2024-07-30 DIAGNOSIS — I73.9 PERIPHERAL ARTERIAL DISEASE (H): ICD-10-CM

## 2024-07-30 DIAGNOSIS — I87.8 VENOUS STASIS: ICD-10-CM

## 2024-07-30 DIAGNOSIS — L97.922 SKIN ULCER OF LEFT LOWER LEG WITH FAT LAYER EXPOSED (H): Primary | ICD-10-CM

## 2024-07-30 PROCEDURE — 29581 APPL MULTLAYER CMPRN SYS LEG: CPT | Mod: LT | Performed by: PODIATRIST

## 2024-07-30 NOTE — PATIENT INSTRUCTIONS
Left leg, once weekly and as needed for any problems clean the left leg and ulcer with wound Vashe, apply Hydrofera Blue to the ulcer sites, wrapped the left leg with multilayered Unna boot compression system with light compression.

## 2024-07-30 NOTE — PROGRESS NOTES
Past Medical History:   Diagnosis Date    Anemia     Arthritis     Cataracts     Central retinal artery occlusion     Cervical spine fracture (H)     After a fall from orthostatic sx    Chronic pain     Back of head    Gastro-oesophageal reflux disease     Head injury     Hypertension     Hyponatremia     Resolved, 2/2 diuretics    Migraines     Osteoporosis     Pneumonia 2018    Rheumatoid arthritis(714.0)      Patient Active Problem List   Diagnosis    Intrinsic atopic dermatitis    Essential hypertension    Rheumatoid arthritis (H)    Retinal artery occlusion    Cervical vertebral fracture (H)    Central retinal artery occlusion of right eye    Bilateral occipital neuralgia    C1-C2 instability    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 factor (H)    Osteoarthritis    Malignant hypertension    Hyponatremia    GERD (gastroesophageal reflux disease)    Eczematous dermatitis    Anxiety state    Anemia    Closed fracture of distal end of left radius with delayed healing, unspecified fracture morphology, subsequent encounter    Closed fracture of distal end of left radius, unspecified fracture morphology, sequela    Osteomyelitis of left leg (H)    Pain of left thumb    CKD (chronic kidney disease) stage 4, GFR 15-29 ml/min (H)     Past Surgical History:   Procedure Laterality Date    COLONOSCOPY      EYE SURGERY Left 02/2019    Hole in macula    FUSION CERVICAL POSTERIOR THREE+ LEVELS  1/22/2013    Procedure: FUSION CERVICAL POSTERIOR THREE+ LEVELS;  Cervical 1-6 Posterior Instrumentation and Fusion, Cervical 3-4 Laminectomy  .Instrumentation and fusion to Cervical 6 *Latex Allergy*;  Surgeon: Ra Bar MD;  Location: [facility] OR    GYN SURGERY      HEAD & NECK SURGERY      HYSTERECTOMY      IR ENDOVENOUS ABLATION VARICOSE VEINS  2/16/2021    KNEE SURGERY      NECK SURGERY      OPEN REDUCTION INTERNAL FIXATION WRIST Left 4/30/2019    Procedure: Open Reduction Internal Fixation Left  Distal Radius Fracture;  Surgeon: Dinh Strong MD;  [facility]    OPEN REDUCTION INTERNAL FIXATION WRIST Left 6/12/2020    Procedure: OPEN REDUCTION INTERNAL FIXATION Left Distal Radius Nonunion, Distal Ulna Resection;  Surgeon: Dinh Strong MD;  [facility]    OPTICAL TRACKING SYSTEM ENDOSCOPIC SINUS SURGERY Right 4/6/2018    Procedure: OPTICAL TRACKING SYSTEM ENDOSCOPIC SINUS SURGERY;  Stealth Assisted Right Maxillary Antrostomy, Anterior Ethmoidectomy And Frontal Sinusotomy;  Surgeon: Elyse Eisenberg MD;  Location: [facility]    OPTICAL TRACKING SYSTEM ENDOSCOPIC SINUS SURGERY Right 8/3/2018    Procedure: OPTICAL TRACKING SYSTEM ENDOSCOPIC SINUS SURGERY;  Stealth Assisted Revision Right Frontal Sinusotomy, Right Stent Placement  **Latex Allergy** ;  Surgeon: Elyse Eisenberg MD;  Location: [facility]    ORTHOPEDIC SURGERY      REMOVE HARDWARE WRIST Left 11/19/2019    Procedure: Left Distal Radius Hardware Removal, Flexor Pollicus Longus Repair, Deep Cultures;  Surgeon: Dinh Strong MD;  Location[facility]    ZZC HAND/FINGER SURGERY UNLISTED      Z PELVIS/HIP JOINT SURGERY UNLISTED       Social History     Socioeconomic History    Marital status:      Spouse name: Not on file    Number of children: Not on file    Years of education: Not on file    Highest education level: Not on file   Occupational History    Not on file   Tobacco Use    Smoking status: Never    Smokeless tobacco: Never   Substance and Sexual Activity    Alcohol use: Yes     Comment: 2 glasses of wine a day    Drug use: Never    Sexual activity: Not Currently   Other Topics Concern    Parent/sibling w/ CABG, MI or angioplasty before 65F 55M? Not Asked   Social History Narrative    Ms. Chau is , has two grown children and two grandchildren. She does not work. She was never a smoker, and drinks a glass of wine with dinner each night.         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       Social Determinants of Health     Financial Resource Strain: Not on file   Food Insecurity: Not on file   Transportation Needs: Not on file   Physical Activity: Not on file   Stress: Not on file   Social Connections: Not on file   Interpersonal Safety: Not on file   Housing Stability: Not on file     Family History   Problem Relation Age of Onset    Arthritis Mother     Respiratory Mother         pulmonary fibrosis    Hypertension Mother     Anemia Mother     Liver Disease Father         from EtOH    Alcoholism Father     No Known Problems Maternal Grandmother     Heart Disease Maternal Grandfather     Glaucoma Paternal Grandfather     Heart Disease Paternal Grandfather     Multiple Sclerosis Sister     Breast Cancer Sister     Skin Cancer No family hx of     Melanoma No family hx of                  Last Renal Panel:  Sodium   Date Value Ref Range Status   06/28/2024 139 135 - 145 mmol/L Final   01/06/2021 134 133 - 144 mmol/L Final     Potassium   Date Value Ref Range Status   06/28/2024 4.9 3.4 - 5.3 mmol/L Final   07/08/2022 4.3 3.4 - 5.3 mmol/L Final   01/06/2021 4.0 3.4 - 5.3 mmol/L Final     Chloride   Date Value Ref Range Status   06/28/2024 104 98 - 107 mmol/L Final   07/08/2022 104 94 - 109 mmol/L Final   01/06/2021 102 94 - 109 mmol/L Final     Carbon Dioxide   Date Value Ref Range Status   01/06/2021 28 20 - 32 mmol/L Final     Carbon Dioxide (CO2)   Date Value Ref Range Status   06/28/2024 24 22 - 29 mmol/L Final   07/08/2022 26 20 - 32 mmol/L Final     Anion Gap   Date Value Ref Range Status   06/28/2024 11 7 - 15 mmol/L Final   07/08/2022 10 3 - 14 mmol/L Final   01/06/2021 4 3 - 14 mmol/L Final     Glucose   Date Value Ref Range Status   06/28/2024 102 (H) 70 - 99 mg/dL Final   07/08/2022 97 70 - 99 mg/dL Final   01/06/2021 87 70 - 99 mg/dL Final     Urea Nitrogen   Date Value Ref Range Status   06/28/2024 32.5 (H) 8.0 - 23.0 mg/dL Final   07/08/2022 11 7 - 30 mg/dL Final   01/06/2021 14 7 - 30  "mg/dL Final     Creatinine   Date Value Ref Range Status   06/28/2024 1.87 (H) 0.51 - 0.95 mg/dL Final   01/06/2021 0.90 0.52 - 1.04 mg/dL Final     GFR Estimate   Date Value Ref Range Status   06/28/2024 26 (L) >60 mL/min/1.73m2 Final     Comment:     eGFR calculated using 2021 CKD-EPI equation.   01/06/2021 59 (L) >60 mL/min/[1.73_m2] Final     Comment:     Non  GFR Calc  Starting 12/18/2018, serum creatinine based estimated GFR (eGFR) will be   calculated using the Chronic Kidney Disease Epidemiology Collaboration   (CKD-EPI) equation.       Calcium   Date Value Ref Range Status   06/28/2024 9.8 8.8 - 10.2 mg/dL Final   01/06/2021 8.8 8.5 - 10.1 mg/dL Final     Phosphorus   Date Value Ref Range Status   06/28/2024 3.9 2.5 - 4.5 mg/dL Final   05/06/2021 4.5 2.5 - 4.5 mg/dL Final     Albumin   Date Value Ref Range Status   06/28/2024 4.4 3.5 - 5.2 g/dL Final   03/16/2022 3.6 3.4 - 5.0 g/dL Final   01/06/2021 2.8 (L) 3.4 - 5.0 g/dL Final     Lab Results   Component Value Date    WBC 5.6 06/28/2024    WBC 7.6 05/06/2021     Lab Results   Component Value Date    RBC 3.49 06/28/2024    RBC 3.28 05/06/2021     Lab Results   Component Value Date    HGB 10.5 06/28/2024    HGB 10.8 05/06/2021     Lab Results   Component Value Date    HCT 32.3 06/28/2024    HCT 33.5 05/06/2021     No components found for: \"MCT\"  Lab Results   Component Value Date    MCV 93 06/28/2024     05/06/2021     Lab Results   Component Value Date    MCH 30.1 06/28/2024    MCH 32.9 05/06/2021     Lab Results   Component Value Date    MCHC 32.5 06/28/2024    MCHC 32.2 05/06/2021     Lab Results   Component Value Date    RDW 13.1 06/28/2024    RDW 15.7 05/06/2021     Lab Results   Component Value Date     06/28/2024     05/06/2021             Subjective findings- 83-year-old returns clinic for left leg ulcer.  Relates left leg ulcer started about 1 week after we seen her last, relates to no injury, relates they have " been wrapping and a using Biatain silver and nursing's been coming out twice weekly, relates to no fever, no chills.    Objective findings- DP and PT are 2 out of 4 left.  Has left leg peripheral edema.  Has decreased hair growth.  Has a left medial leg ulcerations that are through the dermis into the subcutaneous tissues with serosanguineous drainage, venous congestion, positive edema, no odor, no calor, minimal erythema, no pain on palpation.    Assessment and plan- Ulcers left leg with peripheral arterial and venous disease, Kidney disease.  Diagnosis and treatment options discussed with them.  The left leg and ulcer sites were cleaned with wound Vashe and we applied Hydrofera Blue and a multilayer compression system with a Unna boot with light compression was applied to the left lower extremity upon consent.  Will have nursing change this weekly and as needed for any problems.  Referral to vascular medicine given and use discussed with them.  No antibiotics today, no imaging today.  Previous notes reviewed.  Return to clinic and see me in 1 month.                                                  Moderate level of medical decision making.

## 2024-07-30 NOTE — NURSING NOTE
DME FITTING    Relevant Diagnosis: ulcer of left lower leg  Post-op shoe was fit on patient's Left foot.     Person(s) involved in teaching:   Patient    Brace was applied in standard Manner:  Yes  Brace fit well:  Yes  Patient reports brace to fit comfortably:  Yes    Education:   Patient shown self application and removal of brace: Yes  Patient shown how to adjust brace fit, if necessary: Yes  Patient educated on billing and return policy: Yes  Patient confirmed understanding when and how to contact clinic with concerns: Yes

## 2024-07-30 NOTE — LETTER
7/30/2024      Kimberly Chau  [address]   [place] [address]      Dear Colleague,    Thank you for referring your patient, Kimberly Chau, to the [facility]. Please see a copy of my visit note below.    Past Medical History:   Diagnosis Date     Anemia      Arthritis      Cataracts      Central retinal artery occlusion      Cervical spine fracture (H)     After a fall from orthostatic sx     Chronic pain     Back of head     Gastro-oesophageal reflux disease      Head injury      Hypertension      Hyponatremia     Resolved, 2/2 diuretics     Migraines      Osteoporosis      Pneumonia 2018     Rheumatoid arthritis(714.0)      Patient Active Problem List   Diagnosis     Intrinsic atopic dermatitis     Essential hypertension     Rheumatoid arthritis (H)     Retinal artery occlusion     Cervical vertebral fracture (H)     Central retinal artery occlusion of right eye     Bilateral occipital neuralgia     C1-C2 instability     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 factor (H)     Osteoarthritis     Malignant hypertension     Hyponatremia     GERD (gastroesophageal reflux disease)     Eczematous dermatitis     Anxiety state     Anemia     Closed fracture of distal end of left radius with delayed healing, unspecified fracture morphology, subsequent encounter     Closed fracture of distal end of left radius, unspecified fracture morphology, sequela     Osteomyelitis of left leg (H)     Pain of left thumb     CKD (chronic kidney disease) stage 4, GFR 15-29 ml/min (H)     Past Surgical History:   Procedure Laterality Date     COLONOSCOPY       EYE SURGERY Left 02/2019    Hole in macula     FUSION CERVICAL POSTERIOR THREE+ LEVELS  1/22/2013    Procedure: FUSION CERVICAL POSTERIOR THREE+ LEVELS;  Cervical 1-6 Posterior Instrumentation and Fusion, Cervical 3-4 Laminectomy  .Instrumentation and fusion to Cervical 6 *Latex Allergy*;  Surgeon: Ra Bar MD;   Location: [facility] OR     GYN SURGERY       HEAD & NECK SURGERY       HYSTERECTOMY       IR ENDOVENOUS ABLATION VARICOSE VEINS  2/16/2021     KNEE SURGERY       NECK SURGERY       OPEN REDUCTION INTERNAL FIXATION WRIST Left 4/30/2019    Procedure: Open Reduction Internal Fixation Left Distal Radius Fracture;  Surgeon: Dinh Strong MD;  [facility]     OPEN REDUCTION INTERNAL FIXATION WRIST Left 6/12/2020    Procedure: OPEN REDUCTION INTERNAL FIXATION Left Distal Radius Nonunion, Distal Ulna Resection;  Surgeon: Dinh Strong MD;  [facility]     OPTICAL TRACKING SYSTEM ENDOSCOPIC SINUS SURGERY Right 4/6/2018    Procedure: OPTICAL TRACKING SYSTEM ENDOSCOPIC SINUS SURGERY;  Stealth Assisted Right Maxillary Antrostomy, Anterior Ethmoidectomy And Frontal Sinusotomy;  Surgeon: Elyse Eisenberg MD;  Location:  OR     OPTICAL TRACKING SYSTEM ENDOSCOPIC SINUS SURGERY Right 8/3/2018    Procedure: OPTICAL TRACKING SYSTEM ENDOSCOPIC SINUS SURGERY;  Stealth Assisted Revision Right Frontal Sinusotomy, Right Stent Placement  **Latex Allergy** ;  Surgeon: Elyse Eisenberg MD;  Location:  OR     ORTHOPEDIC SURGERY       REMOVE HARDWARE WRIST Left 11/19/2019    Procedure: Left Distal Radius Hardware Removal, Flexor Pollicus Longus Repair, Deep Cultures;  Surgeon: Dinh Strong MD;  Location:  OR     ZZC HAND/FINGER SURGERY UNLISTED       ZZC PELVIS/HIP JOINT SURGERY UNLISTED       Social History     Socioeconomic History     Marital status:      Spouse name: Not on file     Number of children: Not on file     Years of education: Not on file     Highest education level: Not on file   Occupational History     Not on file   Tobacco Use     Smoking status: Never     Smokeless tobacco: Never   Substance and Sexual Activity     Alcohol use: Yes     Comment: 2 glasses of wine a day     Drug use: Never     Sexual activity: Not Currently   Other Topics Concern     Parent/sibling w/ CABG, MI or  angioplasty before 65F 55M? Not Asked   Social History Narrative    Ms. Chau is , has two grown children and two grandchildren. She does not work. She was never a smoker, and drinks a glass of wine with dinner each night.         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      Social Determinants of Health     Financial Resource Strain: Not on file   Food Insecurity: Not on file   Transportation Needs: Not on file   Physical Activity: Not on file   Stress: Not on file   Social Connections: Not on file   Interpersonal Safety: Not on file   Housing Stability: Not on file     Family History   Problem Relation Age of Onset     Arthritis Mother      Respiratory Mother         pulmonary fibrosis     Hypertension Mother      Anemia Mother      Liver Disease Father         from EtOH     Alcoholism Father      No Known Problems Maternal Grandmother      Heart Disease Maternal Grandfather      Glaucoma Paternal Grandfather      Heart Disease Paternal Grandfather      Multiple Sclerosis Sister      Breast Cancer Sister      Skin Cancer No family hx of      Melanoma No family hx of                  Last Renal Panel:  Sodium   Date Value Ref Range Status   06/28/2024 139 135 - 145 mmol/L Final   01/06/2021 134 133 - 144 mmol/L Final     Potassium   Date Value Ref Range Status   06/28/2024 4.9 3.4 - 5.3 mmol/L Final   07/08/2022 4.3 3.4 - 5.3 mmol/L Final   01/06/2021 4.0 3.4 - 5.3 mmol/L Final     Chloride   Date Value Ref Range Status   06/28/2024 104 98 - 107 mmol/L Final   07/08/2022 104 94 - 109 mmol/L Final   01/06/2021 102 94 - 109 mmol/L Final     Carbon Dioxide   Date Value Ref Range Status   01/06/2021 28 20 - 32 mmol/L Final     Carbon Dioxide (CO2)   Date Value Ref Range Status   06/28/2024 24 22 - 29 mmol/L Final   07/08/2022 26 20 - 32 mmol/L Final     Anion Gap   Date Value Ref Range Status   06/28/2024 11 7 - 15 mmol/L Final   07/08/2022 10 3 - 14 mmol/L Final   01/06/2021 4 3 - 14  "mmol/L Final     Glucose   Date Value Ref Range Status   06/28/2024 102 (H) 70 - 99 mg/dL Final   07/08/2022 97 70 - 99 mg/dL Final   01/06/2021 87 70 - 99 mg/dL Final     Urea Nitrogen   Date Value Ref Range Status   06/28/2024 32.5 (H) 8.0 - 23.0 mg/dL Final   07/08/2022 11 7 - 30 mg/dL Final   01/06/2021 14 7 - 30 mg/dL Final     Creatinine   Date Value Ref Range Status   06/28/2024 1.87 (H) 0.51 - 0.95 mg/dL Final   01/06/2021 0.90 0.52 - 1.04 mg/dL Final     GFR Estimate   Date Value Ref Range Status   06/28/2024 26 (L) >60 mL/min/1.73m2 Final     Comment:     eGFR calculated using 2021 CKD-EPI equation.   01/06/2021 59 (L) >60 mL/min/[1.73_m2] Final     Comment:     Non  GFR Calc  Starting 12/18/2018, serum creatinine based estimated GFR (eGFR) will be   calculated using the Chronic Kidney Disease Epidemiology Collaboration   (CKD-EPI) equation.       Calcium   Date Value Ref Range Status   06/28/2024 9.8 8.8 - 10.2 mg/dL Final   01/06/2021 8.8 8.5 - 10.1 mg/dL Final     Phosphorus   Date Value Ref Range Status   06/28/2024 3.9 2.5 - 4.5 mg/dL Final   05/06/2021 4.5 2.5 - 4.5 mg/dL Final     Albumin   Date Value Ref Range Status   06/28/2024 4.4 3.5 - 5.2 g/dL Final   03/16/2022 3.6 3.4 - 5.0 g/dL Final   01/06/2021 2.8 (L) 3.4 - 5.0 g/dL Final     Lab Results   Component Value Date    WBC 5.6 06/28/2024    WBC 7.6 05/06/2021     Lab Results   Component Value Date    RBC 3.49 06/28/2024    RBC 3.28 05/06/2021     Lab Results   Component Value Date    HGB 10.5 06/28/2024    HGB 10.8 05/06/2021     Lab Results   Component Value Date    HCT 32.3 06/28/2024    HCT 33.5 05/06/2021     No components found for: \"MCT\"  Lab Results   Component Value Date    MCV 93 06/28/2024     05/06/2021     Lab Results   Component Value Date    MCH 30.1 06/28/2024    MCH 32.9 05/06/2021     Lab Results   Component Value Date    MCHC 32.5 06/28/2024    MCHC 32.2 05/06/2021     Lab Results   Component Value Date "    RDW 13.1 06/28/2024    RDW 15.7 05/06/2021     Lab Results   Component Value Date     06/28/2024     05/06/2021             Subjective findings- 83-year-old returns clinic for left leg ulcer.  Relates left leg ulcer started about 1 week after we seen her last, relates to no injury, relates they have been wrapping and a using Biatain silver and nursing's been coming out twice weekly, relates to no fever, no chills.    Objective findings- DP and PT are 2 out of 4 left.  Has left leg peripheral edema.  Has decreased hair growth.  Has a left medial leg ulcerations that are through the dermis into the subcutaneous tissues with serosanguineous drainage, venous congestion, positive edema, no odor, no calor, minimal erythema, no pain on palpation.    Assessment and plan- Ulcers left leg with peripheral arterial and venous disease, Kidney disease.  Diagnosis and treatment options discussed with them.  The left leg and ulcer sites were cleaned with wound Vashe and we applied Hydrofera Blue and a multilayer compression system with a Unna boot with light compression was applied to the left lower extremity upon consent.  Will have nursing change this weekly and as needed for any problems.  Referral to vascular medicine given and use discussed with them.  No antibiotics today, no imaging today.  Previous notes reviewed.  Return to clinic and see me in 1 month.                                                  Moderate level of medical decision making.      Again, thank you for allowing me to participate in the care of your patient.        Sincerely,        Camden Salazar DPM

## 2024-07-30 NOTE — NURSING NOTE
Kimberly Chau's chief complaint for this visit includes:  Chief Complaint   Patient presents with    Consult     Foot recheck and wound care     PCP: Guillermo Castellano    Referring Provider:  Referred Self, MD  No address on file    There were no vitals taken for this visit.  Data Unavailable     Do you need any medication refills at today's visit? NO    Allergies   Allergen Reactions    Hctz Other (See Comments)     Pt develops symptomatic and significant hyponatremia with HCTZ exposure    Hydrochlorothiazide      Other reaction(s): Hyponatremia  Hyponatremia    Latex     Benzocaine Rash     carba mix,thrium-sunscreen    Ra Lotion Rash     carba mix,thrium-sunscreen    Ace Inhibitors Unknown     Dizziness and orthostatic changes and electrolyte problems.    Latex Unknown       Humera Hairston LPN

## 2024-07-31 ENCOUNTER — OFFICE VISIT (OUTPATIENT)
Dept: ORTHOPEDICS | Facility: CLINIC | Age: 84
End: 2024-07-31
Payer: MEDICARE

## 2024-07-31 DIAGNOSIS — M86.9 OSTEOMYELITIS OF LEFT LEG (H): ICD-10-CM

## 2024-07-31 DIAGNOSIS — E44.1 MILD PROTEIN-CALORIE MALNUTRITION (H): ICD-10-CM

## 2024-07-31 DIAGNOSIS — I83.009 VENOUS ULCER (H): ICD-10-CM

## 2024-07-31 DIAGNOSIS — C61 PROSTATE CANCER (H): ICD-10-CM

## 2024-07-31 DIAGNOSIS — M80.00XA OSTEOPOROSIS WITH PATHOLOGICAL FRACTURE, INITIAL ENCOUNTER: ICD-10-CM

## 2024-07-31 DIAGNOSIS — M81.0 AGE-RELATED OSTEOPOROSIS WITHOUT CURRENT PATHOLOGICAL FRACTURE: Primary | ICD-10-CM

## 2024-07-31 DIAGNOSIS — L97.909 VENOUS ULCER (H): ICD-10-CM

## 2024-07-31 PROCEDURE — 99213 OFFICE O/P EST LOW 20 MIN: CPT | Mod: 25 | Performed by: FAMILY MEDICINE

## 2024-07-31 PROCEDURE — 96372 THER/PROPH/DIAG INJ SC/IM: CPT | Performed by: FAMILY MEDICINE

## 2024-07-31 NOTE — LETTER
"  7/31/2024      RE: Kimberly Chau  [address] [facility] [address]     Dear Colleague,    Thank you for referring your patient, Kimberly Chau, to the [facility] [place]. Please see a copy of my visit note below.    S: 80 yo female with osteoporosis s/p 1 yr of Evenity treatment here to review labs and have Prolia #2 for her continuing osteoporosis treatment.        -No problems with Prolia #1  -Still treating her L LE osteomyelitis/wound healing  -Doing well with her calcium      O:   NAD  Here with her            Current Facility-Administered Medications       Current Outpatient Medications   Medication     acetaminophen 650 MG TABS     amLODIPine (NORVASC) 5 MG tablet     amLODIPine (NORVASC) 5 MG tablet     carvedilol (COREG) 12.5 MG tablet     COMPRESSION STOCKINGS     Diphenhydramine-APAP, sleep, (TYLENOL PM EXTRA STRENGTH PO)     ferrous gluconate (FERGON) 324 (38 Fe) MG tablet     Gauze Pads & Dressings (TELFA NON-ADHERENT) 3\"X4\" PADS     Multiple Vitamin (MULTI-VITAMIN) per tablet     Omeprazole (PRILOSEC PO)     sodium chloride (OCEAN NASAL SPRAY) 0.65 % nasal spray     triamcinolone (KENALOG) 0.1 % external cream          Current Facility-Administered Medications   Medication     denosumab (PROLIA) injection 60 mg     fluocinonide (LIDEX) 0.05 % ointment     fluocinonide (LIDEX) 0.05 % ointment     mupirocin (BACTROBAN) 2 % ointment     mupirocin (BACTROBAN) 2 % ointment     romosozumab-aqqg (EVENITY) injection 210 mg     romosozumab-aqqg (EVENITY) injection 210 mg     romosozumab-aqqg (EVENITY) injection 210 mg     romosozumab-aqqg (EVENITY) injection 210 mg     romosozumab-aqqg (EVENITY) injection 210 mg     romosozumab-aqqg (EVENITY) injection 210 mg     romosozumab-aqqg (EVENITY) injection 210 mg         No change in PMH since our last visit.         O: NAD       Latest Reference Range & Units 06/28/24 13:31   Sodium 135 - 145 mmol/L 139   Potassium " 3.4 - 5.3 mmol/L 4.9   Chloride 98 - 107 mmol/L 104   Carbon Dioxide (CO2) 22 - 29 mmol/L 24   Urea Nitrogen 8.0 - 23.0 mg/dL 32.5 (H)   Creatinine 0.51 - 0.95 mg/dL 1.87 (H)   GFR Estimate >60 mL/min/1.73m2 26 (L)   Cystatin C 0.6 - 1.0 mg/L 2.5 (H)   GFR Calculated with Cystatin C >=60 mL/min/1.73m2 19 (L)   Calcium 8.8 - 10.2 mg/dL 9.8   Anion Gap 7 - 15 mmol/L 11   Magnesium 1.7 - 2.3 mg/dL 2.0   Phosphorus 2.5 - 4.5 mg/dL 3.9   Albumin 3.5 - 5.2 g/dL 4.4   Glucose 70 - 99 mg/dL 102 (H)   Vitamin D, Total (25-Hydroxy) 20 - 50 ng/mL 49   (H): Data is abnormally high  (L): Data is abnormally low     A:  Severe Osteoporosis (Osteoporosis by DXA and T11 compression insufficiency fracture) s/p 1 year of Evenity treatment with a 5% improvement in her lumbar spine BMD and 1.0 improvement in the total hip since her DXA in May 2021.       P:  Prolia Injection #2 given today.    Continue improved calcium intake and supplemental Vit D  RTC in 6 months for Prolia #3.   -Labs prior to the appt (up to two weeks before her appt). Orders placed.      Procedure Note:      Prolia Injection #2     The risks and benefits of the medication were reviewed and she agreed to proceed.  Prepackage Prolia was obtained from the [facility].  The skin was prepped with an alcohol swab. Prolia solution (60mg) was injected subcutaneously into the R posterior upper arm.  Bandaid was placed.  The patient tolerated the injection well. She did not experience any adverse effects.          Alley Collazo MD, CAQ, FACSM, [facility]  Sports Medicine and Bone Health  Team Physician;  Athletics      Again, thank you for allowing me to participate in the care of your patient.      Sincerely,    Alley Collazo MD

## 2024-07-31 NOTE — PROGRESS NOTES
"S: 82 yo female with osteoporosis s/p 1 yr of Evenity treatment here to review labs and have Prolia #2 for her continuing osteoporosis treatment.        -No problems with Prolia #1  -Still treating her L LE osteomyelitis/wound healing  -Doing well with her calcium      O:   NAD  Here with her            Current Facility-Administered Medications       Current Outpatient Medications   Medication    acetaminophen 650 MG TABS    amLODIPine (NORVASC) 5 MG tablet    amLODIPine (NORVASC) 5 MG tablet    carvedilol (COREG) 12.5 MG tablet    COMPRESSION STOCKINGS    Diphenhydramine-APAP, sleep, (TYLENOL PM EXTRA STRENGTH PO)    ferrous gluconate (FERGON) 324 (38 Fe) MG tablet    Gauze Pads & Dressings (TELFA NON-ADHERENT) 3\"X4\" PADS    Multiple Vitamin (MULTI-VITAMIN) per tablet    Omeprazole (PRILOSEC PO)    sodium chloride (OCEAN NASAL SPRAY) 0.65 % nasal spray    triamcinolone (KENALOG) 0.1 % external cream          Current Facility-Administered Medications   Medication    denosumab (PROLIA) injection 60 mg    fluocinonide (LIDEX) 0.05 % ointment    fluocinonide (LIDEX) 0.05 % ointment    mupirocin (BACTROBAN) 2 % ointment    mupirocin (BACTROBAN) 2 % ointment    romosozumab-aqqg (EVENITY) injection 210 mg    romosozumab-aqqg (EVENITY) injection 210 mg    romosozumab-aqqg (EVENITY) injection 210 mg    romosozumab-aqqg (EVENITY) injection 210 mg    romosozumab-aqqg (EVENITY) injection 210 mg    romosozumab-aqqg (EVENITY) injection 210 mg    romosozumab-aqqg (EVENITY) injection 210 mg         No change in PMH since our last visit.         O: NAD       Latest Reference Range & Units 06/28/24 13:31   Sodium 135 - 145 mmol/L 139   Potassium 3.4 - 5.3 mmol/L 4.9   Chloride 98 - 107 mmol/L 104   Carbon Dioxide (CO2) 22 - 29 mmol/L 24   Urea Nitrogen 8.0 - 23.0 mg/dL 32.5 (H)   Creatinine 0.51 - 0.95 mg/dL 1.87 (H)   GFR Estimate >60 mL/min/1.73m2 26 (L)   Cystatin C 0.6 - 1.0 mg/L 2.5 (H)   GFR Calculated with Cystatin C " >=60 mL/min/1.73m2 19 (L)   Calcium 8.8 - 10.2 mg/dL 9.8   Anion Gap 7 - 15 mmol/L 11   Magnesium 1.7 - 2.3 mg/dL 2.0   Phosphorus 2.5 - 4.5 mg/dL 3.9   Albumin 3.5 - 5.2 g/dL 4.4   Glucose 70 - 99 mg/dL 102 (H)   Vitamin D, Total (25-Hydroxy) 20 - 50 ng/mL 49   (H): Data is abnormally high  (L): Data is abnormally low     A:  Severe Osteoporosis (Osteoporosis by DXA and T11 compression insufficiency fracture) s/p 1 year of Evenity treatment with a 5% improvement in her lumbar spine BMD and 1.0 improvement in the total hip since her DXA in May 2021.       P:  Prolia Injection #2 given today.    Continue improved calcium intake and supplemental Vit D  RTC in 6 months for Prolia #3.   -Labs prior to the appt (up to two weeks before her appt). Orders placed.      Procedure Note:      Prolia Injection #2     The risks and benefits of the medication were reviewed and she agreed to proceed.  Prepackage Prolia was obtained from the [facility].  The skin was prepped with an alcohol swab. Prolia solution (60mg) was injected subcutaneously into the R posterior upper arm.  Bandaid was placed.  The patient tolerated the injection well. She did not experience any adverse effects.          Alley Collazo MD, CAQ, FACSM, [facility]  Sports Medicine and Bone Health  Team Physician;  Athletics

## 2024-07-31 NOTE — NURSING NOTE
[address] [address]  Dept: [phone]  ______________________________________________________________________________    Patient: Kimberly Chau   : 1940   MRN: [id number]   2024    INVASIVE PROCEDURE SAFETY CHECKLIST    Date: 2024   Procedure:Prolia #4  Patient Name: Kimberly Chau  MRN: [id number]  YOB: 1940    Action: Complete sections as appropriate. Any discrepancy results in a HARD COPY until resolved.     PRE PROCEDURE:  Patient ID verified with 2 identifiers (name and  or MRN): Yes  Procedure and site verified with patient/designee (when able): Yes  Accurate consent documentation in medical record: Yes  H&P (or appropriate assessment) documented in medical record: Yes  H&P must be up to 20 days prior to procedure and updates within 24 hours of procedure as applicable: Yes  Relevant diagnostic and radiology test results appropriately labeled and displayed as applicable: Yes  Procedure site(s) marked with provider initials: NA    TIMEOUT:  Time-Out performed immediately prior to starting procedure, including verbal and active participation of all team members addressing the following:Yes  * Correct patient identify  * Confirmed that the correct side and site are marked  * An accurate procedure consent form  * Agreement on the procedure to be done  * Correct patient position  * Relevant images and results are properly labeled and appropriately displayed  * The need to administer antibiotics or fluids for irrigation purposes during the procedure as applicable   * Safety precautions based on patient history or medication use    DURING PROCEDURE: Verification of correct person, site, and procedures any time the responsibility for care of the patient is transferred to another member of the care team.       Prior to injection, verified patient identity using patient's name and date of birth.  Due to injection  administration, patient instructed to remain in clinic for 15 minutes  afterwards, and to report any adverse reaction to me immediately.    Indication: Prolia  (denosumab) is a prescription medicine used to treat osteoporosis in patients who:   Are at high risk for fracture, meaning patients who have had a fracture related to osteoporosis, or who have multiple risk factors for fracture   Cannot use another osteoporosis medicine or other osteoporosis medicines did not work well   The timeline for early/late injections would be 4 weeks early and any time after the 6 month genaro. If a patient receives their injection late, then the subsequent injection would be 6 months from the date that they actually received the injection    1.  When was the last injection?  1/31/24  2.  Did they check with their insurance for this calendar year?  Yes   3.  Is there an order in the chart?  Yes   4.  Has the patient had dental work involving the bone in the past month or will have work in the next 6 months?  No    The following steps were completed to comply with the REMS program for Prolia:  Reviewed information in the Medication Guide and Patient Counseling Chart, including the serious risks of Prolia  and the symptoms of each risk.  Advised patient to seek prompt medical attention if they have signs or symptoms of any of the serious risks.  Provided each patient a copy of the Medication Guide and Patient Brochure.        Drug Amount Wasted:  None.  Vial/Syringe: Syringe  Expiration Date:  8/31/26      Candis Marcus ATC  July 31, 2024

## 2024-08-06 ENCOUNTER — TELEPHONE (OUTPATIENT)
Dept: INTERNAL MEDICINE | Facility: CLINIC | Age: 84
End: 2024-08-06
Payer: MEDICARE

## 2024-08-06 ENCOUNTER — MEDICAL CORRESPONDENCE (OUTPATIENT)
Dept: HEALTH INFORMATION MANAGEMENT | Facility: CLINIC | Age: 84
End: 2024-08-06
Payer: MEDICARE

## 2024-08-06 NOTE — TELEPHONE ENCOUNTER
[facility] Call Center    Phone Message    May a detailed message be left on voicemail: yes     Reason for Call: Order(s): Home Care Orders: Skilled Nursing: wound care once a week for 9 weeks.    Action Taken: Message routed to:  [facility]    Travel Screening: Not Applicable     Date of Service:

## 2024-08-06 NOTE — TELEPHONE ENCOUNTER
Spoke with Kena and gave the following verbal order(s): Home Care Orders: Skilled Nursing: wound care once a week for 9 weeks.  Darling CASTAÑEDA LPN  [facility]

## 2024-08-08 ENCOUNTER — DOCUMENTATION ONLY (OUTPATIENT)
Dept: INTERNAL MEDICINE | Facility: CLINIC | Age: 84
End: 2024-08-08
Payer: MEDICARE

## 2024-08-08 NOTE — PROGRESS NOTES
Type of Form Received: [facility] [id number]    Form Received (Date) 8/8/24   Form Filled out Yes, faxed 8/10   Placed in provider folder Yes

## 2024-08-10 DIAGNOSIS — Z53.9 DIAGNOSIS NOT YET DEFINED: Primary | ICD-10-CM

## 2024-08-10 PROCEDURE — G0179 MD RECERTIFICATION HHA PT: HCPCS | Performed by: INTERNAL MEDICINE

## 2024-08-15 ENCOUNTER — TELEPHONE (OUTPATIENT)
Dept: VASCULAR SURGERY | Facility: CLINIC | Age: 84
End: 2024-08-15
Payer: MEDICARE

## 2024-08-15 NOTE — TELEPHONE ENCOUNTER
I left a voicemail and [employer] message stating that there is an available appointment with  on 8/16/24 at the [facility] at 12:30 pm.  Please call [phone] or [phone] to schedule if you are interested.

## 2024-09-03 ENCOUNTER — OFFICE VISIT (OUTPATIENT)
Dept: PODIATRY | Facility: CLINIC | Age: 84
End: 2024-09-03
Payer: MEDICARE

## 2024-09-03 ENCOUNTER — TELEPHONE (OUTPATIENT)
Dept: INTERNAL MEDICINE | Facility: CLINIC | Age: 84
End: 2024-09-03

## 2024-09-03 DIAGNOSIS — L97.922 SKIN ULCER OF LEFT LOWER LEG WITH FAT LAYER EXPOSED (H): Primary | ICD-10-CM

## 2024-09-03 DIAGNOSIS — I87.8 VENOUS STASIS: ICD-10-CM

## 2024-09-03 DIAGNOSIS — I73.9 PERIPHERAL ARTERIAL DISEASE (H): ICD-10-CM

## 2024-09-03 PROCEDURE — 29581 APPL MULTLAYER CMPRN SYS LEG: CPT | Mod: LT | Performed by: PODIATRIST

## 2024-09-03 NOTE — PROGRESS NOTES
Past Medical History:   Diagnosis Date    Anemia     Arthritis     Cataracts     Central retinal artery occlusion     Cervical spine fracture (H)     After a fall from orthostatic sx    Chronic pain     Back of head    Gastro-oesophageal reflux disease     Head injury     Hypertension     Hyponatremia     Resolved, 2/2 diuretics    Migraines     Osteoporosis     Pneumonia 2018    Rheumatoid arthritis(714.0)      Patient Active Problem List   Diagnosis    Intrinsic atopic dermatitis    Essential hypertension    Rheumatoid arthritis (H)    Retinal artery occlusion    Cervical vertebral fracture (H)    Central retinal artery occlusion of right eye    Bilateral occipital neuralgia    C1-C2 instability    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 factor (H)    Osteoarthritis    Malignant hypertension    Hyponatremia    GERD (gastroesophageal reflux disease)    Eczematous dermatitis    Anxiety state    Anemia    Closed fracture of distal end of left radius with delayed healing, unspecified fracture morphology, subsequent encounter    Closed fracture of distal end of left radius, unspecified fracture morphology, sequela    Osteomyelitis of left leg (H)    Pain of left thumb    CKD (chronic kidney disease) stage 4, GFR 15-29 ml/min (H)    Mild protein-calorie malnutrition (H24)    Venous ulcer (H)    Prostate cancer (H)     Past Surgical History:   Procedure Laterality Date    COLONOSCOPY      EYE SURGERY Left 02/2019    Hole in macula    FUSION CERVICAL POSTERIOR THREE+ LEVELS  1/22/2013    Procedure: FUSION CERVICAL POSTERIOR THREE+ LEVELS;  Cervical 1-6 Posterior Instrumentation and Fusion, Cervical 3-4 Laminectomy  .Instrumentation and fusion to Cervical 6 *Latex Allergy*;  Surgeon: Ra Bar MD;  Location: [facility] OR    GYN SURGERY      HEAD & NECK SURGERY      HYSTERECTOMY      IR ENDOVENOUS ABLATION VARICOSE VEINS  2/16/2021    KNEE SURGERY      NECK SURGERY      OPEN REDUCTION INTERNAL  FIXATION WRIST Left 4/30/2019    Procedure: Open Reduction Internal Fixation Left Distal Radius Fracture;  Surgeon: Dinh Strong MD;  Location: [facility] OR    OPEN REDUCTION INTERNAL FIXATION WRIST Left 6/12/2020    Procedure: OPEN REDUCTION INTERNAL FIXATION Left Distal Radius Nonunion, Distal Ulna Resection;  Surgeon: Dinh Strong MD;  Location: [facility] OR    OPTICAL TRACKING SYSTEM ENDOSCOPIC SINUS SURGERY Right 4/6/2018    Procedure: OPTICAL TRACKING SYSTEM ENDOSCOPIC SINUS SURGERY;  Stealth Assisted Right Maxillary Antrostomy, Anterior Ethmoidectomy And Frontal Sinusotomy;  Surgeon: Elyse Eisenberg MD;  [facility]: [facility] OR    OPTICAL TRACKING SYSTEM ENDOSCOPIC SINUS SURGERY Right 8/3/2018    Procedure: OPTICAL TRACKING SYSTEM ENDOSCOPIC SINUS SURGERY;  Stealth Assisted Revision Right Frontal Sinusotomy, Right Stent Placement  **Latex Allergy** ;  Surgeon: Elyse Eisenberg MD;  Location:  OR    ORTHOPEDIC SURGERY      REMOVE HARDWARE WRIST Left 11/19/2019    Procedure: Left Distal Radius Hardware Removal, Flexor Pollicus Longus Repair, Deep Cultures;  Surgeon: Dinh Strong MD;  Location:  OR    ZZC HAND/FINGER SURGERY UNLISTED      Z PELVIS/HIP JOINT SURGERY UNLISTED       Social History     Socioeconomic History    Marital status:      Spouse name: Not on file    Number of children: Not on file    Years of education: Not on file    Highest education level: Not on file   Occupational History    Not on file   Tobacco Use    Smoking status: Never    Smokeless tobacco: Never   Substance and Sexual Activity    Alcohol use: Yes     Comment: 2 glasses of wine a day    Drug use: Never    Sexual activity: Not Currently   Other Topics Concern    Parent/sibling w/ CABG, MI or angioplasty before 65F 55M? Not Asked   Social History Narrative    Ms. Chau is , has two grown children and two grandchildren. She does not work. She was never a smoker, and drinks a glass of wine with dinner each  night.         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      Social Determinants of Health     Financial Resource Strain: Low Risk  (8/24/2024)    Received from [facility]    Financial Resource Strain     Difficulty of Paying Living Expenses: 3     Difficulty of Paying Living Expenses: Not on file   Food Insecurity: No Food Insecurity (8/24/2024)    [facility]    Food Insecurity     Worried About Running Out of Food in the Last Year: 1   Transportation Needs: No Transportation Needs (8/24/2024)    Received from [facility]    Transportation Needs     Lack of Transportation (Medical): 1   Physical Activity: Not on file   Stress: Not on file   Social Connections: Socially Integrated (8/24/2024)    [facility]    Social Connections     Frequency of Communication with Friends and Family: 0   Interpersonal Safety: Not on file   Housing Stability: Low Risk  (8/24/2024)    [facility]    Housing Stability     Unable to Pay for Housing in the Last Year: 1     Family History   Problem Relation Age of Onset    Arthritis Mother     Respiratory Mother         pulmonary fibrosis    Hypertension Mother     Anemia Mother     Liver Disease Father         from EtOH    Alcoholism Father     No Known Problems Maternal Grandmother     Heart Disease Maternal Grandfather     Glaucoma Paternal Grandfather     Heart Disease Paternal Grandfather     Multiple Sclerosis Sister     Breast Cancer Sister     Skin Cancer No family hx of     Melanoma No family hx of        8/27/2024 CBC reviewed as noted in the EMR.  8/29/2024 BMP and hemoglobin reviewed as noted in the EMR.  8/26/2024 procalcitonin 0.13 as noted in the EMR.  8/26/2024 CRP 3.4 as noted in the EMR.                              Subjective findings-  83-year-old returns clinic with her  for left leg ulcer.  They relate since we seen her last she was hospitalized for a different issue, was on antibiotics but is not on antibiotics now, relates the hospital changed her dressings to Mepilex border dressings, relates they need to reschedule with home nursing to have them come out for the Unna boot compression system change.    Objective findings- DP and PT are 2 out of 4 left.  Has a left medial leg ulcer that is partially eschared and through the dermis with dried serosanguineous drainage, positive edema, positive lower leg Dermatitis, no odor, no calor, no pain on palpation.  Has peripheral edema.  Has decreased hair growth.    Assessment and plan- Ulcer left leg, peripheral arterial and Venous disease with Venous Dermatitis, Kidney disease.  Diagnosis and treatment options discussed with them.  The left leg and ulcer sites were cleaned with wound Vashe and we applied Hydrofera Blue to the ulcer sites and applied a multilayer compression system with an Unna boot with light compression to the left lower extremity upon consent.  No antibiotics today.  Will have them try to reschedule with nursing to change this weekly, if they  get scheduled with nursing next week I will see them in 1 month otherwise I will see them in 1 week.  Previous notes reviewed.                                                      Moderate level of medical decision making.   Followed protocol

## 2024-09-03 NOTE — NURSING NOTE
Kimberly Chau's chief complaint for this visit includes:  Chief Complaint   Patient presents with    Consult     Leg recheck and wound care     PCP: Guillermo Castellano    Referring Provider:  Referred Self, MD  No address on file    There were no vitals taken for this visit.  Data Unavailable     Do you need any medication refills at today's visit? NO    Allergies   Allergen Reactions    Hctz Other (See Comments)     Pt develops symptomatic and significant hyponatremia with HCTZ exposure    Hydrochlorothiazide      Other reaction(s): Hyponatremia  Hyponatremia    Latex     Benzocaine Rash     carba mix,thrium-sunscreen    Ra Lotion Rash     carba mix,thrium-sunscreen    Ace Inhibitors Unknown     Dizziness and orthostatic changes and electrolyte problems.    Latex Unknown       Humera Hairston LPN

## 2024-09-03 NOTE — TELEPHONE ENCOUNTER
Spoke with Katheryn with [facility] and gave the following verbal orders: resuming of home care skilled nursing for continued wound care after discharged from the hospital 9/4/2024 to restart.  Darling CASTAÑEDA LPN  [facility]

## 2024-09-03 NOTE — TELEPHONE ENCOUNTER
[facility] Call Center    Phone Message    May a detailed message be left on voicemail: yes     Reason for Call: Other: home care calling requesting resuming of home care skilled nursing for continued wound care after discharged from the hospital 9/4/2024 to restart.      Action Taken: Message routed to:  [facility]    Travel Screening: Not Applicable     Date of Service:

## 2024-09-03 NOTE — LETTER
9/3/2024      Kimberly Chau  [address]  [place] [address]      Dear Colleague,    Thank you for referring your patient, Kimberly Chau, to the [facility]. Please see a copy of my visit note below.    Past Medical History:   Diagnosis Date    Anemia     Arthritis     Cataracts     Central retinal artery occlusion     Cervical spine fracture (H)     After a fall from orthostatic sx    Chronic pain     Back of head    Gastro-oesophageal reflux disease     Head injury     Hypertension     Hyponatremia     Resolved, 2/2 diuretics    Migraines     Osteoporosis     Pneumonia 2018    Rheumatoid arthritis(714.0)      Patient Active Problem List   Diagnosis    Intrinsic atopic dermatitis    Essential hypertension    Rheumatoid arthritis (H)    Retinal artery occlusion    Cervical vertebral fracture (H)    Central retinal artery occlusion of right eye    Bilateral occipital neuralgia    C1-C2 instability    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 factor (H)    Osteoarthritis    Malignant hypertension    Hyponatremia    GERD (gastroesophageal reflux disease)    Eczematous dermatitis    Anxiety state    Anemia    Closed fracture of distal end of left radius with delayed healing, unspecified fracture morphology, subsequent encounter    Closed fracture of distal end of left radius, unspecified fracture morphology, sequela    Osteomyelitis of left leg (H)    Pain of left thumb    CKD (chronic kidney disease) stage 4, GFR 15-29 ml/min (H)    Mild protein-calorie malnutrition (H24)    Venous ulcer (H)    Prostate cancer (H)     Past Surgical History:   Procedure Laterality Date    COLONOSCOPY      EYE SURGERY Left 02/2019    Hole in macula    FUSION CERVICAL POSTERIOR THREE+ LEVELS  1/22/2013    Procedure: FUSION CERVICAL POSTERIOR THREE+ LEVELS;  Cervical 1-6 Posterior Instrumentation and Fusion, Cervical 3-4 Laminectomy  .Instrumentation and fusion to Cervical 6 *Latex  Allergy*;  Surgeon: Ra Bar MD;  Location: [facility] OR    GYN SURGERY      HEAD & NECK SURGERY      HYSTERECTOMY      IR ENDOVENOUS ABLATION VARICOSE VEINS  2/16/2021    KNEE SURGERY      NECK SURGERY      OPEN REDUCTION INTERNAL FIXATION WRIST Left 4/30/2019    Procedure: Open Reduction Internal Fixation Left Distal Radius Fracture;  Surgeon: Dinh Strong MD;  Location:  OR    OPEN REDUCTION INTERNAL FIXATION WRIST Left 6/12/2020    Procedure: OPEN REDUCTION INTERNAL FIXATION Left Distal Radius Nonunion, Distal Ulna Resection;  Surgeon: Dinh Strong MD;  Location:  OR    OPTICAL TRACKING SYSTEM ENDOSCOPIC SINUS SURGERY Right 4/6/2018    Procedure: OPTICAL TRACKING SYSTEM ENDOSCOPIC SINUS SURGERY;  Stealth Assisted Right Maxillary Antrostomy, Anterior Ethmoidectomy And Frontal Sinusotomy;  Surgeon: Elyse Eisenberg MD;  Location:  OR    OPTICAL TRACKING SYSTEM ENDOSCOPIC SINUS SURGERY Right 8/3/2018    Procedure: OPTICAL TRACKING SYSTEM ENDOSCOPIC SINUS SURGERY;  Stealth Assisted Revision Right Frontal Sinusotomy, Right Stent Placement  **Latex Allergy** ;  Surgeon: Elyse Eisenberg MD;  Location:  OR    ORTHOPEDIC SURGERY      REMOVE HARDWARE WRIST Left 11/19/2019    Procedure: Left Distal Radius Hardware Removal, Flexor Pollicus Longus Repair, Deep Cultures;  Surgeon: Dinh Strong MD;  Location:  OR    ZZC HAND/FINGER SURGERY UNLISTED      [email or web address] PELVIS/HIP JOINT SURGERY UNLISTED       Social History     Socioeconomic History    Marital status:      Spouse name: Not on file    Number of children: Not on file    Years of education: Not on file    Highest education level: Not on file   Occupational History    Not on file   Tobacco Use    Smoking status: Never    Smokeless tobacco: Never   Substance and Sexual Activity    Alcohol use: Yes     Comment: 2 glasses of wine a day    Drug use: Never    Sexual activity: Not Currently   Other Topics Concern    Parent/sibling w/  CABG, MI or angioplasty before 65F 55M? Not Asked   Social History Narrative    Ms. Chau is , has two grown children and two grandchildren. She does not work. She was never a smoker, and drinks a glass of wine with dinner each night.         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      Social Determinants of Health     Financial Resource Strain: Low Risk  (8/24/2024)    Received from [employer]    Financial Resource Strain     Difficulty of Paying Living Expenses: 3     Difficulty of Paying Living Expenses: Not on file   Food Insecurity: No Food Insecurity (8/24/2024)    Received from [employer]    Food Insecurity     Worried About Running Out of Food in the Last Year: 1   Transportation Needs: No Transportation Needs (8/24/2024)    Received from [employer]    Transportation Needs     Lack of Transportation (Medical): 1   Physical Activity: Not on file   Stress: Not on file   Social Connections: Socially Integrated (8/24/2024)    Received from [employer]    Social Connections     Frequency of Communication with Friends and Family: 0   Interpersonal Safety: Not on file   Housing Stability: Low Risk  (8/24/2024)    Received from [employer]    Housing Stability     Unable to Pay for Housing in the Last Year: 1     Family History   Problem Relation Age of Onset    Arthritis Mother     Respiratory Mother         pulmonary fibrosis    Hypertension Mother     Anemia Mother     Liver Disease Father         from EtOH    Alcoholism Father     No Known Problems Maternal Grandmother     Heart Disease Maternal Grandfather     Glaucoma Paternal Grandfather     Heart Disease Paternal Grandfather     Multiple Sclerosis Sister     Breast Cancer Sister     Skin Cancer No family hx of     Melanoma No family hx of        8/27/2024  CBC reviewed as noted in the EMR.  8/29/2024 BMP and hemoglobin reviewed as noted in the EMR.  8/26/2024 procalcitonin 0.13 as noted in the EMR.  8/26/2024 CRP 3.4 as noted in the EMR.                Subjective findings- 83-year-old returns clinic with her  for left leg ulcer.  They relate since we seen her last she was hospitalized for a different issue, was on antibiotics but is not on antibiotics now, relates the hospital changed her dressings to Mepilex border dressings, relates they need to reschedule with home nursing to have them come out for the Unna boot compression system change.    Objective findings- DP and PT are 2 out of 4 left.  Has a left medial leg ulcer that is partially eschared and through the dermis with dried serosanguineous drainage, positive edema, positive lower leg Dermatitis, no odor, no calor, no pain on palpation.  Has peripheral edema.  Has decreased hair growth.    Assessment and plan- Ulcer left leg, peripheral arterial and Venous disease with Venous Dermatitis, Kidney disease.  Diagnosis and treatment options discussed with them.  The left leg and ulcer sites were cleaned with wound Vashe and we applied Hydrofera Blue to the ulcer sites and applied a multilayer compression system with an Unna boot with light compression to the left lower extremity upon consent.  No antibiotics today.  Will have them try to reschedule with nursing to change this weekly, if they  get scheduled with nursing next week I will see them in 1 month otherwise I will see them in 1 week.  Previous notes reviewed.      Moderate level of medical decision making.      Again, thank you for allowing me to participate in the care of your patient.        Sincerely,        Camden Salazar DPM

## 2024-09-12 ENCOUNTER — OFFICE VISIT (OUTPATIENT)
Dept: INTERNAL MEDICINE | Facility: CLINIC | Age: 84
End: 2024-09-12
Payer: MEDICARE

## 2024-09-12 VITALS
DIASTOLIC BLOOD PRESSURE: 82 MMHG | SYSTOLIC BLOOD PRESSURE: 144 MMHG | HEART RATE: 77 BPM | OXYGEN SATURATION: 96 % | WEIGHT: 115.1 LBS | BODY MASS INDEX: 23.52 KG/M2

## 2024-09-12 DIAGNOSIS — N18.4 CKD (CHRONIC KIDNEY DISEASE) STAGE 4, GFR 15-29 ML/MIN (H): ICD-10-CM

## 2024-09-12 DIAGNOSIS — Z23 NEED FOR TDAP VACCINATION: ICD-10-CM

## 2024-09-12 DIAGNOSIS — I10 ESSENTIAL HYPERTENSION: Primary | ICD-10-CM

## 2024-09-12 DIAGNOSIS — N18.4 ANEMIA DUE TO STAGE 4 CHRONIC KIDNEY DISEASE (H): ICD-10-CM

## 2024-09-12 DIAGNOSIS — D63.1 ANEMIA DUE TO STAGE 4 CHRONIC KIDNEY DISEASE (H): ICD-10-CM

## 2024-09-12 DIAGNOSIS — Z29.11 NEED FOR VACCINATION AGAINST RESPIRATORY SYNCYTIAL VIRUS: ICD-10-CM

## 2024-09-12 PROCEDURE — 99213 OFFICE O/P EST LOW 20 MIN: CPT | Mod: GE

## 2024-09-12 RX ORDER — AMLODIPINE BESYLATE 5 MG/1
5 TABLET ORAL DAILY
Qty: 90 TABLET | Refills: 0 | Status: SHIPPED | OUTPATIENT
Start: 2024-09-12

## 2024-09-12 NOTE — PROGRESS NOTES
Assessment & Plan     83 year old woman with PMHx of CKD4 due to bacterial infection, anemia, HTN, OA, and RA, who was recently discharged due to labial growth and infection that was treated with antibiotics. Symptoms have since resolved and is being followed by wound and home care RN.     Labial abscess   Patient was admitted from 8/23 to 8/29 due to labial swelling on her left labia.  She presented to the hospital after it burst open and started to drain.  No associated signs of systemic infection.  Patient notes drainage is improving with no pain.  Patient defers exam today.  Abscess is being managed with home wound care RN per patient and her .   -CTM     CKD (chronic kidney disease) stage 4, GFR 15-29 ml/min (H)  History of CKD being followed by nephrology. Scr has been elevated since 2022. 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 Infection was due to LLE ulcer and cellulitis Denies any changes in urinary frequency. Plans to have follow up with nephrology later this month.  Creatinine was stable during hospital stay and at discharge.  Will plan to follow-up with BMP, urine test and CBC in early October.  - BASIC METABOLIC PANEL; Future  - CBC with platelets; Future  - Albumin Random Urine Quantitative with Creat Ratio; Future    Anemia due to stage 4 chronic kidney disease (H)  Patient has noted anemia that has been downtrending during this last hospital admission.  Hemoglobin was 8.9 down from 11.  Patient's hemoglobin has been low, likely due to underlying kidney disease.  No symptoms of anemia on exam today.  Will plan for CBC as above to monitor.    Essential hypertension  Patient was found to have hypertension at 144/82.  Notably patient is unable to take her amlodipine after running out of her medication.  No concerns for headache, chest pain, shortness of breath.  Otherwise hypertension appears to be well-controlled.  Will restart patient amlodipine 5 mg today.  - amLODIPine  (NORVASC) 5 MG tablet; Take 1 tablet (5 mg) by mouth daily.    Need for Tdap vaccination  Need for vaccination against respiratory syncytial virus  Encouraged patient to follow up with RSV and TDAP vaccine with a pharmacy of their choice       MED REC REQUIRED  Post Medication Reconciliation Status:       Follow up items:   - Follow up symptoms from labial abscess   - Follow up Scr   - Follow symptoms from anemia  - Follow up Vaccinations     No follow-ups on file.    Rola Harris is a 83 year old, presenting for the following health issues:  Follow Up (Hospital follow up)      9/12/2024     9:45 AM   Additional Questions   Roomed by MR   Accompanied by , Wilder     HPI     No new concerns per patient today.  Patient otherwise denies worsening of symptoms regarding her labial abscess.  Patient notes the drainage has improved without any pain.  Patient denies any fevers or chills.  Patient notes that the abscess is being monitored by home RN that is also managing her chronic wounds on her left lower extremity.    Patient notes that she may not have amlodipine.  She denies any diarrhea, constipation, or dysuria symptoms.  She has no chest pain, shortness of breath, abdominal pain, rash, or lower extremity edema.    Review of Systems  Constitutional, HEENT, cardiovascular, pulmonary, gi and gu systems are negative, except as otherwise noted.      Objective    BP (!) 144/82 (BP Location: Right arm, Patient Position: Sitting, Cuff Size: Adult Regular)   Pulse 77   Wt 52.2 kg (115 lb 1.6 oz)   SpO2 96%   BMI 23.52 kg/m    Body mass index is 23.52 kg/m .  Physical Exam   GENERAL: alert and no distress  NECK: no adenopathy, no asymmetry, masses, or scars  RESP: lungs clear to auscultation - no rales, rhonchi or wheezes  CV: regular rate and rhythm, normal S1 S2, no S3 or S4, no murmur, click or rub, no peripheral edema  ABDOMEN: soft, nontender, no hepatosplenomegaly, no masses and bowel sounds normal  MS: no  gross musculoskeletal defects noted, no edema, bandages present over LLE with drainage or leakage    Lab on 06/28/2024   Component Date Value Ref Range Status    Magnesium 06/28/2024 2.0  1.7 - 2.3 mg/dL Final    Vitamin D, Total (25-Hydroxy) 06/28/2024 49  20 - 50 ng/mL Final    optimum levels    Sodium 06/28/2024 139  135 - 145 mmol/L Final    Potassium 06/28/2024 4.9  3.4 - 5.3 mmol/L Final    Chloride 06/28/2024 104  98 - 107 mmol/L Final    Carbon Dioxide (CO2) 06/28/2024 24  22 - 29 mmol/L Final    Anion Gap 06/28/2024 11  7 - 15 mmol/L Final    Glucose 06/28/2024 102 (H)  70 - 99 mg/dL Final    Urea Nitrogen 06/28/2024 32.5 (H)  8.0 - 23.0 mg/dL Final    Creatinine 06/28/2024 1.87 (H)  0.51 - 0.95 mg/dL Final    GFR Estimate 06/28/2024 26 (L)  >60 mL/min/1.73m2 Final    eGFR calculated using 2021 CKD-EPI equation.    Calcium 06/28/2024 9.8  8.8 - 10.2 mg/dL Final    Albumin 06/28/2024 4.4  3.5 - 5.2 g/dL Final    Phosphorus 06/28/2024 3.9  2.5 - 4.5 mg/dL Final    WBC Count 06/28/2024 5.6  4.0 - 11.0 10e3/uL Final    RBC Count 06/28/2024 3.49 (L)  3.80 - 5.20 10e6/uL Final    Hemoglobin 06/28/2024 10.5 (L)  11.7 - 15.7 g/dL Final    Hematocrit 06/28/2024 32.3 (L)  35.0 - 47.0 % Final    MCV 06/28/2024 93  78 - 100 fL Final    MCH 06/28/2024 30.1  26.5 - 33.0 pg Final    MCHC 06/28/2024 32.5  31.5 - 36.5 g/dL Final    RDW 06/28/2024 13.1  10.0 - 15.0 % Final    Platelet Count 06/28/2024 272  150 - 450 10e3/uL Final    Cystatin C 06/28/2024 2.5 (H)  0.6 - 1.0 mg/L Final    GFR Calculated with Cystatin C 06/28/2024 19 (L)  >=60 mL/min/1.73m2 Final           Signed Electronically by: MARY COREY MD      While the patient was in clinic, I reviewed the pertinent medical history and results.  I discussed the current findings on physical examination, as well as the patient s diagnosis and treatment plan with the resident and agree with the information as documented with the following exceptions: none.  Musa Hatch  MD Jy[facility]i

## 2024-09-12 NOTE — PATIENT INSTRUCTIONS
You are due for the following vaccines:   - RSV  - Tetanus (TDAP)   - COVID19   - Influenza    Please go to your preferred pharmacy and get these vaccines when able.

## 2024-09-17 ENCOUNTER — DOCUMENTATION ONLY (OUTPATIENT)
Dept: INTERNAL MEDICINE | Facility: CLINIC | Age: 84
End: 2024-09-17
Payer: MEDICARE

## 2024-09-17 NOTE — PROGRESS NOTES
Type of Form Received: [facility] [id number]    Form Received (Date) 9/17/24   Form Filled out Yes, faxed 9/23/24   Placed in provider folder Yes

## 2024-09-21 ENCOUNTER — MEDICAL CORRESPONDENCE (OUTPATIENT)
Dept: HEALTH INFORMATION MANAGEMENT | Facility: CLINIC | Age: 84
End: 2024-09-21
Payer: MEDICARE

## 2024-09-25 DIAGNOSIS — N18.4 CKD (CHRONIC KIDNEY DISEASE) STAGE 4, GFR 15-29 ML/MIN (H): Primary | ICD-10-CM

## 2024-10-04 ENCOUNTER — TELEPHONE (OUTPATIENT)
Dept: INTERNAL MEDICINE | Facility: CLINIC | Age: 84
End: 2024-10-04
Payer: MEDICARE

## 2024-10-04 NOTE — TELEPHONE ENCOUNTER
[facility] Call Center    Phone Message    May a detailed message be left on voicemail: yes     Reason for Call: Order(s): Home Care Orders: Skilled Nursing: skilled nursing orders continuation requested for once a week for four weeks with two PRN's.     Action Taken: Message routed to:  [facility] pcc    Travel Screening: Not Applicable     Date of Service:

## 2024-10-05 ENCOUNTER — MEDICAL CORRESPONDENCE (OUTPATIENT)
Dept: HEALTH INFORMATION MANAGEMENT | Facility: CLINIC | Age: 84
End: 2024-10-05

## 2024-10-07 NOTE — TELEPHONE ENCOUNTER
Left detailed VM on confidential voicemail box for Kena ABEBE with [facility] with the following verbal order(s): Home Care Orders: Skilled Nursing: continuation for once a week for four weeks with two PRN's.   Darling CASTAÑEDA LPN  [facility]

## 2024-10-08 ENCOUNTER — OFFICE VISIT (OUTPATIENT)
Dept: DERMATOLOGY | Facility: CLINIC | Age: 84
End: 2024-10-08
Attending: STUDENT IN AN ORGANIZED HEALTH CARE EDUCATION/TRAINING PROGRAM
Payer: MEDICARE

## 2024-10-08 DIAGNOSIS — I87.2 VENOUS STASIS ULCER OF OTHER PART OF LEFT LOWER LEG WITHOUT VARICOSE VEINS, UNSPECIFIED ULCER STAGE (H): Primary | ICD-10-CM

## 2024-10-08 DIAGNOSIS — L97.829 VENOUS STASIS ULCER OF OTHER PART OF LEFT LOWER LEG WITHOUT VARICOSE VEINS, UNSPECIFIED ULCER STAGE (H): Primary | ICD-10-CM

## 2024-10-08 DIAGNOSIS — I87.2 VENOUS STASIS DERMATITIS: ICD-10-CM

## 2024-10-08 DIAGNOSIS — R21 RASH AND NONSPECIFIC SKIN ERUPTION: ICD-10-CM

## 2024-10-08 PROCEDURE — 87077 CULTURE AEROBIC IDENTIFY: CPT | Performed by: STUDENT IN AN ORGANIZED HEALTH CARE EDUCATION/TRAINING PROGRAM

## 2024-10-08 PROCEDURE — 87205 SMEAR GRAM STAIN: CPT | Performed by: STUDENT IN AN ORGANIZED HEALTH CARE EDUCATION/TRAINING PROGRAM

## 2024-10-08 PROCEDURE — 99000 SPECIMEN HANDLING OFFICE-LAB: CPT | Performed by: PATHOLOGY

## 2024-10-08 PROCEDURE — 99214 OFFICE O/P EST MOD 30 MIN: CPT | Performed by: STUDENT IN AN ORGANIZED HEALTH CARE EDUCATION/TRAINING PROGRAM

## 2024-10-08 ASSESSMENT — PAIN SCALES - GENERAL: PAINLEVEL: MILD PAIN (2)

## 2024-10-08 NOTE — PROGRESS NOTES
[facility] Dermatology Note  Encounter Date: Oct 8, 2024  Office Visit     Dermatology Problem List:  1. Venous stasis ulcers, dermatitis  - 08/18/21 LE US  - 02/16/21 laser ablation of Great saphenous vein of R &L  leg   - current rx (04/04/24): pentoxyfilline 400 mg BID (dosing per patient preference), Vashe soaks with dressing changes, triamcinolone 0.1% ointment with dressing changes, Unna boot with home nurse dressing changes twice weekly  - Comanaged with podiatry    ____________________________________________    Assessment & Plan:    # Venous stasis dermatitis, left lower extremity (Chronic flaring)  # Stasis ulcers  Chronic with overall improvement with current treatment plan and comanagement with podiatry. There has been some eczematous changes with cracked and scaly skin on the dorsal left foot and ankle. The lower leg has been pruritic and recently painful within the last four days. Due to the cracked and painful, erythematous skin, there is most likely bacterial growth and possible superficial infection.     - swab of left lower leg sent for bacterial culture today   - vinegar soaks once daily (can dampen a paper towel and place on affected area)   - apply triamcinolone 0.1% ointment BID   - continue vashe soaks    - continue using the Unna boot   - continue pentoxifylline 400mg BID   - appreciate co-management with podiatry  - if bilateral could consider acrodermatitis enteropathica    Procedures Performed:   None    Follow-up: 6 month(s) in-person, or earlier for new or changing lesions    Staff and Medical Student:     I saw and examined the patient in the presence of Dr. Cummins.    Randy Luna, MS4  ____________________________________________    CC: No chief complaint on file.    HPI:  Ms. Kimberly Chau is a(n) 83 year old female who presents today as a return patient for venous stasis dermatitis and stasis ulcers. Within the last 4 days, left lower leg has been itchy and has  recently become more erythematous and painful. She has been doing vashe soaks, using an unna boot, and pentoxifylline 400mg BID. She has not been using the triamcinolone 0.1% ointment. She has also been following with Dr. Salazar with podiatry (last visit 9/2024) and has an at home nursing staff that comes once a week. Patient is otherwise feeling well, without additional skin concerns.    Labs:  None reviewed.    Physical Exam:  Vitals: There were no vitals taken for this visit.  SKIN: Focused examination of the lower extremities was performed.  - left lower leg with erythematous patch with shallow ulcerated papules/plaques and linear excoriations  - Scaly, erythematous plaque along the dorsal foot/ankle   - Right lower leg without swelling or erythema  - Ulcer on the posterior lower left leg with dried serosanguineous drainage  - No other lesions of concern on areas examined.     Medications:  Current Outpatient Medications   Medication Sig Dispense Refill    acetaminophen 650 MG TABS Take 650 mg by mouth every 4 hours as needed. 100 tablet 0    amLODIPine (NORVASC) 5 MG tablet Take 1 tablet (5 mg) by mouth daily. 90 tablet 0    amoxicillin-clavulanate (AUGMENTIN) 500-125 MG tablet Take 1 tablet by mouth 2 times daily 14 tablet 0    carvedilol (COREG) 12.5 MG tablet Take 1 tablet (12.5 mg) by mouth 2 times daily (with meals) 180 tablet 0    clindamycin (CLEOCIN) 300 MG capsule Take 1 capsule (300 mg) by mouth 2 times daily (with meals) 20 capsule 0    COMPRESSION STOCKINGS Please measure and distribute 1 pair of 20mmHg - 30mmHg knee high open or closed toe compression stockings. Jobst ultrasheer or equivalent. 2 each 4    denosumab (PROLIA) 60 MG/ML SOSY injection Inject 1 mL (60 mg) Subcutaneous once for 1 dose 1 mL 0    Diphenhydramine-APAP, sleep, (TYLENOL PM EXTRA STRENGTH PO) Take 2 tablets by mouth At Bedtime       doxycycline hyclate (VIBRA-TABS) 100 MG tablet Take 1 tablet (100 mg) by mouth 2 times daily  "14 tablet 0    ferrous gluconate (FERGON) 324 (38 Fe) MG tablet Take 324 mg by mouth      Gauze Pads & Dressings (TELFA NON-ADHERENT) 3\"X4\" PADS Cut to size for open areas on the lower extremities and secure with paper tape. Change dressing every other day. 30 each 11    losartan (COZAAR) 25 MG tablet Take 1 tablet (25 mg) by mouth at bedtime 90 tablet 3    multivitamin w/minerals (MULTI-VITAMIN) tablet Take 1 tablet by mouth every morning 90 tablet 11    Omeprazole (PRILOSEC PO) Take 20 mg by mouth as needed       pentoxifylline ER (TRENTAL) 400 MG CR tablet Take 1 tablet (400 mg) by mouth 2 times daily 120 tablet 3    pentoxifylline ER (TRENTAL) 400 MG CR tablet Take 1 tablet (400 mg) by mouth 3 times daily (with meals) 90 tablet 4    triamcinolone (KENALOG) 0.1 % external ointment Apply with dressing changes, after Vashe soaks 80 g 4    vitamin D3 (CHOLECALCIFEROL) 50 mcg (2000 units) tablet Take 1 tablet by mouth daily       Current Facility-Administered Medications   Medication Dose Route Frequency Provider Last Rate Last Admin    fluocinonide (LIDEX) 0.05 % ointment   Topical Once Ra Orourke MD        fluocinonide (LIDEX) 0.05 % ointment   Topical Once Ra Orourke MD        mupirocin (BACTROBAN) 2 % ointment   Topical Once Ra Orourke MD        mupirocin (BACTROBAN) 2 % ointment   Topical Once Ra Orourke MD        romosozumab-aqqg (EVENITY) injection 210 mg  210 mg Subcutaneous Q30 Days Alley Collazo MD   210 mg at 05/25/22 1246    romosozumab-aqqg (EVENITY) injection 210 mg  210 mg Subcutaneous Q30 Days Kerri Lux MD   210 mg at 04/23/22 1152    romosozumab-aqqg (EVENITY) injection 210 mg  210 mg Subcutaneous Q30 Days Abigail Lugo MD   210 mg at 03/16/22 1450    romosozumab-aqqg (EVENITY) injection 210 mg  210 mg Subcutaneous Once Cowdrey, Abigail, MD        romosozumab-aqqg (EVENITY) injection 210 mg  210 mg Subcutaneous Q30 Days " Alley Collazo MD   210 mg at 08/18/21 1643    romosozumab-aqqg (EVENITY) injection 210 mg  210 mg Subcutaneous Once Alley Collazo MD        romosozumab-aqqg (EVENITY) injection 210 mg  210 mg Subcutaneous Once Alley Collazo MD          Past Medical History:   Patient Active Problem List   Diagnosis    Intrinsic atopic dermatitis    Essential hypertension    Rheumatoid arthritis (H)    Retinal artery occlusion    Cervical vertebral fracture (H)    Central retinal artery occlusion of right eye    Bilateral occipital neuralgia    C1-C2 instability    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 factor (H)    Osteoarthritis    Malignant hypertension    Hyponatremia    GERD (gastroesophageal reflux disease)    Eczematous dermatitis    Anxiety state    Anemia    Closed fracture of distal end of left radius with delayed healing, unspecified fracture morphology, subsequent encounter    Closed fracture of distal end of left radius, unspecified fracture morphology, sequela    Osteomyelitis of left leg (H)    Pain of left thumb    CKD (chronic kidney disease) stage 4, GFR 15-29 ml/min (H)    Mild protein-calorie malnutrition (H)    Venous ulcer (H)    Prostate cancer (H)     Past Medical History:   Diagnosis Date    Anemia     Arthritis     Cataracts     Central retinal artery occlusion     Cervical spine fracture (H)     After a fall from orthostatic sx    Chronic pain     Back of head    Gastro-oesophageal reflux disease     Head injury     Hypertension     Hyponatremia     Resolved, 2/2 diuretics    Migraines     Osteoporosis     Pneumonia 2018    Rheumatoid arthritis(714.0)         CC Hamilton Cummins MD  [address] [place] [address] on close of this encounter.

## 2024-10-08 NOTE — PATIENT INSTRUCTIONS
Do vinegar soak (see below) pat dry and then apply triamcinolone.     Do vinegar soak once daily  Do triamcinolone application twice daily     Vinegar Soaks (acetic acid) There are certain times when a wound is healing that it will benefit from a change in the kind of wound care. Vinegar soaks are often very helpful in finishing the wound healing.  Other times when vinegar soaks are helpful are when there are recurrent infections (including bacterial or fungal) or inflammatory rashes of the feet or hands.     Instructions   - Mix 1 tablespoon of white or yellow vinegar to 8oz. of water.   - Soak gauze in the solution and apply to area 2-3 times a day for 15-20 minutes if possible.   - Rinse off  - Pat dry with gauze.   - Store solution in refrigerator, discard after one week.  -  Continue soaks until follow-up appointment, unless otherwise instructed.     - Alternatively you can mix up a 4:1 water to vinegar solution in a foot bath and soak the affected body part in that solution     If time does not allow, it is better to do it less frequent than this than not at all. This will assist in the healing of the wound and/or decreasing bacterial loads.

## 2024-10-08 NOTE — LETTER
10/8/2024       RE: Kimberly Chau  [address]  [place] [address]     Dear Colleague,    Thank you for referring your patient, Kimberly Chau, to the [facility] DERMATOLOGY [facility] [place] at [facility]. Please see a copy of my visit note below.    [facility] Dermatology Note  Encounter Date: Oct 8, 2024  Office Visit     Dermatology Problem List:  1. Venous stasis ulcers, dermatitis  - 08/18/21 LE US  - 02/16/21 laser ablation of Great saphenous vein of R &L  leg   - current rx (04/04/24): pentoxyfilline 400 mg BID (dosing per patient preference), Vashe soaks with dressing changes, triamcinolone 0.1% ointment with dressing changes, Unna boot with home nurse dressing changes twice weekly  - Comanaged with podiatry    ____________________________________________    Assessment & Plan:    # Venous stasis dermatitis, left lower extremity (Chronic flaring)  # Stasis ulcers  Chronic with overall improvement with current treatment plan and comanagement with podiatry. There has been some eczematous changes with cracked and scaly skin on the dorsal left foot and ankle. The lower leg has been pruritic and recently painful within the last four days. Due to the cracked and painful, erythematous skin, there is most likely bacterial growth and possible superficial infection.     - swab of left lower leg sent for bacterial culture today   - vinegar soaks once daily (can dampen a paper towel and place on affected area)   - apply triamcinolone 0.1% ointment BID   - continue vashe soaks    - continue using the Unna boot   - continue pentoxifylline 400mg BID   - appreciate co-management with podiatry  - if bilateral could consider acrodermatitis enteropathica    Procedures Performed:   None    Follow-up: 6 month(s) in-person, or earlier for new or changing lesions    Staff and Medical Student:     I saw and examined the patient in the presence of   Placido.    Randy Luna, MS4  ____________________________________________    CC: No chief complaint on file.    HPI:  Ms. Kimberly Chau is a(n) 83 year old female who presents today as a return patient for venous stasis dermatitis and stasis ulcers. Within the last 4 days, left lower leg has been itchy and has recently become more erythematous and painful. She has been doing vashe soaks, using an unna boot, and pentoxifylline 400mg BID. She has not been using the triamcinolone 0.1% ointment. She has also been following with Dr. Salazar with podiatry (last visit 9/2024) and has an at home nursing staff that comes once a week. Patient is otherwise feeling well, without additional skin concerns.    Labs:  None reviewed.    Physical Exam:  Vitals: There were no vitals taken for this visit.  SKIN: Focused examination of the lower extremities was performed.  - left lower leg with erythematous patch with shallow ulcerated papules/plaques and linear excoriations  - Scaly, erythematous plaque along the dorsal foot/ankle   - Right lower leg without swelling or erythema  - Ulcer on the posterior lower left leg with dried serosanguineous drainage  - No other lesions of concern on areas examined.     Medications:  Current Outpatient Medications   Medication Sig Dispense Refill     acetaminophen 650 MG TABS Take 650 mg by mouth every 4 hours as needed. 100 tablet 0     amLODIPine (NORVASC) 5 MG tablet Take 1 tablet (5 mg) by mouth daily. 90 tablet 0     amoxicillin-clavulanate (AUGMENTIN) 500-125 MG tablet Take 1 tablet by mouth 2 times daily 14 tablet 0     carvedilol (COREG) 12.5 MG tablet Take 1 tablet (12.5 mg) by mouth 2 times daily (with meals) 180 tablet 0     clindamycin (CLEOCIN) 300 MG capsule Take 1 capsule (300 mg) by mouth 2 times daily (with meals) 20 capsule 0     COMPRESSION STOCKINGS Please measure and distribute 1 pair of 20mmHg - 30mmHg knee high open or closed toe compression stockings. Jobst ultrasheer or  "equivalent. 2 each 4     denosumab (PROLIA) 60 MG/ML SOSY injection Inject 1 mL (60 mg) Subcutaneous once for 1 dose 1 mL 0     Diphenhydramine-APAP, sleep, (TYLENOL PM EXTRA STRENGTH PO) Take 2 tablets by mouth At Bedtime        doxycycline hyclate (VIBRA-TABS) 100 MG tablet Take 1 tablet (100 mg) by mouth 2 times daily 14 tablet 0     ferrous gluconate (FERGON) 324 (38 Fe) MG tablet Take 324 mg by mouth       Gauze Pads & Dressings (TELFA NON-ADHERENT) 3\"X4\" PADS Cut to size for open areas on the lower extremities and secure with paper tape. Change dressing every other day. 30 each 11     losartan (COZAAR) 25 MG tablet Take 1 tablet (25 mg) by mouth at bedtime 90 tablet 3     multivitamin w/minerals (MULTI-VITAMIN) tablet Take 1 tablet by mouth every morning 90 tablet 11     Omeprazole (PRILOSEC PO) Take 20 mg by mouth as needed        pentoxifylline ER (TRENTAL) 400 MG CR tablet Take 1 tablet (400 mg) by mouth 2 times daily 120 tablet 3     pentoxifylline ER (TRENTAL) 400 MG CR tablet Take 1 tablet (400 mg) by mouth 3 times daily (with meals) 90 tablet 4     triamcinolone (KENALOG) 0.1 % external ointment Apply with dressing changes, after Vashe soaks 80 g 4     vitamin D3 (CHOLECALCIFEROL) 50 mcg (2000 units) tablet Take 1 tablet by mouth daily       Current Facility-Administered Medications   Medication Dose Route Frequency Provider Last Rate Last Admin     fluocinonide (LIDEX) 0.05 % ointment   Topical Once Ra Orourke MD         fluocinonide (LIDEX) 0.05 % ointment   Topical Once Ra Orourke MD         mupirocin (BACTROBAN) 2 % ointment   Topical Once Ra Orourke MD         mupirocin (BACTROBAN) 2 % ointment   Topical Once Ra Orourke MD         romosozumab-aqqg (EVENITY) injection 210 mg  210 mg Subcutaneous Q30 Days Alley Collazo MD   210 mg at 05/25/22 1246     romosozumab-aqqg (EVENITY) injection 210 mg  210 mg Subcutaneous Q30 Days Jose J " Kerri Hager MD   210 mg at 04/23/22 1152     romosozumab-aqqg (EVENITY) injection 210 mg  210 mg Subcutaneous Q30 Days Abigail Lugo MD   210 mg at 03/16/22 1450     romosozumab-aqqg (EVENITY) injection 210 mg  210 mg Subcutaneous Once Abigail Lugo MD         romosozumab-aqqg (EVENITY) injection 210 mg  210 mg Subcutaneous Q30 Days [facility], Alley Benavides MD   210 mg at 08/18/21 1643     romosozumab-aqqg (EVENITY) injection 210 mg  210 mg Subcutaneous Once [place], Alley Benavides MD         romosozumab-aqqg (EVENITY) injection 210 mg  210 mg Subcutaneous Once [facility], Alley Benavides MD          Past Medical History:   Patient Active Problem List   Diagnosis     Intrinsic atopic dermatitis     Essential hypertension     Rheumatoid arthritis (H)     Retinal artery occlusion     Cervical vertebral fracture (H)     Central retinal artery occlusion of right eye     Bilateral occipital neuralgia     C1-C2 instability     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 factor (H)     Osteoarthritis     Malignant hypertension     Hyponatremia     GERD (gastroesophageal reflux disease)     Eczematous dermatitis     Anxiety state     Anemia     Closed fracture of distal end of left radius with delayed healing, unspecified fracture morphology, subsequent encounter     Closed fracture of distal end of left radius, unspecified fracture morphology, sequela     Osteomyelitis of left leg (H)     Pain of left thumb     CKD (chronic kidney disease) stage 4, GFR 15-29 ml/min (H)     Mild protein-calorie malnutrition (H)     Venous ulcer (H)     Prostate cancer (H)     Past Medical History:   Diagnosis Date     Anemia      Arthritis      Cataracts      Central retinal artery occlusion      Cervical spine fracture (H)     After a fall from orthostatic sx     Chronic pain     Back of head     Gastro-oesophageal reflux disease      Head injury      Hypertension      Hyponatremia      Resolved, 2/2 diuretics     Migraines      Osteoporosis      Pneumonia 2018     Rheumatoid arthritis(714.0)         CC Hamilton Cummins MD  [address] [place] [address] on close of this encounter.     Attestation with edits by Hamilton Cummins MD at 10/8/2024  4:06 PM:  I have personally examined this patient and agree with the medical student's documentation and plan of care. I have reviewed and amended the medical student's note as necessary.The documentation accurately reflects my clinical observations, diagnoses, treatment and follow-up plans.     Hamilton Cummins MD  Dermatology Staff      Again, thank you for allowing me to participate in the care of your patient.      Sincerely,    Hamilton Cummins MD

## 2024-10-08 NOTE — NURSING NOTE
Dermatology Rooming Note    Kimberly Chau's goals for this visit include:   Chief Complaint   Patient presents with    Derm Problem     Follow up venous stasis ulcers, dermatitis. Worse since last visit.      Pepper Meng RN    
No

## 2024-10-11 ENCOUNTER — OFFICE VISIT (OUTPATIENT)
Dept: PODIATRY | Facility: CLINIC | Age: 84
End: 2024-10-11
Payer: MEDICARE

## 2024-10-11 DIAGNOSIS — L97.921 SKIN ULCER OF LEFT LOWER LEG, LIMITED TO BREAKDOWN OF SKIN (H): Primary | ICD-10-CM

## 2024-10-11 DIAGNOSIS — L03.116 CELLULITIS OF LEFT LOWER EXTREMITY: ICD-10-CM

## 2024-10-11 DIAGNOSIS — I73.9 PERIPHERAL ARTERIAL DISEASE (H): ICD-10-CM

## 2024-10-11 DIAGNOSIS — I87.8 VENOUS STASIS: ICD-10-CM

## 2024-10-11 LAB
BACTERIA SPEC CULT: ABNORMAL
GRAM STAIN RESULT: ABNORMAL
GRAM STAIN RESULT: ABNORMAL

## 2024-10-11 PROCEDURE — 99214 OFFICE O/P EST MOD 30 MIN: CPT | Mod: 25 | Performed by: PODIATRIST

## 2024-10-11 PROCEDURE — 29581 APPL MULTLAYER CMPRN SYS LEG: CPT | Mod: LT | Performed by: PODIATRIST

## 2024-10-11 RX ORDER — SULFAMETHOXAZOLE AND TRIMETHOPRIM 400; 80 MG/1; MG/1
1 TABLET ORAL 2 TIMES DAILY
Qty: 28 TABLET | Refills: 0 | Status: SHIPPED | OUTPATIENT
Start: 2024-10-11

## 2024-10-11 NOTE — NURSING NOTE
Kimberly Chau's chief complaint for this visit includes:  Chief Complaint   Patient presents with    Consult     Leg ulcer recheck and wound care     PCP: Guillermo Castellano    Referring Provider:  Referred Self, MD  No address on file    There were no vitals taken for this visit.  Data Unavailable     Do you need any medication refills at today's visit? NO    Allergies   Allergen Reactions    Hctz Other (See Comments)     Pt develops symptomatic and significant hyponatremia with HCTZ exposure    Hydrochlorothiazide      Other reaction(s): Hyponatremia  Hyponatremia    Latex     Benzocaine Rash     carba mix,thrium-sunscreen    Ra Lotion Rash     carba mix,thrium-sunscreen    Ace Inhibitors Unknown     Dizziness and orthostatic changes and electrolyte problems.    Latex Unknown       Humera Hairston LPN

## 2024-10-11 NOTE — PROGRESS NOTES
Past Medical History:   Diagnosis Date    Anemia     Arthritis     Cataracts     Central retinal artery occlusion     Cervical spine fracture (H)     After a fall from orthostatic sx    Chronic pain     Back of head    Gastro-oesophageal reflux disease     Head injury     Hypertension     Hyponatremia     Resolved, 2/2 diuretics    Migraines     Osteoporosis     Pneumonia 2018    Rheumatoid arthritis(714.0)      Patient Active Problem List   Diagnosis    Intrinsic atopic dermatitis    Essential hypertension    Rheumatoid arthritis (H)    Retinal artery occlusion    Cervical vertebral fracture (H)    Central retinal artery occlusion of right eye    Bilateral occipital neuralgia    C1-C2 instability    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 factor (H)    Osteoarthritis    Malignant hypertension    Hyponatremia    GERD (gastroesophageal reflux disease)    Eczematous dermatitis    Anxiety state    Anemia    Closed fracture of distal end of left radius with delayed healing, unspecified fracture morphology, subsequent encounter    Closed fracture of distal end of left radius, unspecified fracture morphology, sequela    Osteomyelitis of left leg (H)    Pain of left thumb    CKD (chronic kidney disease) stage 4, GFR 15-29 ml/min (H)    Mild protein-calorie malnutrition (H)    Venous ulcer (H)    Prostate cancer (H)     Past Surgical History:   Procedure Laterality Date    COLONOSCOPY      EYE SURGERY Left 02/2019    Hole in macula    FUSION CERVICAL POSTERIOR THREE+ LEVELS  1/22/2013    Procedure: FUSION CERVICAL POSTERIOR THREE+ LEVELS;  Cervical 1-6 Posterior Instrumentation and Fusion, Cervical 3-4 Laminectomy  .Instrumentation and fusion to Cervical 6 *Latex Allergy*;  Surgeon: Ra Bar MD;  Location: [facility] OR    GYN SURGERY      HEAD & NECK SURGERY      HYSTERECTOMY      IR ENDOVENOUS ABLATION VARICOSE VEINS  2/16/2021    KNEE SURGERY      NECK SURGERY      OPEN REDUCTION INTERNAL  FIXATION WRIST Left 4/30/2019    Procedure: Open Reduction Internal Fixation Left Distal Radius Fracture;  Surgeon: Dinh Strong MD;  Location: [facility] OR    OPEN REDUCTION INTERNAL FIXATION WRIST Left 6/12/2020    Procedure: OPEN REDUCTION INTERNAL FIXATION Left Distal Radius Nonunion, Distal Ulna Resection;  Surgeon: Dinh Strong MD;  Location: [facility] OR    OPTICAL TRACKING SYSTEM ENDOSCOPIC SINUS SURGERY Right 4/6/2018    Procedure: OPTICAL TRACKING SYSTEM ENDOSCOPIC SINUS SURGERY;  Stealth Assisted Right Maxillary Antrostomy, Anterior Ethmoidectomy And Frontal Sinusotomy;  Surgeon: Elyse Eisenberg MD;  Location: [facility] OR    OPTICAL TRACKING SYSTEM ENDOSCOPIC SINUS SURGERY Right 8/3/2018    Procedure: OPTICAL TRACKING SYSTEM ENDOSCOPIC SINUS SURGERY;  Stealth Assisted Revision Right Frontal Sinusotomy, Right Stent Placement  **Latex Allergy** ;  Surgeon: Elyse Eisenberg MD;  Location:  OR    ORTHOPEDIC SURGERY      REMOVE HARDWARE WRIST Left 11/19/2019    Procedure: Left Distal Radius Hardware Removal, Flexor Pollicus Longus Repair, Deep Cultures;  Surgeon: Dinh Strong MD;  Location:  OR    ZZC HAND/FINGER SURGERY UNLISTED      Z PELVIS/HIP JOINT SURGERY UNLISTED       Social History     Socioeconomic History    Marital status:      Spouse name: Not on file    Number of children: Not on file    Years of education: Not on file    Highest education level: Not on file   Occupational History    Not on file   Tobacco Use    Smoking status: Never    Smokeless tobacco: Never   Substance and Sexual Activity    Alcohol use: Yes     Comment: 2 glasses of wine a day    Drug use: Never    Sexual activity: Not Currently   Other Topics Concern    Parent/sibling w/ CABG, MI or angioplasty before 65F 55M? Not Asked   Social History Narrative    Ms. Chau is , has two grown children and two grandchildren. She does not work. She was never a smoker, and drinks a glass of wine with dinner each  night.         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      Social Determinants of Health     Financial Resource Strain: Low Risk  (8/24/2024)    Received from [facility]    Financial Resource Strain     Difficulty of Paying Living Expenses: 3     Difficulty of Paying Living Expenses: Not on file   Food Insecurity: No Food Insecurity (8/24/2024)    Received from [facility]    Food Insecurity     Worried About Running Out of Food in the Last Year: 1   Transportation Needs: No Transportation Needs (8/24/2024)    Received from [facility]    Transportation Needs     Lack of Transportation (Medical): 1   Physical Activity: Not on file   Stress: Not on file   Social Connections: Socially Integrated (8/24/2024)    Received from [facility]    Social Connections     Frequency of Communication with Friends and Family: 0   Interpersonal Safety: Not on file   Housing Stability: Low Risk  (8/24/2024)    Received from [facility]    Housing Stability     Unable to Pay for Housing in the Last Year: 1     Family History   Problem Relation Age of Onset    Arthritis Mother     Respiratory Mother         pulmonary fibrosis    Hypertension Mother     Anemia Mother     Liver Disease Father         from EtOH    Alcoholism Father     No Known Problems Maternal Grandmother     Heart Disease Maternal Grandfather     Glaucoma Paternal Grandfather     Heart Disease Paternal Grandfather     Multiple Sclerosis Sister     Breast Cancer Sister     Skin Cancer No family hx of     Melanoma No family hx of      8/29/2024 BMP reviewed as noted in the [facility].    10/8/2024 aerobic culture from left leg results reviewed as noted in the EMR with multiresistant Mrsa.        Subjective findings- 83-year-old returns clinic with  for left leg ulcer.  Relates  since I seen him last her leg got red and swollen and started draining again, relates he seen dermatology I reviewed dermatology 10/8/2024 note, relates he did vinegar soaks for 2 days, relates to intermittently using the Triamcinolone cream, relates nursing is coming out but did not come out this week because she was suad see us, but were planning to see her next week, relates to not being on any antibiotics, relates to no fever, relates no nausea, no chills,, relates to no injuries but it is possible she scratches her legs at night.    Objective findings- DP and PT are 2 out of 4 left.  Has peripheral edema with venous stasis left leg.  Has eschared scratch abrasions left anterior leg with erythema, edema, no active drainage, no odor, no calor, no pain on palpation.  Medial leg ulcer is closed with skin dry and intact.    Assessment and plan- Ulcer left leg, original ulcers closed, scratch abrasions with signs of infection and dermatitis left leg, Venous stasis with Venous hypertension, peripheral arterial disease, kidney disease.  Diagnosis and treatment options discussed with them.  The left leg and ulcer sites were cleaned with wound Vashe and Hydrofera Blue applied to the abrasion ulcer sites and a multilayer compression system with an Unna boot with light compression applied to the left lower extremity upon consent.  Keep the Unna boot compression system dry and intact, change as needed for problems.  Prescription for Bactrim given, dosed for renal function and use discussed with them.  Will have them hold the home nurse for next week's we will see her in clinic and then we will likely have the home nurse do wound cares after that.  No imaging today.  No hospitalization today.  No new labs today.  Previous notes reviewed.  Return to clinic and see me Tuesday as scheduled.                                                                                                  High level of medical decision making.

## 2024-10-11 NOTE — LETTER
10/11/2024      Kimberly Chau  [address]  [place] [address]      Dear Colleague,    Thank you for referring your patient, Kimberly Chau, to the [facility]. Please see a copy of my visit note below.    Past Medical History:   Diagnosis Date     Anemia      Arthritis      Cataracts      Central retinal artery occlusion      Cervical spine fracture (H)     After a fall from orthostatic sx     Chronic pain     Back of head     Gastro-oesophageal reflux disease      Head injury      Hypertension      Hyponatremia     Resolved, 2/2 diuretics     Migraines      Osteoporosis      Pneumonia 2018     Rheumatoid arthritis(714.0)      Patient Active Problem List   Diagnosis     Intrinsic atopic dermatitis     Essential hypertension     Rheumatoid arthritis (H)     Retinal artery occlusion     Cervical vertebral fracture (H)     Central retinal artery occlusion of right eye     Bilateral occipital neuralgia     C1-C2 instability     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 factor (H)     Osteoarthritis     Malignant hypertension     Hyponatremia     GERD (gastroesophageal reflux disease)     Eczematous dermatitis     Anxiety state     Anemia     Closed fracture of distal end of left radius with delayed healing, unspecified fracture morphology, subsequent encounter     Closed fracture of distal end of left radius, unspecified fracture morphology, sequela     Osteomyelitis of left leg (H)     Pain of left thumb     CKD (chronic kidney disease) stage 4, GFR 15-29 ml/min (H)     Mild protein-calorie malnutrition (H)     Venous ulcer (H)     Prostate cancer (H)     Past Surgical History:   Procedure Laterality Date     COLONOSCOPY       EYE SURGERY Left 02/2019    Hole in macula     FUSION CERVICAL POSTERIOR THREE+ LEVELS  1/22/2013    Procedure: FUSION CERVICAL POSTERIOR THREE+ LEVELS;  Cervical 1-6 Posterior Instrumentation and Fusion, Cervical 3-4 Laminectomy   .Instrumentation and fusion to Cervical 6 *Latex Allergy*;  Surgeon: Ra Bar MD;  Location: [facility] OR     GYN SURGERY       HEAD & NECK SURGERY       HYSTERECTOMY       IR ENDOVENOUS ABLATION VARICOSE VEINS  2/16/2021     KNEE SURGERY       NECK SURGERY       OPEN REDUCTION INTERNAL FIXATION WRIST Left 4/30/2019    Procedure: Open Reduction Internal Fixation Left Distal Radius Fracture;  Surgeon: Dinh Strong MD;  Location:  OR     OPEN REDUCTION INTERNAL FIXATION WRIST Left 6/12/2020    Procedure: OPEN REDUCTION INTERNAL FIXATION Left Distal Radius Nonunion, Distal Ulna Resection;  Surgeon: Dinh Strong MD;  Location:  OR     OPTICAL TRACKING SYSTEM ENDOSCOPIC SINUS SURGERY Right 4/6/2018    Procedure: OPTICAL TRACKING SYSTEM ENDOSCOPIC SINUS SURGERY;  Stealth Assisted Right Maxillary Antrostomy, Anterior Ethmoidectomy And Frontal Sinusotomy;  Surgeon: Elyse Eisenberg MD;  Location:  OR     OPTICAL TRACKING SYSTEM ENDOSCOPIC SINUS SURGERY Right 8/3/2018    Procedure: OPTICAL TRACKING SYSTEM ENDOSCOPIC SINUS SURGERY;  Stealth Assisted Revision Right Frontal Sinusotomy, Right Stent Placement  **Latex Allergy** ;  Surgeon: Elyse Eisenberg MD;  Location:  OR     ORTHOPEDIC SURGERY       REMOVE HARDWARE WRIST Left 11/19/2019    Procedure: Left Distal Radius Hardware Removal, Flexor Pollicus Longus Repair, Deep Cultures;  Surgeon: Dinh Srtong MD;  Location:  OR     ZZ HAND/FINGER SURGERY UNLISTED       [facility] PELVIS/HIP JOINT SURGERY UNLISTED       Social History     Socioeconomic History     Marital status:      Spouse name: Not on file     Number of children: Not on file     Years of education: Not on file     Highest education level: Not on file   Occupational History     Not on file   Tobacco Use     Smoking status: Never     Smokeless tobacco: Never   Substance and Sexual Activity     Alcohol use: Yes     Comment: 2 glasses of wine a day     Drug use: Never      Sexual activity: Not Currently   Other Topics Concern     Parent/sibling w/ CABG, MI or angioplasty before 65F 55M? Not Asked   Social History Narrative    Ms. Chau is , has two grown children and two grandchildren. She does not work. She was never a smoker, and drinks a glass of wine with dinner each night.         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      Social Determinants of Health     Financial Resource Strain: Low Risk  (8/24/2024)    Received from [facility]    Financial Resource Strain      Difficulty of Paying Living Expenses: 3      Difficulty of Paying Living Expenses: Not on file   Food Insecurity: No Food Insecurity (8/24/2024)    Received from [facility]    Food Insecurity      Worried About Running Out of Food in the Last Year: 1   Transportation Needs: No Transportation Needs (8/24/2024)    Received from [facility]    Transportation Needs      Lack of Transportation (Medical): 1   Physical Activity: Not on file   Stress: Not on file   Social Connections: Socially Integrated (8/24/2024)    Received from [facility]    Social Connections      Frequency of Communication with Friends and Family: 0   Interpersonal Safety: Not on file   Housing Stability: Low Risk  (8/24/2024)    Received from [facility]    Housing Stability      Unable to Pay for Housing in the Last Year: 1     Family History   Problem Relation Age of Onset     Arthritis Mother      Respiratory Mother         pulmonary fibrosis     Hypertension Mother      Anemia Mother      Liver Disease Father         from EtOH     Alcoholism Father      No Known Problems Maternal Grandmother      Heart Disease Maternal Grandfather      Glaucoma Paternal Grandfather      Heart Disease Paternal Grandfather      Multiple Sclerosis Sister      Breast  Cancer Sister      Skin Cancer No family hx of      Melanoma No family hx of      8/29/2024 BMP reviewed as noted in the Emr.    10/8/2024 aerobic culture from left leg results reviewed as noted in the EMR with multiresistant Mrsa.        Subjective findings- 83-year-old returns clinic with  for left leg ulcer.  Relates since I seen him last her leg got red and swollen and started draining again, relates he seen dermatology I reviewed dermatology 10/8/2024 note, relates he did vinegar soaks for 2 days, relates to intermittently using the Triamcinolone cream, relates nursing is coming out but did not come out this week because she was suad see us, but were planning to see her next week, relates to not being on any antibiotics, relates to no fever, relates no nausea, no chills,, relates to no injuries but it is possible she scratches her legs at night.    Objective findings- DP and PT are 2 out of 4 left.  Has peripheral edema with venous stasis left leg.  Has eschared scratch abrasions left anterior leg with erythema, edema, no active drainage, no odor, no calor, no pain on palpation.  Medial leg ulcer is closed with skin dry and intact.    Assessment and plan- Ulcer left leg, original ulcers closed, scratch abrasions with signs of infection and dermatitis left leg, Venous stasis with Venous hypertension, peripheral arterial disease, kidney disease.  Diagnosis and treatment options discussed with them.  The left leg and ulcer sites were cleaned with wound Vashe and Hydrofera Blue applied to the abrasion ulcer sites and a multilayer compression system with an Unna boot with light compression applied to the left lower extremity upon consent.  Keep the Unna boot compression system dry and intact, change as needed for problems.  Prescription for Bactrim given, dosed for renal function and use discussed with them.  Will have them hold the home nurse for next week's we will see her in clinic and then we will likely  have the home nurse do wound cares after that.  No imaging today.  No hospitalization today.  No new labs today.  Previous notes reviewed.  Return to clinic and see me Tuesday as scheduled.                                                                                                  High level of medical decision making.      Again, thank you for allowing me to participate in the care of your patient.        Sincerely,        Camden Salazar DPM

## 2024-10-15 ENCOUNTER — LAB (OUTPATIENT)
Dept: LAB | Facility: CLINIC | Age: 84
End: 2024-10-15
Payer: MEDICARE

## 2024-10-15 ENCOUNTER — OFFICE VISIT (OUTPATIENT)
Dept: NEPHROLOGY | Facility: CLINIC | Age: 84
End: 2024-10-15
Payer: MEDICARE

## 2024-10-15 ENCOUNTER — OFFICE VISIT (OUTPATIENT)
Dept: PODIATRY | Facility: CLINIC | Age: 84
End: 2024-10-15
Payer: MEDICARE

## 2024-10-15 VITALS
SYSTOLIC BLOOD PRESSURE: 185 MMHG | DIASTOLIC BLOOD PRESSURE: 76 MMHG | HEART RATE: 97 BPM | OXYGEN SATURATION: 96 % | BODY MASS INDEX: 23.72 KG/M2 | WEIGHT: 116.1 LBS

## 2024-10-15 DIAGNOSIS — I87.8 VENOUS STASIS: ICD-10-CM

## 2024-10-15 DIAGNOSIS — L97.921 SKIN ULCER OF LEFT LOWER LEG, LIMITED TO BREAKDOWN OF SKIN (H): Primary | ICD-10-CM

## 2024-10-15 DIAGNOSIS — N18.4 CKD (CHRONIC KIDNEY DISEASE) STAGE 4, GFR 15-29 ML/MIN (H): Primary | ICD-10-CM

## 2024-10-15 DIAGNOSIS — I73.9 PERIPHERAL ARTERIAL DISEASE (H): ICD-10-CM

## 2024-10-15 DIAGNOSIS — N18.4 CKD (CHRONIC KIDNEY DISEASE) STAGE 4, GFR 15-29 ML/MIN (H): ICD-10-CM

## 2024-10-15 DIAGNOSIS — L03.116 CELLULITIS OF LEFT LOWER EXTREMITY: ICD-10-CM

## 2024-10-15 LAB
ALBUMIN MFR UR ELPH: 46.8 MG/DL
ALBUMIN SERPL BCG-MCNC: 4.4 G/DL (ref 3.5–5.2)
ANION GAP SERPL CALCULATED.3IONS-SCNC: 13 MMOL/L (ref 7–15)
BUN SERPL-MCNC: 26.8 MG/DL (ref 8–23)
CALCIUM SERPL-MCNC: 9.5 MG/DL (ref 8.8–10.4)
CHLORIDE SERPL-SCNC: 105 MMOL/L (ref 98–107)
CREAT SERPL-MCNC: 1.85 MG/DL (ref 0.51–0.95)
CREAT UR-MCNC: 88.4 MG/DL
CREAT UR-MCNC: 89.1 MG/DL
EGFRCR SERPLBLD CKD-EPI 2021: 26 ML/MIN/1.73M2
ERYTHROCYTE [DISTWIDTH] IN BLOOD BY AUTOMATED COUNT: 13.3 % (ref 10–15)
GLUCOSE SERPL-MCNC: 96 MG/DL (ref 70–99)
HCO3 SERPL-SCNC: 21 MMOL/L (ref 22–29)
HCT VFR BLD AUTO: 32.8 % (ref 35–47)
HGB BLD-MCNC: 10.5 G/DL (ref 11.7–15.7)
MCH RBC QN AUTO: 30.3 PG (ref 26.5–33)
MCHC RBC AUTO-ENTMCNC: 32 G/DL (ref 31.5–36.5)
MCV RBC AUTO: 95 FL (ref 78–100)
MICROALBUMIN UR-MCNC: 94.6 MG/L
MICROALBUMIN/CREAT UR: 107.01 MG/G CR (ref 0–25)
PHOSPHATE SERPL-MCNC: 3.4 MG/DL (ref 2.5–4.5)
PLATELET # BLD AUTO: 313 10E3/UL (ref 150–450)
POTASSIUM SERPL-SCNC: 4.6 MMOL/L (ref 3.4–5.3)
PROT/CREAT 24H UR: 0.53 MG/MG CR (ref 0–0.2)
RBC # BLD AUTO: 3.46 10E6/UL (ref 3.8–5.2)
SODIUM SERPL-SCNC: 139 MMOL/L (ref 135–145)
WBC # BLD AUTO: 5.5 10E3/UL (ref 4–11)

## 2024-10-15 PROCEDURE — 99215 OFFICE O/P EST HI 40 MIN: CPT | Performed by: INTERNAL MEDICINE

## 2024-10-15 PROCEDURE — G2211 COMPLEX E/M VISIT ADD ON: HCPCS | Performed by: INTERNAL MEDICINE

## 2024-10-15 PROCEDURE — 99213 OFFICE O/P EST LOW 20 MIN: CPT | Mod: 25 | Performed by: PODIATRIST

## 2024-10-15 PROCEDURE — 82043 UR ALBUMIN QUANTITATIVE: CPT

## 2024-10-15 PROCEDURE — 29581 APPL MULTLAYER CMPRN SYS LEG: CPT | Mod: LT | Performed by: PODIATRIST

## 2024-10-15 PROCEDURE — 80069 RENAL FUNCTION PANEL: CPT

## 2024-10-15 PROCEDURE — 85027 COMPLETE CBC AUTOMATED: CPT

## 2024-10-15 PROCEDURE — 82570 ASSAY OF URINE CREATININE: CPT

## 2024-10-15 PROCEDURE — 84156 ASSAY OF PROTEIN URINE: CPT

## 2024-10-15 PROCEDURE — 83970 ASSAY OF PARATHORMONE: CPT

## 2024-10-15 PROCEDURE — 36415 COLL VENOUS BLD VENIPUNCTURE: CPT

## 2024-10-15 NOTE — PROGRESS NOTES
Past Medical History:   Diagnosis Date    Anemia     Arthritis     Cataracts     Central retinal artery occlusion     Cervical spine fracture (H)     After a fall from orthostatic sx    Chronic pain     Back of head    Gastro-oesophageal reflux disease     Head injury     Hypertension     Hyponatremia     Resolved, 2/2 diuretics    Migraines     Osteoporosis     Pneumonia 2018    Rheumatoid arthritis(714.0)      Patient Active Problem List   Diagnosis    Intrinsic atopic dermatitis    Essential hypertension    Rheumatoid arthritis (H)    Retinal artery occlusion    Cervical vertebral fracture (H)    Central retinal artery occlusion of right eye    Bilateral occipital neuralgia    C1-C2 instability    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 factor (H)    Osteoarthritis    Malignant hypertension    Hyponatremia    GERD (gastroesophageal reflux disease)    Eczematous dermatitis    Anxiety state    Anemia    Closed fracture of distal end of left radius with delayed healing, unspecified fracture morphology, subsequent encounter    Closed fracture of distal end of left radius, unspecified fracture morphology, sequela    Osteomyelitis of left leg (H)    Pain of left thumb    CKD (chronic kidney disease) stage 4, GFR 15-29 ml/min (H)    Mild protein-calorie malnutrition (H)    Venous ulcer (H)    Prostate cancer (H)     Past Surgical History:   Procedure Laterality Date    COLONOSCOPY      EYE SURGERY Left 02/2019    Hole in macula    FUSION CERVICAL POSTERIOR THREE+ LEVELS  1/22/2013    Procedure: FUSION CERVICAL POSTERIOR THREE+ LEVELS;  Cervical 1-6 Posterior Instrumentation and Fusion, Cervical 3-4 Laminectomy  .Instrumentation and fusion to Cervical 6 *Latex Allergy*;  Surgeon: Ra Bar MD;  Location: [facility] OR    GYN SURGERY      HEAD & NECK SURGERY      HYSTERECTOMY      IR ENDOVENOUS ABLATION VARICOSE VEINS  2/16/2021    KNEE SURGERY      NECK SURGERY      OPEN REDUCTION INTERNAL  FIXATION WRIST Left 4/30/2019    Procedure: Open Reduction Internal Fixation Left Distal Radius Fracture;  Surgeon: Dinh Strong MD;  [facility]: [facility] OR    OPEN REDUCTION INTERNAL FIXATION WRIST Left 6/12/2020    Procedure: OPEN REDUCTION INTERNAL FIXATION Left Distal Radius Nonunion, Distal Ulna Resection;  Surgeon: Dinh Strong MD;  Location: [facility] OR    OPTICAL TRACKING SYSTEM ENDOSCOPIC SINUS SURGERY Right 4/6/2018    Procedure: OPTICAL TRACKING SYSTEM ENDOSCOPIC SINUS SURGERY;  Stealth Assisted Right Maxillary Antrostomy, Anterior Ethmoidectomy And Frontal Sinusotomy;  Surgeon: Elyse Eisenberg MD;  [facility]    OPTICAL TRACKING SYSTEM ENDOSCOPIC SINUS SURGERY Right 8/3/2018    Procedure: OPTICAL TRACKING SYSTEM ENDOSCOPIC SINUS SURGERY;  Stealth Assisted Revision Right Frontal Sinusotomy, Right Stent Placement  **Latex Allergy** ;  Surgeon: Elyse Eisenberg MD;  Location:  OR    ORTHOPEDIC SURGERY      REMOVE HARDWARE WRIST Left 11/19/2019    Procedure: Left Distal Radius Hardware Removal, Flexor Pollicus Longus Repair, Deep Cultures;  Surgeon: Dinh Strong MD;  Location:  OR    ZZC HAND/FINGER SURGERY UNLISTED      Z PELVIS/HIP JOINT SURGERY UNLISTED       Social History     Socioeconomic History    Marital status:      Spouse name: Not on file    Number of children: Not on file    Years of education: Not on file    Highest education level: Not on file   Occupational History    Not on file   Tobacco Use    Smoking status: Never    Smokeless tobacco: Never   Substance and Sexual Activity    Alcohol use: Yes     Comment: 2 glasses of wine a day    Drug use: Never    Sexual activity: Not Currently   Other Topics Concern    Parent/sibling w/ CABG, MI or angioplasty before 65F 55M? Not Asked   Social History Narrative    Ms. Chau is , has two grown children and two grandchildren. She does not work. She was never a smoker, and drinks a glass of wine with dinner each  night.         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      Social Determinants of Health     Financial Resource Strain: Low Risk  (8/24/2024)    Received from [facility]    Financial Resource Strain     Difficulty of Paying Living Expenses: 3     Difficulty of Paying Living Expenses: Not on file   Food Insecurity: No Food Insecurity (8/24/2024)    Received from [facility]    Food Insecurity     Worried About Running Out of Food in the Last Year: 1   Transportation Needs: No Transportation Needs (8/24/2024)    Received from [facility]    Transportation Needs     Lack of Transportation (Medical): 1   Physical Activity: Not on file   Stress: Not on file   Social Connections: Socially Integrated (8/24/2024)    Received from [facility]    Social Connections     Frequency of Communication with Friends and Family: 0   Interpersonal Safety: Not on file   Housing Stability: Low Risk  (8/24/2024)    Received from [facility]    Housing Stability     Unable to Pay for Housing in the Last Year: 1     Family History   Problem Relation Age of Onset    Arthritis Mother     Respiratory Mother         pulmonary fibrosis    Hypertension Mother     Anemia Mother     Liver Disease Father         from EtOH    Alcoholism Father     No Known Problems Maternal Grandmother     Heart Disease Maternal Grandfather     Glaucoma Paternal Grandfather     Heart Disease Paternal Grandfather     Multiple Sclerosis Sister     Breast Cancer Sister     Skin Cancer No family hx of     Melanoma No family hx of      Lab Results   Component Value Date    WBC 5.5 10/15/2024    WBC 7.6 05/06/2021     Lab Results   Component Value Date    RBC 3.46 10/15/2024    RBC 3.28 05/06/2021     Lab Results   Component Value Date    HGB 10.5 10/15/2024    HGB 10.8 05/06/2021  "    Lab Results   Component Value Date    HCT 32.8 10/15/2024    HCT 33.5 05/06/2021     No components found for: \"MCT\"  Lab Results   Component Value Date    MCV 95 10/15/2024     05/06/2021     Lab Results   Component Value Date    MCH 30.3 10/15/2024    MCH 32.9 05/06/2021     Lab Results   Component Value Date    MCHC 32.0 10/15/2024    MCHC 32.2 05/06/2021     Lab Results   Component Value Date    RDW 13.3 10/15/2024    RDW 15.7 05/06/2021     Lab Results   Component Value Date     10/15/2024     05/06/2021     Last Renal Panel:  Sodium   Date Value Ref Range Status   10/15/2024 139 135 - 145 mmol/L Final   01/06/2021 134 133 - 144 mmol/L Final     Potassium   Date Value Ref Range Status   10/15/2024 4.6 3.4 - 5.3 mmol/L Final   07/08/2022 4.3 3.4 - 5.3 mmol/L Final   01/06/2021 4.0 3.4 - 5.3 mmol/L Final     Chloride   Date Value Ref Range Status   10/15/2024 105 98 - 107 mmol/L Final   07/08/2022 104 94 - 109 mmol/L Final   01/06/2021 102 94 - 109 mmol/L Final     Carbon Dioxide   Date Value Ref Range Status   01/06/2021 28 20 - 32 mmol/L Final     Carbon Dioxide (CO2)   Date Value Ref Range Status   10/15/2024 21 (L) 22 - 29 mmol/L Final   07/08/2022 26 20 - 32 mmol/L Final     Anion Gap   Date Value Ref Range Status   10/15/2024 13 7 - 15 mmol/L Final   07/08/2022 10 3 - 14 mmol/L Final   01/06/2021 4 3 - 14 mmol/L Final     Glucose   Date Value Ref Range Status   10/15/2024 96 70 - 99 mg/dL Final   07/08/2022 97 70 - 99 mg/dL Final   01/06/2021 87 70 - 99 mg/dL Final     Urea Nitrogen   Date Value Ref Range Status   10/15/2024 26.8 (H) 8.0 - 23.0 mg/dL Final   07/08/2022 11 7 - 30 mg/dL Final   01/06/2021 14 7 - 30 mg/dL Final     Creatinine   Date Value Ref Range Status   10/15/2024 1.85 (H) 0.51 - 0.95 mg/dL Final   01/06/2021 0.90 0.52 - 1.04 mg/dL Final     GFR Estimate   Date Value Ref Range Status   10/15/2024 26 (L) >60 mL/min/1.73m2 Final     Comment:     eGFR calculated using " 2021 CKD-EPI equation.   01/06/2021 59 (L) >60 mL/min/[1.73_m2] Final     Comment:     Non  GFR Calc  Starting 12/18/2018, serum creatinine based estimated GFR (eGFR) will be   calculated using the Chronic Kidney Disease Epidemiology Collaboration   (CKD-EPI) equation.       Calcium   Date Value Ref Range Status   10/15/2024 9.5 8.8 - 10.4 mg/dL Final     Comment:     Reference intervals for this test were updated on 7/16/2024 to reflect our healthy population more accurately. There may be differences in the flagging of prior results with similar values performed with this method. Those prior results can be interpreted in the context of the updated reference intervals.   01/06/2021 8.8 8.5 - 10.1 mg/dL Final     Phosphorus   Date Value Ref Range Status   10/15/2024 3.4 2.5 - 4.5 mg/dL Final   05/06/2021 4.5 2.5 - 4.5 mg/dL Final     Albumin   Date Value Ref Range Status   10/15/2024 4.4 3.5 - 5.2 g/dL Final   03/16/2022 3.6 3.4 - 5.0 g/dL Final   01/06/2021 2.8 (L) 3.4 - 5.0 g/dL Final                           Subjective findings- 83-year-old returns clinic with  for left leg ulcers with infection and dermatitis with venous stasis and venous hypertension and arterial and renal disease.  Relates to no problems with the multilayer compression system with an Unna boot, relates to taking the Bactrim with no problems.    Objective findings- DP and PT are 2 out of 4 left.  Has decreased peripheral edema with venous stasis left leg.  Has decreased eschared abrasions left anterior and posterior leg with decreased erythema, decreased edema, no active drainage, no odor, no calor, no pain on palpation.    Assessment and plan- Scratch abrasion ulcers left leg with signs of infection and Dermatitis, Venous stasis and Venous hypertension, peripheral arterial disease, Kidney disease.  Diagnosis and treatment options discussed with them.  Left leg and ulcer sites were cleaned with wound Vashe and we applied  wound Vashe wet-to-dry dressings to eschared ulcer sites and a multilayer compression system with an Unna boot with light compression applied left lower extremity upon consent.  Continue the Bactrim.  They have home nursing coming out weekly so we will have them change the multilayer compression system with an Unna boot weekly and as needed for drainage.  Previous notes reviewed.  Return to clinic in 3 weeks.        10/31/2024-home nursing called and diagnosis and treatment discussed with them and feels okay for them to do another visit next week prior to seeing them.                                                                              Moderate level of medical decision making.

## 2024-10-15 NOTE — PATIENT INSTRUCTIONS
It was a pleasure taking care of you today.  I've included a brief summary of our discussion and care plan from today's visit below.  Please review this information with your primary care provider.     My recommendations are summarized as follows:  -Please monitor your Blood pressure at home twice daily and send me a My chart message in two weeks  -Our nurse will reach out to you for more education on kidney disease and dialysis    Who do I call with any questions after my visit?  Please be in touch if there are any further questions that arise following today's visit.  There are multiple ways to contact your nephrology care team.       During business hours, you may reach your Nephrology Care Team or schedule or reschedule an appointment or lab at [phone].       If you need to schedule imaging, please call [phone].   To schedule a COVID test, please call [phone].     You can always send a secure message through [employer]. [employer] messages are answered by your nurse or doctor typically within 24-48 hours. Please allow extra time on weekends and holidays.       For urgent/emergent questions after business hours, you may reach the on-call Nephrology Fellow by contacting the [facility]  at [phone].     How will I get the results of any tests ordered?    You will receive all of your results.  If you have signed up for [employer], any tests ordered at your visit will be available to you once resulted on [employer]. Typically the physician reviews them and may or may not make further recommendations. If there are urgent results that require a change in your care plan, your physician or nurse will call you to discuss the next steps. If you are not on [employer], a letter may be generated and mailed to you with your results.

## 2024-10-15 NOTE — PROGRESS NOTES
I was asked to see this patient by Guillermo Juarez    CC: CKD    HPI:   I had the pleasure today of seeing Kimberly Chau. She is a 83 year old female  who presents for Follow-up  of CKD. She is here today with her  Wilder of 63 years.    Past medical history is significant for longstanding rheumatoid arthritis [currently on no treatment], osteoporosis complicated by T11 compression fracture, anemia, Hpertension, GERD.     She presented to the emergency department in August 26, 2023 with left lower leg ulcer and cellulitis.  She was found to be in acute renal failure (creatinine 8 mg/dl).  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  The degree of fibrosis on the biopsy was 10 to 20%.  She was urgently started on dialysis and received thrice weekly dialysis for 11 weeks.  She has been off dialysis since November 2023.    She developed cellulitis in the left leg, treated with antibiotics and it got better. Se still has a wond but it is getting better. She follows with podiatry.  He was recently started on Bactrim on October 11, 2024.  Her left leg is wrapped.      Her blood pressure at home is ranging around 130-140/70, though elevated in the clinic today  She missed her medications this morning.    Overall she is doing okay.  Her energy is at her baseline. Her appetite is good.  Her weight has been stable.  Energy is good, she does the cooking and laundry at home. No SOB or gross hematuria or other urinary symptoms.    Social history: she is  for 65 years. She has 2 sons and 2 granddaughters and one great granddaughter.     Allergies   Allergen Reactions    Hctz Other (See Comments)     Pt develops symptomatic and significant hyponatremia with HCTZ exposure    Hydrochlorothiazide      Other reaction(s):  "Hyponatremia  Hyponatremia    Latex     Benzocaine Rash     carba mix,thrium-sunscreen    Ra Lotion Rash     carba mix,thrium-sunscreen    Ace Inhibitors Unknown     Dizziness and orthostatic changes and electrolyte problems.    Latex Unknown       Current Outpatient Medications   Medication Sig Dispense Refill    acetaminophen 650 MG TABS Take 650 mg by mouth every 4 hours as needed. 100 tablet 0    amLODIPine (NORVASC) 5 MG tablet Take 1 tablet (5 mg) by mouth daily. 90 tablet 0    amoxicillin-clavulanate (AUGMENTIN) 500-125 MG tablet Take 1 tablet by mouth 2 times daily 14 tablet 0    carvedilol (COREG) 12.5 MG tablet Take 1 tablet (12.5 mg) by mouth 2 times daily (with meals) 180 tablet 0    clindamycin (CLEOCIN) 300 MG capsule Take 1 capsule (300 mg) by mouth 2 times daily (with meals) 20 capsule 0    COMPRESSION STOCKINGS Please measure and distribute 1 pair of 20mmHg - 30mmHg knee high open or closed toe compression stockings. Jobst ultrasheer or equivalent. 2 each 4    Diphenhydramine-APAP, sleep, (TYLENOL PM EXTRA STRENGTH PO) Take 2 tablets by mouth At Bedtime       doxycycline hyclate (VIBRA-TABS) 100 MG tablet Take 1 tablet (100 mg) by mouth 2 times daily 14 tablet 0    ferrous gluconate (FERGON) 324 (38 Fe) MG tablet Take 324 mg by mouth      Gauze Pads & Dressings (TELFA NON-ADHERENT) 3\"X4\" PADS Cut to size for open areas on the lower extremities and secure with paper tape. Change dressing every other day. 30 each 11    losartan (COZAAR) 25 MG tablet Take 1 tablet (25 mg) by mouth at bedtime 90 tablet 3    multivitamin w/minerals (MULTI-VITAMIN) tablet Take 1 tablet by mouth every morning 90 tablet 11    Omeprazole (PRILOSEC PO) Take 20 mg by mouth as needed       pentoxifylline ER (TRENTAL) 400 MG CR tablet Take 1 tablet (400 mg) by mouth 2 times daily 120 tablet 3    pentoxifylline ER (TRENTAL) 400 MG CR tablet Take 1 tablet (400 mg) by mouth 3 times daily (with meals) 90 tablet 4    " sulfamethoxazole-trimethoprim (BACTRIM) 400-80 MG tablet Take 1 tablet by mouth 2 times daily. 28 tablet 0    triamcinolone (KENALOG) 0.1 % external ointment Apply with dressing changes, after Vashe soaks 80 g 4    vitamin D3 (CHOLECALCIFEROL) 50 mcg (2000 units) tablet Take 1 tablet by mouth daily      denosumab (PROLIA) 60 MG/ML SOSY injection Inject 1 mL (60 mg) Subcutaneous once for 1 dose 1 mL 0     Current Facility-Administered Medications   Medication Dose Route Frequency Provider Last Rate Last Admin    fluocinonide (LIDEX) 0.05 % ointment   Topical Once Ra Orourke MD        fluocinonide (LIDEX) 0.05 % ointment   Topical Once Ra Orourke MD        mupirocin (BACTROBAN) 2 % ointment   Topical Once Ra Orourke MD        mupirocin (BACTROBAN) 2 % ointment   Topical Once Ra Orourke MD        romosozumab-aqqg (EVENITY) injection 210 mg  210 mg Subcutaneous Q30 Days [facility] MD   210 mg at 05/25/22 1246    romosozumab-aqqg (EVENITY) injection 210 mg  210 mg Subcutaneous Q30 Days Kerri Lux MD   210 mg at 04/23/22 1152    romosozumab-aqqg (EVENITY) injection 210 mg  210 mg Subcutaneous Q30 Days Abigail Lugo MD   210 mg at 03/16/22 1450    romosozumab-aqqg (EVENITY) injection 210 mg  210 mg Subcutaneous Once Abigail Lugo MD        romosozumab-aqqg (EVENITY) injection 210 mg  210 mg Subcutaneous Q30 Days [facility] MD   210 mg at 08/18/21 1643    romosozumab-aqqg (EVENITY) injection 210 mg  210 mg Subcutaneous Once [facility] nunn MD        romosozumab-aqqg (EVENITY) injection 210 mg  210 mg Subcutaneous Once [facility] MD         Past Medical History:   Diagnosis Date    Anemia     Arthritis     Cataracts     Central retinal artery occlusion     Cervical spine fracture (H)     After a fall from orthostatic sx    Chronic pain     Back of head    Gastro-oesophageal reflux  disease     Head injury     Hypertension     Hyponatremia     Resolved, 2/2 diuretics    Migraines     Osteoporosis     Pneumonia 2018    Rheumatoid arthritis(714.0)        Past Surgical History:   Procedure Laterality Date    COLONOSCOPY      EYE SURGERY Left 02/2019    Hole in macula    FUSION CERVICAL POSTERIOR THREE+ LEVELS  1/22/2013    Procedure: FUSION CERVICAL POSTERIOR THREE+ LEVELS;  Cervical 1-6 Posterior Instrumentation and Fusion, Cervical 3-4 Laminectomy  .Instrumentation and fusion to Cervical 6 *Latex Allergy*;  Surgeon: Ra Bar MD;  [facility]    GYN SURGERY      HEAD & NECK SURGERY      HYSTERECTOMY      IR ENDOVENOUS ABLATION VARICOSE VEINS  2/16/2021    KNEE SURGERY      NECK SURGERY      OPEN REDUCTION INTERNAL FIXATION WRIST Left 4/30/2019    Procedure: Open Reduction Internal Fixation Left Distal Radius Fracture;  Surgeon: Dinh Strong MD;  Location:  OR    OPEN REDUCTION INTERNAL FIXATION WRIST Left 6/12/2020    Procedure: OPEN REDUCTION INTERNAL FIXATION Left Distal Radius Nonunion, Distal Ulna Resection;  Surgeon: Dinh Strong MD;  Location:  OR    OPTICAL TRACKING SYSTEM ENDOSCOPIC SINUS SURGERY Right 4/6/2018    Procedure: OPTICAL TRACKING SYSTEM ENDOSCOPIC SINUS SURGERY;  Stealth Assisted Right Maxillary Antrostomy, Anterior Ethmoidectomy And Frontal Sinusotomy;  Surgeon: Elyse Eisenberg MD;  Location:  OR    OPTICAL TRACKING SYSTEM ENDOSCOPIC SINUS SURGERY Right 8/3/2018    Procedure: OPTICAL TRACKING SYSTEM ENDOSCOPIC SINUS SURGERY;  Stealth Assisted Revision Right Frontal Sinusotomy, Right Stent Placement  **Latex Allergy** ;  Surgeon: Elyse Eisenberg MD;  Location:  OR    ORTHOPEDIC SURGERY      REMOVE HARDWARE WRIST Left 11/19/2019    Procedure: Left Distal Radius Hardware Removal, Flexor Pollicus Longus Repair, Deep Cultures;  Surgeon: Dinh Strong MD;  Location:  OR    [facility] HAND/FINGER SURGERY UNLISTED      [facility] PELVIS/HIP JOINT  SURGERY UNLISTED         Social History     Tobacco Use    Smoking status: Never    Smokeless tobacco: Never   Substance Use Topics    Alcohol use: Yes     Comment: 2 glasses of wine a day    Drug use: Never       Family History   Problem Relation Age of Onset    Arthritis Mother     Respiratory Mother         pulmonary fibrosis    Hypertension Mother     Anemia Mother     Liver Disease Father         from EtOH    Alcoholism Father     No Known Problems Maternal Grandmother     Heart Disease Maternal Grandfather     Glaucoma Paternal Grandfather     Heart Disease Paternal Grandfather     Multiple Sclerosis Sister     Breast Cancer Sister     Skin Cancer No family hx of     Melanoma No family hx of        ROS: A 12 system review of systems was negative other than noted here or above.     Exam:  BP (!) 185/76 (BP Location: Left arm, Patient Position: Chair, Cuff Size: Adult Regular)   Pulse 97   Wt 52.7 kg (116 lb 1.6 oz)   SpO2 96%   BMI 23.72 kg/m    BP Readings from Last 6 Encounters:   10/15/24 (!) 185/76   09/12/24 (!) 144/82   03/26/24 (!) 167/87   03/05/24 136/80   12/19/23 (!) 152/85   11/27/23 (!) 151/88     Wt Readings from Last 4 Encounters:   10/15/24 52.7 kg (116 lb 1.6 oz)   09/12/24 52.2 kg (115 lb 1.6 oz)   05/24/24 50.3 kg (111 lb)   03/26/24 50.5 kg (111 lb 6.4 oz)     GENERAL APPEARANCE: alert and no distress  EYES: PERRL  HENT: mouth without ulcers or lesions  NECK: supple, no adenopathy  RESP: lungs clear to auscultation - no rales, rhonchi or wheezes  CV: regular rhythm, normal rate, no rub   ABDOMEN:  soft, nontender, no HSM or masses and bowel sounds normal  MS: extremities normal- no gross deformities noted, no evidence of inflammation in joints, no muscle tenderness  SKIN: no rash  NEURO: Normal strength and tone, sensory exam grossly normal, mentation intact and speech normal  PSYCH: mentation appears normal. and affect normal/bright  She has some swelling on the left leg close to the  wound but none on the right.     Results: Reviewed in details with the patient    Assessment/Plan:  Problem #1 Chronic kidney disease: She sustained ANAI secondary to infection associated glomerulonephritis secondary to cellulitis back in August 2023.  She required dialysis for ~2.5 months and she is currently off for dialysis since November 2023. She received Antibiotics to treat her infection but no immunosuppressants .She was following with Dr. Cota.  Her most recent creatinine today is  stable around 1.8 mg/dL; however she is small with small muscle mass. Her current GFR is at 26 mL/min, however her Cystatin C previously showed worse GFR which I estimate at this point to be around 18 mL/min  --We discussed today the importance of blood pressure control in halting the progression of chronic kidney disease.  We also discussed the natural history of chronic kidney disease and the expectations moving forward.  We also discussed extensively today the different modalities of renal placement therapy in case if future need arises.  I explained in details procedures of both peritoneal and hemodialysis and the implication of a PD catheter versus an AV fistula.  We also discussed conservative kidney management and it is her choice whether she wants to go on dialysis or forego dialysis.  Studies have shown that people above 85 years of age do not get much survival advantage after starting dialysis; especially those with several comorbidities. However they might actually have a decline in their quality of life.  She was already referred to the smart classes but she and her  tells me that she really have not really thought deeply about their choices.  -She is on the losartan 25 mg daily  --She is not on SGLT2 inhibitor and given her advanced age and GFR at this stage, I will hold on that.    -- She is currently euvolemic on exam and there is no need for diuretics.  She does have localized edema around the wound on her  left tibia.    Problem #2 anemia normocytic: Her hemoglobin today is slightly better at 10.5 g/dL.  Will hold on any referral to the anemia clinic at this point.  She will need IRASEMA if her hemoglobin is below 9 g/dL.    Problem #3 CKD MBD: Her calcium and phosphorus are rather within normal limits.  Her PTH is lowish  which can suggest dynamic bone disease.  She does have a history of osteoporosis for which she is receiving denosumab.    Problem #4 hypertension: Her blood pressure is  high in clinic today but rather rather acceptable range at home. No changes to her medications were done at this point.  She was instructed to measure her blood pressure at home and communicate with me via [employer] message in 2 weeks.    The total time of this encounter amounted to 40 minutes on the day of the encounter 10/15/2024. This time included face to face time spent with the patient, preparatory work and chart review, ordering tests, and performing post visit documentation.    The longitudinal plan of care for this patient was addressed during this visit. Due to the added complexity in care, I will continue to support the patient with subsequent management of this condition(s) and with the ongoing continuity of care of this condition(s).     *This dictation was prepared in part using Dragon recognition software.  As a result errors may occur. When identified these transcription errors have been corrected.  While every attempt is made to correct errors during dictation, errors may still exist.     Osman Aguirre MD   [facility]   Department of Medicine   Division of Renal Disease and Hypertension

## 2024-10-15 NOTE — LETTER
10/15/2024      Kimberly Chau  [address] [place] [address]      Dear Colleague,    Thank you for referring your patient, Kimberly Chau, to the [facility]. Please see a copy of my visit note below.    Past Medical History:   Diagnosis Date    Anemia     Arthritis     Cataracts     Central retinal artery occlusion     Cervical spine fracture (H)     After a fall from orthostatic sx    Chronic pain     Back of head    Gastro-oesophageal reflux disease     Head injury     Hypertension     Hyponatremia     Resolved, 2/2 diuretics    Migraines     Osteoporosis     Pneumonia 2018    Rheumatoid arthritis(714.0)      Patient Active Problem List   Diagnosis    Intrinsic atopic dermatitis    Essential hypertension    Rheumatoid arthritis (H)    Retinal artery occlusion    Cervical vertebral fracture (H)    Central retinal artery occlusion of right eye    Bilateral occipital neuralgia    C1-C2 instability    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 factor (H)    Osteoarthritis    Malignant hypertension    Hyponatremia    GERD (gastroesophageal reflux disease)    Eczematous dermatitis    Anxiety state    Anemia    Closed fracture of distal end of left radius with delayed healing, unspecified fracture morphology, subsequent encounter    Closed fracture of distal end of left radius, unspecified fracture morphology, sequela    Osteomyelitis of left leg (H)    Pain of left thumb    CKD (chronic kidney disease) stage 4, GFR 15-29 ml/min (H)    Mild protein-calorie malnutrition (H)    Venous ulcer (H)    Prostate cancer (H)     Past Surgical History:   Procedure Laterality Date    COLONOSCOPY      EYE SURGERY Left 02/2019    Hole in macula    FUSION CERVICAL POSTERIOR THREE+ LEVELS  1/22/2013    Procedure: FUSION CERVICAL POSTERIOR THREE+ LEVELS;  Cervical 1-6 Posterior Instrumentation and Fusion, Cervical 3-4 Laminectomy  .Instrumentation and fusion to Cervical 6 *Latex  Allergy*;  Surgeon: Ra Bar MD;  Location: [facility]    GYN SURGERY      HEAD & NECK SURGERY      HYSTERECTOMY      IR ENDOVENOUS ABLATION VARICOSE VEINS  2/16/2021    KNEE SURGERY      NECK SURGERY      OPEN REDUCTION INTERNAL FIXATION WRIST Left 4/30/2019    Procedure: Open Reduction Internal Fixation Left Distal Radius Fracture;  Surgeon: Dinh Strong MD;  Location:  OR    OPEN REDUCTION INTERNAL FIXATION WRIST Left 6/12/2020    Procedure: OPEN REDUCTION INTERNAL FIXATION Left Distal Radius Nonunion, Distal Ulna Resection;  Surgeon: Dinh Strong MD;  Location:  OR    OPTICAL TRACKING SYSTEM ENDOSCOPIC SINUS SURGERY Right 4/6/2018    Procedure: OPTICAL TRACKING SYSTEM ENDOSCOPIC SINUS SURGERY;  Stealth Assisted Right Maxillary Antrostomy, Anterior Ethmoidectomy And Frontal Sinusotomy;  Surgeon: Elyse Eisenberg MD;  Location:  OR    OPTICAL TRACKING SYSTEM ENDOSCOPIC SINUS SURGERY Right 8/3/2018    Procedure: OPTICAL TRACKING SYSTEM ENDOSCOPIC SINUS SURGERY;  Stealth Assisted Revision Right Frontal Sinusotomy, Right Stent Placement  **Latex Allergy** ;  Surgeon: Elyse Eisenberg MD;  Location:  OR    ORTHOPEDIC SURGERY      REMOVE HARDWARE WRIST Left 11/19/2019    Procedure: Left Distal Radius Hardware Removal, Flexor Pollicus Longus Repair, Deep Cultures;  Surgeon: Dinh Strong MD;  Location:  OR    [facility] HAND/FINGER SURGERY UNLISTED      [facility] PELVIS/HIP JOINT SURGERY UNLISTED       Social History     Socioeconomic History    Marital status:      Spouse name: Not on file    Number of children: Not on file    Years of education: Not on file    Highest education level: Not on file   Occupational History    Not on file   Tobacco Use    Smoking status: Never    Smokeless tobacco: Never   Substance and Sexual Activity    Alcohol use: Yes     Comment: 2 glasses of wine a day    Drug use: Never    Sexual activity: Not Currently   Other Topics Concern    Parent/sibling w/  CABG, MI or angioplasty before 65F 55M? Not Asked   Social History Narrative    Ms. Chau is , has two grown children and two grandchildren. She does not work. She was never a smoker, and drinks a glass of wine with dinner each night.         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      Social Determinants of Health     Financial Resource Strain: Low Risk  (8/24/2024)    Received from [employer]    Financial Resource Strain     Difficulty of Paying Living Expenses: 3     Difficulty of Paying Living Expenses: Not on file   Food Insecurity: No Food Insecurity (8/24/2024)    Received from [employer]    Food Insecurity     Worried About Running Out of Food in the Last Year: 1   Transportation Needs: No Transportation Needs (8/24/2024)    Received from [employer]    Transportation Needs     Lack of Transportation (Medical): 1   Physical Activity: Not on file   Stress: Not on file   Social Connections: Socially Integrated (8/24/2024)    Received from [employer]    Social Connections     Frequency of Communication with Friends and Family: 0   Interpersonal Safety: Not on file   Housing Stability: Low Risk  (8/24/2024)    Received from [employer]    Housing Stability     Unable to Pay for Housing in the Last Year: 1     Family History   Problem Relation Age of Onset    Arthritis Mother     Respiratory Mother         pulmonary fibrosis    Hypertension Mother     Anemia Mother     Liver Disease Father         from EtOH    Alcoholism Father     No Known Problems Maternal Grandmother     Heart Disease Maternal Grandfather     Glaucoma Paternal Grandfather     Heart Disease Paternal Grandfather     Multiple Sclerosis Sister     Breast Cancer Sister     Skin Cancer No family hx of     Melanoma No family hx of      Lab Results  "  Component Value Date    WBC 5.5 10/15/2024    WBC 7.6 05/06/2021     Lab Results   Component Value Date    RBC 3.46 10/15/2024    RBC 3.28 05/06/2021     Lab Results   Component Value Date    HGB 10.5 10/15/2024    HGB 10.8 05/06/2021     Lab Results   Component Value Date    HCT 32.8 10/15/2024    HCT 33.5 05/06/2021     No components found for: \"MCT\"  Lab Results   Component Value Date    MCV 95 10/15/2024     05/06/2021     Lab Results   Component Value Date    MCH 30.3 10/15/2024    MCH 32.9 05/06/2021     Lab Results   Component Value Date    MCHC 32.0 10/15/2024    MCHC 32.2 05/06/2021     Lab Results   Component Value Date    RDW 13.3 10/15/2024    RDW 15.7 05/06/2021     Lab Results   Component Value Date     10/15/2024     05/06/2021     Last Renal Panel:  Sodium   Date Value Ref Range Status   10/15/2024 139 135 - 145 mmol/L Final   01/06/2021 134 133 - 144 mmol/L Final     Potassium   Date Value Ref Range Status   10/15/2024 4.6 3.4 - 5.3 mmol/L Final   07/08/2022 4.3 3.4 - 5.3 mmol/L Final   01/06/2021 4.0 3.4 - 5.3 mmol/L Final     Chloride   Date Value Ref Range Status   10/15/2024 105 98 - 107 mmol/L Final   07/08/2022 104 94 - 109 mmol/L Final   01/06/2021 102 94 - 109 mmol/L Final     Carbon Dioxide   Date Value Ref Range Status   01/06/2021 28 20 - 32 mmol/L Final     Carbon Dioxide (CO2)   Date Value Ref Range Status   10/15/2024 21 (L) 22 - 29 mmol/L Final   07/08/2022 26 20 - 32 mmol/L Final     Anion Gap   Date Value Ref Range Status   10/15/2024 13 7 - 15 mmol/L Final   07/08/2022 10 3 - 14 mmol/L Final   01/06/2021 4 3 - 14 mmol/L Final     Glucose   Date Value Ref Range Status   10/15/2024 96 70 - 99 mg/dL Final   07/08/2022 97 70 - 99 mg/dL Final   01/06/2021 87 70 - 99 mg/dL Final     Urea Nitrogen   Date Value Ref Range Status   10/15/2024 26.8 (H) 8.0 - 23.0 mg/dL Final   07/08/2022 11 7 - 30 mg/dL Final   01/06/2021 14 7 - 30 mg/dL Final     Creatinine   Date " Value Ref Range Status   10/15/2024 1.85 (H) 0.51 - 0.95 mg/dL Final   01/06/2021 0.90 0.52 - 1.04 mg/dL Final     GFR Estimate   Date Value Ref Range Status   10/15/2024 26 (L) >60 mL/min/1.73m2 Final     Comment:     eGFR calculated using 2021 CKD-EPI equation.   01/06/2021 59 (L) >60 mL/min/[1.73_m2] Final     Comment:     Non  GFR Calc  Starting 12/18/2018, serum creatinine based estimated GFR (eGFR) will be   calculated using the Chronic Kidney Disease Epidemiology Collaboration   (CKD-EPI) equation.       Calcium   Date Value Ref Range Status   10/15/2024 9.5 8.8 - 10.4 mg/dL Final     Comment:     Reference intervals for this test were updated on 7/16/2024 to reflect our healthy population more accurately. There may be differences in the flagging of prior results with similar values performed with this method. Those prior results can be interpreted in the context of the updated reference intervals.   01/06/2021 8.8 8.5 - 10.1 mg/dL Final     Phosphorus   Date Value Ref Range Status   10/15/2024 3.4 2.5 - 4.5 mg/dL Final   05/06/2021 4.5 2.5 - 4.5 mg/dL Final     Albumin   Date Value Ref Range Status   10/15/2024 4.4 3.5 - 5.2 g/dL Final   03/16/2022 3.6 3.4 - 5.0 g/dL Final   01/06/2021 2.8 (L) 3.4 - 5.0 g/dL Final                     Subjective findings- 83-year-old returns clinic with  for left leg ulcers with infection and dermatitis with venous stasis and venous hypertension and arterial and renal disease.  Relates to no problems with the multilayer compression system with an Unna boot, relates to taking the Bactrim with no problems.    Objective findings- DP and PT are 2 out of 4 left.  Has decreased peripheral edema with venous stasis left leg.  Has decreased eschared abrasions left anterior and posterior leg with decreased erythema, decreased edema, no active drainage, no odor, no calor, no pain on palpation.    Assessment and plan- Scratch abrasion ulcers left leg with signs of  infection and Dermatitis, Venous stasis and Venous hypertension, peripheral arterial disease, Kidney disease.  Diagnosis and treatment options discussed with them.  Left leg and ulcer sites were cleaned with wound Vashe and we applied wound Vashe wet-to-dry dressings to eschared ulcer sites and a multilayer compression system with an Unna boot with light compression applied left lower extremity upon consent.  Continue the Bactrim.  They have home nursing coming out weekly so we will have them change the multilayer compression system with an Unna boot weekly and as needed for drainage.  Previous notes reviewed.  Return to clinic in 3 weeks.            Moderate level of medical decision making.      Again, thank you for allowing me to participate in the care of your patient.        Sincerely,        Camden Salazar DPM

## 2024-10-15 NOTE — NURSING NOTE
Kimberly Chau's goals for this visit include:   Chief Complaint   Patient presents with    RECHECK     7 month follow up CKD4       She requests these members of her care team be copied on today's visit information: no     PCP: Guillermo Castellano    Referring Provider:  Referred Self, MD  No address on file    BP (!) 185/76 (BP Location: Left arm, Patient Position: Chair, Cuff Size: Adult Regular)   Pulse 97   Wt 52.7 kg (116 lb 1.6 oz)   SpO2 96%   BMI 23.72 kg/m      Do you need any medication refills at today's visit? No     CRISTAL Hull   Neph/Pulm [facility]

## 2024-10-15 NOTE — PATIENT INSTRUCTIONS
Left leg once weekly and as needed for drainage clean with wound Vashe, apply wound Vashe wet-to-dry dressing to any ulcer areas and wrapped the left leg with a multilayer compression system with an Unna boot with light compression.

## 2024-10-15 NOTE — NURSING NOTE
Kimberly Chau's chief complaint for this visit includes:  Chief Complaint   Patient presents with    Consult     Unna boot change      PCP: Guillermo Castellano    Referring Provider:  Referred Self, MD  No address on file    There were no vitals taken for this visit.  Data Unavailable     Do you need any medication refills at today's visit? NO    Allergies   Allergen Reactions    Hctz Other (See Comments)     Pt develops symptomatic and significant hyponatremia with HCTZ exposure    Hydrochlorothiazide      Other reaction(s): Hyponatremia  Hyponatremia    Latex     Benzocaine Rash     carba mix,thrium-sunscreen    Ra Lotion Rash     carba mix,thrium-sunscreen    Ace Inhibitors Unknown     Dizziness and orthostatic changes and electrolyte problems.    Latex Unknown       Humera Hairston LPN

## 2024-10-16 LAB — PTH-INTACT SERPL-MCNC: 85 PG/ML (ref 15–65)

## 2024-10-17 ENCOUNTER — DOCUMENTATION ONLY (OUTPATIENT)
Dept: INTERNAL MEDICINE | Facility: CLINIC | Age: 84
End: 2024-10-17
Payer: MEDICARE

## 2024-10-17 NOTE — PROGRESS NOTES
Type of Form Received: [facility] [id number]    Form Received (Date) 10/17/24   Form Filled out Yes, faxed 10/21/24   Placed in provider folder Yes

## 2024-10-21 DIAGNOSIS — Z53.9 DIAGNOSIS NOT YET DEFINED: Primary | ICD-10-CM

## 2024-10-21 PROCEDURE — G0179 MD RECERTIFICATION HHA PT: HCPCS | Performed by: INTERNAL MEDICINE

## 2024-10-22 ENCOUNTER — TELEPHONE (OUTPATIENT)
Dept: INTERNAL MEDICINE | Facility: CLINIC | Age: 84
End: 2024-10-22
Payer: MEDICARE

## 2024-10-22 NOTE — TELEPHONE ENCOUNTER
Left detailed VM on confidential voicemail box for Kena ABEBE with [facility] with the following verbal orders: nursing visit 1xwk for 5wks. Aware of medication possibly causing elevated potassium (reviewed with Lee ABEBE).  Darling CASTAÑEDA LPN  [facility]

## 2024-10-22 NOTE — TELEPHONE ENCOUNTER
[facility] Call Center    Phone Message    May a detailed message be left on voicemail: yes     Reason for Call: RN reporting: Patient not sure if she has taken  losartan (COZAAR) 25 MG tablet, sulfamethoxazole-trimethoprim (BACTRIM) 400-80 MG tablet RN stated  may cause high potassium. Patient was told to take as prescribed unless RN hears different from MD, also RN visit 1xwk for 5wks.     Action Taken: Message routed to:  [facility]      Travel Screening: Not Applicable     Date of Service:

## 2024-10-23 ENCOUNTER — PATIENT OUTREACH (OUTPATIENT)
Dept: NEPHROLOGY | Facility: CLINIC | Age: 84
End: 2024-10-23

## 2024-10-23 NOTE — PROGRESS NOTES
Received message from Dr. Aguirre:  Contact patient to discuss CKD education.     She needs to attend the dialysis education classes dn meet with access nurse at a later stage.

## 2024-10-24 ENCOUNTER — DOCUMENTATION ONLY (OUTPATIENT)
Dept: INTERNAL MEDICINE | Facility: CLINIC | Age: 84
End: 2024-10-24
Payer: MEDICARE

## 2024-10-24 NOTE — PROGRESS NOTES
Type of Form Received: [facility] [id number]    Form Received (Date) 10/23/24   Form Filled out Yes, faxed 11/4/24   Placed in provider folder Yes

## 2024-11-04 ENCOUNTER — MEDICAL CORRESPONDENCE (OUTPATIENT)
Dept: HEALTH INFORMATION MANAGEMENT | Facility: CLINIC | Age: 84
End: 2024-11-04
Payer: MEDICARE

## 2024-11-08 ENCOUNTER — OFFICE VISIT (OUTPATIENT)
Dept: PODIATRY | Facility: CLINIC | Age: 84
End: 2024-11-08
Payer: MEDICARE

## 2024-11-08 DIAGNOSIS — I87.8 VENOUS STASIS: Primary | ICD-10-CM

## 2024-11-08 DIAGNOSIS — Z87.2 HISTORY OF SKIN ULCER OF LOWER EXTREMITY: ICD-10-CM

## 2024-11-08 DIAGNOSIS — I73.9 PERIPHERAL ARTERIAL DISEASE (H): ICD-10-CM

## 2024-11-08 PROCEDURE — 99213 OFFICE O/P EST LOW 20 MIN: CPT | Performed by: PODIATRIST

## 2024-11-08 NOTE — PROGRESS NOTES
Past Medical History:   Diagnosis Date    Anemia     Arthritis     Cataracts     Central retinal artery occlusion     Cervical spine fracture (H)     After a fall from orthostatic sx    Chronic pain     Back of head    Gastro-oesophageal reflux disease     Head injury     Hypertension     Hyponatremia     Resolved, 2/2 diuretics    Migraines     Osteoporosis     Pneumonia 2018    Rheumatoid arthritis(714.0)      Patient Active Problem List   Diagnosis    Intrinsic atopic dermatitis    Essential hypertension    Rheumatoid arthritis (H)    Retinal artery occlusion    Cervical vertebral fracture (H)    Central retinal artery occlusion of right eye    Bilateral occipital neuralgia    C1-C2 instability    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 factor (H)    Osteoarthritis    Malignant hypertension    Hyponatremia    GERD (gastroesophageal reflux disease)    Eczematous dermatitis    Anxiety state    Anemia    Closed fracture of distal end of left radius with delayed healing, unspecified fracture morphology, subsequent encounter    Closed fracture of distal end of left radius, unspecified fracture morphology, sequela    Osteomyelitis of left leg (H)    Pain of left thumb    CKD (chronic kidney disease) stage 4, GFR 15-29 ml/min (H)    Mild protein-calorie malnutrition (H)    Venous ulcer (H)    Prostate cancer (H)     Past Surgical History:   Procedure Laterality Date    COLONOSCOPY      EYE SURGERY Left 02/2019    Hole in macula    FUSION CERVICAL POSTERIOR THREE+ LEVELS  1/22/2013    Procedure: FUSION CERVICAL POSTERIOR THREE+ LEVELS;  Cervical 1-6 Posterior Instrumentation and Fusion, Cervical 3-4 Laminectomy  .Instrumentation and fusion to Cervical 6 *Latex Allergy*;  Surgeon: aR Bar MD;  Location: [facility] OR    GYN SURGERY      HEAD & NECK SURGERY      HYSTERECTOMY      IR ENDOVENOUS ABLATION VARICOSE VEINS  2/16/2021    KNEE SURGERY      NECK SURGERY      OPEN REDUCTION INTERNAL  FIXATION WRIST Left 4/30/2019    Procedure: Open Reduction Internal Fixation Left Distal Radius Fracture;  Surgeon: Dinh Strong MD;  [facility]: [facility] OR    OPEN REDUCTION INTERNAL FIXATION WRIST Left 6/12/2020    Procedure: OPEN REDUCTION INTERNAL FIXATION Left Distal Radius Nonunion, Distal Ulna Resection;  Surgeon: Dinh Strong MD;  Location: [facility] OR    OPTICAL TRACKING SYSTEM ENDOSCOPIC SINUS SURGERY Right 4/6/2018    Procedure: OPTICAL TRACKING SYSTEM ENDOSCOPIC SINUS SURGERY;  Stealth Assisted Right Maxillary Antrostomy, Anterior Ethmoidectomy And Frontal Sinusotomy;  Surgeon: Elyse Eisenberg MD;  [facility]    OPTICAL TRACKING SYSTEM ENDOSCOPIC SINUS SURGERY Right 8/3/2018    Procedure: OPTICAL TRACKING SYSTEM ENDOSCOPIC SINUS SURGERY;  Stealth Assisted Revision Right Frontal Sinusotomy, Right Stent Placement  **Latex Allergy** ;  Surgeon: Elyse Eisenberg MD;  Location:  OR    ORTHOPEDIC SURGERY      REMOVE HARDWARE WRIST Left 11/19/2019    Procedure: Left Distal Radius Hardware Removal, Flexor Pollicus Longus Repair, Deep Cultures;  Surgeon: iDnh Strong MD;  Location:  OR    ZZC HAND/FINGER SURGERY UNLISTED      Z PELVIS/HIP JOINT SURGERY UNLISTED       Social History     Socioeconomic History    Marital status:      Spouse name: Not on file    Number of children: Not on file    Years of education: Not on file    Highest education level: Not on file   Occupational History    Not on file   Tobacco Use    Smoking status: Never    Smokeless tobacco: Never   Substance and Sexual Activity    Alcohol use: Yes     Comment: 2 glasses of wine a day    Drug use: Never    Sexual activity: Not Currently   Other Topics Concern    Parent/sibling w/ CABG, MI or angioplasty before 65F 55M? Not Asked   Social History Narrative    Ms. Chau is , has two grown children and two grandchildren. She does not work. She was never a smoker, and drinks a glass of wine with dinner each  night.         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      Social Drivers of Health     Financial Resource Strain: Low Risk  (8/24/2024)    Received from [facility]    Financial Resource Strain     Difficulty of Paying Living Expenses: 3     Difficulty of Paying Living Expenses: Not on file   Food Insecurity: No Food Insecurity (8/24/2024)    Received from [facility]    Food Insecurity     Do you worry your food will run out before you are able to buy more?: 1   Transportation Needs: No Transportation Needs (8/24/2024)    Received from [facility]    Transportation Needs     Does lack of transportation keep you from medical appointments?: 1     Does lack of transportation keep you from work, meetings or getting things that you need?: 1   Physical Activity: Not on file   Stress: Not on file   Social Connections: Socially Integrated (8/24/2024)    Received from [facility]    Social Connections     Do you often feel lonely or isolated from those around you?: 0   Interpersonal Safety: Not on file   Housing Stability: Low Risk  (8/24/2024)    Received from [facility]    Housing Stability     What is your housing situation today?: 1     Family History   Problem Relation Age of Onset    Arthritis Mother     Respiratory Mother         pulmonary fibrosis    Hypertension Mother     Anemia Mother     Liver Disease Father         from EtOH    Alcoholism Father     No Known Problems Maternal Grandmother     Heart Disease Maternal Grandfather     Glaucoma Paternal Grandfather     Heart Disease Paternal Grandfather     Multiple Sclerosis Sister     Breast Cancer Sister     Skin Cancer No family hx of     Melanoma No family hx of            Lab Results   Component Value Date    WBC 5.5 10/15/2024    WBC 7.6 05/06/2021     Lab Results  "  Component Value Date    RBC 3.46 10/15/2024    RBC 3.28 05/06/2021     Lab Results   Component Value Date    HGB 10.5 10/15/2024    HGB 10.8 05/06/2021     Lab Results   Component Value Date    HCT 32.8 10/15/2024    HCT 33.5 05/06/2021     No components found for: \"MCT\"  Lab Results   Component Value Date    MCV 95 10/15/2024     05/06/2021     Lab Results   Component Value Date    MCH 30.3 10/15/2024    MCH 32.9 05/06/2021     Lab Results   Component Value Date    MCHC 32.0 10/15/2024    MCHC 32.2 05/06/2021     Lab Results   Component Value Date    RDW 13.3 10/15/2024    RDW 15.7 05/06/2021     Lab Results   Component Value Date     10/15/2024     05/06/2021     Last Renal Panel:  Sodium   Date Value Ref Range Status   10/15/2024 139 135 - 145 mmol/L Final   01/06/2021 134 133 - 144 mmol/L Final     Potassium   Date Value Ref Range Status   10/15/2024 4.6 3.4 - 5.3 mmol/L Final   07/08/2022 4.3 3.4 - 5.3 mmol/L Final   01/06/2021 4.0 3.4 - 5.3 mmol/L Final     Chloride   Date Value Ref Range Status   10/15/2024 105 98 - 107 mmol/L Final   07/08/2022 104 94 - 109 mmol/L Final   01/06/2021 102 94 - 109 mmol/L Final     Carbon Dioxide   Date Value Ref Range Status   01/06/2021 28 20 - 32 mmol/L Final     Carbon Dioxide (CO2)   Date Value Ref Range Status   10/15/2024 21 (L) 22 - 29 mmol/L Final   07/08/2022 26 20 - 32 mmol/L Final     Anion Gap   Date Value Ref Range Status   10/15/2024 13 7 - 15 mmol/L Final   07/08/2022 10 3 - 14 mmol/L Final   01/06/2021 4 3 - 14 mmol/L Final     Glucose   Date Value Ref Range Status   10/15/2024 96 70 - 99 mg/dL Final   07/08/2022 97 70 - 99 mg/dL Final   01/06/2021 87 70 - 99 mg/dL Final     Urea Nitrogen   Date Value Ref Range Status   10/15/2024 26.8 (H) 8.0 - 23.0 mg/dL Final   07/08/2022 11 7 - 30 mg/dL Final   01/06/2021 14 7 - 30 mg/dL Final     Creatinine   Date Value Ref Range Status   10/15/2024 1.85 (H) 0.51 - 0.95 mg/dL Final   01/06/2021 0.90 " 0.52 - 1.04 mg/dL Final     GFR Estimate   Date Value Ref Range Status   10/15/2024 26 (L) >60 mL/min/1.73m2 Final     Comment:     eGFR calculated using 2021 CKD-EPI equation.   01/06/2021 59 (L) >60 mL/min/[1.73_m2] Final     Comment:     Non  GFR Calc  Starting 12/18/2018, serum creatinine based estimated GFR (eGFR) will be   calculated using the Chronic Kidney Disease Epidemiology Collaboration   (CKD-EPI) equation.       Calcium   Date Value Ref Range Status   10/15/2024 9.5 8.8 - 10.4 mg/dL Final     Comment:     Reference intervals for this test were updated on 7/16/2024 to reflect our healthy population more accurately. There may be differences in the flagging of prior results with similar values performed with this method. Those prior results can be interpreted in the context of the updated reference intervals.   01/06/2021 8.8 8.5 - 10.1 mg/dL Final     Phosphorus   Date Value Ref Range Status   10/15/2024 3.4 2.5 - 4.5 mg/dL Final   05/06/2021 4.5 2.5 - 4.5 mg/dL Final     Albumin   Date Value Ref Range Status   10/15/2024 4.4 3.5 - 5.2 g/dL Final   03/16/2022 3.6 3.4 - 5.0 g/dL Final   01/06/2021 2.8 (L) 3.4 - 5.0 g/dL Final                             Subjective findings- 83-year-old returns clinic for scratch abrasion ulcers left leg with infection and dermatitis.  Relates to taking the Bactrim with no problems and has a few of those left, relates nursings, not in doing Unna boot changes.    Objective findings- DP and PT are 2 out of 4 left.  Has venous stasis dermatitis left leg.  There is minimal edema, mild serosanguineous drainage, venous congestion, no gross erythema, no odor, no calor, no pain on palpation.    Assessment and plan- Scratch abrasion ulcers left leg with signs of infection and Dermatitis, venous stasis with venous hypertension, peripheral arterial disease, kidney disease.  Diagnosis and treatment options discussed with them.  We cleaned the left leg with wound Vashe  and applied Ace wrap for light compression.  Will discontinue the Unna boot compression systems.  Okay to discontinue home nursing.  They can go back to the compression socks.  She relates she has Triamcinolone cream will start using this daily.  Previous notes reviewed.  Return to clinic and see me in 1 month.                                                                                  Low to moderate level of medical decision making.

## 2024-11-08 NOTE — LETTER
11/8/2024      Kimberly Chau  [address]  [place] [address]      Dear Colleague,    Thank you for referring your patient, Kimberly Chau, to the [facility]. Please see a copy of my visit note below.    Past Medical History:   Diagnosis Date    Anemia     Arthritis     Cataracts     Central retinal artery occlusion     Cervical spine fracture (H)     After a fall from orthostatic sx    Chronic pain     Back of head    Gastro-oesophageal reflux disease     Head injury     Hypertension     Hyponatremia     Resolved, 2/2 diuretics    Migraines     Osteoporosis     Pneumonia 2018    Rheumatoid arthritis(714.0)      Patient Active Problem List   Diagnosis    Intrinsic atopic dermatitis    Essential hypertension    Rheumatoid arthritis (H)    Retinal artery occlusion    Cervical vertebral fracture (H)    Central retinal artery occlusion of right eye    Bilateral occipital neuralgia    C1-C2 instability    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 factor (H)    Osteoarthritis    Malignant hypertension    Hyponatremia    GERD (gastroesophageal reflux disease)    Eczematous dermatitis    Anxiety state    Anemia    Closed fracture of distal end of left radius with delayed healing, unspecified fracture morphology, subsequent encounter    Closed fracture of distal end of left radius, unspecified fracture morphology, sequela    Osteomyelitis of left leg (H)    Pain of left thumb    CKD (chronic kidney disease) stage 4, GFR 15-29 ml/min (H)    Mild protein-calorie malnutrition (H)    Venous ulcer (H)    Prostate cancer (H)     Past Surgical History:   Procedure Laterality Date    COLONOSCOPY      EYE SURGERY Left 02/2019    Hole in macula    FUSION CERVICAL POSTERIOR THREE+ LEVELS  1/22/2013    Procedure: FUSION CERVICAL POSTERIOR THREE+ LEVELS;  Cervical 1-6 Posterior Instrumentation and Fusion, Cervical 3-4 Laminectomy  .Instrumentation and fusion to Cervical 6 *Latex  Allergy*;  Surgeon: Ra Bar MD;  Location: [facility] OR    GYN SURGERY      HEAD & NECK SURGERY      HYSTERECTOMY      IR ENDOVENOUS ABLATION VARICOSE VEINS  2/16/2021    KNEE SURGERY      NECK SURGERY      OPEN REDUCTION INTERNAL FIXATION WRIST Left 4/30/2019    Procedure: Open Reduction Internal Fixation Left Distal Radius Fracture;  Surgeon: Dinh Strong MD;  Location:  OR    OPEN REDUCTION INTERNAL FIXATION WRIST Left 6/12/2020    Procedure: OPEN REDUCTION INTERNAL FIXATION Left Distal Radius Nonunion, Distal Ulna Resection;  Surgeon: Dinh Strong MD;  Location:  OR    OPTICAL TRACKING SYSTEM ENDOSCOPIC SINUS SURGERY Right 4/6/2018    Procedure: OPTICAL TRACKING SYSTEM ENDOSCOPIC SINUS SURGERY;  Stealth Assisted Right Maxillary Antrostomy, Anterior Ethmoidectomy And Frontal Sinusotomy;  Surgeon: Elyse Eisenberg MD;  Location:  OR    OPTICAL TRACKING SYSTEM ENDOSCOPIC SINUS SURGERY Right 8/3/2018    Procedure: OPTICAL TRACKING SYSTEM ENDOSCOPIC SINUS SURGERY;  Stealth Assisted Revision Right Frontal Sinusotomy, Right Stent Placement  **Latex Allergy** ;  Surgeon: Elyse Eisenberg MD;  Location:  OR    ORTHOPEDIC SURGERY      REMOVE HARDWARE WRIST Left 11/19/2019    Procedure: Left Distal Radius Hardware Removal, Flexor Pollicus Longus Repair, Deep Cultures;  Surgeon: Dinh Strong MD;  Location:  OR    ZZC HAND/FINGER SURGERY UNLISTED      [facility] PELVIS/HIP JOINT SURGERY UNLISTED       Social History     Socioeconomic History    Marital status:      Spouse name: Not on file    Number of children: Not on file    Years of education: Not on file    Highest education level: Not on file   Occupational History    Not on file   Tobacco Use    Smoking status: Never    Smokeless tobacco: Never   Substance and Sexual Activity    Alcohol use: Yes     Comment: 2 glasses of wine a day    Drug use: Never    Sexual activity: Not Currently   Other Topics Concern    Parent/sibling w/  CABG, MI or angioplasty before 65F 55M? Not Asked   Social History Narrative    Ms. Chau is , has two grown children and two grandchildren. She does not work. She was never a smoker, and drinks a glass of wine with dinner each night.         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      Social Drivers of Health     Financial Resource Strain: Low Risk  (8/24/2024)    Received from [facility]    Financial Resource Strain     Difficulty of Paying Living Expenses: 3     Difficulty of Paying Living Expenses: Not on file   Food Insecurity: No Food Insecurity (8/24/2024)    Received from [facility]    Food Insecurity     Do you worry your food will run out before you are able to buy more?: 1   Transportation Needs: No Transportation Needs (8/24/2024)    Received from [facility]    Transportation Needs     Does lack of transportation keep you from medical appointments?: 1     Does lack of transportation keep you from work, meetings or getting things that you need?: 1   Physical Activity: Not on file   Stress: Not on file   Social Connections: Socially Integrated (8/24/2024)    Received from [facility]    Social Connections     Do you often feel lonely or isolated from those around you?: 0   Interpersonal Safety: Not on file   Housing Stability: Low Risk  (8/24/2024)    Received from [facility]    Housing Stability     What is your housing situation today?: 1     Family History   Problem Relation Age of Onset    Arthritis Mother     Respiratory Mother         pulmonary fibrosis    Hypertension Mother     Anemia Mother     Liver Disease Father         from EtOH    Alcoholism Father     No Known Problems Maternal Grandmother     Heart Disease Maternal Grandfather     Glaucoma Paternal Grandfather     Heart Disease Paternal  "Grandfather     Multiple Sclerosis Sister     Breast Cancer Sister     Skin Cancer No family hx of     Melanoma No family hx of            Lab Results   Component Value Date    WBC 5.5 10/15/2024    WBC 7.6 05/06/2021     Lab Results   Component Value Date    RBC 3.46 10/15/2024    RBC 3.28 05/06/2021     Lab Results   Component Value Date    HGB 10.5 10/15/2024    HGB 10.8 05/06/2021     Lab Results   Component Value Date    HCT 32.8 10/15/2024    HCT 33.5 05/06/2021     No components found for: \"MCT\"  Lab Results   Component Value Date    MCV 95 10/15/2024     05/06/2021     Lab Results   Component Value Date    MCH 30.3 10/15/2024    MCH 32.9 05/06/2021     Lab Results   Component Value Date    MCHC 32.0 10/15/2024    MCHC 32.2 05/06/2021     Lab Results   Component Value Date    RDW 13.3 10/15/2024    RDW 15.7 05/06/2021     Lab Results   Component Value Date     10/15/2024     05/06/2021     Last Renal Panel:  Sodium   Date Value Ref Range Status   10/15/2024 139 135 - 145 mmol/L Final   01/06/2021 134 133 - 144 mmol/L Final     Potassium   Date Value Ref Range Status   10/15/2024 4.6 3.4 - 5.3 mmol/L Final   07/08/2022 4.3 3.4 - 5.3 mmol/L Final   01/06/2021 4.0 3.4 - 5.3 mmol/L Final     Chloride   Date Value Ref Range Status   10/15/2024 105 98 - 107 mmol/L Final   07/08/2022 104 94 - 109 mmol/L Final   01/06/2021 102 94 - 109 mmol/L Final     Carbon Dioxide   Date Value Ref Range Status   01/06/2021 28 20 - 32 mmol/L Final     Carbon Dioxide (CO2)   Date Value Ref Range Status   10/15/2024 21 (L) 22 - 29 mmol/L Final   07/08/2022 26 20 - 32 mmol/L Final     Anion Gap   Date Value Ref Range Status   10/15/2024 13 7 - 15 mmol/L Final   07/08/2022 10 3 - 14 mmol/L Final   01/06/2021 4 3 - 14 mmol/L Final     Glucose   Date Value Ref Range Status   10/15/2024 96 70 - 99 mg/dL Final   07/08/2022 97 70 - 99 mg/dL Final   01/06/2021 87 70 - 99 mg/dL Final     Urea Nitrogen   Date Value Ref " Range Status   10/15/2024 26.8 (H) 8.0 - 23.0 mg/dL Final   07/08/2022 11 7 - 30 mg/dL Final   01/06/2021 14 7 - 30 mg/dL Final     Creatinine   Date Value Ref Range Status   10/15/2024 1.85 (H) 0.51 - 0.95 mg/dL Final   01/06/2021 0.90 0.52 - 1.04 mg/dL Final     GFR Estimate   Date Value Ref Range Status   10/15/2024 26 (L) >60 mL/min/1.73m2 Final     Comment:     eGFR calculated using 2021 CKD-EPI equation.   01/06/2021 59 (L) >60 mL/min/[1.73_m2] Final     Comment:     Non  GFR Calc  Starting 12/18/2018, serum creatinine based estimated GFR (eGFR) will be   calculated using the Chronic Kidney Disease Epidemiology Collaboration   (CKD-EPI) equation.       Calcium   Date Value Ref Range Status   10/15/2024 9.5 8.8 - 10.4 mg/dL Final     Comment:     Reference intervals for this test were updated on 7/16/2024 to reflect our healthy population more accurately. There may be differences in the flagging of prior results with similar values performed with this method. Those prior results can be interpreted in the context of the updated reference intervals.   01/06/2021 8.8 8.5 - 10.1 mg/dL Final     Phosphorus   Date Value Ref Range Status   10/15/2024 3.4 2.5 - 4.5 mg/dL Final   05/06/2021 4.5 2.5 - 4.5 mg/dL Final     Albumin   Date Value Ref Range Status   10/15/2024 4.4 3.5 - 5.2 g/dL Final   03/16/2022 3.6 3.4 - 5.0 g/dL Final   01/06/2021 2.8 (L) 3.4 - 5.0 g/dL Final                             Subjective findings- 83-year-old returns clinic for scratch abrasion ulcers left leg with infection and dermatitis.  Relates to taking the Bactrim with no problems and has a few of those left, relates nursings, not in doing Unna boot changes.    Objective findings- DP and PT are 2 out of 4 left.  Has venous stasis dermatitis left leg.  There is minimal edema, mild serosanguineous drainage, venous congestion, no gross erythema, no odor, no calor, no pain on palpation.    Assessment and plan- Scratch abrasion  ulcers left leg with signs of infection and Dermatitis, venous stasis with venous hypertension, peripheral arterial disease, kidney disease.  Diagnosis and treatment options discussed with them.  We cleaned the left leg with wound Vashe and applied Ace wrap for light compression.  Will discontinue the Unna boot compression systems.  Okay to discontinue home nursing.  They can go back to the compression socks.  She relates she has Triamcinolone cream will start using this daily.  Previous notes reviewed.  Return to clinic and see me in 1 month.              Low to moderate level of medical decision making.      Again, thank you for allowing me to participate in the care of your patient.        Sincerely,        Camden Salazar DPM

## 2024-11-14 DIAGNOSIS — I10 BENIGN ESSENTIAL HYPERTENSION: ICD-10-CM

## 2024-11-18 RX ORDER — CARVEDILOL 12.5 MG/1
12.5 TABLET ORAL 2 TIMES DAILY WITH MEALS
Qty: 180 TABLET | Refills: 1 | Status: SHIPPED | OUTPATIENT
Start: 2024-11-18

## 2024-11-18 NOTE — TELEPHONE ENCOUNTER
carvedilol (COREG) 12.5 MG tablet   180 tablet 0 10/10/2023       Last Office Visit : 9-  Future Office visit:  2-    Beta-Blockers Protocol Nfeine7811/14/2024 01:03 PM   Protocol Details Most recent blood pressure under 140/90 in past 12 months      3/26/2024  2:18 PM 4/4/2024  2:51 PM 5/24/2024  1:04 PM 9/12/2024  9:46 AM 10/8/2024  2:31 PM 10/15/2024  1:57 PM   Vital Signs         Systolic 167 !    144 !   185 !    Diastolic 87 !    82 !   76 !    Pulse 93    77   97

## 2024-12-26 DIAGNOSIS — I10 ESSENTIAL HYPERTENSION: ICD-10-CM

## 2024-12-31 RX ORDER — AMLODIPINE BESYLATE 5 MG/1
5 TABLET ORAL DAILY
Qty: 90 TABLET | Refills: 0 | Status: SHIPPED | OUTPATIENT
Start: 2024-12-31

## 2024-12-31 NOTE — TELEPHONE ENCOUNTER
"   amLODIPine Besylate Oral Tablet 5 MG     Last Written Prescription Date:  9/12/24  Last Fill Quantity: 90,   # refills: 0   Last Office Visit : 9-  Future Office visit:  2-    Routing refill request to provider for review/approval because:ABN GFR(26) on 10/15/24 and elevated BP. Per 10/15/24 Nephrology note> \"Her blood pressure at home is ranging around 130-140/70, though elevated in the clinic today \"  GFR Estimate   Date Value Ref Range Status   10/15/2024 26 (L) >60 mL/min/1.73m2 Final     Comment:     eGFR calculated using 2021 CKD-EPI equation.   01/06/2021 59 (L) >60 mL/min/[1.73_m2] Final     Comment:     Non  GFR Calc  Starting 12/18/2018, serum creatinine based estimated GFR (eGFR) will be   calculated using the Chronic Kidney Disease Epidemiology Collaboration   (CKD-EPI) equation.        BP Readings from Last 3 Encounters:   10/15/24 (!) 185/76   09/12/24 (!) 144/82   03/26/24 (!) 167/87      "

## 2025-01-21 ENCOUNTER — TELEPHONE (OUTPATIENT)
Dept: INTERNAL MEDICINE | Facility: CLINIC | Age: 85
End: 2025-01-21
Payer: MEDICARE

## 2025-01-21 NOTE — TELEPHONE ENCOUNTER
Patients spouse confirmed scheduled appointment:  Date: 2/26  Time: 4:30pm  Visit type: Return   Provider: PCP  Location: [facility]

## 2025-01-28 ENCOUNTER — LAB (OUTPATIENT)
Dept: LAB | Facility: CLINIC | Age: 85
End: 2025-01-28
Payer: MEDICARE

## 2025-01-28 DIAGNOSIS — N18.4 CKD (CHRONIC KIDNEY DISEASE) STAGE 4, GFR 15-29 ML/MIN (H): Primary | ICD-10-CM

## 2025-01-28 DIAGNOSIS — M81.0 AGE-RELATED OSTEOPOROSIS WITHOUT CURRENT PATHOLOGICAL FRACTURE: ICD-10-CM

## 2025-01-28 LAB
ANION GAP SERPL CALCULATED.3IONS-SCNC: 13 MMOL/L (ref 7–15)
BUN SERPL-MCNC: 46.5 MG/DL (ref 8–23)
CALCIUM SERPL-MCNC: 10.4 MG/DL (ref 8.8–10.4)
CALCIUM SERPL-MCNC: 10.4 MG/DL (ref 8.8–10.4)
CHLORIDE SERPL-SCNC: 101 MMOL/L (ref 98–107)
CHOLEST SERPL-MCNC: 157 MG/DL
CREAT SERPL-MCNC: 2 MG/DL (ref 0.51–0.95)
CREAT UR-MCNC: 42.8 MG/DL
EGFRCR SERPLBLD CKD-EPI 2021: 24 ML/MIN/1.73M2
FASTING STATUS PATIENT QL REPORTED: NO
FASTING STATUS PATIENT QL REPORTED: NO
GLUCOSE SERPL-MCNC: 96 MG/DL (ref 70–99)
HCO3 SERPL-SCNC: 24 MMOL/L (ref 22–29)
HDLC SERPL-MCNC: 65 MG/DL
HGB BLD-MCNC: 10.5 G/DL (ref 11.7–15.7)
LDLC SERPL CALC-MCNC: 66 MG/DL
MAGNESIUM SERPL-MCNC: 2.1 MG/DL (ref 1.7–2.3)
MICROALBUMIN UR-MCNC: 18.6 MG/L
MICROALBUMIN/CREAT UR: 43.46 MG/G CR (ref 0–25)
NONHDLC SERPL-MCNC: 92 MG/DL
PHOSPHATE SERPL-MCNC: 4.6 MG/DL (ref 2.5–4.5)
POTASSIUM SERPL-SCNC: 4.7 MMOL/L (ref 3.4–5.3)
SODIUM SERPL-SCNC: 138 MMOL/L (ref 135–145)
TRIGL SERPL-MCNC: 131 MG/DL

## 2025-01-28 PROCEDURE — 84100 ASSAY OF PHOSPHORUS: CPT

## 2025-01-28 PROCEDURE — 82570 ASSAY OF URINE CREATININE: CPT

## 2025-01-28 PROCEDURE — 80061 LIPID PANEL: CPT

## 2025-01-28 PROCEDURE — 80048 BASIC METABOLIC PNL TOTAL CA: CPT

## 2025-01-28 PROCEDURE — 83735 ASSAY OF MAGNESIUM: CPT

## 2025-01-28 PROCEDURE — 85018 HEMOGLOBIN: CPT

## 2025-01-28 PROCEDURE — 36415 COLL VENOUS BLD VENIPUNCTURE: CPT

## 2025-01-28 PROCEDURE — 82043 UR ALBUMIN QUANTITATIVE: CPT

## 2025-01-30 DIAGNOSIS — M81.0 AGE-RELATED OSTEOPOROSIS WITHOUT CURRENT PATHOLOGICAL FRACTURE: Primary | ICD-10-CM

## 2025-01-30 DIAGNOSIS — M80.00XA OSTEOPOROSIS WITH PATHOLOGICAL FRACTURE, INITIAL ENCOUNTER: ICD-10-CM

## 2025-02-05 ENCOUNTER — OFFICE VISIT (OUTPATIENT)
Dept: ORTHOPEDICS | Facility: CLINIC | Age: 85
End: 2025-02-05
Payer: MEDICARE

## 2025-02-05 DIAGNOSIS — M80.00XA OSTEOPOROSIS WITH PATHOLOGICAL FRACTURE, INITIAL ENCOUNTER: ICD-10-CM

## 2025-02-05 DIAGNOSIS — M81.0 AGE-RELATED OSTEOPOROSIS WITHOUT CURRENT PATHOLOGICAL FRACTURE: Primary | ICD-10-CM

## 2025-02-05 PROCEDURE — 96372 THER/PROPH/DIAG INJ SC/IM: CPT | Performed by: FAMILY MEDICINE

## 2025-02-05 PROCEDURE — 99213 OFFICE O/P EST LOW 20 MIN: CPT | Mod: 25 | Performed by: FAMILY MEDICINE

## 2025-02-05 NOTE — NURSING NOTE
[address] [address]  Dept: [phone]  ______________________________________________________________________________    Patient: Kimberly Chau   : 1940   MRN: [id number]   2025    INVASIVE PROCEDURE SAFETY CHECKLIST    Date: 2025     Procedure:Prolia Injection #5  Patient Name: Kimberly Chau  MRN: [id number]  YOB: 1940    Action: Complete sections as appropriate. Any discrepancy results in a HARD COPY until resolved.     PRE PROCEDURE:  Patient ID verified with 2 identifiers (name and  or MRN): Yes  Procedure and site verified with patient/designee (when able): Yes  Accurate consent documentation in medical record: Yes  H&P (or appropriate assessment) documented in medical record: Yes  H&P must be up to 20 days prior to procedure and updates within 24 hours of procedure as applicable: NA  Relevant diagnostic and radiology test results appropriately labeled and displayed as applicable: Yes  Procedure site(s) marked with provider initials: NA    TIMEOUT:  Time-Out performed immediately prior to starting procedure, including verbal and active participation of all team members addressing the following:Yes  * Correct patient identify  * Confirmed that the correct side and site are marked  * An accurate procedure consent form  * Agreement on the procedure to be done  * Correct patient position  * Relevant images and results are properly labeled and appropriately displayed  * The need to administer antibiotics or fluids for irrigation purposes during the procedure as applicable   * Safety precautions based on patient history or medication use    DURING PROCEDURE: Verification of correct person, site, and procedures any time the responsibility for care of the patient is transferred to another member of the care team.       Prior to injection, verified patient identity using patient's name and date of  birth.  Due to injection administration, patient instructed to remain in clinic for 15 minutes  afterwards, and to report any adverse reaction to me immediately.    Indication: Prolia  (denosumab) is a prescription medicine used to treat osteoporosis in patients who:   Are at high risk for fracture, meaning patients who have had a fracture related to osteoporosis, or who have multiple risk factors for fracture   Cannot use another osteoporosis medicine or other osteoporosis medicines did not work well   The timeline for early/late injections would be 4 weeks early and any time after the 6 month genaro. If a patient receives their injection late, then the subsequent injection would be 6 months from the date that they actually received the injection    1.  When was the last injection?  7/31/2024  2.  Did they check with their insurance for this calendar year?  Yes  3.  Is there an order in the chart?  Yes  4.  Has the patient had dental work involving the bone in the past month or will have work in the next 6 months?  No    The following steps were completed to comply with the REMS program for Prolia:  Reviewed information in the Medication Guide and Patient Counseling Chart, including the serious risks of Prolia  and the symptoms of each risk.  Advised patient to seek prompt medical attention if they have signs or symptoms of any of the serious risks.  Provided each patient a copy of the Medication Guide and Patient Brochure.        Drug Amount Wasted:  None.  Vial/Syringe: Syringe  Expiration Date:  7/31/2027      Madison Aquino ATC  February 5, 2025

## 2025-02-05 NOTE — LETTER
"  2/5/2025      RE: Kimberly Chau  [address] [facility] [address]     Dear Colleague,    Thank you for referring your patient, Kimberly Chau, to the [facility] [place]. Please see a copy of my visit note below.    S: 83 yo female with osteoporosis s/p 1 yr of Evenity treatment here to review labs and have Prolia #3 for her continuing osteoporosis treatment.          -No problems with Prolia #1 and #2  -No falls or fractures  -Doing well with her calcium      O:   NAD             Current Facility-Administered Medications         Current Outpatient Medications   Medication     acetaminophen 650 MG TABS     amLODIPine (NORVASC) 5 MG tablet     amLODIPine (NORVASC) 5 MG tablet     carvedilol (COREG) 12.5 MG tablet     COMPRESSION STOCKINGS     Diphenhydramine-APAP, sleep, (TYLENOL PM EXTRA STRENGTH PO)     ferrous gluconate (FERGON) 324 (38 Fe) MG tablet     Gauze Pads & Dressings (TELFA NON-ADHERENT) 3\"X4\" PADS     Multiple Vitamin (MULTI-VITAMIN) per tablet     Omeprazole (PRILOSEC PO)     sodium chloride (OCEAN NASAL SPRAY) 0.65 % nasal spray     triamcinolone (KENALOG) 0.1 % external cream            Current Facility-Administered Medications   Medication     denosumab (PROLIA) injection 60 mg     fluocinonide (LIDEX) 0.05 % ointment     fluocinonide (LIDEX) 0.05 % ointment     mupirocin (BACTROBAN) 2 % ointment     mupirocin (BACTROBAN) 2 % ointment     romosozumab-aqqg (EVENITY) injection 210 mg     romosozumab-aqqg (EVENITY) injection 210 mg     romosozumab-aqqg (EVENITY) injection 210 mg     romosozumab-aqqg (EVENITY) injection 210 mg     romosozumab-aqqg (EVENITY) injection 210 mg     romosozumab-aqqg (EVENITY) injection 210 mg     romosozumab-aqqg (EVENITY) injection 210 mg         No change in PMH since our last visit.         O: NAD       Latest Reference Range & Units 01/28/25 11:05   Sodium 135 - 145 mmol/L 138   Potassium 3.4 - 5.3 mmol/L 4.7   Chloride 98 - 107 " mmol/L 101   Carbon Dioxide (CO2) 22 - 29 mmol/L 24   Urea Nitrogen 8.0 - 23.0 mg/dL 46.5 (H)   Creatinine 0.51 - 0.95 mg/dL 2.00 (H)   GFR Estimate >60 mL/min/1.73m2 24 (L)   Calcium 8.8 - 10.4 mg/dL  8.8 - 10.4 mg/dL 10.4  10.4   Anion Gap 7 - 15 mmol/L 13   Magnesium 1.7 - 2.3 mg/dL 2.1   Phosphorus 2.5 - 4.5 mg/dL 4.6 (H)   (H): Data is abnormally high  (L): Data is abnormally low     A:  Severe Osteoporosis (Osteoporosis by DXA and T11 compression insufficiency fracture) s/p 1 year of Evenity treatment with a 5% improvement in her lumbar spine BMD and 1.0 improvement in the total hip since her DXA in May 2021.       P:  Prolia Injection #3 given today after reviewing her labs with her.    Continue improved calcium intake and supplemental Vit D  RTC in 6 months for Prolia #4.   -Labs prior to the appt (up to two weeks before her appt). Orders placed.     Procedure Note:      Prolia Injection #3     The risks and benefits of the medication were reviewed and she agreed to proceed.  Prepackage Prolia was obtained from the [facility].  The skin was prepped with an alcohol swab. Prolia solution (60mg) was injected subcutaneously into the R posterior upper arm.  Bandaid was placed.  The patient tolerated the injection well. She did not experience any adverse effects.          Alley Collazo MD, CAQ, FACSM, [facility]  Sports Medicine and Bone Health  Team Physician;  Athletics    Again, thank you for allowing me to participate in the care of your patient.      Sincerely,    Alley Collazo MD

## 2025-02-19 DIAGNOSIS — N18.4 CKD (CHRONIC KIDNEY DISEASE) STAGE 4, GFR 15-29 ML/MIN (H): Primary | ICD-10-CM

## 2025-02-26 ENCOUNTER — OFFICE VISIT (OUTPATIENT)
Dept: INTERNAL MEDICINE | Facility: CLINIC | Age: 85
End: 2025-02-26
Payer: MEDICARE

## 2025-02-26 VITALS
WEIGHT: 120.6 LBS | OXYGEN SATURATION: 97 % | BODY MASS INDEX: 24.36 KG/M2 | HEART RATE: 90 BPM | SYSTOLIC BLOOD PRESSURE: 133 MMHG | DIASTOLIC BLOOD PRESSURE: 77 MMHG

## 2025-02-26 DIAGNOSIS — I10 BENIGN ESSENTIAL HYPERTENSION: Primary | ICD-10-CM

## 2025-02-26 PROCEDURE — 99215 OFFICE O/P EST HI 40 MIN: CPT | Performed by: INTERNAL MEDICINE

## 2025-02-26 PROCEDURE — 3075F SYST BP GE 130 - 139MM HG: CPT | Performed by: INTERNAL MEDICINE

## 2025-02-26 PROCEDURE — 3078F DIAST BP <80 MM HG: CPT | Performed by: INTERNAL MEDICINE

## 2025-02-26 NOTE — PROGRESS NOTES
Kimberly Chau is a 84 year old female that presents in clinic today for the following:     Chief Complaint   Patient presents with    Follow Up     The patient's allergies and medications were reviewed. The patient's vitals were obtained without incident.     [place], [place]   Primary Care Clinic

## 2025-02-26 NOTE — PROGRESS NOTES
HPI:    She has some memory issues that are stable according to her . No new HEENT, cardiopulmonary, abdominal, , GYN, neurological, systemic, psychiatric, lymphatic, endocrine, vascular complaints.     Past Medical History:   Diagnosis Date    Anemia     Arthritis     Cataracts     Central retinal artery occlusion     Cervical spine fracture (H)     After a fall from orthostatic sx    Chronic pain     Back of head    Gastro-oesophageal reflux disease     Head injury     Hypertension     Hyponatremia     Resolved, 2/2 diuretics    Migraines     Osteoporosis     Pneumonia 2018    Rheumatoid arthritis(714.0)      Past Surgical History:   Procedure Laterality Date    COLONOSCOPY      EYE SURGERY Left 02/2019    Hole in macula    FUSION CERVICAL POSTERIOR THREE+ LEVELS  1/22/2013    Procedure: FUSION CERVICAL POSTERIOR THREE+ LEVELS;  Cervical 1-6 Posterior Instrumentation and Fusion, Cervical 3-4 Laminectomy  .Instrumentation and fusion to Cervical 6 *Latex Allergy*;  Surgeon: Ra Bar MD;  Location: [facility] OR    GYN SURGERY      HEAD & NECK SURGERY      HYSTERECTOMY      IR ENDOVENOUS ABLATION VARICOSE VEINS  2/16/2021    KNEE SURGERY      NECK SURGERY      OPEN REDUCTION INTERNAL FIXATION WRIST Left 4/30/2019    Procedure: Open Reduction Internal Fixation Left Distal Radius Fracture;  Surgeon: Dinh Strong MD;  Location:  OR    OPEN REDUCTION INTERNAL FIXATION WRIST Left 6/12/2020    Procedure: OPEN REDUCTION INTERNAL FIXATION Left Distal Radius Nonunion, Distal Ulna Resection;  Surgeon: Dinh Strong MD;  Location:  OR    OPTICAL TRACKING SYSTEM ENDOSCOPIC SINUS SURGERY Right 4/6/2018    Procedure: OPTICAL TRACKING SYSTEM ENDOSCOPIC SINUS SURGERY;  Stealth Assisted Right Maxillary Antrostomy, Anterior Ethmoidectomy And Frontal Sinusotomy;  Surgeon: Elsye Eisenberg MD;  Location:  OR    OPTICAL TRACKING SYSTEM ENDOSCOPIC SINUS SURGERY Right 8/3/2018    Procedure: OPTICAL  TRACKING SYSTEM ENDOSCOPIC SINUS SURGERY;  Stealth Assisted Revision Right Frontal Sinusotomy, Right Stent Placement  **Latex Allergy** ;  Surgeon: Elyse Eisenberg MD;  Location: [facility] OR    ORTHOPEDIC SURGERY      REMOVE HARDWARE WRIST Left 11/19/2019    Procedure: Left Distal Radius Hardware Removal, Flexor Pollicus Longus Repair, Deep Cultures;  Surgeon: Dinh Strong MD;  Location:  OR    [facility] HAND/FINGER SURGERY UNLISTED      [facility] PELVIS/HIP JOINT SURGERY UNLISTED       PE:    Vitals noted, gen, nad, cooperative, alert, neck supple nl rom, lungs with good air movement, RRR, S1, S2, no MRG, abdomen no acute findings, Grossly normal neurological exam.       A/P:    1. Immunizations; COVID Pfizer vaccine x 4; Moderna x 1. . She has completed the shingrix vaccine series. Tdap 3/10/2014. Prevnar 20  5/17/2022. RSV done 11/1/2024.   2. Lipids; 1/28/2025; TG's 131, HDL 65 and LDL 66.  3. HTN and CKD; on Carvedilol 12.5 BID and Amlodipine.  She is intolerant to HcTz and ACE-I.. She was seen Nephrology, Dr. Aguirre 10/15/2024 and next 3/4/2025. Creatinine 2.00 on 1/28/2025  4. RA; she used to follow outside Rheumatology. She does not feel she has any current sxs. And is not on medication.   5. Chronic mild anemia; CKD; Hgb 10.5 on 1/28/2025  6. Seen Dr. Lux, Orthopedics   She had follow up with Dr. Collazo 2/5/2025 and next 8/6/2025. Vitamin D level 49 on 6/28/2024. DEXA scan 8/16/2023 most negative T score was -2.8  7. She declines further mammograms   8. EKG findings from 5/17/2022. Reviewed with Cardiology; Resting echo done 7/1/2022 and Zio patch 7/1/2022 with runs of nonsustained SVT; no significant sxs. Recommended if worse or more persistent, recommend she visit with Cardiology. No current sxs.   9. Dermatology; seen 10/8/2024. .   10. Seen in Podiatry by Dr. Salazar 2/14/2025.   11. She was seen by Dr. Hitchcock, vascular on 1/17/2025 and next 4/18/2025 for PVD.         40 minutes spent on the date of the  encounter doing chart review, history and exam, documentation and further activities as noted above exclusive of procedures and other billable interpretations

## 2025-03-04 ENCOUNTER — LAB (OUTPATIENT)
Dept: LAB | Facility: CLINIC | Age: 85
End: 2025-03-04
Payer: MEDICARE

## 2025-03-04 ENCOUNTER — OFFICE VISIT (OUTPATIENT)
Dept: NEPHROLOGY | Facility: CLINIC | Age: 85
End: 2025-03-04
Payer: MEDICARE

## 2025-03-04 VITALS
WEIGHT: 120.4 LBS | SYSTOLIC BLOOD PRESSURE: 159 MMHG | BODY MASS INDEX: 24.32 KG/M2 | OXYGEN SATURATION: 97 % | DIASTOLIC BLOOD PRESSURE: 76 MMHG | HEART RATE: 83 BPM

## 2025-03-04 DIAGNOSIS — N18.5 CKD (CHRONIC KIDNEY DISEASE) STAGE 5, GFR LESS THAN 15 ML/MIN (H): Primary | ICD-10-CM

## 2025-03-04 DIAGNOSIS — N18.4 CKD (CHRONIC KIDNEY DISEASE) STAGE 4, GFR 15-29 ML/MIN (H): ICD-10-CM

## 2025-03-04 LAB
ALBUMIN MFR UR ELPH: 17.2 MG/DL
ALBUMIN SERPL BCG-MCNC: 4.4 G/DL (ref 3.5–5.2)
ALBUMIN UR-MCNC: 10 MG/DL
ANION GAP SERPL CALCULATED.3IONS-SCNC: 12 MMOL/L (ref 7–15)
APPEARANCE UR: CLEAR
BACTERIA #/AREA URNS HPF: ABNORMAL /HPF
BILIRUB UR QL STRIP: NEGATIVE
BUN SERPL-MCNC: 45.2 MG/DL (ref 8–23)
CALCIUM SERPL-MCNC: 10.3 MG/DL (ref 8.8–10.4)
CHLORIDE SERPL-SCNC: 100 MMOL/L (ref 98–107)
COLOR UR AUTO: ABNORMAL
CREAT SERPL-MCNC: 2.21 MG/DL (ref 0.51–0.95)
CREAT UR-MCNC: 64.5 MG/DL
CREAT UR-MCNC: 67.1 MG/DL
EGFRCR SERPLBLD CKD-EPI 2021: 21 ML/MIN/1.73M2
GLUCOSE SERPL-MCNC: 99 MG/DL (ref 70–99)
GLUCOSE UR STRIP-MCNC: NEGATIVE MG/DL
HCO3 SERPL-SCNC: 24 MMOL/L (ref 22–29)
HGB BLD-MCNC: 10.4 G/DL (ref 11.7–15.7)
HGB UR QL STRIP: ABNORMAL
KETONES UR STRIP-MCNC: NEGATIVE MG/DL
LEUKOCYTE ESTERASE UR QL STRIP: ABNORMAL
MICROALBUMIN UR-MCNC: 19.8 MG/L
MICROALBUMIN/CREAT UR: 30.7 MG/G CR (ref 0–25)
NITRATE UR QL: POSITIVE
PH UR STRIP: 5.5 [PH] (ref 5–7)
PHOSPHATE SERPL-MCNC: 5.1 MG/DL (ref 2.5–4.5)
POTASSIUM SERPL-SCNC: 5.1 MMOL/L (ref 3.4–5.3)
PROT/CREAT 24H UR: 0.26 MG/MG CR (ref 0–0.2)
RBC #/AREA URNS AUTO: ABNORMAL /HPF
SKIP: ABNORMAL
SODIUM SERPL-SCNC: 136 MMOL/L (ref 135–145)
SP GR UR STRIP: 1.01 (ref 1–1.03)
SQUAMOUS #/AREA URNS AUTO: ABNORMAL /LPF
TRANS CELLS #/AREA URNS HPF: ABNORMAL /HPF
UROBILINOGEN UR STRIP-MCNC: NORMAL MG/DL
WBC #/AREA URNS AUTO: ABNORMAL /HPF

## 2025-03-04 PROCEDURE — 3078F DIAST BP <80 MM HG: CPT | Performed by: INTERNAL MEDICINE

## 2025-03-04 PROCEDURE — 3077F SYST BP >= 140 MM HG: CPT | Performed by: INTERNAL MEDICINE

## 2025-03-04 PROCEDURE — G2211 COMPLEX E/M VISIT ADD ON: HCPCS | Performed by: INTERNAL MEDICINE

## 2025-03-04 PROCEDURE — 99214 OFFICE O/P EST MOD 30 MIN: CPT | Performed by: INTERNAL MEDICINE

## 2025-03-04 NOTE — PATIENT INSTRUCTIONS
It was a pleasure taking care of you today.  I've included a brief summary of our discussion and care plan from today's visit below.  Please review this information with your primary care provider.     My recommendations are summarized as follows:  -Please attend the dialysis education classes; my nurse will reach out to you     Who do I call with any questions after my visit?  Please be in touch if there are any further questions that arise following today's visit.  There are multiple ways to contact your nephrology care team.       During business hours, you may reach your Nephrology Care Team or schedule or reschedule an appointment or lab at [phone].       If you need to schedule imaging, please call [phone].   To schedule a COVID test, please call [phone].     You can always send a secure message through [employer]. [employer] messages are answered by your nurse or doctor typically within 24-48 hours. Please allow extra time on weekends and holidays.       For urgent/emergent questions after business hours, you may reach the on-call Nephrology Fellow by contacting the [facility]  at [phone].     How will I get the results of any tests ordered?    You will receive all of your results.  If you have signed up for [employer], any tests ordered at your visit will be available to you once resulted on [employer]. Typically the physician reviews them and may or may not make further recommendations. If there are urgent results that require a change in your care plan, your physician or nurse will call you to discuss the next steps. If you are not on [employer], a letter may be generated and mailed to you with your results.

## 2025-03-04 NOTE — NURSING NOTE
Kimberly Chau's goals for this visit include:   Chief Complaint   Patient presents with    RECHECK     Follow up CKD        She requests these members of her care team be copied on today's visit information: no     PCP: Guillermo Castellano    Referring Provider:  Referred Self, MD  No address on file    BP (!) 159/76 (BP Location: Left arm, Patient Position: Chair, Cuff Size: Adult Small)   Pulse 83   Wt 54.6 kg (120 lb 6.4 oz)   SpO2 97%   BMI 24.32 kg/m      Do you need any medication refills at today's visit? No     CRISTAL Hull   Neph/Pulm [facility]

## 2025-03-04 NOTE — PROGRESS NOTES
I was asked to see this patient by Guillermo Juarez    CC: CKD    HPI:   I had the pleasure today of seeing Kimberly Chau. She is a 84 year old female  who presents for Follow-up  of CKD. She is here today with her  Wilder of 63 years.    Past medical history is significant for longstanding rheumatoid arthritis [currently on no treatment], osteoporosis complicated by T11 compression fracture, anemia, Hpertension, GERD.     She presented to the emergency department in August 26, 2023 with left lower leg ulcer and cellulitis.  She was found to be in acute renal failure (creatinine 8 mg/dl).  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  The degree of fibrosis on the biopsy was 10 to 20%.  She was urgently started on dialysis and received thrice weekly dialysis for 11 weeks.  She has been off dialysis since November 2023.    She developed cellulitis in the left leg early 2024, treated with antibiotics and it got better. She was discharged from podiatry as her ulcer healed.    Her blood pressure at home is ranging around 130-140/70, though elevated in the clinic today.    Overall she is doing okay.  Her energy is at her baseline. Her appetite is good.  Her weight has been stable.  Energy is good, she does the cooking and laundry at home. No SOB or gross hematuria or other urinary symptoms.    Social history: she is  for 66 years. She has 2 sons and 2 granddaughters and one great granddaughter.     Allergies   Allergen Reactions    Hctz Other (See Comments)     Pt develops symptomatic and significant hyponatremia with HCTZ exposure    Hydrochlorothiazide      Other reaction(s): Hyponatremia  Hyponatremia    Latex     Benzocaine Rash     carba mix,thrium-sunscreen    Ra Lotion Rash     carba mix,thrium-sunscreen     "Ace Inhibitors Unknown     Dizziness and orthostatic changes and electrolyte problems.    Latex Unknown       Current Outpatient Medications   Medication Sig Dispense Refill    acetaminophen 650 MG TABS Take 650 mg by mouth every 4 hours as needed. 100 tablet 0    amLODIPine (NORVASC) 5 MG tablet Take 1 tablet (5 mg) by mouth daily. 90 tablet 0    amoxicillin-clavulanate (AUGMENTIN) 500-125 MG tablet Take 1 tablet by mouth 2 times daily 14 tablet 0    carvedilol (COREG) 12.5 MG tablet Take 1 tablet (12.5 mg) by mouth 2 times daily (with meals). 180 tablet 1    COMPRESSION STOCKINGS Please measure and distribute 1 pair of 20mmHg - 30mmHg knee high open or closed toe compression stockings. Jobst ultrasheer or equivalent. 2 each 4    Diphenhydramine-APAP, sleep, (TYLENOL PM EXTRA STRENGTH PO) Take 2 tablets by mouth At Bedtime       ferrous gluconate (FERGON) 324 (38 Fe) MG tablet Take 324 mg by mouth      Gauze Pads & Dressings (TELFA NON-ADHERENT) 3\"X4\" PADS Cut to size for open areas on the lower extremities and secure with paper tape. Change dressing every other day. 30 each 11    losartan (COZAAR) 25 MG tablet Take 1 tablet (25 mg) by mouth at bedtime. 90 tablet 3    multivitamin w/minerals (MULTI-VITAMIN) tablet Take 1 tablet by mouth every morning 90 tablet 11    Omeprazole (PRILOSEC PO) Take 20 mg by mouth as needed       pentoxifylline ER (TRENTAL) 400 MG CR tablet Take 1 tablet (400 mg) by mouth 2 times daily (Patient taking differently: Take 400 mg by mouth daily.) 120 tablet 3    triamcinolone (KENALOG) 0.1 % external ointment Apply with dressing changes, after Vashe soaks 80 g 4    vitamin D3 (CHOLECALCIFEROL) 50 mcg (2000 units) tablet Take 1 tablet by mouth daily      cephALEXin (KEFLEX) 500 MG capsule Take 1 capsule (500 mg) by mouth 2 times daily. (Patient not taking: Reported on 3/4/2025) 28 capsule 0    clindamycin (CLEOCIN) 300 MG capsule Take 1 capsule (300 mg) by mouth 2 times daily (with meals) " (Patient not taking: Reported on 3/4/2025) 20 capsule 0    denosumab (PROLIA) 60 MG/ML SOSY injection Inject 1 mL (60 mg) Subcutaneous once for 1 dose 1 mL 0    doxycycline hyclate (VIBRA-TABS) 100 MG tablet Take 1 tablet (100 mg) by mouth 2 times daily (Patient not taking: Reported on 3/4/2025) 14 tablet 0    pentoxifylline ER (TRENTAL) 400 MG CR tablet Take 1 tablet (400 mg) by mouth 3 times daily (with meals) (Patient not taking: Reported on 3/4/2025) 90 tablet 4    sulfamethoxazole-trimethoprim (BACTRIM) 400-80 MG tablet Take 1 tablet by mouth 2 times daily. (Patient not taking: Reported on 3/4/2025) 28 tablet 0     Current Facility-Administered Medications   Medication Dose Route Frequency Provider Last Rate Last Admin    fluocinonide (LIDEX) 0.05 % ointment   Topical Once Ra Orourke MD        fluocinonide (LIDEX) 0.05 % ointment   Topical Once Ra Orourke MD        mupirocin (BACTROBAN) 2 % ointment   Topical Once Ra Orourke MD        mupirocin (BACTROBAN) 2 % ointment   Topical Once Ra Orourke MD        romosozumab-aqqg (EVENITY) injection 210 mg  210 mg Subcutaneous Q30 Days Alley Collazo MD   210 mg at 05/25/22 1246    romosozumab-aqqg (EVENITY) injection 210 mg  210 mg Subcutaneous Q30 Days Kerri Lux MD   210 mg at 04/23/22 1152    romosozumab-aqqg (EVENITY) injection 210 mg  210 mg Subcutaneous Q30 Days Abigail Lugo MD   210 mg at 03/16/22 1450    romosozumab-aqqg (EVENITY) injection 210 mg  210 mg Subcutaneous Once Abigail Lugo MD        romosozumab-aqqg (EVENITY) injection 210 mg  210 mg Subcutaneous Q30 Days Alley Collazo MD   210 mg at 08/18/21 1643    romosozumab-aqqg (EVENITY) injection 210 mg  210 mg Subcutaneous Once Alley Collazo MD        romosozumab-aqqg (EVENITY) injection 210 mg  210 mg Subcutaneous Once Vale, Alley Benavides MD         Past Medical History:    Diagnosis Date    Anemia     Arthritis     Cataracts     Central retinal artery occlusion     Cervical spine fracture (H)     After a fall from orthostatic sx    Chronic pain     Back of head    Gastro-oesophageal reflux disease     Head injury     Hypertension     Hyponatremia     Resolved, 2/2 diuretics    Migraines     Osteoporosis     Pneumonia 2018    Rheumatoid arthritis(714.0)        Past Surgical History:   Procedure Laterality Date    COLONOSCOPY      EYE SURGERY Left 02/2019    Hole in macula    FUSION CERVICAL POSTERIOR THREE+ LEVELS  1/22/2013    Procedure: FUSION CERVICAL POSTERIOR THREE+ LEVELS;  Cervical 1-6 Posterior Instrumentation and Fusion, Cervical 3-4 Laminectomy  .Instrumentation and fusion to Cervical 6 *Latex Allergy*;  Surgeon: Ra Bar MD;  Location: [facility] OR    GYN SURGERY      HEAD & NECK SURGERY      HYSTERECTOMY      IR ENDOVENOUS ABLATION VARICOSE VEINS  2/16/2021    KNEE SURGERY      NECK SURGERY      OPEN REDUCTION INTERNAL FIXATION WRIST Left 4/30/2019    Procedure: Open Reduction Internal Fixation Left Distal Radius Fracture;  Surgeon: Dinh Strong MD;  Location:  OR    OPEN REDUCTION INTERNAL FIXATION WRIST Left 6/12/2020    Procedure: OPEN REDUCTION INTERNAL FIXATION Left Distal Radius Nonunion, Distal Ulna Resection;  Surgeon: Dinh Strong MD;  Location:  OR    OPTICAL TRACKING SYSTEM ENDOSCOPIC SINUS SURGERY Right 4/6/2018    Procedure: OPTICAL TRACKING SYSTEM ENDOSCOPIC SINUS SURGERY;  Stealth Assisted Right Maxillary Antrostomy, Anterior Ethmoidectomy And Frontal Sinusotomy;  Surgeon: Elyse Eisenberg MD;  Location:  OR    OPTICAL TRACKING SYSTEM ENDOSCOPIC SINUS SURGERY Right 8/3/2018    Procedure: OPTICAL TRACKING SYSTEM ENDOSCOPIC SINUS SURGERY;  Stealth Assisted Revision Right Frontal Sinusotomy, Right Stent Placement  **Latex Allergy** ;  Surgeon: Elyse Eisenberg MD;  Location:  OR    ORTHOPEDIC SURGERY      REMOVE HARDWARE WRIST Left  11/19/2019    Procedure: Left Distal Radius Hardware Removal, Flexor Pollicus Longus Repair, Deep Cultures;  Surgeon: Dinh Strong MD;  Location: [facility] HAND/FINGER SURGERY UNLISTED      [facility] PELVIS/HIP JOINT SURGERY UNLISTED         Social History     Tobacco Use    Smoking status: Never    Smokeless tobacco: Never   Substance Use Topics    Alcohol use: Yes     Comment: 2 glasses of wine a day    Drug use: Never       Family History   Problem Relation Age of Onset    Arthritis Mother     Respiratory Mother         pulmonary fibrosis    Hypertension Mother     Anemia Mother     Liver Disease Father         from EtOH    Alcoholism Father     No Known Problems Maternal Grandmother     Heart Disease Maternal Grandfather     Glaucoma Paternal Grandfather     Heart Disease Paternal Grandfather     Multiple Sclerosis Sister     Breast Cancer Sister     Skin Cancer No family hx of     Melanoma No family hx of        ROS: A 12 system review of systems was negative other than noted here or above.     Exam:  BP (!) 159/76 (BP Location: Left arm, Patient Position: Chair, Cuff Size: Adult Small)   Pulse 83   Wt 54.6 kg (120 lb 6.4 oz)   SpO2 97%   BMI 24.32 kg/m    BP Readings from Last 6 Encounters:   03/04/25 (!) 159/76   02/26/25 133/77   01/17/25 121/79   10/15/24 (!) 185/76   09/12/24 (!) 144/82   03/26/24 (!) 167/87     Wt Readings from Last 4 Encounters:   03/04/25 54.6 kg (120 lb 6.4 oz)   02/26/25 54.7 kg (120 lb 9.6 oz)   01/17/25 51.4 kg (113 lb 4.8 oz)   10/15/24 52.7 kg (116 lb 1.6 oz)     GENERAL APPEARANCE: alert and no distress  EYES: PERRL  HENT: mouth without ulcers or lesions  NECK: supple, no adenopathy  RESP: lungs clear to auscultation - no rales, rhonchi or wheezes  CV: regular rhythm, normal rate, no rub   ABDOMEN:  soft, nontender, no HSM or masses and bowel sounds normal  MS: extremities normal- no gross deformities noted, no evidence of inflammation in joints, no muscle  tenderness  SKIN: no rash  NEURO: Normal strength and tone, sensory exam grossly normal, mentation intact and speech normal  PSYCH: mentation appears normal. and affect normal/bright  She has some swelling on the left leg close to the wound but none on the right.     Results: Reviewed in details with the patient    Assessment/Plan:  Problem #1 Chronic kidney disease: She sustained ANAI secondary to infection associated glomerulonephritis secondary to cellulitis back in August 2023.  She required dialysis for ~2.5 months and she is currently off for dialysis since November 2023. She received Antibiotics to treat her infection but no immunosuppressants .She was following with Dr. Cota.  Her most recent creatinine today is around 2.2 mg/dL; however she is small with small muscle mass. Her current GFR is at 21 mL/min, however her Cystatin C previously showed worse GFR which I estimate at this point to be around  15 mL/min  --We discussed today the importance of blood pressure control in halting the progression of chronic kidney disease.  We also discussed the natural history of chronic kidney disease and the expectations moving forward.  We also discussed extensively today the different modalities of renal placement therapy in case if future need arises.  I explained in details procedures of both peritoneal and hemodialysis and the implication of a PD catheter versus an AV fistula.  We also discussed conservative kidney management and it is her choice whether she wants to go on dialysis or forego dialysis.    -Reemphasize to Follow-up with CKD journey to learn more about her options.  -She is on the losartan 25 mg daily and her potassium is acceptable  --She is not on SGLT2 inhibitor and given her advanced age and GFR at this stage, I will hold on that.    -- She is currently euvolemic on exam and there is no need for diuretics.      Problem #2 anemia normocytic: Her hemoglobin today is at 10.5 g/dL.  Will hold on any  referral to the anemia clinic at this point.  She will need IRASEMA if her hemoglobin is below 9 g/dL.    Problem #3 CKD MBD: Her calcium and phosphorus are rather within normal limits.  Her PTH is lowish  which can suggest adynamic bone disease.  She does have a history of osteoporosis for which she is receiving denosumab.    Problem #4 hypertension: Her blood pressure is  high in clinic today but rather rather acceptable range at home. No changes to her medications were done at this point.  She was instructed to measure her blood pressure at home and communicate with me via [employer] message in 2 weeks.    The total time of this encounter amounted to 35 minutes on the day of the encounter 3/4/2025. This time included face to face time spent with the patient, preparatory work and chart review, ordering tests, and performing post visit documentation.    The longitudinal plan of care for this patient was addressed during this visit. Due to the added complexity in care, I will continue to support the patient with subsequent management of this condition(s) and with the ongoing continuity of care of this condition(s).     *This dictation was prepared in part using Dragon recognition software.  As a result errors may occur. When identified these transcription errors have been corrected.  While every attempt is made to correct errors during dictation, errors may still exist.     Osman Aguirre MD   [facility]   Department of Medicine   Division of Renal Disease and Hypertension

## 2025-03-08 NOTE — TELEPHONE ENCOUNTER
Addended by: WILLIAM MEREDITH on: 3/7/2025 08:22 PM     Modules accepted: Level of Service     [facility] Prior Authorization Team   Phone: [phone]  Fax: [phone]    PA Initiation    Medication: fluocinolone (SYNALAR) 0.025 % ointment -   Insurance Company: [employer] - Phone [phone] Fax [phone]  Pharmacy Filling the Rx: [id number] - [place] - [address]  Filling Pharmacy Phone: [phone]  Filling Pharmacy Fax:    Start Date: 3/15/2017

## 2025-03-13 ENCOUNTER — DOCUMENTATION ONLY (OUTPATIENT)
Dept: OTHER | Facility: CLINIC | Age: 85
End: 2025-03-13
Payer: MEDICARE

## 2025-04-10 ENCOUNTER — ANCILLARY PROCEDURE (OUTPATIENT)
Dept: ULTRASOUND IMAGING | Facility: CLINIC | Age: 85
End: 2025-04-10
Attending: HOSPITALIST
Payer: MEDICARE

## 2025-04-10 DIAGNOSIS — I83.813 VARICOSE VEINS OF BILATERAL LOWER EXTREMITIES WITH PAIN: ICD-10-CM

## 2025-04-10 DIAGNOSIS — I73.9 PAD (PERIPHERAL ARTERY DISEASE): ICD-10-CM

## 2025-04-10 PROCEDURE — 93970 EXTREMITY STUDY: CPT | Performed by: STUDENT IN AN ORGANIZED HEALTH CARE EDUCATION/TRAINING PROGRAM

## 2025-04-10 PROCEDURE — 93922 UPR/L XTREMITY ART 2 LEVELS: CPT | Performed by: STUDENT IN AN ORGANIZED HEALTH CARE EDUCATION/TRAINING PROGRAM

## 2025-04-21 NOTE — PROGRESS NOTES
Otolaryngology Clinic      Name: Kimberly Chau  MRN: [id number]  Age: 79 year old  : 1940      Chief Complaint:   Follow up     History of Present Illness:   Kimberly Chau is a 79 year old female with a history of chronic sinusitis who presents for follow up. She underwent revision frontal sinus surgery with stent placement in 2018. I last saw her on 19 and she was feeling well. She continues to feel overall well with some occasional mucus and congestion. She has not had headaches or frontal sinus pain following stent placement. She is scheduled for revision of left distal radius fracture tomorrow.       8/3/2018 Optical tracking system endoscopic sinus surgery (Right) Procedure: OPTICAL TRACKING SYSTEM ENDOSCOPIC SINUS SURGERY; Stealth Assisted Revision Right Frontal Sinusotomy, Right Stent Placement **Latex Allergy** ; Surgeon: Elyse Eisenberg MD; Location:  OR   2018 Optical tracking system endoscopic sinus surgery (Right) Procedure: OPTICAL TRACKING SYSTEM ENDOSCOPIC SINUS SURGERY; Stealth Assisted Right Maxillary Antrostomy, Anterior Ethmoidectomy And Frontal Sinusotomy; Surgeon: Elyse Eisenberg MD; Location:  OR          Review of Systems:   Pertinent items are noted in HPI or as in patient entered ROS below, remainder of complete ROS is negative.    ENT ROS 2019   Neurology Headache   Eyes -   Ears, Nose, Throat -   Musculoskeletal Neck pain   Allergy/Immunology Allergies or hay fever   Endocrine -         Physical Exam:   Ht 1.524 m (5')   Wt 53.1 kg (117 lb)   BMI 22.85 kg/m       General Assessment   The patient is alert, oriented and in no acute distress.   Head/Face/Scalp  Grossly normal.   Nose  External nose is straight, skin is normal.     Procedure:  Endoscopy indicated for exam of stent and sinus disease  Topical anesthetic/decongestant spray applied.  Rigid scope used for visualization.  Findings:  Scant mucus around stent, freely mobile, in good  position.      Assessment and Plan:  Kimberly Chau is a 79 year old female with history of chronic sinusitis. She has been feeling improved following revision endoscopic sinus surgery with stent placement. She does not get headaches or frontal sinus pain often anymore. She is scheduled for revision left wrist surgery tomorrow and I discussed this with Dr. Strong who will provide prophylactic antibiotic treatment given the patient's sinus disease.     Follow-up: Return in about 6 months (around 5/18/2020).         Scribe Disclosure:  I, Elizabeth Angela, am serving as a scribe to document services personally performed by Elyse Eisenberg MD at this visit, based upon the provider's statements to me. All documentation has been reviewed by the aforementioned provider prior to being entered into the official medical record.    The documentation recorded by the scribe accurately reflects the services I personally performed and the decisions made by me.  Elyse Eisenberg MD   General

## 2025-04-28 ENCOUNTER — TELEPHONE (OUTPATIENT)
Dept: PODIATRY | Facility: CLINIC | Age: 85
End: 2025-04-28
Payer: MEDICARE

## 2025-04-28 DIAGNOSIS — L97.921 SKIN ULCER OF LEFT LOWER LEG, LIMITED TO BREAKDOWN OF SKIN (H): ICD-10-CM

## 2025-04-28 DIAGNOSIS — I73.9 PERIPHERAL ARTERIAL DISEASE: ICD-10-CM

## 2025-04-28 DIAGNOSIS — I87.8 VENOUS STASIS: Primary | ICD-10-CM

## 2025-04-28 RX ORDER — CEPHALEXIN 500 MG/1
500 CAPSULE ORAL 2 TIMES DAILY
Qty: 28 CAPSULE | Refills: 0 | Status: SHIPPED | OUTPATIENT
Start: 2025-04-28

## 2025-04-28 NOTE — TELEPHONE ENCOUNTER
Called and talked to Wilder, patient's , consent on file. Mentioned that the provider sent in a prescription for Keflex antibiotic for the signs of infection to the [facility] pharmacy in [place].  Also relayed to clean the wounds with wound cleanser and wrap with sterile gauze daily until we see her in clinic. The patient's  verbally understood.

## 2025-04-28 NOTE — PROGRESS NOTES
Past Medical History:   Diagnosis Date    Anemia     Arthritis     Cataracts     Central retinal artery occlusion     Cervical spine fracture (H)     After a fall from orthostatic sx    Chronic pain     Back of head    Gastro-oesophageal reflux disease     Head injury     Hypertension     Hyponatremia     Resolved, 2/2 diuretics    Migraines     Osteoporosis     Pneumonia 2018    Rheumatoid arthritis(714.0)      Patient Active Problem List   Diagnosis    Intrinsic atopic dermatitis    Essential hypertension    Rheumatoid arthritis (H)    Retinal artery occlusion    Cervical vertebral fracture (H)    Central retinal artery occlusion of right eye    Bilateral occipital neuralgia    C1-C2 instability    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 factor (H)    Osteoarthritis    Malignant hypertension    Hyponatremia    GERD (gastroesophageal reflux disease)    Eczematous dermatitis    Anxiety state    Anemia    Closed fracture of distal end of left radius with delayed healing, unspecified fracture morphology, subsequent encounter    Closed fracture of distal end of left radius, unspecified fracture morphology, sequela    Osteomyelitis of left leg (H)    Pain of left thumb    CKD (chronic kidney disease) stage 4, GFR 15-29 ml/min (H)    Mild protein-calorie malnutrition    Venous ulcer (H)    Prostate cancer (H)     Past Surgical History:   Procedure Laterality Date    COLONOSCOPY      EYE SURGERY Left 02/2019    Hole in macula    FUSION CERVICAL POSTERIOR THREE+ LEVELS  1/22/2013    Procedure: FUSION CERVICAL POSTERIOR THREE+ LEVELS;  Cervical 1-6 Posterior Instrumentation and Fusion, Cervical 3-4 Laminectomy  .Instrumentation and fusion to Cervical 6 *Latex Allergy*;  Surgeon: Ra Bar MD;  Location: [facility] OR    GYN SURGERY      HEAD & NECK SURGERY      HYSTERECTOMY      IR ENDOVENOUS ABLATION VARICOSE VEINS  2/16/2021    KNEE SURGERY      NECK SURGERY      OPEN REDUCTION INTERNAL  FIXATION WRIST Left 4/30/2019    Procedure: Open Reduction Internal Fixation Left Distal Radius Fracture;  Surgeon: Dinh Strong MD;  Location: [facility] OR    OPEN REDUCTION INTERNAL FIXATION WRIST Left 6/12/2020    Procedure: OPEN REDUCTION INTERNAL FIXATION Left Distal Radius Nonunion, Distal Ulna Resection;  Surgeon: Dinh Strong MD;  Location: [facility] OR    OPTICAL TRACKING SYSTEM ENDOSCOPIC SINUS SURGERY Right 4/6/2018    Procedure: OPTICAL TRACKING SYSTEM ENDOSCOPIC SINUS SURGERY;  Stealth Assisted Right Maxillary Antrostomy, Anterior Ethmoidectomy And Frontal Sinusotomy;  Surgeon: Elyse Eisenberg MD;  [facility]    OPTICAL TRACKING SYSTEM ENDOSCOPIC SINUS SURGERY Right 8/3/2018    Procedure: OPTICAL TRACKING SYSTEM ENDOSCOPIC SINUS SURGERY;  Stealth Assisted Revision Right Frontal Sinusotomy, Right Stent Placement  **Latex Allergy** ;  Surgeon: Elyse Eisenberg MD;  Location:  OR    ORTHOPEDIC SURGERY      REMOVE HARDWARE WRIST Left 11/19/2019    Procedure: Left Distal Radius Hardware Removal, Flexor Pollicus Longus Repair, Deep Cultures;  Surgeon: Dinh Strong MD;  Location:  OR    [facility] HAND/FINGER SURGERY UNLISTED      [facility] PELVIS/HIP JOINT SURGERY UNLISTED       Social History     Socioeconomic History    Marital status:      Spouse name: Not on file    Number of children: Not on file    Years of education: Not on file    Highest education level: Not on file   Occupational History    Not on file   Tobacco Use    Smoking status: Never    Smokeless tobacco: Never   Substance and Sexual Activity    Alcohol use: Yes     Comment: 2 glasses of wine a day    Drug use: Never    Sexual activity: Not Currently   Other Topics Concern    Parent/sibling w/ CABG, MI or angioplasty before 65F 55M? Not Asked   Social History Narrative    Ms. Chau is , has two grown children and two grandchildren. She does not work. She was never a smoker, and drinks a glass of wine with dinner each  night.         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      Social Drivers of Health     Financial Resource Strain: Low Risk  (8/24/2024)    Received from [facility]    Financial Resource Strain     Difficulty of Paying Living Expenses: 3     Difficulty of Paying Living Expenses: Not on file   Food Insecurity: No Food Insecurity (8/24/2024)    [facility]    Food Insecurity     Do you worry your food will run out before you are able to buy more?: 1   Transportation Needs: No Transportation Needs (8/24/2024)    Received from [facility]    Transportation Needs     Does lack of transportation keep you from medical appointments?: 1     Does lack of transportation keep you from work, meetings or getting things that you need?: 1   Physical Activity: Not on file   Stress: Not on file   Social Connections: Socially Integrated (8/24/2024)    Received from [facility]    Social Connections     Do you often feel lonely or isolated from those around you?: 0   Interpersonal Safety: Not on file   Housing Stability: Low Risk  (8/24/2024)    [facility]    Housing Stability     What is your housing situation today?: 1     Family History   Problem Relation Age of Onset    Arthritis Mother     Respiratory Mother         pulmonary fibrosis    Hypertension Mother     Anemia Mother     Liver Disease Father         from EtOH    Alcoholism Father     No Known Problems Maternal Grandmother     Heart Disease Maternal Grandfather     Glaucoma Paternal Grandfather     Heart Disease Paternal Grandfather     Multiple Sclerosis Sister     Breast Cancer Sister     Skin Cancer No family hx of     Melanoma No family hx of          Patient called with concerns of new ulcers and infection on left leg.  Prescription for Keflex sent  to the pharmacy.  Clean the leg with wound cleanser and wrap with gauze dressing if it is draining.  Follow-up in clinic this week as scheduled.  They can also go to the urgent care or emergency room as needed.

## 2025-04-28 NOTE — TELEPHONE ENCOUNTER
Called and talked to Wilder, patient's , consent on file. Mentioned to him that we can get her scheduled for 2pm on 4/30. The patient's  explained that her leg is red and sensitive to the touch. He mentioned that her wound looks good, but she has 6 lesions on her leg that have opened up. Explained that if she needs to have her leg looked at before Wednesday then it would be best for her to go to urgent care. The patient's  verbally understood. A note was sent to the provider.

## 2025-04-28 NOTE — TELEPHONE ENCOUNTER
Patient Returning Call    Reason for call:  patient is having possible infection flair up and wanting to be seen by provider in [facility] asap, refuse next available requesting callback     Information relayed to patient:  te sent to clinic     Patient has additional questions:  No      Could we send this information to you [facility] or would you prefer to receive a phone call?:   Patient would prefer a phone call   Okay to leave a detailed message?: Yes at Cell number on file:    Telephone Information:   Mobile [phone]

## 2025-04-30 ENCOUNTER — OFFICE VISIT (OUTPATIENT)
Dept: PODIATRY | Facility: CLINIC | Age: 85
End: 2025-04-30
Payer: MEDICARE

## 2025-04-30 DIAGNOSIS — L03.116 CELLULITIS OF LEFT LEG: ICD-10-CM

## 2025-04-30 DIAGNOSIS — S80.812S: ICD-10-CM

## 2025-04-30 DIAGNOSIS — I73.9 PERIPHERAL ARTERIAL DISEASE: ICD-10-CM

## 2025-04-30 DIAGNOSIS — L97.921 SKIN ULCER OF LEFT LOWER LEG, LIMITED TO BREAKDOWN OF SKIN (H): ICD-10-CM

## 2025-04-30 DIAGNOSIS — I87.8 VENOUS STASIS: Primary | ICD-10-CM

## 2025-04-30 PROCEDURE — 29581 APPL MULTLAYER CMPRN SYS LEG: CPT | Mod: LT | Performed by: PODIATRIST

## 2025-04-30 PROCEDURE — 99214 OFFICE O/P EST MOD 30 MIN: CPT | Mod: 25 | Performed by: PODIATRIST

## 2025-04-30 NOTE — LETTER
4/30/2025      Kimberly Chau  [address]  [place] [address]      Dear Colleague,    Thank you for referring your patient, Kimberly Chau, to the [facility]. Please see a copy of my visit note below.    Past Medical History:   Diagnosis Date     Anemia      Arthritis      Cataracts      Central retinal artery occlusion      Cervical spine fracture (H)     After a fall from orthostatic sx     Chronic pain     Back of head     Gastro-oesophageal reflux disease      Head injury      Hypertension      Hyponatremia     Resolved, 2/2 diuretics     Migraines      Osteoporosis      Pneumonia 2018     Rheumatoid arthritis(714.0)      Patient Active Problem List   Diagnosis     Intrinsic atopic dermatitis     Essential hypertension     Rheumatoid arthritis (H)     Retinal artery occlusion     Cervical vertebral fracture (H)     Central retinal artery occlusion of right eye     Bilateral occipital neuralgia     C1-C2 instability     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 factor (H)     Osteoarthritis     Malignant hypertension     Hyponatremia     GERD (gastroesophageal reflux disease)     Eczematous dermatitis     Anxiety state     Anemia     Closed fracture of distal end of left radius with delayed healing, unspecified fracture morphology, subsequent encounter     Closed fracture of distal end of left radius, unspecified fracture morphology, sequela     Osteomyelitis of left leg (H)     Pain of left thumb     CKD (chronic kidney disease) stage 4, GFR 15-29 ml/min (H)     Mild protein-calorie malnutrition     Venous ulcer (H)     Prostate cancer (H)     Past Surgical History:   Procedure Laterality Date     COLONOSCOPY       EYE SURGERY Left 02/2019    Hole in macula     FUSION CERVICAL POSTERIOR THREE+ LEVELS  1/22/2013    Procedure: FUSION CERVICAL POSTERIOR THREE+ LEVELS;  Cervical 1-6 Posterior Instrumentation and Fusion, Cervical 3-4 Laminectomy   .Instrumentation and fusion to Cervical 6 *Latex Allergy*;  Surgeon: Ra Bar MD;  Location: [facility] OR     GYN SURGERY       HEAD & NECK SURGERY       HYSTERECTOMY       IR ENDOVENOUS ABLATION VARICOSE VEINS  2/16/2021     KNEE SURGERY       NECK SURGERY       OPEN REDUCTION INTERNAL FIXATION WRIST Left 4/30/2019    Procedure: Open Reduction Internal Fixation Left Distal Radius Fracture;  Surgeon: Dinh Strong MD;  Location:  OR     OPEN REDUCTION INTERNAL FIXATION WRIST Left 6/12/2020    Procedure: OPEN REDUCTION INTERNAL FIXATION Left Distal Radius Nonunion, Distal Ulna Resection;  Surgeon: Dinh Strong MD;  Location:  OR     OPTICAL TRACKING SYSTEM ENDOSCOPIC SINUS SURGERY Right 4/6/2018    Procedure: OPTICAL TRACKING SYSTEM ENDOSCOPIC SINUS SURGERY;  Stealth Assisted Right Maxillary Antrostomy, Anterior Ethmoidectomy And Frontal Sinusotomy;  Surgeon: Elyse Eisenberg MD;  Location:  OR     OPTICAL TRACKING SYSTEM ENDOSCOPIC SINUS SURGERY Right 8/3/2018    Procedure: OPTICAL TRACKING SYSTEM ENDOSCOPIC SINUS SURGERY;  Stealth Assisted Revision Right Frontal Sinusotomy, Right Stent Placement  **Latex Allergy** ;  Surgeon: Elyse Eisenberg MD;  Location:  OR     ORTHOPEDIC SURGERY       REMOVE HARDWARE WRIST Left 11/19/2019    Procedure: Left Distal Radius Hardware Removal, Flexor Pollicus Longus Repair, Deep Cultures;  Surgeon: Dinh Strong MD;  Location:  OR     ZZ HAND/FINGER SURGERY UNLISTED       [facility] PELVIS/HIP JOINT SURGERY UNLISTED       Social History     Socioeconomic History     Marital status:      Spouse name: Not on file     Number of children: Not on file     Years of education: Not on file     Highest education level: Not on file   Occupational History     Not on file   Tobacco Use     Smoking status: Never     Smokeless tobacco: Never   Substance and Sexual Activity     Alcohol use: Yes     Comment: 2 glasses of wine a day     Drug use: Never      Sexual activity: Not Currently   Other Topics Concern     Parent/sibling w/ CABG, MI or angioplasty before 65F 55M? Not Asked   Social History Narrative    Ms. Chau is , has two grown children and two grandchildren. She does not work. She was never a smoker, and drinks a glass of wine with dinner each night.         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      Social Drivers of Health     Financial Resource Strain: Low Risk  (8/24/2024)    Received from [facility]    Financial Resource Strain      Difficulty of Paying Living Expenses: 3      Difficulty of Paying Living Expenses: Not on file   Food Insecurity: No Food Insecurity (8/24/2024)    Received from [facility]    Food Insecurity      Do you worry your food will run out before you are able to buy more?: 1   Transportation Needs: No Transportation Needs (8/24/2024)    Received from [facility]    Transportation Needs      Does lack of transportation keep you from medical appointments?: 1      Does lack of transportation keep you from work, meetings or getting things that you need?: 1   Physical Activity: Not on file   Stress: Not on file   Social Connections: Socially Integrated (8/24/2024)    Received from [facility]    Social Connections      Do you often feel lonely or isolated from those around you?: 0   Interpersonal Safety: Not on file   Housing Stability: Low Risk  (8/24/2024)    Received from [facility]    Housing Stability      What is your housing situation today?: 1     Family History   Problem Relation Age of Onset     Arthritis Mother      Respiratory Mother         pulmonary fibrosis     Hypertension Mother      Anemia Mother      Liver Disease Father         from EtOH     Alcoholism Father      No Known Problems Maternal Grandmother      Heart  Disease Maternal Grandfather      Glaucoma Paternal Grandfather      Heart Disease Paternal Grandfather      Multiple Sclerosis Sister      Breast Cancer Sister      Skin Cancer No family hx of      Melanoma No family hx of      Last Renal Panel:  Sodium   Date Value Ref Range Status   03/04/2025 136 135 - 145 mmol/L Final   01/06/2021 134 133 - 144 mmol/L Final     Potassium   Date Value Ref Range Status   03/04/2025 5.1 3.4 - 5.3 mmol/L Final   07/08/2022 4.3 3.4 - 5.3 mmol/L Final   01/06/2021 4.0 3.4 - 5.3 mmol/L Final     Chloride   Date Value Ref Range Status   03/04/2025 100 98 - 107 mmol/L Final   07/08/2022 104 94 - 109 mmol/L Final   01/06/2021 102 94 - 109 mmol/L Final     Carbon Dioxide   Date Value Ref Range Status   01/06/2021 28 20 - 32 mmol/L Final     Carbon Dioxide (CO2)   Date Value Ref Range Status   03/04/2025 24 22 - 29 mmol/L Final   07/08/2022 26 20 - 32 mmol/L Final     Anion Gap   Date Value Ref Range Status   03/04/2025 12 7 - 15 mmol/L Final   07/08/2022 10 3 - 14 mmol/L Final   01/06/2021 4 3 - 14 mmol/L Final     Glucose   Date Value Ref Range Status   03/04/2025 99 70 - 99 mg/dL Final   07/08/2022 97 70 - 99 mg/dL Final   01/06/2021 87 70 - 99 mg/dL Final     Urea Nitrogen   Date Value Ref Range Status   03/04/2025 45.2 (H) 8.0 - 23.0 mg/dL Final   07/08/2022 11 7 - 30 mg/dL Final   01/06/2021 14 7 - 30 mg/dL Final     Creatinine   Date Value Ref Range Status   03/04/2025 2.21 (H) 0.51 - 0.95 mg/dL Final   01/06/2021 0.90 0.52 - 1.04 mg/dL Final     GFR Estimate   Date Value Ref Range Status   03/04/2025 21 (L) >60 mL/min/1.73m2 Final     Comment:     eGFR calculated using 2021 CKD-EPI equation.   01/06/2021 59 (L) >60 mL/min/[1.73_m2] Final     Comment:     Non  GFR Calc  Starting 12/18/2018, serum creatinine based estimated GFR (eGFR) will be   calculated using the Chronic Kidney Disease Epidemiology Collaboration   (CKD-EPI) equation.       Calcium   Date Value Ref  Range Status   03/04/2025 10.3 8.8 - 10.4 mg/dL Final   01/06/2021 8.8 8.5 - 10.1 mg/dL Final     Phosphorus   Date Value Ref Range Status   03/04/2025 5.1 (H) 2.5 - 4.5 mg/dL Final   05/06/2021 4.5 2.5 - 4.5 mg/dL Final     Albumin   Date Value Ref Range Status   03/04/2025 4.4 3.5 - 5.2 g/dL Final   03/16/2022 3.6 3.4 - 5.0 g/dL Final   01/06/2021 2.8 (L) 3.4 - 5.0 g/dL Final     4/10/2025 ultrasound venous competency bilaterally results reviewed as noted in the EMR.  4/10/2025 ultrasound LIZZY Dopplers reviewed his noted in the EMR with LIZZY of 0.97 on the right and LIZZY of 1.03 on the left.                        Subjective findings- 84-year-old returns clinic with  for venous stasis with infection left leg.  He relates they have been wearing the Jobst stockings, relates she does not think she is itching them but may be doing an at night and is not aware of it, relates about a week ago it started getting red and swollen, he relates he feels it is worse today, relates they got the Keflex and started that yesterday and is taking the Keflex with no problems.    Objective findings- DP and PT are 2 out of 4 left.  Has left leg scratch type abrasions that are partially eschared and through the epidermis, positive erythema, positive edema, no odor, dried serosanguineous drainage, no pain on palpation.  Has dry skin left foot.    Assessment and plan- Venous stasis with scratch abrasions left leg, cellulitis left leg, Dermatitis left leg.  Peripheral arterial and Venous disease and Kidney disease present.  Diagnosis and treatment options discussed with them.  The left leg and scratch abrasions were cleaned with wound Vashe, we applied Biatain silver to the scratch abrasion areas, and wrapped the left leg with a multilayer Unna boot compression system with light compression upon consent.  Continue the Keflex.  Previous imaging reports reviewed and no imaging today.  No labs today.  Previous notes including vascular's  4/18/2025 note reviewed.  Return to clinic and see me in 1 week.                                            Moderate level of medical decision making.      Again, thank you for allowing me to participate in the care of your patient.        Sincerely,        Camden Salazar DPM    Electronically signed

## 2025-04-30 NOTE — PROGRESS NOTES
Past Medical History:   Diagnosis Date    Anemia     Arthritis     Cataracts     Central retinal artery occlusion     Cervical spine fracture (H)     After a fall from orthostatic sx    Chronic pain     Back of head    Gastro-oesophageal reflux disease     Head injury     Hypertension     Hyponatremia     Resolved, 2/2 diuretics    Migraines     Osteoporosis     Pneumonia 2018    Rheumatoid arthritis(714.0)      Patient Active Problem List   Diagnosis    Intrinsic atopic dermatitis    Essential hypertension    Rheumatoid arthritis (H)    Retinal artery occlusion    Cervical vertebral fracture (H)    Central retinal artery occlusion of right eye    Bilateral occipital neuralgia    C1-C2 instability    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 factor (H)    Osteoarthritis    Malignant hypertension    Hyponatremia    GERD (gastroesophageal reflux disease)    Eczematous dermatitis    Anxiety state    Anemia    Closed fracture of distal end of left radius with delayed healing, unspecified fracture morphology, subsequent encounter    Closed fracture of distal end of left radius, unspecified fracture morphology, sequela    Osteomyelitis of left leg (H)    Pain of left thumb    CKD (chronic kidney disease) stage 4, GFR 15-29 ml/min (H)    Mild protein-calorie malnutrition    Venous ulcer (H)    Prostate cancer (H)     Past Surgical History:   Procedure Laterality Date    COLONOSCOPY      EYE SURGERY Left 02/2019    Hole in macula    FUSION CERVICAL POSTERIOR THREE+ LEVELS  1/22/2013    Procedure: FUSION CERVICAL POSTERIOR THREE+ LEVELS;  Cervical 1-6 Posterior Instrumentation and Fusion, Cervical 3-4 Laminectomy  .Instrumentation and fusion to Cervical 6 *Latex Allergy*;  Surgeon: Ra Bar MD;  Location: [facility] OR    GYN SURGERY      HEAD & NECK SURGERY      HYSTERECTOMY      IR ENDOVENOUS ABLATION VARICOSE VEINS  2/16/2021    KNEE SURGERY      NECK SURGERY      OPEN REDUCTION INTERNAL  FIXATION WRIST Left 4/30/2019    Procedure: Open Reduction Internal Fixation Left Distal Radius Fracture;  Surgeon: Dinh Strong MD;  Location: [facility] OR    OPEN REDUCTION INTERNAL FIXATION WRIST Left 6/12/2020    Procedure: OPEN REDUCTION INTERNAL FIXATION Left Distal Radius Nonunion, Distal Ulna Resection;  Surgeon: Dinh Strong MD;  Location: [facility] OR    OPTICAL TRACKING SYSTEM ENDOSCOPIC SINUS SURGERY Right 4/6/2018    Procedure: OPTICAL TRACKING SYSTEM ENDOSCOPIC SINUS SURGERY;  Stealth Assisted Right Maxillary Antrostomy, Anterior Ethmoidectomy And Frontal Sinusotomy;  Surgeon: Elyse Eisenberg MD;  [facility]    OPTICAL TRACKING SYSTEM ENDOSCOPIC SINUS SURGERY Right 8/3/2018    Procedure: OPTICAL TRACKING SYSTEM ENDOSCOPIC SINUS SURGERY;  Stealth Assisted Revision Right Frontal Sinusotomy, Right Stent Placement  **Latex Allergy** ;  Surgeon: Elyse Eisenberg MD;  Location:  OR    ORTHOPEDIC SURGERY      REMOVE HARDWARE WRIST Left 11/19/2019    Procedure: Left Distal Radius Hardware Removal, Flexor Pollicus Longus Repair, Deep Cultures;  Surgeon: Dinh Strong MD;  Location:  OR    ZZC HAND/FINGER SURGERY UNLISTED      Z PELVIS/HIP JOINT SURGERY UNLISTED       Social History     Socioeconomic History    Marital status:      Spouse name: Not on file    Number of children: Not on file    Years of education: Not on file    Highest education level: Not on file   Occupational History    Not on file   Tobacco Use    Smoking status: Never    Smokeless tobacco: Never   Substance and Sexual Activity    Alcohol use: Yes     Comment: 2 glasses of wine a day    Drug use: Never    Sexual activity: Not Currently   Other Topics Concern    Parent/sibling w/ CABG, MI or angioplasty before 65F 55M? Not Asked   Social History Narrative    Ms. Chau is , has two grown children and two grandchildren. She does not work. She was never a smoker, and drinks a glass of wine with dinner each  night.         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      Social Drivers of Health     Financial Resource Strain: Low Risk  (8/24/2024)    Received from [facility]    Financial Resource Strain     Difficulty of Paying Living Expenses: 3     Difficulty of Paying Living Expenses: Not on file   Food Insecurity: No Food Insecurity (8/24/2024)    Received from [facility]    Food Insecurity     Do you worry your food will run out before you are able to buy more?: 1   Transportation Needs: No Transportation Needs (8/24/2024)    Received from [facility]    Transportation Needs     Does lack of transportation keep you from medical appointments?: 1     Does lack of transportation keep you from work, meetings or getting things that you need?: 1   Physical Activity: Not on file   Stress: Not on file   Social Connections: Socially Integrated (8/24/2024)    Received from [facility]    Social Connections     Do you often feel lonely or isolated from those around you?: 0   Interpersonal Safety: Not on file   Housing Stability: Low Risk  (8/24/2024)    Received from [facility]    Housing Stability     What is your housing situation today?: 1     Family History   Problem Relation Age of Onset    Arthritis Mother     Respiratory Mother         pulmonary fibrosis    Hypertension Mother     Anemia Mother     Liver Disease Father         from EtOH    Alcoholism Father     No Known Problems Maternal Grandmother     Heart Disease Maternal Grandfather     Glaucoma Paternal Grandfather     Heart Disease Paternal Grandfather     Multiple Sclerosis Sister     Breast Cancer Sister     Skin Cancer No family hx of     Melanoma No family hx of      Last Renal Panel:  Sodium   Date Value Ref Range Status   03/04/2025 136 135 - 145 mmol/L Final    01/06/2021 134 133 - 144 mmol/L Final     Potassium   Date Value Ref Range Status   03/04/2025 5.1 3.4 - 5.3 mmol/L Final   07/08/2022 4.3 3.4 - 5.3 mmol/L Final   01/06/2021 4.0 3.4 - 5.3 mmol/L Final     Chloride   Date Value Ref Range Status   03/04/2025 100 98 - 107 mmol/L Final   07/08/2022 104 94 - 109 mmol/L Final   01/06/2021 102 94 - 109 mmol/L Final     Carbon Dioxide   Date Value Ref Range Status   01/06/2021 28 20 - 32 mmol/L Final     Carbon Dioxide (CO2)   Date Value Ref Range Status   03/04/2025 24 22 - 29 mmol/L Final   07/08/2022 26 20 - 32 mmol/L Final     Anion Gap   Date Value Ref Range Status   03/04/2025 12 7 - 15 mmol/L Final   07/08/2022 10 3 - 14 mmol/L Final   01/06/2021 4 3 - 14 mmol/L Final     Glucose   Date Value Ref Range Status   03/04/2025 99 70 - 99 mg/dL Final   07/08/2022 97 70 - 99 mg/dL Final   01/06/2021 87 70 - 99 mg/dL Final     Urea Nitrogen   Date Value Ref Range Status   03/04/2025 45.2 (H) 8.0 - 23.0 mg/dL Final   07/08/2022 11 7 - 30 mg/dL Final   01/06/2021 14 7 - 30 mg/dL Final     Creatinine   Date Value Ref Range Status   03/04/2025 2.21 (H) 0.51 - 0.95 mg/dL Final   01/06/2021 0.90 0.52 - 1.04 mg/dL Final     GFR Estimate   Date Value Ref Range Status   03/04/2025 21 (L) >60 mL/min/1.73m2 Final     Comment:     eGFR calculated using 2021 CKD-EPI equation.   01/06/2021 59 (L) >60 mL/min/[1.73_m2] Final     Comment:     Non  GFR Calc  Starting 12/18/2018, serum creatinine based estimated GFR (eGFR) will be   calculated using the Chronic Kidney Disease Epidemiology Collaboration   (CKD-EPI) equation.       Calcium   Date Value Ref Range Status   03/04/2025 10.3 8.8 - 10.4 mg/dL Final   01/06/2021 8.8 8.5 - 10.1 mg/dL Final     Phosphorus   Date Value Ref Range Status   03/04/2025 5.1 (H) 2.5 - 4.5 mg/dL Final   05/06/2021 4.5 2.5 - 4.5 mg/dL Final     Albumin   Date Value Ref Range Status   03/04/2025 4.4 3.5 - 5.2 g/dL Final   03/16/2022 3.6 3.4 -  5.0 g/dL Final   01/06/2021 2.8 (L) 3.4 - 5.0 g/dL Final     4/10/2025 ultrasound venous competency bilaterally results reviewed as noted in the EMR.  4/10/2025 ultrasound LIZZY Dopplers reviewed his noted in the EMR with LIZZY of 0.97 on the right and LIZZY of 1.03 on the left.                        Subjective findings- 84-year-old returns clinic with  for venous stasis with infection left leg.  He relates they have been wearing the Jobst stockings, relates she does not think she is itching them but may be doing an at night and is not aware of it, relates about a week ago it started getting red and swollen, he relates he feels it is worse today, relates they got the Keflex and started that yesterday and is taking the Keflex with no problems.    Objective findings- DP and PT are 2 out of 4 left.  Has left leg scratch type abrasions that are partially eschared and through the epidermis, positive erythema, positive edema, no odor, dried serosanguineous drainage, no pain on palpation.  Has dry skin left foot.    Assessment and plan- Venous stasis with scratch abrasions left leg, cellulitis left leg, Dermatitis left leg.  Peripheral arterial and Venous disease and Kidney disease present.  Diagnosis and treatment options discussed with them.  The left leg and scratch abrasions were cleaned with wound Vashe, we applied Biatain silver to the scratch abrasion areas, and wrapped the left leg with a multilayer Unna boot compression system with light compression upon consent.  Continue the Keflex.  Previous imaging reports reviewed and no imaging today.  No labs today.  Previous notes including vascular's 4/18/2025 note reviewed.  Return to clinic and see me in 1 week.                                            Moderate level of medical decision making.

## 2025-04-30 NOTE — NURSING NOTE
Kimberly Chau's chief complaint for this visit includes:  Chief Complaint   Patient presents with    RECHECK     Leg recheck     PCP: Guillermo Castellano    Referring Provider:  Referred Self, MD  No address on file    There were no vitals taken for this visit.  Data Unavailable     Do you need any medication refills at today's visit? NO    Allergies   Allergen Reactions    Hctz Other (See Comments)     Pt develops symptomatic and significant hyponatremia with HCTZ exposure    Hydrochlorothiazide      Other reaction(s): Hyponatremia  Hyponatremia    Latex     Benzocaine Rash     carba mix,thrium-sunscreen    Ra Lotion Rash     carba mix,thrium-sunscreen    Ace Inhibitors Unknown     Dizziness and orthostatic changes and electrolyte problems.    Latex Unknown       Humera Hairston LPN

## 2025-06-10 ENCOUNTER — OFFICE VISIT (OUTPATIENT)
Dept: PODIATRY | Facility: CLINIC | Age: 85
End: 2025-06-10
Payer: MEDICARE

## 2025-06-10 DIAGNOSIS — I87.8 VENOUS STASIS: Primary | ICD-10-CM

## 2025-06-10 DIAGNOSIS — S80.812S: ICD-10-CM

## 2025-06-10 DIAGNOSIS — I73.9 PERIPHERAL ARTERIAL DISEASE: ICD-10-CM

## 2025-06-10 NOTE — NURSING NOTE
Kimberly Chau's chief complaint for this visit includes:  Chief Complaint   Patient presents with    RECHECK     Leg recheck and wound cares     PCP: Guillermo Castellano    Referring Provider:  Referred Devan, MD  No address on file    There were no vitals taken for this visit.  Data Unavailable     Do you need any medication refills at today's visit? NO    Allergies   Allergen Reactions    Hctz Other (See Comments)     Pt develops symptomatic and significant hyponatremia with HCTZ exposure    Hydrochlorothiazide      Other reaction(s): Hyponatremia  Hyponatremia    Latex     Benzocaine Rash     carba mix,thrium-sunscreen    Ra Lotion Rash     carba mix,thrium-sunscreen    Ace Inhibitors Unknown     Dizziness and orthostatic changes and electrolyte problems.    Latex Unknown       Humera Hairston LPN

## 2025-06-10 NOTE — LETTER
6/10/2025      Kimberly Chau  [id number]  [place] [address]      Dear Colleague,    Thank you for referring your patient, Kimberly Chau, to the [facility]. Please see a copy of my visit note below.    Past Medical History:   Diagnosis Date    Anemia     Arthritis     Cataracts     Central retinal artery occlusion     Cervical spine fracture (H)     After a fall from orthostatic sx    Chronic pain     Back of head    Gastro-oesophageal reflux disease     Head injury     Hypertension     Hyponatremia     Resolved, 2/2 diuretics    Migraines     Osteoporosis     Pneumonia 2018    Rheumatoid arthritis(714.0)      Patient Active Problem List   Diagnosis    Intrinsic atopic dermatitis    Essential hypertension    Rheumatoid arthritis (H)    Retinal artery occlusion    Cervical vertebral fracture (H)    Central retinal artery occlusion of right eye    Bilateral occipital neuralgia    C1-C2 instability    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 factor (H)    Osteoarthritis    Malignant hypertension    Hyponatremia    GERD (gastroesophageal reflux disease)    Eczematous dermatitis    Anxiety state    Anemia    Closed fracture of distal end of left radius with delayed healing, unspecified fracture morphology, subsequent encounter    Closed fracture of distal end of left radius, unspecified fracture morphology, sequela    Osteomyelitis of left leg (H)    Pain of left thumb    CKD (chronic kidney disease) stage 4, GFR 15-29 ml/min (H)    Mild protein-calorie malnutrition    Venous ulcer (H)    Prostate cancer (H)     Past Surgical History:   Procedure Laterality Date    COLONOSCOPY      EYE SURGERY Left 02/2019    Hole in macula    FUSION CERVICAL POSTERIOR THREE+ LEVELS  1/22/2013    Procedure: FUSION CERVICAL POSTERIOR THREE+ LEVELS;  Cervical 1-6 Posterior Instrumentation and Fusion, Cervical 3-4 Laminectomy  .Instrumentation and fusion to Cervical 6 *Latex  Allergy*;  Surgeon: Ra Bra MD;  Location: [facility] OR    GYN SURGERY      HEAD & NECK SURGERY      HYSTERECTOMY      IR ENDOVENOUS ABLATION VARICOSE VEINS  2/16/2021    KNEE SURGERY      NECK SURGERY      OPEN REDUCTION INTERNAL FIXATION WRIST Left 4/30/2019    Procedure: Open Reduction Internal Fixation Left Distal Radius Fracture;  Surgeon: Dinh Strong MD;  Location:  OR    OPEN REDUCTION INTERNAL FIXATION WRIST Left 6/12/2020    Procedure: OPEN REDUCTION INTERNAL FIXATION Left Distal Radius Nonunion, Distal Ulna Resection;  Surgeon: Dinh Strong MD;  Location:  OR    OPTICAL TRACKING SYSTEM ENDOSCOPIC SINUS SURGERY Right 4/6/2018    Procedure: OPTICAL TRACKING SYSTEM ENDOSCOPIC SINUS SURGERY;  Stealth Assisted Right Maxillary Antrostomy, Anterior Ethmoidectomy And Frontal Sinusotomy;  Surgeon: Elyse Eisenberg MD;  Location:  OR    OPTICAL TRACKING SYSTEM ENDOSCOPIC SINUS SURGERY Right 8/3/2018    Procedure: OPTICAL TRACKING SYSTEM ENDOSCOPIC SINUS SURGERY;  Stealth Assisted Revision Right Frontal Sinusotomy, Right Stent Placement  **Latex Allergy** ;  Surgeon: Elyse Eisenberg MD;  Location:  OR    ORTHOPEDIC SURGERY      REMOVE HARDWARE WRIST Left 11/19/2019    Procedure: Left Distal Radius Hardware Removal, Flexor Pollicus Longus Repair, Deep Cultures;  Surgeon: Dinh Strong MD;  Location:  OR    ZZC HAND/FINGER SURGERY UNLISTED      [facility] PELVIS/HIP JOINT SURGERY UNLISTED       Social History     Socioeconomic History    Marital status:      Spouse name: Not on file    Number of children: Not on file    Years of education: Not on file    Highest education level: Not on file   Occupational History    Not on file   Tobacco Use    Smoking status: Never    Smokeless tobacco: Never   Substance and Sexual Activity    Alcohol use: Yes     Comment: 2 glasses of wine a day    Drug use: Never    Sexual activity: Not Currently   Other Topics Concern    Parent/sibling w/  CABG, MI or angioplasty before 65F 55M? Not Asked   Social History Narrative    Ms. Chau is , has two grown children and two grandchildren. She does not work. She was never a smoker, and drinks a glass of wine with dinner each night.         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      Social Drivers of Health     Financial Resource Strain: Low Risk  (8/24/2024)    Received from [employer]    Financial Resource Strain     Difficulty of Paying Living Expenses: 3     Difficulty of Paying Living Expenses: Not on file   Food Insecurity: No Food Insecurity (8/24/2024)    Received from [employer]    Food Insecurity     Do you worry your food will run out before you are able to buy more?: 1   Transportation Needs: No Transportation Needs (8/24/2024)    Received from [employer]    Transportation Needs     Does lack of transportation keep you from medical appointments?: 1     Does lack of transportation keep you from work, meetings or getting things that you need?: 1   Physical Activity: Not on file   Stress: Not on file   Social Connections: Socially Integrated (8/24/2024)    Received from [employer]    Social Connections     Do you often feel lonely or isolated from those around you?: 0   Interpersonal Safety: Not on file   Housing Stability: Low Risk  (8/24/2024)    Received from [employer]    Housing Stability     What is your housing situation today?: 1     Family History   Problem Relation Age of Onset    Arthritis Mother     Respiratory Mother         pulmonary fibrosis    Hypertension Mother     Anemia Mother     Liver Disease Father         from EtOH    Alcoholism Father     No Known Problems Maternal Grandmother     Heart Disease Maternal Grandfather     Glaucoma Paternal Grandfather     Heart Disease Paternal  Grandfather     Multiple Sclerosis Sister     Breast Cancer Sister     Skin Cancer No family hx of     Melanoma No family hx of                      Subjective findings- 84-year-old returns clinic with  for venous stasis with abrasions and infection left leg.  Relates he kept Unna boot on and has been okay.  Relates he needs measurements for his compression socks.    Objective findings- Vascular status intact left.  On the left leg with decreased Dermatitis, minimal erythema, decreased edema, no drainage, no odor, no calor, no pain on palpation.    Assessment and plan- Venous stasis with scratch abrasions and Dermatitis left leg.  Cellulitis left legs.  These have improved.  Cellulitis appears resolved.  Diagnosis and treatment options discussed with them.  Will discontinue the Unna boot compression system.  Did leg measurements as per the sheet they brought in for the company and they get compression socks from.  They can use the Triamcinolone cream as needed.  Tubigrip dispensed that they can use until he gets her new compression socks.  No antibiotics today.  Previous notes reviewed.  Return to clinic and see me in 6 weeks.            Low to moderate level of medical decision making.      Again, thank you for allowing me to participate in the care of your patient.        Sincerely,        Camden Salazar DPM    Electronically signed

## 2025-06-10 NOTE — PROGRESS NOTES
Past Medical History:   Diagnosis Date    Anemia     Arthritis     Cataracts     Central retinal artery occlusion     Cervical spine fracture (H)     After a fall from orthostatic sx    Chronic pain     Back of head    Gastro-oesophageal reflux disease     Head injury     Hypertension     Hyponatremia     Resolved, 2/2 diuretics    Migraines     Osteoporosis     Pneumonia 2018    Rheumatoid arthritis(714.0)      Patient Active Problem List   Diagnosis    Intrinsic atopic dermatitis    Essential hypertension    Rheumatoid arthritis (H)    Retinal artery occlusion    Cervical vertebral fracture (H)    Central retinal artery occlusion of right eye    Bilateral occipital neuralgia    C1-C2 instability    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 factor (H)    Osteoarthritis    Malignant hypertension    Hyponatremia    GERD (gastroesophageal reflux disease)    Eczematous dermatitis    Anxiety state    Anemia    Closed fracture of distal end of left radius with delayed healing, unspecified fracture morphology, subsequent encounter    Closed fracture of distal end of left radius, unspecified fracture morphology, sequela    Osteomyelitis of left leg (H)    Pain of left thumb    CKD (chronic kidney disease) stage 4, GFR 15-29 ml/min (H)    Mild protein-calorie malnutrition    Venous ulcer (H)    Prostate cancer (H)     Past Surgical History:   Procedure Laterality Date    COLONOSCOPY      EYE SURGERY Left 02/2019    Hole in macula    FUSION CERVICAL POSTERIOR THREE+ LEVELS  1/22/2013    Procedure: FUSION CERVICAL POSTERIOR THREE+ LEVELS;  Cervical 1-6 Posterior Instrumentation and Fusion, Cervical 3-4 Laminectomy  .Instrumentation and fusion to Cervical 6 *Latex Allergy*;  Surgeon: Ra Bar MD;  Location: [facility] OR    GYN SURGERY      HEAD & NECK SURGERY      HYSTERECTOMY      IR ENDOVENOUS ABLATION VARICOSE VEINS  2/16/2021    KNEE SURGERY      NECK SURGERY      OPEN REDUCTION INTERNAL  FIXATION WRIST Left 4/30/2019    Procedure: Open Reduction Internal Fixation Left Distal Radius Fracture;  Surgeon: Dinh Strong MD;  Location: [facility] OR    OPEN REDUCTION INTERNAL FIXATION WRIST Left 6/12/2020    Procedure: OPEN REDUCTION INTERNAL FIXATION Left Distal Radius Nonunion, Distal Ulna Resection;  Surgeon: Dinh Strong MD;  Location: [facility] OR    OPTICAL TRACKING SYSTEM ENDOSCOPIC SINUS SURGERY Right 4/6/2018    Procedure: OPTICAL TRACKING SYSTEM ENDOSCOPIC SINUS SURGERY;  Stealth Assisted Right Maxillary Antrostomy, Anterior Ethmoidectomy And Frontal Sinusotomy;  Surgeon: Elyse Eisenberg MD;  [facility]    OPTICAL TRACKING SYSTEM ENDOSCOPIC SINUS SURGERY Right 8/3/2018    Procedure: OPTICAL TRACKING SYSTEM ENDOSCOPIC SINUS SURGERY;  Stealth Assisted Revision Right Frontal Sinusotomy, Right Stent Placement  **Latex Allergy** ;  Surgeon: Elyse Eisenberg MD;  Location:  OR    ORTHOPEDIC SURGERY      REMOVE HARDWARE WRIST Left 11/19/2019    Procedure: Left Distal Radius Hardware Removal, Flexor Pollicus Longus Repair, Deep Cultures;  Surgeon: Dinh Strong MD;  Location:  OR    ZZC HAND/FINGER SURGERY UNLISTED      [facility] PELVIS/HIP JOINT SURGERY UNLISTED       Social History     Socioeconomic History    Marital status:      Spouse name: Not on file    Number of children: Not on file    Years of education: Not on file    Highest education level: Not on file   Occupational History    Not on file   Tobacco Use    Smoking status: Never    Smokeless tobacco: Never   Substance and Sexual Activity    Alcohol use: Yes     Comment: 2 glasses of wine a day    Drug use: Never    Sexual activity: Not Currently   Other Topics Concern    Parent/sibling w/ CABG, MI or angioplasty before 65F 55M? Not Asked   Social History Narrative    Ms. Chau is , has two grown children and two grandchildren. She does not work. She was never a smoker, and drinks a glass of wine with dinner each  night.         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      Social Drivers of Health     Financial Resource Strain: Low Risk  (8/24/2024)    Received from [employer]    Financial Resource Strain     Difficulty of Paying Living Expenses: 3     Difficulty of Paying Living Expenses: Not on file   Food Insecurity: No Food Insecurity (8/24/2024)    Received from [employer]    Food Insecurity     Do you worry your food will run out before you are able to buy more?: 1   Transportation Needs: No Transportation Needs (8/24/2024)    Received from [employer]    Transportation Needs     Does lack of transportation keep you from medical appointments?: 1     Does lack of transportation keep you from work, meetings or getting things that you need?: 1   Physical Activity: Not on file   Stress: Not on file   Social Connections: Socially Integrated (8/24/2024)    Received from [employer]    Social Connections     Do you often feel lonely or isolated from those around you?: 0   Interpersonal Safety: Not on file   Housing Stability: Low Risk  (8/24/2024)    Received from [employer]    Housing Stability     What is your housing situation today?: 1     Family History   Problem Relation Age of Onset    Arthritis Mother     Respiratory Mother         pulmonary fibrosis    Hypertension Mother     Anemia Mother     Liver Disease Father         from EtOH    Alcoholism Father     No Known Problems Maternal Grandmother     Heart Disease Maternal Grandfather     Glaucoma Paternal Grandfather     Heart Disease Paternal Grandfather     Multiple Sclerosis Sister     Breast Cancer Sister     Skin Cancer No family hx of     Melanoma No family hx of                              Subjective findings- 84-year-old returns clinic with  for venous stasis  with abrasions and infection left leg.  Relates he kept Unna boot on and has been okay.  Relates he needs measurements for his compression socks.    Objective findings- Vascular status intact left.  On the left leg with decreased Dermatitis, minimal erythema, decreased edema, no drainage, no odor, no calor, no pain on palpation.    Assessment and plan- Venous stasis with scratch abrasions and Dermatitis left leg.  Cellulitis left legs.  These have improved.  Cellulitis appears resolved.  Diagnosis and treatment options discussed with them.  Will discontinue the Unna boot compression system.  Did leg measurements as per the sheet they brought in for the company and they get compression socks from.  They can use the Triamcinolone cream as needed.  Tubigrip dispensed that they can use until he gets her new compression socks.  No antibiotics today.  Previous notes reviewed.  Return to clinic and see me in 6 weeks.                                Low to moderate level of medical decision making.   Nasalis-Muscle-Based Myocutaneous Island Pedicle Flap Text: Using a #15 blade, an incision was made around the donor flap to the level of the nasalis muscle. Wide lateral undermining was then performed in both the subcutaneous plane above the nasalis muscle, and in a submuscular plane just above periosteum. This allowed the formation of a free nasalis muscle axial pedicle (based on the angular artery) which was still attached to the actual cutaneous flap, increasing its mobility and vascular viability. Hemostasis was obtained with pinpoint electrocoagulation. The flap was mobilized into position and the pivotal anchor points positioned and stabilized with buried interrupted sutures. Subcutaneous and dermal tissues were closed in a multilayered fashion with sutures. Tissue redundancies were excised, and the epidermal edges were apposed without significant tension and sutured with sutures.

## 2025-06-14 ENCOUNTER — HEALTH MAINTENANCE LETTER (OUTPATIENT)
Age: 85
End: 2025-06-14

## 2025-06-26 ENCOUNTER — LAB (OUTPATIENT)
Dept: LAB | Facility: CLINIC | Age: 85
End: 2025-06-26
Payer: MEDICARE

## 2025-06-26 ENCOUNTER — RESULTS FOLLOW-UP (OUTPATIENT)
Dept: INTERNAL MEDICINE | Facility: CLINIC | Age: 85
End: 2025-06-26

## 2025-06-26 ENCOUNTER — OFFICE VISIT (OUTPATIENT)
Dept: NEPHROLOGY | Facility: CLINIC | Age: 85
End: 2025-06-26
Attending: PHYSICIAN ASSISTANT
Payer: MEDICARE

## 2025-06-26 VITALS
WEIGHT: 119.8 LBS | BODY MASS INDEX: 24.15 KG/M2 | DIASTOLIC BLOOD PRESSURE: 80 MMHG | SYSTOLIC BLOOD PRESSURE: 166 MMHG | OXYGEN SATURATION: 97 % | RESPIRATION RATE: 16 BRPM | HEIGHT: 59 IN | HEART RATE: 76 BPM

## 2025-06-26 DIAGNOSIS — N18.5 CKD (CHRONIC KIDNEY DISEASE) STAGE 5, GFR LESS THAN 15 ML/MIN (H): ICD-10-CM

## 2025-06-26 DIAGNOSIS — D63.1 ANEMIA IN STAGE 4 CHRONIC KIDNEY DISEASE (H): ICD-10-CM

## 2025-06-26 DIAGNOSIS — N18.4 ANEMIA IN STAGE 4 CHRONIC KIDNEY DISEASE (H): ICD-10-CM

## 2025-06-26 DIAGNOSIS — N18.4 CKD (CHRONIC KIDNEY DISEASE) STAGE 4, GFR 15-29 ML/MIN (H): Primary | ICD-10-CM

## 2025-06-26 DIAGNOSIS — I10 HYPERTENSION, ESSENTIAL: ICD-10-CM

## 2025-06-26 LAB
ALBUMIN MFR UR ELPH: 20.6 MG/DL
ALBUMIN SERPL BCG-MCNC: 4.3 G/DL (ref 3.5–5.2)
ALBUMIN UR-MCNC: NEGATIVE MG/DL
ANION GAP SERPL CALCULATED.3IONS-SCNC: 13 MMOL/L (ref 7–15)
APPEARANCE UR: CLEAR
BILIRUB UR QL STRIP: NEGATIVE
BUN SERPL-MCNC: 37.5 MG/DL (ref 8–23)
CALCIUM SERPL-MCNC: 9.7 MG/DL (ref 8.8–10.4)
CHLORIDE SERPL-SCNC: 102 MMOL/L (ref 98–107)
COLOR UR AUTO: ABNORMAL
CREAT SERPL-MCNC: 1.9 MG/DL (ref 0.51–0.95)
CREAT UR-MCNC: 78.2 MG/DL
CREAT UR-MCNC: 78.9 MG/DL
CYSTATIN C (ROCHE): 2.6 MG/L (ref 0.6–1)
EGFRCR SERPLBLD CKD-EPI 2021: 25 ML/MIN/1.73M2
ERYTHROCYTE [DISTWIDTH] IN BLOOD BY AUTOMATED COUNT: 13.2 % (ref 10–15)
FERRITIN SERPL-MCNC: 592 NG/ML (ref 11–328)
GFR/BSA.PRED SERPLBLD CYS-BASED-ARV: 18 ML/MIN/1.73M2
GLUCOSE SERPL-MCNC: 94 MG/DL (ref 70–99)
GLUCOSE UR STRIP-MCNC: NEGATIVE MG/DL
HCO3 SERPL-SCNC: 22 MMOL/L (ref 22–29)
HCT VFR BLD AUTO: 32.3 % (ref 35–47)
HGB BLD-MCNC: 10.4 G/DL (ref 11.7–15.7)
HGB UR QL STRIP: ABNORMAL
HYALINE CASTS: 1 /LPF
IRON BINDING CAPACITY (ROCHE): 268 UG/DL (ref 240–430)
IRON SATN MFR SERPL: 26 % (ref 15–46)
IRON SERPL-MCNC: 69 UG/DL (ref 37–145)
KETONES UR STRIP-MCNC: NEGATIVE MG/DL
LEUKOCYTE ESTERASE UR QL STRIP: ABNORMAL
MCH RBC QN AUTO: 30.2 PG (ref 26.5–33)
MCHC RBC AUTO-ENTMCNC: 32.2 G/DL (ref 31.5–36.5)
MCV RBC AUTO: 94 FL (ref 78–100)
MICROALBUMIN UR-MCNC: 31.8 MG/L
MICROALBUMIN/CREAT UR: 40.3 MG/G CR (ref 0–25)
NITRATE UR QL: NEGATIVE
PH UR STRIP: 5.5 [PH] (ref 5–7)
PHOSPHATE SERPL-MCNC: 4.4 MG/DL (ref 2.5–4.5)
PLATELET # BLD AUTO: 228 10E3/UL (ref 150–450)
POTASSIUM SERPL-SCNC: 4.4 MMOL/L (ref 3.4–5.3)
PROT/CREAT 24H UR: 0.26 MG/MG CR (ref 0–0.2)
RBC # BLD AUTO: 3.44 10E6/UL (ref 3.8–5.2)
RBC URINE: 3 /HPF
SODIUM SERPL-SCNC: 137 MMOL/L (ref 135–145)
SP GR UR STRIP: 1.01 (ref 1–1.03)
SQUAMOUS EPITHELIAL: 2 /HPF
UROBILINOGEN UR STRIP-MCNC: NORMAL MG/DL
VIT D+METAB SERPL-MCNC: 98 NG/ML (ref 20–50)
WBC # BLD AUTO: 5.4 10E3/UL (ref 4–11)
WBC URINE: 2 /HPF

## 2025-06-26 PROCEDURE — 82610 CYSTATIN C: CPT | Performed by: PHYSICIAN ASSISTANT

## 2025-06-26 PROCEDURE — 82306 VITAMIN D 25 HYDROXY: CPT | Performed by: PHYSICIAN ASSISTANT

## 2025-06-26 PROCEDURE — 99000 SPECIMEN HANDLING OFFICE-LAB: CPT | Performed by: PATHOLOGY

## 2025-06-26 PROCEDURE — 82570 ASSAY OF URINE CREATININE: CPT | Performed by: PHYSICIAN ASSISTANT

## 2025-06-26 PROCEDURE — G0463 HOSPITAL OUTPT CLINIC VISIT: HCPCS | Performed by: PHYSICIAN ASSISTANT

## 2025-06-26 ASSESSMENT — PAIN SCALES - GENERAL: PAINLEVEL_OUTOF10: NO PAIN (0)

## 2025-06-26 NOTE — PROGRESS NOTES
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  She required dialysis for ~2.5 months and she is currently off for dialysis since November 2023. She received Antibiotics to treat her infection but no immunosuppressants .She was following with Dr. Cota.  Her most recent creatinine today is around 2.2 mg/dL; however she is small with small muscle mass. Her current GFR is at 25 mL/min, however her Cystatin C previously showed worse GFR   --We discussed today the importance of blood pressure control in halting the progression of chronic kidney disease.  We also discussed the natural history of chronic kidney disease and the expectations moving forward.  We also discussed extensively today the different modalities of renal placement therapy in case if future need arises.    Previously explained in details procedures of both peritoneal and hemodialysis and the implication of a PD catheter versus an AV fistula.    We also discussed conservative kidney management and it is her choice whether she wants to go on dialysis or forego dialysis.    - she prefers [facility]   --Reemphasize to Follow-up with CKD [facility] to learn more about her options.  -She is on the losartan 25 mg daily and her potassium is acceptable  --She is not on SGLT2 inhibitor and given her advanced age and GFR at this stage, will hold on that.    --She is currently euvolemic on exam and there is no need for diuretics.    --check cystatin C today    Problem #2 anemia normocytic: Her hemoglobin today is at 10.4 g/dL, iron sat 26%    Taking iron supplement  -Will hold on any referral to the anemia clinic at this point.    -She will need IRASEMA if her hemoglobin is below 9 g/dL.    Problem #3 CKD MBD:  Her calcium and phosphorus are rather within normal limits.  Her last PTH is lowish. Vit D pending  which can suggest adynamic bone disease.  She does have a history of osteoporosis for which she is receiving denosumab.    Problem #4 hypertension: Her blood pressure is  high in clinic today but rather rather acceptable range at home.   -No changes to her medications were done at this point.    -She was instructed to measure her blood pressure at home     #Electrolytes  - at goal    #GERD  Takes omeprazole PRN        Rola Harris is a 84 year old who presents for a nephrology comprehensive health exam.      HPI   Past medical history is significant for longstanding rheumatoid arthritis [currently on no treatment], osteoporosis complicated by T11 compression fracture, anemia, Hpertension, GERD.      She presented to the emergency department in August 26, 2023 with left lower leg ulcer and cellulitis.  She was found to be in acute renal failure (creatinine 8 mg/dl).  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  The degree of fibrosis on the biopsy was 10 to 20%.  She was urgently started on dialysis and received thrice weekly dialysis for 11 weeks.  She has been off dialysis since November 2023.     She developed cellulitis in the left leg early 2024, treated with antibiotics and it got better. She was discharged from podiatry as her ulcer healed.     Her blood pressure at home is ranging around 130-140/70, though elevated in the clinic today.    Taking amlodipine 5 mg daily, carvedilol 12.5 mg BID, losartan 25 mg daily,      Overall she is doing okay.  Her energy is at her baseline. Her appetite is good.  Her weight has been stable.  Energy is good, she does the cooking and laundry at home. No SOB or gross hematuria or  other urinary symptoms.     Social history: she is  for over 66 years. She has 2 sons and 2 granddaughters and one great granddaughter.       Review of Systems   Comprehensive ROS obtained, negative unless noted in HPI.        Past Surgical History:   Procedure Laterality Date    COLONOSCOPY      EYE SURGERY Left 02/2019    Hole in macula    FUSION CERVICAL POSTERIOR THREE+ LEVELS  1/22/2013    Procedure: FUSION CERVICAL POSTERIOR THREE+ LEVELS;  Cervical 1-6 Posterior Instrumentation and Fusion, Cervical 3-4 Laminectomy  .Instrumentation and fusion to Cervical 6 *Latex Allergy*;  Surgeon: Ra Bar MD;  Location: [facility] OR    GYN SURGERY      HEAD & NECK SURGERY      HYSTERECTOMY      IR ENDOVENOUS ABLATION VARICOSE VEINS  2/16/2021    KNEE SURGERY      NECK SURGERY      OPEN REDUCTION INTERNAL FIXATION WRIST Left 4/30/2019    Procedure: Open Reduction Internal Fixation Left Distal Radius Fracture;  Surgeon: Dinh Strong MD;  Location:  OR    OPEN REDUCTION INTERNAL FIXATION WRIST Left 6/12/2020    Procedure: OPEN REDUCTION INTERNAL FIXATION Left Distal Radius Nonunion, Distal Ulna Resection;  Surgeon: Dinh Strong MD;  Location:  OR    OPTICAL TRACKING SYSTEM ENDOSCOPIC SINUS SURGERY Right 4/6/2018    Procedure: OPTICAL TRACKING SYSTEM ENDOSCOPIC SINUS SURGERY;  Stealth Assisted Right Maxillary Antrostomy, Anterior Ethmoidectomy And Frontal Sinusotomy;  Surgeon: Elyse Eisenberg MD;  Location:  OR    OPTICAL TRACKING SYSTEM ENDOSCOPIC SINUS SURGERY Right 8/3/2018    Procedure: OPTICAL TRACKING SYSTEM ENDOSCOPIC SINUS SURGERY;  Stealth Assisted Revision Right Frontal Sinusotomy, Right Stent Placement  **Latex Allergy** ;  Surgeon: Elyse Eisenberg MD;  Location:  OR    ORTHOPEDIC SURGERY      REMOVE HARDWARE WRIST Left 11/19/2019    Procedure: Left Distal Radius Hardware Removal, Flexor Pollicus Longus Repair, Deep Cultures;  Surgeon: Dinh Strong MD;  Location:  OR     ZZC HAND/FINGER SURGERY UNLISTED      Z PELVIS/HIP JOINT SURGERY UNLISTED         Past Medical History:   Diagnosis Date    Anemia     Arthritis     Cataracts     Central retinal artery occlusion     Cervical spine fracture (H)     After a fall from orthostatic sx    Chronic pain     Back of head    Gastro-oesophageal reflux disease     Head injury     Hypertension     Hyponatremia     Resolved, 2/2 diuretics    Migraines     Osteoporosis     Pneumonia 2018    Rheumatoid arthritis(714.0)      Social History     Socioeconomic History    Marital status:    Tobacco Use    Smoking status: Never    Smokeless tobacco: Never   Substance and Sexual Activity    Alcohol use: Yes     Comment: 2 glasses of wine a day    Drug use: Never    Sexual activity: Not Currently   Social History Narrative    Ms. Chau is , has two grown children and two grandchildren. She does not work. She was never a smoker, and drinks a glass of wine with dinner each night.         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      Social Drivers of Health     Financial Resource Strain: Low Risk  (8/24/2024)    Received from [email or web address]    Financial Resource Strain     Difficulty of Paying Living Expenses: 3   Food Insecurity: No Food Insecurity (8/24/2024)    Received from [email or web address]    Food Insecurity     Do you worry your food will run out before you are able to buy more?: 1   Transportation Needs: No Transportation Needs (8/24/2024)    Received from [email or web address]    Transportation Needs     Does lack of transportation keep you from medical appointments?: 1     Does lack of transportation keep you from work, meetings or getting things that you need?: 1   Social Connections: Socially Integrated (8/24/2024)    Received from [email or web address]    Social Connections     Do you often feel  lonely or isolated from those around you?: 0   Housing Stability: Low Risk  (8/24/2024)    Received from [employer] [facility]    Housing Stability     What is your housing situation today?: 1      Family History   Problem Relation Age of Onset    Arthritis Mother     Respiratory Mother         pulmonary fibrosis    Hypertension Mother     Anemia Mother     Liver Disease Father         from EtOH    Alcoholism Father     No Known Problems Maternal Grandmother     Heart Disease Maternal Grandfather     Glaucoma Paternal Grandfather     Heart Disease Paternal Grandfather     Multiple Sclerosis Sister     Breast Cancer Sister     Skin Cancer No family hx of     Melanoma No family hx of       Immunization History   Administered Date(s) Administered    COVID-19 12+ (MODERNA) 10/29/2024    COVID-19 Bivalent 12+ (Pfizer) 10/17/2022    COVID-19 MONOVALENT 12+ (Pfizer) 02/10/2021, 03/03/2021, 12/17/2021    DTaP, Unspecified 03/10/2014    Flu, Unspecified 09/27/2010    Influenza (High Dose) Trivalent,PF (Fluzone) 10/29/2024    Influenza (IIV3) PF 11/11/2003, 09/27/2010, 09/27/2010, 01/08/2013    Influenza Vaccine 65+ (Fluzone HD) 10/27/2020, 12/17/2021    Mantoux Tuberculin Skin Test 09/13/2023, 09/23/2023    Pneumococcal (PCV 7) 09/25/2011    Pneumococcal 20 valent Conjugate (Prevnar 20) 05/17/2022    Pneumococcal 23 valent 06/25/2011    RSV Vaccine (Arexvy) 11/01/2024    TDAP (Adacel,Boostrix) 11/01/2024    TDAP Vaccine (Boostrix) 03/10/2014    Zoster recombinant adjuvanted (Shingrix) 09/05/2018, 11/19/2018    Zoster vaccine, live 04/12/2010     Health Maintenance   Topic Date Due    URINE DRUG SCREEN  Never done    COLORECTAL CANCER SCREENING  09/29/2011    MEDICARE ANNUAL WELLNESS VISIT  03/16/2023    COVID-19 VACCINE (6 - 2024-25 season) 04/29/2025    BMP  06/04/2025    MICROALBUMIN  06/04/2025    HEMOGLOBIN  09/04/2025    FALL RISK ASSESSMENT  09/12/2025    LIPID  01/28/2026    ADVANCE CARE  "PLANNING  03/13/2030    DTAP/TDAP/TD VACCINE (4 - Td or Tdap) 11/01/2034    DEXA  08/16/2038    PARATHYROID  Completed    PHOSPHORUS  Completed    PHQ-2 (once per calendar year)  Completed    INFLUENZA VACCINE  Completed    PNEUMOCOCCAL VACCINE 50+ YEARS  Completed    URINALYSIS  Completed    ALK PHOS  Completed    ZOSTER VACCINE  Completed    RSV VACCINE  Completed    HPV VACCINE  Aged Out    MENINGITIS VACCINE  Aged Out        Objective    BP (!) 166/80   Pulse 76   Resp 16   Ht 1.499 m (4' 11\")   Wt 54.3 kg (119 lb 12.8 oz)   SpO2 97%   BMI 24.20 kg/m    Estimated body mass index is 24.2 kg/m  as calculated from the following:    Height as of this encounter: 1.499 m (4' 11\").    Weight as of this encounter: 54.3 kg (119 lb 12.8 oz).    Physical Exam   General: A/O  Skin: warm and dry, no visible rash  Heart: RRR  Lungs: CTA  Extremities: no LE edema     TIMOTHY  No data recorded  No data recorded    PHQ-9  No data recorded  No data recorded    Recent Labs   Lab Test 03/04/25  1411 01/28/25  1105 10/15/24  1236 06/28/24  1331 03/26/24  1402 03/05/24  1454 12/19/23  1213 12/06/23  1159   GFRESTIMATED 21* 24* 26* 26* 28* 21*   < >  --    CR 2.21* 2.00* 1.85* 1.87* 1.75* 2.19*   < >  --    ALBUMIN 4.4  --  4.4 4.4 4.1 4.0   < >  --    PROTEIN 10*  --   --   --  50*  --   --  10*   BUN 45.2* 46.5* 26.8* 32.5* 29.9* 39.5*   < >  --     < > = values in this interval not displayed.     No results for input(s): \"A1C\" in the last 08219 hours.  Recent Labs   Lab Test 03/04/25  1411 01/28/25  1105 10/15/24  1236 06/28/24  1331 03/26/24  1402   HGB 10.4* 10.5* 10.5* 10.5* 9.5*     Recent Labs   Lab Test 12/19/23  1213   MONE 1,312*      IRON 111     Recent Labs   Lab Test 03/04/25  1411 01/28/25  1105 10/15/24  1236 06/28/24  1331 03/26/24  1402    138 139 139 135   POTASSIUM 5.1 4.7 4.6 4.9 4.6   KINA 10.3 10.4  10.4 9.5 9.8 9.3   PHOS 5.1* 4.6* 3.4 3.9 3.9     Recent Labs   Lab Test 10/15/24  1236 " "03/26/24  1402 01/16/24  1127 12/06/23  1137   PTHI 85* 46 13* 20       Imaging:   Reviewed        Allergies   Allergen Reactions    Hctz Other (See Comments)     Pt develops symptomatic and significant hyponatremia with HCTZ exposure    Hydrochlorothiazide      Other reaction(s): Hyponatremia  Hyponatremia    Latex     Benzocaine Rash     carba mix,thrium-sunscreen    Ra Lotion Rash     carba mix,thrium-sunscreen    Ace Inhibitors Unknown     Dizziness and orthostatic changes and electrolyte problems.    Latex Unknown       Current Outpatient Medications   Medication Sig Dispense Refill    acetaminophen 650 MG TABS Take 650 mg by mouth every 4 hours as needed. 100 tablet 0    amLODIPine (NORVASC) 5 MG tablet Take 1 tablet (5 mg) by mouth daily. 90 tablet 1    amoxicillin-clavulanate (AUGMENTIN) 500-125 MG tablet Take 1 tablet by mouth 2 times daily 14 tablet 0    carvedilol (COREG) 12.5 MG tablet Take 1 tablet (12.5 mg) by mouth 2 times daily (with meals). 180 tablet 1    cephALEXin (KEFLEX) 500 MG capsule Take 1 capsule (500 mg) by mouth 2 times daily. 28 capsule 0    cephALEXin (KEFLEX) 500 MG capsule Take 1 capsule (500 mg) by mouth 2 times daily. 28 capsule 0    COMPRESSION STOCKINGS Please measure and distribute 1 pair of 20mmHg - 30mmHg knee high open or closed toe compression stockings. Jobst ultrasheer or equivalent. 2 each 4    denosumab (PROLIA) 60 MG/ML SOSY injection Inject 1 mL (60 mg) Subcutaneous once for 1 dose 1 mL 0    Diphenhydramine-APAP, sleep, (TYLENOL PM EXTRA STRENGTH PO) Take 2 tablets by mouth At Bedtime       ferrous gluconate (FERGON) 324 (38 Fe) MG tablet Take 324 mg by mouth      Gauze Pads & Dressings (TELFA NON-ADHERENT) 3\"X4\" PADS Cut to size for open areas on the lower extremities and secure with paper tape. Change dressing every other day. 30 each 11    losartan (COZAAR) 25 MG tablet Take 1 tablet (25 mg) by mouth at bedtime. 90 tablet 3    multivitamin w/minerals (MULTI-VITAMIN) " tablet Take 1 tablet by mouth every morning 90 tablet 11    Omeprazole (PRILOSEC PO) Take 20 mg by mouth as needed       pentoxifylline ER (TRENTAL) 400 MG CR tablet Take 1 tablet (400 mg) by mouth daily.      vitamin D3 (CHOLECALCIFEROL) 50 mcg (2000 units) tablet Take 1 tablet by mouth daily       Current Facility-Administered Medications   Medication Dose Route Frequency Provider Last Rate Last Admin    romosozumab-aqqg (EVENITY) injection 210 mg  210 mg Subcutaneous Q30 Days Alley Collzao MD   210 mg at 05/25/22 1246    romosozumab-aqqg (EVENITY) injection 210 mg  210 mg Subcutaneous Q30 Days Kerri Lux MD   210 mg at 04/23/22 1152    romosozumab-aqqg (EVENITY) injection 210 mg  210 mg Subcutaneous Q30 Days Abigail Lugo MD   210 mg at 03/16/22 1450    romosozumab-aqqg (EVENITY) injection 210 mg  210 mg Subcutaneous Q30 Days Alley Collazo MD   210 mg at 08/18/21 1643         BALBINA MARCUS PA-C  [facility] [place]    Balbina Wu PA-C    Visit length 15 minutes. An additional 15 minutes were spent on date of service in chart review, documentation, and other activities as noted.

## 2025-06-26 NOTE — NURSING NOTE
"Chief Complaint   Patient presents with    RECHECK     Annual follow up visit     BP (!) 166/80   Pulse 76   Resp 16   Ht 1.499 m (4' 11\")   Wt 54.3 kg (119 lb 12.8 oz)   SpO2 97%   BMI 24.20 kg/m    Kostas Mariee CMA CMA at 11:18 AM on 6/26/2025     "

## 2025-06-26 NOTE — LETTER
6/26/2025       RE: Kimberly Chau  [address] [place] [address]     Dear Colleague,    Thank you for referring your patient, Kimberly Chau, to the [facility] [place] at [facility]. Please see a copy of my visit note below.    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  She required dialysis for ~2.5 months and she is currently off for dialysis since November 2023. She received Antibiotics to treat her infection but no immunosuppressants .She was following with Dr. Cota.  Her most recent creatinine today is around 2.2 mg/dL; however she is small with small muscle mass. Her current GFR is at 25 mL/min, however her Cystatin C previously showed worse GFR   --We discussed today the importance of blood pressure control in halting the progression of chronic kidney disease.  We also discussed the natural history of chronic kidney disease and the expectations moving forward.  We also discussed extensively today the different modalities of renal placement therapy in case if future need arises.    Previously explained in details procedures of both peritoneal and hemodialysis and the implication of a PD catheter versus an AV fistula.    We also discussed conservative kidney management and it is her choice whether she wants to go on dialysis or forego dialysis.    - she prefers [facility]   --Reemphasize to Follow-up with CKD journey to learn more about her options.  -She is on the losartan 25 mg daily and her potassium is acceptable  --She is not on SGLT2 inhibitor and given her advanced age and GFR at this stage, will hold on that.    --She is currently euvolemic on exam and  there is no need for diuretics.    --check cystatin C today    Problem #2 anemia normocytic: Her hemoglobin today is at 10.4 g/dL, iron sat 26%    Taking iron supplement  -Will hold on any referral to the anemia clinic at this point.    -She will need IRASEMA if her hemoglobin is below 9 g/dL.    Problem #3 CKD MBD: Her calcium and phosphorus are rather within normal limits.  Her last PTH is lowish. Vit D pending  which can suggest adynamic bone disease.  She does have a history of osteoporosis for which she is receiving denosumab.    Problem #4 hypertension: Her blood pressure is  high in clinic today but rather rather acceptable range at home.   -No changes to her medications were done at this point.    -She was instructed to measure her blood pressure at home     #Electrolytes  - at goal    #GERD  Takes omeprazole PRN        Subjective  Kimberly is a 84 year old who presents for a nephrology comprehensive health exam.      HPI   Past medical history is significant for longstanding rheumatoid arthritis [currently on no treatment], osteoporosis complicated by T11 compression fracture, anemia, Hpertension, GERD.      She presented to the emergency department in August 26, 2023 with left lower leg ulcer and cellulitis.  She was found to be in acute renal failure (creatinine 8 mg/dl).  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  The degree of fibrosis on the biopsy was 10 to 20%.  She was urgently started on dialysis and received thrice weekly dialysis for 11 weeks.  She has been off dialysis since November 2023.     She developed cellulitis in the left leg early 2024, treated with antibiotics and it got better. She was discharged from podiatry as her ulcer healed.     Her blood pressure at home is ranging around 130-140/70, though  elevated in the clinic today.    Taking amlodipine 5 mg daily, carvedilol 12.5 mg BID, losartan 25 mg daily,      Overall she is doing okay.  Her energy is at her baseline. Her appetite is good.  Her weight has been stable.  Energy is good, she does the cooking and laundry at home. No SOB or gross hematuria or other urinary symptoms.     Social history: she is  for over 66 years. She has 2 sons and 2 granddaughters and one great granddaughter.       Review of Systems   Comprehensive ROS obtained, negative unless noted in HPI.        Past Surgical History:   Procedure Laterality Date     COLONOSCOPY       EYE SURGERY Left 02/2019    Hole in macula     FUSION CERVICAL POSTERIOR THREE+ LEVELS  1/22/2013    Procedure: FUSION CERVICAL POSTERIOR THREE+ LEVELS;  Cervical 1-6 Posterior Instrumentation and Fusion, Cervical 3-4 Laminectomy  .Instrumentation and fusion to Cervical 6 *Latex Allergy*;  Surgeon: Ra Bar MD;  Location: [facility] OR     GYN SURGERY       HEAD & NECK SURGERY       HYSTERECTOMY       IR ENDOVENOUS ABLATION VARICOSE VEINS  2/16/2021     KNEE SURGERY       NECK SURGERY       OPEN REDUCTION INTERNAL FIXATION WRIST Left 4/30/2019    Procedure: Open Reduction Internal Fixation Left Distal Radius Fracture;  Surgeon: Dinh Strong MD;  Location:  OR     OPEN REDUCTION INTERNAL FIXATION WRIST Left 6/12/2020    Procedure: OPEN REDUCTION INTERNAL FIXATION Left Distal Radius Nonunion, Distal Ulna Resection;  Surgeon: Dinh Strong MD;  Location:  OR     OPTICAL TRACKING SYSTEM ENDOSCOPIC SINUS SURGERY Right 4/6/2018    Procedure: OPTICAL TRACKING SYSTEM ENDOSCOPIC SINUS SURGERY;  Stealth Assisted Right Maxillary Antrostomy, Anterior Ethmoidectomy And Frontal Sinusotomy;  Surgeon: Elyse Eisenberg MD;  Location: [facility] OR     OPTICAL TRACKING SYSTEM ENDOSCOPIC SINUS SURGERY Right 8/3/2018    Procedure: OPTICAL TRACKING SYSTEM ENDOSCOPIC SINUS SURGERY;  Stealth Assisted Revision Right  Frontal Sinusotomy, Right Stent Placement  **Latex Allergy** ;  Surgeon: Elyse Eisenberg MD;  Location: [facility] OR     ORTHOPEDIC SURGERY       REMOVE HARDWARE WRIST Left 11/19/2019    Procedure: Left Distal Radius Hardware Removal, Flexor Pollicus Longus Repair, Deep Cultures;  Surgeon: Dinh Strong MD;  Location:  OR     [facility] HAND/FINGER SURGERY UNLISTED       [facility] PELVIS/HIP JOINT SURGERY UNLISTED         Past Medical History:   Diagnosis Date     Anemia      Arthritis      Cataracts      Central retinal artery occlusion      Cervical spine fracture (H)     After a fall from orthostatic sx     Chronic pain     Back of head     Gastro-oesophageal reflux disease      Head injury      Hypertension      Hyponatremia     Resolved, 2/2 diuretics     Migraines      Osteoporosis      Pneumonia 2018     Rheumatoid arthritis(714.0)      Social History     Socioeconomic History     Marital status:    Tobacco Use     Smoking status: Never     Smokeless tobacco: Never   Substance and Sexual Activity     Alcohol use: Yes     Comment: 2 glasses of wine a day     Drug use: Never     Sexual activity: Not Currently   Social History Narrative    Ms. Chau is , has two grown children and two grandchildren. She does not work. She was never a smoker, and drinks a glass of wine with dinner each night.         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      Social Drivers of Health     Financial Resource Strain: Low Risk  (8/24/2024)    Received from [facility]    Financial Resource Strain      Difficulty of Paying Living Expenses: 3   Food Insecurity: No Food Insecurity (8/24/2024)    Received from [facility]    Food Insecurity      Do you worry your food will run out before you are able to buy more?: 1   Transportation Needs: No Transportation Needs (8/24/2024)    Received from [facility]     Transportation Needs      Does lack of transportation keep you from medical appointments?: 1      Does lack of transportation keep you from work, meetings or getting things that you need?: 1   Social Connections: Socially Integrated (8/24/2024)    Received from [facility]    Social Connections      Do you often feel lonely or isolated from those around you?: 0   Housing Stability: Low Risk  (8/24/2024)    Received from [facility]    Housing Stability      What is your housing situation today?: 1      Family History   Problem Relation Age of Onset     Arthritis Mother      Respiratory Mother         pulmonary fibrosis     Hypertension Mother      Anemia Mother      Liver Disease Father         from EtOH     Alcoholism Father      No Known Problems Maternal Grandmother      Heart Disease Maternal Grandfather      Glaucoma Paternal Grandfather      Heart Disease Paternal Grandfather      Multiple Sclerosis Sister      Breast Cancer Sister      Skin Cancer No family hx of      Melanoma No family hx of       Immunization History   Administered Date(s) Administered     COVID-19 12+ (MODERNA) 10/29/2024     COVID-19 Bivalent 12+ (Pfizer) 10/17/2022     COVID-19 MONOVALENT 12+ (Pfizer) 02/10/2021, 03/03/2021, 12/17/2021     DTaP, Unspecified 03/10/2014     Flu, Unspecified 09/27/2010     Influenza (High Dose) Trivalent,PF (Fluzone) 10/29/2024     Influenza (IIV3) PF 11/11/2003, 09/27/2010, 09/27/2010, 01/08/2013     Influenza Vaccine 65+ (Fluzone HD) 10/27/2020, 12/17/2021     Mantoux Tuberculin Skin Test 09/13/2023, 09/23/2023     Pneumococcal (PCV 7) 09/25/2011     Pneumococcal 20 valent Conjugate (Prevnar 20) 05/17/2022     Pneumococcal 23 valent 06/25/2011     RSV Vaccine (Arexvy) 11/01/2024     TDAP (Adacel,Boostrix) 11/01/2024     TDAP Vaccine (Boostrix) 03/10/2014     Zoster recombinant adjuvanted (Shingrix) 09/05/2018, 11/19/2018     Zoster vaccine,  "live 04/12/2010     Health Maintenance   Topic Date Due     URINE DRUG SCREEN  Never done     COLORECTAL CANCER SCREENING  09/29/2011     MEDICARE ANNUAL WELLNESS VISIT  03/16/2023     COVID-19 VACCINE (6 - 2024-25 season) 04/29/2025     BMP  06/04/2025     MICROALBUMIN  06/04/2025     HEMOGLOBIN  09/04/2025     FALL RISK ASSESSMENT  09/12/2025     LIPID  01/28/2026     ADVANCE CARE PLANNING  03/13/2030     DTAP/TDAP/TD VACCINE (4 - Td or Tdap) 11/01/2034     DEXA  08/16/2038     PARATHYROID  Completed     PHOSPHORUS  Completed     PHQ-2 (once per calendar year)  Completed     INFLUENZA VACCINE  Completed     PNEUMOCOCCAL VACCINE 50+ YEARS  Completed     URINALYSIS  Completed     ALK PHOS  Completed     ZOSTER VACCINE  Completed     RSV VACCINE  Completed     HPV VACCINE  Aged Out     MENINGITIS VACCINE  Aged Out        Objective   BP (!) 166/80   Pulse 76   Resp 16   Ht 1.499 m (4' 11\")   Wt 54.3 kg (119 lb 12.8 oz)   SpO2 97%   BMI 24.20 kg/m    Estimated body mass index is 24.2 kg/m  as calculated from the following:    Height as of this encounter: 1.499 m (4' 11\").    Weight as of this encounter: 54.3 kg (119 lb 12.8 oz).    Physical Exam   General: A/O  Skin: warm and dry, no visible rash  Heart: RRR  Lungs: CTA  Extremities: no LE edema     TIMOTHY  No data recorded  No data recorded    PHQ-9  No data recorded  No data recorded    Recent Labs   Lab Test 03/04/25  1411 01/28/25  1105 10/15/24  1236 06/28/24  1331 03/26/24  1402 03/05/24  1454 12/19/23  1213 12/06/23  1159   GFRESTIMATED 21* 24* 26* 26* 28* 21*   < >  --    CR 2.21* 2.00* 1.85* 1.87* 1.75* 2.19*   < >  --    ALBUMIN 4.4  --  4.4 4.4 4.1 4.0   < >  --    PROTEIN 10*  --   --   --  50*  --   --  10*   BUN 45.2* 46.5* 26.8* 32.5* 29.9* 39.5*   < >  --     < > = values in this interval not displayed.     No results for input(s): \"A1C\" in the last 53859 hours.  Recent Labs   Lab Test 03/04/25  1411 01/28/25  1105 10/15/24  1236 06/28/24  1331 " 03/26/24  1402   HGB 10.4* 10.5* 10.5* 10.5* 9.5*     Recent Labs   Lab Test 12/19/23  1213   MONE 1,312*      IRON 111     Recent Labs   Lab Test 03/04/25  1411 01/28/25  1105 10/15/24  1236 06/28/24  1331 03/26/24  1402    138 139 139 135   POTASSIUM 5.1 4.7 4.6 4.9 4.6   KINA 10.3 10.4  10.4 9.5 9.8 9.3   PHOS 5.1* 4.6* 3.4 3.9 3.9     Recent Labs   Lab Test 10/15/24  1236 03/26/24  1402 01/16/24  1127 12/06/23  1137   PTHI 85* 46 13* 20       Imaging:   Reviewed        Allergies   Allergen Reactions     Hctz Other (See Comments)     Pt develops symptomatic and significant hyponatremia with HCTZ exposure     Hydrochlorothiazide      Other reaction(s): Hyponatremia  Hyponatremia     Latex      Benzocaine Rash     carba mix,thrium-sunscreen     Ra Lotion Rash     carba mix,thrium-sunscreen     Ace Inhibitors Unknown     Dizziness and orthostatic changes and electrolyte problems.     Latex Unknown       Current Outpatient Medications   Medication Sig Dispense Refill     acetaminophen 650 MG TABS Take 650 mg by mouth every 4 hours as needed. 100 tablet 0     amLODIPine (NORVASC) 5 MG tablet Take 1 tablet (5 mg) by mouth daily. 90 tablet 1     amoxicillin-clavulanate (AUGMENTIN) 500-125 MG tablet Take 1 tablet by mouth 2 times daily 14 tablet 0     carvedilol (COREG) 12.5 MG tablet Take 1 tablet (12.5 mg) by mouth 2 times daily (with meals). 180 tablet 1     cephALEXin (KEFLEX) 500 MG capsule Take 1 capsule (500 mg) by mouth 2 times daily. 28 capsule 0     cephALEXin (KEFLEX) 500 MG capsule Take 1 capsule (500 mg) by mouth 2 times daily. 28 capsule 0     COMPRESSION STOCKINGS Please measure and distribute 1 pair of 20mmHg - 30mmHg knee high open or closed toe compression stockings. Jobst ultrasheer or equivalent. 2 each 4     denosumab (PROLIA) 60 MG/ML SOSY injection Inject 1 mL (60 mg) Subcutaneous once for 1 dose 1 mL 0     Diphenhydramine-APAP, sleep, (TYLENOL PM EXTRA STRENGTH PO) Take 2 tablets by  "mouth At Bedtime        ferrous gluconate (FERGON) 324 (38 Fe) MG tablet Take 324 mg by mouth       Gauze Pads & Dressings (TELFA NON-ADHERENT) 3\"X4\" PADS Cut to size for open areas on the lower extremities and secure with paper tape. Change dressing every other day. 30 each 11     losartan (COZAAR) 25 MG tablet Take 1 tablet (25 mg) by mouth at bedtime. 90 tablet 3     multivitamin w/minerals (MULTI-VITAMIN) tablet Take 1 tablet by mouth every morning 90 tablet 11     Omeprazole (PRILOSEC PO) Take 20 mg by mouth as needed        pentoxifylline ER (TRENTAL) 400 MG CR tablet Take 1 tablet (400 mg) by mouth daily.       vitamin D3 (CHOLECALCIFEROL) 50 mcg (2000 units) tablet Take 1 tablet by mouth daily       Current Facility-Administered Medications   Medication Dose Route Frequency Provider Last Rate Last Admin     romosozumab-aqqg (EVENITY) injection 210 mg  210 mg Subcutaneous Q30 Days Alley Collazo MD   210 mg at 05/25/22 1246     romosozumab-aqqg (EVENITY) injection 210 mg  210 mg Subcutaneous Q30 Days Kerri Lxu MD   210 mg at 04/23/22 1152     romosozumab-aqqg (EVENITY) injection 210 mg  210 mg Subcutaneous Q30 Days Abigail Lugo MD   210 mg at 03/16/22 1450     romosozumab-aqqg (EVENITY) injection 210 mg  210 mg Subcutaneous Q30 Days Alley Collazo MD   210 mg at 08/18/21 1643         BALBINA MARCUS PA-C  [facility] [place]    Balbina Wu PA-C    Visit length 15 minutes. An additional 15 minutes were spent on date of service in chart review, documentation, and other activities as noted.        Again, thank you for allowing me to participate in the care of your patient.      Sincerely,    BALBINA MARCUS PA-C    "

## 2025-06-26 NOTE — PATIENT INSTRUCTIONS
No changes today.   Will call with remainder of lab results/recommendations.   Check blood pressure daily, keep log.   Continue good hydration, low sodium diet.   Labs and follow up with Dr. Aguirre as planned in September.

## 2025-07-15 ENCOUNTER — PATIENT OUTREACH (OUTPATIENT)
Dept: NEPHROLOGY | Facility: CLINIC | Age: 85
End: 2025-07-15
Payer: MEDICARE

## 2025-07-15 NOTE — PROGRESS NOTES
Nephrology Note: RN CC Chart Review    REASON FOR ENCOUNTER:     Chart reviewed by nephrology RN CC                        Please let her know there is a little discrepancy between eGFR 25 and cystatin GFR 18. She is still stage 4. Her vit D is very high - stop vit D. Proteinuria is stable. Follow up as planned.      Thanks  Balbina   SITUATION/BACKROUND:     Last nephrology visit:    Last Renal Panel:  Sodium   Date Value Ref Range Status   06/26/2025 137 135 - 145 mmol/L Final   01/06/2021 134 133 - 144 mmol/L Final     Potassium   Date Value Ref Range Status   06/26/2025 4.4 3.4 - 5.3 mmol/L Final   07/08/2022 4.3 3.4 - 5.3 mmol/L Final   01/06/2021 4.0 3.4 - 5.3 mmol/L Final     Chloride   Date Value Ref Range Status   06/26/2025 102 98 - 107 mmol/L Final   07/08/2022 104 94 - 109 mmol/L Final   01/06/2021 102 94 - 109 mmol/L Final     Carbon Dioxide   Date Value Ref Range Status   01/06/2021 28 20 - 32 mmol/L Final     Carbon Dioxide (CO2)   Date Value Ref Range Status   06/26/2025 22 22 - 29 mmol/L Final   07/08/2022 26 20 - 32 mmol/L Final     Anion Gap   Date Value Ref Range Status   06/26/2025 13 7 - 15 mmol/L Final   07/08/2022 10 3 - 14 mmol/L Final   01/06/2021 4 3 - 14 mmol/L Final     Glucose   Date Value Ref Range Status   06/26/2025 94 70 - 99 mg/dL Final   07/08/2022 97 70 - 99 mg/dL Final   01/06/2021 87 70 - 99 mg/dL Final     Urea Nitrogen   Date Value Ref Range Status   06/26/2025 37.5 (H) 8.0 - 23.0 mg/dL Final   07/08/2022 11 7 - 30 mg/dL Final   01/06/2021 14 7 - 30 mg/dL Final     Creatinine   Date Value Ref Range Status   06/26/2025 1.90 (H) 0.51 - 0.95 mg/dL Final   01/06/2021 0.90 0.52 - 1.04 mg/dL Final     GFR Estimate   Date Value Ref Range Status   06/26/2025 25 (L) >60 mL/min/1.73m2 Final     Comment:     eGFR calculated using 2021 CKD-EPI equation.   01/06/2021 59 (L) >60 mL/min/[1.73_m2] Final     Comment:     Non  GFR Calc  Starting 12/18/2018, serum creatinine  based estimated GFR (eGFR) will be   calculated using the Chronic Kidney Disease Epidemiology Collaboration   (CKD-EPI) equation.       Calcium   Date Value Ref Range Status   06/26/2025 9.7 8.8 - 10.4 mg/dL Final   01/06/2021 8.8 8.5 - 10.1 mg/dL Final     Phosphorus   Date Value Ref Range Status   06/26/2025 4.4 2.5 - 4.5 mg/dL Final   05/06/2021 4.5 2.5 - 4.5 mg/dL Final     Albumin   Date Value Ref Range Status   06/26/2025 4.3 3.5 - 5.2 g/dL Final   03/16/2022 3.6 3.4 - 5.0 g/dL Final   01/06/2021 2.8 (L) 3.4 - 5.0 g/dL Final         Neph Tracking Status:  Neph Tracking Flowsheet Last Filled Values       Patient's Referral Dates Auto Populate Patient's Referral Dates    Home Care Referral 3/5/24    Journey Referral 3/26/24              ASSESSMENT/PLAN     Patient to follow up as scheduled at next appointment    Upcoming Appointments:  Future Appts Next 180 days       Visit Type Date Time Department    RETURN NEPHROLOGY 9/17/2025  1:30 PM  MEDICINE RENAL              Lizy Mak RN

## 2025-07-17 DIAGNOSIS — L97.829 VENOUS STASIS ULCER OF OTHER PART OF LEFT LOWER LEG WITHOUT VARICOSE VEINS, UNSPECIFIED ULCER STAGE (H): ICD-10-CM

## 2025-07-17 DIAGNOSIS — I87.2 VENOUS STASIS DERMATITIS: ICD-10-CM

## 2025-07-17 DIAGNOSIS — I87.2 VENOUS STASIS ULCER OF OTHER PART OF LEFT LOWER LEG WITHOUT VARICOSE VEINS, UNSPECIFIED ULCER STAGE (H): ICD-10-CM

## 2025-07-21 RX ORDER — PENTOXIFYLLINE 400 MG/1
400 TABLET, EXTENDED RELEASE ORAL 2 TIMES DAILY
Qty: 120 TABLET | Refills: 0 | Status: SHIPPED | OUTPATIENT
Start: 2025-07-21

## 2025-07-21 NOTE — TELEPHONE ENCOUNTER
Last Written Prescription:  pentoxifylline ER (TRENTAL) 400 MG CR tablet -- -- 4/18/2025 -- No   Sig - Route: Take 1 tablet (400 mg) by mouth daily. - Oral   Class: No Print Out     _____________    Last Office Visit: 10/8/2024  [facility] [place] [facility] Office Visit: 0  _____________        Refill Decision: Medication unable to be refilled by RN due to: Medication not included in refill protocol policy                Requested Prescriptions   Pending Prescriptions Disp Refills    pentoxifylline ER (TRENTAL) 400 MG CR tablet [Pharmacy Med Name: Pentoxifylline ER Oral Tablet Extended Release 400 MG] 120 tablet 0     Sig: Take 1 tablet (400 mg) by mouth 2 times daily       Platelet Inhibitors Failed - 7/21/2025  8:52 AM        Failed - Normal HGB on file in past 12 months     Recent Labs   Lab Test 06/26/25  1032   HGB 10.4*               Failed - Medication is active on med list and the sig matches. RN to manually verify dose and sig if red X/fail.     If the protocol passes (green check), you do not need to verify med dose and sig.    A prescription matches if they are the same clinical intention.    For Example: once daily and every morning are the same.    The protocol can not identify upper and lower case letters as matching and will fail.     For Example: Take 1 tablet (50 mg) by mouth daily     TAKE 1 TABLET (50 MG) BY MOUTH DAILY    For all fails (red x), verify dose and sig.    If the refill does match what is on file, the RN can still proceed to approve the refill request.       If they do not match, route to the appropriate provider.             Failed - Medication indicated for associated diagnosis     Medication is associated with one or more of the following diagnoses:     Cerebrovascular accident   Myocardial infarction   Non-ST segment elevation myocardial infarction, acute - Percutaneous coronary   intervention - Thrombosis; Prophylaxis   Transient ischemic attack;  Treatment and Prophylaxis   Percutaneous coronary intervention - Thrombosis; Prophylaxis - Unstable angina   Unstable angina   Raynaud's Disease          Passed - Normal Platelets on file in past 12 months     Recent Labs   Lab Test 06/26/25  1032                  Passed - Recent (12 month) or future (90 days) visit with authorizing provider's specialty (provided they have been seen in the past 15 months)     The patient must have completed an in-person or virtual visit within the past 12 months or has a future visit scheduled within the next 90 days with the authorizing provider s specialty.  Urgent care and e-visits do not qualify as an office visit for this protocol.          Passed - Patient is age 18 or older        Passed - No active pregnancy on record        Passed - No positive pregnancy test in past 12 months

## 2025-08-06 ENCOUNTER — OFFICE VISIT (OUTPATIENT)
Dept: ORTHOPEDICS | Facility: CLINIC | Age: 85
End: 2025-08-06
Payer: MEDICARE

## 2025-08-06 DIAGNOSIS — Z92.29 PERSONAL HISTORY OF OTHER DRUG THERAPY: ICD-10-CM

## 2025-08-06 DIAGNOSIS — M81.0 AGE-RELATED OSTEOPOROSIS WITHOUT CURRENT PATHOLOGICAL FRACTURE: Primary | ICD-10-CM

## 2025-08-06 DIAGNOSIS — M80.00XA OSTEOPOROSIS WITH PATHOLOGICAL FRACTURE, INITIAL ENCOUNTER: ICD-10-CM

## 2025-08-06 DIAGNOSIS — E67.3 HIGH VITAMIN D LEVEL: ICD-10-CM

## 2025-08-06 PROCEDURE — 99213 OFFICE O/P EST LOW 20 MIN: CPT | Mod: 25 | Performed by: FAMILY MEDICINE

## 2025-08-06 PROCEDURE — 96372 THER/PROPH/DIAG INJ SC/IM: CPT | Mod: RT | Performed by: FAMILY MEDICINE

## 2025-08-26 ENCOUNTER — OFFICE VISIT (OUTPATIENT)
Dept: INTERNAL MEDICINE | Facility: CLINIC | Age: 85
End: 2025-08-26
Payer: MEDICARE

## 2025-08-26 VITALS
OXYGEN SATURATION: 99 % | BODY MASS INDEX: 23.06 KG/M2 | TEMPERATURE: 97.6 F | HEART RATE: 75 BPM | HEIGHT: 59 IN | DIASTOLIC BLOOD PRESSURE: 79 MMHG | SYSTOLIC BLOOD PRESSURE: 137 MMHG | RESPIRATION RATE: 16 BRPM | WEIGHT: 114.4 LBS

## 2025-08-26 DIAGNOSIS — I10 BENIGN ESSENTIAL HYPERTENSION: Primary | ICD-10-CM

## 2025-08-26 PROCEDURE — 99215 OFFICE O/P EST HI 40 MIN: CPT | Performed by: INTERNAL MEDICINE

## 2025-08-26 PROCEDURE — 3078F DIAST BP <80 MM HG: CPT | Performed by: INTERNAL MEDICINE

## 2025-08-26 PROCEDURE — 3075F SYST BP GE 130 - 139MM HG: CPT | Performed by: INTERNAL MEDICINE

## (undated) DEVICE — LINEN TOWEL PACK X5 5464

## (undated) DEVICE — SYR 10ML FINGER CONTROL W/O NDL 309695

## (undated) DEVICE — WIPE INSTRUMENT MEROCEL 400200

## (undated) DEVICE — CAST PLASTER ROLL 2"  7372

## (undated) DEVICE — TUBING IRRIGATOR STRAIGHTSHOT XPS 1895522

## (undated) DEVICE — Device

## (undated) DEVICE — APPLICATOR COTTON TIP 6"X2 STERILE LF 6012

## (undated) DEVICE — TOURNIQUET CUFF 18" REPRO RED 60-7070-103

## (undated) DEVICE — TRACKER ENT OTS INSTRUMENT FUSION 9733533

## (undated) DEVICE — DRAPE STERI TOWEL LG 1010

## (undated) DEVICE — PAD CHUX UNDERPAD 30X30"

## (undated) DEVICE — DRAPE C-ARM OEC MINI VIEW 6800   00-901917-01

## (undated) DEVICE — SUCTION MANIFOLD DORNOCH ULTRA CART UL-CL500

## (undated) DEVICE — SPONGE COTTONOID 1/2X3" 20-07S

## (undated) DEVICE — KIT CULTURE TRANSPORT SYS A.C.T. II DUAL ANEROBE R124022

## (undated) DEVICE — STRAP KNEE/BODY 31143004

## (undated) DEVICE — NDL 25GA 2"  8881200441

## (undated) DEVICE — PEN MARKING SKIN VISIMARK 1424SR

## (undated) DEVICE — PREP CHLORAPREP 26ML TINTED ORANGE  260815

## (undated) DEVICE — LINEN TOWEL PACK X6 WHITE 5487

## (undated) DEVICE — COVER CAMERA IN-LIGHT DISP LT-C02

## (undated) DEVICE — SUCTION MANIFOLD NEPTUNE 2 SYS 1 PORT 702-025-000

## (undated) DEVICE — BLADE SINUS TRICUT STR 4MMX13CM FUSION ROTATE W/TRACKING

## (undated) DEVICE — SYR 03ML LL W/O NDL 309657

## (undated) DEVICE — SLING ARM MED 79-99155

## (undated) DEVICE — CAST PADDING 4" COTTON SPECIALIST 100 UNSTERILE 7054

## (undated) DEVICE — SYR 30ML LL W/O NDL 302832

## (undated) DEVICE — TRACKER PATIENT NON-INVASIVE AXIEM 9734887XOM

## (undated) DEVICE — GOWN XLG DISP 9545

## (undated) DEVICE — CAST PLASTER SPLINT 4X15" 7394

## (undated) DEVICE — DRSG STERI STRIP 1/2X4" R1547

## (undated) DEVICE — ESU PENCIL W/HOLSTER E2350H

## (undated) DEVICE — SLING ARM SM 79-99153

## (undated) DEVICE — GLOVE PROTEXIS BLUE W/NEU-THERA 6.5  2D73EB65

## (undated) DEVICE — SU VICRYL 3-0 SH 27" UND J416H

## (undated) DEVICE — CAST PADDING 4" STERILE 9044S

## (undated) DEVICE — PACK NEURO MINOR UMMC SNE32MNMU4

## (undated) DEVICE — LINEN GOWN XLG 5407

## (undated) DEVICE — ESU GROUND PAD ADULT W/CORD E7507

## (undated) DEVICE — CAST PADDING 4" COTTON WEBRIL STERILE 9084S

## (undated) DEVICE — GLOVE PROTEXIS W/NEU-THERA 6.5  2D73TE65

## (undated) DEVICE — DRAPE IOBAN INCISE 23X17" 6650EZ

## (undated) DEVICE — SU VICRYL 3-0 RB-1 27" UND J215H

## (undated) DEVICE — BNDG ELASTIC 4"X5YDS UNSTERILE 6611-40

## (undated) DEVICE — TUBING SUCTION MEDI-VAC SOFT 3/16"X20' N520A

## (undated) DEVICE — SPECIMEN TRAP MUCOUS 40ML LUKI C30200A

## (undated) DEVICE — DRILL BIT 1.8MM  310.509

## (undated) DEVICE — DRSG ABDOMINAL 07 1/2X8" 7197D

## (undated) DEVICE — LINEN ORTHO PACK 5446

## (undated) DEVICE — SOL NACL 0.9% IRRIG 1000ML BOTTLE 2F7124

## (undated) DEVICE — SPECIMEN BAG MEDIVAC SUCTION WHITE SOCK 65652-122

## (undated) DEVICE — DRAPE WARMER 66X44" ORS-300

## (undated) DEVICE — SU VICRYL 4-0 PS-2 18" UND J496H

## (undated) DEVICE — TOURNIQUET SGL BLADDER 18"X4" RED 5921-218-135

## (undated) DEVICE — DRILL BIT

## (undated) DEVICE — LIGHT HANDLE X1 31140133

## (undated) DEVICE — BRUSH SURGICAL SCRUB W/4% CHG SOL 25ML 371073

## (undated) DEVICE — SU VICRYL 2-0 CT-2 27" UND J269H

## (undated) DEVICE — SU ETHIBOND 3-0 SHDA 30" X562H

## (undated) DEVICE — SU MONOCRYL 4-0 PS-2 18" UND Y496G

## (undated) DEVICE — BLADE KNIFE SURG 15 371115

## (undated) DEVICE — DRSG TEGADERM 2 3/8X2 3/4" 1624W

## (undated) DEVICE — SPECIMEN CONTAINER 5OZ STERILE 2600SA

## (undated) DEVICE — ESU SUCTION CAUTERY 10FR FOOT CONTROL E2505-10FR

## (undated) DEVICE — NDL 22GA 1.5"

## (undated) DEVICE — DRSG XEROFORM 5X9" 8884431605

## (undated) DEVICE — SOL WATER IRRIG 1000ML BOTTLE 2F7114

## (undated) DEVICE — SOL NACL 0.9% INJ 1000ML BAG 07983-09

## (undated) DEVICE — GLOVE PROTEXIS POWDER FREE SMT 6.5  2D72PT65X

## (undated) DEVICE — PACK HAND CUSTOM ASC

## (undated) DEVICE — ESU HOLSTER PLASTIC DISP E2400

## (undated) DEVICE — SU ETHILON 4-0 PS-2 18" BLACK 1667H

## (undated) DEVICE — BUR 30K DIAMOND 70DG 3MM 13CM 1883070BLD

## (undated) DEVICE — CAST PADDING 4" UNSTERILE 9044

## (undated) DEVICE — IMP WIRE KIRSCHNER 1.25X150MM 292.12
Type: IMPLANTABLE DEVICE | Site: WRIST | Status: NON-FUNCTIONAL
Removed: 2019-04-30

## (undated) DEVICE — PACK ENT ENDOSCOPY UMMC

## (undated) DEVICE — SPONGE RAY-TEC 4X8" 7318

## (undated) DEVICE — SOL WATER IRRIG 1000ML BOTTLE 07139-09

## (undated) RX ORDER — ONDANSETRON 2 MG/ML
INJECTION INTRAMUSCULAR; INTRAVENOUS
Status: DISPENSED
Start: 2019-11-19

## (undated) RX ORDER — OXYMETAZOLINE HYDROCHLORIDE 0.05 G/100ML
SPRAY NASAL
Status: DISPENSED
Start: 2018-04-06

## (undated) RX ORDER — FENTANYL CITRATE 50 UG/ML
INJECTION, SOLUTION INTRAMUSCULAR; INTRAVENOUS
Status: DISPENSED
Start: 2019-04-30

## (undated) RX ORDER — FENTANYL CITRATE 50 UG/ML
INJECTION, SOLUTION INTRAMUSCULAR; INTRAVENOUS
Status: DISPENSED
Start: 2020-06-12

## (undated) RX ORDER — PHENYLEPHRINE HCL IN 0.9% NACL 1 MG/10 ML
SYRINGE (ML) INTRAVENOUS
Status: DISPENSED
Start: 2018-04-06

## (undated) RX ORDER — FENTANYL CITRATE 50 UG/ML
INJECTION, SOLUTION INTRAMUSCULAR; INTRAVENOUS
Status: DISPENSED
Start: 2018-04-06

## (undated) RX ORDER — ACETAMINOPHEN 325 MG/1
TABLET ORAL
Status: DISPENSED
Start: 2019-04-30

## (undated) RX ORDER — HYDROMORPHONE HCL/0.9% NACL/PF 0.2MG/0.2
SYRINGE (ML) INTRAVENOUS
Status: DISPENSED
Start: 2018-04-06

## (undated) RX ORDER — ONDANSETRON 2 MG/ML
INJECTION INTRAMUSCULAR; INTRAVENOUS
Status: DISPENSED
Start: 2018-08-03

## (undated) RX ORDER — KETAMINE HCL IN 0.9 % NACL 50 MG/5 ML
SYRINGE (ML) INTRAVENOUS
Status: DISPENSED
Start: 2019-04-30

## (undated) RX ORDER — ACETAMINOPHEN 325 MG/1
TABLET ORAL
Status: DISPENSED
Start: 2018-08-03

## (undated) RX ORDER — ACETAMINOPHEN 500 MG
TABLET ORAL
Status: DISPENSED
Start: 2021-02-16

## (undated) RX ORDER — DIAZEPAM 5 MG
TABLET ORAL
Status: DISPENSED
Start: 2021-02-16

## (undated) RX ORDER — FENTANYL CITRATE 50 UG/ML
INJECTION, SOLUTION INTRAMUSCULAR; INTRAVENOUS
Status: DISPENSED
Start: 2019-11-19

## (undated) RX ORDER — PROPOFOL 10 MG/ML
INJECTION, EMULSION INTRAVENOUS
Status: DISPENSED
Start: 2019-11-19

## (undated) RX ORDER — PROPOFOL 10 MG/ML
INJECTION, EMULSION INTRAVENOUS
Status: DISPENSED
Start: 2020-06-12

## (undated) RX ORDER — PROPOFOL 10 MG/ML
INJECTION, EMULSION INTRAVENOUS
Status: DISPENSED
Start: 2018-04-06

## (undated) RX ORDER — GABAPENTIN 300 MG/1
CAPSULE ORAL
Status: DISPENSED
Start: 2018-04-06

## (undated) RX ORDER — ACETAMINOPHEN 325 MG/1
TABLET ORAL
Status: DISPENSED
Start: 2020-06-12

## (undated) RX ORDER — ACETAMINOPHEN 325 MG/1
TABLET ORAL
Status: DISPENSED
Start: 2019-11-19

## (undated) RX ORDER — TRAMADOL HYDROCHLORIDE 50 MG/1
TABLET ORAL
Status: DISPENSED
Start: 2019-04-30

## (undated) RX ORDER — PHENYLEPHRINE HCL IN 0.9% NACL 1 MG/10 ML
SYRINGE (ML) INTRAVENOUS
Status: DISPENSED
Start: 2019-11-19

## (undated) RX ORDER — FENTANYL CITRATE 50 UG/ML
INJECTION, SOLUTION INTRAMUSCULAR; INTRAVENOUS
Status: DISPENSED
Start: 2018-08-03

## (undated) RX ORDER — ONDANSETRON 2 MG/ML
INJECTION INTRAMUSCULAR; INTRAVENOUS
Status: DISPENSED
Start: 2020-06-12

## (undated) RX ORDER — BUPIVACAINE HYDROCHLORIDE 5 MG/ML
INJECTION, SOLUTION EPIDURAL; INTRACAUDAL
Status: DISPENSED
Start: 2019-04-30

## (undated) RX ORDER — HYDROMORPHONE HYDROCHLORIDE 1 MG/ML
INJECTION, SOLUTION INTRAMUSCULAR; INTRAVENOUS; SUBCUTANEOUS
Status: DISPENSED
Start: 2019-04-30

## (undated) RX ORDER — DEXAMETHASONE SODIUM PHOSPHATE 4 MG/ML
INJECTION, SOLUTION INTRA-ARTICULAR; INTRALESIONAL; INTRAMUSCULAR; INTRAVENOUS; SOFT TISSUE
Status: DISPENSED
Start: 2018-08-03

## (undated) RX ORDER — LIDOCAINE HYDROCHLORIDE 10 MG/ML
INJECTION, SOLUTION EPIDURAL; INFILTRATION; INTRACAUDAL; PERINEURAL
Status: DISPENSED
Start: 2021-02-16

## (undated) RX ORDER — LIDOCAINE HYDROCHLORIDE AND EPINEPHRINE 10; 10 MG/ML; UG/ML
INJECTION, SOLUTION INFILTRATION; PERINEURAL
Status: DISPENSED
Start: 2020-06-12

## (undated) RX ORDER — CEFAZOLIN SODIUM 1 G/3ML
INJECTION, POWDER, FOR SOLUTION INTRAMUSCULAR; INTRAVENOUS
Status: DISPENSED
Start: 2020-06-12

## (undated) RX ORDER — OXYCODONE HYDROCHLORIDE 5 MG/1
TABLET ORAL
Status: DISPENSED
Start: 2019-04-30

## (undated) RX ORDER — ONDANSETRON 2 MG/ML
INJECTION INTRAMUSCULAR; INTRAVENOUS
Status: DISPENSED
Start: 2019-04-30

## (undated) RX ORDER — VANCOMYCIN HYDROCHLORIDE 1 G/20ML
INJECTION, POWDER, LYOPHILIZED, FOR SOLUTION INTRAVENOUS
Status: DISPENSED
Start: 2019-11-19

## (undated) RX ORDER — ACETAMINOPHEN 325 MG/1
TABLET ORAL
Status: DISPENSED
Start: 2018-04-06

## (undated) RX ORDER — OXYCODONE HYDROCHLORIDE 5 MG/1
TABLET ORAL
Status: DISPENSED
Start: 2018-04-06

## (undated) RX ORDER — LIDOCAINE HYDROCHLORIDE 20 MG/ML
INJECTION, SOLUTION EPIDURAL; INFILTRATION; INTRACAUDAL; PERINEURAL
Status: DISPENSED
Start: 2019-04-30

## (undated) RX ORDER — ONDANSETRON 2 MG/ML
INJECTION INTRAMUSCULAR; INTRAVENOUS
Status: DISPENSED
Start: 2018-04-06

## (undated) RX ORDER — DEXAMETHASONE SODIUM PHOSPHATE 4 MG/ML
INJECTION, SOLUTION INTRA-ARTICULAR; INTRALESIONAL; INTRAMUSCULAR; INTRAVENOUS; SOFT TISSUE
Status: DISPENSED
Start: 2018-04-06

## (undated) RX ORDER — LIDOCAINE HYDROCHLORIDE 20 MG/ML
INJECTION, SOLUTION EPIDURAL; INFILTRATION; INTRACAUDAL; PERINEURAL
Status: DISPENSED
Start: 2018-04-06

## (undated) RX ORDER — LIDOCAINE HYDROCHLORIDE 20 MG/ML
INJECTION, SOLUTION EPIDURAL; INFILTRATION; INTRACAUDAL; PERINEURAL
Status: DISPENSED
Start: 2019-11-19

## (undated) RX ORDER — CEFAZOLIN SODIUM 1 G/3ML
INJECTION, POWDER, FOR SOLUTION INTRAMUSCULAR; INTRAVENOUS
Status: DISPENSED
Start: 2019-04-30

## (undated) RX ORDER — OXYMETAZOLINE HYDROCHLORIDE 0.05 G/100ML
SPRAY NASAL
Status: DISPENSED
Start: 2018-08-03

## (undated) RX ORDER — PROPOFOL 10 MG/ML
INJECTION, EMULSION INTRAVENOUS
Status: DISPENSED
Start: 2019-04-30

## (undated) RX ORDER — EPINEPHRINE NASAL SOLUTION 1 MG/ML
SOLUTION NASAL
Status: DISPENSED
Start: 2018-04-06

## (undated) RX ORDER — LIDOCAINE HYDROCHLORIDE 10 MG/ML
INJECTION, SOLUTION EPIDURAL; INFILTRATION; INTRACAUDAL; PERINEURAL
Status: DISPENSED
Start: 2019-04-30

## (undated) RX ORDER — LIDOCAINE HYDROCHLORIDE AND EPINEPHRINE 10; 10 MG/ML; UG/ML
INJECTION, SOLUTION INFILTRATION; PERINEURAL
Status: DISPENSED
Start: 2018-04-06

## (undated) RX ORDER — VANCOMYCIN HYDROCHLORIDE 1 G/200ML
INJECTION, SOLUTION INTRAVENOUS
Status: DISPENSED
Start: 2019-11-19